# Patient Record
Sex: FEMALE | Race: WHITE
[De-identification: names, ages, dates, MRNs, and addresses within clinical notes are randomized per-mention and may not be internally consistent; named-entity substitution may affect disease eponyms.]

---

## 2020-10-23 ENCOUNTER — HOSPITAL ENCOUNTER (OUTPATIENT)
Dept: HOSPITAL 100 - WC | Age: 60
LOS: 8 days | End: 2020-10-31
Payer: COMMERCIAL

## 2020-10-23 ENCOUNTER — HOSPITAL ENCOUNTER (INPATIENT)
Dept: HOSPITAL 100 - ED | Age: 60
LOS: 6 days | Discharge: ANOTHER HOSPITAL | DRG: 477 | End: 2020-10-29
Payer: COMMERCIAL

## 2020-10-23 VITALS
SYSTOLIC BLOOD PRESSURE: 133 MMHG | RESPIRATION RATE: 21 BRPM | HEART RATE: 94 BPM | DIASTOLIC BLOOD PRESSURE: 113 MMHG | OXYGEN SATURATION: 97 %

## 2020-10-23 VITALS
DIASTOLIC BLOOD PRESSURE: 57 MMHG | HEART RATE: 89 BPM | RESPIRATION RATE: 22 BRPM | OXYGEN SATURATION: 98 % | SYSTOLIC BLOOD PRESSURE: 163 MMHG | TEMPERATURE: 97 F

## 2020-10-23 VITALS — BODY MASS INDEX: 42 KG/M2

## 2020-10-23 VITALS
HEART RATE: 87 BPM | RESPIRATION RATE: 23 BRPM | TEMPERATURE: 96.98 F | OXYGEN SATURATION: 97 % | DIASTOLIC BLOOD PRESSURE: 76 MMHG | SYSTOLIC BLOOD PRESSURE: 156 MMHG

## 2020-10-23 VITALS
SYSTOLIC BLOOD PRESSURE: 156 MMHG | TEMPERATURE: 97.52 F | RESPIRATION RATE: 16 BRPM | HEART RATE: 97 BPM | DIASTOLIC BLOOD PRESSURE: 69 MMHG | OXYGEN SATURATION: 97 %

## 2020-10-23 VITALS
DIASTOLIC BLOOD PRESSURE: 65 MMHG | TEMPERATURE: 98 F | SYSTOLIC BLOOD PRESSURE: 160 MMHG | RESPIRATION RATE: 16 BRPM | HEART RATE: 109 BPM

## 2020-10-23 VITALS — BODY MASS INDEX: 40.8 KG/M2 | BODY MASS INDEX: 35.7 KG/M2 | BODY MASS INDEX: 42 KG/M2 | BODY MASS INDEX: 35.6 KG/M2

## 2020-10-23 VITALS — SYSTOLIC BLOOD PRESSURE: 155 MMHG | DIASTOLIC BLOOD PRESSURE: 65 MMHG

## 2020-10-23 VITALS — TEMPERATURE: 98.7 F

## 2020-10-23 DIAGNOSIS — Z82.49: ICD-10-CM

## 2020-10-23 DIAGNOSIS — M48.061: ICD-10-CM

## 2020-10-23 DIAGNOSIS — E11.51: ICD-10-CM

## 2020-10-23 DIAGNOSIS — M48.00: ICD-10-CM

## 2020-10-23 DIAGNOSIS — K68.12: ICD-10-CM

## 2020-10-23 DIAGNOSIS — Z79.899: ICD-10-CM

## 2020-10-23 DIAGNOSIS — Z95.1: ICD-10-CM

## 2020-10-23 DIAGNOSIS — L97.424: ICD-10-CM

## 2020-10-23 DIAGNOSIS — M46.57: ICD-10-CM

## 2020-10-23 DIAGNOSIS — Z88.5: ICD-10-CM

## 2020-10-23 DIAGNOSIS — E66.01: ICD-10-CM

## 2020-10-23 DIAGNOSIS — Z88.8: ICD-10-CM

## 2020-10-23 DIAGNOSIS — E10.621: ICD-10-CM

## 2020-10-23 DIAGNOSIS — G47.33: ICD-10-CM

## 2020-10-23 DIAGNOSIS — G06.1: ICD-10-CM

## 2020-10-23 DIAGNOSIS — N31.9: ICD-10-CM

## 2020-10-23 DIAGNOSIS — D63.8: ICD-10-CM

## 2020-10-23 DIAGNOSIS — M86.672: ICD-10-CM

## 2020-10-23 DIAGNOSIS — Z79.02: ICD-10-CM

## 2020-10-23 DIAGNOSIS — E10.65: ICD-10-CM

## 2020-10-23 DIAGNOSIS — M19.011: ICD-10-CM

## 2020-10-23 DIAGNOSIS — D47.3: ICD-10-CM

## 2020-10-23 DIAGNOSIS — E11.621: Primary | ICD-10-CM

## 2020-10-23 DIAGNOSIS — Z90.710: ICD-10-CM

## 2020-10-23 DIAGNOSIS — E10.42: ICD-10-CM

## 2020-10-23 DIAGNOSIS — Z96.41: ICD-10-CM

## 2020-10-23 DIAGNOSIS — I10: ICD-10-CM

## 2020-10-23 DIAGNOSIS — Z90.49: ICD-10-CM

## 2020-10-23 DIAGNOSIS — L97.429: ICD-10-CM

## 2020-10-23 DIAGNOSIS — M86.172: Primary | ICD-10-CM

## 2020-10-23 DIAGNOSIS — B95.61: ICD-10-CM

## 2020-10-23 DIAGNOSIS — Z79.4: ICD-10-CM

## 2020-10-23 DIAGNOSIS — I49.9: ICD-10-CM

## 2020-10-23 DIAGNOSIS — L03.116: ICD-10-CM

## 2020-10-23 DIAGNOSIS — E10.51: ICD-10-CM

## 2020-10-23 DIAGNOSIS — B96.4: ICD-10-CM

## 2020-10-23 DIAGNOSIS — E78.5: ICD-10-CM

## 2020-10-23 DIAGNOSIS — I25.10: ICD-10-CM

## 2020-10-23 DIAGNOSIS — M19.90: ICD-10-CM

## 2020-10-23 DIAGNOSIS — Z95.818: ICD-10-CM

## 2020-10-23 DIAGNOSIS — Z79.82: ICD-10-CM

## 2020-10-23 DIAGNOSIS — E10.69: ICD-10-CM

## 2020-10-23 DIAGNOSIS — B96.89: ICD-10-CM

## 2020-10-23 DIAGNOSIS — M25.511: ICD-10-CM

## 2020-10-23 DIAGNOSIS — N30.00: ICD-10-CM

## 2020-10-23 DIAGNOSIS — E11.65: ICD-10-CM

## 2020-10-23 LAB
ALANINE AMINOTRANSFER ALT/SGPT: 35 U/L (ref 13–56)
ALANINE AMINOTRANSFER ALT/SGPT: 40 U/L (ref 13–56)
ALBUMIN SERPL-MCNC: 1.6 G/DL (ref 3.2–5)
ALBUMIN SERPL-MCNC: 1.6 G/DL (ref 3.2–5)
ALKALINE PHOSPHATASE: 161 U/L (ref 45–117)
ALKALINE PHOSPHATASE: 179 U/L (ref 45–117)
ANION GAP: 13 (ref 5–15)
ANION GAP: 13 (ref 5–15)
ANISOCYTOSIS BLD QL SMEAR: (no result)
ANISOCYTOSIS: (no result)
APTT PPP: 27.9 SECONDS (ref 24.1–36.2)
AST(SGOT): 21 U/L (ref 15–37)
AST(SGOT): 30 U/L (ref 15–37)
BUN SERPL-MCNC: 22 MG/DL (ref 7–18)
BUN SERPL-MCNC: 26 MG/DL (ref 7–18)
BUN/CREAT RATIO: 27.4 RATIO (ref 10–20)
BUN/CREAT RATIO: 31.7 RATIO (ref 10–20)
CALCIUM SERPL-MCNC: 8.5 MG/DL (ref 8.5–10.1)
CALCIUM SERPL-MCNC: 8.8 MG/DL (ref 8.5–10.1)
CARBON DIOXIDE: 18 MMOL/L (ref 21–32)
CARBON DIOXIDE: 20 MMOL/L (ref 21–32)
CHLORIDE: 105 MMOL/L (ref 98–107)
CHLORIDE: 107 MMOL/L (ref 98–107)
CRP SERPL-MCNC: 361 MG/L (ref 0–3)
DEPRECATED RDW RBC: 54.3 FL (ref 35.1–43.9)
DEPRECATED RDW RBC: 55.6 FL (ref 35.1–43.9)
DIFFERENTIAL COMMENT: (no result)
DIFFERENTIAL COMMENT: (no result)
DIFFERENTIAL INDICATED: (no result)
DIFFERENTIAL INDICATED: (no result)
ERYTHROCYTE [DISTWIDTH] IN BLOOD: 18.4 % (ref 11.6–14.6)
ERYTHROCYTE [DISTWIDTH] IN BLOOD: 18.4 % (ref 11.6–14.6)
EST GLOM FILT RATE - AFR AMER: 92 ML/MIN (ref 60–?)
EST GLOM FILT RATE - AFR AMER: 94 ML/MIN (ref 60–?)
ESTIMATED CREATININE CLEARANCE: 71.84 ML/MIN
ESTIMATED CREATININE CLEARANCE: 73.63 ML/MIN
GLOBULIN: 5.4 G/DL (ref 2.2–4.2)
GLOBULIN: 5.6 G/DL (ref 2.2–4.2)
GLUCOSE, DIPSTICK: 1000 MG/DL
GLUCOSE: 192 MG/DL (ref 74–106)
GLUCOSE: 282 MG/DL (ref 74–106)
HCT VFR BLD AUTO: 30.8 % (ref 37–47)
HCT VFR BLD AUTO: 34.1 % (ref 37–47)
HEMOGLOBIN: 9.2 G/DL (ref 12–15)
HEMOGLOBIN: 9.9 G/DL (ref 12–15)
HGB BLD-MCNC: 9.2 G/DL (ref 12–15)
HGB BLD-MCNC: 9.9 G/DL (ref 12–15)
HYPOCHROMASIA: (no result)
HYPOCHROMIA BLD QL: (no result)
IMMATURE GRANULOCYTES COUNT: 0.72 X10^3/UL (ref 0–0)
IMMATURE GRANULOCYTES COUNT: 1.24 X10^3/UL (ref 0–0)
KETONE-DIPSTICK: 50 MG/DL
LEUKOCYTE ESTERASE UR QL STRIP: 500 /UL
MANUAL DIF COMMENT BLD-IMP: (no result)
MANUAL DIF COMMENT BLD-IMP: (no result)
MCV RBC: 81.3 FL (ref 81–99)
MCV RBC: 82.6 FL (ref 81–99)
MEAN CORP HGB CONC: 29 G/DL (ref 32–36)
MEAN CORP HGB CONC: 29.9 G/DL (ref 32–36)
MEAN PLATELET VOL.: 10.1 FL (ref 6.2–12)
MEAN PLATELET VOL.: 9.2 FL (ref 6.2–12)
MICROCYTOSIS: (no result)
MUCOUS THREADS URNS QL MICRO: (no result) /HPF
NRBC FLAGGED BY ANALYZER: 0 % (ref 0–5)
NRBC FLAGGED BY ANALYZER: 0 % (ref 0–5)
PLATELET # BLD: 562 K/MM3 (ref 150–450)
PLATELET # BLD: 594 K/MM3 (ref 150–450)
PLATELET COUNT: 562 K/MM3 (ref 150–450)
PLATELET COUNT: 594 K/MM3 (ref 150–450)
POSITIVE COUNT: YES
POSITIVE COUNT: YES
POSITIVE DIFFERENTIAL: YES
POSITIVE DIFFERENTIAL: YES
POTASSIUM: 4.4 MMOL/L (ref 3.5–5.1)
POTASSIUM: 4.4 MMOL/L (ref 3.5–5.1)
PREALB SERPL-MCNC: 5.2 MG/DL (ref 20–40)
PROTHROMBIN TIME (PROTIME)PT.: 16 SECONDS (ref 11.7–14.9)
RBC # BLD AUTO: 3.79 M/MM3 (ref 4.2–5.4)
RBC # BLD AUTO: 4.13 M/MM3 (ref 4.2–5.4)
RBC DISTRIBUTION WIDTH CV: 18.4 % (ref 11.6–14.6)
RBC DISTRIBUTION WIDTH CV: 18.4 % (ref 11.6–14.6)
RBC DISTRIBUTION WIDTH SD: 54.3 FL (ref 35.1–43.9)
RBC DISTRIBUTION WIDTH SD: 55.6 FL (ref 35.1–43.9)
RBC MORPH BLD: (no result) NORMAL
RBC UR QL: (no result) /HPF (ref 0–5)
RED CELL MORPHOLOGY: (no result) NORMAL
SCAN SMEAR PER REVIEW CRITERIA: (no result)
SCAN SMEAR PER REVIEW CRITERIA: (no result)
SP GR UR: 1.01 (ref 1–1.03)
SQUAMOUS URNS QL MICRO: (no result) /HPF (ref 5–10)
URINE PRESERVATIVE: (no result)
WBC # BLD AUTO: 41 K/MM3 (ref 4.4–11)
WBC # BLD AUTO: 44.5 K/MM3 (ref 4.4–11)
WHITE BLOOD COUNT: 41 K/MM3 (ref 4.4–11)
WHITE BLOOD COUNT: 44.5 K/MM3 (ref 4.4–11)

## 2020-10-23 PROCEDURE — 81001 URINALYSIS AUTO W/SCOPE: CPT

## 2020-10-23 PROCEDURE — 73718 MRI LOWER EXTREMITY W/O DYE: CPT

## 2020-10-23 PROCEDURE — 77002 NEEDLE LOCALIZATION BY XRAY: CPT

## 2020-10-23 PROCEDURE — 82962 GLUCOSE BLOOD TEST: CPT

## 2020-10-23 PROCEDURE — 87075 CULTR BACTERIA EXCEPT BLOOD: CPT

## 2020-10-23 PROCEDURE — 87640 STAPH A DNA AMP PROBE: CPT

## 2020-10-23 PROCEDURE — 85730 THROMBOPLASTIN TIME PARTIAL: CPT

## 2020-10-23 PROCEDURE — 73610 X-RAY EXAM OF ANKLE: CPT

## 2020-10-23 PROCEDURE — A9575 INJ GADOTERATE MEGLUMI 0.1ML: HCPCS

## 2020-10-23 PROCEDURE — 83550 IRON BINDING TEST: CPT

## 2020-10-23 PROCEDURE — 84134 ASSAY OF PREALBUMIN: CPT

## 2020-10-23 PROCEDURE — 86140 C-REACTIVE PROTEIN: CPT

## 2020-10-23 PROCEDURE — 99203 OFFICE O/P NEW LOW 30 MIN: CPT

## 2020-10-23 PROCEDURE — 83036 HEMOGLOBIN GLYCOSYLATED A1C: CPT

## 2020-10-23 PROCEDURE — 73720 MRI LWR EXTREMITY W/O&W/DYE: CPT

## 2020-10-23 PROCEDURE — 82728 ASSAY OF FERRITIN: CPT

## 2020-10-23 PROCEDURE — 80202 ASSAY OF VANCOMYCIN: CPT

## 2020-10-23 PROCEDURE — 87077 CULTURE AEROBIC IDENTIFY: CPT

## 2020-10-23 PROCEDURE — 87088 URINE BACTERIA CULTURE: CPT

## 2020-10-23 PROCEDURE — A4216 STERILE WATER/SALINE, 10 ML: HCPCS

## 2020-10-23 PROCEDURE — 97802 MEDICAL NUTRITION INDIV IN: CPT

## 2020-10-23 PROCEDURE — 83605 ASSAY OF LACTIC ACID: CPT

## 2020-10-23 PROCEDURE — 87149 DNA/RNA DIRECT PROBE: CPT

## 2020-10-23 PROCEDURE — G0463 HOSPITAL OUTPT CLINIC VISIT: HCPCS

## 2020-10-23 PROCEDURE — 87040 BLOOD CULTURE FOR BACTERIA: CPT

## 2020-10-23 PROCEDURE — 93923 UPR/LXTR ART STDY 3+ LVLS: CPT

## 2020-10-23 PROCEDURE — 71045 X-RAY EXAM CHEST 1 VIEW: CPT

## 2020-10-23 PROCEDURE — 97535 SELF CARE MNGMENT TRAINING: CPT

## 2020-10-23 PROCEDURE — 87086 URINE CULTURE/COLONY COUNT: CPT

## 2020-10-23 PROCEDURE — 88305 TISSUE EXAM BY PATHOLOGIST: CPT

## 2020-10-23 PROCEDURE — 99285 EMERGENCY DEPT VISIT HI MDM: CPT

## 2020-10-23 PROCEDURE — 83540 ASSAY OF IRON: CPT

## 2020-10-23 PROCEDURE — 73060 X-RAY EXAM OF HUMERUS: CPT

## 2020-10-23 PROCEDURE — 87070 CULTURE OTHR SPECIMN AEROBIC: CPT

## 2020-10-23 PROCEDURE — 99251: CPT

## 2020-10-23 PROCEDURE — 87176 TISSUE HOMOGENIZATION CULTR: CPT

## 2020-10-23 PROCEDURE — 72158 MRI LUMBAR SPINE W/O & W/DYE: CPT

## 2020-10-23 PROCEDURE — 87186 SC STD MICRODIL/AGAR DIL: CPT

## 2020-10-23 PROCEDURE — 88304 TISSUE EXAM BY PATHOLOGIST: CPT

## 2020-10-23 PROCEDURE — 87205 SMEAR GRAM STAIN: CPT

## 2020-10-23 PROCEDURE — 97530 THERAPEUTIC ACTIVITIES: CPT

## 2020-10-23 PROCEDURE — 85652 RBC SED RATE AUTOMATED: CPT

## 2020-10-23 PROCEDURE — 85025 COMPLETE CBC W/AUTO DIFF WBC: CPT

## 2020-10-23 PROCEDURE — 93306 TTE W/DOPPLER COMPLETE: CPT

## 2020-10-23 PROCEDURE — 97162 PT EVAL MOD COMPLEX 30 MIN: CPT

## 2020-10-23 PROCEDURE — 20610 DRAIN/INJ JOINT/BURSA W/O US: CPT

## 2020-10-23 PROCEDURE — 85610 PROTHROMBIN TIME: CPT

## 2020-10-23 PROCEDURE — 93005 ELECTROCARDIOGRAM TRACING: CPT

## 2020-10-23 PROCEDURE — 80053 COMPREHEN METABOLIC PANEL: CPT

## 2020-10-23 PROCEDURE — 80048 BASIC METABOLIC PNL TOTAL CA: CPT

## 2020-10-23 PROCEDURE — 88311 DECALCIFY TISSUE: CPT

## 2020-10-23 PROCEDURE — 36415 COLL VENOUS BLD VENIPUNCTURE: CPT

## 2020-10-23 PROCEDURE — 97166 OT EVAL MOD COMPLEX 45 MIN: CPT

## 2020-10-23 PROCEDURE — 11044 DBRDMT BONE 1ST 20 SQ CM/<: CPT

## 2020-10-23 PROCEDURE — 84443 ASSAY THYROID STIM HORMONE: CPT

## 2020-10-23 RX ADMIN — SODIUM CHLORIDE 999 ML: 9 INJECTION, SOLUTION INTRAVENOUS at 21:10

## 2020-10-24 VITALS
TEMPERATURE: 99 F | OXYGEN SATURATION: 96 % | HEART RATE: 90 BPM | SYSTOLIC BLOOD PRESSURE: 154 MMHG | DIASTOLIC BLOOD PRESSURE: 60 MMHG | RESPIRATION RATE: 16 BRPM

## 2020-10-24 VITALS
OXYGEN SATURATION: 95 % | HEART RATE: 86 BPM | SYSTOLIC BLOOD PRESSURE: 148 MMHG | DIASTOLIC BLOOD PRESSURE: 69 MMHG | RESPIRATION RATE: 20 BRPM | TEMPERATURE: 98.6 F

## 2020-10-24 VITALS
HEART RATE: 85 BPM | TEMPERATURE: 99.1 F | RESPIRATION RATE: 18 BRPM | DIASTOLIC BLOOD PRESSURE: 68 MMHG | OXYGEN SATURATION: 100 % | SYSTOLIC BLOOD PRESSURE: 161 MMHG

## 2020-10-24 VITALS
DIASTOLIC BLOOD PRESSURE: 59 MMHG | SYSTOLIC BLOOD PRESSURE: 171 MMHG | TEMPERATURE: 98.4 F | HEART RATE: 85 BPM | OXYGEN SATURATION: 100 % | RESPIRATION RATE: 20 BRPM

## 2020-10-24 VITALS
DIASTOLIC BLOOD PRESSURE: 58 MMHG | SYSTOLIC BLOOD PRESSURE: 144 MMHG | RESPIRATION RATE: 18 BRPM | TEMPERATURE: 98.42 F | OXYGEN SATURATION: 100 % | HEART RATE: 84 BPM

## 2020-10-24 VITALS — HEART RATE: 90 BPM

## 2020-10-24 VITALS
TEMPERATURE: 97.9 F | DIASTOLIC BLOOD PRESSURE: 60 MMHG | OXYGEN SATURATION: 95 % | HEART RATE: 88 BPM | SYSTOLIC BLOOD PRESSURE: 126 MMHG | RESPIRATION RATE: 18 BRPM

## 2020-10-24 VITALS — OXYGEN SATURATION: 92 % | RESPIRATION RATE: 16 BRPM

## 2020-10-24 VITALS — HEART RATE: 92 BPM

## 2020-10-24 VITALS — OXYGEN SATURATION: 98 %

## 2020-10-24 VITALS — HEART RATE: 86 BPM

## 2020-10-24 VITALS — HEART RATE: 94 BPM

## 2020-10-24 LAB
ANION GAP: 15 (ref 5–15)
BUN SERPL-MCNC: 23 MG/DL (ref 7–18)
BUN/CREAT RATIO: 33.5 RATIO (ref 10–20)
CALCIUM SERPL-MCNC: 8.3 MG/DL (ref 8.5–10.1)
CARBON DIOXIDE: 15 MMOL/L (ref 21–32)
CHLORIDE: 106 MMOL/L (ref 98–107)
CRP SERPL-MCNC: 341 MG/L (ref 0–3)
DEPRECATED RDW RBC: 56.3 FL (ref 35.1–43.9)
DIFFERENTIAL COMMENT: (no result)
DIFFERENTIAL INDICATED: (no result)
ERYTHROCYTE [DISTWIDTH] IN BLOOD: 18.6 % (ref 11.6–14.6)
EST GLOM FILT RATE - AFR AMER: 113 ML/MIN (ref 60–?)
ESTIMATED CREATININE CLEARANCE: 85.37 ML/MIN
FERRITIN SERPL-MCNC: 319 NG/ML (ref 8–252)
GLUCOSE: 201 MG/DL (ref 74–106)
HCT VFR BLD AUTO: 34.7 % (ref 37–47)
HEMOGLOBIN: 10 G/DL (ref 12–15)
HGB BLD-MCNC: 10 G/DL (ref 12–15)
IMMATURE GRANULOCYTES COUNT: 1.51 X10^3/UL (ref 0–0)
IRON BINDING CAPACITY,TOTAL: 160 UG/DL (ref 250–450)
IRON SATN MFR SERPL: 11.9 % (ref 15–55)
IRON SPEC-MCNT: 19 UG/DL (ref 50–170)
MANUAL DIF COMMENT BLD-IMP: (no result)
MCV RBC: 83.8 FL (ref 81–99)
MEAN CORP HGB CONC: 28.8 G/DL (ref 32–36)
MEAN PLATELET VOL.: 9.6 FL (ref 6.2–12)
NRBC FLAGGED BY ANALYZER: 0 % (ref 0–5)
PLATELET # BLD: 588 K/MM3 (ref 150–450)
PLATELET COUNT: 588 K/MM3 (ref 150–450)
POSITIVE COUNT: YES
POSITIVE DIFFERENTIAL: YES
POTASSIUM: 4.1 MMOL/L (ref 3.5–5.1)
RBC # BLD AUTO: 4.14 M/MM3 (ref 4.2–5.4)
RBC DISTRIBUTION WIDTH CV: 18.6 % (ref 11.6–14.6)
RBC DISTRIBUTION WIDTH SD: 56.3 FL (ref 35.1–43.9)
SCAN SMEAR PER REVIEW CRITERIA: (no result)
STAPH AUREUS DNA BY PCR: POSITIVE
WBC # BLD AUTO: 45.6 K/MM3 (ref 4.4–11)
WHITE BLOOD COUNT: 45.6 K/MM3 (ref 4.4–11)

## 2020-10-24 RX ADMIN — SODIUM CHLORIDE, PRESERVATIVE FREE 0 ML: 5 INJECTION INTRAVENOUS at 09:23

## 2020-10-24 RX ADMIN — ASPIRIN 81 MG: 81 TABLET, COATED ORAL at 09:22

## 2020-10-24 RX ADMIN — Medication 1 PACKET: at 17:26

## 2020-10-24 RX ADMIN — NYSTATIN 500000 UNIT: 100000 SUSPENSION ORAL at 21:03

## 2020-10-24 RX ADMIN — NYSTATIN 500000 UNIT: 100000 SUSPENSION ORAL at 17:28

## 2020-10-24 RX ADMIN — SODIUM CHLORIDE 100 ML: 9 INJECTION, SOLUTION INTRAVENOUS at 17:25

## 2020-10-24 RX ADMIN — Medication 120 ML: at 21:05

## 2020-10-24 RX ADMIN — SODIUM CHLORIDE, PRESERVATIVE FREE 0 ML: 5 INJECTION INTRAVENOUS at 00:42

## 2020-10-24 RX ADMIN — NYSTATIN 500000 UNIT: 100000 SUSPENSION ORAL at 11:38

## 2020-10-24 RX ADMIN — Medication 120 ML: at 17:25

## 2020-10-24 RX ADMIN — METOPROLOL SUCCINATE 12.5 MG: 25 TABLET, EXTENDED RELEASE ORAL at 11:39

## 2020-10-24 RX ADMIN — METOPROLOL SUCCINATE 12.5 MG: 25 TABLET, EXTENDED RELEASE ORAL at 21:03

## 2020-10-24 RX ADMIN — NYSTATIN 500000 UNIT: 100000 SUSPENSION ORAL at 14:33

## 2020-10-24 RX ADMIN — SODIUM CHLORIDE 100 ML: 9 INJECTION, SOLUTION INTRAVENOUS at 00:36

## 2020-10-24 RX ADMIN — SODIUM CHLORIDE, PRESERVATIVE FREE 0 ML: 5 INJECTION INTRAVENOUS at 11:39

## 2020-10-25 VITALS — HEART RATE: 97 BPM

## 2020-10-25 VITALS
HEART RATE: 78 BPM | OXYGEN SATURATION: 97 % | DIASTOLIC BLOOD PRESSURE: 55 MMHG | SYSTOLIC BLOOD PRESSURE: 127 MMHG | TEMPERATURE: 97.88 F | RESPIRATION RATE: 18 BRPM

## 2020-10-25 VITALS
HEART RATE: 91 BPM | OXYGEN SATURATION: 94 % | SYSTOLIC BLOOD PRESSURE: 122 MMHG | RESPIRATION RATE: 18 BRPM | DIASTOLIC BLOOD PRESSURE: 54 MMHG | TEMPERATURE: 98.7 F

## 2020-10-25 VITALS
HEART RATE: 89 BPM | DIASTOLIC BLOOD PRESSURE: 50 MMHG | OXYGEN SATURATION: 95 % | SYSTOLIC BLOOD PRESSURE: 116 MMHG | RESPIRATION RATE: 18 BRPM | TEMPERATURE: 97.88 F

## 2020-10-25 VITALS
RESPIRATION RATE: 18 BRPM | SYSTOLIC BLOOD PRESSURE: 133 MMHG | HEART RATE: 84 BPM | OXYGEN SATURATION: 95 % | DIASTOLIC BLOOD PRESSURE: 61 MMHG | TEMPERATURE: 98.9 F

## 2020-10-25 VITALS — HEART RATE: 85 BPM

## 2020-10-25 VITALS — HEART RATE: 84 BPM

## 2020-10-25 VITALS — HEART RATE: 93 BPM

## 2020-10-25 VITALS — HEART RATE: 88 BPM

## 2020-10-25 VITALS — HEART RATE: 83 BPM

## 2020-10-25 VITALS — HEART RATE: 76 BPM

## 2020-10-25 LAB
ANION GAP: 12 (ref 5–15)
BUN SERPL-MCNC: 32 MG/DL (ref 7–18)
BUN/CREAT RATIO: 38.4 RATIO (ref 10–20)
CALCIUM SERPL-MCNC: 8.5 MG/DL (ref 8.5–10.1)
CARBON DIOXIDE: 17 MMOL/L (ref 21–32)
CHLORIDE: 112 MMOL/L (ref 98–107)
DEPRECATED RDW RBC: 56.3 FL (ref 35.1–43.9)
DIFFERENTIAL COMMENT: (no result)
DIFFERENTIAL INDICATED: (no result)
ERYTHROCYTE [DISTWIDTH] IN BLOOD: 18.8 % (ref 11.6–14.6)
EST GLOM FILT RATE - AFR AMER: 90 ML/MIN (ref 60–?)
ESTIMATED CREATININE CLEARANCE: 70.97 ML/MIN
GLUCOSE: 225 MG/DL (ref 74–106)
HCT VFR BLD AUTO: 29.4 % (ref 37–47)
HEMOGLOBIN: 8.6 G/DL (ref 12–15)
HGB BLD-MCNC: 8.6 G/DL (ref 12–15)
IMMATURE GRANULOCYTES COUNT: 0.65 X10^3/UL (ref 0–0)
MANUAL DIF COMMENT BLD-IMP: (no result)
MCV RBC: 81.2 FL (ref 81–99)
MEAN CORP HGB CONC: 29.3 G/DL (ref 32–36)
MEAN PLATELET VOL.: 9.5 FL (ref 6.2–12)
NRBC FLAGGED BY ANALYZER: 0 % (ref 0–5)
PLATELET # BLD: 537 K/MM3 (ref 150–450)
PLATELET COUNT: 537 K/MM3 (ref 150–450)
POSITIVE COUNT: YES
POSITIVE DIFFERENTIAL: YES
POTASSIUM: 4.5 MMOL/L (ref 3.5–5.1)
RBC # BLD AUTO: 3.62 M/MM3 (ref 4.2–5.4)
RBC DISTRIBUTION WIDTH CV: 18.8 % (ref 11.6–14.6)
RBC DISTRIBUTION WIDTH SD: 56.3 FL (ref 35.1–43.9)
SCAN SMEAR PER REVIEW CRITERIA: (no result)
VANCOMYCIN TROUGH SERPL-MCNC: 26.6 UG/ML (ref 5–15)
WBC # BLD AUTO: 33.5 K/MM3 (ref 4.4–11)
WHITE BLOOD COUNT: 33.5 K/MM3 (ref 4.4–11)

## 2020-10-25 RX ADMIN — METOPROLOL SUCCINATE 12.5 MG: 25 TABLET, EXTENDED RELEASE ORAL at 20:07

## 2020-10-25 RX ADMIN — NYSTATIN 500000 UNIT: 100000 SUSPENSION ORAL at 17:34

## 2020-10-25 RX ADMIN — Medication 120 ML: at 20:04

## 2020-10-25 RX ADMIN — NYSTATIN 500000 UNIT: 100000 SUSPENSION ORAL at 14:34

## 2020-10-25 RX ADMIN — SODIUM CHLORIDE 100 ML: 9 INJECTION, SOLUTION INTRAVENOUS at 05:17

## 2020-10-25 RX ADMIN — METOPROLOL SUCCINATE 12.5 MG: 25 TABLET, EXTENDED RELEASE ORAL at 11:10

## 2020-10-25 RX ADMIN — NYSTATIN 500000 UNIT: 100000 SUSPENSION ORAL at 11:09

## 2020-10-25 RX ADMIN — Medication 1 PACKET: at 11:08

## 2020-10-25 RX ADMIN — SODIUM CHLORIDE 100 ML: 9 INJECTION, SOLUTION INTRAVENOUS at 19:32

## 2020-10-25 RX ADMIN — NYSTATIN 500000 UNIT: 100000 SUSPENSION ORAL at 20:07

## 2020-10-25 RX ADMIN — ASPIRIN 81 MG: 81 TABLET, COATED ORAL at 11:07

## 2020-10-26 VITALS — HEART RATE: 93 BPM

## 2020-10-26 VITALS
SYSTOLIC BLOOD PRESSURE: 131 MMHG | HEART RATE: 84 BPM | OXYGEN SATURATION: 95 % | RESPIRATION RATE: 18 BRPM | DIASTOLIC BLOOD PRESSURE: 59 MMHG | TEMPERATURE: 98.42 F

## 2020-10-26 VITALS — HEART RATE: 80 BPM

## 2020-10-26 VITALS — OXYGEN SATURATION: 94 %

## 2020-10-26 VITALS
SYSTOLIC BLOOD PRESSURE: 136 MMHG | OXYGEN SATURATION: 97 % | HEART RATE: 82 BPM | DIASTOLIC BLOOD PRESSURE: 59 MMHG | TEMPERATURE: 98.42 F | RESPIRATION RATE: 18 BRPM

## 2020-10-26 VITALS
RESPIRATION RATE: 18 BRPM | OXYGEN SATURATION: 98 % | TEMPERATURE: 98.4 F | SYSTOLIC BLOOD PRESSURE: 137 MMHG | DIASTOLIC BLOOD PRESSURE: 55 MMHG | HEART RATE: 90 BPM

## 2020-10-26 VITALS
SYSTOLIC BLOOD PRESSURE: 139 MMHG | RESPIRATION RATE: 16 BRPM | DIASTOLIC BLOOD PRESSURE: 54 MMHG | OXYGEN SATURATION: 100 % | HEART RATE: 88 BPM | TEMPERATURE: 98.78 F

## 2020-10-26 VITALS — HEART RATE: 90 BPM

## 2020-10-26 VITALS — HEART RATE: 88 BPM

## 2020-10-26 VITALS — HEART RATE: 89 BPM

## 2020-10-26 LAB
ANION GAP: 12 (ref 5–15)
BUN SERPL-MCNC: 30 MG/DL (ref 7–18)
BUN/CREAT RATIO: 46.7 RATIO (ref 10–20)
CALCIUM SERPL-MCNC: 8.5 MG/DL (ref 8.5–10.1)
CARBON DIOXIDE: 18 MMOL/L (ref 21–32)
CHLORIDE: 116 MMOL/L (ref 98–107)
DEPRECATED RDW RBC: 57.3 FL (ref 35.1–43.9)
DIFFERENTIAL COMMENT: (no result)
DIFFERENTIAL INDICATED: (no result)
ERYTHROCYTE [DISTWIDTH] IN BLOOD: 18.9 % (ref 11.6–14.6)
EST GLOM FILT RATE - AFR AMER: 122 ML/MIN (ref 60–?)
ESTIMATED CREATININE CLEARANCE: 92.04 ML/MIN
GLUCOSE: 172 MG/DL (ref 74–106)
HCT VFR BLD AUTO: 27.1 % (ref 37–47)
HEMOGLOBIN: 7.7 G/DL (ref 12–15)
HGB BLD-MCNC: 7.7 G/DL (ref 12–15)
IMMATURE GRANULOCYTES COUNT: 0.34 X10^3/UL (ref 0–0)
MANUAL DIF COMMENT BLD-IMP: (no result)
MCV RBC: 83.6 FL (ref 81–99)
MEAN CORP HGB CONC: 28.4 G/DL (ref 32–36)
MEAN PLATELET VOL.: 9.6 FL (ref 6.2–12)
NRBC FLAGGED BY ANALYZER: 0 % (ref 0–5)
PLATELET # BLD: 458 K/MM3 (ref 150–450)
PLATELET COUNT: 458 K/MM3 (ref 150–450)
POSITIVE DIFFERENTIAL: YES
POTASSIUM: 4 MMOL/L (ref 3.5–5.1)
RBC # BLD AUTO: 3.24 M/MM3 (ref 4.2–5.4)
RBC DISTRIBUTION WIDTH CV: 18.9 % (ref 11.6–14.6)
RBC DISTRIBUTION WIDTH SD: 57.3 FL (ref 35.1–43.9)
SCAN SMEAR PER REVIEW CRITERIA: (no result)
VANCOMYCIN SERPL-MCNC: 14.1 UG/ML (ref 0–15)
WBC # BLD AUTO: 24.2 K/MM3 (ref 4.4–11)
WHITE BLOOD COUNT: 24.2 K/MM3 (ref 4.4–11)

## 2020-10-26 RX ADMIN — NYSTATIN 500000 UNIT: 100000 SUSPENSION ORAL at 10:32

## 2020-10-26 RX ADMIN — SODIUM CHLORIDE 100 ML: 9 INJECTION, SOLUTION INTRAVENOUS at 19:37

## 2020-10-26 RX ADMIN — SODIUM CHLORIDE, PRESERVATIVE FREE 0 ML: 5 INJECTION INTRAVENOUS at 10:28

## 2020-10-26 RX ADMIN — METOPROLOL SUCCINATE 12.5 MG: 25 TABLET, EXTENDED RELEASE ORAL at 21:23

## 2020-10-26 RX ADMIN — NYSTATIN 500000 UNIT: 100000 SUSPENSION ORAL at 17:26

## 2020-10-26 RX ADMIN — Medication 1 PACKET: at 10:17

## 2020-10-26 RX ADMIN — METOPROLOL SUCCINATE 12.5 MG: 25 TABLET, EXTENDED RELEASE ORAL at 10:33

## 2020-10-26 RX ADMIN — Medication 1 PACKET: at 17:26

## 2020-10-26 RX ADMIN — NYSTATIN 500000 UNIT: 100000 SUSPENSION ORAL at 14:46

## 2020-10-26 RX ADMIN — ASPIRIN 81 MG: 81 TABLET, COATED ORAL at 10:28

## 2020-10-26 RX ADMIN — Medication 120 ML: at 10:19

## 2020-10-26 RX ADMIN — NYSTATIN 500000 UNIT: 100000 SUSPENSION ORAL at 21:24

## 2020-10-26 RX ADMIN — SODIUM CHLORIDE 100 ML: 9 INJECTION, SOLUTION INTRAVENOUS at 04:28

## 2020-10-27 VITALS
TEMPERATURE: 98.24 F | HEART RATE: 80 BPM | OXYGEN SATURATION: 98 % | SYSTOLIC BLOOD PRESSURE: 131 MMHG | DIASTOLIC BLOOD PRESSURE: 58 MMHG | RESPIRATION RATE: 18 BRPM

## 2020-10-27 VITALS
DIASTOLIC BLOOD PRESSURE: 59 MMHG | HEART RATE: 79 BPM | OXYGEN SATURATION: 96 % | RESPIRATION RATE: 16 BRPM | TEMPERATURE: 97.16 F | SYSTOLIC BLOOD PRESSURE: 118 MMHG

## 2020-10-27 VITALS
SYSTOLIC BLOOD PRESSURE: 115 MMHG | TEMPERATURE: 98.4 F | DIASTOLIC BLOOD PRESSURE: 54 MMHG | RESPIRATION RATE: 18 BRPM | HEART RATE: 78 BPM | OXYGEN SATURATION: 96 %

## 2020-10-27 VITALS
DIASTOLIC BLOOD PRESSURE: 63 MMHG | TEMPERATURE: 99.1 F | RESPIRATION RATE: 18 BRPM | HEART RATE: 87 BPM | SYSTOLIC BLOOD PRESSURE: 141 MMHG | OXYGEN SATURATION: 96 %

## 2020-10-27 VITALS — HEART RATE: 80 BPM

## 2020-10-27 VITALS — HEART RATE: 87 BPM

## 2020-10-27 VITALS
DIASTOLIC BLOOD PRESSURE: 56 MMHG | SYSTOLIC BLOOD PRESSURE: 137 MMHG | RESPIRATION RATE: 18 BRPM | HEART RATE: 75 BPM | TEMPERATURE: 98.42 F | OXYGEN SATURATION: 97 %

## 2020-10-27 LAB
ANION GAP: 7 (ref 5–15)
BUN SERPL-MCNC: 21 MG/DL (ref 7–18)
BUN/CREAT RATIO: 50.6 RATIO (ref 10–20)
CALCIUM SERPL-MCNC: 7.8 MG/DL (ref 8.5–10.1)
CARBON DIOXIDE: 23 MMOL/L (ref 21–32)
CHLORIDE: 110 MMOL/L (ref 98–107)
DEPRECATED RDW RBC: 57.1 FL (ref 35.1–43.9)
ERYTHROCYTE [DISTWIDTH] IN BLOOD: 18.8 % (ref 11.6–14.6)
EST GLOM FILT RATE - AFR AMER: 201 ML/MIN (ref 60–?)
ESTIMATED CREATININE CLEARANCE: 140.25 ML/MIN
GLUCOSE: 156 MG/DL (ref 74–106)
HCT VFR BLD AUTO: 26.6 % (ref 37–47)
HEMOGLOBIN: 7.6 G/DL (ref 12–15)
HGB BLD-MCNC: 7.6 G/DL (ref 12–15)
IMMATURE GRANULOCYTES COUNT: 0.29 X10^3/UL (ref 0–0)
MCV RBC: 83.4 FL (ref 81–99)
MEAN CORP HGB CONC: 28.6 G/DL (ref 32–36)
MEAN PLATELET VOL.: 9.5 FL (ref 6.2–12)
NRBC FLAGGED BY ANALYZER: 0 % (ref 0–5)
PLATELET # BLD: 379 K/MM3 (ref 150–450)
PLATELET COUNT: 379 K/MM3 (ref 150–450)
POTASSIUM: 3.8 MMOL/L (ref 3.5–5.1)
RBC # BLD AUTO: 3.19 M/MM3 (ref 4.2–5.4)
RBC DISTRIBUTION WIDTH CV: 18.8 % (ref 11.6–14.6)
RBC DISTRIBUTION WIDTH SD: 57.1 FL (ref 35.1–43.9)
WBC # BLD AUTO: 20.2 K/MM3 (ref 4.4–11)
WHITE BLOOD COUNT: 20.2 K/MM3 (ref 4.4–11)

## 2020-10-27 RX ADMIN — SODIUM CHLORIDE 100 ML: 9 INJECTION, SOLUTION INTRAVENOUS at 07:35

## 2020-10-27 RX ADMIN — SODIUM CHLORIDE 100 ML: 9 INJECTION, SOLUTION INTRAVENOUS at 17:58

## 2020-10-27 RX ADMIN — ASPIRIN 81 MG: 81 TABLET, COATED ORAL at 07:56

## 2020-10-27 RX ADMIN — NYSTATIN 500000 UNIT: 100000 SUSPENSION ORAL at 07:59

## 2020-10-27 RX ADMIN — NYSTATIN 500000 UNIT: 100000 SUSPENSION ORAL at 21:32

## 2020-10-27 RX ADMIN — Medication 1 PACKET: at 07:56

## 2020-10-27 RX ADMIN — NYSTATIN 500000 UNIT: 100000 SUSPENSION ORAL at 13:17

## 2020-10-27 RX ADMIN — NYSTATIN 500000 UNIT: 100000 SUSPENSION ORAL at 17:19

## 2020-10-27 RX ADMIN — METOPROLOL SUCCINATE 12.5 MG: 25 TABLET, EXTENDED RELEASE ORAL at 21:33

## 2020-10-27 RX ADMIN — METOPROLOL SUCCINATE 12.5 MG: 25 TABLET, EXTENDED RELEASE ORAL at 07:57

## 2020-10-27 RX ADMIN — Medication 1 PACKET: at 17:19

## 2020-10-28 VITALS
SYSTOLIC BLOOD PRESSURE: 141 MMHG | TEMPERATURE: 98.24 F | RESPIRATION RATE: 18 BRPM | HEART RATE: 86 BPM | OXYGEN SATURATION: 98 % | DIASTOLIC BLOOD PRESSURE: 55 MMHG

## 2020-10-28 VITALS
HEART RATE: 79 BPM | OXYGEN SATURATION: 97 % | DIASTOLIC BLOOD PRESSURE: 51 MMHG | RESPIRATION RATE: 18 BRPM | SYSTOLIC BLOOD PRESSURE: 134 MMHG | TEMPERATURE: 98.3 F

## 2020-10-28 VITALS
TEMPERATURE: 98.24 F | SYSTOLIC BLOOD PRESSURE: 159 MMHG | HEART RATE: 81 BPM | RESPIRATION RATE: 18 BRPM | DIASTOLIC BLOOD PRESSURE: 59 MMHG | OXYGEN SATURATION: 96 %

## 2020-10-28 VITALS — HEART RATE: 86 BPM

## 2020-10-28 VITALS — HEART RATE: 79 BPM

## 2020-10-28 VITALS
RESPIRATION RATE: 18 BRPM | TEMPERATURE: 97.9 F | HEART RATE: 79 BPM | DIASTOLIC BLOOD PRESSURE: 68 MMHG | SYSTOLIC BLOOD PRESSURE: 153 MMHG | OXYGEN SATURATION: 97 %

## 2020-10-28 VITALS
RESPIRATION RATE: 18 BRPM | DIASTOLIC BLOOD PRESSURE: 55 MMHG | OXYGEN SATURATION: 97 % | SYSTOLIC BLOOD PRESSURE: 145 MMHG | TEMPERATURE: 98.06 F | HEART RATE: 85 BPM

## 2020-10-28 LAB
ANION GAP: 9 (ref 5–15)
BUN SERPL-MCNC: 23 MG/DL (ref 7–18)
BUN/CREAT RATIO: 42.5 RATIO (ref 10–20)
CALCIUM SERPL-MCNC: 8.1 MG/DL (ref 8.5–10.1)
CARBON DIOXIDE: 19 MMOL/L (ref 21–32)
CHLORIDE: 109 MMOL/L (ref 98–107)
DEPRECATED RDW RBC: 56.9 FL (ref 35.1–43.9)
ERYTHROCYTE [DISTWIDTH] IN BLOOD: 18.8 % (ref 11.6–14.6)
EST GLOM FILT RATE - AFR AMER: 148 ML/MIN (ref 60–?)
ESTIMATED CREATININE CLEARANCE: 109.08 ML/MIN
GLUCOSE: 168 MG/DL (ref 74–106)
HCT VFR BLD AUTO: 30.2 % (ref 37–47)
HEMOGLOBIN: 8.6 G/DL (ref 12–15)
HGB BLD-MCNC: 8.6 G/DL (ref 12–15)
IMMATURE GRANULOCYTES COUNT: 0.4 X10^3/UL (ref 0–0)
MCV RBC: 83.2 FL (ref 81–99)
MEAN CORP HGB CONC: 28.5 G/DL (ref 32–36)
MEAN PLATELET VOL.: 9.4 FL (ref 6.2–12)
NRBC FLAGGED BY ANALYZER: 0 % (ref 0–5)
PLATELET # BLD: 407 K/MM3 (ref 150–450)
PLATELET COUNT: 407 K/MM3 (ref 150–450)
POTASSIUM: 3.9 MMOL/L (ref 3.5–5.1)
RBC # BLD AUTO: 3.63 M/MM3 (ref 4.2–5.4)
RBC DISTRIBUTION WIDTH CV: 18.8 % (ref 11.6–14.6)
RBC DISTRIBUTION WIDTH SD: 56.9 FL (ref 35.1–43.9)
VANCOMYCIN TROUGH SERPL-MCNC: 17 UG/ML (ref 5–15)
WBC # BLD AUTO: 21.3 K/MM3 (ref 4.4–11)
WHITE BLOOD COUNT: 21.3 K/MM3 (ref 4.4–11)

## 2020-10-28 RX ADMIN — Medication 1 PACKET: at 16:57

## 2020-10-28 RX ADMIN — SODIUM CHLORIDE 100 ML: 9 INJECTION, SOLUTION INTRAVENOUS at 02:31

## 2020-10-28 RX ADMIN — NYSTATIN 500000 UNIT: 100000 SUSPENSION ORAL at 10:15

## 2020-10-28 RX ADMIN — SODIUM CHLORIDE 100 ML: 9 INJECTION, SOLUTION INTRAVENOUS at 14:38

## 2020-10-28 RX ADMIN — METOPROLOL SUCCINATE 12.5 MG: 25 TABLET, EXTENDED RELEASE ORAL at 21:20

## 2020-10-28 RX ADMIN — METOPROLOL SUCCINATE 12.5 MG: 25 TABLET, EXTENDED RELEASE ORAL at 10:14

## 2020-10-28 RX ADMIN — SODIUM CHLORIDE, PRESERVATIVE FREE 0 ML: 5 INJECTION INTRAVENOUS at 13:19

## 2020-10-28 RX ADMIN — NYSTATIN 500000 UNIT: 100000 SUSPENSION ORAL at 13:19

## 2020-10-28 RX ADMIN — NYSTATIN 500000 UNIT: 100000 SUSPENSION ORAL at 21:19

## 2020-10-28 RX ADMIN — ASPIRIN 81 MG: 81 TABLET, COATED ORAL at 07:41

## 2020-10-28 RX ADMIN — NYSTATIN 500000 UNIT: 100000 SUSPENSION ORAL at 16:57

## 2020-10-28 RX ADMIN — Medication 1 PACKET: at 07:41

## 2020-10-29 VITALS
DIASTOLIC BLOOD PRESSURE: 51 MMHG | OXYGEN SATURATION: 95 % | SYSTOLIC BLOOD PRESSURE: 139 MMHG | TEMPERATURE: 97.8 F | HEART RATE: 91 BPM | RESPIRATION RATE: 16 BRPM

## 2020-10-29 VITALS
SYSTOLIC BLOOD PRESSURE: 159 MMHG | TEMPERATURE: 98.6 F | OXYGEN SATURATION: 96 % | DIASTOLIC BLOOD PRESSURE: 75 MMHG | RESPIRATION RATE: 16 BRPM | HEART RATE: 94 BPM

## 2020-10-29 VITALS
RESPIRATION RATE: 16 BRPM | OXYGEN SATURATION: 95 % | SYSTOLIC BLOOD PRESSURE: 151 MMHG | HEART RATE: 82 BPM | TEMPERATURE: 97.5 F | DIASTOLIC BLOOD PRESSURE: 60 MMHG

## 2020-10-29 VITALS — HEART RATE: 97 BPM

## 2020-10-29 VITALS
OXYGEN SATURATION: 98 % | HEART RATE: 97 BPM | DIASTOLIC BLOOD PRESSURE: 90 MMHG | TEMPERATURE: 97.6 F | SYSTOLIC BLOOD PRESSURE: 160 MMHG | RESPIRATION RATE: 14 BRPM

## 2020-10-29 VITALS — HEART RATE: 94 BPM

## 2020-10-29 RX ADMIN — DOCUSATE SODIUM 50 MG AND SENNOSIDES 8.6 MG 2 TABLET: 8.6; 5 TABLET, FILM COATED ORAL at 15:27

## 2020-10-29 RX ADMIN — Medication 1 PACKET: at 09:05

## 2020-10-29 RX ADMIN — ASPIRIN 81 MG: 81 TABLET, COATED ORAL at 09:07

## 2020-10-29 RX ADMIN — NYSTATIN 500000 UNIT: 100000 SUSPENSION ORAL at 17:08

## 2020-10-29 RX ADMIN — SODIUM CHLORIDE, PRESERVATIVE FREE 0 ML: 5 INJECTION INTRAVENOUS at 14:24

## 2020-10-29 RX ADMIN — NYSTATIN 500000 UNIT: 100000 SUSPENSION ORAL at 09:05

## 2020-10-29 RX ADMIN — Medication 1 PACKET: at 16:48

## 2020-10-29 RX ADMIN — NYSTATIN 500000 UNIT: 100000 SUSPENSION ORAL at 14:24

## 2020-10-29 RX ADMIN — METOPROLOL SUCCINATE 12.5 MG: 25 TABLET, EXTENDED RELEASE ORAL at 09:04

## 2020-10-29 RX ADMIN — METOPROLOL SUCCINATE 12.5 MG: 25 TABLET, EXTENDED RELEASE ORAL at 20:24

## 2020-11-18 ENCOUNTER — HOSPITAL ENCOUNTER (INPATIENT)
Age: 60
LOS: 41 days | Discharge: ANOTHER HOSPITAL | DRG: 622 | End: 2020-12-29
Payer: COMMERCIAL

## 2020-11-18 VITALS
RESPIRATION RATE: 16 BRPM | HEART RATE: 80 BPM | SYSTOLIC BLOOD PRESSURE: 156 MMHG | OXYGEN SATURATION: 93 % | TEMPERATURE: 98.24 F | DIASTOLIC BLOOD PRESSURE: 63 MMHG

## 2020-11-18 VITALS — HEART RATE: 80 BPM | OXYGEN SATURATION: 93 %

## 2020-11-18 VITALS — BODY MASS INDEX: 35.6 KG/M2 | BODY MASS INDEX: 38.6 KG/M2
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
## 2020-11-18 DIAGNOSIS — I10: ICD-10-CM

## 2020-11-18 DIAGNOSIS — L97.429: ICD-10-CM

## 2020-11-18 DIAGNOSIS — E66.01: ICD-10-CM

## 2020-11-18 DIAGNOSIS — B95.61: ICD-10-CM

## 2020-11-18 DIAGNOSIS — G47.30: ICD-10-CM

## 2020-11-18 DIAGNOSIS — Z23: ICD-10-CM

## 2020-11-18 DIAGNOSIS — K21.9: ICD-10-CM

## 2020-11-18 DIAGNOSIS — B35.4: ICD-10-CM

## 2020-11-18 DIAGNOSIS — M75.01: ICD-10-CM

## 2020-11-18 DIAGNOSIS — E10.621: ICD-10-CM

## 2020-11-18 DIAGNOSIS — F32.9: ICD-10-CM

## 2020-11-18 DIAGNOSIS — E10.42: ICD-10-CM

## 2020-11-18 DIAGNOSIS — Z95.1: ICD-10-CM

## 2020-11-18 DIAGNOSIS — N31.9: ICD-10-CM

## 2020-11-18 DIAGNOSIS — M86.172: ICD-10-CM

## 2020-11-18 DIAGNOSIS — M19.90: ICD-10-CM

## 2020-11-18 DIAGNOSIS — T81.30XA: ICD-10-CM

## 2020-11-18 DIAGNOSIS — M48.061: ICD-10-CM

## 2020-11-18 DIAGNOSIS — E78.5: ICD-10-CM

## 2020-11-18 DIAGNOSIS — I25.10: ICD-10-CM

## 2020-11-18 DIAGNOSIS — L89.154: ICD-10-CM

## 2020-11-18 DIAGNOSIS — G06.2: ICD-10-CM

## 2020-11-18 DIAGNOSIS — E10.69: Primary | ICD-10-CM

## 2020-11-18 DIAGNOSIS — R78.81: ICD-10-CM

## 2020-11-18 DIAGNOSIS — G47.33: ICD-10-CM

## 2020-11-18 DIAGNOSIS — E10.51: ICD-10-CM

## 2020-11-18 PROCEDURE — 82962 GLUCOSE BLOOD TEST: CPT

## 2020-11-18 PROCEDURE — 80048 BASIC METABOLIC PNL TOTAL CA: CPT

## 2020-11-18 PROCEDURE — 97530 THERAPEUTIC ACTIVITIES: CPT

## 2020-11-18 PROCEDURE — 97162 PT EVAL MOD COMPLEX 30 MIN: CPT

## 2020-11-18 PROCEDURE — 97110 THERAPEUTIC EXERCISES: CPT

## 2020-11-18 PROCEDURE — 87640 STAPH A DNA AMP PROBE: CPT

## 2020-11-18 PROCEDURE — 87635 SARS-COV-2 COVID-19 AMP PRB: CPT

## 2020-11-18 PROCEDURE — 85014 HEMATOCRIT: CPT

## 2020-11-18 PROCEDURE — 86850 RBC ANTIBODY SCREEN: CPT

## 2020-11-18 PROCEDURE — 86922 COMPATIBILITY TEST ANTIGLOB: CPT

## 2020-11-18 PROCEDURE — 87070 CULTURE OTHR SPECIMN AEROBIC: CPT

## 2020-11-18 PROCEDURE — 87077 CULTURE AEROBIC IDENTIFY: CPT

## 2020-11-18 PROCEDURE — 97802 MEDICAL NUTRITION INDIV IN: CPT

## 2020-11-18 PROCEDURE — 86920 COMPATIBILITY TEST SPIN: CPT

## 2020-11-18 PROCEDURE — 87186 SC STD MICRODIL/AGAR DIL: CPT

## 2020-11-18 PROCEDURE — A4216 STERILE WATER/SALINE, 10 ML: HCPCS

## 2020-11-18 PROCEDURE — 87426 SARSCOV CORONAVIRUS AG IA: CPT

## 2020-11-18 PROCEDURE — 97535 SELF CARE MNGMENT TRAINING: CPT

## 2020-11-18 PROCEDURE — 86900 BLOOD TYPING SEROLOGIC ABO: CPT

## 2020-11-18 PROCEDURE — 97542 WHEELCHAIR MNGMENT TRAINING: CPT

## 2020-11-18 PROCEDURE — 87205 SMEAR GRAM STAIN: CPT

## 2020-11-18 PROCEDURE — 97166 OT EVAL MOD COMPLEX 45 MIN: CPT

## 2020-11-18 PROCEDURE — 80202 ASSAY OF VANCOMYCIN: CPT

## 2020-11-18 PROCEDURE — 86901 BLOOD TYPING SEROLOGIC RH(D): CPT

## 2020-11-18 PROCEDURE — U0002 COVID-19 LAB TEST NON-CDC: HCPCS

## 2020-11-18 PROCEDURE — 93926 LOWER EXTREMITY STUDY: CPT

## 2020-11-18 PROCEDURE — 97130 THER IVNTJ EA ADDL 15 MIN: CPT

## 2020-11-18 PROCEDURE — 85025 COMPLETE CBC W/AUTO DIFF WBC: CPT

## 2020-11-18 PROCEDURE — 97129 THER IVNTJ 1ST 15 MIN: CPT

## 2020-11-18 PROCEDURE — 36415 COLL VENOUS BLD VENIPUNCTURE: CPT

## 2020-11-18 PROCEDURE — 74018 RADEX ABDOMEN 1 VIEW: CPT

## 2020-11-18 PROCEDURE — 85018 HEMOGLOBIN: CPT

## 2020-11-19 VITALS — OXYGEN SATURATION: 99 % | HEART RATE: 66 BPM | RESPIRATION RATE: 18 BRPM

## 2020-11-19 VITALS — HEART RATE: 77 BPM | DIASTOLIC BLOOD PRESSURE: 60 MMHG | SYSTOLIC BLOOD PRESSURE: 128 MMHG

## 2020-11-19 VITALS
SYSTOLIC BLOOD PRESSURE: 128 MMHG | DIASTOLIC BLOOD PRESSURE: 60 MMHG | TEMPERATURE: 98.06 F | HEART RATE: 77 BPM | RESPIRATION RATE: 18 BRPM | OXYGEN SATURATION: 94 %

## 2020-11-19 VITALS — HEART RATE: 68 BPM | SYSTOLIC BLOOD PRESSURE: 120 MMHG | DIASTOLIC BLOOD PRESSURE: 53 MMHG

## 2020-11-19 VITALS
OXYGEN SATURATION: 95 % | DIASTOLIC BLOOD PRESSURE: 53 MMHG | RESPIRATION RATE: 16 BRPM | SYSTOLIC BLOOD PRESSURE: 120 MMHG | HEART RATE: 68 BPM | TEMPERATURE: 97.4 F

## 2020-11-19 VITALS — DIASTOLIC BLOOD PRESSURE: 53 MMHG | SYSTOLIC BLOOD PRESSURE: 120 MMHG

## 2020-11-19 LAB
ANION GAP: 4 (ref 5–15)
BUN SERPL-MCNC: 10 MG/DL (ref 7–18)
BUN/CREAT RATIO: 18.8 RATIO (ref 10–20)
CALCIUM SERPL-MCNC: 8.1 MG/DL (ref 8.5–10.1)
CARBON DIOXIDE: 30 MMOL/L (ref 21–32)
CHLORIDE: 107 MMOL/L (ref 98–107)
DEPRECATED RDW RBC: 77.4 FL (ref 35.1–43.9)
DIFFERENTIAL COMMENT: (no result)
DIFFERENTIAL INDICATED: (no result)
ERYTHROCYTE [DISTWIDTH] IN BLOOD: 24.2 % (ref 11.6–14.6)
EST GLOM FILT RATE - AFR AMER: 151 ML/MIN (ref 60–?)
ESTIMATED CREATININE CLEARANCE: 111.14 ML/MIN
GLUCOSE: 113 MG/DL (ref 74–106)
HCT VFR BLD AUTO: 24.3 % (ref 37–47)
HEMOGLOBIN: 7.4 G/DL (ref 12–15)
HGB BLD-MCNC: 7.4 G/DL (ref 12–15)
IMMATURE GRANULOCYTES COUNT: 0.11 X10^3/UL (ref 0–0)
MANUAL DIF COMMENT BLD-IMP: (no result)
MCV RBC: 89.3 FL (ref 81–99)
MEAN CORP HGB CONC: 30.5 G/DL (ref 32–36)
MEAN PLATELET VOL.: 8.6 FL (ref 6.2–12)
NRBC FLAGGED BY ANALYZER: 0 % (ref 0–5)
PLATELET # BLD: 399 K/MM3 (ref 150–450)
PLATELET COUNT: 399 K/MM3 (ref 150–450)
POSITIVE MORPHOLOGY: YES
POTASSIUM: 3.6 MMOL/L (ref 3.5–5.1)
RBC # BLD AUTO: 2.72 M/MM3 (ref 4.2–5.4)
RBC DISTRIBUTION WIDTH CV: 24.2 % (ref 11.6–14.6)
RBC DISTRIBUTION WIDTH SD: 77.4 FL (ref 35.1–43.9)
SCAN SMEAR PER REVIEW CRITERIA: (no result)
WBC # BLD AUTO: 10.6 K/MM3 (ref 4.4–11)
WHITE BLOOD COUNT: 10.6 K/MM3 (ref 4.4–11)

## 2020-11-20 ENCOUNTER — HOSPITAL ENCOUNTER (OUTPATIENT)
Age: 60
End: 2020-11-20
Payer: COMMERCIAL

## 2020-11-20 VITALS — BODY MASS INDEX: 38.6 KG/M2

## 2020-11-20 VITALS
RESPIRATION RATE: 20 BRPM | SYSTOLIC BLOOD PRESSURE: 164 MMHG | HEART RATE: 74 BPM | OXYGEN SATURATION: 95 % | TEMPERATURE: 97.34 F | DIASTOLIC BLOOD PRESSURE: 62 MMHG

## 2020-11-20 VITALS
TEMPERATURE: 97.7 F | OXYGEN SATURATION: 98 % | SYSTOLIC BLOOD PRESSURE: 131 MMHG | RESPIRATION RATE: 16 BRPM | HEART RATE: 68 BPM | DIASTOLIC BLOOD PRESSURE: 61 MMHG

## 2020-11-20 VITALS
HEART RATE: 69 BPM | DIASTOLIC BLOOD PRESSURE: 63 MMHG | RESPIRATION RATE: 14 BRPM | OXYGEN SATURATION: 98 % | TEMPERATURE: 96.9 F | SYSTOLIC BLOOD PRESSURE: 144 MMHG

## 2020-11-20 VITALS
SYSTOLIC BLOOD PRESSURE: 153 MMHG | DIASTOLIC BLOOD PRESSURE: 62 MMHG | OXYGEN SATURATION: 96 % | RESPIRATION RATE: 16 BRPM | HEART RATE: 74 BPM | TEMPERATURE: 97.88 F

## 2020-11-20 VITALS
OXYGEN SATURATION: 96 % | SYSTOLIC BLOOD PRESSURE: 153 MMHG | DIASTOLIC BLOOD PRESSURE: 62 MMHG | HEART RATE: 74 BPM | RESPIRATION RATE: 16 BRPM | TEMPERATURE: 98 F

## 2020-11-20 VITALS
OXYGEN SATURATION: 99 % | SYSTOLIC BLOOD PRESSURE: 136 MMHG | HEART RATE: 76 BPM | DIASTOLIC BLOOD PRESSURE: 55 MMHG | RESPIRATION RATE: 18 BRPM | TEMPERATURE: 97.1 F

## 2020-11-20 VITALS
TEMPERATURE: 96.7 F | OXYGEN SATURATION: 95 % | HEART RATE: 77 BPM | RESPIRATION RATE: 16 BRPM | DIASTOLIC BLOOD PRESSURE: 50 MMHG | SYSTOLIC BLOOD PRESSURE: 124 MMHG

## 2020-11-20 VITALS — HEART RATE: 62 BPM

## 2020-11-20 VITALS — HEART RATE: 74 BPM

## 2020-11-20 VITALS
RESPIRATION RATE: 16 BRPM | TEMPERATURE: 97.16 F | DIASTOLIC BLOOD PRESSURE: 55 MMHG | OXYGEN SATURATION: 99 % | HEART RATE: 76 BPM | SYSTOLIC BLOOD PRESSURE: 136 MMHG

## 2020-11-20 VITALS
HEART RATE: 62 BPM | RESPIRATION RATE: 16 BRPM | SYSTOLIC BLOOD PRESSURE: 143 MMHG | OXYGEN SATURATION: 100 % | TEMPERATURE: 96.26 F | DIASTOLIC BLOOD PRESSURE: 73 MMHG

## 2020-11-20 VITALS
DIASTOLIC BLOOD PRESSURE: 58 MMHG | SYSTOLIC BLOOD PRESSURE: 128 MMHG | TEMPERATURE: 97 F | RESPIRATION RATE: 16 BRPM | OXYGEN SATURATION: 96 % | HEART RATE: 65 BPM

## 2020-11-20 DIAGNOSIS — D64.9: Primary | ICD-10-CM

## 2020-11-20 PROCEDURE — 86922 COMPATIBILITY TEST ANTIGLOB: CPT

## 2020-11-20 PROCEDURE — 86850 RBC ANTIBODY SCREEN: CPT

## 2020-11-20 PROCEDURE — 86901 BLOOD TYPING SEROLOGIC RH(D): CPT

## 2020-11-20 PROCEDURE — 36415 COLL VENOUS BLD VENIPUNCTURE: CPT

## 2020-11-20 PROCEDURE — 86900 BLOOD TYPING SEROLOGIC ABO: CPT

## 2020-11-20 PROCEDURE — 86920 COMPATIBILITY TEST SPIN: CPT

## 2020-11-20 PROCEDURE — P9016 RBC LEUKOCYTES REDUCED: HCPCS

## 2020-11-20 PROCEDURE — A4216 STERILE WATER/SALINE, 10 ML: HCPCS

## 2020-11-20 PROCEDURE — 36430 TRANSFUSION BLD/BLD COMPNT: CPT

## 2020-11-21 VITALS
SYSTOLIC BLOOD PRESSURE: 154 MMHG | DIASTOLIC BLOOD PRESSURE: 60 MMHG | OXYGEN SATURATION: 93 % | TEMPERATURE: 97.34 F | RESPIRATION RATE: 16 BRPM | HEART RATE: 69 BPM

## 2020-11-21 VITALS
HEART RATE: 82 BPM | DIASTOLIC BLOOD PRESSURE: 62 MMHG | TEMPERATURE: 98.42 F | OXYGEN SATURATION: 93 % | RESPIRATION RATE: 16 BRPM | SYSTOLIC BLOOD PRESSURE: 140 MMHG

## 2020-11-21 VITALS — HEART RATE: 69 BPM

## 2020-11-21 VITALS — SYSTOLIC BLOOD PRESSURE: 140 MMHG | HEART RATE: 82 BPM | DIASTOLIC BLOOD PRESSURE: 62 MMHG

## 2020-11-21 LAB
HCT VFR BLD AUTO: 33.9 % (ref 37–47)
HEMOGLOBIN: 10 G/DL (ref 12–15)
HGB BLD-MCNC: 10 G/DL (ref 12–15)
VANCOMYCIN SERPL-MCNC: 16.3 UG/ML (ref 0–15)
VANCOMYCIN TROUGH SERPL-MCNC: 22.6 UG/ML (ref 5–15)
VANCOMYCIN TROUGH SERPL-MCNC: 33.3 UG/ML (ref 5–15)

## 2020-11-22 VITALS
TEMPERATURE: 98.24 F | DIASTOLIC BLOOD PRESSURE: 65 MMHG | SYSTOLIC BLOOD PRESSURE: 127 MMHG | RESPIRATION RATE: 16 BRPM | HEART RATE: 68 BPM | OXYGEN SATURATION: 94 %

## 2020-11-22 VITALS — SYSTOLIC BLOOD PRESSURE: 148 MMHG | DIASTOLIC BLOOD PRESSURE: 73 MMHG | HEART RATE: 70 BPM

## 2020-11-22 VITALS
TEMPERATURE: 97.52 F | HEART RATE: 70 BPM | DIASTOLIC BLOOD PRESSURE: 73 MMHG | OXYGEN SATURATION: 94 % | SYSTOLIC BLOOD PRESSURE: 148 MMHG | RESPIRATION RATE: 18 BRPM

## 2020-11-22 VITALS — DIASTOLIC BLOOD PRESSURE: 65 MMHG | SYSTOLIC BLOOD PRESSURE: 127 MMHG | HEART RATE: 68 BPM

## 2020-11-22 VITALS — HEART RATE: 68 BPM | DIASTOLIC BLOOD PRESSURE: 65 MMHG | SYSTOLIC BLOOD PRESSURE: 127 MMHG

## 2020-11-23 VITALS
TEMPERATURE: 96.6 F | HEART RATE: 71 BPM | SYSTOLIC BLOOD PRESSURE: 149 MMHG | DIASTOLIC BLOOD PRESSURE: 65 MMHG | OXYGEN SATURATION: 96 % | RESPIRATION RATE: 16 BRPM

## 2020-11-23 VITALS — HEART RATE: 71 BPM | DIASTOLIC BLOOD PRESSURE: 65 MMHG | SYSTOLIC BLOOD PRESSURE: 149 MMHG

## 2020-11-23 VITALS
SYSTOLIC BLOOD PRESSURE: 147 MMHG | RESPIRATION RATE: 16 BRPM | TEMPERATURE: 97.16 F | HEART RATE: 70 BPM | OXYGEN SATURATION: 95 % | DIASTOLIC BLOOD PRESSURE: 67 MMHG

## 2020-11-23 VITALS — DIASTOLIC BLOOD PRESSURE: 65 MMHG | HEART RATE: 71 BPM | SYSTOLIC BLOOD PRESSURE: 149 MMHG

## 2020-11-23 VITALS — RESPIRATION RATE: 18 BRPM | OXYGEN SATURATION: 96 % | HEART RATE: 66 BPM

## 2020-11-23 VITALS — HEART RATE: 70 BPM

## 2020-11-23 LAB — VANCOMYCIN TROUGH SERPL-MCNC: 14.2 UG/ML (ref 5–15)

## 2020-11-24 VITALS
TEMPERATURE: 97.4 F | HEART RATE: 64 BPM | RESPIRATION RATE: 16 BRPM | OXYGEN SATURATION: 98 % | SYSTOLIC BLOOD PRESSURE: 157 MMHG | DIASTOLIC BLOOD PRESSURE: 72 MMHG

## 2020-11-24 VITALS — HEART RATE: 64 BPM | DIASTOLIC BLOOD PRESSURE: 72 MMHG | SYSTOLIC BLOOD PRESSURE: 157 MMHG

## 2020-11-24 VITALS — DIASTOLIC BLOOD PRESSURE: 72 MMHG | HEART RATE: 64 BPM | SYSTOLIC BLOOD PRESSURE: 157 MMHG

## 2020-11-24 VITALS
DIASTOLIC BLOOD PRESSURE: 68 MMHG | OXYGEN SATURATION: 96 % | SYSTOLIC BLOOD PRESSURE: 155 MMHG | RESPIRATION RATE: 16 BRPM | TEMPERATURE: 97.7 F | HEART RATE: 68 BPM

## 2020-11-24 VITALS — HEART RATE: 84 BPM | DIASTOLIC BLOOD PRESSURE: 68 MMHG | SYSTOLIC BLOOD PRESSURE: 144 MMHG

## 2020-11-25 VITALS
HEART RATE: 65 BPM | RESPIRATION RATE: 18 BRPM | OXYGEN SATURATION: 96 % | TEMPERATURE: 97.34 F | DIASTOLIC BLOOD PRESSURE: 53 MMHG | SYSTOLIC BLOOD PRESSURE: 131 MMHG

## 2020-11-25 VITALS
HEART RATE: 70 BPM | DIASTOLIC BLOOD PRESSURE: 43 MMHG | SYSTOLIC BLOOD PRESSURE: 121 MMHG | RESPIRATION RATE: 17 BRPM | TEMPERATURE: 97.52 F | OXYGEN SATURATION: 94 %

## 2020-11-25 VITALS — DIASTOLIC BLOOD PRESSURE: 53 MMHG | SYSTOLIC BLOOD PRESSURE: 131 MMHG | HEART RATE: 61 BPM

## 2020-11-25 VITALS — HEART RATE: 70 BPM

## 2020-11-25 VITALS — HEART RATE: 61 BPM | DIASTOLIC BLOOD PRESSURE: 53 MMHG | SYSTOLIC BLOOD PRESSURE: 131 MMHG

## 2020-11-25 LAB — VANCOMYCIN TROUGH SERPL-MCNC: 14.1 UG/ML (ref 5–15)

## 2020-11-26 VITALS — DIASTOLIC BLOOD PRESSURE: 55 MMHG | SYSTOLIC BLOOD PRESSURE: 122 MMHG | HEART RATE: 60 BPM

## 2020-11-26 VITALS
TEMPERATURE: 97.34 F | RESPIRATION RATE: 16 BRPM | HEART RATE: 74 BPM | OXYGEN SATURATION: 98 % | SYSTOLIC BLOOD PRESSURE: 152 MMHG | DIASTOLIC BLOOD PRESSURE: 66 MMHG

## 2020-11-26 VITALS — HEART RATE: 60 BPM | OXYGEN SATURATION: 97 % | RESPIRATION RATE: 16 BRPM

## 2020-11-26 VITALS
TEMPERATURE: 98.24 F | RESPIRATION RATE: 16 BRPM | OXYGEN SATURATION: 97 % | HEART RATE: 60 BPM | SYSTOLIC BLOOD PRESSURE: 122 MMHG | DIASTOLIC BLOOD PRESSURE: 55 MMHG

## 2020-11-26 VITALS — DIASTOLIC BLOOD PRESSURE: 66 MMHG | SYSTOLIC BLOOD PRESSURE: 152 MMHG | HEART RATE: 74 BPM

## 2020-11-26 VITALS — HEART RATE: 74 BPM

## 2020-11-26 LAB
ANION GAP: 5 (ref 5–15)
ANISOCYTOSIS BLD QL SMEAR: (no result)
ANISOCYTOSIS: (no result)
BUN SERPL-MCNC: 18 MG/DL (ref 7–18)
BUN/CREAT RATIO: 26.7 RATIO (ref 10–20)
CALCIUM SERPL-MCNC: 8.7 MG/DL (ref 8.5–10.1)
CARBON DIOXIDE: 25 MMOL/L (ref 21–32)
CHLORIDE: 109 MMOL/L (ref 98–107)
DEPRECATED RDW RBC: 69.2 FL (ref 35.1–43.9)
DIFFERENTIAL INDICATED: (no result)
ERYTHROCYTE [DISTWIDTH] IN BLOOD: 20.4 % (ref 11.6–14.6)
EST GLOM FILT RATE - AFR AMER: 115 ML/MIN (ref 60–?)
ESTIMATED CREATININE CLEARANCE: 87.92 ML/MIN
GLUCOSE: 130 MG/DL (ref 74–106)
HCT VFR BLD AUTO: 33.2 % (ref 37–47)
HEMOGLOBIN: 10 G/DL (ref 12–15)
HGB BLD-MCNC: 10 G/DL (ref 12–15)
HYPOCHROMASIA: (no result)
HYPOCHROMIA BLD QL: (no result)
IMMATURE GRANULOCYTES COUNT: 0.13 X10^3/UL (ref 0–0)
MACROCYTES BLD QL: (no result)
MACROCYTOSIS: (no result)
MCV RBC: 93 FL (ref 81–99)
MEAN CORP HGB CONC: 30.1 G/DL (ref 32–36)
MEAN PLATELET VOL.: 8.9 FL (ref 6.2–12)
NRBC FLAGGED BY ANALYZER: 0 % (ref 0–5)
PLATELET # BLD: 447 K/MM3 (ref 150–450)
PLATELET COUNT: 447 K/MM3 (ref 150–450)
POSITIVE MORPHOLOGY: YES
POTASSIUM: 3.8 MMOL/L (ref 3.5–5.1)
RBC # BLD AUTO: 3.57 M/MM3 (ref 4.2–5.4)
RBC DISTRIBUTION WIDTH CV: 20.4 % (ref 11.6–14.6)
RBC DISTRIBUTION WIDTH SD: 69.2 FL (ref 35.1–43.9)
SCAN SMEAR PER REVIEW CRITERIA: (no result)
WBC # BLD AUTO: 10.4 K/MM3 (ref 4.4–11)
WHITE BLOOD COUNT: 10.4 K/MM3 (ref 4.4–11)

## 2020-11-27 VITALS
OXYGEN SATURATION: 95 % | TEMPERATURE: 97.6 F | SYSTOLIC BLOOD PRESSURE: 143 MMHG | HEART RATE: 65 BPM | DIASTOLIC BLOOD PRESSURE: 56 MMHG | RESPIRATION RATE: 16 BRPM

## 2020-11-27 VITALS
HEART RATE: 82 BPM | TEMPERATURE: 97.88 F | SYSTOLIC BLOOD PRESSURE: 150 MMHG | DIASTOLIC BLOOD PRESSURE: 62 MMHG | OXYGEN SATURATION: 94 % | RESPIRATION RATE: 15 BRPM

## 2020-11-27 VITALS — HEART RATE: 65 BPM | SYSTOLIC BLOOD PRESSURE: 143 MMHG | DIASTOLIC BLOOD PRESSURE: 65 MMHG

## 2020-11-27 VITALS — HEART RATE: 72 BPM | RESPIRATION RATE: 16 BRPM | OXYGEN SATURATION: 95 %

## 2020-11-27 VITALS — HEART RATE: 82 BPM

## 2020-11-27 VITALS — HEART RATE: 65 BPM | SYSTOLIC BLOOD PRESSURE: 143 MMHG | DIASTOLIC BLOOD PRESSURE: 56 MMHG

## 2020-11-28 VITALS
DIASTOLIC BLOOD PRESSURE: 48 MMHG | TEMPERATURE: 97.88 F | HEART RATE: 65 BPM | OXYGEN SATURATION: 92 % | SYSTOLIC BLOOD PRESSURE: 139 MMHG | RESPIRATION RATE: 18 BRPM

## 2020-11-28 VITALS — SYSTOLIC BLOOD PRESSURE: 133 MMHG | HEART RATE: 70 BPM | DIASTOLIC BLOOD PRESSURE: 50 MMHG

## 2020-11-28 VITALS
RESPIRATION RATE: 16 BRPM | DIASTOLIC BLOOD PRESSURE: 50 MMHG | OXYGEN SATURATION: 96 % | SYSTOLIC BLOOD PRESSURE: 133 MMHG | HEART RATE: 70 BPM | TEMPERATURE: 98.78 F

## 2020-11-28 VITALS — SYSTOLIC BLOOD PRESSURE: 139 MMHG | HEART RATE: 65 BPM | DIASTOLIC BLOOD PRESSURE: 48 MMHG

## 2020-11-28 VITALS — SYSTOLIC BLOOD PRESSURE: 133 MMHG | DIASTOLIC BLOOD PRESSURE: 50 MMHG | HEART RATE: 70 BPM

## 2020-11-28 LAB — VANCOMYCIN TROUGH SERPL-MCNC: 12.8 UG/ML (ref 5–15)

## 2020-11-29 VITALS
HEART RATE: 64 BPM | TEMPERATURE: 97.2 F | RESPIRATION RATE: 18 BRPM | SYSTOLIC BLOOD PRESSURE: 137 MMHG | DIASTOLIC BLOOD PRESSURE: 57 MMHG | OXYGEN SATURATION: 95 %

## 2020-11-29 VITALS
SYSTOLIC BLOOD PRESSURE: 152 MMHG | HEART RATE: 62 BPM | TEMPERATURE: 97.52 F | DIASTOLIC BLOOD PRESSURE: 70 MMHG | RESPIRATION RATE: 16 BRPM | OXYGEN SATURATION: 95 %

## 2020-11-29 VITALS — SYSTOLIC BLOOD PRESSURE: 137 MMHG | HEART RATE: 64 BPM | DIASTOLIC BLOOD PRESSURE: 57 MMHG

## 2020-11-29 VITALS — OXYGEN SATURATION: 95 % | RESPIRATION RATE: 18 BRPM | HEART RATE: 64 BPM

## 2020-11-29 VITALS — SYSTOLIC BLOOD PRESSURE: 152 MMHG | DIASTOLIC BLOOD PRESSURE: 70 MMHG | HEART RATE: 62 BPM

## 2020-11-29 VITALS — DIASTOLIC BLOOD PRESSURE: 70 MMHG | SYSTOLIC BLOOD PRESSURE: 152 MMHG

## 2020-11-30 VITALS
DIASTOLIC BLOOD PRESSURE: 56 MMHG | SYSTOLIC BLOOD PRESSURE: 125 MMHG | TEMPERATURE: 97.2 F | RESPIRATION RATE: 16 BRPM | HEART RATE: 56 BPM | OXYGEN SATURATION: 97 %

## 2020-11-30 VITALS
DIASTOLIC BLOOD PRESSURE: 60 MMHG | SYSTOLIC BLOOD PRESSURE: 146 MMHG | TEMPERATURE: 98.1 F | OXYGEN SATURATION: 93 % | RESPIRATION RATE: 16 BRPM | HEART RATE: 75 BPM

## 2020-11-30 VITALS — HEART RATE: 75 BPM | SYSTOLIC BLOOD PRESSURE: 146 MMHG | DIASTOLIC BLOOD PRESSURE: 60 MMHG

## 2020-11-30 VITALS — HEART RATE: 75 BPM

## 2020-11-30 VITALS — HEART RATE: 60 BPM

## 2020-12-01 VITALS
TEMPERATURE: 97.5 F | OXYGEN SATURATION: 99 % | RESPIRATION RATE: 16 BRPM | HEART RATE: 61 BPM | DIASTOLIC BLOOD PRESSURE: 73 MMHG | SYSTOLIC BLOOD PRESSURE: 148 MMHG

## 2020-12-01 VITALS — HEART RATE: 61 BPM | DIASTOLIC BLOOD PRESSURE: 73 MMHG | SYSTOLIC BLOOD PRESSURE: 148 MMHG

## 2020-12-01 VITALS — HEART RATE: 64 BPM | DIASTOLIC BLOOD PRESSURE: 72 MMHG | SYSTOLIC BLOOD PRESSURE: 146 MMHG

## 2020-12-01 VITALS
TEMPERATURE: 97.8 F | OXYGEN SATURATION: 98 % | HEART RATE: 64 BPM | DIASTOLIC BLOOD PRESSURE: 72 MMHG | RESPIRATION RATE: 16 BRPM | SYSTOLIC BLOOD PRESSURE: 146 MMHG

## 2020-12-01 VITALS — HEART RATE: 64 BPM | OXYGEN SATURATION: 98 % | RESPIRATION RATE: 18 BRPM

## 2020-12-01 VITALS — DIASTOLIC BLOOD PRESSURE: 73 MMHG | HEART RATE: 61 BPM | SYSTOLIC BLOOD PRESSURE: 148 MMHG

## 2020-12-01 LAB — VANCOMYCIN TROUGH SERPL-MCNC: 14.9 UG/ML (ref 5–15)

## 2020-12-02 VITALS
TEMPERATURE: 97.3 F | HEART RATE: 96 BPM | OXYGEN SATURATION: 97 % | RESPIRATION RATE: 16 BRPM | SYSTOLIC BLOOD PRESSURE: 126 MMHG | DIASTOLIC BLOOD PRESSURE: 61 MMHG

## 2020-12-02 VITALS — DIASTOLIC BLOOD PRESSURE: 63 MMHG | HEART RATE: 68 BPM | SYSTOLIC BLOOD PRESSURE: 154 MMHG

## 2020-12-02 VITALS
SYSTOLIC BLOOD PRESSURE: 154 MMHG | HEART RATE: 65 BPM | DIASTOLIC BLOOD PRESSURE: 63 MMHG | TEMPERATURE: 97.8 F | OXYGEN SATURATION: 97 % | RESPIRATION RATE: 18 BRPM

## 2020-12-02 VITALS — HEART RATE: 96 BPM

## 2020-12-02 VITALS — DIASTOLIC BLOOD PRESSURE: 93 MMHG | SYSTOLIC BLOOD PRESSURE: 154 MMHG | HEART RATE: 65 BPM

## 2020-12-02 LAB — STAPH AUREUS DNA BY PCR: NEGATIVE

## 2020-12-03 VITALS
TEMPERATURE: 97 F | SYSTOLIC BLOOD PRESSURE: 117 MMHG | RESPIRATION RATE: 16 BRPM | OXYGEN SATURATION: 100 % | DIASTOLIC BLOOD PRESSURE: 54 MMHG | HEART RATE: 74 BPM

## 2020-12-03 VITALS
DIASTOLIC BLOOD PRESSURE: 56 MMHG | TEMPERATURE: 98.9 F | HEART RATE: 74 BPM | RESPIRATION RATE: 16 BRPM | SYSTOLIC BLOOD PRESSURE: 111 MMHG | OXYGEN SATURATION: 95 %

## 2020-12-03 VITALS — HEART RATE: 74 BPM | DIASTOLIC BLOOD PRESSURE: 56 MMHG | SYSTOLIC BLOOD PRESSURE: 111 MMHG

## 2020-12-03 VITALS — HEART RATE: 76 BPM

## 2020-12-03 LAB
ANION GAP: 5 (ref 5–15)
BUN SERPL-MCNC: 22 MG/DL (ref 7–18)
BUN/CREAT RATIO: 31 RATIO (ref 10–20)
CALCIUM SERPL-MCNC: 8.9 MG/DL (ref 8.5–10.1)
CARBON DIOXIDE: 24 MMOL/L (ref 21–32)
CHLORIDE: 108 MMOL/L (ref 98–107)
DEPRECATED RDW RBC: 59.4 FL (ref 35.1–43.9)
ERYTHROCYTE [DISTWIDTH] IN BLOOD: 17.5 % (ref 11.6–14.6)
EST GLOM FILT RATE - AFR AMER: 108 ML/MIN (ref 60–?)
ESTIMATED CREATININE CLEARANCE: 82.96 ML/MIN
GLUCOSE: 103 MG/DL (ref 74–106)
HCT VFR BLD AUTO: 32.2 % (ref 37–47)
HEMOGLOBIN: 9.5 G/DL (ref 12–15)
HGB BLD-MCNC: 9.5 G/DL (ref 12–15)
IMMATURE GRANULOCYTES COUNT: 0.16 X10^3/UL (ref 0–0)
MCV RBC: 92.8 FL (ref 81–99)
MEAN CORP HGB CONC: 29.5 G/DL (ref 32–36)
MEAN PLATELET VOL.: 9.2 FL (ref 6.2–12)
NRBC FLAGGED BY ANALYZER: 0 % (ref 0–5)
PLATELET # BLD: 411 K/MM3 (ref 150–450)
PLATELET COUNT: 411 K/MM3 (ref 150–450)
POTASSIUM: 3.7 MMOL/L (ref 3.5–5.1)
RBC # BLD AUTO: 3.47 M/MM3 (ref 4.2–5.4)
RBC DISTRIBUTION WIDTH CV: 17.5 % (ref 11.6–14.6)
RBC DISTRIBUTION WIDTH SD: 59.4 FL (ref 35.1–43.9)
WBC # BLD AUTO: 13.3 K/MM3 (ref 4.4–11)
WHITE BLOOD COUNT: 13.3 K/MM3 (ref 4.4–11)

## 2020-12-04 VITALS
OXYGEN SATURATION: 95 % | DIASTOLIC BLOOD PRESSURE: 67 MMHG | HEART RATE: 69 BPM | RESPIRATION RATE: 18 BRPM | SYSTOLIC BLOOD PRESSURE: 138 MMHG | TEMPERATURE: 98.06 F

## 2020-12-04 VITALS
DIASTOLIC BLOOD PRESSURE: 53 MMHG | RESPIRATION RATE: 16 BRPM | SYSTOLIC BLOOD PRESSURE: 128 MMHG | TEMPERATURE: 96.8 F | OXYGEN SATURATION: 96 % | HEART RATE: 66 BPM

## 2020-12-04 VITALS — RESPIRATION RATE: 18 BRPM | OXYGEN SATURATION: 95 % | HEART RATE: 69 BPM

## 2020-12-04 VITALS — HEART RATE: 66 BPM | SYSTOLIC BLOOD PRESSURE: 128 MMHG | DIASTOLIC BLOOD PRESSURE: 53 MMHG

## 2020-12-04 VITALS — HEART RATE: 69 BPM | DIASTOLIC BLOOD PRESSURE: 67 MMHG | SYSTOLIC BLOOD PRESSURE: 138 MMHG

## 2020-12-04 VITALS — DIASTOLIC BLOOD PRESSURE: 53 MMHG | HEART RATE: 66 BPM | SYSTOLIC BLOOD PRESSURE: 128 MMHG

## 2020-12-04 LAB — VANCOMYCIN TROUGH SERPL-MCNC: 15.4 UG/ML (ref 5–15)

## 2020-12-05 VITALS — HEART RATE: 61 BPM | RESPIRATION RATE: 16 BRPM | OXYGEN SATURATION: 94 %

## 2020-12-05 VITALS
HEART RATE: 63 BPM | TEMPERATURE: 97.5 F | OXYGEN SATURATION: 96 % | RESPIRATION RATE: 14 BRPM | SYSTOLIC BLOOD PRESSURE: 115 MMHG | DIASTOLIC BLOOD PRESSURE: 50 MMHG

## 2020-12-05 VITALS — DIASTOLIC BLOOD PRESSURE: 67 MMHG | HEART RATE: 61 BPM | SYSTOLIC BLOOD PRESSURE: 136 MMHG

## 2020-12-05 VITALS
RESPIRATION RATE: 16 BRPM | DIASTOLIC BLOOD PRESSURE: 67 MMHG | HEART RATE: 61 BPM | TEMPERATURE: 97.7 F | SYSTOLIC BLOOD PRESSURE: 136 MMHG | OXYGEN SATURATION: 97 %

## 2020-12-05 VITALS — HEART RATE: 67 BPM

## 2020-12-06 VITALS — DIASTOLIC BLOOD PRESSURE: 59 MMHG | SYSTOLIC BLOOD PRESSURE: 133 MMHG | HEART RATE: 89 BPM

## 2020-12-06 VITALS — SYSTOLIC BLOOD PRESSURE: 149 MMHG | HEART RATE: 61 BPM | DIASTOLIC BLOOD PRESSURE: 62 MMHG

## 2020-12-06 VITALS
DIASTOLIC BLOOD PRESSURE: 59 MMHG | RESPIRATION RATE: 17 BRPM | TEMPERATURE: 97.88 F | HEART RATE: 89 BPM | OXYGEN SATURATION: 95 % | SYSTOLIC BLOOD PRESSURE: 133 MMHG

## 2020-12-06 VITALS
SYSTOLIC BLOOD PRESSURE: 149 MMHG | DIASTOLIC BLOOD PRESSURE: 62 MMHG | HEART RATE: 65 BPM | RESPIRATION RATE: 16 BRPM | TEMPERATURE: 96 F | OXYGEN SATURATION: 95 %

## 2020-12-06 VITALS — HEART RATE: 65 BPM | DIASTOLIC BLOOD PRESSURE: 62 MMHG | SYSTOLIC BLOOD PRESSURE: 149 MMHG

## 2020-12-07 VITALS
DIASTOLIC BLOOD PRESSURE: 72 MMHG | TEMPERATURE: 98.24 F | RESPIRATION RATE: 16 BRPM | SYSTOLIC BLOOD PRESSURE: 151 MMHG | HEART RATE: 70 BPM | OXYGEN SATURATION: 100 %

## 2020-12-07 VITALS
DIASTOLIC BLOOD PRESSURE: 62 MMHG | SYSTOLIC BLOOD PRESSURE: 125 MMHG | RESPIRATION RATE: 16 BRPM | TEMPERATURE: 96.6 F | HEART RATE: 69 BPM | OXYGEN SATURATION: 97 %

## 2020-12-07 VITALS — DIASTOLIC BLOOD PRESSURE: 62 MMHG | HEART RATE: 69 BPM | SYSTOLIC BLOOD PRESSURE: 125 MMHG

## 2020-12-07 VITALS — HEART RATE: 70 BPM

## 2020-12-08 VITALS — HEART RATE: 62 BPM | DIASTOLIC BLOOD PRESSURE: 64 MMHG | SYSTOLIC BLOOD PRESSURE: 145 MMHG

## 2020-12-08 VITALS
OXYGEN SATURATION: 98 % | RESPIRATION RATE: 18 BRPM | DIASTOLIC BLOOD PRESSURE: 64 MMHG | SYSTOLIC BLOOD PRESSURE: 145 MMHG | TEMPERATURE: 97.9 F | HEART RATE: 62 BPM

## 2020-12-08 VITALS — DIASTOLIC BLOOD PRESSURE: 53 MMHG | SYSTOLIC BLOOD PRESSURE: 142 MMHG | HEART RATE: 63 BPM

## 2020-12-08 VITALS — OXYGEN SATURATION: 98 % | HEART RATE: 62 BPM | RESPIRATION RATE: 18 BRPM

## 2020-12-08 VITALS
SYSTOLIC BLOOD PRESSURE: 142 MMHG | DIASTOLIC BLOOD PRESSURE: 53 MMHG | RESPIRATION RATE: 15 BRPM | TEMPERATURE: 97 F | OXYGEN SATURATION: 98 % | HEART RATE: 63 BPM

## 2020-12-08 VITALS — DIASTOLIC BLOOD PRESSURE: 53 MMHG | HEART RATE: 62 BPM | SYSTOLIC BLOOD PRESSURE: 142 MMHG

## 2020-12-09 VITALS
SYSTOLIC BLOOD PRESSURE: 115 MMHG | TEMPERATURE: 97.88 F | OXYGEN SATURATION: 95 % | DIASTOLIC BLOOD PRESSURE: 63 MMHG | RESPIRATION RATE: 18 BRPM | HEART RATE: 60 BPM

## 2020-12-09 VITALS — DIASTOLIC BLOOD PRESSURE: 75 MMHG | SYSTOLIC BLOOD PRESSURE: 118 MMHG | HEART RATE: 77 BPM

## 2020-12-09 VITALS
RESPIRATION RATE: 16 BRPM | SYSTOLIC BLOOD PRESSURE: 118 MMHG | TEMPERATURE: 97.16 F | DIASTOLIC BLOOD PRESSURE: 52 MMHG | OXYGEN SATURATION: 97 % | HEART RATE: 77 BPM

## 2020-12-09 VITALS — HEART RATE: 60 BPM | RESPIRATION RATE: 18 BRPM | OXYGEN SATURATION: 95 %

## 2020-12-09 VITALS — DIASTOLIC BLOOD PRESSURE: 52 MMHG | HEART RATE: 76 BPM | SYSTOLIC BLOOD PRESSURE: 119 MMHG

## 2020-12-09 VITALS — HEART RATE: 60 BPM | DIASTOLIC BLOOD PRESSURE: 63 MMHG | SYSTOLIC BLOOD PRESSURE: 115 MMHG

## 2020-12-10 VITALS — DIASTOLIC BLOOD PRESSURE: 54 MMHG | HEART RATE: 77 BPM | SYSTOLIC BLOOD PRESSURE: 122 MMHG

## 2020-12-10 VITALS — SYSTOLIC BLOOD PRESSURE: 110 MMHG | DIASTOLIC BLOOD PRESSURE: 41 MMHG | HEART RATE: 78 BPM

## 2020-12-10 VITALS
OXYGEN SATURATION: 96 % | TEMPERATURE: 98.24 F | RESPIRATION RATE: 16 BRPM | HEART RATE: 77 BPM | DIASTOLIC BLOOD PRESSURE: 54 MMHG | SYSTOLIC BLOOD PRESSURE: 122 MMHG

## 2020-12-10 VITALS — SYSTOLIC BLOOD PRESSURE: 122 MMHG | HEART RATE: 77 BPM | DIASTOLIC BLOOD PRESSURE: 54 MMHG

## 2020-12-10 VITALS
TEMPERATURE: 97.3 F | HEART RATE: 66 BPM | RESPIRATION RATE: 18 BRPM | OXYGEN SATURATION: 100 % | DIASTOLIC BLOOD PRESSURE: 43 MMHG | SYSTOLIC BLOOD PRESSURE: 84 MMHG

## 2020-12-10 LAB
ANION GAP: 5 (ref 5–15)
BUN SERPL-MCNC: 23 MG/DL (ref 7–18)
BUN/CREAT RATIO: 37.4 RATIO (ref 10–20)
CALCIUM SERPL-MCNC: 9 MG/DL (ref 8.5–10.1)
CARBON DIOXIDE: 26 MMOL/L (ref 21–32)
CHLORIDE: 107 MMOL/L (ref 98–107)
DEPRECATED RDW RBC: 51.9 FL (ref 35.1–43.9)
ERYTHROCYTE [DISTWIDTH] IN BLOOD: 15.8 % (ref 11.6–14.6)
EST GLOM FILT RATE - AFR AMER: 128 ML/MIN (ref 60–?)
ESTIMATED CREATININE CLEARANCE: 95.01 ML/MIN
GLUCOSE: 136 MG/DL (ref 74–106)
HCT VFR BLD AUTO: 31.9 % (ref 37–47)
HEMOGLOBIN: 10.1 G/DL (ref 12–15)
HGB BLD-MCNC: 10.1 G/DL (ref 12–15)
IMMATURE GRANULOCYTES COUNT: 0.18 X10^3/UL (ref 0–0)
MCV RBC: 89.6 FL (ref 81–99)
MEAN CORP HGB CONC: 31.7 G/DL (ref 32–36)
MEAN PLATELET VOL.: 9.2 FL (ref 6.2–12)
NRBC FLAGGED BY ANALYZER: 0 % (ref 0–5)
PLATELET # BLD: 407 K/MM3 (ref 150–450)
PLATELET COUNT: 407 K/MM3 (ref 150–450)
POTASSIUM: 3.9 MMOL/L (ref 3.5–5.1)
RBC # BLD AUTO: 3.56 M/MM3 (ref 4.2–5.4)
RBC DISTRIBUTION WIDTH CV: 15.8 % (ref 11.6–14.6)
RBC DISTRIBUTION WIDTH SD: 51.9 FL (ref 35.1–43.9)
WBC # BLD AUTO: 13.5 K/MM3 (ref 4.4–11)
WHITE BLOOD COUNT: 13.5 K/MM3 (ref 4.4–11)

## 2020-12-11 ENCOUNTER — HOSPITAL ENCOUNTER (OUTPATIENT)
Age: 60
End: 2020-12-11
Payer: COMMERCIAL

## 2020-12-11 VITALS — SYSTOLIC BLOOD PRESSURE: 119 MMHG | DIASTOLIC BLOOD PRESSURE: 65 MMHG | HEART RATE: 59 BPM

## 2020-12-11 VITALS
DIASTOLIC BLOOD PRESSURE: 65 MMHG | SYSTOLIC BLOOD PRESSURE: 119 MMHG | HEART RATE: 59 BPM | OXYGEN SATURATION: 97 % | RESPIRATION RATE: 16 BRPM | TEMPERATURE: 98.42 F

## 2020-12-11 VITALS — HEART RATE: 62 BPM | SYSTOLIC BLOOD PRESSURE: 130 MMHG | DIASTOLIC BLOOD PRESSURE: 51 MMHG

## 2020-12-11 VITALS — OXYGEN SATURATION: 98 % | RESPIRATION RATE: 18 BRPM | HEART RATE: 59 BPM

## 2020-12-11 VITALS
RESPIRATION RATE: 16 BRPM | OXYGEN SATURATION: 97 % | HEART RATE: 62 BPM | TEMPERATURE: 96.08 F | SYSTOLIC BLOOD PRESSURE: 130 MMHG | DIASTOLIC BLOOD PRESSURE: 51 MMHG

## 2020-12-11 VITALS — BODY MASS INDEX: 38.6 KG/M2

## 2020-12-11 DIAGNOSIS — T81.40XA: Primary | ICD-10-CM

## 2020-12-11 DIAGNOSIS — K68.12: ICD-10-CM

## 2020-12-11 LAB — VANCOMYCIN TROUGH SERPL-MCNC: 15.2 UG/ML (ref 5–15)

## 2020-12-11 PROCEDURE — A9575 INJ GADOTERATE MEGLUMI 0.1ML: HCPCS

## 2020-12-11 PROCEDURE — 72158 MRI LUMBAR SPINE W/O & W/DYE: CPT

## 2020-12-12 VITALS
OXYGEN SATURATION: 98 % | SYSTOLIC BLOOD PRESSURE: 136 MMHG | HEART RATE: 63 BPM | TEMPERATURE: 97.88 F | RESPIRATION RATE: 18 BRPM | DIASTOLIC BLOOD PRESSURE: 78 MMHG

## 2020-12-12 VITALS
TEMPERATURE: 97.16 F | SYSTOLIC BLOOD PRESSURE: 125 MMHG | RESPIRATION RATE: 17 BRPM | HEART RATE: 70 BPM | OXYGEN SATURATION: 97 % | DIASTOLIC BLOOD PRESSURE: 57 MMHG

## 2020-12-12 VITALS — HEART RATE: 63 BPM | DIASTOLIC BLOOD PRESSURE: 78 MMHG | SYSTOLIC BLOOD PRESSURE: 136 MMHG

## 2020-12-12 VITALS — DIASTOLIC BLOOD PRESSURE: 57 MMHG | HEART RATE: 70 BPM | SYSTOLIC BLOOD PRESSURE: 125 MMHG

## 2020-12-12 VITALS — SYSTOLIC BLOOD PRESSURE: 136 MMHG | HEART RATE: 63 BPM | DIASTOLIC BLOOD PRESSURE: 78 MMHG

## 2020-12-13 VITALS — HEART RATE: 71 BPM | SYSTOLIC BLOOD PRESSURE: 132 MMHG | DIASTOLIC BLOOD PRESSURE: 45 MMHG

## 2020-12-13 VITALS
TEMPERATURE: 97.4 F | RESPIRATION RATE: 16 BRPM | OXYGEN SATURATION: 97 % | DIASTOLIC BLOOD PRESSURE: 45 MMHG | SYSTOLIC BLOOD PRESSURE: 132 MMHG | HEART RATE: 71 BPM

## 2020-12-13 VITALS — DIASTOLIC BLOOD PRESSURE: 40 MMHG | HEART RATE: 50 BPM | SYSTOLIC BLOOD PRESSURE: 97 MMHG

## 2020-12-13 VITALS
OXYGEN SATURATION: 97 % | TEMPERATURE: 96.8 F | SYSTOLIC BLOOD PRESSURE: 94 MMHG | RESPIRATION RATE: 17 BRPM | HEART RATE: 56 BPM | DIASTOLIC BLOOD PRESSURE: 37 MMHG

## 2020-12-14 VITALS — SYSTOLIC BLOOD PRESSURE: 111 MMHG | DIASTOLIC BLOOD PRESSURE: 53 MMHG

## 2020-12-14 VITALS — HEART RATE: 64 BPM

## 2020-12-14 VITALS
OXYGEN SATURATION: 97 % | HEART RATE: 69 BPM | TEMPERATURE: 97.3 F | RESPIRATION RATE: 16 BRPM | DIASTOLIC BLOOD PRESSURE: 59 MMHG | SYSTOLIC BLOOD PRESSURE: 125 MMHG

## 2020-12-14 VITALS — DIASTOLIC BLOOD PRESSURE: 53 MMHG | SYSTOLIC BLOOD PRESSURE: 111 MMHG | HEART RATE: 69 BPM

## 2020-12-14 VITALS
OXYGEN SATURATION: 99 % | RESPIRATION RATE: 16 BRPM | SYSTOLIC BLOOD PRESSURE: 111 MMHG | TEMPERATURE: 97.16 F | DIASTOLIC BLOOD PRESSURE: 53 MMHG | HEART RATE: 69 BPM

## 2020-12-15 VITALS
DIASTOLIC BLOOD PRESSURE: 54 MMHG | OXYGEN SATURATION: 98 % | RESPIRATION RATE: 14 BRPM | TEMPERATURE: 96.3 F | SYSTOLIC BLOOD PRESSURE: 125 MMHG

## 2020-12-15 VITALS
DIASTOLIC BLOOD PRESSURE: 54 MMHG | OXYGEN SATURATION: 100 % | TEMPERATURE: 96.98 F | RESPIRATION RATE: 16 BRPM | SYSTOLIC BLOOD PRESSURE: 138 MMHG | HEART RATE: 58 BPM

## 2020-12-15 VITALS — SYSTOLIC BLOOD PRESSURE: 138 MMHG | HEART RATE: 58 BPM | DIASTOLIC BLOOD PRESSURE: 54 MMHG

## 2020-12-15 VITALS — SYSTOLIC BLOOD PRESSURE: 125 MMHG | DIASTOLIC BLOOD PRESSURE: 54 MMHG | HEART RATE: 57 BPM

## 2020-12-15 VITALS — RESPIRATION RATE: 18 BRPM

## 2020-12-15 VITALS — SYSTOLIC BLOOD PRESSURE: 125 MMHG | HEART RATE: 57 BPM | DIASTOLIC BLOOD PRESSURE: 54 MMHG

## 2020-12-16 VITALS
OXYGEN SATURATION: 98 % | DIASTOLIC BLOOD PRESSURE: 73 MMHG | TEMPERATURE: 99.86 F | RESPIRATION RATE: 17 BRPM | HEART RATE: 83 BPM | SYSTOLIC BLOOD PRESSURE: 140 MMHG

## 2020-12-16 VITALS — HEART RATE: 62 BPM

## 2020-12-16 VITALS
SYSTOLIC BLOOD PRESSURE: 114 MMHG | DIASTOLIC BLOOD PRESSURE: 62 MMHG | HEART RATE: 63 BPM | TEMPERATURE: 98 F | RESPIRATION RATE: 18 BRPM | OXYGEN SATURATION: 93 %

## 2020-12-16 VITALS — HEART RATE: 90 BPM | SYSTOLIC BLOOD PRESSURE: 140 MMHG | DIASTOLIC BLOOD PRESSURE: 74 MMHG

## 2020-12-16 VITALS — HEART RATE: 60 BPM

## 2020-12-17 VITALS
HEART RATE: 59 BPM | TEMPERATURE: 97.4 F | DIASTOLIC BLOOD PRESSURE: 63 MMHG | RESPIRATION RATE: 18 BRPM | OXYGEN SATURATION: 94 % | SYSTOLIC BLOOD PRESSURE: 127 MMHG

## 2020-12-17 VITALS
TEMPERATURE: 96.5 F | RESPIRATION RATE: 16 BRPM | OXYGEN SATURATION: 96 % | HEART RATE: 60 BPM | SYSTOLIC BLOOD PRESSURE: 139 MMHG | DIASTOLIC BLOOD PRESSURE: 60 MMHG

## 2020-12-17 VITALS — HEART RATE: 60 BPM | SYSTOLIC BLOOD PRESSURE: 139 MMHG | DIASTOLIC BLOOD PRESSURE: 60 MMHG

## 2020-12-17 VITALS — SYSTOLIC BLOOD PRESSURE: 127 MMHG | DIASTOLIC BLOOD PRESSURE: 63 MMHG | HEART RATE: 62 BPM

## 2020-12-17 VITALS — RESPIRATION RATE: 18 BRPM | HEART RATE: 62 BPM | OXYGEN SATURATION: 94 %

## 2020-12-17 LAB
ANION GAP: 6 (ref 5–15)
BUN SERPL-MCNC: 24 MG/DL (ref 7–18)
BUN/CREAT RATIO: 38.3 RATIO (ref 10–20)
CALCIUM SERPL-MCNC: 9 MG/DL (ref 8.5–10.1)
CARBON DIOXIDE: 24 MMOL/L (ref 21–32)
CHLORIDE: 110 MMOL/L (ref 98–107)
DEPRECATED RDW RBC: 49.1 FL (ref 35.1–43.9)
ERYTHROCYTE [DISTWIDTH] IN BLOOD: 15 % (ref 11.6–14.6)
EST GLOM FILT RATE - AFR AMER: 125 ML/MIN (ref 60–?)
ESTIMATED CREATININE CLEARANCE: 93.5 ML/MIN
GLUCOSE: 179 MG/DL (ref 74–106)
HCT VFR BLD AUTO: 32.1 % (ref 37–47)
HEMOGLOBIN: 9.8 G/DL (ref 12–15)
HGB BLD-MCNC: 9.8 G/DL (ref 12–15)
IMMATURE GRANULOCYTES COUNT: 0.12 X10^3/UL (ref 0–0)
MCV RBC: 89.7 FL (ref 81–99)
MEAN CORP HGB CONC: 30.5 G/DL (ref 32–36)
MEAN PLATELET VOL.: 9.5 FL (ref 6.2–12)
NRBC FLAGGED BY ANALYZER: 0 % (ref 0–5)
PLATELET # BLD: 343 K/MM3 (ref 150–450)
PLATELET COUNT: 343 K/MM3 (ref 150–450)
POTASSIUM: 3.9 MMOL/L (ref 3.5–5.1)
RBC # BLD AUTO: 3.58 M/MM3 (ref 4.2–5.4)
RBC DISTRIBUTION WIDTH CV: 15 % (ref 11.6–14.6)
RBC DISTRIBUTION WIDTH SD: 49.1 FL (ref 35.1–43.9)
WBC # BLD AUTO: 11.2 K/MM3 (ref 4.4–11)
WHITE BLOOD COUNT: 11.2 K/MM3 (ref 4.4–11)

## 2020-12-18 VITALS
HEART RATE: 78 BPM | SYSTOLIC BLOOD PRESSURE: 136 MMHG | DIASTOLIC BLOOD PRESSURE: 80 MMHG | RESPIRATION RATE: 16 BRPM | OXYGEN SATURATION: 97 % | TEMPERATURE: 97.6 F

## 2020-12-18 VITALS — DIASTOLIC BLOOD PRESSURE: 65 MMHG | SYSTOLIC BLOOD PRESSURE: 129 MMHG | HEART RATE: 60 BPM

## 2020-12-18 VITALS
OXYGEN SATURATION: 97 % | SYSTOLIC BLOOD PRESSURE: 129 MMHG | RESPIRATION RATE: 15 BRPM | HEART RATE: 60 BPM | TEMPERATURE: 98.24 F | DIASTOLIC BLOOD PRESSURE: 65 MMHG

## 2020-12-18 VITALS — HEART RATE: 60 BPM | DIASTOLIC BLOOD PRESSURE: 65 MMHG | SYSTOLIC BLOOD PRESSURE: 129 MMHG

## 2020-12-18 VITALS — RESPIRATION RATE: 16 BRPM | OXYGEN SATURATION: 97 % | HEART RATE: 78 BPM

## 2020-12-18 VITALS — DIASTOLIC BLOOD PRESSURE: 80 MMHG | HEART RATE: 78 BPM | SYSTOLIC BLOOD PRESSURE: 136 MMHG

## 2020-12-18 LAB — VANCOMYCIN TROUGH SERPL-MCNC: 17.3 UG/ML (ref 5–15)

## 2020-12-19 VITALS — DIASTOLIC BLOOD PRESSURE: 65 MMHG | HEART RATE: 65 BPM | SYSTOLIC BLOOD PRESSURE: 134 MMHG

## 2020-12-19 VITALS
SYSTOLIC BLOOD PRESSURE: 115 MMHG | OXYGEN SATURATION: 96 % | DIASTOLIC BLOOD PRESSURE: 57 MMHG | RESPIRATION RATE: 16 BRPM | HEART RATE: 56 BPM | TEMPERATURE: 96.98 F

## 2020-12-19 VITALS
SYSTOLIC BLOOD PRESSURE: 123 MMHG | DIASTOLIC BLOOD PRESSURE: 44 MMHG | RESPIRATION RATE: 16 BRPM | TEMPERATURE: 97.5 F | HEART RATE: 63 BPM | OXYGEN SATURATION: 98 %

## 2020-12-19 VITALS — SYSTOLIC BLOOD PRESSURE: 115 MMHG | HEART RATE: 56 BPM | DIASTOLIC BLOOD PRESSURE: 57 MMHG

## 2020-12-19 VITALS — DIASTOLIC BLOOD PRESSURE: 57 MMHG | SYSTOLIC BLOOD PRESSURE: 115 MMHG | HEART RATE: 56 BPM

## 2020-12-20 VITALS
TEMPERATURE: 97.7 F | DIASTOLIC BLOOD PRESSURE: 89 MMHG | OXYGEN SATURATION: 98 % | RESPIRATION RATE: 18 BRPM | HEART RATE: 65 BPM | SYSTOLIC BLOOD PRESSURE: 150 MMHG

## 2020-12-20 VITALS — SYSTOLIC BLOOD PRESSURE: 150 MMHG | HEART RATE: 65 BPM | DIASTOLIC BLOOD PRESSURE: 89 MMHG

## 2020-12-20 VITALS
HEART RATE: 58 BPM | DIASTOLIC BLOOD PRESSURE: 57 MMHG | RESPIRATION RATE: 16 BRPM | TEMPERATURE: 97.8 F | SYSTOLIC BLOOD PRESSURE: 119 MMHG | OXYGEN SATURATION: 100 %

## 2020-12-20 VITALS — SYSTOLIC BLOOD PRESSURE: 117 MMHG | HEART RATE: 58 BPM | DIASTOLIC BLOOD PRESSURE: 57 MMHG

## 2020-12-20 VITALS — SYSTOLIC BLOOD PRESSURE: 119 MMHG | DIASTOLIC BLOOD PRESSURE: 57 MMHG | HEART RATE: 57 BPM

## 2020-12-20 VITALS — OXYGEN SATURATION: 98 % | HEART RATE: 65 BPM | RESPIRATION RATE: 18 BRPM

## 2020-12-21 VITALS
RESPIRATION RATE: 14 BRPM | SYSTOLIC BLOOD PRESSURE: 129 MMHG | HEART RATE: 60 BPM | TEMPERATURE: 97.88 F | OXYGEN SATURATION: 97 % | DIASTOLIC BLOOD PRESSURE: 61 MMHG

## 2020-12-21 VITALS
HEART RATE: 64 BPM | RESPIRATION RATE: 20 BRPM | OXYGEN SATURATION: 96 % | SYSTOLIC BLOOD PRESSURE: 114 MMHG | TEMPERATURE: 98.4 F | DIASTOLIC BLOOD PRESSURE: 60 MMHG

## 2020-12-21 VITALS — SYSTOLIC BLOOD PRESSURE: 129 MMHG | DIASTOLIC BLOOD PRESSURE: 61 MMHG | HEART RATE: 60 BPM

## 2020-12-21 VITALS — HEART RATE: 64 BPM

## 2020-12-21 VITALS — DIASTOLIC BLOOD PRESSURE: 61 MMHG | HEART RATE: 60 BPM | SYSTOLIC BLOOD PRESSURE: 129 MMHG

## 2020-12-22 VITALS
OXYGEN SATURATION: 95 % | HEART RATE: 81 BPM | DIASTOLIC BLOOD PRESSURE: 55 MMHG | TEMPERATURE: 97.1 F | RESPIRATION RATE: 14 BRPM | SYSTOLIC BLOOD PRESSURE: 123 MMHG

## 2020-12-22 VITALS
RESPIRATION RATE: 14 BRPM | OXYGEN SATURATION: 97 % | TEMPERATURE: 97.88 F | HEART RATE: 92 BPM | DIASTOLIC BLOOD PRESSURE: 46 MMHG | SYSTOLIC BLOOD PRESSURE: 122 MMHG

## 2020-12-22 VITALS — SYSTOLIC BLOOD PRESSURE: 122 MMHG | HEART RATE: 62 BPM | DIASTOLIC BLOOD PRESSURE: 46 MMHG

## 2020-12-22 VITALS — SYSTOLIC BLOOD PRESSURE: 122 MMHG | DIASTOLIC BLOOD PRESSURE: 46 MMHG | HEART RATE: 62 BPM

## 2020-12-22 VITALS — HEART RATE: 81 BPM

## 2020-12-22 LAB — VANCOMYCIN TROUGH SERPL-MCNC: 16.1 UG/ML (ref 5–15)

## 2020-12-23 VITALS
DIASTOLIC BLOOD PRESSURE: 43 MMHG | SYSTOLIC BLOOD PRESSURE: 107 MMHG | TEMPERATURE: 97.8 F | OXYGEN SATURATION: 95 % | HEART RATE: 60 BPM | RESPIRATION RATE: 16 BRPM

## 2020-12-23 VITALS
SYSTOLIC BLOOD PRESSURE: 147 MMHG | DIASTOLIC BLOOD PRESSURE: 53 MMHG | OXYGEN SATURATION: 97 % | RESPIRATION RATE: 16 BRPM | HEART RATE: 67 BPM | TEMPERATURE: 97.2 F

## 2020-12-23 VITALS
DIASTOLIC BLOOD PRESSURE: 57 MMHG | TEMPERATURE: 97.88 F | SYSTOLIC BLOOD PRESSURE: 125 MMHG | OXYGEN SATURATION: 99 % | HEART RATE: 65 BPM | RESPIRATION RATE: 16 BRPM

## 2020-12-23 VITALS — SYSTOLIC BLOOD PRESSURE: 107 MMHG | HEART RATE: 60 BPM | DIASTOLIC BLOOD PRESSURE: 43 MMHG

## 2020-12-23 VITALS — HEART RATE: 65 BPM

## 2020-12-24 VITALS
DIASTOLIC BLOOD PRESSURE: 43 MMHG | RESPIRATION RATE: 96 BRPM | SYSTOLIC BLOOD PRESSURE: 111 MMHG | HEART RATE: 60 BPM | TEMPERATURE: 97.5 F | OXYGEN SATURATION: 96 %

## 2020-12-24 VITALS — SYSTOLIC BLOOD PRESSURE: 111 MMHG | DIASTOLIC BLOOD PRESSURE: 43 MMHG | HEART RATE: 60 BPM

## 2020-12-24 VITALS — OXYGEN SATURATION: 99 % | RESPIRATION RATE: 18 BRPM | HEART RATE: 63 BPM

## 2020-12-24 VITALS — HEART RATE: 79 BPM

## 2020-12-24 VITALS
SYSTOLIC BLOOD PRESSURE: 122 MMHG | OXYGEN SATURATION: 96 % | HEART RATE: 69 BPM | TEMPERATURE: 97.88 F | RESPIRATION RATE: 18 BRPM | DIASTOLIC BLOOD PRESSURE: 51 MMHG

## 2020-12-24 VITALS — HEART RATE: 60 BPM | SYSTOLIC BLOOD PRESSURE: 111 MMHG | DIASTOLIC BLOOD PRESSURE: 43 MMHG

## 2020-12-24 LAB
ANION GAP: 5 (ref 5–15)
BUN SERPL-MCNC: 28 MG/DL (ref 7–18)
BUN/CREAT RATIO: 44.2 RATIO (ref 10–20)
CALCIUM SERPL-MCNC: 8.9 MG/DL (ref 8.5–10.1)
CARBON DIOXIDE: 26 MMOL/L (ref 21–32)
CHLORIDE: 108 MMOL/L (ref 98–107)
DEPRECATED RDW RBC: 48.2 FL (ref 35.1–43.9)
ERYTHROCYTE [DISTWIDTH] IN BLOOD: 14.4 % (ref 11.6–14.6)
EST GLOM FILT RATE - AFR AMER: 123 ML/MIN (ref 60–?)
ESTIMATED CREATININE CLEARANCE: 93.5 ML/MIN
GLUCOSE: 182 MG/DL (ref 74–106)
HCT VFR BLD AUTO: 32.4 % (ref 37–47)
HEMOGLOBIN: 9.9 G/DL (ref 12–15)
HGB BLD-MCNC: 9.9 G/DL (ref 12–15)
IMMATURE GRANULOCYTES COUNT: 0.1 X10^3/UL (ref 0–0)
MCV RBC: 90.8 FL (ref 81–99)
MEAN CORP HGB CONC: 30.6 G/DL (ref 32–36)
MEAN PLATELET VOL.: 9.8 FL (ref 6.2–12)
NRBC FLAGGED BY ANALYZER: 0 % (ref 0–5)
PLATELET # BLD: 272 K/MM3 (ref 150–450)
PLATELET COUNT: 272 K/MM3 (ref 150–450)
POTASSIUM: 4 MMOL/L (ref 3.5–5.1)
RBC # BLD AUTO: 3.57 M/MM3 (ref 4.2–5.4)
RBC DISTRIBUTION WIDTH CV: 14.4 % (ref 11.6–14.6)
RBC DISTRIBUTION WIDTH SD: 48.2 FL (ref 35.1–43.9)
WBC # BLD AUTO: 13.4 K/MM3 (ref 4.4–11)
WHITE BLOOD COUNT: 13.4 K/MM3 (ref 4.4–11)

## 2020-12-25 VITALS
RESPIRATION RATE: 16 BRPM | TEMPERATURE: 97.8 F | DIASTOLIC BLOOD PRESSURE: 57 MMHG | OXYGEN SATURATION: 99 % | HEART RATE: 62 BPM | SYSTOLIC BLOOD PRESSURE: 122 MMHG

## 2020-12-25 VITALS
OXYGEN SATURATION: 96 % | SYSTOLIC BLOOD PRESSURE: 124 MMHG | RESPIRATION RATE: 16 BRPM | TEMPERATURE: 98.06 F | DIASTOLIC BLOOD PRESSURE: 54 MMHG | HEART RATE: 55 BPM

## 2020-12-25 VITALS — HEART RATE: 62 BPM | SYSTOLIC BLOOD PRESSURE: 122 MMHG | DIASTOLIC BLOOD PRESSURE: 57 MMHG

## 2020-12-25 VITALS — HEART RATE: 62 BPM

## 2020-12-26 VITALS — RESPIRATION RATE: 18 BRPM | OXYGEN SATURATION: 96 % | HEART RATE: 60 BPM

## 2020-12-26 VITALS
HEART RATE: 60 BPM | TEMPERATURE: 97.6 F | SYSTOLIC BLOOD PRESSURE: 124 MMHG | DIASTOLIC BLOOD PRESSURE: 52 MMHG | OXYGEN SATURATION: 96 % | RESPIRATION RATE: 18 BRPM

## 2020-12-26 VITALS — HEART RATE: 64 BPM | DIASTOLIC BLOOD PRESSURE: 56 MMHG | SYSTOLIC BLOOD PRESSURE: 125 MMHG

## 2020-12-26 VITALS — SYSTOLIC BLOOD PRESSURE: 124 MMHG | HEART RATE: 60 BPM | DIASTOLIC BLOOD PRESSURE: 52 MMHG

## 2020-12-26 VITALS
RESPIRATION RATE: 16 BRPM | HEART RATE: 64 BPM | DIASTOLIC BLOOD PRESSURE: 56 MMHG | TEMPERATURE: 96.98 F | OXYGEN SATURATION: 97 % | SYSTOLIC BLOOD PRESSURE: 125 MMHG

## 2020-12-26 LAB — VANCOMYCIN TROUGH SERPL-MCNC: 14.1 UG/ML (ref 5–15)

## 2020-12-27 VITALS — SYSTOLIC BLOOD PRESSURE: 134 MMHG | DIASTOLIC BLOOD PRESSURE: 61 MMHG | HEART RATE: 59 BPM

## 2020-12-27 VITALS — DIASTOLIC BLOOD PRESSURE: 64 MMHG | SYSTOLIC BLOOD PRESSURE: 141 MMHG | HEART RATE: 64 BPM

## 2020-12-27 VITALS
TEMPERATURE: 97.1 F | DIASTOLIC BLOOD PRESSURE: 64 MMHG | HEART RATE: 64 BPM | OXYGEN SATURATION: 99 % | SYSTOLIC BLOOD PRESSURE: 141 MMHG | RESPIRATION RATE: 16 BRPM

## 2020-12-27 VITALS — SYSTOLIC BLOOD PRESSURE: 115 MMHG | HEART RATE: 55 BPM | DIASTOLIC BLOOD PRESSURE: 53 MMHG

## 2020-12-27 VITALS
HEART RATE: 55 BPM | SYSTOLIC BLOOD PRESSURE: 115 MMHG | TEMPERATURE: 98.06 F | DIASTOLIC BLOOD PRESSURE: 53 MMHG | RESPIRATION RATE: 14 BRPM | OXYGEN SATURATION: 98 %

## 2020-12-28 VITALS
TEMPERATURE: 97.88 F | OXYGEN SATURATION: 96 % | SYSTOLIC BLOOD PRESSURE: 124 MMHG | DIASTOLIC BLOOD PRESSURE: 55 MMHG | RESPIRATION RATE: 16 BRPM | HEART RATE: 61 BPM

## 2020-12-28 VITALS — SYSTOLIC BLOOD PRESSURE: 121 MMHG | HEART RATE: 64 BPM | DIASTOLIC BLOOD PRESSURE: 51 MMHG

## 2020-12-28 VITALS
HEART RATE: 64 BPM | TEMPERATURE: 96.6 F | OXYGEN SATURATION: 97 % | RESPIRATION RATE: 18 BRPM | SYSTOLIC BLOOD PRESSURE: 121 MMHG | DIASTOLIC BLOOD PRESSURE: 51 MMHG

## 2020-12-28 VITALS — HEART RATE: 61 BPM

## 2020-12-29 ENCOUNTER — HOSPITAL ENCOUNTER (INPATIENT)
Dept: HOSPITAL 100 - MS3 | Age: 60
LOS: 2 days | Discharge: NURSING, REHAB OR LONG-TERM CARE | DRG: 981 | End: 2020-12-31
Payer: COMMERCIAL

## 2020-12-29 VITALS
RESPIRATION RATE: 16 BRPM | HEART RATE: 97 BPM | OXYGEN SATURATION: 100 % | SYSTOLIC BLOOD PRESSURE: 132 MMHG | DIASTOLIC BLOOD PRESSURE: 55 MMHG

## 2020-12-29 VITALS
RESPIRATION RATE: 16 BRPM | TEMPERATURE: 97.52 F | HEART RATE: 98 BPM | OXYGEN SATURATION: 100 % | DIASTOLIC BLOOD PRESSURE: 88 MMHG | SYSTOLIC BLOOD PRESSURE: 164 MMHG

## 2020-12-29 VITALS — BODY MASS INDEX: 38.6 KG/M2 | BODY MASS INDEX: 39 KG/M2

## 2020-12-29 VITALS
RESPIRATION RATE: 18 BRPM | HEART RATE: 59 BPM | TEMPERATURE: 98.42 F | DIASTOLIC BLOOD PRESSURE: 55 MMHG | OXYGEN SATURATION: 97 % | SYSTOLIC BLOOD PRESSURE: 130 MMHG

## 2020-12-29 VITALS — DIASTOLIC BLOOD PRESSURE: 57 MMHG | SYSTOLIC BLOOD PRESSURE: 117 MMHG | HEART RATE: 70 BPM

## 2020-12-29 VITALS
OXYGEN SATURATION: 98 % | HEART RATE: 72 BPM | RESPIRATION RATE: 16 BRPM | TEMPERATURE: 98.06 F | DIASTOLIC BLOOD PRESSURE: 55 MMHG | SYSTOLIC BLOOD PRESSURE: 121 MMHG

## 2020-12-29 VITALS
DIASTOLIC BLOOD PRESSURE: 93 MMHG | RESPIRATION RATE: 16 BRPM | TEMPERATURE: 98.06 F | HEART RATE: 68 BPM | SYSTOLIC BLOOD PRESSURE: 115 MMHG | OXYGEN SATURATION: 99 %

## 2020-12-29 VITALS — HEART RATE: 60 BPM | SYSTOLIC BLOOD PRESSURE: 115 MMHG | DIASTOLIC BLOOD PRESSURE: 40 MMHG

## 2020-12-29 VITALS
TEMPERATURE: 97 F | HEART RATE: 95 BPM | DIASTOLIC BLOOD PRESSURE: 65 MMHG | OXYGEN SATURATION: 97 % | SYSTOLIC BLOOD PRESSURE: 121 MMHG | RESPIRATION RATE: 16 BRPM

## 2020-12-29 VITALS
OXYGEN SATURATION: 97 % | TEMPERATURE: 97.88 F | RESPIRATION RATE: 16 BRPM | DIASTOLIC BLOOD PRESSURE: 40 MMHG | HEART RATE: 60 BPM | SYSTOLIC BLOOD PRESSURE: 115 MMHG

## 2020-12-29 VITALS
RESPIRATION RATE: 16 BRPM | TEMPERATURE: 98.24 F | HEART RATE: 70 BPM | DIASTOLIC BLOOD PRESSURE: 57 MMHG | OXYGEN SATURATION: 100 % | SYSTOLIC BLOOD PRESSURE: 117 MMHG

## 2020-12-29 DIAGNOSIS — E66.01: ICD-10-CM

## 2020-12-29 DIAGNOSIS — M46.28: ICD-10-CM

## 2020-12-29 DIAGNOSIS — I50.9: ICD-10-CM

## 2020-12-29 DIAGNOSIS — Z86.61: ICD-10-CM

## 2020-12-29 DIAGNOSIS — Z79.82: ICD-10-CM

## 2020-12-29 DIAGNOSIS — E78.5: ICD-10-CM

## 2020-12-29 DIAGNOSIS — I11.0: ICD-10-CM

## 2020-12-29 DIAGNOSIS — F41.9: ICD-10-CM

## 2020-12-29 DIAGNOSIS — I96: Primary | ICD-10-CM

## 2020-12-29 DIAGNOSIS — E10.52: ICD-10-CM

## 2020-12-29 DIAGNOSIS — F32.9: ICD-10-CM

## 2020-12-29 DIAGNOSIS — Z79.02: ICD-10-CM

## 2020-12-29 DIAGNOSIS — E10.42: ICD-10-CM

## 2020-12-29 DIAGNOSIS — B95.61: ICD-10-CM

## 2020-12-29 DIAGNOSIS — E10.649: ICD-10-CM

## 2020-12-29 DIAGNOSIS — L89.154: ICD-10-CM

## 2020-12-29 DIAGNOSIS — Z79.4: ICD-10-CM

## 2020-12-29 DIAGNOSIS — L97.422: ICD-10-CM

## 2020-12-29 DIAGNOSIS — M86.172: ICD-10-CM

## 2020-12-29 DIAGNOSIS — I25.10: ICD-10-CM

## 2020-12-29 DIAGNOSIS — D50.9: ICD-10-CM

## 2020-12-29 DIAGNOSIS — N31.9: ICD-10-CM

## 2020-12-29 DIAGNOSIS — T81.31XA: ICD-10-CM

## 2020-12-29 DIAGNOSIS — E87.6: ICD-10-CM

## 2020-12-29 DIAGNOSIS — Z79.899: ICD-10-CM

## 2020-12-29 DIAGNOSIS — Z95.1: ICD-10-CM

## 2020-12-29 DIAGNOSIS — Z98.1: ICD-10-CM

## 2020-12-29 DIAGNOSIS — E10.621: ICD-10-CM

## 2020-12-29 LAB — VANCOMYCIN TROUGH SERPL-MCNC: 15.4 UG/ML (ref 5–15)

## 2020-12-29 PROCEDURE — 88311 DECALCIFY TISSUE: CPT

## 2020-12-29 PROCEDURE — 88304 TISSUE EXAM BY PATHOLOGIST: CPT

## 2020-12-29 PROCEDURE — 87077 CULTURE AEROBIC IDENTIFY: CPT

## 2020-12-29 PROCEDURE — 87070 CULTURE OTHR SPECIMN AEROBIC: CPT

## 2020-12-29 PROCEDURE — 80053 COMPREHEN METABOLIC PANEL: CPT

## 2020-12-29 PROCEDURE — 0KBP0ZZ EXCISION OF LEFT HIP MUSCLE, OPEN APPROACH: ICD-10-PCS | Performed by: PLASTIC SURGERY

## 2020-12-29 PROCEDURE — 97802 MEDICAL NUTRITION INDIV IN: CPT

## 2020-12-29 PROCEDURE — 87102 FUNGUS ISOLATION CULTURE: CPT

## 2020-12-29 PROCEDURE — 85027 COMPLETE CBC AUTOMATED: CPT

## 2020-12-29 PROCEDURE — G0463 HOSPITAL OUTPT CLINIC VISIT: HCPCS

## 2020-12-29 PROCEDURE — 72192 CT PELVIS W/O DYE: CPT

## 2020-12-29 PROCEDURE — 87205 SMEAR GRAM STAIN: CPT

## 2020-12-29 PROCEDURE — 87176 TISSUE HOMOGENIZATION CULTR: CPT

## 2020-12-29 PROCEDURE — 97166 OT EVAL MOD COMPLEX 45 MIN: CPT

## 2020-12-29 PROCEDURE — 99251: CPT

## 2020-12-29 PROCEDURE — 80048 BASIC METABOLIC PNL TOTAL CA: CPT

## 2020-12-29 PROCEDURE — 87075 CULTR BACTERIA EXCEPT BLOOD: CPT

## 2020-12-29 PROCEDURE — 82962 GLUCOSE BLOOD TEST: CPT

## 2020-12-29 PROCEDURE — 88305 TISSUE EXAM BY PATHOLOGIST: CPT

## 2020-12-29 PROCEDURE — 97530 THERAPEUTIC ACTIVITIES: CPT

## 2020-12-29 PROCEDURE — 88312 SPECIAL STAINS GROUP 1: CPT

## 2020-12-29 PROCEDURE — 84134 ASSAY OF PREALBUMIN: CPT

## 2020-12-29 PROCEDURE — 97162 PT EVAL MOD COMPLEX 30 MIN: CPT

## 2020-12-29 PROCEDURE — 36415 COLL VENOUS BLD VENIPUNCTURE: CPT

## 2020-12-29 PROCEDURE — A4216 STERILE WATER/SALINE, 10 ML: HCPCS

## 2020-12-29 PROCEDURE — 85025 COMPLETE CBC W/AUTO DIFF WBC: CPT

## 2020-12-29 PROCEDURE — 87206 SMEAR FLUORESCENT/ACID STAI: CPT

## 2020-12-29 RX ADMIN — DOCUSATE SODIUM 50 MG AND SENNOSIDES 8.6 MG 1 TABLET: 8.6; 5 TABLET, FILM COATED ORAL at 21:53

## 2020-12-29 RX ADMIN — Medication 10 MG: at 21:50

## 2020-12-29 RX ADMIN — SODIUM CHLORIDE, PRESERVATIVE FREE 0 ML: 5 INJECTION INTRAVENOUS at 17:51

## 2020-12-30 VITALS
DIASTOLIC BLOOD PRESSURE: 54 MMHG | RESPIRATION RATE: 18 BRPM | OXYGEN SATURATION: 99 % | HEART RATE: 78 BPM | TEMPERATURE: 97.52 F | SYSTOLIC BLOOD PRESSURE: 137 MMHG

## 2020-12-30 VITALS
DIASTOLIC BLOOD PRESSURE: 37 MMHG | OXYGEN SATURATION: 97 % | HEART RATE: 69 BPM | SYSTOLIC BLOOD PRESSURE: 106 MMHG | RESPIRATION RATE: 16 BRPM | TEMPERATURE: 99.14 F

## 2020-12-30 VITALS
HEART RATE: 60 BPM | TEMPERATURE: 97 F | RESPIRATION RATE: 16 BRPM | OXYGEN SATURATION: 99 % | DIASTOLIC BLOOD PRESSURE: 39 MMHG | SYSTOLIC BLOOD PRESSURE: 102 MMHG

## 2020-12-30 VITALS — SYSTOLIC BLOOD PRESSURE: 138 MMHG | HEART RATE: 75 BPM | DIASTOLIC BLOOD PRESSURE: 58 MMHG

## 2020-12-30 VITALS
HEART RATE: 75 BPM | SYSTOLIC BLOOD PRESSURE: 138 MMHG | OXYGEN SATURATION: 100 % | TEMPERATURE: 97.5 F | DIASTOLIC BLOOD PRESSURE: 58 MMHG | RESPIRATION RATE: 16 BRPM

## 2020-12-30 VITALS — SYSTOLIC BLOOD PRESSURE: 106 MMHG | HEART RATE: 69 BPM | DIASTOLIC BLOOD PRESSURE: 37 MMHG

## 2020-12-30 VITALS — HEART RATE: 75 BPM

## 2020-12-30 LAB
ANION GAP: 5 (ref 5–15)
BUN SERPL-MCNC: 21 MG/DL (ref 7–18)
BUN/CREAT RATIO: 28.6 RATIO (ref 10–20)
CALCIUM SERPL-MCNC: 9.2 MG/DL (ref 8.5–10.1)
CARBON DIOXIDE: 26 MMOL/L (ref 21–32)
CHLORIDE: 107 MMOL/L (ref 98–107)
DEPRECATED RDW RBC: 49.7 FL (ref 35.1–43.9)
ERYTHROCYTE [DISTWIDTH] IN BLOOD: 14.6 % (ref 11.6–14.6)
EST GLOM FILT RATE - AFR AMER: 104 ML/MIN (ref 60–?)
ESTIMATED CREATININE CLEARANCE: 80.69 ML/MIN
GLUCOSE: 91 MG/DL (ref 74–106)
HCT VFR BLD AUTO: 35.5 % (ref 37–47)
HEMOGLOBIN: 10.5 G/DL (ref 12–15)
HGB BLD-MCNC: 10.5 G/DL (ref 12–15)
MCV RBC: 93.9 FL (ref 81–99)
MEAN CORP HGB CONC: 29.6 G/DL (ref 32–36)
MEAN PLATELET VOL.: 9.8 FL (ref 6.2–12)
PLATELET # BLD: 247 K/MM3 (ref 150–450)
PLATELET COUNT: 247 K/MM3 (ref 150–450)
POTASSIUM: 4.2 MMOL/L (ref 3.5–5.1)
PREALB SERPL-MCNC: 20.8 MG/DL (ref 20–40)
RBC # BLD AUTO: 3.78 M/MM3 (ref 4.2–5.4)
RBC DISTRIBUTION WIDTH CV: 14.6 % (ref 11.6–14.6)
RBC DISTRIBUTION WIDTH SD: 49.7 FL (ref 35.1–43.9)
WBC # BLD AUTO: 13.7 K/MM3 (ref 4.4–11)
WHITE BLOOD COUNT: 13.7 K/MM3 (ref 4.4–11)

## 2020-12-30 RX ADMIN — DOCUSATE SODIUM 50 MG AND SENNOSIDES 8.6 MG 1 TABLET: 8.6; 5 TABLET, FILM COATED ORAL at 08:22

## 2020-12-30 RX ADMIN — Medication 120 ML: at 17:05

## 2020-12-30 RX ADMIN — DOCUSATE SODIUM 50 MG AND SENNOSIDES 8.6 MG 1 TABLET: 8.6; 5 TABLET, FILM COATED ORAL at 23:13

## 2020-12-30 RX ADMIN — Medication 10 MG: at 23:13

## 2020-12-30 RX ADMIN — SODIUM HYPOCHLORITE 1 APPLIC: 2.5 SOLUTION TOPICAL at 11:51

## 2020-12-30 RX ADMIN — SODIUM CHLORIDE, PRESERVATIVE FREE 0 ML: 5 INJECTION INTRAVENOUS at 00:31

## 2020-12-30 RX ADMIN — Medication 120 ML: at 08:31

## 2020-12-30 RX ADMIN — SODIUM CHLORIDE, PRESERVATIVE FREE 0 ML: 5 INJECTION INTRAVENOUS at 20:38

## 2020-12-30 RX ADMIN — Medication 120 ML: at 22:35

## 2020-12-30 RX ADMIN — Medication 120 ML: at 14:46

## 2020-12-30 RX ADMIN — ASPIRIN 81 MG: 81 TABLET, COATED ORAL at 08:16

## 2020-12-31 VITALS — HEART RATE: 66 BPM | DIASTOLIC BLOOD PRESSURE: 43 MMHG | SYSTOLIC BLOOD PRESSURE: 126 MMHG

## 2020-12-31 VITALS
DIASTOLIC BLOOD PRESSURE: 37 MMHG | SYSTOLIC BLOOD PRESSURE: 108 MMHG | OXYGEN SATURATION: 100 % | TEMPERATURE: 97.8 F | RESPIRATION RATE: 18 BRPM | HEART RATE: 64 BPM

## 2020-12-31 VITALS
RESPIRATION RATE: 18 BRPM | TEMPERATURE: 97.5 F | SYSTOLIC BLOOD PRESSURE: 131 MMHG | HEART RATE: 65 BPM | OXYGEN SATURATION: 100 % | DIASTOLIC BLOOD PRESSURE: 48 MMHG

## 2020-12-31 VITALS
HEART RATE: 63 BPM | TEMPERATURE: 96.98 F | DIASTOLIC BLOOD PRESSURE: 65 MMHG | RESPIRATION RATE: 18 BRPM | OXYGEN SATURATION: 96 % | SYSTOLIC BLOOD PRESSURE: 121 MMHG

## 2020-12-31 VITALS
TEMPERATURE: 97.3 F | OXYGEN SATURATION: 100 % | HEART RATE: 68 BPM | SYSTOLIC BLOOD PRESSURE: 121 MMHG | RESPIRATION RATE: 16 BRPM | DIASTOLIC BLOOD PRESSURE: 31 MMHG

## 2020-12-31 VITALS — HEART RATE: 66 BPM

## 2020-12-31 LAB
ALANINE AMINOTRANSFER ALT/SGPT: 13 U/L (ref 13–56)
ALBUMIN SERPL-MCNC: 2.2 G/DL (ref 3.2–5)
ALKALINE PHOSPHATASE: 88 U/L (ref 45–117)
ANION GAP: 5 (ref 5–15)
AST(SGOT): 10 U/L (ref 15–37)
BUN SERPL-MCNC: 20 MG/DL (ref 7–18)
BUN/CREAT RATIO: 32.3 RATIO (ref 10–20)
CALCIUM SERPL-MCNC: 8.6 MG/DL (ref 8.5–10.1)
CARBON DIOXIDE: 25 MMOL/L (ref 21–32)
CHLORIDE: 108 MMOL/L (ref 98–107)
DEPRECATED RDW RBC: 48.6 FL (ref 35.1–43.9)
ERYTHROCYTE [DISTWIDTH] IN BLOOD: 14.2 % (ref 11.6–14.6)
EST GLOM FILT RATE - AFR AMER: 127 ML/MIN (ref 60–?)
ESTIMATED CREATININE CLEARANCE: 95.01 ML/MIN
GLOBULIN: 3.7 G/DL (ref 2.2–4.2)
GLUCOSE: 177 MG/DL (ref 74–106)
HCT VFR BLD AUTO: 30.9 % (ref 37–47)
HEMOGLOBIN: 9 G/DL (ref 12–15)
HGB BLD-MCNC: 9 G/DL (ref 12–15)
IMMATURE GRANULOCYTES COUNT: 0.06 X10^3/UL (ref 0–0)
MCV RBC: 93.9 FL (ref 81–99)
MEAN CORP HGB CONC: 29.1 G/DL (ref 32–36)
MEAN PLATELET VOL.: 9.8 FL (ref 6.2–12)
NRBC FLAGGED BY ANALYZER: 0 % (ref 0–5)
PLATELET # BLD: 214 K/MM3 (ref 150–450)
PLATELET COUNT: 214 K/MM3 (ref 150–450)
POTASSIUM: 4.1 MMOL/L (ref 3.5–5.1)
RBC # BLD AUTO: 3.29 M/MM3 (ref 4.2–5.4)
RBC DISTRIBUTION WIDTH CV: 14.2 % (ref 11.6–14.6)
RBC DISTRIBUTION WIDTH SD: 48.6 FL (ref 35.1–43.9)
WBC # BLD AUTO: 11.1 K/MM3 (ref 4.4–11)
WHITE BLOOD COUNT: 11.1 K/MM3 (ref 4.4–11)

## 2020-12-31 RX ADMIN — DOCUSATE SODIUM 50 MG AND SENNOSIDES 8.6 MG 1 TABLET: 8.6; 5 TABLET, FILM COATED ORAL at 10:42

## 2020-12-31 RX ADMIN — ASPIRIN 81 MG: 81 TABLET, COATED ORAL at 10:36

## 2020-12-31 RX ADMIN — Medication 120 ML: at 10:38

## 2020-12-31 RX ADMIN — SODIUM HYPOCHLORITE 1 APPLIC: 2.5 SOLUTION TOPICAL at 10:38

## 2020-12-31 RX ADMIN — SODIUM CHLORIDE, PRESERVATIVE FREE 0 ML: 5 INJECTION INTRAVENOUS at 00:42

## 2020-12-31 RX ADMIN — SODIUM CHLORIDE, PRESERVATIVE FREE 0 ML: 5 INJECTION INTRAVENOUS at 16:46

## 2022-10-20 ENCOUNTER — HOSPITAL ENCOUNTER (OUTPATIENT)
Dept: HOSPITAL 100 - WC | Age: 62
LOS: 11 days | Discharge: HOME | End: 2022-10-31
Payer: MEDICAID

## 2022-10-20 VITALS — DIASTOLIC BLOOD PRESSURE: 87 MMHG | HEART RATE: 97 BPM | TEMPERATURE: 97.52 F | SYSTOLIC BLOOD PRESSURE: 116 MMHG

## 2022-10-20 DIAGNOSIS — E11.621: Primary | ICD-10-CM

## 2022-10-20 DIAGNOSIS — Z79.4: ICD-10-CM

## 2022-10-20 DIAGNOSIS — Z79.82: ICD-10-CM

## 2022-10-20 DIAGNOSIS — Z79.02: ICD-10-CM

## 2022-10-20 DIAGNOSIS — E11.42: ICD-10-CM

## 2022-10-20 DIAGNOSIS — L89.622: ICD-10-CM

## 2022-10-20 DIAGNOSIS — E11.51: ICD-10-CM

## 2022-10-20 PROCEDURE — 11042 DBRDMT SUBQ TIS 1ST 20SQCM/<: CPT

## 2022-10-20 PROCEDURE — 99213 OFFICE O/P EST LOW 20 MIN: CPT

## 2022-10-20 PROCEDURE — G0463 HOSPITAL OUTPT CLINIC VISIT: HCPCS

## 2022-11-01 VITALS — HEART RATE: 97 BPM | TEMPERATURE: 97.5 F | DIASTOLIC BLOOD PRESSURE: 87 MMHG | SYSTOLIC BLOOD PRESSURE: 116 MMHG

## 2022-11-03 VITALS — HEART RATE: 78 BPM | TEMPERATURE: 97.88 F | DIASTOLIC BLOOD PRESSURE: 65 MMHG | SYSTOLIC BLOOD PRESSURE: 115 MMHG

## 2022-11-10 VITALS — DIASTOLIC BLOOD PRESSURE: 56 MMHG | SYSTOLIC BLOOD PRESSURE: 111 MMHG | TEMPERATURE: 96.98 F | HEART RATE: 73 BPM

## 2022-11-17 ENCOUNTER — HOSPITAL ENCOUNTER (OUTPATIENT)
Dept: HOSPITAL 100 - WC | Age: 62
LOS: 13 days | Discharge: HOME | End: 2022-11-30
Payer: MEDICAID

## 2022-11-17 VITALS — TEMPERATURE: 96.7 F | SYSTOLIC BLOOD PRESSURE: 128 MMHG | DIASTOLIC BLOOD PRESSURE: 79 MMHG | HEART RATE: 84 BPM

## 2022-11-17 DIAGNOSIS — R53.81: ICD-10-CM

## 2022-11-17 DIAGNOSIS — L89.622: ICD-10-CM

## 2022-11-17 DIAGNOSIS — E11.42: ICD-10-CM

## 2022-11-17 DIAGNOSIS — E11.621: Primary | ICD-10-CM

## 2022-11-17 PROCEDURE — 73630 X-RAY EXAM OF FOOT: CPT

## 2022-11-17 PROCEDURE — 15275 SKIN SUB GRAFT FACE/NK/HF/G: CPT

## 2022-12-01 VITALS
TEMPERATURE: 95.6 F | RESPIRATION RATE: 18 BRPM | SYSTOLIC BLOOD PRESSURE: 155 MMHG | HEART RATE: 86 BPM | DIASTOLIC BLOOD PRESSURE: 75 MMHG

## 2022-12-01 VITALS — TEMPERATURE: 96.62 F | DIASTOLIC BLOOD PRESSURE: 79 MMHG | SYSTOLIC BLOOD PRESSURE: 128 MMHG | HEART RATE: 84 BPM

## 2022-12-08 VITALS — SYSTOLIC BLOOD PRESSURE: 122 MMHG | TEMPERATURE: 96.9 F | DIASTOLIC BLOOD PRESSURE: 54 MMHG | HEART RATE: 63 BPM

## 2022-12-15 VITALS — DIASTOLIC BLOOD PRESSURE: 61 MMHG | SYSTOLIC BLOOD PRESSURE: 115 MMHG | TEMPERATURE: 97.16 F | HEART RATE: 71 BPM

## 2022-12-22 ENCOUNTER — HOSPITAL ENCOUNTER (OUTPATIENT)
Dept: HOSPITAL 100 - WC | Age: 62
LOS: 9 days | Discharge: HOME | End: 2022-12-31
Payer: MEDICAID

## 2022-12-22 ENCOUNTER — HOSPITAL ENCOUNTER (INPATIENT)
Dept: HOSPITAL 100 - ED | Age: 62
LOS: 8 days | Discharge: NURSING, REHAB OR LONG-TERM CARE | DRG: 314 | End: 2022-12-30
Payer: MEDICAID

## 2022-12-22 VITALS
HEART RATE: 85 BPM | TEMPERATURE: 97.8 F | DIASTOLIC BLOOD PRESSURE: 68 MMHG | SYSTOLIC BLOOD PRESSURE: 112 MMHG | RESPIRATION RATE: 22 BRPM

## 2022-12-22 VITALS
SYSTOLIC BLOOD PRESSURE: 152 MMHG | HEART RATE: 73 BPM | RESPIRATION RATE: 18 BRPM | OXYGEN SATURATION: 99 % | DIASTOLIC BLOOD PRESSURE: 67 MMHG | TEMPERATURE: 97.2 F

## 2022-12-22 VITALS
HEART RATE: 67 BPM | TEMPERATURE: 97.88 F | DIASTOLIC BLOOD PRESSURE: 64 MMHG | SYSTOLIC BLOOD PRESSURE: 130 MMHG | RESPIRATION RATE: 14 BRPM

## 2022-12-22 VITALS
OXYGEN SATURATION: 100 % | SYSTOLIC BLOOD PRESSURE: 96 MMHG | HEART RATE: 81 BPM | TEMPERATURE: 96.8 F | RESPIRATION RATE: 18 BRPM | DIASTOLIC BLOOD PRESSURE: 78 MMHG

## 2022-12-22 VITALS
OXYGEN SATURATION: 99 % | HEART RATE: 73 BPM | SYSTOLIC BLOOD PRESSURE: 152 MMHG | DIASTOLIC BLOOD PRESSURE: 67 MMHG | TEMPERATURE: 97.2 F | RESPIRATION RATE: 18 BRPM

## 2022-12-22 VITALS
DIASTOLIC BLOOD PRESSURE: 78 MMHG | HEART RATE: 80 BPM | OXYGEN SATURATION: 100 % | TEMPERATURE: 96.8 F | SYSTOLIC BLOOD PRESSURE: 96 MMHG | RESPIRATION RATE: 18 BRPM

## 2022-12-22 VITALS
SYSTOLIC BLOOD PRESSURE: 132 MMHG | DIASTOLIC BLOOD PRESSURE: 53 MMHG | TEMPERATURE: 98.3 F | HEART RATE: 80 BPM | RESPIRATION RATE: 17 BRPM | OXYGEN SATURATION: 97 %

## 2022-12-22 VITALS
HEART RATE: 67 BPM | SYSTOLIC BLOOD PRESSURE: 139 MMHG | OXYGEN SATURATION: 100 % | TEMPERATURE: 97.52 F | RESPIRATION RATE: 16 BRPM | DIASTOLIC BLOOD PRESSURE: 63 MMHG

## 2022-12-22 VITALS — SYSTOLIC BLOOD PRESSURE: 132 MMHG | HEART RATE: 80 BPM | DIASTOLIC BLOOD PRESSURE: 53 MMHG

## 2022-12-22 VITALS — BODY MASS INDEX: 40.7 KG/M2 | BODY MASS INDEX: 40.6 KG/M2

## 2022-12-22 VITALS — OXYGEN SATURATION: 98 %

## 2022-12-22 DIAGNOSIS — E10.621: Primary | ICD-10-CM

## 2022-12-22 DIAGNOSIS — I35.0: ICD-10-CM

## 2022-12-22 DIAGNOSIS — I25.2: ICD-10-CM

## 2022-12-22 DIAGNOSIS — B95.4: ICD-10-CM

## 2022-12-22 DIAGNOSIS — F41.9: ICD-10-CM

## 2022-12-22 DIAGNOSIS — B96.89: ICD-10-CM

## 2022-12-22 DIAGNOSIS — D50.9: ICD-10-CM

## 2022-12-22 DIAGNOSIS — Z79.82: ICD-10-CM

## 2022-12-22 DIAGNOSIS — L97.424: ICD-10-CM

## 2022-12-22 DIAGNOSIS — Z79.02: ICD-10-CM

## 2022-12-22 DIAGNOSIS — B95.2: ICD-10-CM

## 2022-12-22 DIAGNOSIS — E10.42: ICD-10-CM

## 2022-12-22 DIAGNOSIS — E11.42: ICD-10-CM

## 2022-12-22 DIAGNOSIS — Z95.1: ICD-10-CM

## 2022-12-22 DIAGNOSIS — Z79.899: ICD-10-CM

## 2022-12-22 DIAGNOSIS — I10: ICD-10-CM

## 2022-12-22 DIAGNOSIS — L89.622: ICD-10-CM

## 2022-12-22 DIAGNOSIS — E10.65: ICD-10-CM

## 2022-12-22 DIAGNOSIS — E10.69: ICD-10-CM

## 2022-12-22 DIAGNOSIS — E11.51: ICD-10-CM

## 2022-12-22 DIAGNOSIS — E78.5: ICD-10-CM

## 2022-12-22 DIAGNOSIS — F32.A: ICD-10-CM

## 2022-12-22 DIAGNOSIS — Z79.4: ICD-10-CM

## 2022-12-22 DIAGNOSIS — E66.01: ICD-10-CM

## 2022-12-22 DIAGNOSIS — I25.10: ICD-10-CM

## 2022-12-22 DIAGNOSIS — M86.672: ICD-10-CM

## 2022-12-22 DIAGNOSIS — E11.621: Primary | ICD-10-CM

## 2022-12-22 DIAGNOSIS — L03.116: ICD-10-CM

## 2022-12-22 DIAGNOSIS — E10.51: ICD-10-CM

## 2022-12-22 LAB
ALANINE AMINOTRANSFER ALT/SGPT: 21 U/L (ref 13–56)
ALBUMIN SERPL-MCNC: 2.9 G/DL (ref 3.2–5)
ALKALINE PHOSPHATASE: 139 U/L (ref 45–117)
ANION GAP: 5 (ref 5–15)
AST(SGOT): 10 U/L (ref 15–37)
BILIRUB DIRECT SERPL-MCNC: 0.14 MG/DL (ref 0–0.3)
BUN SERPL-MCNC: 19 MG/DL (ref 7–18)
BUN/CREAT RATIO: 17.8 RATIO (ref 10–20)
CALCIUM SERPL-MCNC: 9.4 MG/DL (ref 8.5–10.1)
CARBON DIOXIDE: 29 MMOL/L (ref 21–32)
CHLORIDE: 97 MMOL/L (ref 98–107)
CRP SERPL-MCNC: 35.2 MG/L (ref 0–3)
DEPRECATED RDW RBC: 40.3 FL (ref 35.1–43.9)
ERYTHROCYTE [DISTWIDTH] IN BLOOD: 13.1 % (ref 11.6–14.6)
EST GLOM FILT RATE - AFR AMER: 67 ML/MIN (ref 60–?)
ESTIMATED CREATININE CLEARANCE: 53.69 ML/MIN
GLOBULIN: 3.8 G/DL (ref 2.2–4.2)
GLUCOSE: 379 MG/DL (ref 74–106)
HCT VFR BLD AUTO: 36.1 % (ref 37–47)
HEMOGLOBIN: 12.4 G/DL (ref 12–15)
HGB BLD-MCNC: 12.4 G/DL (ref 12–15)
IMMATURE GRANULOCYTES COUNT: 0.1 X10^3/UL (ref 0–0)
MCV RBC: 86 FL (ref 81–99)
MEAN CORP HGB CONC: 34.3 G/DL (ref 32–36)
MEAN PLATELET VOL.: 10 FL (ref 6.2–12)
NRBC FLAGGED BY ANALYZER: 0 % (ref 0–5)
PLATELET # BLD: 295 K/MM3 (ref 150–450)
PLATELET COUNT: 295 K/MM3 (ref 150–450)
POTASSIUM: 4.9 MMOL/L (ref 3.5–5.1)
RBC # BLD AUTO: 4.2 M/MM3 (ref 4.2–5.4)
RBC DISTRIBUTION WIDTH CV: 13.1 % (ref 11.6–14.6)
RBC DISTRIBUTION WIDTH SD: 40.3 FL (ref 35.1–43.9)
WBC # BLD AUTO: 11.1 K/MM3 (ref 4.4–11)
WHITE BLOOD COUNT: 11.1 K/MM3 (ref 4.4–11)

## 2022-12-22 PROCEDURE — 87426 SARSCOV CORONAVIRUS AG IA: CPT

## 2022-12-22 PROCEDURE — 85652 RBC SED RATE AUTOMATED: CPT

## 2022-12-22 PROCEDURE — 99251: CPT

## 2022-12-22 PROCEDURE — 87070 CULTURE OTHR SPECIMN AEROBIC: CPT

## 2022-12-22 PROCEDURE — 88311 DECALCIFY TISSUE: CPT

## 2022-12-22 PROCEDURE — G0463 HOSPITAL OUTPT CLINIC VISIT: HCPCS

## 2022-12-22 PROCEDURE — 36415 COLL VENOUS BLD VENIPUNCTURE: CPT

## 2022-12-22 PROCEDURE — 99284 EMERGENCY DEPT VISIT MOD MDM: CPT

## 2022-12-22 PROCEDURE — 97803 MED NUTRITION INDIV SUBSEQ: CPT

## 2022-12-22 PROCEDURE — 97166 OT EVAL MOD COMPLEX 45 MIN: CPT

## 2022-12-22 PROCEDURE — 87040 BLOOD CULTURE FOR BACTERIA: CPT

## 2022-12-22 PROCEDURE — 87206 SMEAR FLUORESCENT/ACID STAI: CPT

## 2022-12-22 PROCEDURE — 88312 SPECIAL STAINS GROUP 1: CPT

## 2022-12-22 PROCEDURE — 80048 BASIC METABOLIC PNL TOTAL CA: CPT

## 2022-12-22 PROCEDURE — 73721 MRI JNT OF LWR EXTRE W/O DYE: CPT

## 2022-12-22 PROCEDURE — 87075 CULTR BACTERIA EXCEPT BLOOD: CPT

## 2022-12-22 PROCEDURE — 86140 C-REACTIVE PROTEIN: CPT

## 2022-12-22 PROCEDURE — 87116 MYCOBACTERIA CULTURE: CPT

## 2022-12-22 PROCEDURE — 87205 SMEAR GRAM STAIN: CPT

## 2022-12-22 PROCEDURE — 87640 STAPH A DNA AMP PROBE: CPT

## 2022-12-22 PROCEDURE — 83735 ASSAY OF MAGNESIUM: CPT

## 2022-12-22 PROCEDURE — 80202 ASSAY OF VANCOMYCIN: CPT

## 2022-12-22 PROCEDURE — 83605 ASSAY OF LACTIC ACID: CPT

## 2022-12-22 PROCEDURE — 97110 THERAPEUTIC EXERCISES: CPT

## 2022-12-22 PROCEDURE — 88307 TISSUE EXAM BY PATHOLOGIST: CPT

## 2022-12-22 PROCEDURE — 85025 COMPLETE CBC W/AUTO DIFF WBC: CPT

## 2022-12-22 PROCEDURE — 93005 ELECTROCARDIOGRAM TRACING: CPT

## 2022-12-22 PROCEDURE — 73610 X-RAY EXAM OF ANKLE: CPT

## 2022-12-22 PROCEDURE — 87102 FUNGUS ISOLATION CULTURE: CPT

## 2022-12-22 PROCEDURE — 87186 SC STD MICRODIL/AGAR DIL: CPT

## 2022-12-22 PROCEDURE — 80076 HEPATIC FUNCTION PANEL: CPT

## 2022-12-22 PROCEDURE — 97112 NEUROMUSCULAR REEDUCATION: CPT

## 2022-12-22 PROCEDURE — 82962 GLUCOSE BLOOD TEST: CPT

## 2022-12-22 PROCEDURE — 87015 SPECIMEN INFECT AGNT CONCNTJ: CPT

## 2022-12-22 PROCEDURE — 93306 TTE W/DOPPLER COMPLETE: CPT

## 2022-12-22 PROCEDURE — 83036 HEMOGLOBIN GLYCOSYLATED A1C: CPT

## 2022-12-22 PROCEDURE — 93923 UPR/LXTR ART STDY 3+ LVLS: CPT

## 2022-12-22 PROCEDURE — 36569 INSJ PICC 5 YR+ W/O IMAGING: CPT

## 2022-12-22 PROCEDURE — 97530 THERAPEUTIC ACTIVITIES: CPT

## 2022-12-22 PROCEDURE — 97162 PT EVAL MOD COMPLEX 30 MIN: CPT

## 2022-12-22 PROCEDURE — 99213 OFFICE O/P EST LOW 20 MIN: CPT

## 2022-12-22 PROCEDURE — A4216 STERILE WATER/SALINE, 10 ML: HCPCS

## 2022-12-22 PROCEDURE — 80053 COMPREHEN METABOLIC PANEL: CPT

## 2022-12-22 PROCEDURE — 87077 CULTURE AEROBIC IDENTIFY: CPT

## 2022-12-22 PROCEDURE — 85610 PROTHROMBIN TIME: CPT

## 2022-12-22 PROCEDURE — 84134 ASSAY OF PREALBUMIN: CPT

## 2022-12-22 PROCEDURE — 85730 THROMBOPLASTIN TIME PARTIAL: CPT

## 2022-12-22 PROCEDURE — 15275 SKIN SUB GRAFT FACE/NK/HF/G: CPT

## 2022-12-22 PROCEDURE — 97116 GAIT TRAINING THERAPY: CPT

## 2022-12-22 PROCEDURE — 97535 SELF CARE MNGMENT TRAINING: CPT

## 2022-12-22 PROCEDURE — 84100 ASSAY OF PHOSPHORUS: CPT

## 2022-12-22 RX ADMIN — SODIUM CHLORIDE 999 ML: 9 INJECTION, SOLUTION INTRAVENOUS at 11:09

## 2022-12-22 RX ADMIN — SODIUM CHLORIDE 100 ML: 9 INJECTION, SOLUTION INTRAVENOUS at 16:21

## 2022-12-22 RX ADMIN — DOCUSATE SODIUM 50 MG AND SENNOSIDES 8.6 MG 2 TABLET: 8.6; 5 TABLET, FILM COATED ORAL at 21:17

## 2022-12-22 RX ADMIN — DOXEPIN HYDROCHLORIDE 20 MG: 10 CAPSULE ORAL at 21:17

## 2022-12-22 RX ADMIN — INSULIN GLARGINE-YFGN 15 UNIT: 100 INJECTION, SOLUTION SUBCUTANEOUS at 16:31

## 2022-12-22 RX ADMIN — SODIUM CHLORIDE, PRESERVATIVE FREE 0 ML: 5 INJECTION INTRAVENOUS at 16:21

## 2022-12-22 RX ADMIN — INSULIN GLARGINE-YFGN 40 UNIT: 100 INJECTION, SOLUTION SUBCUTANEOUS at 21:15

## 2022-12-23 VITALS
OXYGEN SATURATION: 94 % | TEMPERATURE: 98.78 F | DIASTOLIC BLOOD PRESSURE: 67 MMHG | HEART RATE: 77 BPM | RESPIRATION RATE: 16 BRPM | SYSTOLIC BLOOD PRESSURE: 145 MMHG

## 2022-12-23 VITALS
TEMPERATURE: 97.5 F | OXYGEN SATURATION: 95 % | DIASTOLIC BLOOD PRESSURE: 56 MMHG | SYSTOLIC BLOOD PRESSURE: 134 MMHG | HEART RATE: 78 BPM | RESPIRATION RATE: 16 BRPM

## 2022-12-23 VITALS
SYSTOLIC BLOOD PRESSURE: 145 MMHG | DIASTOLIC BLOOD PRESSURE: 67 MMHG | OXYGEN SATURATION: 94 % | HEART RATE: 77 BPM | RESPIRATION RATE: 18 BRPM

## 2022-12-23 VITALS
SYSTOLIC BLOOD PRESSURE: 107 MMHG | OXYGEN SATURATION: 97 % | DIASTOLIC BLOOD PRESSURE: 50 MMHG | TEMPERATURE: 98 F | HEART RATE: 75 BPM | RESPIRATION RATE: 18 BRPM

## 2022-12-23 VITALS
DIASTOLIC BLOOD PRESSURE: 67 MMHG | SYSTOLIC BLOOD PRESSURE: 145 MMHG | OXYGEN SATURATION: 98 % | RESPIRATION RATE: 18 BRPM | TEMPERATURE: 97.9 F | HEART RATE: 79 BPM

## 2022-12-23 VITALS — HEART RATE: 76 BPM | SYSTOLIC BLOOD PRESSURE: 146 MMHG | DIASTOLIC BLOOD PRESSURE: 48 MMHG

## 2022-12-23 VITALS
HEART RATE: 75 BPM | DIASTOLIC BLOOD PRESSURE: 67 MMHG | OXYGEN SATURATION: 92 % | SYSTOLIC BLOOD PRESSURE: 105 MMHG | RESPIRATION RATE: 16 BRPM

## 2022-12-23 VITALS — OXYGEN SATURATION: 94 %

## 2022-12-23 VITALS
DIASTOLIC BLOOD PRESSURE: 56 MMHG | HEART RATE: 78 BPM | TEMPERATURE: 97.7 F | OXYGEN SATURATION: 98 % | SYSTOLIC BLOOD PRESSURE: 135 MMHG | RESPIRATION RATE: 18 BRPM

## 2022-12-23 VITALS
HEART RATE: 73 BPM | DIASTOLIC BLOOD PRESSURE: 63 MMHG | RESPIRATION RATE: 16 BRPM | OXYGEN SATURATION: 93 % | SYSTOLIC BLOOD PRESSURE: 117 MMHG

## 2022-12-23 VITALS
RESPIRATION RATE: 18 BRPM | HEART RATE: 66 BPM | SYSTOLIC BLOOD PRESSURE: 125 MMHG | DIASTOLIC BLOOD PRESSURE: 48 MMHG | OXYGEN SATURATION: 95 % | TEMPERATURE: 98.42 F

## 2022-12-23 VITALS
SYSTOLIC BLOOD PRESSURE: 106 MMHG | RESPIRATION RATE: 16 BRPM | TEMPERATURE: 98.4 F | DIASTOLIC BLOOD PRESSURE: 61 MMHG | HEART RATE: 74 BPM | OXYGEN SATURATION: 94 %

## 2022-12-23 VITALS
DIASTOLIC BLOOD PRESSURE: 48 MMHG | OXYGEN SATURATION: 98 % | TEMPERATURE: 97.9 F | HEART RATE: 76 BPM | RESPIRATION RATE: 17 BRPM | SYSTOLIC BLOOD PRESSURE: 146 MMHG

## 2022-12-23 VITALS
SYSTOLIC BLOOD PRESSURE: 108 MMHG | HEART RATE: 75 BPM | OXYGEN SATURATION: 92 % | DIASTOLIC BLOOD PRESSURE: 67 MMHG | RESPIRATION RATE: 18 BRPM

## 2022-12-23 VITALS — HEART RATE: 79 BPM

## 2022-12-23 LAB
ANION GAP: 5 (ref 5–15)
APTT PPP: 24.2 SECONDS (ref 24.1–36.2)
BUN SERPL-MCNC: 17 MG/DL (ref 7–18)
BUN/CREAT RATIO: 18.2 RATIO (ref 10–20)
CALCIUM SERPL-MCNC: 8.3 MG/DL (ref 8.5–10.1)
CARBON DIOXIDE: 27 MMOL/L (ref 21–32)
CHLORIDE: 104 MMOL/L (ref 98–107)
DEPRECATED RDW RBC: 42.4 FL (ref 35.1–43.9)
ERYTHROCYTE [DISTWIDTH] IN BLOOD: 13.2 % (ref 11.6–14.6)
EST GLOM FILT RATE - AFR AMER: 78 ML/MIN (ref 60–?)
ESTIMATED CREATININE CLEARANCE: 61.12 ML/MIN
GLUCOSE: 394 MG/DL (ref 74–106)
HCT VFR BLD AUTO: 33.1 % (ref 37–47)
HEMOGLOBIN: 11.1 G/DL (ref 12–15)
HGB BLD-MCNC: 11.1 G/DL (ref 12–15)
IMMATURE GRANULOCYTES COUNT: 0.06 X10^3/UL (ref 0–0)
MAGNESIUM: 2.3 MG/DL (ref 1.6–2.6)
MCV RBC: 89 FL (ref 81–99)
MEAN CORP HGB CONC: 33.5 G/DL (ref 32–36)
MEAN PLATELET VOL.: 9.9 FL (ref 6.2–12)
NRBC FLAGGED BY ANALYZER: 0 % (ref 0–5)
PLATELET # BLD: 261 K/MM3 (ref 150–450)
PLATELET COUNT: 261 K/MM3 (ref 150–450)
POTASSIUM: 3.9 MMOL/L (ref 3.5–5.1)
PREALB SERPL-MCNC: 11.3 MG/DL (ref 20–40)
PROTHROMBIN TIME (PROTIME)PT.: 13.7 SECONDS (ref 11.7–14.9)
RBC # BLD AUTO: 3.72 M/MM3 (ref 4.2–5.4)
RBC DISTRIBUTION WIDTH CV: 13.2 % (ref 11.6–14.6)
RBC DISTRIBUTION WIDTH SD: 42.4 FL (ref 35.1–43.9)
STAPH AUREUS DNA BY PCR: NEGATIVE
WBC # BLD AUTO: 7.4 K/MM3 (ref 4.4–11)
WHITE BLOOD COUNT: 7.4 K/MM3 (ref 4.4–11)

## 2022-12-23 PROCEDURE — 0QBM0ZZ EXCISION OF LEFT TARSAL, OPEN APPROACH: ICD-10-PCS | Performed by: PODIATRIST

## 2022-12-23 RX ADMIN — DOXEPIN HYDROCHLORIDE 20 MG: 10 CAPSULE ORAL at 21:06

## 2022-12-23 RX ADMIN — DOCUSATE SODIUM 50 MG AND SENNOSIDES 8.6 MG 2 TABLET: 8.6; 5 TABLET, FILM COATED ORAL at 13:58

## 2022-12-23 RX ADMIN — INFLUENZA A VIRUS A/VICTORIA/2570/2019 IVR-215 (H1N1) ANTIGEN (FORMALDEHYDE INACTIVATED), INFLUENZA A VIRUS A/DARWIN/9/2021 SAN-010 (H3N2) ANTIGEN (FORMALDEHYDE INACTIVATED), INFLUENZA B VIRUS B/PHUKET/3073/2013 ANTIGEN (FORMALDEHYDE INACTIVATED), AND INFLUENZA B VIRUS B/MICHIGAN/01/2021 ANTIGEN (FORMALDEHYDE INACTIVATED) 0.5 ML: 15; 15; 15; 15 INJECTION, SUSPENSION INTRAMUSCULAR at 16:03

## 2022-12-23 RX ADMIN — INSULIN GLARGINE-YFGN 45 UNIT: 100 INJECTION, SOLUTION SUBCUTANEOUS at 21:04

## 2022-12-23 RX ADMIN — INSULIN GLARGINE-YFGN 15 UNIT: 100 INJECTION, SOLUTION SUBCUTANEOUS at 17:29

## 2022-12-23 RX ADMIN — SODIUM CHLORIDE 100 ML: 9 INJECTION, SOLUTION INTRAVENOUS at 04:02

## 2022-12-24 VITALS
TEMPERATURE: 98.24 F | HEART RATE: 77 BPM | DIASTOLIC BLOOD PRESSURE: 51 MMHG | SYSTOLIC BLOOD PRESSURE: 112 MMHG | OXYGEN SATURATION: 98 % | RESPIRATION RATE: 18 BRPM

## 2022-12-24 VITALS
HEART RATE: 74 BPM | OXYGEN SATURATION: 98 % | DIASTOLIC BLOOD PRESSURE: 61 MMHG | TEMPERATURE: 97.88 F | SYSTOLIC BLOOD PRESSURE: 124 MMHG | RESPIRATION RATE: 15 BRPM

## 2022-12-24 VITALS
OXYGEN SATURATION: 98 % | RESPIRATION RATE: 18 BRPM | DIASTOLIC BLOOD PRESSURE: 69 MMHG | SYSTOLIC BLOOD PRESSURE: 93 MMHG | HEART RATE: 76 BPM | TEMPERATURE: 98.6 F

## 2022-12-24 VITALS — HEART RATE: 73 BPM | DIASTOLIC BLOOD PRESSURE: 46 MMHG | SYSTOLIC BLOOD PRESSURE: 111 MMHG

## 2022-12-24 VITALS — OXYGEN SATURATION: 93 %

## 2022-12-24 VITALS — HEART RATE: 77 BPM

## 2022-12-24 VITALS
DIASTOLIC BLOOD PRESSURE: 46 MMHG | RESPIRATION RATE: 18 BRPM | OXYGEN SATURATION: 96 % | HEART RATE: 73 BPM | TEMPERATURE: 98.2 F | SYSTOLIC BLOOD PRESSURE: 111 MMHG

## 2022-12-24 LAB
ANION GAP: 5 (ref 5–15)
BUN SERPL-MCNC: 19 MG/DL (ref 7–18)
BUN/CREAT RATIO: 18.8 RATIO (ref 10–20)
CALCIUM SERPL-MCNC: 8.8 MG/DL (ref 8.5–10.1)
CARBON DIOXIDE: 28 MMOL/L (ref 21–32)
CHLORIDE: 103 MMOL/L (ref 98–107)
DEPRECATED RDW RBC: 44.9 FL (ref 35.1–43.9)
ERYTHROCYTE [DISTWIDTH] IN BLOOD: 13.4 % (ref 11.6–14.6)
EST GLOM FILT RATE - AFR AMER: 72 ML/MIN (ref 60–?)
ESTIMATED CREATININE CLEARANCE: 56.88 ML/MIN
GLUCOSE: 269 MG/DL (ref 74–106)
HCT VFR BLD AUTO: 34.5 % (ref 37–47)
HEMOGLOBIN: 11.3 G/DL (ref 12–15)
HGB BLD-MCNC: 11.3 G/DL (ref 12–15)
IMMATURE GRANULOCYTES COUNT: 0.11 X10^3/UL (ref 0–0)
MCV RBC: 91.3 FL (ref 81–99)
MEAN CORP HGB CONC: 32.8 G/DL (ref 32–36)
MEAN PLATELET VOL.: 10 FL (ref 6.2–12)
NRBC FLAGGED BY ANALYZER: 0 % (ref 0–5)
PLATELET # BLD: 275 K/MM3 (ref 150–450)
PLATELET COUNT: 275 K/MM3 (ref 150–450)
POTASSIUM: 4.2 MMOL/L (ref 3.5–5.1)
RBC # BLD AUTO: 3.78 M/MM3 (ref 4.2–5.4)
RBC DISTRIBUTION WIDTH CV: 13.4 % (ref 11.6–14.6)
RBC DISTRIBUTION WIDTH SD: 44.9 FL (ref 35.1–43.9)
WBC # BLD AUTO: 9.1 K/MM3 (ref 4.4–11)
WHITE BLOOD COUNT: 9.1 K/MM3 (ref 4.4–11)

## 2022-12-24 RX ADMIN — DOCUSATE SODIUM 50 MG AND SENNOSIDES 8.6 MG 2 TABLET: 8.6; 5 TABLET, FILM COATED ORAL at 10:19

## 2022-12-24 RX ADMIN — SODIUM CHLORIDE, PRESERVATIVE FREE 0 ML: 5 INJECTION INTRAVENOUS at 22:25

## 2022-12-24 RX ADMIN — INSULIN GLARGINE-YFGN 30 UNIT: 100 INJECTION, SOLUTION SUBCUTANEOUS at 07:44

## 2022-12-24 RX ADMIN — INSULIN GLARGINE-YFGN 45 UNIT: 100 INJECTION, SOLUTION SUBCUTANEOUS at 20:57

## 2022-12-24 RX ADMIN — DOCUSATE SODIUM 50 MG AND SENNOSIDES 8.6 MG 2 TABLET: 8.6; 5 TABLET, FILM COATED ORAL at 20:59

## 2022-12-24 RX ADMIN — DOXEPIN HYDROCHLORIDE 20 MG: 10 CAPSULE ORAL at 20:59

## 2022-12-25 VITALS — HEART RATE: 76 BPM

## 2022-12-25 VITALS
RESPIRATION RATE: 18 BRPM | DIASTOLIC BLOOD PRESSURE: 62 MMHG | OXYGEN SATURATION: 96 % | SYSTOLIC BLOOD PRESSURE: 121 MMHG | TEMPERATURE: 98.06 F | HEART RATE: 76 BPM

## 2022-12-25 VITALS
SYSTOLIC BLOOD PRESSURE: 127 MMHG | DIASTOLIC BLOOD PRESSURE: 47 MMHG | HEART RATE: 66 BPM | TEMPERATURE: 98.24 F | RESPIRATION RATE: 17 BRPM | OXYGEN SATURATION: 99 %

## 2022-12-25 VITALS
TEMPERATURE: 97.88 F | RESPIRATION RATE: 16 BRPM | OXYGEN SATURATION: 97 % | HEART RATE: 76 BPM | SYSTOLIC BLOOD PRESSURE: 114 MMHG | DIASTOLIC BLOOD PRESSURE: 58 MMHG

## 2022-12-25 VITALS
HEART RATE: 73 BPM | TEMPERATURE: 97.6 F | SYSTOLIC BLOOD PRESSURE: 100 MMHG | DIASTOLIC BLOOD PRESSURE: 49 MMHG | RESPIRATION RATE: 18 BRPM | OXYGEN SATURATION: 94 %

## 2022-12-25 VITALS — SYSTOLIC BLOOD PRESSURE: 127 MMHG | HEART RATE: 66 BPM | DIASTOLIC BLOOD PRESSURE: 47 MMHG

## 2022-12-25 LAB
ALANINE AMINOTRANSFER ALT/SGPT: 18 U/L (ref 13–56)
ALBUMIN SERPL-MCNC: 2.5 G/DL (ref 3.2–5)
ALKALINE PHOSPHATASE: 106 U/L (ref 45–117)
ANION GAP: 4 (ref 5–15)
AST(SGOT): 11 U/L (ref 15–37)
BUN SERPL-MCNC: 24 MG/DL (ref 7–18)
BUN/CREAT RATIO: 21.6 RATIO (ref 10–20)
CALCIUM SERPL-MCNC: 8.5 MG/DL (ref 8.5–10.1)
CARBON DIOXIDE: 29 MMOL/L (ref 21–32)
CHLORIDE: 104 MMOL/L (ref 98–107)
DEPRECATED RDW RBC: 45.7 FL (ref 35.1–43.9)
ERYTHROCYTE [DISTWIDTH] IN BLOOD: 13.5 % (ref 11.6–14.6)
EST GLOM FILT RATE - AFR AMER: 64 ML/MIN (ref 60–?)
ESTIMATED CREATININE CLEARANCE: 51.76 ML/MIN
GLOBULIN: 3.4 G/DL (ref 2.2–4.2)
GLUCOSE: 381 MG/DL (ref 74–106)
HCT VFR BLD AUTO: 33.7 % (ref 37–47)
HEMOGLOBIN: 10.6 G/DL (ref 12–15)
HGB BLD-MCNC: 10.6 G/DL (ref 12–15)
IMMATURE GRANULOCYTES COUNT: 0.1 X10^3/UL (ref 0–0)
MCV RBC: 91.6 FL (ref 81–99)
MEAN CORP HGB CONC: 31.5 G/DL (ref 32–36)
MEAN PLATELET VOL.: 9.9 FL (ref 6.2–12)
NRBC FLAGGED BY ANALYZER: 0 % (ref 0–5)
PLATELET # BLD: 262 K/MM3 (ref 150–450)
PLATELET COUNT: 262 K/MM3 (ref 150–450)
POTASSIUM: 4.3 MMOL/L (ref 3.5–5.1)
RBC # BLD AUTO: 3.68 M/MM3 (ref 4.2–5.4)
RBC DISTRIBUTION WIDTH CV: 13.5 % (ref 11.6–14.6)
RBC DISTRIBUTION WIDTH SD: 45.7 FL (ref 35.1–43.9)
VANCOMYCIN TROUGH SERPL-MCNC: 20.8 UG/ML (ref 5–15)
WBC # BLD AUTO: 8.9 K/MM3 (ref 4.4–11)
WHITE BLOOD COUNT: 8.9 K/MM3 (ref 4.4–11)

## 2022-12-25 RX ADMIN — INSULIN GLARGINE-YFGN 45 UNIT: 100 INJECTION, SOLUTION SUBCUTANEOUS at 21:54

## 2022-12-25 RX ADMIN — DOCUSATE SODIUM 50 MG AND SENNOSIDES 8.6 MG 2 TABLET: 8.6; 5 TABLET, FILM COATED ORAL at 08:26

## 2022-12-25 RX ADMIN — DOXEPIN HYDROCHLORIDE 20 MG: 10 CAPSULE ORAL at 21:56

## 2022-12-25 RX ADMIN — SODIUM CHLORIDE, PRESERVATIVE FREE 0 ML: 5 INJECTION INTRAVENOUS at 14:27

## 2022-12-25 RX ADMIN — INSULIN GLARGINE-YFGN 30 UNIT: 100 INJECTION, SOLUTION SUBCUTANEOUS at 08:27

## 2022-12-25 RX ADMIN — DOCUSATE SODIUM 50 MG AND SENNOSIDES 8.6 MG 2 TABLET: 8.6; 5 TABLET, FILM COATED ORAL at 21:55

## 2022-12-26 VITALS
RESPIRATION RATE: 16 BRPM | DIASTOLIC BLOOD PRESSURE: 62 MMHG | SYSTOLIC BLOOD PRESSURE: 136 MMHG | TEMPERATURE: 97.7 F | OXYGEN SATURATION: 98 % | HEART RATE: 67 BPM

## 2022-12-26 VITALS
HEART RATE: 73 BPM | OXYGEN SATURATION: 100 % | RESPIRATION RATE: 15 BRPM | SYSTOLIC BLOOD PRESSURE: 142 MMHG | TEMPERATURE: 98.06 F | DIASTOLIC BLOOD PRESSURE: 66 MMHG

## 2022-12-26 VITALS — HEART RATE: 67 BPM | SYSTOLIC BLOOD PRESSURE: 136 MMHG | DIASTOLIC BLOOD PRESSURE: 62 MMHG

## 2022-12-26 VITALS
RESPIRATION RATE: 16 BRPM | SYSTOLIC BLOOD PRESSURE: 119 MMHG | HEART RATE: 66 BPM | TEMPERATURE: 97.34 F | DIASTOLIC BLOOD PRESSURE: 57 MMHG | OXYGEN SATURATION: 98 %

## 2022-12-26 VITALS
HEART RATE: 74 BPM | SYSTOLIC BLOOD PRESSURE: 110 MMHG | TEMPERATURE: 97.88 F | RESPIRATION RATE: 16 BRPM | DIASTOLIC BLOOD PRESSURE: 58 MMHG | OXYGEN SATURATION: 95 %

## 2022-12-26 VITALS — HEART RATE: 66 BPM

## 2022-12-26 LAB
ALANINE AMINOTRANSFER ALT/SGPT: 21 U/L (ref 13–56)
ALBUMIN SERPL-MCNC: 2.5 G/DL (ref 3.2–5)
ALKALINE PHOSPHATASE: 97 U/L (ref 45–117)
ANION GAP: 5 (ref 5–15)
AST(SGOT): 12 U/L (ref 15–37)
BUN SERPL-MCNC: 22 MG/DL (ref 7–18)
BUN/CREAT RATIO: 21.4 RATIO (ref 10–20)
CALCIUM SERPL-MCNC: 8.6 MG/DL (ref 8.5–10.1)
CARBON DIOXIDE: 28 MMOL/L (ref 21–32)
CHLORIDE: 103 MMOL/L (ref 98–107)
DEPRECATED RDW RBC: 45.3 FL (ref 35.1–43.9)
ERYTHROCYTE [DISTWIDTH] IN BLOOD: 13.5 % (ref 11.6–14.6)
EST GLOM FILT RATE - AFR AMER: 70 ML/MIN (ref 60–?)
ESTIMATED CREATININE CLEARANCE: 55.78 ML/MIN
GLOBULIN: 3.6 G/DL (ref 2.2–4.2)
GLUCOSE: 353 MG/DL (ref 74–106)
HCT VFR BLD AUTO: 32.9 % (ref 37–47)
HEMOGLOBIN: 10.9 G/DL (ref 12–15)
HGB BLD-MCNC: 10.9 G/DL (ref 12–15)
IMMATURE GRANULOCYTES COUNT: 0.08 X10^3/UL (ref 0–0)
MCV RBC: 91.4 FL (ref 81–99)
MEAN CORP HGB CONC: 33.1 G/DL (ref 32–36)
MEAN PLATELET VOL.: 9.9 FL (ref 6.2–12)
NRBC FLAGGED BY ANALYZER: 0 % (ref 0–5)
PLATELET # BLD: 250 K/MM3 (ref 150–450)
PLATELET COUNT: 250 K/MM3 (ref 150–450)
POTASSIUM: 4.2 MMOL/L (ref 3.5–5.1)
RBC # BLD AUTO: 3.6 M/MM3 (ref 4.2–5.4)
RBC DISTRIBUTION WIDTH CV: 13.5 % (ref 11.6–14.6)
RBC DISTRIBUTION WIDTH SD: 45.3 FL (ref 35.1–43.9)
VANCOMYCIN TROUGH SERPL-MCNC: 19 UG/ML (ref 5–15)
WBC # BLD AUTO: 6.7 K/MM3 (ref 4.4–11)
WHITE BLOOD COUNT: 6.7 K/MM3 (ref 4.4–11)

## 2022-12-26 RX ADMIN — DOXEPIN HYDROCHLORIDE 20 MG: 10 CAPSULE ORAL at 22:03

## 2022-12-26 RX ADMIN — INSULIN GLARGINE-YFGN 45 UNIT: 100 INJECTION, SOLUTION SUBCUTANEOUS at 22:01

## 2022-12-26 RX ADMIN — DOCUSATE SODIUM 50 MG AND SENNOSIDES 8.6 MG 2 TABLET: 8.6; 5 TABLET, FILM COATED ORAL at 22:02

## 2022-12-26 RX ADMIN — DOCUSATE SODIUM 50 MG AND SENNOSIDES 8.6 MG 2 TABLET: 8.6; 5 TABLET, FILM COATED ORAL at 08:33

## 2022-12-26 RX ADMIN — INSULIN GLARGINE-YFGN 35 UNIT: 100 INJECTION, SOLUTION SUBCUTANEOUS at 08:29

## 2022-12-27 VITALS
RESPIRATION RATE: 15 BRPM | DIASTOLIC BLOOD PRESSURE: 68 MMHG | HEART RATE: 66 BPM | OXYGEN SATURATION: 98 % | SYSTOLIC BLOOD PRESSURE: 110 MMHG | TEMPERATURE: 97.8 F

## 2022-12-27 VITALS
DIASTOLIC BLOOD PRESSURE: 48 MMHG | RESPIRATION RATE: 18 BRPM | TEMPERATURE: 98.24 F | OXYGEN SATURATION: 98 % | HEART RATE: 75 BPM | SYSTOLIC BLOOD PRESSURE: 132 MMHG

## 2022-12-27 VITALS — DIASTOLIC BLOOD PRESSURE: 51 MMHG | SYSTOLIC BLOOD PRESSURE: 107 MMHG

## 2022-12-27 VITALS
TEMPERATURE: 98.24 F | DIASTOLIC BLOOD PRESSURE: 64 MMHG | RESPIRATION RATE: 16 BRPM | SYSTOLIC BLOOD PRESSURE: 120 MMHG | OXYGEN SATURATION: 98 % | HEART RATE: 72 BPM

## 2022-12-27 VITALS
OXYGEN SATURATION: 99 % | DIASTOLIC BLOOD PRESSURE: 56 MMHG | TEMPERATURE: 97.7 F | RESPIRATION RATE: 18 BRPM | SYSTOLIC BLOOD PRESSURE: 105 MMHG | HEART RATE: 59 BPM

## 2022-12-27 VITALS — OXYGEN SATURATION: 95 %

## 2022-12-27 VITALS — HEART RATE: 70 BPM

## 2022-12-27 VITALS — HEART RATE: 72 BPM

## 2022-12-27 LAB
ANION GAP: 5 (ref 5–15)
BUN SERPL-MCNC: 21 MG/DL (ref 7–18)
BUN/CREAT RATIO: 21.1 RATIO (ref 10–20)
CALCIUM SERPL-MCNC: 8.7 MG/DL (ref 8.5–10.1)
CARBON DIOXIDE: 30 MMOL/L (ref 21–32)
CHLORIDE: 103 MMOL/L (ref 98–107)
DEPRECATED RDW RBC: 46.5 FL (ref 35.1–43.9)
ERYTHROCYTE [DISTWIDTH] IN BLOOD: 13.6 % (ref 11.6–14.6)
EST GLOM FILT RATE - AFR AMER: 73 ML/MIN (ref 60–?)
ESTIMATED CREATININE CLEARANCE: 58.03 ML/MIN
GLUCOSE: 314 MG/DL (ref 74–106)
HCT VFR BLD AUTO: 33.1 % (ref 37–47)
HEMOGLOBIN: 10.7 G/DL (ref 12–15)
HGB BLD-MCNC: 10.7 G/DL (ref 12–15)
IMMATURE GRANULOCYTES COUNT: 0.09 X10^3/UL (ref 0–0)
MCV RBC: 92.2 FL (ref 81–99)
MEAN CORP HGB CONC: 32.3 G/DL (ref 32–36)
MEAN PLATELET VOL.: 10.1 FL (ref 6.2–12)
NRBC FLAGGED BY ANALYZER: 0 % (ref 0–5)
PLATELET # BLD: 246 K/MM3 (ref 150–450)
PLATELET COUNT: 246 K/MM3 (ref 150–450)
POTASSIUM: 4 MMOL/L (ref 3.5–5.1)
RBC # BLD AUTO: 3.59 M/MM3 (ref 4.2–5.4)
RBC DISTRIBUTION WIDTH CV: 13.6 % (ref 11.6–14.6)
RBC DISTRIBUTION WIDTH SD: 46.5 FL (ref 35.1–43.9)
WBC # BLD AUTO: 7.2 K/MM3 (ref 4.4–11)
WHITE BLOOD COUNT: 7.2 K/MM3 (ref 4.4–11)

## 2022-12-27 RX ADMIN — INSULIN GLARGINE-YFGN 35 UNIT: 100 INJECTION, SOLUTION SUBCUTANEOUS at 08:26

## 2022-12-27 RX ADMIN — INSULIN GLARGINE-YFGN 50 UNIT: 100 INJECTION, SOLUTION SUBCUTANEOUS at 21:17

## 2022-12-27 RX ADMIN — DOCUSATE SODIUM 50 MG AND SENNOSIDES 8.6 MG 2 TABLET: 8.6; 5 TABLET, FILM COATED ORAL at 08:28

## 2022-12-27 RX ADMIN — DOCUSATE SODIUM 50 MG AND SENNOSIDES 8.6 MG 2 TABLET: 8.6; 5 TABLET, FILM COATED ORAL at 21:18

## 2022-12-27 RX ADMIN — DOXEPIN HYDROCHLORIDE 20 MG: 10 CAPSULE ORAL at 21:19

## 2022-12-28 VITALS — HEART RATE: 62 BPM

## 2022-12-28 VITALS
OXYGEN SATURATION: 98 % | SYSTOLIC BLOOD PRESSURE: 118 MMHG | DIASTOLIC BLOOD PRESSURE: 60 MMHG | HEART RATE: 70 BPM | RESPIRATION RATE: 16 BRPM | TEMPERATURE: 98.06 F

## 2022-12-28 VITALS
OXYGEN SATURATION: 100 % | RESPIRATION RATE: 18 BRPM | DIASTOLIC BLOOD PRESSURE: 55 MMHG | TEMPERATURE: 97.5 F | HEART RATE: 65 BPM | SYSTOLIC BLOOD PRESSURE: 132 MMHG

## 2022-12-28 VITALS
OXYGEN SATURATION: 97 % | RESPIRATION RATE: 16 BRPM | HEART RATE: 65 BPM | TEMPERATURE: 97.52 F | SYSTOLIC BLOOD PRESSURE: 116 MMHG | DIASTOLIC BLOOD PRESSURE: 58 MMHG

## 2022-12-28 VITALS
DIASTOLIC BLOOD PRESSURE: 56 MMHG | HEART RATE: 62 BPM | SYSTOLIC BLOOD PRESSURE: 120 MMHG | OXYGEN SATURATION: 98 % | RESPIRATION RATE: 18 BRPM | TEMPERATURE: 97.88 F

## 2022-12-28 VITALS — HEART RATE: 65 BPM

## 2022-12-28 LAB
ALANINE AMINOTRANSFER ALT/SGPT: 24 U/L (ref 13–56)
ALBUMIN SERPL-MCNC: 2.5 G/DL (ref 3.2–5)
ALKALINE PHOSPHATASE: 90 U/L (ref 45–117)
ANION GAP: 4 (ref 5–15)
AST(SGOT): 16 U/L (ref 15–37)
BUN SERPL-MCNC: 22 MG/DL (ref 7–18)
BUN/CREAT RATIO: 24.7 RATIO (ref 10–20)
CALCIUM SERPL-MCNC: 8.5 MG/DL (ref 8.5–10.1)
CARBON DIOXIDE: 30 MMOL/L (ref 21–32)
CHLORIDE: 106 MMOL/L (ref 98–107)
DEPRECATED RDW RBC: 44.8 FL (ref 35.1–43.9)
ERYTHROCYTE [DISTWIDTH] IN BLOOD: 13.6 % (ref 11.6–14.6)
EST GLOM FILT RATE - AFR AMER: 83 ML/MIN (ref 60–?)
ESTIMATED CREATININE CLEARANCE: 64.55 ML/MIN
GLOBULIN: 3.6 G/DL (ref 2.2–4.2)
GLUCOSE: 213 MG/DL (ref 74–106)
HCT VFR BLD AUTO: 32.5 % (ref 37–47)
HEMOGLOBIN: 10.5 G/DL (ref 12–15)
HGB BLD-MCNC: 10.5 G/DL (ref 12–15)
IMMATURE GRANULOCYTES COUNT: 0.08 X10^3/UL (ref 0–0)
MCV RBC: 91 FL (ref 81–99)
MEAN CORP HGB CONC: 32.3 G/DL (ref 32–36)
MEAN PLATELET VOL.: 9.7 FL (ref 6.2–12)
NRBC FLAGGED BY ANALYZER: 0 % (ref 0–5)
PLATELET # BLD: 233 K/MM3 (ref 150–450)
PLATELET COUNT: 233 K/MM3 (ref 150–450)
POTASSIUM: 4 MMOL/L (ref 3.5–5.1)
RBC # BLD AUTO: 3.57 M/MM3 (ref 4.2–5.4)
RBC DISTRIBUTION WIDTH CV: 13.6 % (ref 11.6–14.6)
RBC DISTRIBUTION WIDTH SD: 44.8 FL (ref 35.1–43.9)
VANCOMYCIN TROUGH SERPL-MCNC: 20.5 UG/ML (ref 5–15)
WBC # BLD AUTO: 7.7 K/MM3 (ref 4.4–11)
WHITE BLOOD COUNT: 7.7 K/MM3 (ref 4.4–11)

## 2022-12-28 RX ADMIN — DOCUSATE SODIUM 50 MG AND SENNOSIDES 8.6 MG 2 TABLET: 8.6; 5 TABLET, FILM COATED ORAL at 07:35

## 2022-12-28 RX ADMIN — INSULIN GLARGINE-YFGN 50 UNIT: 100 INJECTION, SOLUTION SUBCUTANEOUS at 20:25

## 2022-12-28 RX ADMIN — DIPHENHYDRAMINE HYDROCHLORIDE 25 MG: 50 INJECTION, SOLUTION INTRAMUSCULAR; INTRAVENOUS at 00:29

## 2022-12-28 RX ADMIN — DOXEPIN HYDROCHLORIDE 20 MG: 10 CAPSULE ORAL at 20:26

## 2022-12-28 RX ADMIN — DOCUSATE SODIUM 50 MG AND SENNOSIDES 8.6 MG 2 TABLET: 8.6; 5 TABLET, FILM COATED ORAL at 20:27

## 2022-12-28 RX ADMIN — INSULIN GLARGINE-YFGN 35 UNIT: 100 INJECTION, SOLUTION SUBCUTANEOUS at 07:34

## 2022-12-29 VITALS
TEMPERATURE: 97.8 F | RESPIRATION RATE: 16 BRPM | HEART RATE: 68 BPM | OXYGEN SATURATION: 97 % | SYSTOLIC BLOOD PRESSURE: 128 MMHG | DIASTOLIC BLOOD PRESSURE: 52 MMHG

## 2022-12-29 VITALS
HEART RATE: 64 BPM | RESPIRATION RATE: 16 BRPM | DIASTOLIC BLOOD PRESSURE: 54 MMHG | TEMPERATURE: 97.88 F | OXYGEN SATURATION: 97 % | SYSTOLIC BLOOD PRESSURE: 123 MMHG

## 2022-12-29 VITALS
OXYGEN SATURATION: 98 % | DIASTOLIC BLOOD PRESSURE: 61 MMHG | HEART RATE: 71 BPM | RESPIRATION RATE: 18 BRPM | SYSTOLIC BLOOD PRESSURE: 130 MMHG | TEMPERATURE: 99.2 F

## 2022-12-29 VITALS
OXYGEN SATURATION: 98 % | SYSTOLIC BLOOD PRESSURE: 130 MMHG | DIASTOLIC BLOOD PRESSURE: 61 MMHG | HEART RATE: 71 BPM | TEMPERATURE: 99.14 F | RESPIRATION RATE: 18 BRPM

## 2022-12-29 VITALS
SYSTOLIC BLOOD PRESSURE: 103 MMHG | RESPIRATION RATE: 16 BRPM | HEART RATE: 69 BPM | TEMPERATURE: 98.24 F | OXYGEN SATURATION: 96 % | DIASTOLIC BLOOD PRESSURE: 44 MMHG

## 2022-12-29 VITALS
RESPIRATION RATE: 16 BRPM | SYSTOLIC BLOOD PRESSURE: 107 MMHG | OXYGEN SATURATION: 95 % | TEMPERATURE: 98.1 F | HEART RATE: 65 BPM | DIASTOLIC BLOOD PRESSURE: 50 MMHG

## 2022-12-29 VITALS — HEART RATE: 66 BPM

## 2022-12-29 VITALS — OXYGEN SATURATION: 97 %

## 2022-12-29 LAB
ANION GAP: 3 (ref 5–15)
BUN SERPL-MCNC: 22 MG/DL (ref 7–18)
BUN/CREAT RATIO: 25 RATIO (ref 10–20)
CALCIUM SERPL-MCNC: 8.8 MG/DL (ref 8.5–10.1)
CARBON DIOXIDE: 30 MMOL/L (ref 21–32)
CHLORIDE: 108 MMOL/L (ref 98–107)
DEPRECATED RDW RBC: 46.1 FL (ref 35.1–43.9)
ERYTHROCYTE [DISTWIDTH] IN BLOOD: 13.6 % (ref 11.6–14.6)
EST GLOM FILT RATE - AFR AMER: 84 ML/MIN (ref 60–?)
ESTIMATED CREATININE CLEARANCE: 65.28 ML/MIN
GLUCOSE: 163 MG/DL (ref 74–106)
HCT VFR BLD AUTO: 33.4 % (ref 37–47)
HEMOGLOBIN: 10.6 G/DL (ref 12–15)
HGB BLD-MCNC: 10.6 G/DL (ref 12–15)
IMMATURE GRANULOCYTES COUNT: 0.08 X10^3/UL (ref 0–0)
MCV RBC: 92.8 FL (ref 81–99)
MEAN CORP HGB CONC: 31.7 G/DL (ref 32–36)
MEAN PLATELET VOL.: 10 FL (ref 6.2–12)
NRBC FLAGGED BY ANALYZER: 0 % (ref 0–5)
PLATELET # BLD: 234 K/MM3 (ref 150–450)
PLATELET COUNT: 234 K/MM3 (ref 150–450)
POTASSIUM: 3.9 MMOL/L (ref 3.5–5.1)
RBC # BLD AUTO: 3.6 M/MM3 (ref 4.2–5.4)
RBC DISTRIBUTION WIDTH CV: 13.6 % (ref 11.6–14.6)
RBC DISTRIBUTION WIDTH SD: 46.1 FL (ref 35.1–43.9)
WBC # BLD AUTO: 7.8 K/MM3 (ref 4.4–11)
WHITE BLOOD COUNT: 7.8 K/MM3 (ref 4.4–11)

## 2022-12-29 RX ADMIN — DOCUSATE SODIUM 50 MG AND SENNOSIDES 8.6 MG 2 TABLET: 8.6; 5 TABLET, FILM COATED ORAL at 08:27

## 2022-12-29 RX ADMIN — INSULIN GLARGINE-YFGN 35 UNIT: 100 INJECTION, SOLUTION SUBCUTANEOUS at 09:36

## 2022-12-29 RX ADMIN — SODIUM CHLORIDE, PRESERVATIVE FREE 0 ML: 5 INJECTION INTRAVENOUS at 23:38

## 2022-12-29 RX ADMIN — INSULIN GLARGINE-YFGN 50 UNIT: 100 INJECTION, SOLUTION SUBCUTANEOUS at 21:42

## 2022-12-29 RX ADMIN — DOCUSATE SODIUM 50 MG AND SENNOSIDES 8.6 MG 2 TABLET: 8.6; 5 TABLET, FILM COATED ORAL at 20:41

## 2022-12-29 RX ADMIN — DOXEPIN HYDROCHLORIDE 20 MG: 10 CAPSULE ORAL at 20:41

## 2022-12-30 VITALS
TEMPERATURE: 97.5 F | OXYGEN SATURATION: 94 % | DIASTOLIC BLOOD PRESSURE: 46 MMHG | SYSTOLIC BLOOD PRESSURE: 108 MMHG | RESPIRATION RATE: 18 BRPM | HEART RATE: 64 BPM

## 2022-12-30 LAB
ALANINE AMINOTRANSFER ALT/SGPT: 23 U/L (ref 13–56)
ALBUMIN SERPL-MCNC: 2.6 G/DL (ref 3.2–5)
ALKALINE PHOSPHATASE: 87 U/L (ref 45–117)
ANION GAP: 5 (ref 5–15)
AST(SGOT): 14 U/L (ref 15–37)
BUN SERPL-MCNC: 27 MG/DL (ref 7–18)
BUN/CREAT RATIO: 31.5 RATIO (ref 10–20)
CALCIUM SERPL-MCNC: 8.5 MG/DL (ref 8.5–10.1)
CARBON DIOXIDE: 25 MMOL/L (ref 21–32)
CHLORIDE: 109 MMOL/L (ref 98–107)
DEPRECATED RDW RBC: 45.9 FL (ref 35.1–43.9)
DIFFERENTIAL COMMENT: (no result)
DIFFERENTIAL INDICATED: (no result)
ERYTHROCYTE [DISTWIDTH] IN BLOOD: 13.8 % (ref 11.6–14.6)
EST GLOM FILT RATE - AFR AMER: 86 ML/MIN (ref 60–?)
ESTIMATED CREATININE CLEARANCE: 66.8 ML/MIN
GLOBULIN: 3.6 G/DL (ref 2.2–4.2)
GLUCOSE: 157 MG/DL (ref 74–106)
HCT VFR BLD AUTO: 32.6 % (ref 37–47)
HEMOGLOBIN: 10.4 G/DL (ref 12–15)
HGB BLD-MCNC: 10.4 G/DL (ref 12–15)
IMMATURE GRANULOCYTES COUNT: 0.08 X10^3/UL (ref 0–0)
MANUAL DIF COMMENT BLD-IMP: (no result)
MCV RBC: 92.4 FL (ref 81–99)
MEAN CORP HGB CONC: 31.9 G/DL (ref 32–36)
MEAN PLATELET VOL.: 10 FL (ref 6.2–12)
NRBC FLAGGED BY ANALYZER: 0 % (ref 0–5)
PLATELET # BLD: 184 K/MM3 (ref 150–450)
PLATELET COUNT: 184 K/MM3 (ref 150–450)
POSITIVE COUNT: YES
POTASSIUM: 3.9 MMOL/L (ref 3.5–5.1)
RBC # BLD AUTO: 3.53 M/MM3 (ref 4.2–5.4)
RBC DISTRIBUTION WIDTH CV: 13.8 % (ref 11.6–14.6)
RBC DISTRIBUTION WIDTH SD: 45.9 FL (ref 35.1–43.9)
SCAN SMEAR PER REVIEW CRITERIA: (no result)
VANCOMYCIN TROUGH SERPL-MCNC: 19.9 UG/ML (ref 5–15)
WBC # BLD AUTO: 7.9 K/MM3 (ref 4.4–11)
WHITE BLOOD COUNT: 7.9 K/MM3 (ref 4.4–11)

## 2022-12-30 RX ADMIN — SODIUM CHLORIDE, PRESERVATIVE FREE 0 ML: 5 INJECTION INTRAVENOUS at 02:01

## 2023-04-11 ENCOUNTER — HOSPITAL ENCOUNTER (OUTPATIENT)
Age: 63
Discharge: HOME | End: 2023-04-11
Payer: MEDICARE

## 2023-04-11 DIAGNOSIS — Z96.652: ICD-10-CM

## 2023-04-11 DIAGNOSIS — M86.9: Primary | ICD-10-CM

## 2023-04-11 PROCEDURE — 73590 X-RAY EXAM OF LOWER LEG: CPT

## 2023-04-11 PROCEDURE — 73560 X-RAY EXAM OF KNEE 1 OR 2: CPT

## 2023-04-14 ENCOUNTER — HOSPITAL ENCOUNTER (INPATIENT)
Dept: HOSPITAL 100 - ED | Age: 63
LOS: 7 days | Discharge: NURSING, REHAB OR LONG-TERM CARE | DRG: 617 | End: 2023-04-21
Payer: MEDICARE

## 2023-04-14 VITALS
HEART RATE: 83 BPM | DIASTOLIC BLOOD PRESSURE: 44 MMHG | TEMPERATURE: 98.42 F | SYSTOLIC BLOOD PRESSURE: 138 MMHG | OXYGEN SATURATION: 97 % | RESPIRATION RATE: 16 BRPM

## 2023-04-14 VITALS
HEART RATE: 83 BPM | OXYGEN SATURATION: 99 % | RESPIRATION RATE: 18 BRPM | DIASTOLIC BLOOD PRESSURE: 46 MMHG | SYSTOLIC BLOOD PRESSURE: 104 MMHG

## 2023-04-14 VITALS
RESPIRATION RATE: 18 BRPM | TEMPERATURE: 101.3 F | DIASTOLIC BLOOD PRESSURE: 92 MMHG | SYSTOLIC BLOOD PRESSURE: 132 MMHG | HEART RATE: 91 BPM | OXYGEN SATURATION: 96 %

## 2023-04-14 VITALS
OXYGEN SATURATION: 99 % | SYSTOLIC BLOOD PRESSURE: 117 MMHG | DIASTOLIC BLOOD PRESSURE: 46 MMHG | RESPIRATION RATE: 16 BRPM | HEART RATE: 82 BPM

## 2023-04-14 VITALS
DIASTOLIC BLOOD PRESSURE: 66 MMHG | TEMPERATURE: 98.4 F | SYSTOLIC BLOOD PRESSURE: 117 MMHG | RESPIRATION RATE: 18 BRPM | HEART RATE: 82 BPM | OXYGEN SATURATION: 99 %

## 2023-04-14 VITALS
OXYGEN SATURATION: 100 % | SYSTOLIC BLOOD PRESSURE: 123 MMHG | RESPIRATION RATE: 14 BRPM | TEMPERATURE: 98.6 F | DIASTOLIC BLOOD PRESSURE: 60 MMHG | HEART RATE: 83 BPM

## 2023-04-14 VITALS
TEMPERATURE: 98.6 F | DIASTOLIC BLOOD PRESSURE: 45 MMHG | OXYGEN SATURATION: 96 % | HEART RATE: 85 BPM | SYSTOLIC BLOOD PRESSURE: 133 MMHG | RESPIRATION RATE: 18 BRPM

## 2023-04-14 VITALS
HEART RATE: 83 BPM | TEMPERATURE: 98.6 F | DIASTOLIC BLOOD PRESSURE: 60 MMHG | OXYGEN SATURATION: 100 % | SYSTOLIC BLOOD PRESSURE: 123 MMHG | RESPIRATION RATE: 16 BRPM

## 2023-04-14 VITALS
RESPIRATION RATE: 16 BRPM | TEMPERATURE: 98.6 F | OXYGEN SATURATION: 99 % | DIASTOLIC BLOOD PRESSURE: 44 MMHG | HEART RATE: 80 BPM | SYSTOLIC BLOOD PRESSURE: 128 MMHG

## 2023-04-14 VITALS — BODY MASS INDEX: 43 KG/M2 | BODY MASS INDEX: 45.3 KG/M2 | BODY MASS INDEX: 40.4 KG/M2

## 2023-04-14 VITALS
OXYGEN SATURATION: 96 % | SYSTOLIC BLOOD PRESSURE: 132 MMHG | HEART RATE: 91 BPM | RESPIRATION RATE: 18 BRPM | DIASTOLIC BLOOD PRESSURE: 92 MMHG | TEMPERATURE: 101.3 F

## 2023-04-14 VITALS
RESPIRATION RATE: 16 BRPM | OXYGEN SATURATION: 96 % | DIASTOLIC BLOOD PRESSURE: 51 MMHG | HEART RATE: 84 BPM | SYSTOLIC BLOOD PRESSURE: 121 MMHG

## 2023-04-14 VITALS
DIASTOLIC BLOOD PRESSURE: 55 MMHG | RESPIRATION RATE: 16 BRPM | TEMPERATURE: 98.6 F | OXYGEN SATURATION: 97 % | HEART RATE: 89 BPM | SYSTOLIC BLOOD PRESSURE: 144 MMHG

## 2023-04-14 DIAGNOSIS — I25.10: ICD-10-CM

## 2023-04-14 DIAGNOSIS — E11.42: ICD-10-CM

## 2023-04-14 DIAGNOSIS — L97.524: ICD-10-CM

## 2023-04-14 DIAGNOSIS — D62: ICD-10-CM

## 2023-04-14 DIAGNOSIS — Z79.891: ICD-10-CM

## 2023-04-14 DIAGNOSIS — Z79.02: ICD-10-CM

## 2023-04-14 DIAGNOSIS — R30.0: ICD-10-CM

## 2023-04-14 DIAGNOSIS — E78.5: ICD-10-CM

## 2023-04-14 DIAGNOSIS — E66.01: ICD-10-CM

## 2023-04-14 DIAGNOSIS — L97.424: ICD-10-CM

## 2023-04-14 DIAGNOSIS — M25.362: ICD-10-CM

## 2023-04-14 DIAGNOSIS — Z79.899: ICD-10-CM

## 2023-04-14 DIAGNOSIS — E11.51: ICD-10-CM

## 2023-04-14 DIAGNOSIS — Z79.82: ICD-10-CM

## 2023-04-14 DIAGNOSIS — E11.621: Primary | ICD-10-CM

## 2023-04-14 DIAGNOSIS — I10: ICD-10-CM

## 2023-04-14 DIAGNOSIS — E11.65: ICD-10-CM

## 2023-04-14 DIAGNOSIS — G89.29: ICD-10-CM

## 2023-04-14 DIAGNOSIS — E11.69: ICD-10-CM

## 2023-04-14 DIAGNOSIS — R53.81: ICD-10-CM

## 2023-04-14 DIAGNOSIS — Z96.652: ICD-10-CM

## 2023-04-14 DIAGNOSIS — R33.9: ICD-10-CM

## 2023-04-14 DIAGNOSIS — M86.672: ICD-10-CM

## 2023-04-14 DIAGNOSIS — Z95.1: ICD-10-CM

## 2023-04-14 DIAGNOSIS — Z79.4: ICD-10-CM

## 2023-04-14 DIAGNOSIS — I25.2: ICD-10-CM

## 2023-04-14 LAB
ANION GAP: 7 (ref 5–15)
BUN SERPL-MCNC: 22 MG/DL (ref 7–18)
BUN/CREAT RATIO: 19.1 RATIO (ref 10–20)
CALCIUM SERPL-MCNC: 9 MG/DL (ref 8.5–10.1)
CARBON DIOXIDE: 28 MMOL/L (ref 21–32)
CHLORIDE: 101 MMOL/L (ref 98–107)
DEPRECATED RDW RBC: 47.8 FL (ref 35.1–43.9)
ERYTHROCYTE [DISTWIDTH] IN BLOOD: 13.9 % (ref 11.6–14.6)
EST GLOM FILT RATE - AFR AMER: 62 ML/MIN (ref 60–?)
ESTIMATED CREATININE CLEARANCE: 49.96 ML/MIN
GLUCOSE: 342 MG/DL (ref 74–106)
HCT VFR BLD AUTO: 37 % (ref 37–47)
HEMOGLOBIN: 11.8 G/DL (ref 12–15)
HGB BLD-MCNC: 11.8 G/DL (ref 12–15)
IMMATURE GRANULOCYTES COUNT: 0.09 X10^3/UL (ref 0–0)
MCV RBC: 94.6 FL (ref 81–99)
MEAN CORP HGB CONC: 31.9 G/DL (ref 32–36)
MEAN PLATELET VOL.: 10.2 FL (ref 6.2–12)
NRBC FLAGGED BY ANALYZER: 0 % (ref 0–5)
PLATELET # BLD: 231 K/MM3 (ref 150–450)
PLATELET COUNT: 231 K/MM3 (ref 150–450)
POTASSIUM: 4.5 MMOL/L (ref 3.5–5.1)
RBC # BLD AUTO: 3.91 M/MM3 (ref 4.2–5.4)
RBC DISTRIBUTION WIDTH CV: 13.9 % (ref 11.6–14.6)
RBC DISTRIBUTION WIDTH SD: 47.8 FL (ref 35.1–43.9)
WBC # BLD AUTO: 14.3 K/MM3 (ref 4.4–11)
WHITE BLOOD COUNT: 14.3 K/MM3 (ref 4.4–11)

## 2023-04-14 PROCEDURE — 88305 TISSUE EXAM BY PATHOLOGIST: CPT

## 2023-04-14 PROCEDURE — 36415 COLL VENOUS BLD VENIPUNCTURE: CPT

## 2023-04-14 PROCEDURE — 87426 SARSCOV CORONAVIRUS AG IA: CPT

## 2023-04-14 PROCEDURE — A4216 STERILE WATER/SALINE, 10 ML: HCPCS

## 2023-04-14 PROCEDURE — 93005 ELECTROCARDIOGRAM TRACING: CPT

## 2023-04-14 PROCEDURE — P9016 RBC LEUKOCYTES REDUCED: HCPCS

## 2023-04-14 PROCEDURE — 97162 PT EVAL MOD COMPLEX 30 MIN: CPT

## 2023-04-14 PROCEDURE — 82962 GLUCOSE BLOOD TEST: CPT

## 2023-04-14 PROCEDURE — 85025 COMPLETE CBC W/AUTO DIFF WBC: CPT

## 2023-04-14 PROCEDURE — 97166 OT EVAL MOD COMPLEX 45 MIN: CPT

## 2023-04-14 PROCEDURE — 83036 HEMOGLOBIN GLYCOSYLATED A1C: CPT

## 2023-04-14 PROCEDURE — 86900 BLOOD TYPING SEROLOGIC ABO: CPT

## 2023-04-14 PROCEDURE — 80048 BASIC METABOLIC PNL TOTAL CA: CPT

## 2023-04-14 PROCEDURE — 81001 URINALYSIS AUTO W/SCOPE: CPT

## 2023-04-14 PROCEDURE — 71045 X-RAY EXAM CHEST 1 VIEW: CPT

## 2023-04-14 PROCEDURE — 88311 DECALCIFY TISSUE: CPT

## 2023-04-14 PROCEDURE — 80202 ASSAY OF VANCOMYCIN: CPT

## 2023-04-14 PROCEDURE — 86920 COMPATIBILITY TEST SPIN: CPT

## 2023-04-14 PROCEDURE — 86850 RBC ANTIBODY SCREEN: CPT

## 2023-04-14 PROCEDURE — 85018 HEMOGLOBIN: CPT

## 2023-04-14 PROCEDURE — 86922 COMPATIBILITY TEST ANTIGLOB: CPT

## 2023-04-14 PROCEDURE — 97535 SELF CARE MNGMENT TRAINING: CPT

## 2023-04-14 PROCEDURE — A4648 IMPLANTABLE TISSUE MARKER: HCPCS

## 2023-04-14 PROCEDURE — 73630 X-RAY EXAM OF FOOT: CPT

## 2023-04-14 PROCEDURE — 85014 HEMATOCRIT: CPT

## 2023-04-14 PROCEDURE — 86901 BLOOD TYPING SEROLOGIC RH(D): CPT

## 2023-04-14 PROCEDURE — 97530 THERAPEUTIC ACTIVITIES: CPT

## 2023-04-14 PROCEDURE — 99283 EMERGENCY DEPT VISIT LOW MDM: CPT

## 2023-04-14 RX ADMIN — VANCOMYCIN HYDROCHLORIDE 200 MG: 1 INJECTION, SOLUTION INTRAVENOUS at 23:22

## 2023-04-14 RX ADMIN — INSULIN GLARGINE-YFGN 50 UNIT: 100 INJECTION, SOLUTION SUBCUTANEOUS at 21:55

## 2023-04-14 RX ADMIN — DOXEPIN HYDROCHLORIDE 20 MG: 10 CAPSULE ORAL at 21:52

## 2023-04-14 RX ADMIN — SODIUM CHLORIDE, PRESERVATIVE FREE 0 ML: 5 INJECTION INTRAVENOUS at 21:55

## 2023-04-15 VITALS
HEART RATE: 82 BPM | DIASTOLIC BLOOD PRESSURE: 54 MMHG | OXYGEN SATURATION: 97 % | SYSTOLIC BLOOD PRESSURE: 108 MMHG | RESPIRATION RATE: 16 BRPM | TEMPERATURE: 97.52 F

## 2023-04-15 VITALS
RESPIRATION RATE: 16 BRPM | TEMPERATURE: 98 F | OXYGEN SATURATION: 98 % | SYSTOLIC BLOOD PRESSURE: 115 MMHG | HEART RATE: 81 BPM | DIASTOLIC BLOOD PRESSURE: 51 MMHG

## 2023-04-15 VITALS
RESPIRATION RATE: 14 BRPM | DIASTOLIC BLOOD PRESSURE: 59 MMHG | HEART RATE: 93 BPM | OXYGEN SATURATION: 95 % | SYSTOLIC BLOOD PRESSURE: 121 MMHG | TEMPERATURE: 100.04 F

## 2023-04-15 VITALS
HEART RATE: 94 BPM | RESPIRATION RATE: 18 BRPM | SYSTOLIC BLOOD PRESSURE: 131 MMHG | DIASTOLIC BLOOD PRESSURE: 69 MMHG | OXYGEN SATURATION: 86 % | TEMPERATURE: 100.22 F

## 2023-04-15 VITALS — OXYGEN SATURATION: 86 %

## 2023-04-15 LAB
ANION GAP: 4 (ref 5–15)
BUN SERPL-MCNC: 18 MG/DL (ref 7–18)
BUN/CREAT RATIO: 15.3 RATIO (ref 10–20)
CALCIUM SERPL-MCNC: 8.5 MG/DL (ref 8.5–10.1)
CARBON DIOXIDE: 29 MMOL/L (ref 21–32)
CHLORIDE: 102 MMOL/L (ref 98–107)
DEPRECATED RDW RBC: 49.1 FL (ref 35.1–43.9)
ERYTHROCYTE [DISTWIDTH] IN BLOOD: 14.3 % (ref 11.6–14.6)
EST GLOM FILT RATE - AFR AMER: 60 ML/MIN (ref 60–?)
ESTIMATED CREATININE CLEARANCE: 48.69 ML/MIN
GLUCOSE: 304 MG/DL (ref 74–106)
HCT VFR BLD AUTO: 33 % (ref 37–47)
HEMOGLOBIN: 10.6 G/DL (ref 12–15)
HGB BLD-MCNC: 10.6 G/DL (ref 12–15)
IMMATURE GRANULOCYTES COUNT: 0.09 X10^3/UL (ref 0–0)
MCV RBC: 94 FL (ref 81–99)
MEAN CORP HGB CONC: 32.1 G/DL (ref 32–36)
MEAN PLATELET VOL.: 10.3 FL (ref 6.2–12)
NRBC FLAGGED BY ANALYZER: 0 % (ref 0–5)
PLATELET # BLD: 216 K/MM3 (ref 150–450)
PLATELET COUNT: 216 K/MM3 (ref 150–450)
POTASSIUM: 3.6 MMOL/L (ref 3.5–5.1)
RBC # BLD AUTO: 3.51 M/MM3 (ref 4.2–5.4)
RBC DISTRIBUTION WIDTH CV: 14.3 % (ref 11.6–14.6)
RBC DISTRIBUTION WIDTH SD: 49.1 FL (ref 35.1–43.9)
VANCOMYCIN TROUGH SERPL-MCNC: 14.6 UG/ML (ref 5–15)
WBC # BLD AUTO: 15.1 K/MM3 (ref 4.4–11)
WHITE BLOOD COUNT: 15.1 K/MM3 (ref 4.4–11)

## 2023-04-15 RX ADMIN — SODIUM CHLORIDE, PRESERVATIVE FREE 0 ML: 5 INJECTION INTRAVENOUS at 17:19

## 2023-04-15 RX ADMIN — VANCOMYCIN HYDROCHLORIDE 200 MG: 1 INJECTION, SOLUTION INTRAVENOUS at 23:26

## 2023-04-15 RX ADMIN — SODIUM CHLORIDE, PRESERVATIVE FREE 0 ML: 5 INJECTION INTRAVENOUS at 23:20

## 2023-04-15 RX ADMIN — SODIUM CHLORIDE, PRESERVATIVE FREE 0 ML: 5 INJECTION INTRAVENOUS at 10:51

## 2023-04-15 RX ADMIN — SODIUM CHLORIDE, PRESERVATIVE FREE 0 ML: 5 INJECTION INTRAVENOUS at 23:03

## 2023-04-15 RX ADMIN — INSULIN GLARGINE-YFGN 50 UNIT: 100 INJECTION, SOLUTION SUBCUTANEOUS at 23:40

## 2023-04-15 RX ADMIN — DOXEPIN HYDROCHLORIDE 20 MG: 10 CAPSULE ORAL at 23:33

## 2023-04-15 RX ADMIN — VANCOMYCIN HYDROCHLORIDE 200 MG: 1 INJECTION, SOLUTION INTRAVENOUS at 10:22

## 2023-04-16 VITALS
DIASTOLIC BLOOD PRESSURE: 59 MMHG | SYSTOLIC BLOOD PRESSURE: 116 MMHG | RESPIRATION RATE: 18 BRPM | HEART RATE: 74 BPM | OXYGEN SATURATION: 95 % | TEMPERATURE: 97.88 F

## 2023-04-16 VITALS
SYSTOLIC BLOOD PRESSURE: 115 MMHG | HEART RATE: 77 BPM | DIASTOLIC BLOOD PRESSURE: 58 MMHG | RESPIRATION RATE: 20 BRPM | TEMPERATURE: 98.3 F | OXYGEN SATURATION: 92 %

## 2023-04-16 VITALS
DIASTOLIC BLOOD PRESSURE: 50 MMHG | OXYGEN SATURATION: 95 % | SYSTOLIC BLOOD PRESSURE: 123 MMHG | RESPIRATION RATE: 18 BRPM | TEMPERATURE: 99.5 F | HEART RATE: 88 BPM

## 2023-04-16 VITALS — HEART RATE: 86 BPM | OXYGEN SATURATION: 96 %

## 2023-04-16 VITALS
SYSTOLIC BLOOD PRESSURE: 116 MMHG | RESPIRATION RATE: 18 BRPM | OXYGEN SATURATION: 98 % | TEMPERATURE: 98.3 F | HEART RATE: 76 BPM | DIASTOLIC BLOOD PRESSURE: 55 MMHG

## 2023-04-16 VITALS — DIASTOLIC BLOOD PRESSURE: 55 MMHG | SYSTOLIC BLOOD PRESSURE: 120 MMHG | HEART RATE: 75 BPM

## 2023-04-16 VITALS — OXYGEN SATURATION: 81 %

## 2023-04-16 LAB
ANION GAP: 3 (ref 5–15)
BUN SERPL-MCNC: 14 MG/DL (ref 7–18)
BUN/CREAT RATIO: 12.7 RATIO (ref 10–20)
CALCIUM SERPL-MCNC: 8.7 MG/DL (ref 8.5–10.1)
CARBON DIOXIDE: 29 MMOL/L (ref 21–32)
CHLORIDE: 100 MMOL/L (ref 98–107)
DEPRECATED RDW RBC: 48 FL (ref 35.1–43.9)
ERYTHROCYTE [DISTWIDTH] IN BLOOD: 13.9 % (ref 11.6–14.6)
EST GLOM FILT RATE - AFR AMER: 65 ML/MIN (ref 60–?)
ESTIMATED CREATININE CLEARANCE: 52.23 ML/MIN
GLUCOSE: 249 MG/DL (ref 74–106)
HCT VFR BLD AUTO: 34 % (ref 37–47)
HEMOGLOBIN: 10.9 G/DL (ref 12–15)
HGB BLD-MCNC: 10.9 G/DL (ref 12–15)
IMMATURE GRANULOCYTES COUNT: 0.15 X10^3/UL (ref 0–0)
MCV RBC: 94.7 FL (ref 81–99)
MEAN CORP HGB CONC: 32.1 G/DL (ref 32–36)
MEAN PLATELET VOL.: 10.3 FL (ref 6.2–12)
NRBC FLAGGED BY ANALYZER: 0 % (ref 0–5)
PLATELET # BLD: 231 K/MM3 (ref 150–450)
PLATELET COUNT: 231 K/MM3 (ref 150–450)
POTASSIUM: 3.6 MMOL/L (ref 3.5–5.1)
RBC # BLD AUTO: 3.59 M/MM3 (ref 4.2–5.4)
RBC DISTRIBUTION WIDTH CV: 13.9 % (ref 11.6–14.6)
RBC DISTRIBUTION WIDTH SD: 48 FL (ref 35.1–43.9)
WBC # BLD AUTO: 15.8 K/MM3 (ref 4.4–11)
WHITE BLOOD COUNT: 15.8 K/MM3 (ref 4.4–11)

## 2023-04-16 RX ADMIN — VANCOMYCIN HYDROCHLORIDE 200 MG: 1 INJECTION, SOLUTION INTRAVENOUS at 10:42

## 2023-04-16 RX ADMIN — VANCOMYCIN HYDROCHLORIDE 200 MG: 1 INJECTION, SOLUTION INTRAVENOUS at 23:14

## 2023-04-16 RX ADMIN — DOXEPIN HYDROCHLORIDE 20 MG: 10 CAPSULE ORAL at 21:45

## 2023-04-16 RX ADMIN — SODIUM CHLORIDE, PRESERVATIVE FREE 0 ML: 5 INJECTION INTRAVENOUS at 21:28

## 2023-04-16 RX ADMIN — SODIUM CHLORIDE, PRESERVATIVE FREE 0 ML: 5 INJECTION INTRAVENOUS at 23:14

## 2023-04-17 VITALS
RESPIRATION RATE: 16 BRPM | DIASTOLIC BLOOD PRESSURE: 49 MMHG | OXYGEN SATURATION: 96 % | SYSTOLIC BLOOD PRESSURE: 109 MMHG | TEMPERATURE: 99.3 F | HEART RATE: 83 BPM

## 2023-04-17 VITALS
OXYGEN SATURATION: 100 % | DIASTOLIC BLOOD PRESSURE: 50 MMHG | HEART RATE: 81 BPM | TEMPERATURE: 98.1 F | RESPIRATION RATE: 16 BRPM | SYSTOLIC BLOOD PRESSURE: 111 MMHG

## 2023-04-17 VITALS
RESPIRATION RATE: 16 BRPM | OXYGEN SATURATION: 95 % | HEART RATE: 74 BPM | DIASTOLIC BLOOD PRESSURE: 59 MMHG | TEMPERATURE: 99.14 F | SYSTOLIC BLOOD PRESSURE: 123 MMHG

## 2023-04-17 VITALS
OXYGEN SATURATION: 95 % | SYSTOLIC BLOOD PRESSURE: 115 MMHG | HEART RATE: 86 BPM | TEMPERATURE: 98.06 F | RESPIRATION RATE: 18 BRPM | DIASTOLIC BLOOD PRESSURE: 44 MMHG

## 2023-04-17 VITALS
DIASTOLIC BLOOD PRESSURE: 50 MMHG | SYSTOLIC BLOOD PRESSURE: 115 MMHG | TEMPERATURE: 98.78 F | HEART RATE: 79 BPM | RESPIRATION RATE: 18 BRPM | OXYGEN SATURATION: 95 %

## 2023-04-17 LAB
ANION GAP: 6 (ref 5–15)
BUN SERPL-MCNC: 17 MG/DL (ref 7–18)
BUN/CREAT RATIO: 15.5 RATIO (ref 10–20)
CALCIUM SERPL-MCNC: 8.8 MG/DL (ref 8.5–10.1)
CARBON DIOXIDE: 27 MMOL/L (ref 21–32)
CHLORIDE: 100 MMOL/L (ref 98–107)
DEPRECATED RDW RBC: 47 FL (ref 35.1–43.9)
ERYTHROCYTE [DISTWIDTH] IN BLOOD: 13.7 % (ref 11.6–14.6)
EST GLOM FILT RATE - AFR AMER: 65 ML/MIN (ref 60–?)
ESTIMATED CREATININE CLEARANCE: 52.23 ML/MIN
GLUCOSE, DIPSTICK: 250 MG/DL
GLUCOSE: 192 MG/DL (ref 74–106)
HCT VFR BLD AUTO: 31.7 % (ref 37–47)
HEMOGLOBIN: 10.2 G/DL (ref 12–15)
HGB BLD-MCNC: 10.2 G/DL (ref 12–15)
IMMATURE GRANULOCYTES COUNT: 0.15 X10^3/UL (ref 0–0)
MCV RBC: 93.5 FL (ref 81–99)
MEAN CORP HGB CONC: 32.2 G/DL (ref 32–36)
MEAN PLATELET VOL.: 10.3 FL (ref 6.2–12)
MUCOUS THREADS URNS QL MICRO: (no result) /HPF
NRBC FLAGGED BY ANALYZER: 0 % (ref 0–5)
PLATELET # BLD: 242 K/MM3 (ref 150–450)
PLATELET COUNT: 242 K/MM3 (ref 150–450)
POTASSIUM: 3.6 MMOL/L (ref 3.5–5.1)
PROT UR QL STRIP.AUTO: 30 MG/DL
RBC # BLD AUTO: 3.39 M/MM3 (ref 4.2–5.4)
RBC DISTRIBUTION WIDTH CV: 13.7 % (ref 11.6–14.6)
RBC DISTRIBUTION WIDTH SD: 47 FL (ref 35.1–43.9)
RBC UR QL: (no result) /HPF (ref 0–5)
SP GR UR: 1.01 (ref 1–1.03)
SQUAMOUS URNS QL MICRO: (no result) /HPF (ref 5–10)
URINE PRESERVATIVE: (no result)
VANCOMYCIN TROUGH SERPL-MCNC: 17.8 UG/ML (ref 5–15)
WBC # BLD AUTO: 14.3 K/MM3 (ref 4.4–11)
WHITE BLOOD COUNT: 14.3 K/MM3 (ref 4.4–11)

## 2023-04-17 RX ADMIN — VANCOMYCIN HYDROCHLORIDE 200 MG: 1 INJECTION, SOLUTION INTRAVENOUS at 23:12

## 2023-04-17 RX ADMIN — Medication 1 PACKET: at 15:58

## 2023-04-17 RX ADMIN — SODIUM CHLORIDE, PRESERVATIVE FREE 0 ML: 5 INJECTION INTRAVENOUS at 23:12

## 2023-04-17 RX ADMIN — VANCOMYCIN HYDROCHLORIDE 200 MG: 1 INJECTION, SOLUTION INTRAVENOUS at 11:00

## 2023-04-17 RX ADMIN — DOXEPIN HYDROCHLORIDE 20 MG: 10 CAPSULE ORAL at 23:00

## 2023-04-18 VITALS
OXYGEN SATURATION: 100 % | DIASTOLIC BLOOD PRESSURE: 57 MMHG | RESPIRATION RATE: 16 BRPM | HEART RATE: 89 BPM | SYSTOLIC BLOOD PRESSURE: 131 MMHG

## 2023-04-18 VITALS
DIASTOLIC BLOOD PRESSURE: 50 MMHG | OXYGEN SATURATION: 100 % | RESPIRATION RATE: 16 BRPM | HEART RATE: 88 BPM | SYSTOLIC BLOOD PRESSURE: 109 MMHG | TEMPERATURE: 97.34 F

## 2023-04-18 VITALS
OXYGEN SATURATION: 99 % | DIASTOLIC BLOOD PRESSURE: 52 MMHG | SYSTOLIC BLOOD PRESSURE: 134 MMHG | TEMPERATURE: 98.24 F | HEART RATE: 79 BPM | RESPIRATION RATE: 18 BRPM

## 2023-04-18 VITALS
HEART RATE: 79 BPM | DIASTOLIC BLOOD PRESSURE: 53 MMHG | TEMPERATURE: 98.06 F | SYSTOLIC BLOOD PRESSURE: 132 MMHG | OXYGEN SATURATION: 98 % | RESPIRATION RATE: 16 BRPM

## 2023-04-18 VITALS
OXYGEN SATURATION: 100 % | RESPIRATION RATE: 18 BRPM | SYSTOLIC BLOOD PRESSURE: 127 MMHG | HEART RATE: 73 BPM | TEMPERATURE: 98.1 F | DIASTOLIC BLOOD PRESSURE: 53 MMHG

## 2023-04-18 VITALS
HEART RATE: 73 BPM | SYSTOLIC BLOOD PRESSURE: 109 MMHG | OXYGEN SATURATION: 100 % | TEMPERATURE: 97.52 F | RESPIRATION RATE: 16 BRPM | DIASTOLIC BLOOD PRESSURE: 50 MMHG

## 2023-04-18 VITALS
OXYGEN SATURATION: 100 % | SYSTOLIC BLOOD PRESSURE: 115 MMHG | DIASTOLIC BLOOD PRESSURE: 50 MMHG | RESPIRATION RATE: 17 BRPM | HEART RATE: 89 BPM

## 2023-04-18 VITALS
TEMPERATURE: 97.5 F | OXYGEN SATURATION: 98 % | RESPIRATION RATE: 15 BRPM | SYSTOLIC BLOOD PRESSURE: 133 MMHG | HEART RATE: 86 BPM | DIASTOLIC BLOOD PRESSURE: 63 MMHG

## 2023-04-18 VITALS
SYSTOLIC BLOOD PRESSURE: 142 MMHG | RESPIRATION RATE: 18 BRPM | OXYGEN SATURATION: 97 % | DIASTOLIC BLOOD PRESSURE: 60 MMHG | HEART RATE: 71 BPM | TEMPERATURE: 98.24 F

## 2023-04-18 VITALS
DIASTOLIC BLOOD PRESSURE: 63 MMHG | OXYGEN SATURATION: 95 % | HEART RATE: 86 BPM | RESPIRATION RATE: 15 BRPM | SYSTOLIC BLOOD PRESSURE: 109 MMHG

## 2023-04-18 VITALS
OXYGEN SATURATION: 99 % | TEMPERATURE: 97.88 F | RESPIRATION RATE: 16 BRPM | DIASTOLIC BLOOD PRESSURE: 50 MMHG | HEART RATE: 80 BPM | SYSTOLIC BLOOD PRESSURE: 122 MMHG

## 2023-04-18 VITALS
TEMPERATURE: 97.34 F | RESPIRATION RATE: 16 BRPM | HEART RATE: 75 BPM | DIASTOLIC BLOOD PRESSURE: 59 MMHG | OXYGEN SATURATION: 93 % | SYSTOLIC BLOOD PRESSURE: 131 MMHG

## 2023-04-18 LAB
ANION GAP: 4 (ref 5–15)
BUN SERPL-MCNC: 20 MG/DL (ref 7–18)
BUN/CREAT RATIO: 18.9 RATIO (ref 10–20)
CALCIUM SERPL-MCNC: 8.9 MG/DL (ref 8.5–10.1)
CARBON DIOXIDE: 27 MMOL/L (ref 21–32)
CHLORIDE: 104 MMOL/L (ref 98–107)
DEPRECATED RDW RBC: 46.1 FL (ref 35.1–43.9)
ERYTHROCYTE [DISTWIDTH] IN BLOOD: 13.7 % (ref 11.6–14.6)
EST GLOM FILT RATE - AFR AMER: 68 ML/MIN (ref 60–?)
ESTIMATED CREATININE CLEARANCE: 54.2 ML/MIN
GLUCOSE: 226 MG/DL (ref 74–106)
HCT VFR BLD AUTO: 27.9 % (ref 37–47)
HCT VFR BLD AUTO: 29.9 % (ref 37–47)
HEMOGLOBIN: 8.8 G/DL (ref 12–15)
HEMOGLOBIN: 9.8 G/DL (ref 12–15)
HGB BLD-MCNC: 8.8 G/DL (ref 12–15)
HGB BLD-MCNC: 9.8 G/DL (ref 12–15)
IMMATURE GRANULOCYTES COUNT: 0.16 X10^3/UL (ref 0–0)
MCV RBC: 92.9 FL (ref 81–99)
MEAN CORP HGB CONC: 32.8 G/DL (ref 32–36)
MEAN PLATELET VOL.: 9.8 FL (ref 6.2–12)
NRBC FLAGGED BY ANALYZER: 0 % (ref 0–5)
PLATELET # BLD: 243 K/MM3 (ref 150–450)
PLATELET COUNT: 243 K/MM3 (ref 150–450)
POTASSIUM: 3.4 MMOL/L (ref 3.5–5.1)
RBC # BLD AUTO: 3.22 M/MM3 (ref 4.2–5.4)
RBC DISTRIBUTION WIDTH CV: 13.7 % (ref 11.6–14.6)
RBC DISTRIBUTION WIDTH SD: 46.1 FL (ref 35.1–43.9)
WBC # BLD AUTO: 12 K/MM3 (ref 4.4–11)
WHITE BLOOD COUNT: 12 K/MM3 (ref 4.4–11)

## 2023-04-18 PROCEDURE — 0Y6D0Z1 DETACHMENT AT LEFT UPPER LEG, HIGH, OPEN APPROACH: ICD-10-PCS | Performed by: SURGERY

## 2023-04-18 RX ADMIN — SODIUM CHLORIDE, PRESERVATIVE FREE 0 ML: 5 INJECTION INTRAVENOUS at 23:47

## 2023-04-18 RX ADMIN — LIDOCAINE HYDROCHLORIDE 30 ML: 10 INJECTION, SOLUTION EPIDURAL; INFILTRATION; INTRACAUDAL; PERINEURAL at 14:15

## 2023-04-18 RX ADMIN — SODIUM CHLORIDE, PRESERVATIVE FREE 0 ML: 5 INJECTION INTRAVENOUS at 07:02

## 2023-04-18 RX ADMIN — INSULIN GLARGINE-YFGN 50 UNIT: 100 INJECTION, SOLUTION SUBCUTANEOUS at 22:57

## 2023-04-18 RX ADMIN — DOXEPIN HYDROCHLORIDE 20 MG: 10 CAPSULE ORAL at 22:58

## 2023-04-18 RX ADMIN — VANCOMYCIN HYDROCHLORIDE 200 MG: 1 INJECTION, SOLUTION INTRAVENOUS at 11:05

## 2023-04-18 RX ADMIN — VANCOMYCIN HYDROCHLORIDE 200 MG: 1 INJECTION, SOLUTION INTRAVENOUS at 22:16

## 2023-04-19 VITALS — DIASTOLIC BLOOD PRESSURE: 53 MMHG | HEART RATE: 87 BPM | SYSTOLIC BLOOD PRESSURE: 112 MMHG

## 2023-04-19 VITALS
DIASTOLIC BLOOD PRESSURE: 51 MMHG | TEMPERATURE: 98.24 F | RESPIRATION RATE: 18 BRPM | OXYGEN SATURATION: 100 % | HEART RATE: 80 BPM | SYSTOLIC BLOOD PRESSURE: 120 MMHG

## 2023-04-19 VITALS
RESPIRATION RATE: 18 BRPM | OXYGEN SATURATION: 99 % | DIASTOLIC BLOOD PRESSURE: 44 MMHG | TEMPERATURE: 98.5 F | HEART RATE: 82 BPM | SYSTOLIC BLOOD PRESSURE: 108 MMHG

## 2023-04-19 VITALS
OXYGEN SATURATION: 100 % | RESPIRATION RATE: 18 BRPM | TEMPERATURE: 98.96 F | HEART RATE: 86 BPM | DIASTOLIC BLOOD PRESSURE: 110 MMHG | SYSTOLIC BLOOD PRESSURE: 127 MMHG

## 2023-04-19 VITALS
OXYGEN SATURATION: 98 % | SYSTOLIC BLOOD PRESSURE: 126 MMHG | DIASTOLIC BLOOD PRESSURE: 100 MMHG | HEART RATE: 87 BPM | TEMPERATURE: 97.88 F | RESPIRATION RATE: 16 BRPM

## 2023-04-19 VITALS
TEMPERATURE: 97.88 F | DIASTOLIC BLOOD PRESSURE: 55 MMHG | SYSTOLIC BLOOD PRESSURE: 106 MMHG | OXYGEN SATURATION: 100 % | HEART RATE: 86 BPM | RESPIRATION RATE: 18 BRPM

## 2023-04-19 VITALS
TEMPERATURE: 97.6 F | DIASTOLIC BLOOD PRESSURE: 47 MMHG | HEART RATE: 78 BPM | RESPIRATION RATE: 18 BRPM | SYSTOLIC BLOOD PRESSURE: 127 MMHG | OXYGEN SATURATION: 100 %

## 2023-04-19 VITALS
SYSTOLIC BLOOD PRESSURE: 109 MMHG | RESPIRATION RATE: 18 BRPM | TEMPERATURE: 97.88 F | HEART RATE: 80 BPM | DIASTOLIC BLOOD PRESSURE: 51 MMHG | OXYGEN SATURATION: 96 %

## 2023-04-19 VITALS
DIASTOLIC BLOOD PRESSURE: 50 MMHG | TEMPERATURE: 98.5 F | SYSTOLIC BLOOD PRESSURE: 114 MMHG | RESPIRATION RATE: 16 BRPM | HEART RATE: 80 BPM | OXYGEN SATURATION: 93 %

## 2023-04-19 VITALS — DIASTOLIC BLOOD PRESSURE: 56 MMHG | HEART RATE: 89 BPM | SYSTOLIC BLOOD PRESSURE: 129 MMHG

## 2023-04-19 VITALS
RESPIRATION RATE: 18 BRPM | HEART RATE: 84 BPM | SYSTOLIC BLOOD PRESSURE: 113 MMHG | TEMPERATURE: 98.2 F | DIASTOLIC BLOOD PRESSURE: 44 MMHG | OXYGEN SATURATION: 95 %

## 2023-04-19 VITALS — SYSTOLIC BLOOD PRESSURE: 110 MMHG | DIASTOLIC BLOOD PRESSURE: 53 MMHG

## 2023-04-19 LAB
ANION GAP: 2 (ref 5–15)
BUN SERPL-MCNC: 22 MG/DL (ref 7–18)
BUN/CREAT RATIO: 17.2 RATIO (ref 10–20)
CALCIUM SERPL-MCNC: 8.4 MG/DL (ref 8.5–10.1)
CARBON DIOXIDE: 27 MMOL/L (ref 21–32)
CHLORIDE: 105 MMOL/L (ref 98–107)
DEPRECATED RDW RBC: 47.9 FL (ref 35.1–43.9)
ERYTHROCYTE [DISTWIDTH] IN BLOOD: 13.8 % (ref 11.6–14.6)
EST GLOM FILT RATE - AFR AMER: 54 ML/MIN (ref 60–?)
ESTIMATED CREATININE CLEARANCE: 44.88 ML/MIN
GLUCOSE: 329 MG/DL (ref 74–106)
HCT VFR BLD AUTO: 22.2 % (ref 37–47)
HEMOGLOBIN: 7 G/DL (ref 12–15)
HGB BLD-MCNC: 7 G/DL (ref 12–15)
IMMATURE GRANULOCYTES COUNT: 0.2 X10^3/UL (ref 0–0)
MCV RBC: 95.3 FL (ref 81–99)
MEAN CORP HGB CONC: 31.5 G/DL (ref 32–36)
MEAN PLATELET VOL.: 10.3 FL (ref 6.2–12)
NRBC FLAGGED BY ANALYZER: 0 % (ref 0–5)
PLATELET # BLD: 301 K/MM3 (ref 150–450)
PLATELET COUNT: 301 K/MM3 (ref 150–450)
POTASSIUM: 4.5 MMOL/L (ref 3.5–5.1)
RBC # BLD AUTO: 2.33 M/MM3 (ref 4.2–5.4)
RBC DISTRIBUTION WIDTH CV: 13.8 % (ref 11.6–14.6)
RBC DISTRIBUTION WIDTH SD: 47.9 FL (ref 35.1–43.9)
WBC # BLD AUTO: 15.8 K/MM3 (ref 4.4–11)
WHITE BLOOD COUNT: 15.8 K/MM3 (ref 4.4–11)

## 2023-04-19 RX ADMIN — NITROGLYCERIN 0.4 MG: 0.4 TABLET, ORALLY DISINTEGRATING SUBLINGUAL at 21:28

## 2023-04-19 RX ADMIN — SODIUM CHLORIDE, PRESERVATIVE FREE 0 ML: 5 INJECTION INTRAVENOUS at 23:08

## 2023-04-19 RX ADMIN — HYDROMORPHONE HYDROCHLORIDE 0.5 MG: 1 INJECTION, SOLUTION INTRAMUSCULAR; INTRAVENOUS; SUBCUTANEOUS at 06:04

## 2023-04-19 RX ADMIN — HYDROMORPHONE HYDROCHLORIDE 0.5 MG: 1 INJECTION, SOLUTION INTRAMUSCULAR; INTRAVENOUS; SUBCUTANEOUS at 15:32

## 2023-04-19 RX ADMIN — HYDROMORPHONE HYDROCHLORIDE 0.5 MG: 1 INJECTION, SOLUTION INTRAMUSCULAR; INTRAVENOUS; SUBCUTANEOUS at 23:08

## 2023-04-19 RX ADMIN — SODIUM CHLORIDE, PRESERVATIVE FREE 0 ML: 5 INJECTION INTRAVENOUS at 00:19

## 2023-04-19 RX ADMIN — Medication 1 PACKET: at 18:24

## 2023-04-19 RX ADMIN — NITROGLYCERIN 0.4 MG: 0.4 TABLET, ORALLY DISINTEGRATING SUBLINGUAL at 11:48

## 2023-04-19 RX ADMIN — VANCOMYCIN HYDROCHLORIDE 200 MG: 1 INJECTION, SOLUTION INTRAVENOUS at 13:21

## 2023-04-19 RX ADMIN — SODIUM CHLORIDE, PRESERVATIVE FREE 0 ML: 5 INJECTION INTRAVENOUS at 06:04

## 2023-04-19 RX ADMIN — INSULIN GLARGINE-YFGN 50 UNIT: 100 INJECTION, SOLUTION SUBCUTANEOUS at 22:51

## 2023-04-19 RX ADMIN — DOXEPIN HYDROCHLORIDE 20 MG: 10 CAPSULE ORAL at 22:52

## 2023-04-20 VITALS
DIASTOLIC BLOOD PRESSURE: 55 MMHG | HEART RATE: 79 BPM | SYSTOLIC BLOOD PRESSURE: 110 MMHG | TEMPERATURE: 98.96 F | OXYGEN SATURATION: 95 % | RESPIRATION RATE: 18 BRPM

## 2023-04-20 VITALS
OXYGEN SATURATION: 96 % | HEART RATE: 80 BPM | TEMPERATURE: 98.4 F | RESPIRATION RATE: 16 BRPM | SYSTOLIC BLOOD PRESSURE: 124 MMHG | DIASTOLIC BLOOD PRESSURE: 54 MMHG

## 2023-04-20 VITALS
SYSTOLIC BLOOD PRESSURE: 118 MMHG | TEMPERATURE: 97.8 F | HEART RATE: 76 BPM | RESPIRATION RATE: 18 BRPM | OXYGEN SATURATION: 93 % | DIASTOLIC BLOOD PRESSURE: 48 MMHG

## 2023-04-20 VITALS
OXYGEN SATURATION: 99 % | RESPIRATION RATE: 18 BRPM | SYSTOLIC BLOOD PRESSURE: 105 MMHG | DIASTOLIC BLOOD PRESSURE: 50 MMHG | HEART RATE: 76 BPM | TEMPERATURE: 98.06 F

## 2023-04-20 LAB
ANION GAP: 3 (ref 5–15)
BUN SERPL-MCNC: 25 MG/DL (ref 7–18)
BUN/CREAT RATIO: 17.9 RATIO (ref 10–20)
CALCIUM SERPL-MCNC: 8.8 MG/DL (ref 8.5–10.1)
CARBON DIOXIDE: 28 MMOL/L (ref 21–32)
CHLORIDE: 107 MMOL/L (ref 98–107)
DEPRECATED RDW RBC: 50.3 FL (ref 35.1–43.9)
ERYTHROCYTE [DISTWIDTH] IN BLOOD: 14.6 % (ref 11.6–14.6)
EST GLOM FILT RATE - AFR AMER: 49 ML/MIN (ref 60–?)
ESTIMATED CREATININE CLEARANCE: 41.04 ML/MIN
GLUCOSE: 185 MG/DL (ref 74–106)
HCT VFR BLD AUTO: 25.3 % (ref 37–47)
HEMOGLOBIN: 7.9 G/DL (ref 12–15)
HGB BLD-MCNC: 7.9 G/DL (ref 12–15)
IMMATURE GRANULOCYTES COUNT: 0.41 X10^3/UL (ref 0–0)
MCV RBC: 95.5 FL (ref 81–99)
MEAN CORP HGB CONC: 31.2 G/DL (ref 32–36)
MEAN PLATELET VOL.: 9.8 FL (ref 6.2–12)
NRBC FLAGGED BY ANALYZER: 0.2 % (ref 0–5)
PLATELET # BLD: 337 K/MM3 (ref 150–450)
PLATELET COUNT: 337 K/MM3 (ref 150–450)
POTASSIUM: 4.1 MMOL/L (ref 3.5–5.1)
RBC # BLD AUTO: 2.65 M/MM3 (ref 4.2–5.4)
RBC DISTRIBUTION WIDTH CV: 14.6 % (ref 11.6–14.6)
RBC DISTRIBUTION WIDTH SD: 50.3 FL (ref 35.1–43.9)
WBC # BLD AUTO: 18.3 K/MM3 (ref 4.4–11)
WHITE BLOOD COUNT: 18.3 K/MM3 (ref 4.4–11)

## 2023-04-20 RX ADMIN — SODIUM CHLORIDE, PRESERVATIVE FREE 0 ML: 5 INJECTION INTRAVENOUS at 04:04

## 2023-04-20 RX ADMIN — DOXEPIN HYDROCHLORIDE 20 MG: 10 CAPSULE ORAL at 22:26

## 2023-04-20 RX ADMIN — HYDROMORPHONE HYDROCHLORIDE 0.5 MG: 1 INJECTION, SOLUTION INTRAMUSCULAR; INTRAVENOUS; SUBCUTANEOUS at 04:04

## 2023-04-20 RX ADMIN — HYDROMORPHONE HYDROCHLORIDE 0.5 MG: 1 INJECTION, SOLUTION INTRAMUSCULAR; INTRAVENOUS; SUBCUTANEOUS at 16:44

## 2023-04-20 RX ADMIN — SODIUM CHLORIDE, PRESERVATIVE FREE 0 ML: 5 INJECTION INTRAVENOUS at 08:56

## 2023-04-20 RX ADMIN — Medication 1 PACKET: at 16:42

## 2023-04-20 RX ADMIN — SODIUM CHLORIDE, PRESERVATIVE FREE 0 ML: 5 INJECTION INTRAVENOUS at 16:41

## 2023-04-20 RX ADMIN — HYDROMORPHONE HYDROCHLORIDE 0.5 MG: 1 INJECTION, SOLUTION INTRAMUSCULAR; INTRAVENOUS; SUBCUTANEOUS at 08:25

## 2023-04-20 RX ADMIN — INSULIN GLARGINE-YFGN 50 UNIT: 100 INJECTION, SOLUTION SUBCUTANEOUS at 22:30

## 2023-04-21 VITALS
OXYGEN SATURATION: 95 % | RESPIRATION RATE: 18 BRPM | SYSTOLIC BLOOD PRESSURE: 101 MMHG | TEMPERATURE: 98 F | DIASTOLIC BLOOD PRESSURE: 43 MMHG | HEART RATE: 63 BPM

## 2023-04-21 VITALS
HEART RATE: 72 BPM | SYSTOLIC BLOOD PRESSURE: 108 MMHG | OXYGEN SATURATION: 95 % | TEMPERATURE: 98.1 F | DIASTOLIC BLOOD PRESSURE: 51 MMHG | RESPIRATION RATE: 14 BRPM

## 2023-04-21 VITALS
HEART RATE: 72 BPM | SYSTOLIC BLOOD PRESSURE: 110 MMHG | TEMPERATURE: 98.7 F | DIASTOLIC BLOOD PRESSURE: 46 MMHG | OXYGEN SATURATION: 100 % | RESPIRATION RATE: 18 BRPM

## 2023-04-21 RX ADMIN — SODIUM CHLORIDE, PRESERVATIVE FREE 0 ML: 5 INJECTION INTRAVENOUS at 14:50

## 2023-04-21 RX ADMIN — Medication 1 PACKET: at 08:26

## 2024-02-19 ENCOUNTER — HOSPITAL ENCOUNTER (OUTPATIENT)
Age: 64
Discharge: HOME | End: 2024-02-19
Payer: MEDICARE

## 2024-02-19 DIAGNOSIS — R78.81: ICD-10-CM

## 2024-02-19 DIAGNOSIS — R30.0: Primary | ICD-10-CM

## 2024-02-19 LAB
MUCOUS THREADS URNS QL MICRO: (no result) /HPF
RBC UR QL: (no result) /HPF (ref 0–5)
SP GR UR: 1.01 (ref 1–1.03)
SQUAMOUS URNS QL MICRO: (no result) /HPF (ref 5–10)
URINE PRESERVATIVE: (no result)

## 2024-02-19 PROCEDURE — 87088 URINE BACTERIA CULTURE: CPT

## 2024-02-19 PROCEDURE — 87086 URINE CULTURE/COLONY COUNT: CPT

## 2024-02-19 PROCEDURE — 81001 URINALYSIS AUTO W/SCOPE: CPT

## 2024-09-02 ENCOUNTER — HOSPITAL ENCOUNTER (INPATIENT)
Dept: HOSPITAL 100 - ED | Age: 64
LOS: 1 days | Discharge: ANOTHER HOSPITAL | DRG: 287 | End: 2024-09-03
Payer: MEDICARE

## 2024-09-02 VITALS
HEART RATE: 96 BPM | SYSTOLIC BLOOD PRESSURE: 103 MMHG | OXYGEN SATURATION: 100 % | DIASTOLIC BLOOD PRESSURE: 55 MMHG | RESPIRATION RATE: 18 BRPM

## 2024-09-02 VITALS
SYSTOLIC BLOOD PRESSURE: 160 MMHG | TEMPERATURE: 98.7 F | RESPIRATION RATE: 18 BRPM | BODY MASS INDEX: 39.4 KG/M2 | DIASTOLIC BLOOD PRESSURE: 91 MMHG | OXYGEN SATURATION: 100 % | HEART RATE: 95 BPM | BODY MASS INDEX: 40.5 KG/M2

## 2024-09-02 VITALS
TEMPERATURE: 98 F | HEART RATE: 100 BPM | SYSTOLIC BLOOD PRESSURE: 100 MMHG | RESPIRATION RATE: 18 BRPM | DIASTOLIC BLOOD PRESSURE: 56 MMHG | OXYGEN SATURATION: 96 %

## 2024-09-02 VITALS
TEMPERATURE: 98.3 F | DIASTOLIC BLOOD PRESSURE: 69 MMHG | SYSTOLIC BLOOD PRESSURE: 87 MMHG | RESPIRATION RATE: 14 BRPM | HEART RATE: 99 BPM | OXYGEN SATURATION: 91 %

## 2024-09-02 VITALS
HEART RATE: 89 BPM | DIASTOLIC BLOOD PRESSURE: 64 MMHG | RESPIRATION RATE: 22 BRPM | OXYGEN SATURATION: 96 % | SYSTOLIC BLOOD PRESSURE: 78 MMHG

## 2024-09-02 VITALS
RESPIRATION RATE: 16 BRPM | OXYGEN SATURATION: 18 % | DIASTOLIC BLOOD PRESSURE: 91 MMHG | HEART RATE: 75 BPM | SYSTOLIC BLOOD PRESSURE: 160 MMHG

## 2024-09-02 VITALS
RESPIRATION RATE: 16 BRPM | DIASTOLIC BLOOD PRESSURE: 51 MMHG | SYSTOLIC BLOOD PRESSURE: 106 MMHG | HEART RATE: 88 BPM | OXYGEN SATURATION: 99 %

## 2024-09-02 VITALS
DIASTOLIC BLOOD PRESSURE: 91 MMHG | SYSTOLIC BLOOD PRESSURE: 110 MMHG | HEART RATE: 78 BPM | OXYGEN SATURATION: 100 % | RESPIRATION RATE: 17 BRPM

## 2024-09-02 VITALS
HEART RATE: 88 BPM | DIASTOLIC BLOOD PRESSURE: 60 MMHG | OXYGEN SATURATION: 97 % | SYSTOLIC BLOOD PRESSURE: 91 MMHG | RESPIRATION RATE: 16 BRPM

## 2024-09-02 VITALS — HEART RATE: 101 BPM | DIASTOLIC BLOOD PRESSURE: 79 MMHG | SYSTOLIC BLOOD PRESSURE: 159 MMHG

## 2024-09-02 VITALS
DIASTOLIC BLOOD PRESSURE: 56 MMHG | OXYGEN SATURATION: 99 % | RESPIRATION RATE: 16 BRPM | HEART RATE: 88 BPM | SYSTOLIC BLOOD PRESSURE: 99 MMHG

## 2024-09-02 VITALS
HEART RATE: 90 BPM | SYSTOLIC BLOOD PRESSURE: 94 MMHG | OXYGEN SATURATION: 96 % | RESPIRATION RATE: 16 BRPM | DIASTOLIC BLOOD PRESSURE: 64 MMHG

## 2024-09-02 VITALS
SYSTOLIC BLOOD PRESSURE: 108 MMHG | OXYGEN SATURATION: 100 % | HEART RATE: 93 BPM | DIASTOLIC BLOOD PRESSURE: 62 MMHG | RESPIRATION RATE: 18 BRPM

## 2024-09-02 VITALS
OXYGEN SATURATION: 98 % | HEART RATE: 90 BPM | DIASTOLIC BLOOD PRESSURE: 82 MMHG | SYSTOLIC BLOOD PRESSURE: 100 MMHG | RESPIRATION RATE: 17 BRPM

## 2024-09-02 VITALS
HEART RATE: 88 BPM | SYSTOLIC BLOOD PRESSURE: 107 MMHG | OXYGEN SATURATION: 99 % | RESPIRATION RATE: 17 BRPM | DIASTOLIC BLOOD PRESSURE: 66 MMHG

## 2024-09-02 VITALS — DIASTOLIC BLOOD PRESSURE: 55 MMHG | HEART RATE: 100 BPM | SYSTOLIC BLOOD PRESSURE: 112 MMHG

## 2024-09-02 DIAGNOSIS — Z95.1: ICD-10-CM

## 2024-09-02 DIAGNOSIS — I25.2: ICD-10-CM

## 2024-09-02 DIAGNOSIS — E11.9: ICD-10-CM

## 2024-09-02 DIAGNOSIS — Z79.84: ICD-10-CM

## 2024-09-02 DIAGNOSIS — I10: ICD-10-CM

## 2024-09-02 DIAGNOSIS — T82.855A: Primary | ICD-10-CM

## 2024-09-02 DIAGNOSIS — Z89.612: ICD-10-CM

## 2024-09-02 DIAGNOSIS — Z79.899: ICD-10-CM
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
## 2024-09-02 DIAGNOSIS — I25.10: ICD-10-CM

## 2024-09-02 DIAGNOSIS — Z79.4: ICD-10-CM

## 2024-09-02 DIAGNOSIS — E78.5: ICD-10-CM

## 2024-09-02 DIAGNOSIS — Z79.02: ICD-10-CM

## 2024-09-02 DIAGNOSIS — Z95.5: ICD-10-CM

## 2024-09-02 DIAGNOSIS — Z79.82: ICD-10-CM

## 2024-09-02 DIAGNOSIS — Z79.85: ICD-10-CM

## 2024-09-02 LAB
ANION GAP: 5 (ref 5–15)
APTT PPP: 22.8 SECONDS (ref 24.1–36.2)
BUN SERPL-MCNC: 19 MG/DL (ref 7–18)
BUN/CREAT RATIO: 17.1 RATIO (ref 10–20)
CALCIUM SERPL-MCNC: 9.2 MG/DL (ref 8.5–10.1)
CARBON DIOXIDE: 27 MMOL/L (ref 21–32)
CHLORIDE: 104 MMOL/L (ref 98–107)
DEPRECATED RDW RBC: 48.6 FL (ref 35.1–43.9)
ERYTHROCYTE [DISTWIDTH] IN BLOOD: 14.6 % (ref 11.6–14.6)
EST GLOM FILT RATE - AFR AMER: 64 ML/MIN (ref 60–?)
ESTIMATED CREATININE CLEARANCE: 68.69 ML/MIN
GLUCOSE: 372 MG/DL (ref 74–106)
HCT VFR BLD AUTO: 35.1 % (ref 37–47)
HEMOGLOBIN: 10.8 G/DL (ref 12–15)
HGB BLD-MCNC: 10.8 G/DL (ref 12–15)
IMMATURE GRANULOCYTES COUNT: 0.11 X10^3/UL (ref 0–0)
MCV RBC: 90.5 FL (ref 81–99)
MEAN CORP HGB CONC: 30.8 G/DL (ref 32–36)
MEAN PLATELET VOL.: 10 FL (ref 6.2–12)
NRBC FLAGGED BY ANALYZER: 0 % (ref 0–5)
PLATELET # BLD: 274 K/MM3 (ref 150–450)
PLATELET COUNT: 274 K/MM3 (ref 150–450)
POTASSIUM: 4.2 MMOL/L (ref 3.5–5.1)
PROTHROMBIN TIME (PROTIME)PT.: 13.7 SECONDS (ref 11.7–14.9)
RBC # BLD AUTO: 3.88 M/MM3 (ref 4.2–5.4)
RBC DISTRIBUTION WIDTH CV: 14.6 % (ref 11.6–14.6)
RBC DISTRIBUTION WIDTH SD: 48.6 FL (ref 35.1–43.9)
TROPONIN-I HS: 467 PG/ML (ref 3–54)
WBC # BLD AUTO: 10.9 K/MM3 (ref 4.4–11)
WHITE BLOOD COUNT: 10.9 K/MM3 (ref 4.4–11)

## 2024-09-02 PROCEDURE — 85610 PROTHROMBIN TIME: CPT

## 2024-09-02 PROCEDURE — 85730 THROMBOPLASTIN TIME PARTIAL: CPT

## 2024-09-02 PROCEDURE — 99285 EMERGENCY DEPT VISIT HI MDM: CPT

## 2024-09-02 PROCEDURE — C1769 GUIDE WIRE: HCPCS

## 2024-09-02 PROCEDURE — 85025 COMPLETE CBC W/AUTO DIFF WBC: CPT

## 2024-09-02 PROCEDURE — 80048 BASIC METABOLIC PNL TOTAL CA: CPT

## 2024-09-02 PROCEDURE — 99152 MOD SED SAME PHYS/QHP 5/>YRS: CPT

## 2024-09-02 PROCEDURE — 71045 X-RAY EXAM CHEST 1 VIEW: CPT

## 2024-09-02 PROCEDURE — C1894 INTRO/SHEATH, NON-LASER: HCPCS

## 2024-09-02 PROCEDURE — 93005 ELECTROCARDIOGRAM TRACING: CPT

## 2024-09-02 PROCEDURE — 93455 CORONARY ART/GRFT ANGIO S&I: CPT

## 2024-09-02 PROCEDURE — A4216 STERILE WATER/SALINE, 10 ML: HCPCS

## 2024-09-02 PROCEDURE — 84484 ASSAY OF TROPONIN QUANT: CPT

## 2024-09-02 PROCEDURE — 99153 MOD SED SAME PHYS/QHP EA: CPT

## 2024-09-02 RX ADMIN — NITROGLYCERIN 3 MG: 10 INJECTION INTRAVENOUS at 21:25

## 2024-09-02 RX ADMIN — TICAGRELOR 180 MG: 90 TABLET ORAL at 19:23

## 2024-09-02 RX ADMIN — NITROGLYCERIN 0.4 MG: 0.4 TABLET, ORALLY DISINTEGRATING SUBLINGUAL at 19:33

## 2024-09-02 RX ADMIN — HEPARIN SODIUM 4000 UNIT: 5000 INJECTION, SOLUTION INTRAVENOUS; SUBCUTANEOUS at 19:34

## 2024-09-02 RX ADMIN — NITROGLYCERIN 0.4 MG: 0.4 TABLET, ORALLY DISINTEGRATING SUBLINGUAL at 19:38

## 2024-09-02 NOTE — XMS RPT_ITS
Comprehensive CCD (C-CDA v2.1)  
  
                          Created on: 2024  
  
  
JAYDEN SOTO  
External Reference #: CDR,PersonID:[id number]  
: 1960  
Sex: Female  
  
Demographics  
  
  
                                        Address             [id number]  
[place]  [address]  
   
                                        Home Phone          [phone]  
   
                                        Home Phone          [phone]  
   
                                        Home Phone          [phone]  
   
                                        Home Phone          [phone]  
   
                                        Preferred Language  en  
   
                                        Marital Status        
   
                                        Mu-ism Affiliation Unknown  
   
                                        Race                White  
   
                                        Ethnic Group        Not  or Lati  
no  
  
  
Author  
  
  
                                        Organization        [facility]  
  
  
Care Team Providers  
  
  
                                Care Team Member Name Role            Phone  
   
                                MICHAEL ROBLES MD Attending       Unavailable  
   
                                MICHAEL ROBLES MD Primary Care    Unavailable  
   
                                MICHAEL ROBLES MD Admitting       Unavailable  
   
                                Unavailable     Primary Care Provider UnavailAnnalee Noel DO Primary Care Provider [phone]   
   
                                Becky Corrales MD Primary Care Provider [phone]  
[phone]  
   
                                Annalee Eckert DO Primary Care Provider [phone]   
   
                                Becky Corrales MD Primary Care Provider [phone]  
   
                                BECKY CORRALES Primary Care    Unavailable  
   
                                ARNULFO GALVIN Attending       UnavailBASIL Molina    Referring       Unavailable  
   
                                ESTERSOFI, ANNALEE M Primary Care    Unavailable  
   
                                BASIL MITCHELL    Referring       Unavailable  
   
                                ESTERSOFI, ANNALEE M Primary Care    Unavailable  
   
                                BASIL MITCHELL    Attending       Unavailable  
   
                                RADHA, ANNALEE M Primary Care    Unavailable  
   
                                BASIL MITCHELL    Attending       Unavailable  
   
                                ANNALEE ECKERT M Primary Care    Unavailable  
   
                                DAMON ECKERT Admitting       Unavailable  
   
                                DAMON ECKERT Attending       Unavailable  
   
                                BECKY CORRALES Primary Care    Unavailable  
   
                                BASIL MITCHELL    Admitting       Unavailable  
   
                                BASIL MITCHELL    Attending       Unavailable  
   
                                BASIL MITCHELL    Referring       Unavailable  
   
                                SAVANNA, BECKY Primary Care    Unavailable  
   
                                DUMCON SANDOVAL III Consulting      UnavailBASIL Molina    Attending       Unavailable  
   
                                RADHA, ANNALEE M Primary Care    Unavailable  
   
                                DAMON ECKERT Attending       Unavailable  
   
                                BECKY CORRALES Primary Care    Unavailable  
   
                                BASIL MITCHELL    Attending       Unavailable  
   
                                BASIL MITCHELL    Referring       Unavailable  
   
                                KARENI, BECKY Primary Care    Unavailable  
   
                                DAMON ECKERT Attending       Unavailable  
   
                                SAVANNA, BECKY Primary Care    Unavailable  
   
                                ALVIN SO Referring       Unavailable  
   
                                SAVANNA, BECKY Primary Care    Unavailable  
   
                                MAMADOU STARK Attending       Unavailab  
DAMON Cabrera Referring       Unavailable  
   
                                KALISEBECKY CARLISLE Primary Care    Unavailable  
   
                                STEWART WEBBER CHERISE PAL Admitting       Unav  
ailable  
   
                                CHERISE WEST Attending       Unav  
ailable  
   
                                BECKY CORRALES Primary Care    Unavailable  
   
                                FROY ALVARADO MD Admitting       Unavailable  
   
                                BECKY CORRALES MD Consulting      Unavailable  
   
                                FROY ALVARADO MD Attending       Unavailable  
   
                                FROY ALVARADO MD Primary Care    Unavailable  
   
                                PROVIDER, UNKNOWN Consulting      Unavailable  
   
                                PROVIDER, UNKNOWN Consulting      Unavailable  
   
                                BECKY CORRALES MD Admitting       Unavailable  
   
                                BECKY CORRALES MD Attending       Unavailable  
   
                                BECKY CORRALES MD Consulting      Unavailable  
   
                                BECKY CORRALES MD Primary Care    Unavailable  
   
                                PROVIDER, UNKNOWN Consulting      Unavailable  
   
                                PROVIDER, UNKNOWN Consulting      Unavailable  
   
                                CABA, MICHAELA T Admitting       Unavailable  
   
                                BECKY CORRALES MD Consulting      Unavailable  
   
                                CABA, MICHAELA T Attending       Unavailable  
   
                                CABA, MICHAELA T Primary Care    Unavailable  
   
                                PROVIDER, UNKNOWN Consulting      Unavailable  
   
                                PROVIDER, UNKNOWN Consulting      Unavailable  
   
                                BECKY CORRALES MD Admitting       Unavailable  
   
                                BECKY CORRALES MD Attending       Unavailable  
   
                                BECKY CORRALES MD Consulting      Unavailable  
   
                                BECKY CORRALES MD Primary Care    Unavailable  
   
                                PROVIDER, UNKNOWN Consulting      Unavailable  
   
                                PROVIDER, UNKNOWN Consulting      Unavailable  
   
                                BECKY CORRALES MD Admitting       Unavailable  
   
                                BECKY CORRALES MD Attending       Unavailable  
   
                                BECKY CORRALES MD Consulting      Unavailable  
   
                                BECKY CORRALES MD Primary Care    Unavailable  
   
                                PROVIDER, UNKNOWN Consulting      Unavailable  
   
                                PROVIDER, UNKNOWN Consulting      Unavailable  
  
  
  
Allergies  
  
  
                                                    Allergy   
Classification                          Reported   
Allergen(s)               Allergy Type              Date of   
Onset                     Reaction(s)               Facility  
   
                                                      
(2 sources)                             Acetaminophen /   
HYDROcodone     Drug Allergy                                    [facility]   
Repository  
   
                                                      
(2 sources)                             Angiotensin   
Converting Enzyme   
(Ace) Inhibitors                        Drug allergy   
(disorder)                                                  [facility]   
Repository  
   
                                                      
(4 sources)                             Morphine;   
Translations:   
[MORPHINE]                Drug Allergy                
1                                                   [facility]   
Repository  
   
                                                      
(20 sources)                            HMG-CoA reductase   
inhibitor;   
Translations:   
[STATINS-HMG-COA   
REDUCTASE   
INHIBITORS]                             Drug   
Intolerance                               
1                         Unknown                   [facility]  
   
                                                      
(20 sources)        Morphine            Drug Allergy          
1                         GI Upset                  [facility]  
   
                                                      
(1 source)                              HMG-CoA reductase   
inhibitor                               Drug   
Intolerance                               
1                         Unknown                   [facility]  
   
                                                      
(1 source)                              STATINS (HMG-COA   
REDUCTASE   
INHIBITORS)                             Drug allergy   
(disorder)                                                  [facility]   
Repository  
  
  
  
Medications  
Current Medications  
  
  
  
                      Medication Drug Class(es) Dates      Sig (Normalized) Sig   
(Original)  
   
                                                    acetaminophen 325 mg   
/ oxyCODONE   
hydrochloride 5 mg   
oral tablet  
(1 source)                Opioid Agonist            Start:   
2022  
End:   
2022                              take 1 tablet by   
mouth every six   
hours as needed   
for pain                                oxyCODONE-acetaminop  
hen (PERCOCET) 5-325   
mg tablet   
Indications: Trigger   
middle finger of   
right hand Take 1   
tablet by mouth   
every 6 hours as   
needed for pain for   
up to 3 days. 5   
tablet 0 2022 Active  
   
                                        Comment on above:   Take 1 tablet by selina  
th every 6 hours as needed for pain for up  
  
to 3 days.   
   
                                                    cyclobenzaprine   
hydrochloride 10 mg   
oral tablet  
(9 sources)               Muscle Relaxant           Start:   
2022  
End:   
2022                              take 1 tablet by   
mouth three times   
daily                                   cyclobenzaprine   
(FLEXERIL) 10 mg   
tablet Take 1 tablet   
by mouth three times   
daily. 90 tablet 0   
2022 Active  
   
                                        Comment on above:   Take 1 tablet by selina  
th three times daily.   
  
  
  
Completed/Discontinued Medications  
  
  
  
                      Medication Drug Class(es) Dates      Sig (Normalized) Sig   
(Original)  
   
                                                    1 ml alirocumab 75   
mg/ml auto-injector  
(12 sources)        PCSK9 Inhibitor                         inject 150 mg by   
subcutaneous   
injection every other   
week                                    alirocumab (PRALUENT)   
75 mg/mL pen Inject 150   
mg subcutaneously every   
other week. 0 Active  
  
  
  
                                                            inject 75 mg by subc  
utaneous injection   
every other week                        alirocumab (PRALUENT) 75 mg/mL pen Injec  
t   
75 mg subcutaneously every other week. 0   
Active  
  
  
  
                                        Comment on above:   Inject 75 mg subcuta  
neously every other week.   
   
                                                            Inject 150 mg subcut  
aneously every other week.  
   
   
                                                    apixaban 5 mg oral   
tablet  
(6 sources)               Factor Xa Inhibitor       Start:   
2022                              take 1 tablet by   
mouth twice   
daily                                   apixaban   
(ELIQUIS) 5 mg   
tab(s) Take 1   
tablet by mouth   
twice daily. 60   
tablet 0   
2022 Active  
  
  
  
                                        Start: 2022   take 1 tablet by selina  
th twice   
daily                                   apixaban (ELIQUIS) 5 mg tab(s) Take   
1 tablet by mouth twice daily. 60   
tablet 0 2022 Active  
   
                                        Start: 2022   take 1 tablet by selina  
th twice   
daily                                   apixaban (ELIQUIS) 5 mg tab(s) Take   
1 tablet by mouth twice daily. 60   
tablet 0 2022 Active  
   
                                        Start: 2022   take 2 tablets by mo  
uth twice   
daily, then take 1 tablet by   
mouth twice daily                       apixaban (ELIQUIS DVT-PE TREAT 30D   
START) 5 mg (74 tabs) Take 2   
tablets (10 mg) by mouth twice   
daily for 7 days. Then take 1   
tablet (5 mg) by mouth twice daily   
for 23 days 74 tablet 0 2022   
Active  
  
  
  
                                        Comment on above:   Take 1 tablet by selina  
 twice daily.   
   
                                                            Take 2 tablets (10 m  
g) by mouth twice daily for 7 days. Then   
take 1 tablet (5 mg) by mouth twice daily for 23 days   
   
                                                    aspirin 81 mg oral   
tablet  
(20 sources)                            Platelet Aggregation   
Inhibitor, Nonsteroidal   
Anti-inflammatory Drug                  Start:   
2022                                          aspirin 81 mg cap Take   
1 capsule by mouth   
once daily. Resume   
once BID dosing is   
completed 0 2022   
Active  
  
  
  
                                                    Start: 2022  
End: 2022                         take 1 tablet by mouth twice   
daily, then take 1 tablet by   
mouth twice daily                       aspirin, enteric coated (ASPIRIN,   
ENTERIC COATED) 81 mg EC tablet   
Take 1 tablet by mouth twice daily   
for 21 days. Once BID dosing is   
completed in 21 days, resume home   
daily dose 42 tablet 0 2022   
Active  
  
  
  
                                        Comment on above:   Take 1 tablet by [facility] twice daily for 21 days. Once BID   
dosing is   
completed in 21 days, resume home daily dose   
   
                                                            Take 1 capsule by mo  
uth once daily. Resume once BID dosing is   
completed   
   
                                                    bisacodyl 10 mg   
delayed release   
oral tablet  
(12 sources)        Stimulant Laxative                      take 10 mg by mouth   
once daily                              BISACODYL ORAL Take   
10 mg by mouth once   
daily. 0 Active  
   
                                        Comment on above:   Take 10 mg by mouth   
once daily.   
   
                                                    cefTRIAXone 1000   
mg injection  
(9 sources)                             Cephalosporin   
Antibacterial                           Start:   
2022  
End:   
2022                                    inject 2 g by   
intramuscular   
injection every   
twenty-four hours                       cefTRIAXone sodium   
(ROCEPHIN) 1 gram   
solr Inject 2 g   
intramuscularly every   
24 hours. 0   
2022   
Discontinued (Patient   
chooses alternative   
therapy)  
   
                                        Comment on above:   Inject 2 g intramusc  
ularly every 24 hours.   
   
                                                    cephalexin 500 mg   
oral capsule  
(3 sources)                             Cephalosporin   
Antibacterial                           Start:   
2022                                    take 1 capsule by   
mouth twice daily                       cephALEXin (KEFLEX)   
500 mg capsule Take 1   
capsule by mouth   
twice daily. 60   
capsule 5 2022   
Active  
  
  
  
                                                    Start: 2022  
End: 2022                         take 1 capsule by mouth four   
times daily                             cephALEXin (KEFLEX) 500 mg capsule   
Take 1 capsule by mouth four times   
daily. 240 capsule 1 2022 Active  
  
  
  
                                        Comment on above:   Take 1 capsule by mo  
uth four times daily.   
   
                                                            Take 1 capsule by mo  
uth twice daily.   
   
                                                    clopidogrel 75 mg oral   
tablet  
(15 sources)                            P2Y12 Platelet   
Inhibitor                                           take 1 tablet by   
mouth once daily                        clopidogrel (PLAVIX)   
75 mg tablet Take 75   
mg by mouth once   
daily. 0 Active  
   
                                        Comment on above:   Take 75 mg by mouth   
once daily.   
   
                                                    24 hr dilTIAZem   
hydrochloride 180 mg   
extended release oral   
capsule  
(15 sources)        Calcium Channel Blocker                     take 1 capsule b  
y   
mouth once daily,   
then take 1   
capsule by mouth   
every twenty-four   
hours                                   dilTIAZem CD   
(CARDIZEM CD) 180 mg   
24 hr capsule Take   
180 mg by mouth once   
daily. 0 Active  
   
                                        Comment on above:   Take 180 mg by mouth  
 once daily.   
   
                                                    doxepin hydrochloride   
10 mg oral capsule  
(12 sources)                            Tricyclic   
Antidepressant                                      take 2 capsules   
by mouth once   
daily at bedtime                        doxepin capsule 10   
mg Take 20 mg by   
mouth daily at   
bedtime. 0 Active  
   
                                        Comment on above:   Take 20 mg by mouth   
daily at bedtime.   
   
                                                    doxycycline monohydrate   
100 mg oral capsule  
(3 sources)         Tetracycline-class Drug                     take 1 capsule b  
y   
mouth twice daily                       doxycycline   
monohydrate   
(MONODOX) 100 mg   
capsule Take 100 mg   
by mouth twice   
daily. 0 Active  
   
                                        Comment on above:   Take 100 mg by mouth  
 twice daily.   
   
                                                    DULoxetine 60 mg   
delayed release oral   
capsule  
(15 sources)                            Serotonin and   
Norepinephrine Reuptake   
Inhibitor                                           take 1 capsule by   
mouth twice daily                       DULoxetine   
(CYMBALTA) 60 mg   
capsule Take 60 mg   
by mouth twice   
daily. 0 Active  
  
  
  
                                                take 1 capsule by mouth once robel  
ly DULoxetine (CYMBALTA) 60 mg capsule Take 60   
mg   
by mouth once daily. 0 Active  
  
  
  
                                        Comment on above:   Take 60 mg by mouth   
once daily.   
   
                                                            Take 60 mg by mouth   
twice daily.  
   
   
                                                    empagliflozin 25 mg   
oral tablet  
(15 sources)                            Sodium-Glucose   
Cotransporter 2   
Inhibitor                                           take 1 tablet by   
mouth once daily   
at breakfast                            empagliflozin   
(JARDIANCE) 25 mg   
tablet Take 25 mg by   
mouth daily with   
breakfast. 0 Active  
   
                                        Comment on above:   Take 25 mg by mouth   
daily with breakfast.   
   
                                                    gabapentin  
(15 sources)        Anti-epileptic Agent                     take 200 mg by   
mouth three times   
daily                                   GABAPENTIN ORAL Take   
200 mg by mouth three   
times daily. 0 Active  
  
  
  
                                                                GABAPENTIN ORAL   
Take by mouth. 0 Active  
  
  
  
                                        Comment on above:   Take by mouth.   
   
                                                            Take 200 mg by mouth  
 three times daily.  
   
   
                                                    insulin aspart,   
human 100 unt/ml   
injectable   
solution  
(12 sources)              Insulin Analog            Start:   
                                                 insulin aspart U-100   
(NOVOLOG) 100   
unit/mL 14 units   
with breakfast 8   
units with lunch 10   
units with dinner   
Admin Instructions:   
ADMINISTER   
CORRECTIONAL INSULIN   
REGARDLESS OF MEAL   
OR NUTRITION INTAKE   
Custom Scale If   
Blood Glucose   
(mg/dL) is Less than   
100 0 units 101-150   
0 units 151-175 2   
units 176-200 3   
units 201-225 4   
units 226-250 5   
units 251-275 6   
units 276-300 7   
units 301-325 8   
units 326-350 9   
units >350 10 units   
and Notify Provider   
Notify provider if 2   
consecutive blood   
glucose values in   
the previous 24   
hours are greater   
than 250 mg/mL and   
there have been no   
changes to the   
insulin regimen in   
the previous 24   
hours. 0 2022   
Active  
   
                                        Comment on above:   14 units with breakf  
ast  
8 units with lunch  
10 units with dinner  
  
Admin Instructions: ADMINISTER CORRECTIONAL INSULIN REGARDLESS OF   
MEAL OR NUTRITION INTAKE  
Custom Scale  
If Blood Glucose (mg/dL) is  
Less than 100 0 units  
101-150 0 units  
151-175 2 units  
176-200 3 units  
201-225 4 units  
226-250 5 units  
251-275 6 units  
276-300 7 units  
301-325 8 units  
326-350 9 units  
>350 10 units and Notify Provider  
Notify provider if 2 consecutive blood glucose values in the   
previous 24 hours are greater than 250 mg/mL and there have been no   
changes to the insulin regimen in the previous 24 hours.   
   
                                                    insulin glargine   
100 unt/ml   
injectable   
solution  
(12 sources)              Insulin Analog            Start:   
                                     inject 36 [IU] by   
subcutaneous   
injection once   
daily at bedtime                        insulin glargine   
(LANTUS U-100   
INSULIN) 100 unit/mL   
injection Inject 36   
Units subcutaneously   
daily at bedtime. 0   
2022 Active  
   
                                        Comment on above:   Inject 36 Units subc  
utaneously daily at bedtime.   
   
                                                    insulin   
glargine,hum.rec.a  
nlog (LANTUS   
SUBCUTANEOUS)  
(3 sources)                                                     insulin   
glargine,hum.rec.anl  
og (LANTUS   
SUBCUTANEOUS) Inject   
subcutaneously. 0   
Active  
   
                                        Comment on above:   Inject subcutaneousl  
y.   
   
                                                    24 hr isosorbide   
mononitrate 30 mg   
extended release   
oral tablet  
(15 sources)    Nitrate Vasodilator                                 isosorbide   
mononitrate ER   
(IMDUR) 30 mg 24 hr   
tablet Take 90 mg by   
mouth once daily. 0   
Active  
  
  
  
                                                            take 1 tablet by selina  
th once daily, then   
take 1 tablet by mouth every twenty-four   
hours                                   isosorbide mononitrate ER (IMDUR) 30 mg   
24 hr tablet Take 30 mg by mouth once   
daily. 0 Active  
  
  
  
                                        Comment on above:   Take 30 mg by mouth   
once daily.   
   
                                                            Take 90 mg by mouth   
once daily.  
   
   
                                                    Lactobacillus   
acidophilus  
(5 sources)                                                 take 1 capsule by   
mouth twice daily                       Lactobacillus   
acidophilus   
(ACIDOPHILUS) cap Take   
1 capsule by mouth   
twice daily. 0 Active  
   
                                        Comment on above:   Take 1 capsule by mo  
uth twice daily.   
   
                                                    melatonin 10 mg oral   
capsule  
(12 sources)                                                take 1 capsule by   
mouth once daily   
at bedtime                              melatonin 10 mg cap   
Take 1 capsule by mouth   
daily at bedtime. 0   
Active  
   
                                        Comment on above:   Take 1 capsule by mo  
uth daily at bedtime.   
   
                                                    metoprolol tartrate 25   
mg oral tablet  
(15 sources)                            beta-Adrenergic   
Blocker                                             take 25 mg by   
mouth twice daily                       METOPROLOL TARTRATE   
ORAL Take 25 mg by   
mouth twice daily. 0   
Active  
  
  
  
                                                                METOPROLOL TARTR  
ATE ORAL Take by mouth. 0 Active  
  
  
  
                                        Comment on above:   Take by mouth.   
   
                                                            Take 25 mg by mouth   
twice daily.  
   
   
                                                    12 hr ranolazine 1000   
mg extended release   
oral tablet  
(15 sources)        Anti-anginal                            take 1 tablet by   
mouth twice daily                       ranolazine SR (RANEXA)   
1,000 mg tab ER 12 hr   
Take 1 tablet by mouth   
twice daily. 0 Active  
  
  
  
                                                                ranolazine SR (R  
ANEXA) 1,000 mg tab ER 12 hr Take by mouth. 0 Active  
  
  
  
                                        Comment on above:   Take by mouth.   
   
                                                            Take 1 tablet by selina  
th twice daily.  
   
  
  
  
Problems  
Active Problems  
  
  
                                                    Problem   
Classification  Problem         Date            Documented Date Episodic/Chronic  
   
                                                    Bacterial infection;   
unspecified site  
(2 sources)                             Infection by   
methicillin sensitive   
Staphylococcus   
aureus; Translations:   
[Methicillin   
susceptible   
Staphylococcus aureus   
infection,   
unspecified site]                                           Episodic  
   
                                                    Coronary   
atherosclerosis and   
other heart disease  
(2 sources)                             Atherosclerotic heart   
disease of native   
coronary artery   
without angina   
pectoris;   
Translations:   
[Unstable angina]                       Onset:   
2022                                          Chronic  
   
                                                    Deficiency and other   
anemia  
(1 source)                              Anemia, unspecified;   
Translations:   
[Anemia, unspecified]                   Onset:   
2024                                          Episodic  
   
                                                    Diabetes mellitus   
without complication  
(20 sources)                            Type 2 diabetes   
mellitus without   
complication;   
Translations: [Type 2   
diabetes mellitus   
without   
complications]                          Onset:   
2022                Chronic  
   
                                                    Disorders of lipid   
metabolism  
(1 source)                              Hyperlipidemia,   
unspecified;   
Translations:   
[Hyperlipidemia,   
unspecified   
hyperlipidemia type]                    Onset:   
2022                                          Chronic  
   
                                                    Essential   
hypertension  
(20 sources)                            Essential   
hypertension;   
Translations:   
[Essential (primary)   
hypertension]                           Onset:   
2022                Chronic  
   
                                                    Other aftercare  
(1 source)                              Post-discharge   
follow-up;   
Translations:   
[Encounter for   
follow-up examination   
after completed   
treatment for   
conditions other than   
malignant neoplasm]                                         Episodic  
   
                                                    Other aftercare  
(1 source)                              Long-term current use   
of antibiotic;   
Translations: [Long   
term (current) use of   
antibiotics]                                                Episodic  
   
                                                    Other bone disease   
and musculoskeletal   
deformities  
(1 source)                              Knee joint finding;   
Translations:   
[Acquired absence of   
unspecified knee]                                           Chronic  
   
                                                    Other connective   
tissue disease  
(1 source)                              Presence of   
unspecified   
artificial knee   
joint; Translations:   
[Infection of total   
knee replacement,   
subsequent encounter]                   Onset:   
2022                                          Chronic  
   
                                                    Other connective   
tissue disease  
(1 source)                              Presence of left   
artificial knee   
joint; Translations:   
[Status post left   
partial knee   
replacement]                            Onset:   
2022                                          Chronic  
   
                                                    Other non-traumatic   
joint disorders  
(1 source)                              Pain in right   
shoulder;   
Translations: [Right   
shoulder pain,   
unspecified   
chronicity]                             Onset:   
10-                                          Episodic  
   
                                                    Other nutritional;   
endocrine; and   
metabolic disorders  
(20 sources)                            Obese class II;   
Translations:   
[Obesity,   
unspecified]                            Onset:   
2022                Chronic  
  
  
Past or Other Problems  
  
  
                      Problem Classification Problem    Date       Documented Da  
te Episodic/Chronic  
   
                                                    Complication of   
device; implant or   
graft  
(7 sources)                             Infection and   
inflammatory reaction   
due to internal left   
knee prosthesis,   
initial encounter;   
Translations: [Joint   
pain]                                   Onset:   
2021                                          Episodic  
   
                                                    Infective arthritis   
and osteomyelitis   
(except that caused by   
tuberculosis or   
sexually transmitted   
disease)  
(20 sources)                            Knee pyogenic   
arthritis;   
Translations:   
[Pyogenic arthritis,   
unspecified]                            Onset:   
2022                Episodic  
   
                                                    Other aftercare  
(1 source)                              Long term (current)   
use of insulin;   
Translations: [Type 2   
diabetes mellitus   
without complication,   
with long-term   
current use of   
insulin (HCC)]                          Onset:   
2022                                          Episodic  
   
                                                    Other aftercare  
(1 source)                              Long term (current)   
use of antibiotics;   
Translations: [Long   
term (current) use of   
antibiotics]                            Onset:   
2022                                          Episodic  
   
                                                    Other connective   
tissue disease  
(20 sources)                            Triggering of digit;   
Translations:   
[Trigger finger,   
right middle finger]                    Onset:   
2022                                          Episodic  
   
                                                    Other connective   
tissue disease  
(2 sources)                             Trigger finger, right   
middle finger;   
Translations:   
[Trigger middle   
finger of right hand]                   Onset:   
2022                                          Episodic  
   
                                                    Phlebitis;   
thrombophlebitis and   
thromboembolism  
(2 sources)                             Thromboembolism of   
vein; Translations:   
[Acute embolism and   
thrombosis of deep   
veins of right upper   
extremity]                              Onset:   
2022                                          Episodic  
   
                                                    Residual codes;   
unclassified  
(1 source)                              Other specified   
postprocedural   
states; Translations:   
[S/P trigger finger   
release]                                Onset:   
2022                                          Episodic  
   
                                                    Residual codes;   
unclassified  
(1 source)                              Localized edema;   
Translations:   
[Localized edema]                       Onset:   
2024                                          Episodic  
  
  
  
Results  
  
  
                          Test Name    Value        Interpretation Reference   
[facility]                                   Facility  
   
                                                    BRIEF OP NOTon 2024   
   
                                        BRIEF OP NOT        [facility] ID: [id number]  
Author: CHERISE WEST MD  
Service: Interventional   
Cardiology  
Author Type: Physician  
Type: Brief Op Note  
Filed: 2024 17:40  
Note Text:  
OPERATIVE NOTE  
Procedure DATE: 24  
Proceduralist(s) and   
Assistant(s):  
Cherise Pal MD  
Procedures:  
1. Left heart cath coronary   
angiography.  
2. Bypass grafts   
angiography.  
3. PTCA and IVUS of SVG to   
circumflex OM.  
Brief History :  
63 years old white female   
with long history of   
coronary artery disease she   
has  
history of coronary artery   
bypass surgery in [place]   
many years ago where she  
had SVG to circumflex OM and   
SVG to RCA and LIMA to LAD.   
In  she did  
receive bare-metal stenting   
of the degenerative disease   
of saphenous venous  
graft to circumflex and   
bare-metal stent of the   
degenerative disease of  
saphenous venous graft to   
PDA. She had occluded   
circumflex and occluded   
right  
coronary artery with   
occlusive disease of LAD.   
Patient was referred for  
coronary angiography and   
possible revascularization   
with unstable angina she is  
using many nitroglycerin   
despite maximal medical   
management. Per her son she  
use up to 20 of sublingual   
nitros. Patient is morbidly   
obese BMI 39.12. She  
has ischemic cardiomyopathy   
with ejection fraction 43%.   
She has been treated  
for type 2 diabetes   
mellitus, hypertension she   
quit smoking years ago and   
she  
has a family history of   
coronary artery disease. She   
lives with her son and  
hisfamily. She used to work   
as a schoolbus  for   
many years. Risk and  
benefit of procedure were   
discussed with the patient   
and she was consented.  
Procedure Details:  
Patient brought to cardiac   
Cath Lab, draped and   
positioned in usual fashion   
left  
radial artery approach,   
under complete aseptic and   
sterile condition 2%  
Xylocaine without   
epinephrine was used local   
anesthesia. Using modified  
Seldinger technique 5 French   
arterial sheath was placed   
left radial artery  
angiography was performed   
utilizing 5 French   
multipurpose and JL 3 5   
catheter  
after discussion with the   
patient decision was made to   
proceed with attempted to  
reach, lines the totally   
occluded saphenous venous   
graft to circumflex 5 French  
multipurpose guide was   
initially utilized 0.014.   
Wire was advanced at easily   
to  
cross to the native vessel   
followed by 2.75 20 balloon   
dilating catheter at low  
pressure inflation were able   
to create enough lumen to do   
intracoronary  
ultrasound which she   
revealed the intra graft   
thrombosis and the stent was   
wide  
patent with any stent   
restenosis. The 5 French   
sheath was upsized to 6   
French  
and a AR2 6 French guide was   
utilized and the 0.014 PT 2   
wire was readvanced to  
the circumflex OM and   
attempted to utilize   
aspiration atherectomy   
utilizing the  
penumbra catheter was noted   
successful due to the   
organized thrombosed lumen   
of  
the graft. We are unable to   
advance his penumbra   
catheter beyond this first   
to  
few millimeters of the   
graft. So decision was to   
abort attempted to restore   
the  
patency of the thrombosed   
graft. Arterial sheath was   
removed hemostasis secured  
by quick clot patient was   
transferred back to same-day   
unit in stable condition.  
The above was discussed with   
the patient and her son.  
Anesthesia: Conscious   
Sedation and Local  
Findings:  
1. Hemodynamic results:   
There was no gradient across   
the aortic valve LVEDP 16  
mmHg.  
2. Angiographic results:  
A. Native coronary   
angiography:  
1. Left main: Large-caliber   
vessel with distal 40 to 50%   
stenosis normally  
bifurcated into LAD and   
circumflex coronary artery.  
2. Left anterior descending   
coronary artery: Proximal   
with 80 to 90%  
stenosis mid vessel total   
occlusion after origin of   
the pheresed diagonal.  
3. Circumflex coronary   
artery: Proximally totally   
occluded.  
4. Right coronary artery:   
Proximally totally occluded.  
B. Bypass graft angiography:  
1. Left internal mammary   
graft to LAD: Wide patent   
with excellent  
antegrade flow.  
2. Reverse saphenous venous   
graft to PDA: Stent wide   
patent excellent  
LAMONTE-3 antegrade flow.  
3. Reverse saphenous venous   
graft to circumflex OM:   
Proximally totally  
occluded.  
3. Attempted to recanalize   
totally occluded SVG to   
circumflex OM was not  
successful.  
Estimated Blood Loss:   
minimal  
Complications: None  
Preop Diagnosis:  
Patient with multiple risk   
factors for coronary artery   
disease history of CABG  
stenting of grafts .   
With unstable angina   
utilizing mini sublingual   
nitro  
daily.  
Postop Diagnosis:  
1. Noninvasive echo evidence   
of decreased ejection   
fraction 43%.  
2. Three-vessel native   
occlusive coronary artery   
disease:  
A. LM: 50%.  
B. LAD: Proximal 90% mid   
vessel total occlusion.  
C. CX: Nondominant, proximal   
total occlusion.  
D. RCA: Dominant, total   
occlusion.  
3. LIMA to LAD: Wide patent.  
4. SVG to PDA: Wide patent.  
5. SVG to circumflex OM:   
Totally occluded   
unsuccessful attempted to   
recanalize  
the graft.  
Discussions and   
recommendations  
Patient with decreased left   
ventricular ejection   
fraction pat (more content   
not included)...    Normal                                  [facility]  
   
                                                    Basic metabolic 2000 panelon  
 2024   
   
                                                    Anion gap   
[Moles/Vol]     5 mmol/L        Normal          5-16            [facility]  
   
                                        Comment on above:   Order Comment: Speci  
men Type: BLOOD SPECIMEN  
Ordering Facility: [facility]  
Address: [address] [place] [address]   
   
                                                            Performed By: #### 2  
4321-2 ####  
[facility]  
CLIA [id number]  
[address] [place] [address] [place]   
   
                                                    Calcium   
[Mass/Vol]      9.7 mg/dL       Normal          8.5-10.5        [facility]  
   
                                        Comment on above:   Order Comment: Speci  
men Type: BLOOD SPECIMEN  
Ordering Facility: [facility]  
Address: [address] [place] [address]   
   
                                                            Performed By: #### 2  
4321-2 ####  
[facility]  
CLIA [id number]  
[address] [place] [address] [place]   
   
                                                    Chloride   
[Moles/Vol]     105 mmol/L      Normal                    [facility]  
   
                                        Comment on above:   Order Comment: Speci  
men Type: BLOOD SPECIMEN  
Ordering Facility: [facility]  
Address: [address] [place] [address]   
   
                                                            Performed By: #### 2  
4321-2 ####  
[facility]  
CLIA [id number]  
[address] [place], [place] [address] [place]   
   
                      CO2 [Moles/Vol] 29 mmol/L  Normal     21-32      [facility]  
   
                                        Comment on above:   Order Comment: Speci  
men Type: BLOOD SPECIMEN  
Ordering Facility: [facility]  
Address: [address] [place] [address]   
   
                                                            Performed By: #### 2  
4321-2 ####  
[facility]  
CLIA [id number]  
[address] [place] [address] [place]   
   
                                                    Creatinine   
[Mass/Vol]      0.96 mg/dL      High            0.51-0.95       [facility]  
   
                                        Comment on above:   Order Comment: Speci  
men Type: BLOOD SPECIMEN  
Ordering Facility: [facility]  
Address: [address] [place] [address]   
   
                                                            Result Comment: Kayla  
ents receiving either N-Acetylcysteine (NAC) or   
Metamizole prior to venipuncture, may have falsely depressed   
results.   
   
                                                            Performed By: #### 2  
4321-2 ####  
[facility]  
CLIA [id number]  
[address] [place] [address]   
   
                                                    Creatinine and   
Glomerular   
filtration   
rate.predicted   
panel (S/P/Bld) 67 mL/min/1.73m??? Normal          >=60            [facility]  
   
                                        Comment on above:   Order Comment: Speci  
men Type: BLOOD SPECIMEN  
Ordering Facility: [facility]  
Address: [address] [place] [address]   
   
                                                            Result Comment: Sydnee  
mated Glomerular Filtration Rate (eGFR) is   
calculated using the  CKD-EPI creatinine equation. This equation   
utilizes serum creatinine, sex, and age as parameters. The   
creatinine assay has traceable calibration to isotope dilution-mass   
spectrometry. Refer to KDIGO guidelines for clinical interpretation.   
In patients with unstable renal function, e.g. those with acute   
kidney injury, the eGFR may not accurately reflect actual GFR.   
   
                                                            Performed By: #### 2  
4321-2 ####  
[facility]  
CLIA [id number]  
[address] [place] [address] [place]   
   
                                                    Glucose   
[Mass/Vol]      270 mg/dL       High                      [facility]  
   
                                        Comment on above:   Order Comment: Speci  
men Type: BLOOD SPECIMEN  
Ordering Facility: [facility]  
Address: [address] [place], [place] [id number]   
   
                                                            Result Comment: The   
American Diabetes Association (ADA) provides   
guidance for cutoff values for fasting glucose and random glucose.   
The ADA defines fasting as no caloric intake for at least 8 hours.   
Fasting plasma glucose results between 100 to 125 mg/dL indicate   
increased risk for diabetes (prediabetes).  
Fasting plasma glucose results greater than or equal to 126 mg/dL   
meet the criteria for diagnosis of diabetes. In the absence of   
unequivocal hyperglycemia, results should be confirmed by repeat   
testing. In a patient with classic symptoms of hyperglycemia or   
hyperglycemic crisis, random plasma glucose results greater than or   
equal to 200 mg/dL meet the criteria for diagnosis of diabetes.  
Reference: Standards of Medical Care in Diabetes 2016, American   
Diabetes Association. Diabetes Care. 2016.39(Suppl 1).  
Results may be falsely elevated after the administration of   
Sulfapyridine.  
Results may be falsely depressed after the administration of   
Sulfasalazine.   
   
                                                            Performed By: #### 2  
4321-2 ####  
[facility]  
CLIA [id number]  
[address] [place] [address] [place]   
   
                                                    Potassium   
[Moles/Vol]     4.9 mmol/L      Normal          3.5-5.1         [facility]  
   
                                        Comment on above:   Order Comment: Speci  
men Type: BLOOD SPECIMEN  
Ordering Facility: [facility]  
Address: [address] [place] [address]   
   
                                                            Performed By: #### 2  
4321-2 ####  
[facility]  
CLIA [id number]  
[address] [place] [address] [place]   
   
                                                    Sodium   
[Moles/Vol]     139 mmol/L      Normal          136-145         [facility]  
   
                                        Comment on above:   Order Comment: Speci  
men Type: BLOOD SPECIMEN  
Ordering Facility: [facility]  
Address: [address] [place], [place] [address]   
   
                                                            Performed By: #### 2  
4321-2 ####  
[facility]  
CLIA [id number]  
[address] [place], [place] [address] [place]   
   
                                                    Urea nitrogen   
[Mass/Vol]      18 mg/dL        Normal          -            [facility]  
   
                                        Comment on above:   Order Comment: Speci  
men Type: BLOOD SPECIMEN  
Ordering Facility: [facility]  
Address: [address] [place] [address]   
   
                                                            Performed By: #### 2  
4321-2 ####  
[facility]  
CLIA [id number]  
[address] [place], [place] [address] [facility] [place]   
   
                                                    CARD CATH DIAGNOSTICon    
   
                                                    CARD CATH   
DIAGNOSTIC                              Site Id: [facility]  
Lab #: DEFAULT  
Study Date: 3/26/2024  
Start Time:  
End Time:  
  
Name Duty  
Cherise Pal MD PROC MD 1  
Dilcia Ruiz RT(R), [facility]   
PROC SCRUB 1  
Coby Henry RN PROC   
CIRC 1  
Bryson Medina RN PROC   
CIRC 2  
Roxie Sloan RN PROC   
RECORD 1  
+-------------------+  
 PATIENT INFORMATION   
+-------------------+  
Name: JAYDEN SOTO  
MRN: [id number]  
Accession #:   
[id number]  
: 1960  
Age: 63 years  
Gender: F  
+---------------------------  
-+  
 CLINICAL   
HISTORY/INDICATIONS   
+---------------------------  
-+  
Abnormal ECG.  
Procedural Status: Elective  
CAD Presentation: Symptoms   
Likely to be Ischemic  
Angina Classification   
(within 2 weeks): CCS III  
No Heart Failure  
Clinical History:  
63 years old white female   
with long history of   
coronary artery disease she   
has history of coronary   
artery bypass surgery in   
[place] many years ago where   
she had SVG to circumflex OM   
and SVG to RCA and LIMA to   
LAD. In  she did receive   
bare-metal stenting of the   
degenerative disease of   
saphenous venous graft to   
circumflex and bare-metal   
stent of the degenerative   
disease of saphenous venous   
graft to PDA. She had   
occluded circumflex and   
occluded right coronary   
artery with occlusive   
disease of LAD. Patient was   
referred for coronary   
angiography and possible   
revascularization with   
unstable angina she is using   
many nitroglycerin despite   
maximal medical management.   
Per her son she use up to 20   
of sublingual nitros.   
Patient is morbidly obese   
BMI 39.12. She has ischemic   
cardiomyopathy with ejection   
fraction 43%. She has been   
treated for type 2 diabetes   
mellitus, hypertension she   
quit smoking years ago and   
she has a family history of   
coronary artery disease. She   
lives with her son and his   
family. She used to work as   
a schoolbus  for many   
years. Risk and benefit of   
procedure were discussed   
with the patient and she was   
consented.  
+-------------------+  
 DIAGNOSTIC FINDINGS   
+-------------------+  
Coronary Anatomy: Right   
Dominant  
Injection Site(s): lt rad 5f  
  
LMT: _ The LMT has moderate   
diffuse disease.  
LAD:  
_ The LAD has severe diffuse   
disease.  
LCX: _ The Circumflex has   
severe diffuse disease.  
RAMUS: _ Ramus Status: Not   
Applicable.  
RCA:  
_ The RCA has severe diffuse   
disease.  
GRAFTS:  
_ Saphenous Vein Graft to   
the Right Posterior   
Descending Artery .   
Additional  
Comments - wide patent.  
_ Left Internal Mammary   
Artery Graft to the Left   
Anterior Descending .  
Additional Comments - wide   
patent.  
_ Saphenous Vein Graft to   
the OM-1 First Obtuse   
Marginal Branch (100 %  
stenosis). Additional   
Comments - occluded.  
+---------------+  
 IMPRESSION/PLAN   
+---------------+  
Impression:Findings:  
1. Hemodynamic results:   
There was no gradient across   
the aortic valve LVEDP 16   
mmHg.  
2. Angiographic results:  
A. Native coronary   
angiography:  
1. Left main: Large-caliber   
vessel with distal 40 to 50%   
stenosis normally bifurcated   
into LAD and circumflex   
coronary artery.  
2. Left anterior descending   
coronary artery: Proximal   
with 80 to 90% stenosis mid   
vessel total occlusion after   
origin of the pheresed   
diagonal.  
3. Circumflex coronary   
artery: Proximally totally   
occluded.  
4. Right coronary artery:   
Proximally totally occluded.  
B. Bypass graft angiography:  
1. Left internal mammary   
graft to LAD: Wide patent   
with excellent antegrade   
flow.  
2. Reverse saphenous venous   
graft to PDA: Stent wide   
patent excellent LAMONTE-3   
antegrade flow.  
3. Reverse saphenous venous   
graft to circumflex OM:   
Proximally totally occluded.  
3. Attempted to recanalize   
totally occluded SVG to   
circumflex OM was not   
successful.  
  
Postop Diagnosis:  
1. Noninvasive echo evidence   
of decreased ejection   
fraction 43%.  
2. Three-vessel native   
occlusive coronary artery   
disease:  
A. LM: 50%.  
B. LAD: Proximal 90% mid   
vessel total occlusion.  
C. CX: Nondominant, proximal   
total occlusion.  
D. RCA: Dominant, total   
occlusion.  
3. LIMA to LAD: Wide patent.  
4. SVG to PDA: Wide patent.  
5. SVG to circumflex OM:   
Totally occluded   
unsuccessful attempted to   
recanalize the graft.  
  
Recommended Treatment:   
Medical Therapy.  
Plan: Patient with decreased   
left ventricular ejection   
fraction patent LIMA to LAD   
and saphenous venous graft   
to PDA. Medical management   
for ischemia which probably   
in the lateral wall has been   
challenging. Unfortunately   
we are unable to establish   
antegrade flow to the   
lateral wall. Would optimize   
patient medical management.   
Consider nuclear stress test   
to confirm his reperfusion   
defect in the future to   
consider complex   
intervention with the   
proximal LAD to supply   
adequate antegrade flow to   
the first diagonal.  
+-------------------+  
 HEMODYNAMICS - XPER   
+-------------------+  
+----------------+------+---  
--+-------+-----+------+----  
--+  
 Measurement Name Sys  Kiera   
 End Kiera Mean  A Wave V   
Wave   
+----------------+------+---  
--+-------+-----+------+----  
--+  
 AO  95.00  63.00   80.00      
   
+----------------+------+---  
--+-------+-----+------+----  
--+  
 AO  111.00 58.00   81.00   
(more content not   
included)...        Normal                                  [facility]  
   
                                                    CBC panel Auto (Bld)on    
   
                                                    Erythrocyte   
distribution   
width (RBC)   
[Ratio]         17.2 %          High            11.5-15.0       [facility]  
   
                                        Comment on above:   Order Comment: Speci  
men Type: BLOOD SPECIMEN  
Ordering Facility: [facility]  
Address: [address] [place] [address]   
   
                                                            Performed By: #### 5  
8410-2 ####  
[facility]  
CLIA [id number]  
[address] [place] [address] [place]   
   
                                                    Hematocrit (Bld)   
[Volume fraction] 37.4 %          Normal          36.0-46.0       [facility]  
   
                                        Comment on above:   Order Comment: Speci  
men Type: BLOOD SPECIMEN  
Ordering Facility: [facility]  
Address: [address] [place] [address]   
   
                                                            Performed By: #### 5  
8410-2 ####  
[facility]  
CLIA [id number]  
[address] [place] [address] [place]   
   
                                                    Hemoglobin (Bld)   
[Mass/Vol]      11.5 g/dL       Normal          11.5-15.5       [facility]  
   
                                        Comment on above:   Order Comment: Speci  
men Type: BLOOD SPECIMEN  
Ordering Facility: [facility]  
Address: [address] [place] [address]   
   
                                                            Performed By: #### 5  
8410-2 ####  
[facility]  
CLIA [id number]  
[address] [place] [address] [place]   
   
                                                    MCH (RBC)   
[Entitic mass]  25.8 pg         Low             26.0-34.0       [facility]  
   
                                        Comment on above:   Order Comment: Speci  
men Type: BLOOD SPECIMEN  
Ordering Facility: [facility]  
Address: [address] [place] [address]   
   
                                                            Performed By: #### 5  
8410-2 ####  
[facility]  
CLIA [id number]  
[address] [place] [address] [place]   
   
                                                    MCHC (RBC)   
[Mass/Vol]      30.7 g/dL       Normal          30.5-36.0       [facility]  
   
                                        Comment on above:   Order Comment: Speci  
men Type: BLOOD SPECIMEN  
Ordering Facility: [facility]  
Address: [address] [place] [address]   
   
                                                            Performed By: #### 5  
8410-2 ####  
[facility]  
CLIA [id number]  
[address] [place] [address] [place]   
   
                                                    MCV (RBC)   
[Entitic vol]   84.0 fL         Normal          80.0-100.0      [facility]  
   
                                        Comment on above:   Order Comment: Speci  
men Type: BLOOD SPECIMEN  
Ordering Facility: [facility]  
Address: [address] [place] [address]   
   
                                                            Performed By: #### 5  
8410-2 ####  
[facility]  
CLIA [id number]  
[address] [place] [address] [place]   
   
                                                    Nucleated RBC   
(Bld) [#/Vol]   10*3/uL         Normal          <0.01           [facility]  
   
                                        Comment on above:   Order Comment: Speci  
men Type: BLOOD SPECIMEN  
Ordering Facility: [facility]  
Address: [address] [place] [address]   
   
                                                            Performed By: #### 5  
8410-2 ####  
[facility]  
CLIA [id number]  
[address] [place] [address] [place]   
   
                                                    Platelet mean   
volume (Bld)   
[Entitic vol]   9.4 fL          Normal          9.0-12.7        [facility]  
   
                                        Comment on above:   Order Comment: Speci  
men Type: BLOOD SPECIMEN  
Ordering Facility: [facility]  
Address: [address] [place] [address]   
   
                                                            Performed By: #### 5  
8410-2 ####  
[facility]  
CLIA [id number]  
[address] [place] [address] [place]   
   
                                                    Platelets (Bld)   
[#/Vol]         331 10*3/uL     Normal          150-400         [facility]  
   
                                        Comment on above:   Order Comment: Speci  
men Type: BLOOD SPECIMEN  
Ordering Facility: [facility]  
Address: [address] [place] [address]   
   
                                                            Performed By: #### 5  
8410-2 ####  
[facility]  
CLIA [id number]  
[address] [place] [address] [place]   
   
                      RBC (Bld) [#/Vol] 4.45 10*6/uL Normal     3.90-5.20  [facility]  
   
                                        Comment on above:   Order Comment: Speci  
men Type: BLOOD SPECIMEN  
Ordering Facility: [facility]  
Address: [address] [facility] [place] [address]   
   
                                                            Performed By: #### 5  
8410-2 ####  
[facility]  
CLIA [id number]  
[address] [place], [place] [address] [place]   
   
                      WBC (Bld) [#/Vol] 11.53 10*3/uL High       3.70-11.00 [facility]  
   
                                        Comment on above:   Order Comment: Speci  
men Type: BLOOD SPECIMEN  
Ordering Facility: [facility]  
Address: [address] [place] [address]   
   
                                                            Performed By: #### 5  
8410-2 ####  
[facility]  
CLIA [id number]  
[address] [place], [place] [address] [place]   
   
                                                    NURSING PROGon 2024   
   
                                        NURSING PROG        HNO ID: [id number]  
Author: KIMBERLEE MCCAULEY RN  
Service: Nursing  
Author Type: Registered   
Nurse  
Type: Nursing Progress Note  
Filed: 2024 11:59  
Note Text:  
Advised Dr. Pal that   
patients glucose was 270 on   
BMP. I rechecked blood sugar  
and it is down to 256.   
Patient took 75 units of   
Lantus last night 3/25/24 at  
2200. See orders    Normal                                  [facility]  
   
                                                    NURSING PROGon 2024   
   
                                        NURSING PROG        HNO ID: [id number]  
Author: LEO GOODEN RN  
Service: Nursing  
Author Type: Registered   
Nurse  
Type: Nursing Progress Note  
Filed: 2024 16:12  
Note Text:  
PRE-PROCEDURE INSTRUCTIONS  
TO PREPARE FOR YOUR   
PROCEDURE:  
Your arrival time for your   
procedure is 1100.  
Do NOT eat any solid foods   
after MIDNIGHT the night   
prior to your procedure -  
this includes gum or mints.  
You can drink clear liquids*   
up until 1030, which is 2   
hours before your arrival  
time.  
*Clear liquids = water,   
carbohydrate drink (sports   
drink that is clear or   
yellow  
in color), Ensure   
Pre-Surgery (given by PEAT   
or your DrKen), fruit juice   
without  
pulp (apple/cranberry),   
clear tea, black coffee (no   
cream).  
NO CARBONATED BEVERAGES AND   
NO ALCOHOL.  
Shower the morning of the   
procedure, put on clean   
clothes, and have clean   
sheets  
for your bed to help prevent   
infection after your   
procedure.  
Leave all valuables such as   
jewelry including rings,   
piercings, wallets, and  
purses at home.  
Wear comfortable,   
loose-fitting clothing.  
If you wear glasses or   
contacts, please bring a   
case.  
SPECIAL INSTRUCTIONS:  
If instructed, bring your   
first voided urine specimen   
with you.  
If you were provided skin   
preparation to use prior to   
your procedure, complete  
this as directed.  
If you were provided Ensure   
Pre-Surgery drink, you need   
to drink this at N/A.  
This should be consumed   
quickly (in less than 5   
minutes, rather than sipped   
over  
time)  
If a bowel preparation has   
been ordered by your   
physician, it is very   
important  
to follow the bowel prep   
instructions or your   
procedure may need to be  
rescheduled.  
If you use crutches or a   
walker, bring them with you.  
If you have a home   
CPAP/BIPAP machine, bring it   
with you.  
If you were instructed to   
complete a fleets enema or   
bowel prep, complete as  
directed.  
Bring copy of Living   
Will/Power of .  
Do not smoke or chew. If you   
use tobacco, quit or at   
least cut down before  
surgery. Do not smoke or   
chew after midnight the day   
before your surgery. This  
effects bleeding, infection,   
healing, and so much more.  
Do not take any Diet or   
Herbal Supplements 2 weeks   
prior to your surgery date.  
Please notify your physician   
if there is any change in   
your physical condition  
such as a cold, cough,   
fever, sore throat, or skin   
irritation near the surgical  
site.  
Visitors under the age of 14   
are restricted in the   
Surgery Center.  
UPON ARRIVAL:  
Access to [facility]   
[facility]) is located  
on [address].  
 parking is available   
for your convenience from   
5am-5pm- there is a $5.00  
charge for this service.  
Take the elevators directly   
inside the entrance to the   
1st Floor Surgery Lobby.  
Sign in at the podium   
located to the left when you   
get off the elevators.  
A payment may be expected at   
the time of service.  
One visitor may come back to   
the preoperative area with   
you. The preoperative  
staff will be reviewing your   
medical history, please let   
them know if you prefer  
not to have a visitor with   
you during this time. Once   
you are ready for your  
procedure, two visitors at a   
time are permitted in your   
preprocedure room.  
Follow all medication   
instructions as provided by   
your physician.     [facility]  
   
                                        NURSING PROG        HNO ID: [id number]  
Author: LEO GOODEN RN  
Service: Nursing  
Author Type: Registered   
Nurse  
Type: Nursing Progress Note  
Filed: 2024 14:38  
Note Text:  
PRE-PROCEDURE INSTRUCTIONS  
TO PREPARE FOR YOUR   
PROCEDURE:  
Your arrival time for your   
procedure is 0830.  
Do NOT eat any solid foods   
after MIDNIGHT the night   
prior to your procedure -  
this includes gum or mints.  
You can drink clear liquids*   
up until 0800, which is 2   
hours before your arrival  
time.  
*Clear liquids = water,   
carbohydrate drink (sports   
drink that is clear or   
yellow  
in color), Ensure   
Pre-Surgery (given by PEAT   
or your DrKen), fruit juice   
without  
pulp (apple/cranberry),   
clear tea, black coffee (no   
cream).  
NO CARBONATED BEVERAGES AND   
NO ALCOHOL.  
Shower the morning of the   
procedure, put on clean   
clothes, and have clean   
sheets  
for your bed to help prevent   
infection after your   
procedure.  
Leave all valuables such as   
jewelry including rings,   
piercings, wallets, and  
purses at home.  
Wear comfortable,   
loose-fitting clothing.  
If you wear glasses or   
contacts, please bring a   
case.  
SPECIAL INSTRUCTIONS:  
If instructed, bring your   
first voided urine specimen   
with you.  
If you were provided skin   
preparation to use prior to   
your procedure, complete  
this as directed.  
If you were provided Ensure   
Pre-Surgery drink, you need   
to drink this at N/A.  
This should be consumed   
quickly (in less than 5   
minutes, rather than sipped   
over  
time)  
If a bowel preparation has   
been ordered by your   
physician, it is very   
important  
to follow the bowel prep   
instructions or your   
procedure may need to be  
rescheduled.  
If you use crutches or a   
walker, bring them with you.  
If you have a home   
CPAP/BIPAP machine, bring it   
with you.  
If you were instructed to   
complete a fleets enema or   
bowel prep, complete as  
directed.  
Bring copy of Living   
Will/Power of .  
Do not smoke or chew. If you   
use tobacco, quit or at   
least cut down before  
surgery. Do not smoke or   
chew after midnight the day   
before your surgery. This  
effects bleeding, infection,   
healing, and so much more.  
Do not take any Diet or   
Herbal Supplements 2 weeks   
prior to your surgery date.  
Please notify your physician   
if there is any change in   
your physical condition  
such as a cold, cough,   
fever, sore throat, or skin   
irritation near the surgical  
site.  
Visitors under the age of 14   
are restricted in the   
Surgery Center.  
UPON ARRIVAL:  
Access to [facility]   
[facility]) is located  
on [address].  
 parking is available   
for your convenience from   
5am-5pm- there is a $5.00  
charge for this service.  
Take the elevators directly   
inside the entrance to the   
1st Floor Surgery Lobby.  
Sign in at the podium   
located to the left when you   
get off the elevators.  
A payment may be expected at   
the time of service.  
One visitor may come back to   
the preoperative area with   
you. The preoperative  
staff will be reviewing your   
medical history, please let   
them know if you prefer  
not to have a visitor with   
you during this time. Once   
you are ready for your  
procedure, two visitors at a   
time are permitted in your   
preprocedure room.  
Follow all medication   
instructions as provided by   
your physician.     [facility]  
   
                                        NURSING PROG        HNO ID: [id number]  
Author: LEO GOODEN,   
RN  
Service: Nursing  
Author Type: Registered   
Nurse  
Type: Nursing Progress Note  
Filed: 2024 09:36  
Note Text:  
I spoke to Dr. Alvarado's nurse   
and she notified Dr. Pal's   
office regarding the  
patient's Metformin. Dr. Pal is comfortable with   
proceeding with heart cath   
as  
long as the Metformin is   
held the 24 hours pre-   
procedure. I called the pt's  
daughter in law and she will   
hold today's doses. [facility]  
   
                                        NURSING PROG        HNO ID: [id number]  
Author: LEO GOODEN,   
RN  
Service: Nursing  
Author Type: Registered   
Nurse  
Type: Nursing Progress Note  
Filed: 2024 09:20  
Note Text:  
Hx completed with Pt's   
daughter in law. She states   
they did not receive any  
instruction regarding hold   
Metformin prior to   
procedure. I left a   
voicemail for  
Dr. Alvarado's nurse. Pt is   
going to hold AM dose for   
now until we hear back from  
the office.         [facility]  
   
                                                    CMP with eGFRon 2024   
   
                      AGE        63 years   [facility]  
   
                                        Comment on above:   Performed By: #### 2  
[id number] ####  
[facility],[address] [place] [address]   
[id number]   
   
                                                    Albumin   
[Mass/Vol]      2.9 g/dL        Low             3.4 - 5.0       [facility]  
   
                                        Comment on above:   Performed By: #### 2  
[id number] ####  
[facility],[address] [place] [address]   
[id number]   
   
                                                    Albumin/Globulin   
[Mass ratio]    0.6 {ratio}     Low             0.9 - 1.6       [facility]  
   
                                        Comment on above:   Performed By: #### 2  
[id number] ####  
[facility],[address] [place] [address]   
[id number]   
   
                      ALK PHOS   126 U/L    High       46 - 116   [facility]  
   
                                        Comment on above:   Performed By: #### 2  
[id number] ####  
[facility],[address] [place] [address]   
[id number]   
   
                                                    ALT [Catalytic   
activity/Vol]   44 U/L          Normal          14 - 59         [facility]  
   
                                        Comment on above:   Performed By: #### 2  
[id number] ####  
[facility],[address] [place] [address]   
[id number]   
   
                                                    Anion gap   
[Moles/Vol]     14 mmol/L       Normal          10 - 20         [facility]  
   
                                        Comment on above:   Performed By: #### 2  
[id number] ####  
[facility],[address] [place] [address]   
[id number]   
   
                                                    AST [Catalytic   
activity/Vol]   38 U/L          Normal          13 - 39         [facility]  
   
                                        Comment on above:   Performed By: #### 2  
[id number] ####  
[facility],[address] [place] [address]   
[id number]   
   
                      B/C RATIO  14 ratio   Normal     0 - 30     [facility]  
   
                                        Comment on above:   Performed By: #### 2  
[id number] ####  
[facility],[address] [place] [address]   
[id number]   
   
                                                    Bilirubin   
[Mass/Vol]      0.4 mg/dL       Normal          0.2 - 1.0       [facility]  
   
                                        Comment on above:   Performed By: #### 2  
[id number] ####  
[facility],[address] [place] [address]   
[id number]   
   
                                                    Calcium   
[Mass/Vol]      9.4 mg/dL       Normal          8.5 - 10.1      [facility]  
   
                                        Comment on above:   Performed By: #### 2  
[id number] ####  
[facility],[address] [place] [address]   
[address]   
   
                                                    Chloride   
[Moles/Vol]     103 mmol/L      Normal          98 - 107        [facility]  
   
                                        Comment on above:   Performed By: #### 2  
13885 ####  
[facility],[address] [place] [address]   
[address]   
   
                      CMP with eGFR            Normal                [facility]  
   
                                        Comment on above:   Result Comment: COMP  
REHENSIVE METABOLIC PANEL   
   
                                                            Performed By: #### 2  
22422 ####  
[facility],[address] [place] [address]   
[address]   
   
                      CO2 [Moles/Vol] 26.3 mmol/L Normal     21.0 - 32.0 [facility]  
   
                                        Comment on above:   Performed By: #### 2  
25669 ####  
[facility],[address] [place] [address]   
[address]   
   
                                                    Creatinine   
[Mass/Vol]      1.11 mg/dL      High            0.55 - 1.02     [facility]  
   
                                        Comment on above:   Performed By: #### 2  
24260 ####  
[facility],[address] [place] [address]   
[address]   
   
                      eGFR       50 ML/MINUTE Low        60 - 999   [facility]  
   
                                        Comment on above:   Performed By: #### 2  
37607 ####  
[facility],[address] [place] [address]   
[address]   
   
                      eGFR(AA)   60 ML/MINUTE Normal     60 - 999   [facility]  
   
                                        Comment on above:   Result Comment: ACCO  
RDING TO THE NATIONAL KIDNEY DISEASE   
EDUCATION   
PROGRAM(NKDE), A NORMAL eGFR  
IS A VALUE GREATER THAN OR EQUAL TO 60 ML/MIN/1.73 SQ METERS.  
CHRONIC KIDNEY DISEASE: <60mL/MIN/1.73 SQ METERS  
KIDNEY FAILURE: <15mL/MIN/1.73 SQ METERS  
THIS TEST SHOULD ONLY BE USED FOR PATIENTS 18 YEARS OF AGE AND   
OLDER.   
   
                                                            Performed By: #### 2  
16731 ####  
[facility],[address] [place] [address]   
[address]   
   
                                                    Globulin (S)   
[Mass/Vol]      4.7 g/dL        High            1.5 - 3.8       [facility]  
   
                                        Comment on above:   Performed By: #### 2  
17940 ####  
[facility],[address] [place] [address]   
[address]   
   
                                                    Glucose   
[Mass/Vol]      246 mg/dL       High            74 - 106        [facility]  
   
                                        Comment on above:   Performed By: #### 2  
36162 ####  
[facility],[address] [place] [address]   
[address]   
   
                                                    Potassium   
[Moles/Vol]     4.6 mmol/L      Normal          3.5 - 5.1       [facility]  
   
                                        Comment on above:   Performed By: #### 2  
88017 ####  
[facility],[address] [place] [address]   
[address]   
   
                                                    Protein   
[Mass/Vol]      7.6 g/dL        Normal          6.4 - 8.2       [facility]  
   
                                        Comment on above:   Performed By: #### 2  
21974 ####  
[address]   
[address]   
   
                                                    Sodium   
[Moles/Vol]     139 mmol/L      Normal          136 - 145       [facility]  
   
                                        Comment on above:   Performed By: #### 2  
33320 ####  
[facility],[address] [place] [address]   
[address]   
   
                                                    Urea nitrogen   
[Mass/Vol]      16 mg/dL        Normal          7 - 18          [facility]  
   
                                        Comment on above:   Performed By: #### 2  
38212 ####  
[facility],[address] [place] [address]   
[address]   
   
                                                    LIPID PROFILEon 2024   
   
                                                    Cholesterol   
[Mass/Vol]      241 mg/dL       High            0 - 240         [facility]  
   
                                        Comment on above:   Performed By: #### 2  
38703 ####  
[place] [address],[place]   
[address]   
   
                                                    Cholesterol in   
HDL [Mass/Vol]  33 mg/dL        Low             40 - 60         [facility]  
   
                                        Comment on above:   Performed By: #### 2  
58510 ####  
[facility],[address] [place] [address]   
[address]   
   
                                                    Cholesterol in   
LDL [Mass/Vol]  173 mg/dL       High            0 - 129         [facility]  
   
                                        Comment on above:   Performed By: #### 2  
15390 ####  
[facility],[address] [place] [address]   
[address]   
   
                                                    Cholesterol.total  
/Cholesterol in   
HDL [Mass ratio] 7.3 {ratio}     High            0.0 - 5.0       [facility]  
   
                                        Comment on above:   Performed By: #### 2  
41430 ####  
[facility],[address] [place] [address]   
[address]   
   
                      Lipid 1996 panel            Normal                [facility]  
   
                                        Comment on above:   Result Comment: LIPI  
D PROFILE   
   
                                                            Performed By: #### 2  
80970 ####  
[facility],[address] [place] [address]   
[address]   
   
                                                    Triglyceride   
[Mass/Vol]      177 mg/dL       High            0 - 150         [facility]  
   
                                        Comment on above:   Performed By: #### 2  
85715 ####  
[facility],[address] [place] [address]   
[address]   
   
                                                    MAGNESIUMon 2024   
   
                                                    Magnesium   
[Mass/Vol]      1.8 mg/dL       Normal          1.8 - 2.4       [facility]  
   
                                        Comment on above:   Performed By: #### 2  
09581 ####  
[facility],[address] [place] [address]   
[address]   
   
                                                    TSHon 2024   
   
                      TSH Qn     1.64 m[IU]/L Normal     0.35 - 3.74 [facility]  
   
                                        Comment on above:   Performed By: #### 2  
61508 ####  
[facility],[address] [place] [address]   
[address]   
   
                                                    LIPID PROFILEon 2023   
   
                                                    Cholesterol   
[Mass/Vol]      137 mg/dL       Normal          0 - 240         [facility]  
   
                                        Comment on above:   Performed By: #### 2  
22562 ####  
[facility],[address] [place] [address]   
[address]   
   
                                                    Cholesterol in   
HDL [Mass/Vol]  30 mg/dL        Low             40 - 60         [facility]  
   
                                        Comment on above:   Performed By: #### 2  
80968 ####  
[facility],[address] [place] [address]   
[address]   
   
                                                    Cholesterol in   
LDL [Mass/Vol]  76 mg/dL        Normal          0 - 129         [facility]  
   
                                        Comment on above:   Performed By: #### 2  
17801 ####  
[facility],[address] [place] [address]   
[address]   
   
                                                    Cholesterol.total  
/Cholesterol in   
HDL [Mass ratio] 4.6 {ratio}     Normal          0.0 - 5.0       [facility]  
   
                                        Comment on above:   Performed By: #### 2  
64288 ####  
[facility],[address] [place] [address]   
[address]   
   
                      Lipid 1996 panel            Normal                [facility]  
   
                                        Comment on above:   Result Comment: LIPI  
D PROFILE   
   
                                                            Performed By: #### 2  
44974 ####  
[address]   
[address]   
   
                                                    Triglyceride   
[Mass/Vol]      155 mg/dL       High            0 - 150         [facility]  
   
                                        Comment on above:   Performed By: #### 2  
58233 ####  
[address]   
[address]   
   
                                                    HGB A1C [CCL]on 2023   
   
                                                    HbA1c (Bld) [Mass   
fraction]       9.9 %           High            4.3-5.6         [facility]  
   
                                        Comment on above:   Result Comment: Amer  
ican Diabetes Association guidelines   
indicate   
that patients with  
HgbA1c in the range 5.7-6.4% are at increased risk for development   
of  
diabetes, and intervention by lifestyle modification may be   
beneficial.  
HgbA1c greater or equal to 6.5% is considered diagnostic of   
diabetes.   
   
                                                            Performed By: #### 2  
28596 ####  
[facility],[address] [place] [address]   
[address]   
   
                      Hemoglobin A0 237 mg/dL  Normal                [facility]  
   
                                        Comment on above:   Result Comment: eAG:  
 (Estimated average glucose) is a   
calculated   
value from HgbA1c and  
is representative of the average blood glucose level in the last 2-3  
month [place] [address]  
Johnny Chauhan III, M.D.  
[id number]  
[phone]   
   
                                                            Performed By: #### 2  
96170 ####  
[address]   
[address]   
   
                                                    HbA1c (Bld)on 2023   
   
                                                    Average glucose   
Estimated from   
glycated   
hemoglobin (Bld)   
[Mass/Vol]      237 mg/dL       Normal                          [facility]  
   
                                        Comment on above:   Order Comment: Speci  
men Type: BLOOD SPECIMEN  
Ordering Facility: [facility]  
Address: [address] [place] [address]   
   
                                                            Result Comment: eAG:  
 (Estimated average glucose) is a calculated   
value from HgbA1c and is representative of the average blood glucose   
level in the last 2-3 month period.   
   
                                                            Performed By: #### 5  
5454-3 ####  
[facility]  
CLIA [id number]  
[address] [place] [address] [place]   
   
                                                    HbA1c (Bld) [Mass   
fraction]       9.9 %           High            4.3-5.6         [facility]  
   
                                        Comment on above:   Order Comment: Speci  
men Type: BLOOD SPECIMEN  
Ordering Facility: [facility]  
Address: [address] [place] [address]   
   
                                                            Result Comment: Amer  
ican Diabetes Association guidelines indicate   
that patients with HgbA1c in the range 5.7-6.4% are at increased   
risk for development of diabetes, and intervention by lifestyle   
modification may be beneficial. HgbA1c greater or equal to 6.5% is   
considered diagnostic of diabetes.   
   
                                                            Performed By: #### 5  
5454-3 ####  
[facility]  
CLIA [id number]  
[address] [place], [place] [id number] [place]   
   
                                                    HbA1c (Bld)on 2023   
   
                                                    Average glucose   
Estimated from   
glycated   
hemoglobin (Bld)   
[Mass/Vol]      183 mg/dL       Normal                          [facility]  
   
                                        Comment on above:   Order Comment: Speci  
men Type: BLOOD SPECIMEN  
Ordering Facility: [facility]  
Address: [facility] [place] [address]   
   
                                                            Result Comment: eAG:  
 (Estimated average glucose) is a calculated   
value from HgbA1c and is representative of the average blood glucose   
level in the last 2-3 month period.   
   
                                                            Performed By: #### 5  
5454-3 ####  
[facility]  
CLIA [id number]  
[address] [place] [address] [place]   
   
                                                    HbA1c (Bld) [Mass   
fraction]       8.0 %           High            4.3-5.6         [facility]  
   
                                        Comment on above:   Order Comment: Speci  
men Type: BLOOD SPECIMEN  
Ordering Facility: [facility]  
Address: [facility] [place] [address]   
   
                                                            Result Comment: Amer  
ican Diabetes Association guidelines indicate   
that patients with HgbA1c in the range 5.7-6.4% are at increased   
risk for development of diabetes, and intervention by lifestyle   
modification may be beneficial. HgbA1c greater or equal to 6.5% is   
considered diagnostic of diabetes.   
   
                                                            Performed By: #### 5  
5454-3 ####  
[facility]  
CLIA [id number]  
[address] [place] [address] [place]   
   
                                                    Bacteria Wnd Culton 20  
23   
   
                                                    Bacteria   
identified Cx Nom   
(Wound)                                 CULTURE, WOUND:  
Unable to isolate   
predominant organisms due to   
heavy mixed growth. No   
further workup. Recommend   
repeat collection of tissue   
obtained after cleansing and   
debridement.  
GRAM STAIN:  
Many Gram positive cocci  
Few Polymorphonuclear   
leukocytes          Abnormal                                [facility]  
   
                                        Comment on above:   Performed By: #### 6  
462-6 ####  
[facility]  
CLIA [id number]  
[address] [place] [address] [place]   
   
                                                    CNOVon 10-   
   
                                        CNOV                Office Visit (AGHWW1  
)  
----------------------------  
----------------------------  
------------------------  
JAYDEN SOTO ([id number])   
1960 F CHT  
Date Time Provider   
Department  
10/4/22 9:30 AM MAMADOU STARK AGHWW1  
During your visit today, we   
recorded the following   
information about you:  
Temperature Weight Height  
98.3 degrees 115.7 kg 1.702   
m  
Rosalinda Cazares PA-C   
10/4/2022 12:43 PM Signed  
ORTHOPAEDIC SHOULDER AND   
ELBOW SERVICE  
HISTORY AND PHYSICAL EXAM  
REFERRING PROVIDER:  
Damon Eckert  
[address]  
[facility] [address]  
CHIEF COMPLAINT: right   
shoulder pain  
PAIN EVALUATION  
10/4/2022  
0954  
Pain Level: 8  
Description: Aching;Sore  
Frequency: Continuous  
Intervention/Comfort   
measure: Medication  
SUBJECTIVE:  
Jayden Soto is a 62 year   
old female who presents to   
clinic with right shoulder  
pain. History of right   
shoulder rotator cuff repair   
maybe 20 years ago. It  
did well following surgery   
until about 2 years ago. Two   
years ago she became  
septic due to diabetic ulcer   
on her left heel. She ended   
up having her left  
partial knee replacement   
washed out with an   
antibiotic spacer placed and   
a  
washout of her back. Due to   
the positioning during her   
back procedure she  
awoke with a  dead arm .   
While in the hospital she   
underwent a right shoulder  
corticosteroid injection and   
therapy without improvement.   
She was seen at an  
outside clinic following   
that with a subsequent   
corticosteroid injection  
without improvement.   
Shoulder is painful over the   
anterior aspect. Worse with  
range of motion. She has   
pain into her forearm and   
elbow. She does ambulate  
with a cane in her right   
hand. She is on chronic   
suppressive antibiotics and  
the plan is to keep the   
antibiotic spacer in place   
in her left knee. She is  
accompanied by her daughter   
today.  
PAST MEDICAL HISTORY:  
PAST MEDICAL HISTORY  
Diagnosis Date  
Chronic pain of left knee  
Coronary artery disease  
1 cardiac stent  
Diabetes (HCC)  
Hypercholesteremia  
Hypertension  
MRSA (methicillin resistant   
staph aureus) culture   
positive  
multiple joints/bones  
Pain of right hand  
PAST SURGICAL HISTORY:  
PAST SURGICAL HISTORY  
Procedure Laterality Date  
BACK SURGERY HX  
ELBOW SURGERY HX Bilateral  
for bone spurs  
IR VASCULAR ACCESS TEAM PICC   
INSERTION RADIO 2022  
KNEE SURGERY HX  
PAST SURGICAL HISTORY OF   
Left 2022  
left knee  
PAST SURGICAL HISTORY OF   
Left  
heel surgery  
PAST SURGICAL HISTORY OF   
Right 2022  
right middle finger  
SHOULDER SURGERY HX   
Bilateral  
SOCIAL HISTORY:  
Social History  
Tobacco Use  
Smoking status: Never  
Smokeless tobacco: Never  
Vaping Use  
Vaping Use: Never used  
Substance Use Topics  
Alcohol use: Never  
Drug use: Never  
ALLERGIES:  
ALLERGIES  
Allergen Reactions  
Morphine GI Upset  
Statins-Hmg-Coa Red* Unknown  
MEDICATIONS:  
Current Outpatient   
Medications on File Prior to   
Visit  
Medication Sig  
cephALEXin (KEFLEX) 500 mg   
capsule Take 1 capsule by   
mouth twice daily.  
Lactobacillus acidophilus   
(ACIDOPHILUS) cap Take 1   
capsule by mouth twice  
daily.  
apixaban (ELIQUIS DVT-PE   
TREAT 30D START) 5 mg (74   
tabs) Take 2 tablets (10  
mg) by mouth twice daily for   
7 days. Then take 1 tablet   
(5 mg) by mouth twice  
daily for 23 days  
apixaban (ELIQUIS) 5 mg   
tab(s) Take 1 tablet by   
mouth twice daily.  
aspirin 81 mg cap Take 1   
capsule by mouth once daily.   
Resume once BID dosing  
is completed  
aspirin, enteric coated   
(ASPIRIN, ENTERIC COATED) 81   
mg EC tablet Take 1  
tablet by mouth twice daily   
for 21 days. Once BID dosing   
is completed in 21  
days, resume home daily dose  
insulin glargine (LANTUS   
U-100 INSULIN) 100 unit/mL   
injection Inject 36 Units  
subcutaneously daily at   
bedtime. (Patient taking   
differently: Inject 40 Units  
subcutaneously daily at   
bedtime.)  
insulin aspart U-100   
(NOVOLOG) 100 unit/mL 14   
units with breakfast  
8 units with lunch  
10 units with dinner  
Admin Instructions:   
ADMINISTER CORRECTIONAL   
INSULIN REGARDLESS OF MEAL   
OR  
NUTRITION INTAKE  
Custom Scale  
If Blood Glucose (mg/dL) is  
Less than 100 0 units  
101-150 0 units  
151-175 2 units  
176-200 3 units  
201-225 4 units  
226-250 5 units  
251-275 6 units  
276-300 7 units  
301-325 8 units  
326-350 9 units  
>350 10 units and Notify   
Provider  
Notify provider if 2   
consecutive blood glucose   
values in the previous 24   
hours  
are greater than 250 mg/mL   
and there have been no   
changes to the insulin  
regimen in the previous 24   
hours.  
doxepin capsule 10 mg Take   
20 mg by mouth daily at   
bedtime.  
BISACODYL ORAL Take 10 mg by   
mouth once daily.  
melatonin 10 mg cap Take 1   
capsule by mouth daily at   
bedtime.  
alirocumab (PRALUENT) 75   
mg/mL pen Inject 150 mg   
subcutaneously every other  
week.  
DULoxetine (CYMBALTA) 60 mg   
capsule Take 60 mg by mouth   
twice daily.  
ranolazine SR (RANEXA) 1,000   
mg tab ER 12 hr Take 1   
tablet by mouth twice  
daily.  
dilTIAZem CD (CARDIZEM CD,   
CARTIA XT) 180 mg 24 hr   
capsule Take 180 mg by  
mouth once daily.  
GABAPENTIN ORAL Gaurav (more   
content not included)... Normal                                  [facility]  
   
                                                    CNPNon 2022   
   
                                        CNPN                Telephone (AGPOB1)  
----------------------------  
----------------------------  
------------------------  
JAYDEN SOTO ([id number])   
1960 F CHT  
Date Time Provider   
Department  
22  ORTH AGPOB1  
During your visit today, we   
recorded the following   
information about you:  
[facility]   
2022 4:15 PM Signed  
----- Message from Fauzia Moreira sent at 2022 4:09   
PM EDT -----  
Regarding: Orthopedics /   
Open Shoulder: Pain /   
Previous Surgery By A Non-  
Provider  
Contact: [phone]  
Patient has been identified   
by name and Date of birth   
(Y/N): y  
Patient: Jayden Soto  
YOB: 1960  
MRN: [id number]  
Previous Provider Seen: n/a  
Body Part(s) Identified:   
Right Shoulder  
Diagnosis/Reason For Visit:   
Right Shoulder  
Reason for the   
call/escalation: Right   
Shoulder Pain / Previous   
Surgery in  
[place] several yrs ago   
? Please reach out  
If reason for   
call/escalation is discharge   
from ED/ER or Hospital,   
which  
facility was the patient   
seen at: n/a  
Was an appointment scheduled   
(Y/N): n  
Person calling if other than   
patient: n  
Return call to if other than   
patient: n  
Best contact number:   
[phone]  
Thank you,  
Fauzia Moreira  
2022 4:09 PM  
[facility] Reagan  [facility]   
2022 4:16 PM Signed  
Please schedule with Karina Oliveirae D [facility]  
2022 4:16 PM  
Allergies As of Date:   
2022 Noted Allergy   
Reaction  
MORPHINE 2021 8 - GI   
Upset  
STATINS-HMG-COA REDUCTASE   
INHIBIT*2021 16 -   
Unknown  
Date Reviewed: 2022  
Reviewed by: Sherron Martins RN - Fully Assessed  
Reason for Visit:  
Appointment [[id number]]  
Prescriptions as of   
2022  
- cephALEXin (KEFLEX) 500 mg   
capsule  
Take 1 capsule by mouth   
twice daily.  
- Lactobacillus acidophilus   
(ACIDOPHILUS) cap  
Take 1 capsule by mouth   
twice daily.  
- apixaban (ELIQUIS DVT-PE   
TREAT 30D START) 5 mg (74   
tabs)  
Take 2 tablets (10 mg) by   
mouth twice daily for 7   
days. Then take 1 tablet (5  
mg) by mouth twice daily for   
23 days  
- apixaban (ELIQUIS) 5 mg   
tab(s)  
Take 1 tablet by mouth twice   
daily.  
- aspirin 81 mg cap  
Take 1 capsule by mouth once   
daily. Resume once BID   
dosing is completed  
- aspirin, enteric coated   
(ASPIRIN, ENTERIC COATED) 81   
mg EC tablet  
Take 1 tablet by mouth twice   
daily for 21 days. Once BID   
dosing is completed  
in 21 days, resume home   
daily dose  
- insulin glargine (LANTUS   
U-100 INSULIN) 100 unit/mL   
injection  
Inject 36 Units   
subcutaneously daily at   
bedtime.  
- insulin aspart U-100   
(NOVOLOG) 100 unit/mL  
14 units with breakfast 8   
units with lunch 10 units   
with dinner Admin  
Instructions: ADMINISTER   
CORRECTIONAL INSULIN   
REGARDLESS OF MEAL OR   
NUTRITION  
INTAKE Custom Scale If Blood   
Glucose (mg/dL) is Less than   
100 ? 0 units  
101-150 ? 0 units 151-175 ?   
2 units 176-200 ? 3 units   
201-225 ? 4 units  
226-250 ? 5 units 251-275 ?   
6 units 276-300 ? 7 units   
301-325 ? 8 units  
326-350 ? 9 units >350 ? 10   
units and Notify Provider   
Notify provider  
if 2 consecutive blood   
glucose values in the   
previous 24 hours are   
greater  
than 250 mg/mL and there   
have been no changes to the   
insulin regimen in the  
previous 24 hours.  
- doxepin capsule 10 mg  
Take 20 mg by mouth daily at   
bedtime.  
- BISACODYL ORAL  
Take 10 mg by mouth once   
daily.  
- melatonin 10 mg cap  
Take 1 capsule by mouth   
daily at bedtime.  
- alirocumab (PRALUENT) 75   
mg/mL pen  
Inject 150 mg subcutaneously   
every other week.  
- DULoxetine (CYMBALTA) 60   
mg capsule  
Take 60 mg by mouth twice   
daily.  
- ranolazine SR (RANEXA)   
1,000 mg tab ER 12 hr  
Take 1 tablet by mouth twice   
daily.  
- dilTIAZem CD (CARDIZEM CD)   
180 mg 24 hr capsule  
Take 180 mg by mouth once   
daily.  
- empagliflozin (JARDIANCE)   
25 mg tablet  
Take 25 mg by mouth daily   
with breakfast.  
- GABAPENTIN ORAL  
Take 200 mg by mouth three   
times daily.  
- METOPROLOL TARTRATE ORAL  
Take 25 mg by mouth twice   
daily.  
- isosorbide mononitrate ER   
(IMDUR) 30 mg 24 hr tablet  
Take 90 mg by mouth once   
daily.  
- clopidogrel (PLAVIX) 75 mg   
tablet  
Take 75 mg by mouth once   
daily.  
Problem List As Of Date   
2022 Noted Resolved  
Septic joint of left knee   
joint (HCC) [M00.9]   
2022  
Obesity, Class II, BMI   
35-39.9 [E66.9] 2022  
Controlled type 2 diabetes   
mellitus without   
com*2022  
Primary hypertension [I10]   
2022  
Trigger finger, right middle   
finger [M65.331] 2022  
Encounter Number: [id number]  
Encounter Status:Closed by   
TEZ  ROMA BROWNE on 22        Normal                                  [facility]  
   
                                                    C-REACTIVE PROTEINon   
022   
   
                                                    C-REACTIVE   
PROTEIN         0.40 mg/dL      Normal                          [facility]  
   
                                        Comment on above:   Order Comment: [facility]  
[facility]   
   
                                                            Result Comment: REF   
VALUE  
< 1.00   
   
                                                            Performed By: #### E  
SRWS ####  
[address] [address]   
   
                                                    CBC AND DIFFERENTIALon    
   
                                                    Basophils (Bld)   
[#/Vol]         0.10 10*3/uL    Normal          0.00 - 0.10     [facility]  
   
                                        Comment on above:   Order Comment: [facility]  
[facility]   
   
                                                            Performed By: #### C  
BCDF ####  
[address] [address]   
   
                                                    Basophils/100 WBC   
(Bld)           1.1 %           Normal          0.0 - 2.0       [facility]  
   
                                        Comment on above:   Order Comment: [facility]  
[facility]   
   
                                                            Performed By: #### C  
BCDF ####  
[address] [address]   
   
                                                    Eosinophils (Bld)   
[#/Vol]         0.30 10*3/uL    Normal          0.00 - 0.70     [facility]  
   
                                        Comment on above:   Order Comment: [facility]  
[facility]   
   
                                                            Performed By: #### C  
BCDF ####  
[address] [address]   
   
                                                    Eosinophils/100   
WBC (Bld)       3.2 %           Normal          0.0 - 6.0       [facility]  
   
                                        Comment on above:   Order Comment: [facility]  
[facility]   
   
                                                            Performed By: #### C  
BCDF ####  
[address] [address]   
   
                                                    Erythrocyte   
distribution   
width (RBC)   
[Ratio]         16.2 %          High            11.5 - 14.5     [facility]  
   
                                        Comment on above:   Order Comment: [facility]  
[facility]   
   
                                                            Performed By: #### C  
BCDF ####  
[address] [address]   
   
                                                    Hematocrit (Bld)   
[Volume fraction] 30.6 %          Low             36.0 - 46.0     [facility]  
   
                                        Comment on above:   Order Comment: [facility]  
[facility]   
   
                                                            Performed By: #### C  
BCDF ####  
[address] [address]   
   
                                                    Hemoglobin (Bld)   
[Mass/Vol]      10.3 g/dL       Low             12.0 - 16.0     [facility]  
   
                                        Comment on above:   Order Comment: [facility]  
[facility]   
   
                                                            Performed By: #### C  
BCDF ####  
[address] [address]   
   
                                                    Lymphocytes (Bld)   
[#/Vol]         2.40 10*3/uL    Normal          1.20 - 4.80     [facility]  
   
                                        Comment on above:   Order Comment: [facility]  
[facility]   
   
                                                            Performed By: #### C  
BCDF ####  
[address] [address]   
   
                                                    Lymphocytes/100   
WBC (Bld)       24.1 %          Normal          13.0 - 44.0     [facility]  
   
                                        Comment on above:   Order Comment: [facility]  
[facility]   
   
                                                            Performed By: #### C  
BCDF ####  
[address] [address]   
   
                                                    MCHC (RBC)   
[Mass/Vol]      33.8 g/dL       Normal          32.0 - 36.0     [facility]  
   
                                        Comment on above:   Order Comment: [facility]  
[facility]   
   
                                                            Performed By: #### C  
BCDF ####  
[address] [address]   
   
                                                    MCV (RBC)   
[Entitic vol]   85 fL           Normal          80 - 100        [facility]  
   
                                        Comment on above:   Order Comment: [facility]  
[facility]   
   
                                                            Performed By: #### C  
BCDF ####  
[address] [address]   
   
                                                    Monocytes (Bld)   
[#/Vol]         0.70 10*3/uL    Normal          0.10 - 1.00     [facility]  
   
                                        Comment on above:   Order Comment: [facility]  
[facility]   
   
                                                            Performed By: #### C  
BCDF ####  
[address] [address]   
   
                                                    Monocytes/100 WBC   
(Bld)           7.5 %           Normal          2.0 - 10.0      [facility]  
   
                                        Comment on above:   Order Comment: [facility]  
[facility]   
   
                                                            Performed By: #### C  
BCDF ####  
[address] [address]   
   
                                                    Neutrophils (Bld)   
[#/Vol]         6.40 10*3/uL    Normal          1.20 - 7.70     [facility]  
   
                                        Comment on above:   Order Comment: [facility]  
[facility]   
   
                                                            Result Comment: Perc  
ent differential counts (%) should be   
interpreted in the  
context of the absolute cell counts (cells/L).   
   
                                                            Performed By: #### C  
BCDF ####  
[address] [address]   
   
                                                    Neutrophils/100   
WBC (Bld)       64.1 %          Normal          40.0 - 80.0     [facility]  
   
                                        Comment on above:   Order Comment: [facility]  
[facility]   
   
                                                            Performed By: #### C  
BCDF ####  
[address] [address]   
   
                                                    Platelets (Bld)   
[#/Vol]         258 10*3/uL     Normal          150 - 450       [facility]  
   
                                        Comment on above:   Order Comment: [facility]  
[facility]   
   
                                                            Performed By: #### C  
BCDF ####  
[address] [address]   
   
                      RBC        3.59 x10E12/L Low        4.00 - 5.20 [facility]  
   
                                        Comment on above:   Order Comment: [facility]  
[facility]   
   
                                                            Performed By: #### C  
BCDF ####  
[address] [address]   
   
                      WBC (Bld) [#/Vol] 9.9 10*3/uL Normal     4.4 - 11.3 [facility]  
   
                                        Comment on above:   Order Comment: [facility]  
[facility]   
   
                                                            Performed By: #### C  
BCDF ####  
[address] [address]   
   
                                                    CREATININEon 2022   
   
                                                    Creatinine   
[Mass/Vol]      0.94 mg/dL      Normal          0.50 - 1.05     [facility]  
   
                                        Comment on above:   Order Comment: [facility]  
[facility]   
   
                                                            Performed By: #### E  
SRWS ####  
[address] [address]   
   
                                                    GFR/1.73 sq   
M.predicted among   
non-blacks MDRD   
(S/P/Bld) [Vol   
rate/Area]      69 mL/min/{1.73_m2} Normal          >90             [facility]  
   
                                        Comment on above:   Order Comment: [facility]  
[facility]   
   
                                                            Result Comment: CALC  
ULATIONS OF ESTIMATED GFR ARE PERFORMED  
USING THE  CKD-EPI STUDY REFIT EQUATION  
WITHOUT THE RACE VARIABLE FOR THE IDMS-TRACEABLE  
CREATININE METHODS.  
https://jasn.asnjournals.org/content/early//ASN.8202288196   
   
                                                            Performed By: #### E  
SRWS ####  
[address] [address]   
   
                                                    HEPATIC FUNCTION PANELon    
   
                                                    Albumin   
[Mass/Vol]      3.6 g/dL        Normal          3.4 - 5.0       [facility]  
   
                                        Comment on above:   Order Comment: [facility]  
[facility]   
   
                                                            Performed By: #### E  
SRWS ####  
[address] [address]   
   
                                                    ALP [Catalytic   
activity/Vol]   131 U/L         Normal          33 - 136        [facility]  
   
                                        Comment on above:   Order Comment: [facility]  
[facility]   
   
                                                            Performed By: #### E  
SRWS ####  
[address] [address]   
   
                                                    ALT [Catalytic   
activity/Vol]   14 U/L          Normal          7 - 45          [facility]  
   
                                        Comment on above:   Order Comment: [facility]  
[facility]   
   
                                                            Result Comment: Kayla  
ents treated with Sulfasalazine may generate  
falsely decreased results for ALT.   
   
                                                            Performed By: #### E  
SRWS ####  
[address] [address]   
   
                                                    AST [Catalytic   
activity/Vol]   12 U/L          Normal          9 - 39          [facility]  
   
                                        Comment on above:   Order Comment: [facility]  
[facility]   
   
                                                            Performed By: #### E  
SRWS ####  
[address] [address]   
   
                                                    Bilirubin   
[Mass/Vol]      0.3 mg/dL       Normal          0.0 - 1.2       [facility]  
   
                                        Comment on above:   Order Comment: [facility]  
[facility]   
   
                                                            Performed By: #### E  
SRWS ####  
[address] [address]   
   
                                                    Bilirubin.indirec  
t [Mass/Vol]    0.0 mg/dL       Normal          0.0 - 0.3       [facility]  
   
                                        Comment on above:   Order Comment: [facility]  
[facility]   
   
                                                            Performed By: #### E  
SRWS ####  
[address] [address]   
   
                                                    Protein   
[Mass/Vol]      6.4 g/dL        Normal          6.4 - 8.2       [facility]  
   
                                        Comment on above:   Order Comment: [facility]  
[facility]   
   
                                                            Performed By: #### E  
SRWS ####  
[address] [address]   
   
                                                    SEDIMENTATION RATE, ERYTHROC  
YTEon 2022   
   
                                                    SEDIMENTATION   
RATE, ERYTHROCYTE 9 mm/h          Normal          0 - 30          [facility]  
   
                                        Comment on above:   Order Comment: [facility]  
[facility]   
   
                                                            Performed By: #### C  
REAT ####  
[address] [address]   
   
                                                    C-REACTIVE PROTEINon   
022   
   
                                                    C-REACTIVE   
PROTEIN         0.46 mg/dL      Normal                          [facility]  
   
                                        Comment on above:   Order Comment: [facility]  
[facility]   
   
                                                            Result Comment: REF   
VALUE  
< 1.00   
   
                                                            Performed By: #### E  
SRWS ####  
[address] [address]   
   
                                                    CBC AND DIFFERENTIALon    
   
                                                    Basophils (Bld)   
[#/Vol]         0.10 10*3/uL    Normal          0.00 - 0.10     [facility]  
   
                                        Comment on above:   Order Comment: [facility]  
[facility]   
   
                                                            Performed By: #### C  
BCDF ####  
[address] [address]   
   
                                                    Basophils/100 WBC   
(Bld)           0.9 %           Normal          0.0 - 2.0       [facility]  
   
                                        Comment on above:   Order Comment: [facility]  
[facility]   
   
                                                            Performed By: #### C  
BCDF ####  
[address] [address]   
   
                                                    Eosinophils (Bld)   
[#/Vol]         0.30 10*3/uL    Normal          0.00 - 0.70     [facility]  
   
                                        Comment on above:   Order Comment: [facility]  
[facility]   
   
                                                            Performed By: #### C  
BCDF ####  
[address] [address]   
   
                                                    Eosinophils/100   
WBC (Bld)       3.4 %           Normal          0.0 - 6.0       [facility]  
   
                                        Comment on above:   Order Comment: [facility]  
[facility]   
   
                                                            Performed By: #### C  
BCDF ####  
[address] [address]   
   
                                                    Erythrocyte   
distribution   
width (RBC)   
[Ratio]         16.5 %          High            11.5 - 14.5     [facility]  
   
                                        Comment on above:   Order Comment: [facility]  
[facility]   
   
                                                            Performed By: #### C  
BCDF ####  
[address] [address]   
   
                                                    Hematocrit (Bld)   
[Volume fraction] 32.4 %          Low             36.0 - 46.0     [facility]  
   
                                        Comment on above:   Order Comment: [facility]  
[facility]   
   
                                                            Performed By: #### C  
BCDF ####  
[address] [address]   
   
                                                    Hemoglobin (Bld)   
[Mass/Vol]      10.9 g/dL       Low             12.0 - 16.0     [facility]  
   
                                        Comment on above:   Order Comment: [facility]   
   
                                                            Performed By: #### C  
BCDF ####  
[address] [address]   
   
                                                    Lymphocytes (Bld)   
[#/Vol]         2.50 10*3/uL    Normal          1.20 - 4.80     [facility]  
   
                                        Comment on above:   Order Comment: [facility]   
   
                                                            Performed By: #### C  
BCDF ####  
[address] [address]   
   
                                                    Lymphocytes/100   
WBC (Bld)       26.6 %          Normal          13.0 - 44.0     [facility]  
   
                                        Comment on above:   Order Comment: [facility]   
   
                                                            Performed By: #### C  
BCDF ####  
[address] [address]   
   
                                                    MCHC (RBC)   
[Mass/Vol]      33.8 g/dL       Normal          32.0 - 36.0     [facility]  
   
                                        Comment on above:   Order Comment: [facility]   
   
                                                            Performed By: #### C  
BCDF ####  
[address] [address]   
   
                                                    MCV (RBC)   
[Entitic vol]   86 fL           Normal          80 - 100        [facility]  
   
                                        Comment on above:   Order Comment: [facility]   
   
                                                            Performed By: #### C  
BCDF ####  
[address] [address]   
   
                                                    Monocytes (Bld)   
[#/Vol]         0.60 10*3/uL    Normal          0.10 - 1.00     [facility]  
   
                                        Comment on above:   Order Comment: [facility]   
   
                                                            Performed By: #### C  
BCDF ####  
[address] [address]   
   
                                                    Monocytes/100 WBC   
(Bld)           6.5 %           Normal          2.0 - 10.0      [facility]  
   
                                        Comment on above:   Order Comment: [facility]   
   
                                                            Performed By: #### C  
BCDF ####  
[address] [address]   
   
                                                    Neutrophils (Bld)   
[#/Vol]         5.80 10*3/uL    Normal          1.20 - 7.70     [facility]  
   
                                        Comment on above:   Order Comment: [facility]   
   
                                                            Result Comment: Perc  
ent differential counts (%) should be   
interpreted in the  
context of the absolute cell counts (cells/L).   
   
                                                            Performed By: #### C  
BCDF ####  
[address] [address]   
   
                                                    Neutrophils/100   
WBC (Bld)       62.6 %          Normal          40.0 - 80.0     [facility]  
   
                                        Comment on above:   Order Comment: [facility]   
   
                                                            Performed By: #### C  
BCDF ####  
[address] [address]   
   
                      NUCLEATED RBC 0.1 /100 WBC Normal                [facility]  
   
                                        Comment on above:   Order Comment: [facility]   
   
                                                            Performed By: #### C  
BCDF ####  
[address] [address]   
   
                                                    Platelets (Bld)   
[#/Vol]         322 10*3/uL     Normal          150 - 450       [facility]  
   
                                        Comment on above:   Order Comment: [facility]   
   
                                                            Performed By: #### C  
BCDF ####  
[address] [address]   
   
                      RBC        3.78 x10E12/L Low        4.00 - 5.20 [facility]  
   
                                        Comment on above:   Order Comment: [facility]   
   
                                                            Performed By: #### C  
BCDF ####  
[address] [address]   
   
                      WBC (Bld) [#/Vol] 9.2 10*3/uL Normal     4.4 - 11.3 [facility]  
   
                                        Comment on above:   Order Comment: [facility]   
   
                                                            Performed By: #### C  
BCDF ####  
[address] [address]   
   
                                                    CREATININEon 2022   
   
                                                    Creatinine   
[Mass/Vol]      0.88 mg/dL      Normal          0.50 - 1.05     [facility]  
   
                                        Comment on above:   Order Comment: [facility]   
   
                                                            Performed By: #### C  
REAT ####  
[address] [address]   
   
                                                    GFR/1.73 sq   
M.predicted among   
non-blacks MDRD   
(S/P/Bld) [Vol   
rate/Area]      75 mL/min/{1.73_m2} Normal          >90             [facility]  
   
                                        Comment on above:   Order Comment: [facility]   
   
                                                            Result Comment: CALC  
ULATIONS OF ESTIMATED GFR ARE PERFORMED  
USING THE  CKD-EPI STUDY REFIT EQUATION  
WITHOUT THE RACE VARIABLE FOR THE IDMS-TRACEABLE  
CREATININE METHODS.  
https://jasn.asnjournals.org/content/early//ASN.[email or web address]   
   
                                                            Performed By: #### C  
REAT ####  
[place] [id number]   
   
                                                    HEPATIC FUNCTION PANELon    
   
                                                    Albumin   
[Mass/Vol]      3.8 g/dL        Normal          3.4 - 5.0       [facility]  
   
                                        Comment on above:   Order Comment: [place]  
[facility]   
   
                                                            Performed By: #### H  
EPFP ####  
[place] [id number]   
   
                                                    ALP [Catalytic   
activity/Vol]   131 U/L         Normal          33 - 136        [facility]  
   
                                        Comment on above:   Order Comment: [place]  
[facility]   
   
                                                            Performed By: #### H  
EPFP ####  
[address] [address]   
   
                                                    ALT [Catalytic   
activity/Vol]   15 U/L          Normal          7 - 45          [facility]  
   
                                        Comment on above:   Order Comment: [place]  
[facility]   
   
                                                            Result Comment: Kayla  
ents treated with Sulfasalazine may generate  
falsely decreased results for ALT.   
   
                                                            Performed By: #### H  
EPFP ####  
[address] [address]   
   
                                                    AST [Catalytic   
activity/Vol]   14 U/L          Normal          9 - 39          [facility]  
   
                                        Comment on above:   Order Comment: [place]  
[facility]   
   
                                                            Performed By: #### H  
EPFP ####  
[address] [address]   
   
                                                    Bilirubin   
[Mass/Vol]      0.3 mg/dL       Normal          0.0 - 1.2       [facility]  
   
                                        Comment on above:   Order Comment: [place]  
[facility]   
   
                                                            Performed By: #### H  
EPFP ####  
[address] [address]   
   
                                                    Bilirubin.indirec  
t [Mass/Vol]    0.1 mg/dL       Normal          0.0 - 0.3       [facility]  
   
                                        Comment on above:   Order Comment: [place]  
[facility]   
   
                                                            Performed By: #### H  
EPFP ####  
[address] [address]   
   
                                                    Protein   
[Mass/Vol]      6.7 g/dL        Normal          6.4 - 8.2       [facility]  
   
                                        Comment on above:   Order Comment: [place]  
[facility]   
   
                                                            Performed By: #### H  
EPFP ####  
[address] [address]   
   
                                                    SEDIMENTATION RATE, ERYTHROC  
YTEon 2022   
   
                                                    SEDIMENTATION   
RATE, ERYTHROCYTE 21 mm/h         Normal          0 - 30          [facility]  
   
                                        Comment on above:   Order Comment: [place]  
[facility]   
   
                                                            Performed By: #### E  
SRWS ####  
[address] [address]   
   
                                                    Basic metabolic 2000 panelon  
 2022   
   
                                                    Anion gap   
[Moles/Vol]     9 mmol/L        Normal          9-18            [facility]  
   
                                        Comment on above:   Order Comment: Speci  
men Type: BLOOD SPECIMEN  
Ordering Facility: [facility]  
Address: [address] [place] [address]   
   
                                                            Performed By: #### 2  
4321-2 ####  
[facility]  
CLIA [id number]  
[address] [place]   
   
                                                    Calcium   
[Mass/Vol]      9.5 mg/dL       Normal          8.5-10.2        [facility]  
   
                                        Comment on above:   Order Comment: Speci  
men Type: BLOOD SPECIMEN  
Ordering Facility: [facility]  
Address: [address] [place] [address]   
   
                                                            Performed By: #### 2  
4321-2 ####  
[facility]  
CLIA [id number]  
[address] [place] [address] [place]   
   
                                                    Chloride   
[Moles/Vol]     102 mmol/L      Normal                    [facility]  
   
                                        Comment on above:   Order Comment: Speci  
men Type: BLOOD SPECIMEN  
Ordering Facility: [facility]  
Address: [address] [address]   
   
                                                            Performed By: #### 2  
4321-2 ####  
[facility]  
CLIA [id number]  
[address]   
   
                      CO2 [Moles/Vol] 28 mmol/L  Normal     22-30      [facility]  
   
                                        Comment on above:   Order Comment: Speci  
men Type: BLOOD SPECIMEN  
Ordering Facility: [facility]  
Address: [address] [place] [address]   
   
                                                            Performed By: #### 2  
4321-2 ####  
[facility]  
CLIA [id number]  
[address]   
   
                                                    Creatinine   
[Mass/Vol]      0.90 mg/dL      Normal          0.58-0.96       [facility]  
   
                                        Comment on above:   Order Comment: Speci  
men Type: BLOOD SPECIMEN  
Ordering Facility: [facility]  
Address: [address] [place] [address]   
   
                                                            Performed By: #### 2  
4321-2 ####  
[facility]  
CLIA [id number]  
[address]   
   
                                                    ESTIMATED   
GLOMERULAR   
FILTRATION RATE 73 mL/min/1.73m??? Normal          >=60            [facility]  
   
                                        Comment on above:   Order Comment: Speci  
men Type: BLOOD SPECIMEN  
Ordering Facility: [facility]  
Address: [address] [place] [address]   
   
                                                            Result Comment: Sydnee  
mated Glomerular Filtration Rate (eGFR) is   
calculated using the  CKD-EPI creatinine equation. This equation   
utilizes serum creatinine, sex, and age as parameters. The   
creatinine assay has traceable calibration to isotope dilution-mass   
spectrometry. Refer to KDIGO guidelines for clinical interpretation.   
In patients with unstable renal function, e.g. those with acute   
kidney injury, the eGFR may not accurately reflect actual GFR.   
   
                                                            Performed By: #### 2  
4321-2 ####  
[facility]  
CLIA [id number]  
[address] [place] [facility]   
   
                                                    Glucose   
[Mass/Vol]      259 mg/dL       High            74-99           [facility]  
   
                                        Comment on above:   Order Comment: Speci  
men Type: BLOOD SPECIMEN  
Ordering Facility: [facility]  
Address: [address] [place] [address]   
   
                                                            Result Comment: The   
American Diabetes Association (ADA) provides   
guidance for cutoff values for fasting glucose and random glucose.   
The ADA defines fasting as no caloric intake for at least 8 hours.   
Fasting plasma glucose results between 100 to 125 mg/dL indicate   
increased risk for diabetes (prediabetes).  
Fasting plasma glucose results greater than or equal to 126 mg/dL   
meet the criteria for diagnosis of diabetes. In the absence of   
unequivocal hyperglycemia, results should be confirmed by repeat   
testing. In a patient with classic symptoms of hyperglycemia or   
hyperglycemic crisis, random plasma glucose results greater than or   
equal to 200 mg/dL meet the criteria for diagnosis of diabetes.  
Reference: Standards of Medical Care in Diabetes 2016, American   
Diabetes Association. Diabetes Care. 2016.39(Suppl 1).   
   
                                                            Performed By: #### 2  
4321-2 ####  
[facility]  
CLIA [id number]  
[address] [place] [facility]   
   
                                                    Potassium   
[Moles/Vol]     4.6 mmol/L      Normal          3.7-5.1         [facility]  
   
                                        Comment on above:   Order Comment: Speci  
men Type: BLOOD SPECIMEN  
Ordering Facility: [facility]  
Address: [address] [place] [address]   
   
                                                            Performed By: #### 2  
4321-2 ####  
[facility]  
CLIA [id number]  
[address] [place] [facility]   
   
                                                    Sodium   
[Moles/Vol]     139 mmol/L      Normal          136-144         [facility]  
   
                                        Comment on above:   Order Comment: Speci  
men Type: BLOOD SPECIMEN  
Ordering Facility: [facility]  
Address: [address] [place] [address]   
   
                                                            Performed By: #### 2  
4321-2 ####  
[facility]  
CLIA [id number]  
[address] [place] [facility]   
   
                                                    Urea nitrogen   
[Mass/Vol]      19 mg/dL        Normal          7-21            [facility]  
   
                                        Comment on above:   Order Comment: Speci  
men Type: BLOOD SPECIMEN  
Ordering Facility: [facility]  
Address: [address] [place] [address]   
   
                                                            Performed By: #### 2  
4321-2 ####  
[facility]  
CLIA [id number]  
[address] [place]   
   
                                                    CBC W Auto Differential pane  
l (Bld)on 2022   
   
                                                    Basophils (Bld)   
[#/Vol]         0.07 10*3/uL    Normal          <0.11           [facility]  
   
                                        Comment on above:   Order Comment: Speci  
men Type: BLOOD SPECIMENOrdering Facility:  
   
[facility] Address: [address] [address]   
   
                                                            Performed By: #### 6  
4626, 425-3 ####  
[facility]  
CLIA [id number]  
[address] [place]   
   
                                                    Basophils/100 WBC   
(Bld)           0.8 %           Normal                          [facility]  
   
                                        Comment on above:   Order Comment: Speci  
men Type: BLOOD SPECIMENOrdering Facility:  
   
[facility] Address: [address] [place] [address]   
   
                                                            Performed By: #### 6  
464-6, 125-3 ####  
[facility]  
CLIA [id number]  
[address] [place] [facility]   
   
                                                    Differential cell   
count method Nom   
(Bld)           Auto            Normal                          [facility]  
   
                                        Comment on above:   Order Comment: Speci  
men Type: BLOOD SPECIMENOrdering Facility:  
   
[facility] Address: [address] [place] [address]   
   
                                                            Performed By: #### 6  
120-6, 008-3 ####  
[facility]  
CLIA [id number]  
[address] [place]   
   
                                                    Eosinophils (Bld)   
[#/Vol]         0.50 10*3/uL    High            <0.46           [facility]  
   
                                        Comment on above:   Order Comment: Speci  
men Type: BLOOD SPECIMENOrdering Facility:  
   
[facility] Address: [address] [address]   
   
                                                            Performed By: #### 6  
464-3, 954-3 ####  
[facility]  
CLIA [id number]  
[address] [place]   
   
                                                    Eosinophils/100   
WBC (Bld)       5.4 %           Normal                          [facility]  
   
                                        Comment on above:   Order Comment: Speci  
men Type: BLOOD SPECIMENOrdering Facility:  
   
[facility] Address: [address] [address]   
   
                                                            Performed By: #### 6  
4626 635-3 ####  
[facility]  
CLIA [id number]  
[address]   
   
                                                    Erythrocyte   
distribution   
width (RBC)   
[Ratio]         15.3 %          High            11.5-15.0       [facility]  
   
                                        Comment on above:   Order Comment: Speci  
men Type: BLOOD SPECIMENOrdering Facility:  
   
[facility] Address: [address] [place] [address]   
   
                                                            Performed By: #### 6  
4626, 675-3 ####  
[facility]  
CLIA [id number]  
[address] [place] [facility]   
   
                                                    Hematocrit (Bld)   
[Volume fraction] 36.3 %          Normal          36.0-46.0       [facility]  
   
                                        Comment on above:   Order Comment: Speci  
men Type: BLOOD SPECIMENOrdering Facility:  
  
[facility] Address: [address] [place] [address]   
   
                                                            Performed By: #### 6  
461-2, 705-3 ####  
[facility]  
CLIA [id number]  
[address] [place] [facility]   
   
                                                    Hemoglobin (Bld)   
[Mass/Vol]      11.0 g/dL       Low             11.5-15.5       [facility]  
   
                                        Comment on above:   Order Comment: Speci  
men Type: BLOOD SPECIMENOrdering Facility:  
   
[facility] Address: [address] [place] [address]   
   
                                                            Performed By: #### 6  
465-6, 655-3 ####  
[facility]  
CLIA [id number]  
[address]   
   
                      IMMATURE GRAN % 0.5 %      Normal                [facility]  
   
                                        Comment on above:   Order Comment: Speci  
men Type: BLOOD SPECIMENOrdering Facility:  
   
[facility] Address: [address] [place] [address]   
   
                                                            Performed By: #### 6  
4626, 915-3 ####  
[facility]  
CLIA [id number]  
[address]   
   
                      IMMATURE GRAN ABS 0.05 k/uL  Normal     <0.10      [facility]  
   
                                        Comment on above:   Order Comment: Speci  
men Type: BLOOD SPECIMENOrdering Facility:  
   
[facility] Address: [address] [place] [address]   
   
                                                            Performed By: #### 6  
4626, 275-3 ####  
[facility]  
CLIA [id number]  
[address] [place]   
   
                                                    Lymphocytes (Bld)   
[#/Vol]         2.36 10*3/uL    Normal          1.00-4.00       [facility]  
   
                                        Comment on above:   Order Comment: Speci  
men Type: BLOOD SPECIMENOrdering Facility:  
   
[facility] Address: [address] [place] [address]   
   
                                                            Performed By: #### 6  
4626, 325-3 ####  
[facility]  
CLIA [id number]  
[address] [place] [facility]   
   
                                                    Lymphocytes/100   
WBC (Bld)       25.6 %          Normal                          [facility]  
   
                                        Comment on above:   Order Comment: Speci  
men Type: BLOOD SPECIMENOrdering Facility:  
   
[facility] Address: [address] [place] [address]   
   
                                                            Performed By: #### 6  
463-6, 775-3 ####  
[facility]  
CLIA [id number]  
[address] [place] [facility]   
   
                                                    MCH (RBC)   
[Entitic mass]  27.7 pg         Normal          26.0-34.0       [facility]  
   
                                        Comment on above:   Order Comment: Speci  
men Type: BLOOD SPECIMENOrdering Facility:  
  
[facility] Address: [address] [address]   
   
                                                            Performed By: #### 6  
463-6, 189-3 ####  
[facility]  
CLIA [id number]  
[address] [place]   
   
                                                    MCHC (RBC)   
[Mass/Vol]      30.3 g/dL       Low             30.5-36.0       [facility]  
   
                                        Comment on above:   Order Comment: Speci  
men Type: BLOOD SPECIMENOrdering Facility:  
   
[facility] Address: [address] [place] [address]   
   
                                                            Performed By: #### 6  
462-6 635-3 ####  
[facility]  
CLIA [id number]  
[address]   
   
                                                    MCV (RBC)   
[Entitic vol]   91.4 fL         Normal          80.0-100.0      [facility]  
   
                                        Comment on above:   Order Comment: Speci  
men Type: BLOOD SPECIMENOrdering Facility:  
  
[facility] Address: [address] [place] [address]   
   
                                                            Performed By: #### 6  
462-6, 635-3 ####  
[facility]  
CLIA [id number]  
[address]   
   
                                                    Monocytes (Bld)   
[#/Vol]         0.60 10*3/uL    Normal          <0.87           [facility]  
   
                                        Comment on above:   Order Comment: Speci  
men Type: BLOOD SPECIMENOrdering Facility:  
   
[facility] Address: [address] [place] [address]   
   
                                                            Performed By: #### 6  
4626, 655-3 ####  
[facility]  
CLIA [id number]  
[address]   
   
                                                    Monocytes/100 WBC   
(Bld)           6.5 %           Normal                          [facility]  
   
                                        Comment on above:   Order Comment: Speci  
men Type: BLOOD SPECIMENOrdering Facility:  
   
[facility] Address: [address] [place] [address]   
   
                                                            Performed By: #### 6  
462-6, 995-3 ####  
[facility]  
CLIA [id number]  
[address] [place]   
   
                                                    Neutrophils (Bld)   
[#/Vol]         5.64 10*3/uL    Normal          1.45-7.50       [facility]  
   
                                        Comment on above:   Order Comment: Speci  
men Type: BLOOD SPECIMENOrdering Facility:  
   
[facility] Address: [address] [place] [address]   
   
                                                            Performed By: #### 6  
462-6, 915-3 ####  
[facility]  
CLIA [id number]  
[address]   
   
                                                    Neutrophils/100   
WBC (Bld)       61.2 %          Normal                          [facility]  
   
                                        Comment on above:   Order Comment: Speci  
men Type: BLOOD SPECIMENOrdering Facility:  
   
[facility] Address: [address]   
   
                                                            Performed By: #### 6  
462-6, 635-3 ####  
[facility]  
CLIA [id number]  
[address]   
   
                                                    Nucleated RBC   
(Bld) [#/Vol]   10*3/uL         Normal          <0.01           [facility]  
   
                                        Comment on above:   Order Comment: Speci  
men Type: BLOOD SPECIMENOrdering Facility:  
   
[facility] Address: [address]   
   
                                                            Performed By: #### 6  
462-6 635-3 ####  
[facility]  
CLIA [id number]  
[address] [place]   
   
                                                    Nucleated RBC/100   
WBC (Bld) [Ratio] 0.0 /100 WBC    Normal                          [facility]  
   
                                        Comment on above:   Order Comment: Speci  
men Type: BLOOD SPECIMENOrdering Facility:  
   
[facility] Address: [address]   
   
                                                            Performed By: #### 6  
462-6 635-3 ####  
[facility]  
CLIA [id number]  
[address] [place]   
   
                                                    Platelet mean   
volume (Bld)   
[Entitic vol]   9.7 fL          Normal          9.0-12.7        [facility] [facility]  
   
                                        Comment on above:   Order Comment: Speci  
men Type: BLOOD SPECIMENOrdering Facility:  
  
[facility] Address: [address]   
   
                                                            Performed By: #### 6  
[id number] ####  
[facility]  
CLIA [id number]  
[address] [place]   
   
                                                    Platelets (Bld)   
[#/Vol]         428 10*3/uL     High            150-400         [facility]  
   
                                        Comment on above:   Order Comment: Speci  
men Type: BLOOD SPECIMENOrdering Facility:  
   
[facility] Address: [address]   
   
                                                            Performed By: #### 6  
[id number], 635-3 ####  
[facility]  
CLIA [id number]  
[address]   
   
                      RBC (Bld) [#/Vol] 3.97 10*6/uL Normal     3.90-5.20  [facility]  
   
                                        Comment on above:   Order Comment: Speci  
men Type: BLOOD SPECIMENOrdering Facility:  
   
[facility] Address: [address] [place] [address]   
   
                                                            Performed By: #### 6  
462-6, 635-3 ####  
[facility]  
CLIA [id number]  
[address]   
   
                      WBC (Bld) [#/Vol] 9.22 10*3/uL Normal     3.70-11.00 [facility]  
   
                                        Comment on above:   Order Comment: Speci  
men Type: BLOOD SPECIMENOrdering Facility:  
   
[facility] Address: [address] [place] [address]   
   
                                                            Performed By: #### 6  
462-6, 635-3 ####  
[facility]  
CLIA [id number]  
[address]   
   
                                                    ED NOTEon 2022   
   
                                        ED NOTE             HNO ID: [id number]  
Author: Jami Tamayo RN  
Service: Emergency Medicine  
Author Type: Registered   
Nurse  
Type: ED Notes  
Filed: 2022 6:35 PM  
Note Text:  
Pt alert and oriented x 4,   
speaking in full sentences   
with unlabored  
breathing. Pt verbalizes   
understanding of discharge   
paperwork. Pt  
ambulated from department   
without difficulty. Normal                                  [facility]  
   
                                        ED NOTE             HNO ID: [id number]  
Author: Sherron Martins RN  
Service: Emergency Medicine  
Author Type: Registered   
Nurse  
Type: ED Notes  
Filed: 2022 5:56 PM  
Note Text:  
PICC line removed from right   
arm. Line length noted @ 42   
cm. Verified by  
multiple caregivers.   
Pressure to be held for 20   
min                 Normal                                  [facility]  
   
                                                    ED PROV NOTEon 2022   
   
                                        ED PROV NOTE        HNO ID: [id number]  
Author: Arnulfo Galvin MD  
Service: Emergency Medicine  
Author Type: Physician  
Type: ED Provider Notes  
Filed: 2022 7:52 PM  
Note Text:  
ED Provider Note  
Patient Name: Jayden Soto  
MRN: [id number]  
: 1960  
SERVICE DATE: 22  
History  
Patient presents with:  
Arm Pain: Pt states she was   
told she has a blood clot   
around her PICC line  
in her right upper arm. pt   
states she is having pain   
but no other  
symptoms. Pt is receiving   
antbx for a staph infection   
and has not missed  
any treatments  
61-year-old female presents   
emergency department after   
she was found to  
have a DVT in her right   
upper extremity earlier   
today on an outpatient  
ultrasound. She is currently   
receiving Rocephin and a   
PICC line in her  
right arm after she was   
found to have a chronically   
infected knee. She  
had the hardware removed   
from her knee and and   
antibiotics spacer placed  
on . Her culture   
grew out MSSA.. She has had   
swelling in her  
right arm since last week.   
Denies any chest pain   
shortness of breath or  
hemoptysis. No previous   
history of DVT. She is   
currently on aspirin and  
Brilinta for a cardiac   
stent. Stent was placed   
almost 1 year ago. Denies  
any fever sweats chills.   
Otherwise has been feeling   
well except for the  
pain in her right arm.   
Denies any history of brain   
lesions. No recent  
surgeries on brain or spine.   
She did have a pulley   
release of the right  
middle finger tendon on   
 but that would not   
prevent her from  
receiving anticoagulation at   
this point. She denies any   
GI bleeding.  
Denies hematuria.  
She reports that the   
infectious disease doctor   
sent her in and was  
communicating additional   
thoughts via electronic   
medical record.  
PAST MEDICAL HISTORY  
Diagnosis Date  
- Chronic pain of left knee  
- Coronary artery disease  
1 cardiac stent  
- Diabetes (HCC)  
- Hypercholesteremia  
- Hypertension  
- MRSA (methicillin   
resistant staph aureus)   
culture positive  
multiple joints/bones  
- Pain of right hand  
PAST SURGICAL HISTORY  
Procedure Laterality Date  
- BACK SURGERY HX  
- ELBOW SURGERY HX Bilateral  
for bone spurs  
- IR VASCULAR ACCESS TEAM   
PICC INSERTION RADIO   
2022  
- KNEE SURGERY HX  
- PAST SURGICAL HISTORY OF   
Left 2022  
left knee  
- PAST SURGICAL HISTORY OF   
Left  
heel surgery  
- PAST SURGICAL HISTORY OF   
Right 2022  
right middle finger  
- SHOULDER SURGERY HX   
Bilateral  
FAMILY HISTORY  
Problem Relation Age of   
Onset  
- Heart Mother  
- Stroke Father  
Social History  
Tobacco Use  
- Smoking status: Never   
Smoker  
- Smokeless tobacco: Never   
Used  
Vaping Use  
- Vaping Use: Never used  
Substance and Sexual   
Activity  
- Alcohol use: Never  
- Drug use: Never  
- Sexual activity: Not on   
file  
ALLERGIES  
Allergen Reactions  
- Morphine GI Upset  
- Statins-Hmg-Coa Red*   
Unknown  
Review of Systems  
Constitutional: Negative for   
fever.  
HENT: Negative for trouble   
swallowing.  
Eyes: Negative for discharge   
and redness.  
Respiratory: Negative for   
cough, chest tightness and   
shortness of breath.  
Cardiovascular: Negative for   
chest pain and leg swelling.  
Gastrointestinal: Negative   
for abdominal pain.  
Genitourinary: Negative for   
difficulty urinating.  
Skin: Negative for rash and   
wound.  
Neurological: Negative for   
speech difficulty.  
Psychiatric/Behavioral:   
Negative for agitation.  
All other systems reviewed   
and are negative.  
Physical Exam  
Vitals [22 1538]  
BP Pulse Temp Temp src Resp   
SpO2 Weight Height  
(!) 124/39 77 36.7 ?C (98.1   
?F) Oral 18 100 % 102.1 kg   
(225 lb) 1.702 m  
(5' 7 )  
Physical Exam  
Vitals and nursing note   
reviewed.  
Constitutional:  
General: She is not in acute   
distress.  
Appearance: Normal   
appearance. She is not   
ill-appearing or  
toxic-appearing.  
HENT:  
Head: Normocephalic and   
atraumatic.  
Right Ear: External ear   
normal.  
Left Ear: External ear   
normal.  
Nose: Nose normal.  
Eyes:  
General: No scleral icterus.  
Conjunctiva/sclera:   
Conjunctivae normal.  
Cardiovascular:  
Rate and Rhythm: Normal rate   
and regular rhythm.  
Pulmonary:  
Effort: Pulmonary effort is   
normal. No respiratory   
distress.  
Breath sounds: Normal breath   
sounds. No wheezing.  
Abdominal:  
General: There is no   
distension.  
Palpations: Abdomen is soft.  
Tenderness: There is no   
abdominal tenderness. There   
is no guarding.  
Musculoskeletal:  
Comments: Patient with a   
PICC line in her right upper   
extremity. No  
signs of cellulitis or   
infection at the site of the   
PICC line insertion.  
She has mild tenderness with   
palpation of the upper arm.   
The arm itself  
is not tense in any way. No   
signs of compartment   
syndrome. Right upper  
extremity is neurovascular   
intact.  
Skin:  
General: Skin is warm and   
dry.  
Neurological:  
General: No focal deficit   
present.  
Mental Status: She is alert   
and oriented to person,   
place, and time.  
Psychiatric:  
Mood and Affect: Mood   
normal.  
Behavior: Behavior normal.  
Diagnostic Testing  
ED Labs Ordered and Reviewed  
BASIC METABOLIC PNL -   
Abnormal; (more content not   
included)...        Normal                                  [facility]  
   
                                                    ED Triage Noteon 2022   
   
                                        ED Triage Note      HNO ID: [id number]  
Author: Britney Rivas PA-C  
Service: Emergency Medicine  
Author Type: Physician   
Assistant  
Type: ED Triage Notes  
Filed: 2022 3:46 PM  
Note Text:  
ED INTAKE NOTE  
Patient Name: Jayden Soto  
MRN: [id number]  
Service Date: 22  
BRIEF HPI:  
61-year-old female presents   
with positive DVT study in   
her right upper  
extremity where she has a   
PICC line. She presents here   
for removal and to  
transition to oral   
antibiotics and to start   
anticoagulation.  
BRIEF EXAM:  
Awake and Alert  
RRR  
CTAB  
RAWLS  
INTAKE WORKUP:  
CBC CMP PT PTT  
SIGNATURE: Britney Rivas PA-C                Normal                                  [facility]  
   
                                                    No Panel Informationon    
   
                                                                  [facility]  
   
                                                    US DVT UPPER RTon 2022  
   
   
                                        US DVT UPPER RT     * * *Final Report* *  
 *  
DATE OF EXAM: 2022   
2:46PM  
[facility] 1004 - US DVT UPPER RT /   
ACCESSION # [id number]  
PROCEDURE REASON: MSSA   
(methicillin susceptible   
Staphylococcus aureus)  
infection  
  
* * * * Physician   
Interpretation * * * *  
EXAMINATION: RIGHT UPPER   
EXTREMITY DEEP VENOUS   
ULTRASOUND WITH DOPPLER  
IMAGING  
CLINICAL HISTORY: MSSA   
infection. Right arm pain   
right PICC  
basilic  
TECHNIQUE: Grayscale with   
compression maneuvers where   
accessible, color  
and spectral Doppler of the   
right internal jugular,   
subclavian, and  
axillary veins was   
performed. Grayscale with   
compression maneuvers of  
the right brachial, basilic   
and cephalic veins was also   
performed. The  
contralateral internal   
jugular and distal   
subclavian veins were imaged  
for comparison. Images were   
obtained and stored in a   
permanent archive.  
MQ: [id number]  
COMPARISON: None  
RESULT:  
RIGHT UPPER EXTREMITY  
DEEP VEINS  
Internal Jugular vein:   
Normal compression, normal   
spontaneous flow.  
Subclavian vein: Thrombus.   
PICC seen.  
Axillary vein: Thrombus.  
Brachial vein: Normal   
compression  
SUPERFICIAL VEINS  
Basilic vein: Thrombus.  
Cephalic vein: Normal   
compression.  
LEFT UPPER EXTREMITY (FOR   
COMPARISON)  
DEEP VEINS  
Internal Jugular and Distal   
Subclavian veins: Normal   
compression, normal  
spontaneous flow.  
IMPRESSION:  
Positive study for DVT in   
the right subclavian,   
axillary, and basilic  
veins.  
Findings submitted to the   
file room to be conveyed as   
a wet reading to  
the ordering physician as   
per protocol.  
: PSCB  
Transcribe Date/Time: 2022 2:49P  
Dictated by : MIO MCNEIL MD  
This examination was   
interpreted and the report   
reviewed and  
electronically signed by:  
MIO MCNEIL MD on 2022 2:52PM EST  
[id number] Normal                                  [facility]  
   
                                                    C-REACTIVE PROTEINon   
022   
   
                                                    C-REACTIVE   
PROTEIN         1.78 mg/dL      Abnormal                        [facility]  
   
                                        Comment on above:   Order Comment: [place]  
[facility]   
   
                                                            Result Comment: REF   
VALUE  
< 1.00   
   
                                                            Performed By: #### C  
REAT ####  
[address] [address]   
   
                                                    CBC AND DIFFERENTIALon    
   
                                                    Basophils (Bld)   
[#/Vol]         0.10 10*3/uL    Normal          0.00 - 0.10     [facility]  
   
                                        Comment on above:   Order Comment: [facility]  
[facility]   
   
                                                            Performed By: #### C  
BCDF ####  
[address] [address]   
   
                                                    Basophils/100 WBC   
(Bld)           0.7 %           Normal          0.0 - 2.0       [facility]  
   
                                        Comment on above:   Order Comment: [facility]  
[facility]   
   
                                                            Performed By: #### C  
BCDF ####  
[address] [address]   
   
                                                    Eosinophils (Bld)   
[#/Vol]         0.50 10*3/uL    Normal          0.00 - 0.70     [facility]  
   
                                        Comment on above:   Order Comment: [facility]  
[facility]   
   
                                                            Performed By: #### C  
BCDF ####  
[address] [address]   
   
                                                    Eosinophils/100   
WBC (Bld)       5.7 %           Normal          0.0 - 6.0       [facility]  
   
                                        Comment on above:   Order Comment: [facility]  
[facility]   
   
                                                            Performed By: #### C  
BCDF ####  
[address] [address]   
   
                                                    Erythrocyte   
distribution   
width (RBC)   
[Ratio]         16.8 %          High            11.5 - 14.5     [facility]  
   
                                        Comment on above:   Order Comment: [facility]  
[facility]   
   
                                                            Performed By: #### C  
BCDF ####  
[address] [address]   
   
                                                    Hematocrit (Bld)   
[Volume fraction] 30.3 %          Low             36.0 - 46.0     [facility]  
   
                                        Comment on above:   Order Comment: [facility]  
[facility]   
   
                                                            Performed By: #### C  
BCDF ####  
[address] [address]   
   
                                                    Hemoglobin (Bld)   
[Mass/Vol]      9.8 g/dL        Low             12.0 - 16.0     [facility]  
   
                                        Comment on above:   Order Comment: [facility]  
[facility]   
   
                                                            Performed By: #### C  
BCDF ####  
[address] [address]   
   
                                                    Lymphocytes (Bld)   
[#/Vol]         2.00 10*3/uL    Normal          1.20 - 4.80     [facility]  
   
                                        Comment on above:   Order Comment: [facility]  
[facility]   
   
                                                            Performed By: #### C  
BCDF ####  
[address] [address]   
   
                                                    Lymphocytes/100   
WBC (Bld)       23.6 %          Normal          13.0 - 44.0     [facility]  
   
                                        Comment on above:   Order Comment: [facility]  
[facility]   
   
                                                            Performed By: #### C  
BCDF ####  
[address] [address]   
   
                                                    MCHC (RBC)   
[Mass/Vol]      32.2 g/dL       Normal          32.0 - 36.0     [facility]  
   
                                        Comment on above:   Order Comment: [facility]  
[facility]   
   
                                                            Performed By: #### C  
BCDF ####  
[address] [address]   
   
                                                    MCV (RBC)   
[Entitic vol]   86 fL           Normal          80 - 100        [facility]  
   
                                        Comment on above:   Order Comment: [facility]  
[facility]   
   
                                                            Performed By: #### C  
BCDF ####  
[address] [address]   
   
                                                    Monocytes (Bld)   
[#/Vol]         0.60 10*3/uL    Normal          0.10 - 1.00     [facility]  
   
                                        Comment on above:   Order Comment: [facility]  
[facility]   
   
                                                            Performed By: #### C  
BCDF ####  
[address] [address]   
   
                                                    Monocytes/100 WBC   
(Bld)           6.8 %           Normal          2.0 - 10.0      [facility]  
   
                                        Comment on above:   Order Comment: [facility]  
[facility]   
   
                                                            Performed By: #### C  
BCDF ####  
[address] [address]   
   
                                                    Neutrophils (Bld)   
[#/Vol]         5.30 10*3/uL    Normal          1.20 - 7.70     [facility]  
   
                                        Comment on above:   Order Comment: [facility]  
[facility]   
   
                                                            Result Comment: Perc  
ent differential counts (%) should be   
interpreted in the  
context of the absolute cell counts (cells/L).   
   
                                                            Performed By: #### C  
BCDF ####  
[address] [address]   
   
                                                    Neutrophils/100   
WBC (Bld)       63.2 %          Normal          40.0 - 80.0     [facility]  
   
                                        Comment on above:   Order Comment: [facility]  
[facility]   
   
                                                            Performed By: #### C  
BCDF ####  
[address] [address]   
   
                      NUCLEATED RBC 0.2 /100 WBC Normal                [facility]  
   
                                        Comment on above:   Order Comment: [facility]  
[facility]   
   
                                                            Performed By: #### C  
BCDF ####  
[address] [address]   
   
                                                    Platelets (Bld)   
[#/Vol]         349 10*3/uL     Normal          150 - 450       [facility]  
   
                                        Comment on above:   Order Comment: [facility]  
[facility]   
   
                                                            Performed By: #### C  
BCDF ####  
[address] [address]   
   
                      RBC        3.51 x10E12/L Low        4.00 - 5.20 [facility]  
   
                                        Comment on above:   Order Comment: [place]  
[facility]   
   
                                                            Performed By: #### C  
BCDF ####  
[address] [address]   
   
                      WBC (Bld) [#/Vol] 8.5 10*3/uL Normal     4.4 - 11.3 [facility]  
   
                                        Comment on above:   Order Comment: [place]  
[facility]   
   
                                                            Performed By: #### C  
BCDF ####  
[address] [address]   
   
                                                    CREATININEon 2022   
   
                                                    Creatinine   
[Mass/Vol]      0.83 mg/dL      Normal          0.50 - 1.05     [facility]  
   
                                        Comment on above:   Order Comment: [facility]  
[facility]   
   
                                                            Performed By: #### C  
REAT ####  
[address] [address]   
   
                                                    GFR/1.73 sq   
M.predicted among   
non-blacks MDRD   
(S/P/Bld) [Vol   
rate/Area]      80 mL/min/{1.73_m2} Normal          >90             [facility]  
   
                                        Comment on above:   Order Comment: [facility]  
[facility]   
   
                                                            Result Comment: CALC  
ULATIONS OF ESTIMATED GFR ARE PERFORMED  
USING THE  CKD-EPI STUDY REFIT EQUATION  
WITHOUT THE RACE VARIABLE FOR THE IDMS-TRACEABLE  
CREATININE METHODS.  
https://jasn.asnjournals.org/content/early/ASN.1915605838   
   
                                                            Performed By: #### C  
REAT ####  
[address] [address]   
   
                                                    HEPATIC FUNCTION PANELon    
   
                                                    Albumin   
[Mass/Vol]      3.4 g/dL        Normal          3.4 - 5.0       [facility]  
   
                                        Comment on above:   Order Comment: [facility]  
[facility]   
   
                                                            Performed By: #### H  
EPFP ####  
[address] [address]   
   
                                                    ALP [Catalytic   
activity/Vol]   121 U/L         Normal          33 - 136        [facility]  
   
                                        Comment on above:   Order Comment: [facility]  
[facility]   
   
                                                            Performed By: #### H  
EPFP ####  
[address] [address]   
   
                                                    ALT [Catalytic   
activity/Vol]   11 U/L          Normal          7 - 45          [facility]  
   
                                        Comment on above:   Order Comment: [facility]  
[facility]   
   
                                                            Result Comment: Kayla  
ents treated with Sulfasalazine may generate  
falsely decreased results for ALT.   
   
                                                            Performed By: #### H  
EPFP ####  
[address] [address]   
   
                                                    AST [Catalytic   
activity/Vol]   12 U/L          Normal          9 - 39          [facility]  
   
                                        Comment on above:   Order Comment: [facility]  
[facility]   
   
                                                            Performed By: #### H  
EPFP ####  
[address] [address]   
   
                                                    Bilirubin   
[Mass/Vol]      0.3 mg/dL       Normal          0.0 - 1.2       [facility]  
   
                                        Comment on above:   Order Comment: [facility]  
[facility]   
   
                                                            Performed By: #### H  
EPFP ####  
[address] [address]   
   
                                                    Bilirubin.indirec  
t [Mass/Vol]    0.1 mg/dL       Normal          0.0 - 0.3       [facility]  
   
                                        Comment on above:   Order Comment: [facility]  
[facility]   
   
                                                            Performed By: #### H  
EPFP ####  
[address] [address]   
   
                                                    Protein   
[Mass/Vol]      6.4 g/dL        Normal          6.4 - 8.2       [facility]  
   
                                        Comment on above:   Order Comment: [place]  
[facility]   
   
                                                            Performed By: #### H  
EPFP ####  
[address] [address]   
   
                                                    SEDIMENTATION RATE, ERYTHROC  
YTEon 2022   
   
                                                    SEDIMENTATION   
RATE, ERYTHROCYTE 23 mm/h         Normal          0 - 30          [facility]  
   
                                        Comment on above:   Order Comment: [place]  
[facility]   
   
                                                            Performed By: #### C  
REAT ####  
[address] [address]   
   
                                                    CNOVon 2022   
   
                                        CNOV                Office Visit (AGHWW1  
)  
----------------------------  
----------------------------  
------------------------  
JAYDEN SOTO ([id number])   
1960 F T  
Date Time Provider   
Department  
22 1:45 PM DAMON ECKERT AGHWW1  
During your visit today, we   
recorded the following   
information about you:  
Respiration Weight Height  
20/minute 102.1 kg 1.702 m  
Damon Eckert MD   
2022 2:29 PM Signed  
Patient presents with:  
Right Hand - Follow Up, Post   
Op, Pain  
SURGEON  
Damon Eckert MD  
PROCEDURE  
2022  
A1 pulley release right   
middle finger  
HISTORY OF PRESENT ILLNESS  
Jaydne Soto presents for   
post operative follow up 2   
weeks from surgery.  
The patient is doing well.   
She is very pleased with the   
results of the surgery  
and reports minimal pain in   
the hand. No wound concerns.  
Current Concerns: None  
Pain control:   
Well-controlled  
Currently taking pain   
medication:No  
Fever, chills or other signs   
of infection: Denies  
PHYSICAL EXAMINATION  
Right hand  
Inspection shows very   
minimal residual swelling  
A slight middle finger PIP   
flexion contracture remains,   
passively reducible and  
nonpainful  
Incision(s) clean, dry,   
intact. Nylon sutures in   
place  
No erythema, cellulitis or   
drainage  
Sutures removed without   
difficulty. Patient   
tolerated procedure well  
ROM: Full composite fist,   
middle fingertip to palm  
No residual mechanical   
locking or catching  
Sensation:Normal sensation  
AIN/PIN/ulnar motor intact  
Brisk cap refill  
IMAGING  
No new imaging obtained  
ASSESSMENT AND PLAN  
ASSESSMENT/PLAN:  
1. S/P trigger finger   
release - ICD9: V45.89,   
ICD10: Z98.890  
The patient is doing well   
and has healed the wound   
nicely. She is pleased with  
the results. We will   
follow-up together on an   
as-needed basis.  
Damon Eckert  
Pt education provided on   
palmar massage and   
desensitization.  
Patient was instructed to   
call the office with any   
questions and/or concerns  
Referring Provider: SELF   
[200]  
Allergies As of Date:   
2022 Noted Allergy   
Reaction  
MORPHINE 2021 8 - GI   
Upset  
STATINS-HMG-COA REDUCTASE   
INHIBIT*2021 16 -   
Unknown  
Date Reviewed: 2022  
Reviewed by: Damon Eckert MD - Fully Assessed  
Reason for Visit:  
Follow Up [171]  
Post Op [174]  
Pain [78]  
Primary Visit Diagnosis:S/P   
trigger finger release   
[Z98.890]  
Prescriptions as of   
2022  
- aspirin 81 mg cap  
Take 1 capsule by mouth once   
daily. Resume once BID   
dosing is completed  
- cyclobenzaprine (FLEXERIL)   
10 mg tablet  
Take 1 tablet by mouth three   
times daily.  
- aspirin, enteric coated   
(ASPIRIN, ENTERIC COATED) 81   
mg EC tablet  
Take 1 tablet by mouth twice   
daily for 21 days. Once BID   
dosing is completed  
in 21 days, resume home   
daily dose  
- insulin glargine (LANTUS   
U-100 INSULIN) 100 unit/mL   
injection  
Inject 36 Units   
subcutaneously daily at   
bedtime.  
- insulin aspart U-100   
(NOVOLOG) 100 unit/mL  
14 units with breakfast 8   
units with lunch 10 units   
with dinner Admin  
Instructions: ADMINISTER   
CORRECTIONAL INSULIN   
REGARDLESS OF MEAL OR   
NUTRITION  
INTAKE Custom Scale If Blood   
Glucose (mg/dL) is Less than   
100 ? 0 units  
101-150 ? 0 units 151-175 ?   
2 units 176-200 ? 3 units   
201-225 ? 4 units  
226-250 ? 5 units 251-275 ?   
6 units 276-300 ? 7 units   
301-325 ? 8 units  
326-350 ? 9 units >350 ? 10   
units and Notify Provider   
Notify provider  
if 2 consecutive blood   
glucose values in the   
previous 24 hours are   
greater  
than 250 mg/mL and there   
have been no changes to the   
insulin regimen in the  
previous 24 hours.  
- cefTRIAXone sodium   
(ROCEPHIN) 1 gram solr  
Inject 2 g intramuscularly   
every 24 hours.  
- doxepin capsule 10 mg  
Take 20 mg by mouth daily at   
bedtime.  
- BISACODYL ORAL  
Take 10 mg by mouth once   
daily.  
- melatonin 10 mg cap  
Take 1 capsule by mouth   
daily at bedtime.  
- alirocumab (PRALUENT) 75   
mg/mL pen  
Inject 75 mg subcutaneously   
every other week.  
- DULoxetine (CYMBALTA) 60   
mg capsule  
Take 60 mg by mouth twice   
daily.  
- ranolazine SR (RANEXA)   
1,000 mg tab ER 12 hr  
Take 1 tablet by mouth twice   
daily.  
- dilTIAZem CD (CARDIZEM CD)   
180 mg 24 hr capsule  
Take 180 mg by mouth once   
daily.  
- empagliflozin (JARDIANCE)   
25 mg tablet  
Take 25 mg by mouth daily   
with breakfast.  
- GABAPENTIN ORAL  
Take 200 mg by mouth three   
times daily.  
- METOPROLOL TARTRATE ORAL  
Take 25 mg by mouth twice   
daily.  
- isosorbide mononitrate ER   
(IMDUR) 30 mg 24 hr tablet  
Take 90 mg by mouth once   
daily.  
- clopidogrel (PLAVIX) 75 mg   
tablet  
Take 75 mg by mouth once   
daily.  
Problem List As Of Date   
2022 Noted Resolved  
Septic joint of left knee   
joint (HCC) [M00.9]   
2022  
Obesity, Class II, BMI   
35-39.9 [E66.9] 2022  
Controlled type 2 diabetes   
mellitus without   
com*2022  
Primary hypertension [I10]   
2022  
Trigger finger, right middle   
finger [M65.331] 2022  
Encounter Number: [id number]  
Encounter Status:Closed by   
DAMON ECKERT on 22 Normal                                  [facility]  
   
                                                    C-REACTIVE PROTEINon   
022   
   
                                                    C-REACTIVE   
PROTEIN         0.62 mg/dL      Normal                          [facility]  
   
                                        Comment on above:   Order Comment: [place]  
[facility]   
   
                                                            Result Comment: REF   
VALUE  
< 1.00   
   
                                                            Performed By: #### C  
RP ####  
[address] [address]   
   
                                                    CBC AND DIFFERENTIALon    
   
                                                    Basophils (Bld)   
[#/Vol]         0.10 10*3/uL    Normal          0.00 - 0.10     [facility]  
   
                                        Comment on above:   Order Comment: [place]  
[facility]   
   
                                                            Performed By: #### E  
SRWS ####  
[address] [address]   
   
                                                    Basophils/100 WBC   
(Bld)           0.9 %           Normal          0.0 - 2.0       [facility]  
   
                                        Comment on above:   Order Comment: [place]  
[facility]   
   
                                                            Performed By: #### E  
SRWS ####  
[address] [address]   
   
                                                    Eosinophils (Bld)   
[#/Vol]         0.60 10*3/uL    Normal          0.00 - 0.70     [facility]  
   
                                        Comment on above:   Order Comment: [place]  
[facility]   
   
                                                            Performed By: #### E  
SRWS ####  
[address] [address]   
   
                                                    Eosinophils/100   
WBC (Bld)       6.5 %           Normal          0.0 - 6.0       [facility]  
   
                                        Comment on above:   Order Comment: [place]  
[facility]   
   
                                                            Performed By: #### E  
SRWS ####  
[address] [address]   
   
                                                    Erythrocyte   
distribution   
width (RBC)   
[Ratio]         17.5 %          High            11.5 - 14.5     [facility]  
   
                                        Comment on above:   Order Comment: [place]  
[facility]   
   
                                                            Performed By: #### E  
SRWS ####  
[address] [place] [address]   
   
                                                    Hematocrit (Bld)   
[Volume fraction] 29.2 %          Low             36.0 - 46.0     [facility]  
   
                                        Comment on above:   Order Comment: [place]  
[facility]   
   
                                                            Performed By: #### E  
SRWS ####  
[address] [address]   
   
                                                    Hemoglobin (Bld)   
[Mass/Vol]      9.6 g/dL        Low             12.0 - 16.0     [facility]  
   
                                        Comment on above:   Order Comment: [place]  
[facility]   
   
                                                            Performed By: #### E  
SRWS ####  
[address] [address]   
   
                                                    Lymphocytes (Bld)   
[#/Vol]         2.00 10*3/uL    Normal          1.20 - 4.80     [facility]  
   
                                        Comment on above:   Order Comment: [place]  
[facility]   
   
                                                            Performed By: #### E  
SRWS ####  
[address] [address]   
   
                                                    Lymphocytes/100   
WBC (Bld)       21.7 %          Normal          13.0 - 44.0     [facility]  
   
                                        Comment on above:   Order Comment: [place]  
[facility]   
   
                                                            Performed By: #### E  
SRWS ####  
[address] [address]   
   
                                                    MCHC (RBC)   
[Mass/Vol]      32.8 g/dL       Normal          32.0 - 36.0     [facility]  
   
                                        Comment on above:   Order Comment: [place]  
[facility]   
   
                                                            Performed By: #### E  
SRWS ####  
[address] [address]   
   
                                                    MCV (RBC)   
[Entitic vol]   86 fL           Normal          80 - 100        [facility]  
   
                                        Comment on above:   Order Comment: [place]  
[facility]   
   
                                                            Performed By: #### E  
SRWS ####  
[address] [address]   
   
                                                    Monocytes (Bld)   
[#/Vol]         0.60 10*3/uL    Normal          0.10 - 1.00     [facility]  
   
                                        Comment on above:   Order Comment: [place]  
[facility]   
   
                                                            Performed By: #### E  
SRWS ####  
[address] [address]   
   
                                                    Monocytes/100 WBC   
(Bld)           6.3 %           Normal          2.0 - 10.0      [facility]  
   
                                        Comment on above:   Order Comment: [place]  
[facility]   
   
                                                            Performed By: #### E  
SRWS ####  
[address] [address]   
   
                                                    Neutrophils (Bld)   
[#/Vol]         5.90 10*3/uL    Normal          1.20 - 7.70     [facility]  
   
                                        Comment on above:   Order Comment: [place]  
[facility]   
   
                                                            Result Comment: Perc  
ent differential counts (%) should be   
interpreted in the  
context of the absolute cell counts (cells/L).   
   
                                                            Performed By: #### E  
SRWS ####  
[address] [address]   
   
                                                    Neutrophils/100   
WBC (Bld)       64.6 %          Normal          40.0 - 80.0     [facility]  
   
                                        Comment on above:   Order Comment: [place]  
[facility]   
   
                                                            Performed By: #### E  
SRWS ####  
[address] [address]   
   
                      NUCLEATED RBC 0.1 /100 WBC Normal                [facility]  
   
                                        Comment on above:   Order Comment: [place]  
[facility]   
   
                                                            Performed By: #### E  
SRWS ####  
[address] [address]   
   
                                                    Platelets (Bld)   
[#/Vol]         248 10*3/uL     Normal          150 - 450       [facility]  
   
                                        Comment on above:   Order Comment: [place]  
[facility]   
   
                                                            Performed By: #### E  
SRWS ####  
[address] [address]   
   
                      RBC        3.41 x10E12/L Low        4.00 - 5.20 [facility]  
   
                                        Comment on above:   Order Comment: [place]  
[facility]   
   
                                                            Performed By: #### E  
SRWS ####  
[address] [address]   
   
                      WBC (Bld) [#/Vol] 9.1 10*3/uL Normal     4.4 - 11.3 [facility]  
   
                                        Comment on above:   Order Comment: [place]  
[facility]   
   
                                                            Performed By: #### E  
SRWS ####  
[address] [address]   
   
                                                    CREATININEon 2022   
   
                                                    Creatinine   
[Mass/Vol]      0.88 mg/dL      Normal          0.50 - 1.05     [facility]  
   
                                        Comment on above:   Order Comment: [place]  
[facility]   
   
                                                            Performed By: #### C  
REAT ####  
[address] [address]   
   
                                                    GFR/1.73 sq   
M.predicted among   
non-blacks MDRD   
(S/P/Bld) [Vol   
rate/Area]      75 mL/min/{1.73_m2} Normal          >90             [facility]  
   
                                        Comment on above:   Order Comment: [place]  
[facility]   
   
                                                            Result Comment: CALC  
ULATIONS OF ESTIMATED GFR ARE PERFORMED  
USING THE  CKD-EPI STUDY REFIT EQUATION  
WITHOUT THE RACE VARIABLE FOR THE IDMS-TRACEABLE  
CREATININE METHODS.  
https://jasn.asnjournals.org/content/early//ASN.4643799452   
   
                                                            Performed By: #### C  
REAT ####  
[place] [id number]   
   
                                                    HEPATIC FUNCTION PANELon    
   
                                                    Albumin   
[Mass/Vol]      3.3 g/dL        Low             3.4 - 5.0       [facility]  
   
                                        Comment on above:   Order Comment: [place]  
[facility]   
   
                                                            Performed By: #### C  
REAT ####  
[address] [address]   
   
                                                    ALP [Catalytic   
activity/Vol]   124 U/L         Normal          33 - 136        [facility]  
   
                                        Comment on above:   Order Comment: [place]  
[facility]   
   
                                                            Performed By: #### C  
REAT ####  
[address] [address]   
   
                                                    ALT [Catalytic   
activity/Vol]   12 U/L          Normal          7 - 45          [facility]  
   
                                        Comment on above:   Order Comment: [place]  
[facility]   
   
                                                            Result Comment: Kayla  
ents treated with Sulfasalazine may generate  
falsely decreased results for ALT.   
   
                                                            Performed By: #### C  
REAT ####  
[address] [address]   
   
                                                    AST [Catalytic   
activity/Vol]   10 U/L          Normal          9 - 39          [facility]  
   
                                        Comment on above:   Order Comment: [place]  
[facility]   
   
                                                            Performed By: #### C  
REAT ####  
[address] [address]   
   
                                                    Bilirubin   
[Mass/Vol]      0.3 mg/dL       Normal          0.0 - 1.2       [facility]  
   
                                        Comment on above:   Order Comment: [place]  
[facility]   
   
                                                            Performed By: #### C  
REAT ####  
[address] [address]   
   
                                                    Bilirubin.indirec  
t [Mass/Vol]    0.0 mg/dL       Normal          0.0 - 0.3       [facility]  
   
                                        Comment on above:   Order Comment: [place]  
[facility]   
   
                                                            Performed By: #### C  
REAT ####  
[address] [address]   
   
                                                    Protein   
[Mass/Vol]      6.2 g/dL        Low             6.4 - 8.2       [facility]  
   
                                        Comment on above:   Order Comment: [place]  
[facility]   
   
                                                            Performed By: #### C  
REAT ####  
[address] [address]   
   
                                                    SEDIMENTATION RATE, ERYTHROC  
YTEon 2022   
   
                                                    SEDIMENTATION   
RATE, ERYTHROCYTE 11 mm/h         Normal          0 - 30          [facility]  
   
                                        Comment on above:   Order Comment: [place]  
[facility]   
   
                                                            Performed By: #### E  
SRWS ####  
[address] [address]   
   
                                                    CNOVon 04-   
   
                                        CNOV                Office Visit (AGHWG1  
)  
----------------------------  
----------------------------  
------------------------  
JAYDEN SOTO ([id number])   
1960 F T  
Date Time Provider   
Department  
4/15/22 9:30 AM BASIL MITCHELL   
[facility]  
During your visit today, we   
recorded the following   
information about you:  
Respiration Weight Height  
16/minute 102.1 kg 1.702 m  
Radha Novak MA   
4/15/2022 10:29 AM Signed  
REVIEW OF SYSTEMS:  
GENERAL: Well developed,   
well nourished. No acute   
distress  
PAIN: Pain left knee  
CARDIOVASCULAR: HTN  
MSK: Positive for joint   
swelling  
SKIN: Negative for lesions,   
rash, itching, metal   
sensitivity  
NEURO: Negative for seizure,   
trauma, numbness/tingling of   
extremities.  
ENDOCRINE: Positive for   
diabetic associated symptoms  
HEMATOLOGY: Negative for   
excessive bleeding, clots,   
bleeding disorders.  
Basil Mitchell MD 4/15/2022   
10:29 AM Signed  
Follow-up Patient Visit  
Jayden Soto is a 61 year   
old female who presents to   
follow up for Acquired  
absence of knee joint   
following explantation of   
joint prosthesis with   
presence  
of antibiotic-impregnated   
cement spacer (primary   
encounter diagnosis)  
Current or previous   
treatment regimens: NSAIDS  
Medications:  
Current Outpatient   
Medications  
Medication Sig  
- aspirin 81 mg cap Take 1   
capsule by mouth once daily.   
Resume once BID dosing  
is completed  
- cyclobenzaprine (FLEXERIL)   
10 mg tablet Take 1 tablet   
by mouth three times  
daily.  
- aspirin, enteric coated   
(ASPIRIN, ENTERIC COATED) 81   
mg EC tablet Take 1  
tablet by mouth twice daily   
for 21 days. Once BID dosing   
is completed in 21  
days, resume home daily dose  
- insulin glargine (LANTUS   
U-100 INSULIN) 100 unit/mL   
injection Inject 36 Units  
subcutaneously daily at   
bedtime.  
- insulin aspart U-100   
(NOVOLOG) 100 unit/mL 14   
units with breakfast  
8 units with lunch  
10 units with dinner  
Admin Instructions:   
ADMINISTER CORRECTIONAL   
INSULIN REGARDLESS OF MEAL   
OR  
NUTRITION INTAKE  
Custom Scale  
If Blood Glucose (mg/dL) is  
Less than 100 ? 0 units  
101-150 ? 0 units  
151-175 ? 2 units  
176-200 ? 3 units  
201-225 ? 4 units  
226-250 ? 5 units  
251-275 ? 6 units  
276-300 ? 7 units  
301-325 ? 8 units  
326-350 ? 9 units  
>350 ? 10 units and Notify   
Provider  
Notify provider if 2   
consecutive blood glucose   
values in the previous 24   
hours  
are greater than 250 mg/mL   
and there have been no   
changes to the insulin  
regimen in the previous 24   
hours.  
- cefTRIAXone sodium   
(ROCEPHIN) 1 gram solr   
Inject 2 g intramuscularly   
every 24  
hours.  
- doxepin capsule 10 mg Take   
20 mg by mouth daily at   
bedtime.  
- BISACODYL ORAL Take 10 mg   
by mouth once daily.  
- melatonin 10 mg cap Take 1   
capsule by mouth daily at   
bedtime.  
- alirocumab (PRALUENT) 75   
mg/mL pen Inject 75 mg   
subcutaneously every other  
week.  
- DULoxetine (CYMBALTA) 60   
mg capsule Take 60 mg by   
mouth twice daily.  
- ranolazine SR (RANEXA)   
1,000 mg tab ER 12 hr Take 1   
tablet by mouth twice  
daily.  
- dilTIAZem CD (CARDIZEM CD)   
180 mg 24 hr capsule Take   
180 mg by mouth once  
daily.  
- empagliflozin (JARDIANCE)   
25 mg tablet Take 25 mg by   
mouth daily with  
breakfast.  
- GABAPENTIN ORAL Take 200   
mg by mouth three times   
daily.  
- METOPROLOL TARTRATE ORAL   
Take 25 mg by mouth twice   
daily.  
- isosorbide mononitrate ER   
(IMDUR) 30 mg 24 hr tablet   
Take 90 mg by mouth once  
daily.  
- clopidogrel (PLAVIX) 75 mg   
tablet Take 75 mg by mouth   
once daily.  
No current   
facility-administered   
medications for this visit.  
Allergies:  
ALLERGIES  
Allergen Reactions  
- Morphine GI Upset  
- Statins-Hmg-Coa Red*   
Unknown  
Physical Examination:  
Resp 16   Ht 5' 7  (1.70m)     
Wt 225 lb (102.1kg)   BMI   
35.23 kg/(m2).  
Ortho Exam  
Ambulating with a walker  
Incision CDI  
ROM 5-100  
NVI  
Images: na  
Procedures  
Oxford Score: see report  
Assessment and Plan:  
1. Acquired absence of knee   
joint following explantation   
of joint prosthesis  
with presence of   
antibiotic-impregnated   
cement spacer - ICD9:   
V88.22, ICD10:  
Z89.529  
Continue antibiotics per ID  
Overall she is doing well,   
much better than prior to   
surgery  
As long as she is doing well   
clinically we can ride out   
the spacer  
FU in 6 weeks  
No follow-ups on file.  
Basil Mitchell MD  
Referring Provider: BASIL MITCHELL [[id number]]  
Allergies As of Date:   
04/15/2022 Noted Allergy   
Reaction  
MORPHINE 2021 8 - GI   
Upset  
STATINS-HMG-COA REDUCTASE   
INHIBIT*2021 16 -   
Unknown  
Date Reviewed: 04/15/2022  
Reviewed by: Basil Mitchell MD   
- Fully Assessed  
Reason for Visit:  
Post Op [174]  
Primary Visit   
Diagnosis:Acquired absence   
of knee joint following   
explantation  
of joint prosthesis with   
presence of  
antibiotic-impregnated   
cement spacer [Z89.529]  
Prescriptions as of   
04/15/2022  
- aspirin 81 mg cap  
Take 1 capsule by mouth once   
daily. Resume once BID   
dosing is completed  
- cyclobenzaprine (FLEXERIL)   
10 mg tablet  
Take 1 tablet by mouth three   
times daily.  
- aspirin, enteric coated   
(ASPIRIN, ENTERIC COATED) 81   
mg EC tablet  
Take 1 tablet by mouth twice   
meka (more content not   
included)...        Normal                                  [facility]  
   
                                                    C-REACTIVE PROTEINon   
022   
   
                                                    C-REACTIVE   
PROTEIN         0.23 mg/dL      Normal                          [facility]  
   
                                        Comment on above:   Order Comment: [facility]   
   
                                                            Result Comment: REF   
VALUE  
< 1.00   
   
                                                            Performed By: #### E  
SRWS ####  
[address] [address]   
   
                                                    CBC AND DIFFERENTIALon    
   
                                                    Basophils (Bld)   
[#/Vol]         0.00 10*3/uL    Normal          0.00 - 0.10     [facility]  
   
                                        Comment on above:   Order Comment: dropp  
ed off @ [place]   
   
                                                            Performed By: #### C  
BCDF ####  
[address] [address]   
   
                                                    Basophils/100 WBC   
(Bld)           0.6 %           Normal          0.0 - 2.0       [facility]  
   
                                        Comment on above:   Order Comment: dropp  
ed off @ [place]   
   
                                                            Performed By: #### C  
BCDF ####  
[address] [address]   
   
                                                    Eosinophils (Bld)   
[#/Vol]         0.40 10*3/uL    Normal          0.00 - 0.70     [facility]  
   
                                        Comment on above:   Order Comment: dropp  
ed off @ [place]   
   
                                                            Performed By: #### C  
BCDF ####  
[address] [address]   
   
                                                    Eosinophils/100   
WBC (Bld)       4.8 %           Normal          0.0 - 6.0       [facility]  
   
                                        Comment on above:   Order Comment: dropp  
ed off @ [place]   
   
                                                            Performed By: #### C  
BCDF ####  
[address] [address]   
   
                                                    Erythrocyte   
distribution   
width (RBC)   
[Ratio]         17.2 %          High            11.5 - 14.5     [facility]  
   
                                        Comment on above:   Order Comment: dropp  
ed off @ [place]   
   
                                                            Performed By: #### C  
BCDF ####  
[address] [address]   
   
                                                    Hematocrit (Bld)   
[Volume fraction] 32.8 %          Low             36.0 - 46.0     [facility]  
   
                                        Comment on above:   Order Comment: dropp  
ed off @ [place]   
   
                                                            Performed By: #### C  
BCDF ####  
[address] [address]   
   
                                                    Hemoglobin (Bld)   
[Mass/Vol]      10.6 g/dL       Low             12.0 - 16.0     [facility]  
   
                                        Comment on above:   Order Comment: dropp  
ed off @ [place]   
   
                                                            Performed By: #### C  
BCDF ####  
[address] [address]   
   
                                                    Lymphocytes (Bld)   
[#/Vol]         2.40 10*3/uL    Normal          1.20 - 4.80     [facility]  
   
                                        Comment on above:   Order Comment: dropp  
ed off @ [place]   
   
                                                            Performed By: #### C  
BCDF ####  
[address] [address]   
   
                                                    Lymphocytes/100   
WBC (Bld)       32.6 %          Normal          13.0 - 44.0     [facility]  
   
                                        Comment on above:   Order Comment: dropp  
ed off @ [place]   
   
                                                            Performed By: #### C  
BCDF ####  
[address] [address]   
   
                                                    MCHC (RBC)   
[Mass/Vol]      32.3 g/dL       Normal          32.0 - 36.0     [facility]  
   
                                        Comment on above:   Order Comment: dropp  
ed off @ [place]   
   
                                                            Performed By: #### C  
BCDF ####  
[address] [address]   
   
                                                    MCV (RBC)   
[Entitic vol]   85 fL           Normal          80 - 100        [facility]  
   
                                        Comment on above:   Order Comment: dropp  
ed off @ [place]   
   
                                                            Performed By: #### C  
BCDF ####  
[address] [address]   
   
                                                    Monocytes (Bld)   
[#/Vol]         0.60 10*3/uL    Normal          0.10 - 1.00     [facility]  
   
                                        Comment on above:   Order Comment: dropp  
ed off @ [place]   
   
                                                            Performed By: #### C  
BCDF ####  
[address] [address]   
   
                                                    Monocytes/100 WBC   
(Bld)           7.6 %           Normal          2.0 - 10.0      [facility]  
   
                                        Comment on above:   Order Comment: dropp  
ed off @ [place]   
   
                                                            Performed By: #### C  
BCDF ####  
[address] [address]   
   
                                                    Neutrophils (Bld)   
[#/Vol]         4.00 10*3/uL    Normal          1.20 - 7.70     [facility]  
   
                                        Comment on above:   Order Comment: dropp  
ed off @ [place]   
   
                                                            Result Comment: Perc  
ent differential counts (%) should be   
interpreted in the  
context of the absolute cell counts (cells/L).   
   
                                                            Performed By: #### C  
BCDF ####  
[address] [address]   
   
                                                    Neutrophils/100   
WBC (Bld)       54.4 %          Normal          40.0 - 80.0     [facility]  
   
                                        Comment on above:   Order Comment: dropp  
ed off @ [place]   
   
                                                            Performed By: #### C  
BCDF ####  
[address] [address]   
   
                      NUCLEATED RBC 0.2 /100 WBC Normal                [facility]  
   
                                        Comment on above:   Order Comment: dropp  
ed off @ [place]   
   
                                                            Performed By: #### C  
BCDF ####  
[address] [address]   
   
                                                    Platelets (Bld)   
[#/Vol]         262 10*3/uL     Normal          150 - 450       [facility]  
   
                                        Comment on above:   Order Comment: dropp  
ed off @ [place]   
   
                                                            Performed By: #### C  
BCDF ####  
[address] [address]   
   
                      RBC        3.86 x10E12/L Low        4.00 - 5.20 [facility]  
   
                                        Comment on above:   Order Comment: dropp  
ed off @ [place]   
   
                                                            Performed By: #### C  
BCDF ####  
[address] [address]   
   
                      WBC (Bld) [#/Vol] 7.4 10*3/uL Normal     4.4 - 11.3 [facility]  
   
                                        Comment on above:   Order Comment: dropp  
ed off @ [place]   
   
                                                            Performed By: #### C  
BCDF ####  
[address] [address]   
   
                                                    CREATININEon 2022   
   
                                                    Creatinine   
[Mass/Vol]      0.87 mg/dL      Normal          0.50 - 1.05     [facility]  
   
                                        Comment on above:   Order Comment: [place]  
[facility]   
   
                                                            Performed By: #### E  
SRWS ####  
[address] [address]   
   
                                                    GFR/1.73 sq   
M.predicted among   
non-blacks MDRD   
(S/P/Bld) [Vol   
rate/Area]      76 mL/min/{1.73_m2} Normal          >90             [facility]  
   
                                        Comment on above:   Order Comment: [place]  
[facility]   
   
                                                            Result Comment: CALC  
ULATIONS OF ESTIMATED GFR ARE PERFORMED  
USING THE  CKD-EPI STUDY REFIT EQUATION  
WITHOUT THE RACE VARIABLE FOR THE IDMS-TRACEABLE  
CREATININE METHODS.  
https://jasn.asnjournals.org/content/early/ASN.0068346017   
   
                                                            Performed By: #### E  
SRWS ####  
[address] [address]   
   
                                                    HEPATIC FUNCTION PANELon    
   
                                                    Albumin   
[Mass/Vol]      3.6 g/dL        Normal          3.4 - 5.0       [facility]  
   
                                        Comment on above:   Order Comment: [place]  
[facility]   
   
                                                            Performed By: #### E  
SRWS ####  
[address] [address]   
   
                                                    ALP [Catalytic   
activity/Vol]   126 U/L         Normal          33 - 136        [facility]  
   
                                        Comment on above:   Order Comment: [place]  
[facility]   
   
                                                            Performed By: #### E  
SRWS ####  
[address] [address]   
   
                                                    ALT [Catalytic   
activity/Vol]   15 U/L          Normal          7 - 45          [facility]  
   
                                        Comment on above:   Order Comment: [place]  
[facility]   
   
                                                            Result Comment: Kayla  
ents treated with Sulfasalazine may generate  
falsely decreased results for ALT.   
   
                                                            Performed By: #### E  
SRWS ####  
[address] [address]   
   
                                                    AST [Catalytic   
activity/Vol]   14 U/L          Normal          9 - 39          [facility]  
   
                                        Comment on above:   Order Comment: [place]  
[facility]   
   
                                                            Performed By: #### E  
SRWS ####  
[address] [address]   
   
                                                    Bilirubin   
[Mass/Vol]      0.2 mg/dL       Normal          0.0 - 1.2       [facility]  
   
                                        Comment on above:   Order Comment: [place]  
[facility]   
   
                                                            Performed By: #### E  
SRWS ####  
[address] [address]   
   
                                                    Bilirubin.indirec  
t [Mass/Vol]    0.0 mg/dL       Normal          0.0 - 0.3       [facility]  
   
                                        Comment on above:   Order Comment: [place]  
[facility]   
   
                                                            Performed By: #### E  
SRWS ####  
[address] [address]   
   
                                                    Protein   
[Mass/Vol]      6.7 g/dL        Normal          6.4 - 8.2       [facility]  
   
                                        Comment on above:   Order Comment: [place]  
[facility]   
   
                                                            Performed By: #### E  
SRWS ####  
[address] [address]   
   
                                                    SEDIMENTATION RATE, ERYTHROC  
YTEon 2022   
   
                                                    SEDIMENTATION   
RATE, ERYTHROCYTE 39 mm/h         High            0 - 30          [facility]  
   
                                        Comment on above:   Order Comment: [place]  
[facility]   
   
                                                            Performed By: #### E  
SRWS ####  
[address] [address]   
   
                                                    BRIEF OP NOTon 2022   
   
                                        BRIEF OP NOT        HNO ID: [id number]  
Author: Damon Eckert MD  
Service: Orthopaedic Surgery  
Author Type: Physician  
Type: Brief Op Note  
Filed: 2022 11:17 AM  
Note Text:  
BRIEF OPERATIVE / PROCEDURE   
NOTE  
LOG ID: [id number]  
Surgery/Procedure Date:   
2022  
Incision/Procedure Start   
Time: 10:45 AM  
Incision Close/Procedure End   
Time: 10:57 AM  
Surgeon(s)/Proceduralist(s)   
and Assistant(s):  
Surgeon(s) and Role:  
* Damon Eckert MD -   
Primary  
No Additional Staff  
Procedure(s): A1 pulley   
release right middle finger   
[id number]  
Anesthesia: Local  
Findings: Thickening and   
stenosis right middle   
finger.  
Estimated Blood Loss:   
minimal  
Specimens: None  
Complications: None  
Pre-Op/Pre-Procedure   
Diagnosis: Right middle   
trigger finger  
Post-Op/Post-Procedure   
Diagnosis: Right middle   
trigger finger  
SIGNATURE: Damon Eckert MD PATIENT NAME:   
Jayden Soto  
DATE: 2022 MRN:   
[id number]  
TIME: 11:13 AM PAGER/CONTACT   
#:                  Normal                                  [facility]  
   
                                                    HISTORY PHYSICALon    
   
                                        HISTORY PHYSICAL    HNO ID: [id number]  
Author: Isabella Thomas APRN.CNP  
Service: Anesthesiology  
Author Type: Nurse   
Practitioner  
Type: HANDP  
Filed: 2022 9:33 AM  
Note Text:  
HISTORY AND PHYSICAL   
EXAMINATION  
SERVICE DATE: 2022  
SERVICE TIME: 8:54 AM  
PRIMARY CARE PHYSICIAN:   
Becky Corrales MD, MD  
REASON FOR VISIT:  
Jayden Soto is a 61 year   
old female who is scheduled   
for Procedure(s):  
A1 PULLEY RELEASE RIGHT   
MIDDLE FINGER (Right) at the   
request of Dr. Eckert for routine HANDP.  
The patient has the   
following:  
ACTIVE PROBLEM LIST  
Septic Joint of Left Knee   
Joint (Hcc)  
Obesity, Class II, Bmi   
35-39.9  
Controlled Type 2 Diabetes   
Mellitus Without   
Complication, With Long-Term  
Current Use of Insulin (Hcc)  
Primary Hypertension  
Trigger Finger, Right Middle   
Finger  
Subjective  
CHIEF COMPLAINT: Right   
middle finger  
HPI:  
Patient is a 61 year old   
female who presents to the   
outpatient surgery  
center for the above   
procedure. Patient complains   
of right middle finger  
pain for a year. Today, she   
rates the right middle   
finger pain as 6 out  
of 10 on the numeric pain   
scale. She describes the   
pain as a throbbing  
and aching pain that   
radiates up right arm. She   
reports that she  
experiences  locking  of   
right middle finger daily.   
Patient has history  
of septic left knee. Patient   
agrees to proceed with   
procedure.  
PAST MEDICAL HISTORY  
Diagnosis Date  
- Chronic pain of left knee  
- Coronary artery disease  
1 cardiac stent  
- Diabetes (HCC)  
- Hypercholesteremia  
- Hypertension  
- MRSA (methicillin   
resistant staph aureus)   
culture positive  
multiple joints/bones  
- Pain of right hand  
PAST SURGICAL HISTORY  
Procedure Laterality Date  
- BACK SURGERY HX  
- ELBOW SURGERY HX Bilateral  
for bone spurs  
- IR VASCULAR ACCESS TEAM   
PICC INSERTION RADIO   
2022  
- KNEE SURGERY HX  
- PAST SURGICAL HISTORY OF   
Left 2022  
left knee  
- PAST SURGICAL HISTORY OF   
Left  
heel surgery  
- SHOULDER SURGERY HX   
Bilateral  
FAMILY HISTORY  
Problem Relation Age of   
Onset  
- Heart Mother  
- Stroke Father  
SOCIAL HISTORY:  
Social History  
Tobacco Use  
- Smoking status: Never   
Smoker  
- Smokeless tobacco: Never   
Used  
Vaping Use  
- Vaping Use: Never used  
Substance Use Topics  
- Alcohol use: Never  
- Drug use: Never  
Prior to Admission   
medications as of 22   
0924  
Medication Sig Last Dose   
Taking  
aspirin 81 mg cap Take 1   
capsule by mouth once daily.   
Resume once BID  
dosing is completed 2022   
at Unknown time Yes  
cyclobenzaprine (FLEXERIL)   
10 mg tablet Take 1 tablet   
by mouth three times  
daily. 2022 at Unknown   
time Yes  
insulin glargine (LANTUS   
U-100 INSULIN) 100 unit/mL   
injection Inject 36  
Units subcutaneously daily   
at bedtime. 2022 at 1800   
Yes  
insulin aspart U-100   
(NOVOLOG) 100 unit/mL 14   
units with breakfast  
8 units with lunch  
10 units with dinner  
Admin Instructions:   
ADMINISTER CORRECTIONAL   
INSULIN REGARDLESS OF MEAL   
OR  
NUTRITION INTAKE  
Custom Scale  
If Blood Glucose (mg/dL) is  
Less than 100 ? 0 units  
101-150 ? 0 units  
151-175 ? 2 units  
176-200 ? 3 units  
201-225 ? 4 units  
226-250 ? 5 units  
251-275 ? 6 units  
276-300 ? 7 units  
301-325 ? 8 units  
326-350 ? 9 units  
>350 ? 10 units and Notify   
Provider  
Notify provider if 2   
consecutive blood glucose   
values in the previous 24  
hours are greater than 250   
mg/mL and there have been no   
changes to the  
insulin regimen in the   
previous 24 hours. 2022   
at 1800 Yes  
cefTRIAXone sodium   
(ROCEPHIN) 1 gram solr   
Inject 2 g intramuscularly   
every  
24 hours. 2022 at   
Unknown time Yes  
doxepin capsule 10 mg Take   
20 mg by mouth daily at   
bedtime. 2022 at  
2100 Yes  
melatonin 10 mg cap Take 1   
capsule by mouth daily at   
bedtime. 2022 at  
2100 Yes  
alirocumab (PRALUENT) 75   
mg/mL pen Inject 75 mg   
subcutaneously every other  
week. Past Week at Unknown   
time Yes  
DULoxetine (CYMBALTA) 60 mg   
capsule Take 60 mg by mouth   
twice daily.  
2022 at 0630 Yes  
ranolazine SR (RANEXA) 1,000   
mg tab ER 12 hr Take 1   
tablet by mouth twice  
daily.  
2022 at 0630 Yes  
dilTIAZem CD (CARDIZEM CD)   
180 mg 24 hr capsule Take   
180 mg by mouth once  
daily. 2022 at 0630 Yes  
empagliflozin (JARDIANCE) 25   
mg tablet Take 25 mg by   
mouth daily with  
breakfast. 2022 at 0630   
Yes  
GABAPENTIN ORAL Take 200 mg   
by mouth three times daily.  
2022 at 0630 Yes  
METOPROLOL TARTRATE ORAL   
Take 25 mg by mouth twice   
daily.  
2022 at 0630 Yes  
isosorbide mononitrate ER   
(IMDUR) 30 mg 24 hr tablet   
Take 90 mg by mouth  
once daily.  
2022 at Unknown time Yes  
clopidogrel (PLAVIX) 75 mg   
tablet Take 75 mg by mouth   
once daily. 2022  
at 0630 Yes  
aspirin, enteric coated   
(ASPIRIN, ENTERIC COATED) 81   
mg EC tablet Take 1  
tablet by mouth twice daily   
for 21 days. Once BID dosing   
is completed in  
21 days, resume home daily   
dose  
BISACODYL ORAL Take 10 mg by   
mouth once daily.  
No medication comments   
found.  
ALLERGIES  
Allergen Reactions  
- Morphine GI Upset  
- Statins-Hmg-Coa Red*   
Unknown  
R (more content not   
included)...        [facility]  
   
                                                    NURSING PROGon 2022   
   
                                        NURSING PROG        HNO ID: [id number]  
Author: Tunde Ward RN  
Service: General Surgery  
Author Type: Registered   
Nurse  
Type: Nursing Progress Note  
Filed: 2022 11:30 AM  
Note Text:  
Nursing Progress Note  
Patient Name: Jayden Soto  
MRN: [id number]  
[facility]   
[facility]  
____________________________  
____________________________  
____________  
Daily Note:Dr. Eckert   
seeing pt  
This note was completed by:   
Tunde Ward    [facility]  
   
                                                    OPERATIVE NOon 2022   
   
                                        OPERATIVE NO        HNO ID: [id number]  
Author: aDmon Ekcert MD  
Service: Orthopaedic Surgery  
Author Type: Physician  
Type: Operative Report  
Filed: 2022 11:21 AM  
Note Text:  
ORTHO OPERATIVE REPORT  
LOG ID: [id number]  
Surgery/Procedure Date:   
2022  
Incision/Procedure Start   
Time: 10:45 AM  
Incision Close/Procedure End   
Time: 10:57 AM  
Surgeon(s)/Proceduralist(s)   
and Assistant(s):  
Surgeon(s) and Role:  
* Damon Eckert MD -   
Primary  
No Additional Staff  
Procedure(s): A1 pulley   
release, right middle finger  
Anesthesia: Local  
Operative Indications: Ms. Soto is a 61 year old   
female who presents  
with a progressively   
worsening right middle   
trigger finger. The patient  
failed nonoperative measures   
which had only given   
temporary relief. We  
discussed the option of   
surgery and the risks,   
benefits, alternatives, and  
limitations of this. She   
considered these carefully   
and elected to  
proceed. No guarantees were   
stated nor implied.  
Procedure Details: The   
patient was identified in   
the preoperative holding  
area having a valid and   
signed consent sheet. All   
final questions were  
answered to satisfaction,   
and I personally marked the   
operative site at  
the right hand overlying the   
A1 pulley of the middle   
finger. A  
preoperative huddle was   
performed. Using sterile   
technique, local  
anesthetic was infiltrated   
at the level of the   
incision. This set up  
nicely and provided dense   
analgesia. The patient was   
taken back to the  
operating room. She   
transferred to the operative   
table. Anesthesia  
controlled the head, neck,   
and airway from this point   
forward. All bony  
prominences were well   
padded. A hand table was   
brought into the right.  
Time-out was performed. All   
aspects of the surgical   
safety checklist  
were reviewed and agreed   
upon by all members of the   
surgical team. I  
began first with a   
preliminary cleanse of the   
entire right upper  
extremity with isopropyl   
alcohol. Next, a formal   
ChloraPrep pre-surgical  
preparation was performed   
and 3-minute dry time was   
observed. The right  
upper extremity was then   
draped out in usual sterile   
fashion. I planned  
the incision overlying the   
A1 pulley along the distal   
palmar crease. Total  
incision length was less   
than 1 cm. The skin was   
incised sharply with  
scalpel and careful   
dissection was carried   
through the subcutaneous  
tissues. Any small crossing   
venous structures were   
cauterized with  
bipolar electrocautery. The   
dissection was carried down   
to the level of  
the A1 pulley and retractors   
were then utilized to   
protect the  
neurovascular bundles,   
radial and ulnar. The pulley   
was then incised down  
its midline and was divided   
completely proximal to   
distal. I also  
dissected and divided the   
palmar aponeurotic fascia.   
At this point, there  
was no longer any catching   
with active flexion. There   
was noted to be  
some stenosis at the level   
of the pulley, but no   
high-grade fraying or  
other abnormalities. At this   
point, the wound was   
thoroughly irrigated  
with normal saline.   
Meticulous hemostasis was   
achievedand the wound was  
closed with 4-0 nylon   
sutures placed in simple   
fashion. Next, a Xeroform  
dressing was placed over the   
incision followed by fluffs   
and a soft  
dressing to the hand. The   
patient was then transferred   
to the recovery  
chair, and taken to the PACU   
in stable condition having   
tolerated the  
procedure well.  
Total Tourniquet Time: N/A  
Pre-Op/Pre-Procedure   
Diagnosis: Right middle   
trigger finger  
Post-Op/Post-Procedure   
Diagnosis: Right middle   
trigger finger  
Estimated Blood Loss:   
minimal  
Specimens: None  
Drains: Bonilla None  
Complications: None  
Participation in Procedure:   
The primary   
surgeon/proceduralist   
performed  
the entire procedure.  
SIGNATURE: Damon Eckert MD PATIENT NAME:   
Jayden Soto  
DATE: 2022 MRN:   
[id number]  
TIME: 11:17 AM PAGER/CONTACT   
#:  
? ?                 Normal                                  [facility]  
   
                                                    C REACTIVE PROTEINon   
022   
   
                      CRP [Mass/Vol] 15.6 mg/L  High       0-10.0     [facility]  
   
                                        Comment on above:   Performed By: #### C  
REAT, ESR, CREACT, LIVR, ACBC ####  
Testing performed at [address] [address]   
   
                                                    CBCon 2022   
   
                      ABSOLUTE BAS 0.1 10*3/uL Normal     0.0-0.2    [facility]  
   
                                        Comment on above:   Performed By: #### C  
REAT, ESR, CREACT, LIVR, ACBC ####  
Testing performed at [address] [address]   
   
                      ABSOLUTE EOS 0.40 10*3/uL Normal     0.0-0.7    [facility]  
   
                                        Comment on above:   Performed By: #### C  
REAT, ESR, CREACT, LIVR, ACBC ####  
Testing performed at [address] [address]   
   
                                                    ABSOLUTE   
NEUTROPHIL COUNT 5.5 10*3/uL     Normal          1.4-6.5         [facility]  
   
                                        Comment on above:   Performed By: #### C  
REAT, ESR, CREACT, LIVR, ACBC ####  
Testing performed at [address] [address]   
   
                                                    Basophils/100 WBC   
(Bld)           1.0 %           Normal          0.0-2.0         [facility]  
   
                                        Comment on above:   Performed By: #### C  
REAT, ESR, CREACT, LIVR, ACBC ####  
Testing performed at [address] [address]   
   
                      DTYPE      AUTO DIFF  Normal                [facility]  
   
                                        Comment on above:   Performed By: #### C  
REAT, ESR, CREACT, LIVR, ACBC ####  
Testing performed at [address] [address]   
   
                                                    Eosinophils/100   
WBC (Bld)       5.3 %           Normal          0.0-11.0        [facility]  
   
                                        Comment on above:   Performed By: #### C  
REAT, ESR, CREACT, LIVR, ACBC ####  
Testing performed at [address] [address]   
   
                                                    Erythrocyte   
distribution   
width (RBC)   
[Ratio]         17.1 %          High            11.5-14.5       [facility]  
   
                                        Comment on above:   Performed By: #### C  
REAT, ESR, CREACT, LIVR, ACBC ####  
Testing performed at [address] [address]   
   
                                                    Hematocrit (Bld)   
[Volume fraction] 27.9 %          Low             36.0-48.0       [facility]  
   
                                        Comment on above:   Performed By: #### C  
REAT, ESR, CREACT, LIVR, ACBC ####  
Testing performed at [address] [address]   
   
                                                    Hemoglobin (Bld)   
[Mass/Vol]      9.2 g/dL        Low             12.0-16.0       [facility]  
   
                                        Comment on above:   Performed By: #### C  
REAT, ESR, CREACT, LIVR, ACBC ####  
Testing performed at [address] [address]   
   
                                                    Lymphocytes (Bld)   
[#/Vol]         1.20 10*3/uL    Normal          1.2-3.4         [facility]  
   
                                        Comment on above:   Performed By: #### C  
REAT, ESR, CREACT, LIVR, ACBC ####  
Testing performed at [address] [address]   
   
                                                    Lymphocytes/100   
WBC (Bld)       15.4 %          Low             20.0-55.0       [facility]  
   
                                        Comment on above:   Performed By: #### C  
REAT, ESR, CREACT, LIVR, ACBC ####  
Testing performed at [address] [address]   
   
                                                    MCH (RBC)   
[Entitic mass]  28.1 pg         Normal          26.0-35.0       [facility]  
   
                                        Comment on above:   Performed By: #### C  
REAT, ESR, CREACT, LIVR, ACBC ####  
Testing performed at [address] [address]   
   
                                                    MCHC (RBC)   
[Mass/Vol]      32.9 g/dL       Normal          27.0-37.0       [facility]  
   
                                        Comment on above:   Performed By: #### C  
REAT, ESR, CREACT, LIVR, ACBC ####  
Testing performed at [address] [address]   
   
                                                    MCV (RBC)   
[Entitic vol]   85.2 fL         Normal          80.0-100.0      [facility]  
   
                                        Comment on above:   Performed By: #### C  
REAT, ESR, CREACT, LIVR, ACBC ####  
Testing performed at [address] [address]   
   
                                                    Monocytes (Bld)   
[#/Vol]         0.8 10*3/uL     High            0.0-0.7         [facility]  
   
                                        Comment on above:   Performed By: #### C  
REAT, ESR, CREACT, LIVR, ACBC ####  
Testing performed at [address] [address]   
   
                                                    Monocytes/100 WBC   
(Bld)           9.4 %           Normal          0.0-10.0        [facility]  
   
                                        Comment on above:   Performed By: #### C  
REAT, ESR, CREACT, LIVR, ACBC ####  
Testing performed at [address] [address]   
   
                                                    Neutrophils/100   
WBC (Bld)       68.9 %          Normal          37.0-75.0       [facility]  
   
                                        Comment on above:   Performed By: #### C  
REAT, ESR, CREACT, LIVR, ACBC ####  
Testing performed at [address] [address]   
   
                                                    Platelet mean   
volume (Bld)   
[Entitic vol]   7.7 fL          Normal          7.4-11.0        [facility]  
   
                                        Comment on above:   Performed By: #### C  
REAT, ESR, CREACT, LIVR, ACBC ####  
Testing performed at [address] [place] [address]   
   
                                                    Platelets (Bld)   
[#/Vol]         257 10*3/uL     Normal          130.0-400.0     [facility]  
   
                                        Comment on above:   Performed By: #### C  
REAT, ESR, CREACT, LIVR, ACBC ####  
Testing performed at [address] [place] [address]   
   
                      RBC (Bld) [#/Vol] 3.27 10*6/uL Low        4.0-5.4    [facility]  
   
                                        Comment on above:   Performed By: #### C  
REAT, ESR, CREACT, LIVR, ACBC ####  
Testing performed at [address] [place] [address]   
   
                      WBC (Bld) [#/Vol] 8.0 10*3/uL Normal     3.6-11.0   [facility]  
   
                                        Comment on above:   Performed By: #### C  
REAT, ESR, CREACT, LIVR, ACBC ####  
Testing performed at [address] [place] [address]   
   
                                                    CNOVon 2022   
   
                                        CNOV                Office Visit ([id number]  
)  
----------------------------  
----------------------------  
------------------------  
JAYDEN SOTO ([id number])   
1960 F  
Date Time Provider   
Department  
22 9:30 AM [facility]  
During your visit today, we   
recorded the following   
information about you:  
Respiration Weight Height  
20/minute 102.1 kg 1.702 m  
Damon Eckert MD   
2022 9:52 AM Signed  
Patient presents with:  
Right Hand - New, Stiffness,   
Pain  
HISTORY OF PRESENT ILLNESS  
Jayden Soto is a 61 year   
old female right hand   
dominant who presents for  
evaluation of a right middle   
trigger finger. The patient   
reports this  
worsening over the last year   
or more. It frequently   
becomes locked and is  
rather painful. Of note she   
is recovering from a left   
septic knee arthroplasty  
which underwent explantation   
and placement of a cement   
spacer. She is being  
treated by Dr. Mitchell.  
Location: Right hand, middle   
finger  
Severity: 5 on a scale of   
0-10  
Duration of symptoms: Over 1   
year  
Date of injury n/a  
Symptoms have Worsened  
Previous treatment:   
Observation  
Context worse with  
Activity/Motion and Gripping  
Occupation: Retired bus   
  
Smoking status:  
Tobacco Use: Never  
REVIEW OF SYSTEMS  
Cardiovascular ROS:No   
history of chest pain,   
palpitation, orthopnea,   
cyanosis,  
pedal edema  
Neurologic ROS: Numbness and   
Tingling:No  
PAST MEDICAL HISTORY  
Past medical, surgical,   
family, and social histories   
have been reviewed and  
updated with the patient   
today and are located   
elsewhere in the medical   
record.  
Diabetes:Yes  
ALLERGIES  
ALLERGIES  
Allergen Reactions  
- Morphine GI Upset  
- Statins-Hmg-Coa Red*   
Unknown  
PHYSICAL EXAMINATION  
Resp 20   Ht 170.2 cm (5'   
7 )   Wt 102.1 kg (225 lb)     
BMI 35.24 kg/m?  
Body mass index is 35.24   
kg/m?.  
General Appearance Well   
appearing, alert, in no   
acute distress,   
well-hydrated,  
well nourished.  
Alert and oriented times: 3  
Normal affect times: 3  
Appears stated age and well   
nourished  
Gait and station:normal  
Right Upper Extremity Exam:  
Inspection shows a resting   
flexed posture at the middle   
finger  
No wounds or lacerations. No   
surgical incisions noted  
Skin: WNL  
Tenderness to palpation:   
Tender at middle finger A1   
pulley  
ROM: Deficit at middle   
finger, 1 cm tip to palm  
Instability: none  
Sensation:Normal sensation  
Atrophy: Intact  
Brisk capillary refill  
Special tests: Pain,   
mechanical locking with   
terminal passive flexion of   
the  
middle finger  
REVIEW OF STUDIES  
No new imaging obtained  
ASSESSMENT AND PLAN  
ASSESSMENT/PLAN:  
1. Trigger finger, right   
middle finger - ICD9:   
727.03, ICD10: M65.331  
I discussed the diagnosis   
with the patient and   
reviewed some diagrams to  
explain the relevant   
anatomy. We reviewed   
treatment options including  
injections and surgery. She   
has a history of fragile   
diabetes and has  
experienced some significant   
blood sugar fluctuations   
following previous  
injections years ago. I   
think that the safest and   
most predictable measure in  
her case would be surgical   
release of the A1 pulley.   
This could be done under  
local anesthesia as a short   
outpatient procedure. Risks,   
benefits,  
alternatives were reviewed   
with her and she consider   
these carefully and elects  
to proceed. Consent was   
obtained. We will plan on   
outpatient surgery at her  
convenience. All of her   
questions were answered to   
her satisfaction.  
Damon Eckert  
Patient educated on expected   
postoperative course and   
recovery.  
Patient instructed to call   
the office with questions or   
concerns.  
Referring Provider: SELF   
[[id number]]  
Allergies As of Date:   
2022 Noted Allergy   
Reaction  
MORPHINE 2021 8 - GI   
Upset  
STATINS-HMG-COA REDUCTASE   
INHIBIT*2021 16 -   
Unknown  
Date Reviewed: 2022  
Reviewed by: Damon Eckert MD - Fully Assessed  
Reason for Visit:  
New [278501]  
Stiffness [1661]  
Pain [78]  
Primary Visit   
Diagnosis:Trigger finger,   
right middle finger   
[M65.331]  
Prescriptions as of   
2022  
- aspirin 81 mg cap  
Take 1 capsule by mouth once   
daily. Resume once BID   
dosing is completed  
- cyclobenzaprine (FLEXERIL)   
10 mg tablet  
Take 1 tablet by mouth three   
times daily.  
- aspirin, enteric coated   
(ASPIRIN, ENTERIC COATED) 81   
mg EC tablet  
Take 1 tablet by mouth twice   
daily for 21 days. Once BID   
dosing is completed  
in 21 days, resume home   
daily dose  
- insulin glargine (LANTUS   
U-100 INSULIN) 100 unit/mL   
injection  
Inject 36 Units   
subcutaneously daily at   
bedtime.  
- insulin aspart U-100   
(NOVOLOG) 100 unit/mL  
14 units with breakfast 8   
units with lunch 10 units   
with dinner Admin  
Instructions: ADMINISTER   
CORRECTIONAL INSULIN   
REGARDLESS OF MEAL OR   
NUTRITION  
INTAKE Custom Scale If Blood   
Glucose (mg/dL) is Less than   
100 ? 0 units  
101-150 ? 0 units 151-175 ?   
2 units 176-200 ? 3 units   
201-225 ? 4 units  
226-250 ? 5 units 251-275 ?   
6 units 276-300 ? 7 units   
301-325 ? 8 units  
326-350 ? 9 units >350 ? 10   
units and Notify Provider   
Notify provider  
if 2 consecutive blood   
glucose values i (more   
content not included)... Normal                                  [facility]  
   
                                                    CREATININE,SERUMon   
2   
   
                                                    Creatinine   
[Mass/Vol]      0.88 mg/dL      Normal          0.52-1.04       [facility]  
   
                                        Comment on above:   Performed By: #### C  
REAT, ESR, CREACT, LIVR, ACBC ####  
Testing performed at [place] [id number]   
   
                                                    EST. GFR,   
American        84 ml/min/1.73sq.m [facility]  
   
                                        Comment on above:   Performed By: #### C  
REAT, ESR, CREACT, LIVR, ACBC ####  
Testing performed at [place] [id number]   
   
                                                    EST. GFR,Non   
African American 70 ml/min/1.73sq.m [facility]  
   
                                        Comment on above:   Performed By: #### C  
REAT, ESR, CREACT, LIVR, ACBC ####  
[place] [address]   
   
                                        GFR Information     Average GFR for 60-6  
9 years   
old = 85.           Normal                                  [facility]  
   
                                        Comment on above:   Result Comment:   
jarek Kidney disease, GFR = <60.  
Kidney failure, GFR = <15.  
The GFR estimate is not adjusted for extreme body surface area or   
acute process, nor has it been validated for pregnant women or   
ethnic groups other than  and .   
   
                                                            Performed By: #### C  
REAT, ESR, CREACT, LIVR, ACBC ####  
[place] [address]   
   
                                                    ESRon 2022   
   
                                                    ESR (Bld)   
[Velocity]      25 mm/h         Normal          0-30            [facility]  
   
                                        Comment on above:   Performed By: #### C  
REAT, ESR, CREACT, LIVR, ACBC ####  
Testing performed at [place] [id number]   
   
                                                    LIVER PANELon 2022   
   
                                                    Albumin   
[Mass/Vol]      3.0 g/dL        Low             3.5-5.0         [facility]  
   
                                        Comment on above:   Performed By: #### C  
REAT, ESR, CREACT, LIVR, ACBC ####  
[place] [address]   
   
                                                    ALP [Catalytic   
activity/Vol]   107 U/L         Normal                    [facility]  
   
                                        Comment on above:   Performed By: #### C  
REAT, ESR, CREACT, LIVR, ACBC ####  
Testing performed at [address] [address]   
   
                                                    ALT [Catalytic   
activity/Vol]   15 U/L          Normal          14-54           [facility]  
   
                                        Comment on above:   Performed By: #### C  
REAT, ESR, CREACT, LIVR, ACBC ####  
Testing performed at [address] [address]   
   
                                                    AST [Catalytic   
activity/Vol]   16 U/L          Normal          15-41           [facility]  
   
                                        Comment on above:   Performed By: #### C  
REAT, ESR, CREACT, LIVR, ACBC ####  
Testing performed at [address] [address]   
   
                                                    Bilirubin   
[Mass/Vol]      0.4 mg/dL       Normal          0.2-1.2         [facility]  
   
                                        Comment on above:   Performed By: #### C  
REAT, ESR, CREACT, LIVR, ACBC ####  
Testing performed at [address] [address]   
   
                                                    Bilirubin.indirec  
t [Mass/Vol]    mg/dL           Normal          0.0-0.2         [facility]  
   
                                        Comment on above:   Performed By: #### C  
REAT, ESR, CREACT, LIVR, ACBC ####  
Testing performed at [address] [address]   
   
                                                    Protein   
[Mass/Vol]      6.4 g/dL        Normal          6.3-8.2         [facility]  
   
                                        Comment on above:   Performed By: #### C  
REAT, ESR, CREACT, LIVR, ACBC ####  
Testing performed at [address] [address]   
   
                                                    [place] 2022   
   
                                        CNPN                Telephone ([id number])  
----------------------------  
----------------------------  
------------------------  
JAYDEN SOTO ([id number])   
1960 F  
Date Time Provider   
Department  
3/30/22 BASIL MITCHELL  
During your visit today, we   
recorded the following   
information about you:  
Maco Cardona    
Ppg 3/30/2022 1:53 PM Signed  
----- Message from Amita Crooks sent at 3/30/2022   
1:31 PM EDT -----  
Regarding: Orthopedics /   
Paula Knee: Pain / Post Op   
Within 90 Day Period  
Subject Line Format:   
Orthopedics / [Provider Name   
or  Open AND Body Part ] /  
[Issue]  
Patient has been identified   
by name and Date of birth   
(Y/N): Y  
Patient: Jayden Soto  
YOB: 1960  
MRN: [id number]  
Previous Provider Seen: DR. HANNAH MITCHELL  
Body Part(s) Identified:   
KNEE  
Diagnosis/Reason For Visit:   
POST OP  
Reason for the   
call/escalation: POST OP  
If reason for   
call/escalation is discharge   
from ED/ER or Hospital,   
which  
facility was the patient   
seen at: NA  
Was an appointment scheduled   
(Y/N): N  
Person calling if other than   
patient: DAUGHTER IN [facility] -   
[place]  
Return call to if other than   
patient:     
Best contact number:   
[phone]  
Thank you,  
Amita Crooks  
2022 1:31 PM  
Maco Cardona [facility]   
Ppg 3/30/2022 1:54 PM Signed  
Appointment scheduled with   
 4/15/22 @ 9:30 am.  
Maco Cardona    
Ppg  
Allergies As of Date:   
2022 Noted Allergy   
Reaction  
MORPHINE 2021 8 - GI   
Upset  
STATINS-HMG-COA REDUCTASE   
INHIBIT*2021 16 -   
Unknown  
Date Reviewed: 2022  
Reviewed by: Marjorie Francis RN - Fully Assessed  
Reason for Visit:  
Appointment [186]  
Prescriptions as of   
2022  
- aspirin 81 mg cap  
Take 1 capsule by mouth once   
daily. Resume once BID   
dosing is completed  
- cyclobenzaprine (FLEXERIL)   
10 mg tablet  
Take 1 tablet by mouth three   
times daily.  
- aspirin, enteric coated   
(ASPIRIN, ENTERIC COATED) 81   
mg EC tablet  
Take 1 tablet by mouth twice   
daily for 21 days. Once BID   
dosing is completed  
in 21 days, resume home   
daily dose  
- insulin glargine (LANTUS   
U-100 INSULIN) 100 unit/mL   
injection  
Inject 36 Units   
subcutaneously daily at   
bedtime.  
- insulin aspart U-100   
(NOVOLOG) 100 unit/mL  
14 units with breakfast 8   
units with lunch 10 units   
with dinner Admin  
Instructions: ADMINISTER   
CORRECTIONAL INSULIN   
REGARDLESS OF MEAL OR   
NUTRITION  
INTAKE Custom Scale If Blood   
Glucose (mg/dL) is Less than   
100 ? 0 units  
101-150 ? 0 units 151-175 ?   
2 units 176-200 ? 3 units   
201-225 ? 4 units  
226-250 ? 5 units 251-275 ?   
6 units 276-300 ? 7 units   
301-325 ? 8 units  
326-350 ? 9 units >350 ? 10   
units and Notify Provider   
Notify provider  
if 2 consecutive blood   
glucose values in the   
previous 24 hours are   
greater  
than 250 mg/mL and there   
have been no changes to the   
insulin regimen in the  
previous 24 hours.  
- cefTRIAXone sodium   
(ROCEPHIN) 1 gram solr  
Inject 2 g intramuscularly   
every 24 hours.  
- doxepin capsule 10 mg  
Take 20 mg by mouth daily at   
bedtime.  
- BISACODYL ORAL  
Take 10 mg by mouth once   
daily.  
- melatonin 10 mg cap  
Take 1 capsule by mouth   
daily at bedtime.  
- alirocumab (PRALUENT) 75   
mg/mL pen  
Inject 75 mg subcutaneously   
every other week.  
- DULoxetine (CYMBALTA) 60   
mg capsule  
Take 60 mg by mouth twice   
daily.  
- ranolazine SR (RANEXA)   
1,000 mg tab ER 12 hr  
Take 1 tablet by mouth twice   
daily.  
- dilTIAZem CD (CARDIZEM CD)   
180 mg 24 hr capsule  
Take 180 mg by mouth once   
daily.  
- empagliflozin (JARDIANCE)   
25 mg tablet  
Take 25 mg by mouth daily   
with breakfast.  
- GABAPENTIN ORAL  
Take 200 mg by mouth three   
times daily.  
- METOPROLOL TARTRATE ORAL  
Take 25 mg by mouth twice   
daily.  
- isosorbide mononitrate ER   
(IMDUR) 30 mg 24 hr tablet  
Take 90 mg by mouth once   
daily.  
- clopidogrel (PLAVIX) 75 mg   
tablet  
Take 75 mg by mouth once   
daily.  
Problem List As Of Date   
2022 Noted Resolved  
Septic joint of left knee   
joint (HCC) [M00.9]   
2022  
Obesity, Class II, BMI   
35-39.9 [E66.9] 2022  
Controlled type 2 diabetes   
mellitus without   
com*2022  
Primary hypertension [I10]   
2022  
Encounter Number: [id number]  
Encounter Status:Closed by   
SUZAN  MACO BROWNE on 3/30/22        Normal                                  [facility]  
   
                                                    Basic metabolic 2000 panelon  
 2022   
   
                                                    Anion gap   
[Moles/Vol]     5 mmol/L        Low             9-18            [facility]  
   
                                        Comment on above:   Order Comment: Speci  
men Type: BLOOD SPECIMEN  
Ordering Facility: [facility]  
Address: [address] [address]   
   
                                                            Performed By: #### 2  
4321-2 ####  
[facility]  
CLIA [id number]  
[address] [place] [address] [place]   
   
                                                    Calcium   
[Mass/Vol]      9.0 mg/dL       Normal          8.5-10.2        [facility]  
   
                                        Comment on above:   Order Comment: Speci  
men Type: BLOOD SPECIMEN  
Ordering Facility: [facility]  
Address: [address] [place] [address]   
   
                                                            Performed By: #### 2  
4321-2 ####  
[facility] LABORATORY  
CLIA [id number]  
[address] [place] [address] [place]   
   
                                                    Chloride   
[Moles/Vol]     103 mmol/L      Normal                    [facility]  
   
                                        Comment on above:   Order Comment: Speci  
men Type: BLOOD SPECIMEN  
Ordering Facility: [facility]  
Address: [address] [place] [address]   
   
                                                            Performed By: #### 2  
4321-2 ####  
[facility]  
CLIA [id number]  
[address] [place] [address] [place]   
   
                      CO2 [Moles/Vol] 30 mmol/L  Normal     22-30      [facility]  
   
                                        Comment on above:   Order Comment: Speci  
men Type: BLOOD SPECIMEN  
Ordering Facility: [facility]  
Address: [address] [address]   
   
                                                            Performed By: #### 2  
4321-2 ####  
[facility]  
CLIA [id number]  
[address] [place]   
   
                                                    Creatinine   
[Mass/Vol]      1.12 mg/dL      High            0.58-0.96       [facility]  
   
                                        Comment on above:   Order Comment: Speci  
men Type: BLOOD SPECIMEN  
Ordering Facility: [facility]  
Address: [address] [address]   
   
                                                            Performed By: #### 2  
4321-2 ####  
[facility] LABORATORY  
CLIA [id number]  
1 [place] [address] [place]   
   
                                                    ESTIMATED   
GLOMERULAR   
FILTRATION RATE 56 mL/min/1.73m??? Low             >=60            [facility]  
   
                                        Comment on above:   Order Comment: Speci  
men Type: BLOOD SPECIMEN  
Ordering Facility: [facility]  
Address: [address] [place] [address]   
   
                                                            Result Comment: Sydnee  
mated Glomerular Filtration Rate (eGFR) is   
calculated using the  CKD-EPI creatinine equation. This equation   
utilizes serum creatinine, sex, and age as parameters. The   
creatinine assay has traceable calibration to isotope dilution-mass   
spectrometry. Refer to KDIGO guidelines for clinical interpretation.   
In patients with unstable renal function, e.g. those with acute   
kidney injury, the eGFR may not accurately reflect actual GFR.   
   
                                                            Performed By: #### 2  
4321-2 ####  
[facility]  
CLIA [id number]  
[address] [place] [address] [place]   
   
                                                    Glucose   
[Mass/Vol]      74 mg/dL        Normal          74-99           [facility]  
   
                                        Comment on above:   Order Comment: Speci  
men Type: BLOOD SPECIMEN  
Ordering Facility: [facility]  
Address: [address] [place] [address]   
   
                                                            Result Comment: The   
American Diabetes Association (ADA) provides   
guidance for cutoff values for fasting glucose and random glucose.   
The ADA defines fasting as no caloric intake for at least 8 hours.   
Fasting plasma glucose results between 100 to 125 mg/dL indicate   
increased risk for diabetes (prediabetes).  
Fasting plasma glucose results greater than or equal to 126 mg/dL   
meet the criteria for diagnosis of diabetes. In the absence of   
unequivocal hyperglycemia, results should be confirmed by repeat   
testing. In a patient with classic symptoms of hyperglycemia or   
hyperglycemic crisis, random plasma glucose results greater than or   
equal to 200 mg/dL meet the criteria for diagnosis of diabetes.  
Reference: Standards of Medical Care in Diabetes 2016, American   
Diabetes Association. Diabetes Care. 2016.39(Suppl 1).   
   
                                                            Performed By: #### 2  
4321-2 ####  
[facility] LABORATORY  
CLIA [id number]  
[address] [place] [address] [place]   
   
                                                    Potassium   
[Moles/Vol]     4.3 mmol/L      Normal          3.7-5.1         [facility]  
   
                                        Comment on above:   Order Comment: Speci  
men Type: BLOOD SPECIMEN  
Ordering Facility: [facility]  
Address: [address] [place] [address]   
   
                                                            Performed By: #### 2  
4321-2 ####  
[facility]  
CLIA [id number]  
[address]   
   
                                                    Sodium   
[Moles/Vol]     138 mmol/L      Normal          136-144         [facility]  
   
                                        Comment on above:   Order Comment: Speci  
men Type: BLOOD SPECIMEN  
Ordering Facility: [facility]  
Address: [address] [place] [address]   
   
                                                            Performed By: #### 2  
4321-2 ####  
[facility] LABORAT[facility]  
CLIA [id number]  
[address]   
   
                                                    Urea nitrogen   
[Mass/Vol]      17 mg/dL        Normal          7-21            [facility]  
   
                                        Comment on above:   Order Comment: Speci  
men Type: BLOOD SPECIMEN  
Ordering Facility: [facility]  
Address: [address] [place] [address]   
   
                                                            Performed By: #### 2  
4321-2 ####  
[facility] LABORATORY  
CLIA [id number]  
[address]   
   
                                                    CASE MANAGEMon 2022   
   
                                        CASE MANAGEM        HNO ID: [id number]  
Author: Bev Steele RN  
Service: Nursing  
Author Type: Registered   
Nurse  
Type: Care Mgt Progress Note  
Filed: 3/29/2022 4:02 PM  
Note Text:  
CARE MANAGEMENT DISCHARGE   
NOTE  
SERVICE DATE: 3/29/2022  
SERVICE TIME: 3:17 PM  
LOS: 8 days  
Admission Date: 3/21/2022  
DISCHARGE ARRANGEMENT (list   
agency and phone number)  
Discharge Arrangement: Home   
with Home Health  
Provider Name: [facility] Phone:   
[phone]  
CAREGIVER ASSESSMENT:  
Caregiver is ready, willing   
and able to meet the   
patient's needs as  
recommended by the   
inter-professional team:: No   
Caregiver needed  
Does the patient have an   
acute stroke diagnosis, or   
has the patient had a  
stroke during this   
admission?: No  
Patient's transition needs   
and plan for meeting these   
needs: home care and  
home pharmacy  
TRANSPORTATION ARRANGEMENTS:  
Transportation Arrangements:   
Car  
Caregiver is ready, willing   
and able to meet the   
patient's needs as  
recommended by the   
inter-professional team:: No   
Caregiver needed  
Does the patient have an   
acute stroke diagnosis, or   
has the patient had a  
stroke during this   
admission?: No  
Needs Prior to Discharge:   
Ready for Discharge  
Discharge orders in , pt   
going home with IV   
antibiotics with assistance  
from [facility]   
and [facility]   
Infusions ; SOC set up  
through [facility] set up with   
daugher-in-law at 9:00 am  
tomorrow morning; supplies   
through [facility] being   
delivered tonight;  
pt's son is able to provide   
transportation tonight; home   
care order and  
discharge summary sent to   
[facility] and   
[facility]; pt  
ready for dc from care   
management standpoint  
SIGNATURE: Bev Steele RN   
PATIENT NAME: Jayden Soto  
DATE: 2022 MRN:   
[id number]  
TIME: 3:16 PM PAGER/CONTACT   
#: [id number]       Normal                                  [facility]  
   
                                                    CBC panel Auto (Bld)on    
   
                                                    Erythrocyte   
distribution   
width (RBC)   
[Ratio]         15.3 %          High            11.5-15.0       [facility]  
   
                                        Comment on above:   Order Comment: Speci  
men Type: BLOOD SPECIMENOrdering Facility:  
   
[facility] Address: [address] [place] [address]   
   
                                                            Performed By: #### 5  
8410-2 ####[facility] LABORATORYCLIA   
[id number] [place] [address] [place]   
   
                                                    Hematocrit (Bld)   
[Volume fraction] 27.7 %          Low             36.0-46.0       [facility]  
   
                                        Comment on above:   Order Comment: Speci  
men Type: BLOOD SPECIMENOrdering Facility:  
  
[facility] Address: [address] [address]   
   
                                                            Performed By: #### 5  
8410-2 ####[facility] LABORATORYCLIA   
[id number] [place] [address] [place]   
   
                                                    Hemoglobin (Bld)   
[Mass/Vol]      8.3 g/dL        Low             11.5-15.5       [facility]  
   
                                        Comment on above:   Order Comment: Speci  
men Type: BLOOD SPECIMENOrdering Facility:  
   
[facility] Address: [address] [address]   
   
                                                            Performed By: #### 5  
8410-2 ####[facility] LABORATORYCLIA   
[id number] [address]   
   
                                                    MCH (RBC)   
[Entitic mass]  27.9 pg         Normal          26.0-34.0       [facility]  
   
                                        Comment on above:   Order Comment: Speci  
men Type: BLOOD SPECIMENOrdering Facility:  
  
[facility] Address: [address] [place] [address]   
   
                                                            Performed By: #### 5  
8410-2 ####[facility] LABORATORYCLIA   
[id number] [address]   
   
                                                    MCHC (RBC)   
[Mass/Vol]      30.0 g/dL       Low             30.5-36.0       [facility]  
   
                                        Comment on above:   Order Comment: Speci  
men Type: BLOOD SPECIMENOrdering Facility:  
   
[facility] Address: [address] [place] [address]   
   
                                                            Performed By: #### 5  
8410-2 ####[facility] LABORATORYCLIA   
[id number] [address]   
   
                                                    MCV (RBC)   
[Entitic vol]   93.0 fL         Normal          80.0-100.0      [facility]  
   
                                        Comment on above:   Order Comment: Speci  
men Type: BLOOD SPECIMENOrdering Facility:  
  
[facility] Address: [address] [place] [address]   
   
                                                            Performed By: #### 5  
8410-2 ####[facility] LABORATORYCLIA   
[id number] [address] [place] [facility]   
   
                                                    Nucleated RBC   
(Bld) [#/Vol]   0.02 10*3/uL    High            <0.01           [facility]  
   
                                        Comment on above:   Order Comment: Speci  
men Type: BLOOD SPECIMENOrdering Facility:  
   
[facility] Address: [address] [place] [address]   
   
                                                            Performed By: #### 5  
8410-2 ####[facility] LABORATORYCLIA   
[id number] [address]   
   
                                                    Platelet mean   
volume (Bld)   
[Entitic vol]   9.5 fL          Normal          9.0-12.7        [facility]  
   
                                        Comment on above:   Order Comment: Speci  
men Type: BLOOD SPECIMENOrdering Facility:  
  
[facility] Address: [address] [place] [address]   
   
                                                            Performed By: #### 5  
8410-2 ####[facility] LABORATORYCLIA   
[id number] [address]   
   
                                                    Platelets (Bld)   
[#/Vol]         381 10*3/uL     Normal          150-400         [facility]  
   
                                        Comment on above:   Order Comment: Speci  
men Type: BLOOD SPECIMENOrdering Facility:  
   
[facility] Address: [address] [place] [address]   
   
                                                            Performed By: #### 5  
8410-2 ####[facility] LABORATORYCLIA   
[id number] [address]   
   
                      RBC (Bld) [#/Vol] 2.98 10*6/uL Low        3.90-5.20  [facility]  
   
                                        Comment on above:   Order Comment: Speci  
men Type: BLOOD SPECIMENOrdering Facility:  
   
[facility] Address: [address] [place] [address]   
   
                                                            Performed By: #### 5  
8410-2 ####[facility] LABORATORYCLIA   
[id number] [address]   
   
                      WBC (Bld) [#/Vol] 9.93 10*3/uL Normal     3.70-11.00 [facility]  
   
                                        Comment on above:   Order Comment: Speci  
men Type: BLOOD SPECIMENOrdering Facility:  
   
[facility] Address: [address] [place] [address]   
   
                                                            Performed By: #### 5  
8410-2 ####[facility] LABORATORYCLIA   
[id number] [address]   
   
                                                    CNDSon 2022   
   
                                        CNDS                HNO ID: [id number]  
Author: JASON RolandCNP  
Service: Orthopaedic Surgery  
Author Type: Nurse   
Practitioner  
Type: Discharge Summary  
Filed: 3/29/2022 12:42 PM  
Note Text:  
----------------------------  
----------------------------  
------------------------  
Attestation signed by Basil Mitchell MD at 4/3/2022 6:55   
PM  
agree  
----------------------------  
----------------------------  
------------------------  
DISCHARGE SUMMARY  
PATIENT NAME: Jayden Soto   
Code Status: Not on file  
MRN: [id number]  
Highest Readmission Risk   
Score: 19  
The 30 day readmissions risk   
score is derived from an   
internally  
validated risk model which   
evaluates patient level   
characteristics,  
utilization history,   
medication orders and lab   
results up until the day of  
discharge. Patients with a   
score of 40 or above are   
considered highest  
risk for readmission.   
Specific patient level   
drivers will be listed at   
the  
bottom of the summary.  
Admission Information  
Admission Information  
ADMIT DATE: 3/21/2022  
DISCHARGE DATE: 3/29/2022  
MY DOCTORS AND MEDICAL TEAM:  
My Main Hospital Doctor:   
Basil Mitchell MD  
Primary Care Provider:   
Becky Corrales MD, MD  
My Medical Team Members:   
Treatment Team:  
Attending Provider: Basil Mitchell MD  
Consulting: Alvin So MD  
Consulting: Jerry Fink  
Consulting: Jerry Simms Infectious   
Disease  
MY CONDITION AT DISCHARGE:   
Stable  
REASON I WAS IN THE   
HOSPITAL: Left prosthetic   
knee infection  
SUMMARY OF WHAT HAPPENED   
WHILE I WAS IN THE HOSPITAL:   
Underwent irrigation  
and debridement of knee with   
spacer placement. On IV   
antibiotics.  
Discharged in stable   
condition.  
PICC line placed 3/25/2022  
OTHER PROBLEMS/DIAGNOSIS:  
Active Problems:  
Septic joint of left knee   
joint (HCC)  
Obesity, Class II, BMI   
35-39.9  
Controlled type 2 diabetes   
mellitus without   
complication, with long-term  
current use of insulin (HCC)  
Primary hypertension  
Resolved Problems:  
* No resolved hospital   
problems. *  
OPERATIONS PERFORMED WHILE   
IN THE HOSPITAL: None  
IMPORTANT TEST/PROCEDURES:  
No procedures performed  
TEST RESULTS NOT AVAILABLE   
AT THIS TIME:  
No pending results  
Discharge Disposition  
Discharge Disposition: Home   
With Home health care  
Activity When You Leave the   
Hospital  
No baths or showers for:  
Until first postoperative   
visit.  
Diet Instructions  
Resume your pre-hospital   
diet  
For Pain When You Leave the   
Hospital  
Use acetaminophen (Tylenol)   
as recommended on the bottle  
Use ibuprofen (Motrin,   
Advil) as recommended on the   
bottle  
Use the dispensed medication   
(see prescription)  
Wound/Surgical Site Care  
Do not change or remove your   
dressing  
Keep your dressing clean and   
dry  
Call Your Doctor If  
There is an unusual odor   
from the wound area  
There is severe pain at the   
operative site  
You have a severe headache  
You have lightheadedness,   
fainting, or confusion  
You have persistent   
nausea/vomiting over 24   
hours  
You have persistent or heavy   
bleeding  
You have redness, swelling,   
pus or drainage from the   
wound  
You have swollen glands or   
cold and clammy skin  
Your temperature is greater   
than 101F  
Follow Up Appointments  
Follow-Up Appointment  
When: In 1 week  
Basil Mitchell MD  
[phone]  
[address]  
[facility] [address]  
PCP Requested Referral  
Follow up with Dr. Con Lovett, infectious disease   
in 3 weeks. Call to  
schedule Appointment  
Follow up with your PCP or   
endocrinologist for improved   
glycemic control.  
FOLLOW-UP APPOINTMENTS   
ALREADY SCHEDULED WITH A   
[facility] PROVIDER:  
No future appointments.  
ALLERGIES  
Allergen Reactions  
- Morphine GI Upset  
- Statins-Hmg-Coa Red*   
Unknown  
DISCHARGE MEDICATION:   
Current Discharge Medication   
List  
Medication List  
START taking these   
medications  
insulin glargine 100 unit/mL   
injection  
Commonly known as: LANTUS   
U-100 INSULIN  
Inject 36 Units   
subcutaneously daily at   
bedtime.  
Replaces: LANTUS   
SUBCUTANEOUS  
oxyCODONE IR 5 mg immediate   
release tablet  
Commonly known as:   
ROXICODONE  
Take 1 tablet by mouth every   
6 hours as needed for pain   
for up to 5 days.  
CHANGE how you take these   
medications  
* aspirin, enteric coated 81   
mg EC tablet  
Commonly known as: ASPIRIN,   
ENTERIC COATED  
Take 1 tablet by mouth twice   
daily for 21 days.  
Once BID dosing is completed   
in 21 days, resume home   
daily dose  
What changed: You were   
already taking a medication   
with the same name, and  
this prescription was added.   
Make sure you understand how   
and when to take  
each.  
* aspirin 81 mg Cap  
Take 1 capsule by mouth once   
daily. Resume once BID   
dosing is completed  
What changed: additional   
instructions  
insulin aspart U-100 100   
unit/mL  
Commonly known as: NovoLOG  
14 units with breakfast  
8 units with lunch  
10 units with dinner  
Admin Instructions:   
ADMINISTER CORRECTIONAL   
INSULIN REGARDLESS OF MEAL   
OR  
NUTRITION INTAKE  
Custom Scale  
If Blood Glucose (mg/dL) is  
Less than 100 ? 0 units  
101-150 ? 0 units  
151-175 ? 2 units  
176-200 ? 3 units  
201-225 ? 4 units  
226-2 (more content not   
included)...        Normal                                  [facility]  
   
                                                    CONSULT PROGon 2022   
   
                                        CONSULT PROG        HNO ID: [id number]  
Author: Floridalma Mckeon PA-C  
Service: Infectious Disease  
Author Type: Physician   
Assistant  
Type: Consult Progress Note  
Filed: 3/29/2022 3:30 PM  
Note Text:  
----------------------------  
----------------------------  
------------------------  
Attestation signed by Con Lovett III, MD at   
3/29/2022 3:42 PM  
Discussed with Floridalma Yu PA-C. Ceftriaxone   
through 22 for MSSA left  
TKA infection s/p resection.  
----------------------------  
----------------------------  
------------------------  
----------------------------  
----------------------------  
------------------------  
Summary: ID signing off  
----------------------------  
----------------------------  
------------------------  
INFECTIOUS DISEASE CONSULT   
PROGRESS NOTE  
SERVICE DATE: 2022  
SERVICE TIME: 10:25 AM  
BRIEF SUMMARY: 61 year old   
female history of MSSA left   
TKA PJI s/p DAIR in  
 at outside hospital on   
suppressive therapy with   
doxycycline  
presented to [facility] 3/21 for   
breakthrough of PJI through   
suppression.  
ASSESSMENT:  
1. Left TKA infection due to   
MSSA s/p resection   
arthroplasty and  
articulating spacer   
placement with vancomycin   
and tobramycin  
3/24/2022--possible   
destination spacer  
2. Left total knee   
arthroplasty prosthetic   
joint infection with MSSA  
status post irrigation   
debridement with joint   
retention at outside  
hospital in   
3. Disseminated MSSA   
infection in  from foot   
wound complicated by  
lumbar spine infection   
status post surgical   
intervention but no  
instrumentation per patient  
4. Comorbidities including   
obesity and type 2 DM on   
insulin which  
complicate mgmt and recovery  
RECOMMENDATIONS:  
-Stop doxycycline.  
-Continue ceftriaxone 2 g IV   
every 24 hours for 6 weeks   
from resection  
arthroplasty through   
2022. CoPAT complete.  
-Follow-up in ID clinic has   
been arranged for 2022.  
ID team will sign off.   
Please Epic chat/call with   
questions.  
Subjective  
INTERVAL HISTORY:  
3/29- No complaints today.   
Some discomfort in her back.   
Left knee is  
painful. Dismissal planning   
is underway.  
PERTINENT ROS: No nausea,   
vomiting, diarrhea, skin   
rash.  
Active Antimicrobials (From   
admission, onward)  
Start Stop  
22 1900 doxycycline   
hyclate 100 mg cap(s)   
(VIBRAMYCIN) 100 mg,  
ORAL, EVERY 12 HRS AT   
7AM/7PM  
--  
22 1500 cefTRIAXone iv   
piggyback 2 g in dextrose   
(iso-osmotic) 50  
mL (ROCEPHIN) 2 g,   
INTRAVENOUS, EVERY 24 HOURS  
--  
22 1300 miconazole 2 %   
1 application topical powder   
(LOTRIMIN AF,  
DESENEX) 1 application,   
TOPICAL, 2 TIMES DAILY  
--  
Objective  
PHYSICAL EXAM:  
Physical Exam  
Constitutional:  
General: She is not in acute   
distress.  
Appearance: Normal   
appearance. She is not   
ill-appearing,  
toxic-appearing or   
diaphoretic.  
Musculoskeletal:  
Comments: She has some   
tenderness with palpation in   
the lumbar spine  
and paraspinal muscles.   
Prior incisions are healed.   
No evidence of  
dehiscence, erythema, or   
swelling.  
Left knee wrapped in Ace   
dressing.  
Skin:  
General: Skin is warm and   
dry.  
Findings: No rash.  
Neurological:  
Mental Status: She is alert   
and oriented to person,   
place, and time.  
Psychiatric:  
Mood and Affect: Mood   
normal.  
Behavior: Behavior normal.  
/61   Pulse 63   Temp   
(Src) 97.7 (Temporal)   Resp   
16   Ht 5' 7   
(1.70m)   Wt 248 lb 10.9 oz   
(112.8kg)   SpO2 96%   BMI   
38.94 kg/(m2).  
O2 Therapy: Room Air  
Lines, Drains, and Airways  
Line  
Central Line Single Lumen   
22 1319 Peripherally   
Inserted (PICC)  
Right Arm Through Introducer   
4.0 French 3 days  
DATA:  
Labs  
Hemoglobin 8.3, WBC 9.93,   
platelets 381  
Estimated Creatinine   
Clearance: 69.2 mL/min (A)   
(based on SCr of 1.12  
mg/dL (H)).  
Microbiology  
3/7/21 Left knee synovial   
fluid-  
3/23/2022 left knee   
arthroplasty?  
SIGNATURE: Floridalma Mckeon PA-C PATIENT NAME: Jayden Soto  
DATE: 2022 MRN:   
[id number]  
TIME: 10:25 AM CONTACT #:   
[phone]  
Discussed with Dr. Lovett. [facility]  
   
                                        CONSULT PROG        HNO ID: [id number]  
Author: April Dc MD  
Service: Endocrinology  
Author Type: Physician  
Type: Consult Progress Note  
Filed: 3/29/2022 9:10 AM  
Note Text:  
ENDOCRINOLOGY CONSULT   
PROGRESS NOTE  
SERVICE DATE: 3/29/2022  
SERVICE TIME: 7:07 AM  
Subjective  
INTERVAL HPI: Following for   
DM type 2.  
The patient is a 61-year-old   
female, who was admitted on   
, with  
left knee pain and swelling.   
She was found to have an   
infected left knee  
arthroplasty. She had   
surgery on , removal   
of knee arthroplasty,  
left knee with irrigation   
and debridement of left knee   
and insertion of  
antibiotic spacer.  
?Patient has history of type   
2 diabetes for 32 years,   
diagnosed when she  
was pregnant. She was on   
oral agents for several   
years. She has been on  
insulin for about 15 years.   
She is on Lantus 40 units at   
bedtime and  
NovoLog 13 units with each   
meal; current dose for at   
least 1 year. She is  
also on Jardiance 25 mg a   
day. She checks blood sugar   
3 times a day. They  
used to be less than 190,   
but since she had infection   
last year in her  
knee, her blood sugars have   
been high over 200s and   
300s; sometimes, it  
has gone as high as 500 and   
600. Her blood sugars have   
been fluctuating  
here. She was initially   
given Lantus 25 units at   
bedtime and then it was  
increased to 40 units. She   
was n.p.o. and NovoLog was   
being held off and  
on. She has lost about 80   
pounds weight over the last   
year. She has  
history of retinopathy and   
gets injections. She also   
had cataract  
surgery. She has history of   
neuropathy and numbness in   
both feet. She  
did not have any kidney   
problems.  
Notes and orders reviewed.   
D/C plan in progress.  
Diet: DIET CARBOHYDRATE   
CONTROLLED p.o intake good,   
following diet.  
Activity:Up with Assistance,   
has PT; ambulating  
Review of Systems: c/o left   
knee pain, no SOB, no   
nausea, no emesis; rest  
negative  
Current   
Facility-Administered   
Medications  
Medication Dose Route   
Frequency  
- ondansetron (PF) 4 mg   
injection (ZOFRAN) 4 mg   
INTRAVENOUS q 6 H PRN  
- NaCl 0.9% iv flush bag 20   
mL INTRAVENOUS PRN  
- sodium chloride 0.9 %   
(flush) 3-5 mL (BD   
POSIFLUSH) 3-5 mL   
INTRAVENOUS  
q 12 H  
- sodium chloride 0.9 %   
(flush) 2-10 mL (BD   
POSIFLUSH) 2-10 mL  
INTRAVENOUS q 12 H  
- DULoxetine 60 mg cap(s)   
(CYMBALTA) 60 mg ORAL BID  
- ranolazine ER 1,000 mg   
tab(s) (RANEXA) 1,000 mg   
ORAL BID  
- dilTIAZem  mg cap(s)   
(CARDIZEM CD, CARTIA XT) 180   
mg ORAL DAILY  
- gabapentin 200 mg cap(s)   
(NEURONTIN) 200 mg ORAL TID  
- isosorbide mononitrate ER   
90 mg tab(s) (IMDUR) 90 mg   
ORAL DAILY  
- clopidogrel 75 mg tab(s)   
(PLAVIX) 75 mg ORAL DAILY  
- metoprolol tartrate (short   
acting) 25 mg tab(s)   
(LOPRESSOR) 25 mg ORAL  
q 12 H  
- dextrose 40 % 15 g 15 g   
ORAL PRN  
Or  
- glucagon 1 mg injection 1   
mg INTRAMUSCULAR PRN  
Or  
- dextrose 50% in water 25   
mL syringe 12.5 g   
INTRAVENOUS PRN  
- empagliflozin 25 mg tab(s)   
(JARDIANCE) 25 mg ORAL DAILY  
- docusate sodium 100 mg   
cap(s) (COLACE) 100 mg ORAL   
BID  
- aspirin, enteric coated 81   
mg tab(s) 81 mg ORAL BID  
- sodium chloride 0.9 %   
(flush) 3-5 mL (BD   
POSIFLUSH) 3-5 mL   
INTRAVENOUS  
q 12 H  
- miconazole 2 % 1   
application topical powder   
(LOTRIMIN AF, DESENEX) 1  
application TOPICAL BID  
- acetaminophen 1,000 mg   
tab(s) (TYLENOL) 1,000 mg   
ORAL q 6 H  
- oxyCODONE IR 5-10 mg   
tab(s) (ROXICODONE) 5-10 mg   
ORAL q 4 H PRN  
- traMADol 50 mg tab(s)   
(ULTRAM) 50 mg ORAL q 6 H  
- morphine 2 mg injection 2   
mg INTRAVENOUS q 3 H PRN  
- melatonin 6 mg tab(s) 6 mg   
ORAL DAILY (8 PM)  
- insulin lispro pen (rapid   
acting) (HumaLOG KWIKPEN)   
SUBCUTANEOUS w  
MEALS  
- sodium chloride 0.9 %   
(flush) 10 mL (BD POSIFLUSH)   
10 mL INTRAVENOUS q  
12 H  
- sodium chloride 0.9 %   
(flush) 20 mL (BD POSIFLUSH)   
20 mL INTRAVENOUS  
PRN  
- senna-docusate 8.6-50 mg 1   
tablet (SENNA-S) 1 tablet   
ORAL BID  
- calcium carbonate 500 mg   
chewable tab(s) (TUMS) 500   
mg ORAL TID PRN  
- insulin glargine 36 Units   
pen (long acting) (LANTUS   
SOLOSTAR, BASAGLAR  
KWIKPEN) 36 Units   
SUBCUTANEOUS AT BEDTIME  
- insulin lispro 8 Units pen   
(rapid acting) (HumaLOG   
KWIKPEN) 8 Units  
SUBCUTANEOUS DAILY wLUNCH  
- insulin lispro 10 Units   
pen (rapid acting) (HumaLOG   
KWIKPEN) 10 Units  
SUBCUTANEOUS DAILY wDINNER  
- insulin lispro 14 Units   
pen (rapid acting) (HumaLOG   
KWIKPEN) 14 Units  
SUBCUTANEOUS DAILY WITH   
BREAKFAST  
- cefTRIAXone iv piggyback 2   
g in dextrose (iso-osmotic)   
50 mL (ROCEPHIN)  
2 g INTRAVENOUS q 24 H  
- doxycycline hyclate 100 mg   
cap(s) (VIBRAMYCIN) 100 mg   
ORAL q 12 h  
7am/7pm  
- diphenhydrAMINE 50 mg   
injection (BENADRYL) 50 mg   
INTRAVENOUS q 6 H PRN  
- cyclobenzaprine 5 mg   
tab(s) (FLEXERIL) 5 mg ORAL   
TID PRN  
Objective  
PHYSICAL EXAM:  
GENERAL: lying in bed, in no   
acute distress, mildly   
obese.  
SKIN: Warm, dry, has heel   
protector dressings over   
both heels. No rash.  
No ulcers or calluses   
noticed. Left knee bandaged.  
HEENT: Head is normocephalic   
and atraumatic. Eyes, pupils   
round, EOMI.  
Buccal mucosa is dry. No   
thrush.  
NECK: Supple. No goiter   
palpable on thyroid (more   
content not included)... Normal                                  [facility]  
   
                                                    NUTRITIONon 2022   
   
                                        NUTRITION           HNO ID: [id number]  
Author: Karina Viveros DTR  
Service: Nutrition Therapy  
Author Type: Dietetic   
Technician  
Type: Nutrition  
Filed: 3/29/2022 1:20 PM  
Note Text:  
NUTRITION THERAPY  
DIETETIC TECHNICIAN NOTE  
SERVICE DATE: 3/29/2022  
SERVICE TIME: 1028  
Visit Type: Length of Stay  
Patient reports   
unintentional weight loss of   
80# over the past year. Per  
Epic patient gained weight   
since 2021. Weight loss   
is not significant  
for malnutrition.  
Plan of Care:  
[facility]  
Nursing Admission Assessment   
Malnutrition Score: 0  
Nutrition Intake:  
Diet Orders (From admission,   
onward)  
Start Ordered  
22 0645 DIET   
CARBOHYDRATE CONTROLLED   
START NOW  
Question: Carbohydrate   
Control Answer: CONSISTENT   
CARBOHYDRATE  
22 0632  
Average intake over: Unable   
to determine  
Appetite: Good (Po intake   
%, eating well and   
enough)  
GI Symptoms:   
Constipation;Gaseous (acid   
reflux)  
Anthropometrics:  
Body mass index is 38.95   
kg/m?.  
Usual Weight: 123.4 kg (272   
lb) (a year ago per patient)  
Weight Change: Increased  
MNT Billing:  
$ Routine Care : 1-15   
minutes  
SIGNATURE: Karina Viveros DTR PATIENT NAME: Jayden Soto  
DATE: 2022 MRN:   
[id number]  
TIME: 1:18 PM       Normal                                  [facility]  
   
                                                    Basic metabolic 2000 panelon  
 2022   
   
                                                    Anion gap   
[Moles/Vol]     7 mmol/L        Low             9-18            [facility]  
   
                                        Comment on above:   Order Comment: Speci  
men Type: BLOOD SPECIMEN  
Ordering Facility: [facility]  
Address: [address] [place] [id number]   
   
                                                            Performed By: #### 2  
4321-2 ####  
[facility]  
CLIA [id number]  
[address] [place]   
   
                                                    Calcium   
[Mass/Vol]      8.7 mg/dL       Normal          8.5-10.2        [facility]  
   
                                        Comment on above:   Order Comment: Speci  
men Type: BLOOD SPECIMEN  
Ordering Facility: [facility]  
Address: [address] [place] [address]   
   
                                                            Performed By: #### 2  
4321-2 ####  
[facility]  
CLIA [id number]  
[address] [place] [address] [place]   
   
                                                    Chloride   
[Moles/Vol]     102 mmol/L      Normal                    [facility]  
   
                                        Comment on above:   Order Comment: Speci  
men Type: BLOOD SPECIMEN  
Ordering Facility: [facility]  
Address: [address] [place] [address]   
   
                                                            Performed By: #### 2  
4321-2 ####  
[facility]  
CLIA [id number]  
[address] [place]   
   
                      CO2 [Moles/Vol] 29 mmol/L  Normal     22-30      [facility]  
   
                                        Comment on above:   Order Comment: Speci  
men Type: BLOOD SPECIMEN  
Ordering Facility: [facility]  
Address: [address] [place] [address]   
   
                                                            Performed By: #### 2  
4321-2 ####  
[facility]  
CLIA [id number]  
[address] [place]   
   
                                                    Creatinine   
[Mass/Vol]      1.28 mg/dL      High            0.58-0.96       [facility]  
   
                                        Comment on above:   Order Comment: Speci  
men Type: BLOOD SPECIMEN  
Ordering Facility: [facility]  
Address: [address] [place] [address]   
   
                                                            Performed By: #### 2  
4321-2 ####  
[facility]  
CLIA [id number]  
[address]   
   
                                                    ESTIMATED   
GLOMERULAR   
FILTRATION RATE 48 mL/min/1.73m??? Low             >=60            [facility]  
   
                                        Comment on above:   Order Comment: Speci  
men Type: BLOOD SPECIMEN  
Ordering Facility: [facility]  
Address: [address] [place] [address]   
   
                                                            Result Comment: Sydnee  
mated Glomerular Filtration Rate (eGFR) is   
calculated using the  CKD-EPI creatinine equation. This equation   
utilizes serum creatinine, sex, and age as parameters. The   
creatinine assay has traceable calibration to isotope dilution-mass   
spectrometry. Refer to KDIGO guidelines for clinical interpretation.   
In patients with unstable renal function, e.g. those with acute   
kidney injury, the eGFR may not accurately reflect actual GFR.   
   
                                                            Performed By: #### 2  
4321-2 ####  
[facility]  
CLIA [id number]  
[address] [place] [address] [place]   
   
                                                    Glucose   
[Mass/Vol]      159 mg/dL       High            74-99           [facility]  
   
                                        Comment on above:   Order Comment: Speci  
men Type: BLOOD SPECIMEN  
Ordering Facility: [facility]  
Address: [address] [place] [address]   
   
                                                            Result Comment: The   
American Diabetes Association (ADA) provides   
guidance for cutoff values for fasting glucose and random glucose.   
The ADA defines fasting as no caloric intake for at least 8 hours.   
Fasting plasma glucose results between 100 to 125 mg/dL indicate   
increased risk for diabetes (prediabetes).  
Fasting plasma glucose results greater than or equal to 126 mg/dL   
meet the criteria for diagnosis of diabetes. In the absence of   
unequivocal hyperglycemia, results should be confirmed by repeat   
testing. In a patient with classic symptoms of hyperglycemia or   
hyperglycemic crisis, random plasma glucose results greater than or   
equal to 200 mg/dL meet the criteria for diagnosis of diabetes.  
Reference: Standards of Medical Care in Diabetes 2016, American   
Diabetes Association. Diabetes Care. 2016.39(Suppl 1).   
   
                                                            Performed By: #### 2  
4321-2 ####  
[facility]  
CLIA [id number]  
[address] [place] [address] [place]   
   
                                                    Potassium   
[Moles/Vol]     4.3 mmol/L      Normal          3.7-5.1         [facility]  
   
                                        Comment on above:   Order Comment: Speci  
men Type: BLOOD SPECIMEN  
Ordering Facility: [facility]  
Address: [address] [place] [address]   
   
                                                            Performed By: #### 2  
4321-2 ####  
[facility]  
CLIA [id number]  
[address] [place] [address] [place]   
   
                                                    Sodium   
[Moles/Vol]     138 mmol/L      Normal          136-144         [facility]  
   
                                        Comment on above:   Order Comment: Speci  
men Type: BLOOD SPECIMEN  
Ordering Facility: [facility]  
Address[place] [facility] [place] [address]   
   
                                                            Performed By: #### 2  
4321-2 ####  
[facility]  
CLIA [id number]  
[address]   
   
                                                    Urea nitrogen   
[Mass/Vol]      16 mg/dL        Normal          7-21            [facility]  
   
                                        Comment on above:   Order Comment: Speci  
men Type: BLOOD SPECIMEN  
Ordering Facility: [facility]  
Address: [address] [place] [address]   
   
                                                            Performed By: #### 2  
4321-2 ####  
[facility]  
CLIA [id number]  
[address]   
   
                                                    CASE MANAGEMon 2022   
   
                                        CASE MANAGEM        HNO ID: [id number]  
Author: Bev Steele RN  
Service: Nursing  
Author Type: Registered   
Nurse  
Type: Care Mgt Progress Note  
Filed: 3/28/2022 4:38 PM  
Note Text:  
CARE MANAGEMENT PROGRESS   
NOTE  
SERVICE DATE: 3/28/2022  
SERVICE TIME: 4:33 PM  
LOS: 7 days  
DC Plan: home with HHC   
through [facility]   
[facility]  
through [facility]; spoke   
with ID, copat in place,   
asked ID if they were  
ok with pt being discharged   
tomorrow and stated that pt   
should be ok to dc  
tomorrow, will follow up   
with ID tomorrow again;   
spoke with pt about  
possibility of dc tomorrow,   
pt states that her pain is   
managed enough to  
be dc tomorrow; [facility]   
and [facility]   
are able to do SOC  
visit Wednesday 3/30 if pt   
is medically ready for dc   
tomorrow after dose  
of IV antibiotics  
Anticipated dc date:   
3/29-3/30  
Transportation: pt family   
able to provide   
transportation at dc  
SIGNATURE: Bev Steele RN   
PATIENT NAME: Jayden Soto  
DATE: 2022 MRN:   
[id number]  
TIME: 4:33 PM PAGER/CONTACT   
#: [id number]       Normal                                  [facility]  
   
                                                    CBC panel Auto (Bld)on    
   
                                                    Erythrocyte   
distribution   
width (RBC)   
[Ratio]         14.7 %          Normal          11.5-15.0       [facility]  
   
                                        Comment on above:   Order Comment: Speci  
men Type: BLOOD SPECIMENOrdering Facility:  
   
[facility] Address: [address] [place] [address]   
   
                                                            Performed By: #### 5  
8410-2 ####[facility] LABORATORYCLIA   
[id number] [address]   
   
                                                    Hematocrit (Bld)   
[Volume fraction] 25.9 %          Low             36.0-46.0       [facility]  
   
                                        Comment on above:   Order Comment: Speci  
men Type: BLOOD SPECIMENOrdering Facility:  
  
[facility] Address: [address] [place] [address]   
   
                                                            Performed By: #### 5  
8410-2 ####[facility] LABORATORYCLIA   
[id number] [address] [place]   
[facility]   
   
                                                    Hemoglobin (Bld)   
[Mass/Vol]      7.8 g/dL        Low             11.5-15.5       [facility]  
   
                                        Comment on above:   Order Comment: Speci  
men Type: BLOOD SPECIMENOrdering Facility:  
   
[facility] Address: [address] [place] [address]   
   
                                                            Performed By: #### 5  
8410-2 ####[facility] LABORATORYCLIA   
[id number] [address] [place]   
[place]   
   
                                                    MCH (RBC)   
[Entitic mass]  28.1 pg         Normal          26.0-34.0       [facility]  
   
                                        Comment on above:   Order Comment: Speci  
men Type: BLOOD SPECIMENOrdering Facility:  
  
[facility] Address: [address] [place] [address]   
   
                                                            Performed By: #### 5  
8410-2 ####[facility] LABORATORYCLIA   
[id number] [address] [place]   
[place]   
   
                                                    MCHC (RBC)   
[Mass/Vol]      30.1 g/dL       Low             30.5-36.0       [facility]  
   
                                        Comment on above:   Order Comment: Speci  
men Type: BLOOD SPECIMENOrdering Facility:  
   
[facility] Address: [address] [place] [address]   
   
                                                            Performed By: #### 5  
8410-2 ####[facility] LABORATORYCLIA   
[id number] [address]   
   
                                                    MCV (RBC)   
[Entitic vol]   93.2 fL         Normal          80.0-100.0      [facility]  
   
                                        Comment on above:   Order Comment: Speci  
men Type: BLOOD SPECIMENOrdering Facility:  
  
[facility] Address: [address] [place] [address]   
   
                                                            Performed By: #### 5  
8410-2 ####[facility] LABORATORYCLIA   
[id number] [address]   
   
                                                    Nucleated RBC   
(Bld) [#/Vol]   10*3/uL         Normal          <0.01           [facility]  
   
                                        Comment on above:   Order Comment: Speci  
men Type: BLOOD SPECIMENOrdering Facility:  
   
[facility] Address: [address] [place] [address]   
   
                                                            Performed By: #### 5  
8410-2 ####[facility] LABORATORYCLIA   
[id number] [address]   
   
                                                    Platelet mean   
volume (Bld)   
[Entitic vol]   9.7 fL          Normal          9.0-12.7        [facility]  
   
                                        Comment on above:   Order Comment: Speci  
men Type: BLOOD SPECIMENOrdering Facility:  
  
[facility] Address: [address] [place] [address]   
   
                                                            Performed By: #### 5  
8410-2 ####[facility] LABORATORYCLIA   
[id number] [address]   
   
                                                    Platelets (Bld)   
[#/Vol]         372 10*3/uL     Normal          150-400         [facility]  
   
                                        Comment on above:   Order Comment: Speci  
men Type: BLOOD SPECIMENOrdering Facility:  
   
[facility] Address: [address] [place] [address]   
   
                                                            Performed By: #### 5  
8410-2 ####[facility] LABORATORYCLIA   
[id number] [address] [place]   
[place]   
   
                      RBC (Bld) [#/Vol] 2.78 10*6/uL Low        3.90-5.20  [facility]  
   
                                        Comment on above:   Order Comment: Speci  
men Type: BLOOD SPECIMENOrdering Facility:  
   
[facility] Address: [address] [place] [address]   
   
                                                            Performed By: #### 5  
8410-2 ####[facility] LABORATORYCLIA   
[id number] [address] OF   
[facility]   
   
                      WBC (Bld) [#/Vol] 9.68 10*3/uL Normal     3.70-11.00 [facility]  
   
                                        Comment on above:   Order Comment: Speci  
men Type: BLOOD SPECIMENOrdering Facility:  
   
[facility] Address: [address] [place] [address]   
   
                                                            Performed By: #### 5  
8410-2 ####[facility] LABORATORYCLIA   
[id number] [address]   
   
                                                    CONSULT PROGon 2022   
   
                                        CONSULT PROG        HNO ID: [id number]  
Author: Con Lovett III, MD  
Service: Infectious Disease  
Author Type: Physician  
Type: Consult Progress Note  
Filed: 3/28/2022 3:05 PM  
Note Text:  
PROGRESS NOTE INFECTIOUS   
DISEASE  
ASSESSMENT:  
61 year old female history   
of S. Aureus infection of   
the left knee in  
2021 who was on   
suppressive therapy with   
doxycycline and came in  
with left knee MSSA   
infection. Was reported in   
others notes that patient  
had MRSA last year but I   
wonder if this is recurrence   
of the prior  
infection? Need to get   
records of prior micro.  
1. Left TKA infection due to   
MSSA s/p extraction and   
antibiotic spacer  
placement- occurred despite   
doxycycline suppression  
2. Prior Left TKA infection   
with ?MRSA?-  
3. Prior spine infection-   
per care everywhere was in   
2020 due to  
MSSA so would really be   
surprised if #2 is MRSA  
4. Obesity, type 2 DM on   
insulin- complicate mgmt and   
recovery  
Active Antimicrobials (From   
admission, onward)  
Start Stop  
22 1500 cefTRIAXone iv   
piggyback 2 g in dextrose   
(iso-osmotic) 50  
mL (ROCEPHIN) 2 g,   
INTRAVENOUS, EVERY 24 HOURS  
--  
22 1300 miconazole 2 %   
1 application topical powder   
(LOTRIMIN AF,  
DESENEX) 1 application,   
TOPICAL, 2 TIMES DAILY  
--  
RECOMMENDATIONS:  
1. Change to ceftriaxone-   
stop date=22  
2. Resume doxycycline- need   
to get records from [facility] to   
answer the  
following->did she really   
have MRSA- if no then can   
hold the doxycycline;  
is there a reason to   
suppress staph other than   
the TKA infection?-if no  
then I don't think would   
need doxy going forward as   
the 2 stage exchange  
should eradicate  
DATA:  
Labs:  
Recent Labs  
22  
0209 22  
0401 22  
0135  
WBC 9.68 11.01* 10.82  
HB 7.8* 8.6* 8.4*  
HCT 25.9* 28.2* 26.9*  
 396 329  
 139 140  
K 4.3 4.2 4.2  
CHLOR 102 101 103  
CO2 29 29 30  
CREAT 1.28* 1.11* 0.93  
BUN 16 16 17  
GLUC 159* 146* 107*  
CA 8.7 9.1 8.8  
Microbiology:  
Positive Micro-30 Days  
Procedure Component Value   
Units Date/Time  
WOUND CULTURE AND GRAM STAIN   
[[id number]] (Abnormal)   
(Susceptibility)  
Collected: 22 1557  
Order Status: Completed   
Specimen: Tissue from KNEE   
ARTHROPLASTY LEFT  
Updated: 22 0953  
Culture, Wound Rare   
Staphylococcus aureus  
Gram Stain No organisms seen  
Many Polymorphonuclear   
leukocytes  
Many Red Blood Cells  
Susceptibility  
Staphylococcus aureus  
Not Specified  
Clindamycin Susceptible [1]  
Erythromycin Resistant  
Gentamicin Susceptible  
Oxacillin Susceptible [2]  
Rifampin Susceptible [3]  
Tetracycline Susceptible  
Trimeth sulfameth   
Susceptible  
Vancomycin Susceptible  
[1] This isolate does not   
demonstrate inducible   
Clindamycin resistance  
in vitro.  
[2] Oxacillin-susceptible   
staphylococci are   
susceptible to other  
penicilllinase-stable   
penicillins,   
beta-lactam/beta-lactamase   
inhibitor  
combinations,   
anti-staphylococcal cephems,   
and carbapenems.  
[3] Rifampin should not be   
used alone for antimicrobial   
therapy.  
Linear View  
Imaging/Other:  
No new imaging  
Subjective  
Feeling ok. Says she had   
trouble walking the stairs   
today with PT. She is  
worried about being   
discharged too early.  
Review of Systems  
No fevers or chills  
No nausea or diarrhea  
No pain at the right arm   
PICC  
Mild left knee pain  
Current medications:   
Reviewed  
Objective  
BP 99/55   Pulse 64   Temp   
(Src) 98.2 (Oral)   Resp 16   
  Ht 5' 7  (1.70m)  
  Wt 248 lb 10.9 oz   
(112.8kg)   SpO2 97%   BMI   
38.94 kg/(m2).  
O2 Therapy: Room Air  
Physical Exam  
Obese. No distress.  
oropharynx clear and moist  
Heart RRR without murmur  
Lungs CTAB  
Left knee bandaged, no   
surrounding redness.  
Right arm PICC without   
redness  
Lines, Drains, and Airways  
Line  
Central Line Single Lumen   
22 1319 Peripherally   
Inserted (PICC)  
Right Arm Through Introducer   
4.0 French 3 days  
SIGNATURE: Con Lovett III, MD PATIENT NAME: Jayden Soto  
DATE: 2022  
MRN: [id number]  
TIME: 2:47 PM  
PAGER/CONTACT #:   
[phone]        [facility]  
   
                                        CONSULT PROG        HNO ID: [id number]  
Author: Leonela Antony RPh  
Service: Pharmacy  
Author Type: Pharmacist  
Type: Consult Progress Note  
Filed: 3/28/2022 2:33 PM  
Note Text:  
PHARMACY VANCOMYCIN DOSING   
NOTE  
Patient Name: Jayden Soto   
MRN: [id number] Admission Date:   
3/21/2022  
Date of Consult: 3/28/2022   
Time of Consult: 2:31 PM  
Vancomycin therapy has been   
discontinued. Vancomycin   
level(s) have been  
discontinued: Yes. Pharmacy   
vancomycin dosing service   
will sign off.  
Thank you for allowing us to   
participate in this   
patient's care. Please  
contact pharmacy if there   
are questions.  
Leonela Antony RPh  [facility]  
   
                                        CONSULT PROG        HNO ID: [id number]  
Author: April Dc MD  
Service: Endocrinology  
Author Type: Physician  
Type: Consult Progress Note  
Filed: 3/28/2022 8:19 AM  
Note Text:  
ENDOCRINOLOGY CONSULT   
PROGRESS NOTE  
SERVICE DATE: 3/28/2022  
SERVICE TIME: 6:49 AM  
Subjective  
INTERVAL HPI: Following for   
DM type 2.  
The patient is a 61-year-old   
female, who was admitted on   
, with  
left knee pain and swelling.   
She was found to have an   
infected left knee  
arthroplasty. She had   
surgery on , removal   
of knee arthroplasty,  
left knee with irrigation   
and debridement of left knee   
and insertion of  
antibiotic spacer.  
?Patient has history of type   
2 diabetes for 32 years,   
diagnosed when she  
was pregnant. She was on   
oral agents for several   
years. She has been on  
insulin for about 15 years.   
She is on Lantus 40 units at   
bedtime and  
NovoLog 13 units with each   
meal; current dose for at   
least 1 year. She is  
also on Jardiance 25 mg a   
day. She checks blood sugar   
3 times a day. They  
used to be less than 190,   
but since she had infection   
last year in her  
knee, her blood sugars have   
been high over 200s and   
300s; sometimes, it  
has gone as high as 500 and   
600. Her blood sugars have   
been fluctuating  
here. She was initially   
given Lantus 25 units at   
bedtime and then it was  
increased to 40 units. She   
was n.p.o. and NovoLog was   
being held off and  
on. She has lost about 80   
pounds weight over the last   
year. She has  
history of retinopathy and   
gets injections. She also   
had cataract  
surgery. She has history of   
neuropathy and numbness in   
both feet. She  
did not have any kidney   
problems.  
Notes and orders reviewed.   
BS low last pm.  
Diet: DIET CARBOHYDRATE   
CONTROLLED p.o intake good,   
following diet.  
Activity:Up with Assistance,   
has PT; ambulating a little   
in room  
Review of Systems: c/o left   
knee pain, severe at times   
no SOB, no nausea,  
no emesis; rest negative  
Current   
Facility-Administered   
Medications  
Medication Dose Route   
Frequency  
- ondansetron (PF) 4 mg   
injection (ZOFRAN) 4 mg   
INTRAVENOUS q 6 H PRN  
- NaCl 0.9% iv flush bag 20   
mL INTRAVENOUS PRN  
- sodium chloride 0.9 %   
(flush) 3-5 mL (BD   
POSIFLUSH) 3-5 mL   
INTRAVENOUS  
q 12 H  
- sodium chloride 0.9 %   
(flush) 2-10 mL (BD   
POSIFLUSH) 2-10 mL  
INTRAVENOUS q 12 H  
- DULoxetine 60 mg cap(s)   
(CYMBALTA) 60 mg ORAL BID  
- ranolazine ER 1,000 mg   
tab(s) (RANEXA) 1,000 mg   
ORAL BID  
- dilTIAZem  mg cap(s)   
(CARDIZEM CD, CARTIA XT) 180   
mg ORAL DAILY  
- gabapentin 200 mg cap(s)   
(NEURONTIN) 200 mg ORAL TID  
- isosorbide mononitrate ER   
90 mg tab(s) (IMDUR) 90 mg   
ORAL DAILY  
- clopidogrel 75 mg tab(s)   
(PLAVIX) 75 mg ORAL DAILY  
- metoprolol tartrate (short   
acting) 25 mg tab(s)   
(LOPRESSOR) 25 mg ORAL  
q 12 H  
- dextrose 40 % 15 g 15 g   
ORAL PRN  
Or  
- glucagon 1 mg injection 1   
mg INTRAMUSCULAR PRN  
Or  
- dextrose 50% in water 25   
mL syringe 12.5 g   
INTRAVENOUS PRN  
- empagliflozin 25 mg tab(s)   
(JARDIANCE) 25 mg ORAL DAILY  
- docusate sodium 100 mg   
cap(s) (COLACE) 100 mg ORAL   
BID  
- aspirin, enteric coated 81   
mg tab(s) 81 mg ORAL BID  
- sodium chloride 0.9 %   
(flush) 3-5 mL (BD   
POSIFLUSH) 3-5 mL   
INTRAVENOUS  
q 12 H  
- vancomycin dosing and   
monitoring per pharmacy   
OTHER As Directed  
- miconazole 2 % 1   
application topical powder   
(LOTRIMIN AF, DESENEX) 1  
application TOPICAL BID  
- acetaminophen 1,000 mg   
tab(s) (TYLENOL) 1,000 mg   
ORAL q 6 H  
- oxyCODONE IR 5-10 mg   
tab(s) (ROXICODONE) 5-10 mg   
ORAL q 4 H PRN  
- traMADol 50 mg tab(s)   
(ULTRAM) 50 mg ORAL q 6 H  
- morphine 2 mg injection 2   
mg INTRAVENOUS q 3 H PRN  
- melatonin 6 mg tab(s) 6 mg   
ORAL DAILY (8 PM)  
- insulin lispro pen (rapid   
acting) (HumaLOG KWIKPEN)   
SUBCUTANEOUS w  
MEALS  
- sodium chloride 0.9 %   
(flush) 10 mL (BD POSIFLUSH)   
10 mL INTRAVENOUS q  
12 H  
- sodium chloride 0.9 %   
(flush) 20 mL (BD POSIFLUSH)   
20 mL INTRAVENOUS  
PRN  
- senna-docusate 8.6-50 mg 1   
tablet (SENNA-S) 1 tablet   
ORAL BID  
- calcium carbonate 500 mg   
chewable tab(s) (TUMS) 500   
mg ORAL TID PRN  
- insulin glargine 36 Units   
pen (long acting) (LANTUS   
SOLOSTAR, BASAGLAR  
KWIKPEN) 36 Units   
SUBCUTANEOUS AT BEDTIME  
- insulin lispro 8 Units pen   
(rapid acting) (HumaLOG   
KWIKPEN) 8 Units  
SUBCUTANEOUS DAILY wLUNCH  
- insulin lispro 10 Units   
pen (rapid acting) (HumaLOG   
KWIKPEN) 10 Units  
SUBCUTANEOUS DAILY wDINNER  
- insulin lispro 14 Units   
pen (rapid acting) (HumaLOG   
KWIKPEN) 14 Units  
SUBCUTANEOUS DAILY WITH   
BREAKFAST  
- NaCl 0.9% 1,000 mL iv   
bolus 1,000 mL INTRAVENOUS   
ONCE  
Objective  
PHYSICAL EXAM:  
GENERAL: lying in bed, in no   
acute distress, mildly   
obese.  
SKIN: Warm, dry, has heel   
protector dressings over   
both heels. No rash.  
No ulcers or calluses   
noticed. Left knee bandaged.  
HEENT: Head is normocephalic   
and atraumatic. Eyes, pupils   
round, EOMI.  
Buccal mucosa is dry. No   
thrush.  
NECK: Supple. No goiter   
palpable on thyroid exam.  
LUNGS: Fair air entry. Clear   
to auscultation.  
CVS: Regular rate and   
rhythm. No murmur or gallop.  
ABDOMEN: Soft and nontender.   
No masses.  
EXTREMITIES: (more content   
not included)...    Normal                                  [facility]  
   
                                                    THERAPY NTon 2022   
   
                                        THERAPY NT          HNO ID: [id number]  
Author: Eze Foley PTA  
Service: Physical Therapy  
Author Type: Physical   
Therapy Assistant  
Type: Therapy   
(PT/OT/Speech/Resp)  
Filed: 3/28/2022 2:37 PM  
Note Text:  
----------------------------  
----------------------------  
------------------------  
Attestation signed by   
Nabeel Monterroso PT at   
3/28/2022 4:14 PM  
I reviewed and agree with   
the assessment as documented   
above.  
SIGNATURE: Nabeel Monterroso PT  
DATE: 2022  
TIME: 4:14 PM  
----------------------------  
----------------------------  
------------------------  
Physical Therapy Treatment  
SERVICE DATE: 3/28/2022  
SERVICE TIME: 1402 to 1427  
ROOM: [place] [address]  
Recommended Discharge   
Disposition: Home PT  
Recommended Discharge   
Disposition Comments: home   
with family assist and  
home PT, use of wheeled   
walker  
Anticipated Discharge Needs:   
Physical Assist at   
Home;Supervision at Home  
Physical Assist at Home for:  
Cleaning;Laundry;Meals;Safet  
y;Shopping;Transportation  
Supervision at Home due to:   
(change in medical status)  
Recommended Discharge   
Equipment: No equipment   
needs anticipated  
PT 6 Clicks Score: 20  
Patient continues to require   
decreased levels of assist   
with all mobility  
tasks, able to negotiate 4   
steps this session with   
bilateral  
rail--progressed from   
moderate assist on 1st step   
to minimal assist.  
continue to recommend home   
PT to improve strength, ROM,   
balance,endurance,  
normalize gait pattern and   
improved safety and   
performance in the home  
back to prior level of   
function.  
Additional personnel present   
during visit: Ginny Morel  
Precautions/Activity   
Restrictions: Fall Risk  
Extremity With Weight   
Bearing Restricted: Left   
Lower Extremity  
Left Lower Extremity Weight   
Bearing Status: WBAT  
Current Hospital Course:   
Direct admit from Dr. Evans's office due to L TKA  
sepsis. s/p IANDD, component   
removal, antibiotic spacer   
insertion 3/23/33.  
WBAT post op. Waiting   
PICC/antibiotic   
recommendations.  
Reason for Hospital   
Admission: TKA infection  
Relevant Past Medical   
History: L TKA, joint   
infection with washout, HTN,  
DMII, obesity  
Response to Therapy   
Interventions: Good   
participation in activities,  
Improved tolerance for   
activity, Notable   
progression with functional  
activities/skills, On-track   
to achieve discharge goals  
Continue skilled needs due   
to: Functional   
mobility/skill impairments,  
Safety concerns  
Physical Therapy Problem   
List: Education   
Deficit;Safety   
Deficits;Decreased  
Activity Tolerance;Decreased   
Strength;Functional Mobility  
Impairment;Balance Impaired  
Treatment Interventions:   
Education;Strengthening;Func  
tional Mobility  
Training;Balance   
Training;Neuromuscular   
Re-education;Pain Management  
Plan for next visit: Chair   
transfer training, Gait   
training, Exercise  
instruction/handout, Sit to   
Stand Transfers, Stairs   
training, Standing  
Balance, Standing Tolerance,   
Bed mobility  
Home Environment  
Patient Lives With: Family   
(son, DIL, grandkids)  
Assistance Available: 24   
Hour  
Entry To Home: Ramp  
Number Of Stairs To   
Bed/Bath: 0  
Equipment Owned: Wheeled   
Walker;Cane;Shower   
Chair;Elevated Toilet Seat  
Prior Functional Level:   
Within Functional Limits  
Prior Functional Level   
Comments: patient reports   
typically independent  
without device, completes   
own ADLS  
Patient Report: agreeable to   
PT session  
CURRENT FUNCTIONAL STATUS:   
Most recent performance   
mobility performed  
during session in bold,   
other mobility completed   
during prior session and  
may no longer be correct or   
appropriate to complete.  
Current Functional Mobility   
Assist Level Additional   
Information  
Rolling  
Supine to Sit Modified   
Independent  
Sit to Supine Modified   
Independent  
Scooting  
Sit to Stand Contact Guard   
Assistance;Stand By   
Assistance cuing to slide  
LLE out, proper UE support,   
powering up with RLE  
Stand to Sit Contact Guard   
Assistance;Stand By   
Assistance cuing to slide  
LLE out, proper UE support,   
eccentric control  
Bed to Chair Contact Guard   
Assistance Bed To Chair   
Transfer Type: Stepping  
Bed To Chair Transfer   
Equipment: Gait Belt;Wheeled   
Walker  
Toilet/Commode Minimal   
Assistance;Additional   
Information  
Gait  
Contact Guard Assistance   
Gait Device: Wheeled Walker  
Gait Distance (feet): 100'  
cuing for proper posture,   
heel strike, reciprocal   
stepping  
Stairs Moderate   
Assistance;Minimal   
Assistance Stairs Device:   
Rail  
(bilateral rails)  
Number of Stairs: 4  
cuing to lead up with non   
surgical LE, down with   
surgical LE  
Curb Step  
Car Transfer  
Blank fields indicate   
activity not attempted  
General   
Deviations/Observations:   
Antalgic gait;Lynn  
decreased;Non-functional   
gait speed;Step length   
decreased  
Balance: Static   
Sitting;Dynamic   
Sitting;Static   
Standing;Dynamic Standing  
Static Sitting Balance:   
Normal Patient able to   
maintain steady balance  
without handhold support  
Dynamic Sitting Balance:   
Normal Patient accepts   
maximal challenge and  
can shift weight easily   
within full range in all   
directions  
Static Standing Balance:   
Fair Patient able to (more   
content not included)... Normal                                  [facility]  
   
                                                    Basic metabolic 2000 panelon  
 2022   
   
                                                    Anion gap   
[Moles/Vol]     9 mmol/L        Normal          9-18            [facility]  
   
                                        Comment on above:   Order Comment: Speci  
men Type: BLOOD SPECIMENOrdering Facility:  
   
[facility] Address: [address] [place] [address]   
   
                                                            Performed By: #### 2  
4321-2 ####[facility] LABORATORYCLIA   
[id number] [place] [address] [place]   
[place]   
   
                                                    Calcium   
[Mass/Vol]      9.1 mg/dL       Normal          8.5-10.2        [facility]  
   
                                        Comment on above:   Order Comment: Speci  
men Type: BLOOD SPECIMENOrdering Facility:  
   
[facility] Address: [address] [place] [address]   
   
                                                            Performed By: #### 2  
4321-2 ####[facility] LABORATORYCLIA   
[id number] [place] [address] [place]   
[place]   
   
                                                    Chloride   
[Moles/Vol]     101 mmol/L      Normal                    [facility]  
   
                                        Comment on above:   Order Comment: Speci  
men Type: BLOOD SPECIMENOrdering Facility:  
   
[facility] Address: [address] [place] [address]   
   
                                                            Performed By: #### 2  
4321-2 ####[facility] LABORATORYCLIA   
[id number] [address] [place]   
[place]   
   
                      CO2 [Moles/Vol] 29 mmol/L  Normal     22-30      [facility]  
   
                                        Comment on above:   Order Comment: Speci  
men Type: BLOOD SPECIMENOrdering Facility:  
   
[facility] Address: [address] [place] [address]   
   
                                                            Performed By: #### 2  
4321-2 ####[facility] LABORATORYCLIA   
[id number] [address] [place]   
[place]   
   
                                                    Creatinine   
[Mass/Vol]      1.11 mg/dL      High            0.58-0.96       [facility]  
   
                                        Comment on above:   Order Comment: Speci  
men Type: BLOOD SPECIMENOrdering Facility:  
   
[facility] Address: [address] [place] [address]   
   
                                                            Performed By: #### 2  
4321-2 ####[facility] LABORATORYCLIA   
[id number] [address]   
   
                                                    ESTIMATED   
GLOMERULAR   
FILTRATION RATE 57 mL/min/1.73m??? Low             >=60            [facility]  
   
                                        Comment on above:   Order Comment: Speci  
men Type: BLOOD SPECIMENOrdering Facility:  
   
[facility] Address: [address] [place] [address]   
   
                                                            Result Comment: Sydnee  
mated Glomerular Filtration Rate (eGFR) is   
calculated using the  CKD-EPI creatinine equation. This equation   
utilizes serum creatinine, sex, and age as parameters. The   
creatinine assay has traceable calibration to isotope dilution-mass   
spectrometry. Refer to KDIGO guidelines for clinical interpretation.   
In patients with unstable renal function, e.g. those with acute   
kidney injury, the eGFR may not accurately reflect actual GFR.   
   
                                                            Performed By: #### 2  
4321-2 ####[facility] LABORATORYCLIA   
[id number] [address] [place]   
[place]   
   
                                                    Glucose   
[Mass/Vol]      146 mg/dL       High            74-99           [facility]  
   
                                        Comment on above:   Order Comment: Speci  
men Type: BLOOD SPECIMENOrdering Facility:  
   
[facility] Address: [address] [place] [address]   
   
                                                            Result Comment: The   
American Diabetes Association (ADA) provides   
guidance for cutoff values for fasting glucose and random glucose.   
The ADA defines fasting as no caloric intake for at least 8 hours.   
Fasting plasma glucose results between 100 to 125 mg/dL indicate   
increased risk for diabetes (prediabetes).  
Fasting plasma glucose results greater than or equal to 126 mg/dL   
meet the criteria for diagnosis of diabetes. In the absence of   
unequivocal hyperglycemia, results should be confirmed by repeat   
testing. In a patient with classic symptoms of hyperglycemia or   
hyperglycemic crisis, random plasma glucose results greater than or   
equal to 200 mg/dL meet the criteria for diagnosis of diabetes.  
Reference: Standards of Medical Care in Diabetes 2016, American   
Diabetes Association. Diabetes Care. 2016.39(Suppl 1).   
   
                                                            Performed By: #### 2  
4321-2 ####[facility] LABORATORYCLIA   
[id number] [place] [address] [place]   
   
                                                    Potassium   
[Moles/Vol]     4.2 mmol/L      Normal          3.7-5.1         [facility]  
   
                                        Comment on above:   Order Comment: Jerry  
men Type: BLOOD SPECIMENOrdering Facility:  
   
[facility] Address: [address] [place] [address]   
   
                                                            Performed By: #### 2  
4321-2 ####[facility] LABORATORYCLIA   
[id number] [place] [address] [place]   
   
                                                    Sodium   
[Moles/Vol]     139 mmol/L      Normal          136-144         [facility]  
   
                                        Comment on above:   Order Comment: Speci  
men Type: BLOOD SPECIMENOrdering Facility:  
   
[facility] Address: [address] [place] [address]   
   
                                                            Performed By: #### 2  
4321-2 ####[facility] LABORATORYCLIA   
[id number] [place] [address] [place]   
   
                                                    Urea nitrogen   
[Mass/Vol]      16 mg/dL        Normal          7-21            [facility]  
   
                                        Comment on above:   Order Comment: Speci  
men Type: BLOOD SPECIMENOrdering Facility:  
   
[facility] Address: [address] [address]   
   
                                                            Performed By: #### 2  
4321-2 ####[facility] LABORATORYCLIA   
[id number] [address]   
   
                                                    CBC panel Auto (Bld)on    
   
                                                    Erythrocyte   
distribution   
width (RBC)   
[Ratio]         14.6 %          Normal          11.5-15.0       [facility]  
   
                                        Comment on above:   Order Comment: Speci  
men Type: BLOOD SPECIMENOrdering Facility:  
   
[facility] Address: [address] [place] [address]   
   
                                                            Performed By: #### 5  
8410-2 ####[facility] LABORATORYCLIA   
[id number] [address]   
   
                                                    Hematocrit (Bld)   
[Volume fraction] 28.2 %          Low             36.0-46.0       [facility]  
   
                                        Comment on above:   Order Comment: Speci  
men Type: BLOOD SPECIMENOrdering Facility:  
  
[facility] Address: [address] [place] [address]   
   
                                                            Performed By: #### 5  
8410-2 ####[facility] LABORATORYCLIA   
[id number] [address]   
   
                                                    Hemoglobin (Bld)   
[Mass/Vol]      8.6 g/dL        Low             11.5-15.5       [facility]  
   
                                        Comment on above:   Order Comment: Speci  
men Type: BLOOD SPECIMENOrdering Facility:  
   
[facility] Address: [address] [place] [address]   
   
                                                            Performed By: #### 5  
8410-2 ####[facility] LABORATORYCLIA   
[id number] [address]   
   
                                                    MCH (RBC)   
[Entitic mass]  28.1 pg         Normal          26.0-34.0       [facility]  
   
                                        Comment on above:   Order Comment: Speci  
men Type: BLOOD SPECIMENOrdering Facility:  
  
[facility] Address: [address] [place] [address]   
   
                                                            Performed By: #### 5  
8410-2 ####[facility] LABORATORYCLIA   
[id number] [address]   
   
                                                    MCHC (RBC)   
[Mass/Vol]      30.5 g/dL       Normal          30.5-36.0       [facility]  
   
                                        Comment on above:   Order Comment: Speci  
men Type: BLOOD SPECIMENOrdering Facility:  
   
[facility] Address: [address] [place] [address]   
   
                                                            Performed By: #### 5  
8410-2 ####[facility] LABORATORYCLIA   
[id number] [address] [place]   
[place]   
   
                                                    MCV (RBC)   
[Entitic vol]   92.2 fL         Normal          80.0-100.0      [facility]  
   
                                        Comment on above:   Order Comment: Speci  
men Type: BLOOD SPECIMENOrdering Facility:  
  
[facility] Address: [address] [place] [address]   
   
                                                            Performed By: #### 5  
8410-2 ####[facility] LABORATORYCLIA   
[id number] [address] [place]   
[facility]   
   
                                                    Nucleated RBC   
(Bld) [#/Vol]   10*3/uL         Normal          <0.01           [facility]  
   
                                        Comment on above:   Order Comment: Speci  
men Type: BLOOD SPECIMENOrdering Facility:  
   
[facility] Address: [address] [place] [address]   
   
                                                            Performed By: #### 5  
8410-2 ####[facility] LABORATORYCLIA   
[id number] [address] [place]   
[place]   
   
                                                    Platelet mean   
volume (Bld)   
[Entitic vol]   9.5 fL          Normal          9.0-12.7        [facility]  
   
                                        Comment on above:   Order Comment: Speci  
men Type: BLOOD SPECIMENOrdering Facility:  
  
[facility] Address: [address] [place] [address]   
   
                                                            Performed By: #### 5  
8410-2 ####[facility] LABORATORYCLIA   
[id number] [address] [place]   
[place]   
   
                                                    Platelets (Bld)   
[#/Vol]         396 10*3/uL     Normal          150-400         [facility]  
   
                                        Comment on above:   Order Comment: Speci  
men Type: BLOOD SPECIMENOrdering Facility:  
   
[facility] Address: [address] [place] [address]   
   
                                                            Performed By: #### 5  
8410-2 ####[facility] LABORATORYCLIA   
[id number] [address]   
   
                      RBC (Bld) [#/Vol] 3.06 10*6/uL Low        3.90-5.20  [facility]  
   
                                        Comment on above:   Order Comment: Speci  
men Type: BLOOD SPECIMENOrdering Facility:  
   
[facility] Address: [address] [place] [address]   
   
                                                            Performed By: #### 5  
8410-2 ####[facility] LABORATORYCLIA   
[id number] [address]   
   
                      WBC (Bld) [#/Vol] 11.01 10*3/uL High       3.70-11.00 [facility]  
   
                                        Comment on above:   Order Comment: Speci  
men Type: BLOOD SPECIMENOrdering Facility:  
   
[facility] Address: [facility] [place] [address] [address]   
   
                                                            Performed By: #### 5  
8410-2 ####[facility] LABORATORYCLIA   
[id number] [address]   
   
                                                    CONSULT PROGon 2022   
   
                                        CONSULT PROG        HNO ID: [id number]  
Author: Isamar Bhat RPh  
Service: Pharmacy  
Author Type: Pharmacist  
Type: Consult Progress Note  
Filed: 3/27/2022 12:23 PM  
Note Text:  
PHARMACY VANCOMYCIN DOSING   
NOTE  
Patient Name: Jayden Soto   
MRN: [id number] Admission Date:   
3/21/2022  
Date of Consult: 3/27/2022   
Time of Consult: 11:31 AM  
Indication: Bone AND joint   
infection  
Goal Range: 15-25 mcg/mL  
RECOMMENDATIONS/PLAN:  
Pharmacy consulted for   
vancomycin dosing for Jayden Soto, a 61 year old,  
female who is being treated   
with vancomycin for knee   
arthroplasty  
prosthetic joint infection.  
1. Patient is currently   
ordered Vancomycin 1.5 g IV   
q12h. Today is day 5  
of therapy.  
2. The most recent   
vancomycin level was 23.1   
mcg/mL drawn at 1108 on  
3/27/22. This is a ~11.5   
hour level on the 5th day of   
therapy. This  
level is reflective of 2   
doses of 1.5g q12h   
(pre-steady state for this  
dose).  
3. Serum creatinine and/or   
urine output have changed   
markedly in the  
previous days, therefore   
will discontinue vancomycin   
at this time and dose  
by level. Baseline Scr ~0.7,   
increased to 1.11 and   
appeared to be  
down-trending 3/26/22. Given   
Scr increasing again today   
(1.11), will dose  
by level at this time. Will   
give vancomycin weight based   
dose- 1.75g x 1  
today @ 1700 to allow time   
for vancomycin to clear.  
4. The next vancomycin level   
has been ordered for 3/28/22   
@ 1500  
(Completed)  
We will follow patient renal   
function, vancomycin levels   
and doses with  
you during the course of   
therapy. Additional   
recommendations will appear  
in follow up notes. If you   
have any questions, please   
contact Isamar Bhat  
at [phone] or main   
pharmacy at [phone]  
Age: 61 year old  
Allergies:  
ALLERGIES  
Allergen Reactions  
- Morphine GI Upset  
- Statins-Hmg-Coa Red*   
Unknown  
Last 3 Encounter Wt   
Readings:  
Date: Wt:  
2022 113.5 kg (250 lb   
3.6 oz)  
2022 95.3 kg (210 lb)  
2022 95.3 kg (210 lb)  
Last 1 Encounter Ht   
Readings:  
Date: Ht:  
2022 170.2 cm (5' 7 )  
Estimated Creatinine   
Clearance: 70.1 mL/min (A)   
(based on SCr of 1.11  
mg/dL (H)).  
Temp (24hrs), Av.7 ?C   
(98.1 ?F), Min:36.4 ?C (97.5   
?F), Max:37 ?C  
(98.6 ?F)  
- Current Temp: 36.5 ?C   
(97.7 ?F)  
Labs  
BUN (mg/dL)  
Date Value  
2022 16  
2022 17  
2022 17  
Creatinine (mg/dL)  
Date Value  
2022 1.11 (H)  
2022 0.93  
2022 1.11 (H)  
WBC (k/uL)  
Date Value  
2022 11.01 (H)  
2022 10.82  
2022 15.83 (H)  
Vancomycin Levels:  
Vancomycin (ug/mL)  
Date/Time Value  
2022 1108 23.1 (H)  
2022 0913 12.6  
Isamar Bhat [facility]      Normal                                  [facility]  
   
                                        CONSULT PROG        HNO ID: [id number]  
Author: April Dc MD  
Service: Endocrinology  
Author Type: Physician  
Type: Consult Progress Note  
Filed: 3/27/2022 9:31 AM  
Note Text:  
ENDOCRINOLOGY CONSULT   
PROGRESS NOTE  
SERVICE DATE: 3/27/2022  
SERVICE TIME: 7:22 AM  
Subjective  
INTERVAL HPI: Following for   
DM type 2.  
The patient is a 61-year-old   
female, who was admitted on   
, with  
left knee pain and swelling.   
She was found to have an   
infected left knee  
arthroplasty. She had   
surgery on , removal   
of knee arthroplasty,  
left knee with irrigation   
and debridement of left knee   
and insertion of  
antibiotic spacer.  
?Patient has history of type   
2 diabetes for 32 years,   
diagnosed when she  
was pregnant. She was on   
oral agents for several   
years. She has been on  
insulin for about 15 years.   
She is on Lantus 40 units at   
bedtime and  
NovoLog 13 units with each   
meal; current dose for at   
least 1 year. She is  
also on Jardiance 25 mg a   
day. She checks blood sugar   
3 times a day. They  
used to be less than 190,   
but since she had infection   
last year in her  
knee, her blood sugars have   
been high over 200s and   
300s; sometimes, it  
has gone as high as 500 and   
600. Her blood sugars have   
been fluctuating  
here. She was initially   
given Lantus 25 units at   
bedtime and then it was  
increased to 40 units. She   
was n.p.o. and NovoLog was   
being held off and  
on. She has lost about 80   
pounds weight over the last   
year. She has  
history of retinopathy and   
gets injections. She also   
had cataract  
surgery. She has history of   
neuropathy and numbness in   
both feet. She  
did not have any kidney   
problems.  
Notes and orders reviewed.  
Diet: DIET CARBOHYDRATE   
CONTROLLED p.o intake good,   
following diet.  
Activity:Up with Assistance,   
has PT; ambulating a little   
in room  
Review of Systems: c/o left   
knee pain, severe at times   
no SOB, c/o mild  
occ nausea, no emesis; rest   
negative  
Current   
Facility-Administered   
Medications  
Medication Dose Route   
Frequency  
- ondansetron (PF) 4 mg   
injection (ZOFRAN) 4 mg   
INTRAVENOUS q 6 H PRN  
- NaCl 0.9% iv flush bag 20   
mL INTRAVENOUS PRN  
- sodium chloride 0.9 %   
(flush) 3-5 mL (BD   
POSIFLUSH) 3-5 mL   
INTRAVENOUS  
q 12 H  
- sodium chloride 0.9 %   
(flush) 2-10 mL (BD   
POSIFLUSH) 2-10 mL  
INTRAVENOUS q 12 H  
- DULoxetine 60 mg cap(s)   
(CYMBALTA) 60 mg ORAL BID  
- ranolazine ER 1,000 mg   
tab(s) (RANEXA) 1,000 mg   
ORAL BID  
- dilTIAZem  mg cap(s)   
(CARDIZEM CD, CARTIA XT) 180   
mg ORAL DAILY  
- gabapentin 200 mg cap(s)   
(NEURONTIN) 200 mg ORAL TID  
- isosorbide mononitrate ER   
90 mg tab(s) (IMDUR) 90 mg   
ORAL DAILY  
- clopidogrel 75 mg tab(s)   
(PLAVIX) 75 mg ORAL DAILY  
- metoprolol tartrate (short   
acting) 25 mg tab(s)   
(LOPRESSOR) 25 mg ORAL  
q 12 H  
- dextrose 40 % 15 g 15 g   
ORAL PRN  
Or  
- glucagon 1 mg injection 1   
mg INTRAMUSCULAR PRN  
Or  
- dextrose 50% in water 25   
mL syringe 12.5 g   
INTRAVENOUS PRN  
- empagliflozin 25 mg tab(s)   
(JARDIANCE) 25 mg ORAL DAILY  
- docusate sodium 100 mg   
cap(s) (COLACE) 100 mg ORAL   
BID  
- aspirin, enteric coated 81   
mg tab(s) 81 mg ORAL BID  
- sodium chloride 0.9 %   
(flush) 3-5 mL (BD   
POSIFLUSH) 3-5 mL   
INTRAVENOUS  
q 12 H  
- vancomycin dosing and   
monitoring per pharmacy   
OTHER As Directed  
- insulin glargine 40 Units   
pen (long acting) (LANTUS   
SOLOSTAR, BASAGLAR  
KWIKPEN) 40 Units   
SUBCUTANEOUS AT BEDTIME  
- miconazole 2 % 1   
application topical powder   
(LOTRIMIN AF, DESENEX) 1  
application TOPICAL BID  
- acetaminophen 1,000 mg   
tab(s) (TYLENOL) 1,000 mg   
ORAL q 6 H  
- oxyCODONE IR 5-10 mg   
tab(s) (ROXICODONE) 5-10 mg   
ORAL q 4 H PRN  
- traMADol 50 mg tab(s)   
(ULTRAM) 50 mg ORAL q 6 H  
- morphine 2 mg injection 2   
mg INTRAVENOUS q 3 H PRN  
- melatonin 6 mg tab(s) 6 mg   
ORAL DAILY (8 PM)  
- insulin lispro 16 Units   
pen (rapid acting) (HumaLOG   
KWIKPEN) 16 Units  
SUBCUTANEOUS DAILY WITH   
BREAKFAST  
- insulin lispro 10 Units   
pen (rapid acting) (HumaLOG   
KWIKPEN) 10 Units  
SUBCUTANEOUS DAILY wLUNCH  
- insulin lispro 12 Units   
pen (rapid acting) (HumaLOG   
KWIKPEN) 12 Units  
SUBCUTANEOUS DAILY wDINNER  
- insulin lispro pen (rapid   
acting) (HumaLOG KWIKPEN)   
SUBCUTANEOUS w  
MEALS  
- sodium chloride 0.9 %   
(flush) 10 mL (BD POSIFLUSH)   
10 mL INTRAVENOUS q  
12 H  
- sodium chloride 0.9 %   
(flush) 20 mL (BD POSIFLUSH)   
20 mL INTRAVENOUS  
PRN  
- senna-docusate 8.6-50 mg 1   
tablet (SENNA-S) 1 tablet   
ORAL BID  
- vancomycin 1.5 g in D5W   
250 mL (VANCOCIN) 1.5 g   
INTRAVENOUS q 12 HR  
- NaCl 0.9% 500 mL iv bolus   
500 mL INTRAVENOUS ONCE  
Objective  
PHYSICAL EXAM:  
GENERAL: lying in bed, in no   
acute distress, mildly   
obese.  
SKIN: Warm, dry, has heel   
protector dressings over   
both heels. No rash.  
No ulcers or calluses   
noticed. Left knee bandaged.  
HEENT: Head is normocephalic   
and atraumatic. Eyes, pupils   
round, EOMI.  
Buccal mucosa is dry. No   
thrush.  
NECK: Supple. No goiter   
palpable on thyroid exam.  
LUNGS: Fair air entry. Clear   
to auscultation.  
CVS: Regular rate and   
rhythm. No murmur or gallop.  
ABDOMEN: Soft and nontender.   
No masses.  
EXTREMITIES: has com (more   
content not included)... Normal                                  [facility]  
   
                                                    THERAPY NTon 2022   
   
                                        THERAPY NT          HNO ID: [id number]  
Author: Eze Foley PTA  
Service: Physical Therapy  
Author Type: Physical   
Therapy Assistant  
Type: Therapy   
(PT/OT/Speech/Resp)  
Filed: 3/27/2022 12:06 PM  
Note Text:  
----------------------------  
----------------------------  
------------------------  
Attestation signed by Parvin Tomlin PT at 3/27/2022 2:41   
PM  
I reviewed and agree with   
the documentation   
corresponding to this   
therapy  
visit.  
SIGNATURE: Parvin Tomlin PT  
DATE: 2022  
TIME: 2:41 PM  
----------------------------  
----------------------------  
------------------------  
Physical Therapy Treatment  
SERVICE DATE: 3/27/2022  
SERVICE TIME: 1116 to 1143  
ROOM: TQ--01  
Recommended Discharge   
Disposition: Home PT  
Recommended Discharge   
Disposition Comments: home   
with family assist and  
home PT, use of wheeled   
walker  
Anticipated Discharge Needs:   
Physical Assist at   
Home;Supervision at Home  
Physical Assist at Home for:  
Cleaning;Laundry;Meals;Safet  
y;Shopping;Transportation  
Supervision at Home due to:   
(change in medical status)  
Recommended Discharge   
Equipment: No equipment   
needs anticipated  
PT 6 Clicks Score: 19  
Patient with improved gait   
distances this session,   
attempted  
stairs--unable to complete,   
secondary to pain with WB on   
LLE attempting to  
step up with RLE--patient   
does not have stairs at   
home. continue to  
recommend home PT to improve   
strength, ROM,   
balance,endurance, normalize  
gait pattern and improved   
safety and performance in   
the home back to prior  
level of function.  
Additional personal during   
visit : Luis Choudhary  
Precautions/Activity   
Restrictions: Fall Risk  
Extremity With Weight   
Bearing Restricted: Left   
Lower Extremity  
Left Lower Extremity Weight   
Bearing Status: WBAT  
Current Hospital Course:   
Direct admit from Dr. Evans's office due to L TKA  
sepsis. s/p IANDD, component   
removal, antibiotic spacer   
insertion 3/23/33.  
WBAT post op. Waiting   
PICC/antibiotic   
recommendations.  
Reason for Hospital   
Admission: TKA infection  
Relevant Past Medical   
History: L TKA, joint   
infection with washout, HTN,  
DMII, obesity  
Response to Therapy   
Interventions: Good   
participation in activities,  
Improved tolerance for   
activity, Notable   
progression with functional  
activities/skills, Pain  
Continue skilled needs due   
to: Functional   
mobility/skill impairments,  
Safety concerns  
Physical Therapy Problem   
List: Education   
Deficit;Safety   
Deficits;Decreased  
Activity Tolerance;Decreased   
Strength;Functional Mobility  
Impairment;Balance Impaired  
Treatment Interventions:   
Education;Strengthening;Func  
tional Mobility  
Training;Balance   
Training;Neuromuscular   
Re-education;Pain Management  
Plan for next visit: Chair   
transfer training, Gait   
training, Exercise  
instruction/handout, Sit to   
Stand Transfers, Stairs   
training, Standing  
Balance, Standing Tolerance,   
Bed mobility  
Home Environment  
Patient Lives With: Family   
(son, DIL, grandkids)  
Assistance Available: 24   
Hour  
Entry To Home: Ramp  
Number Of Stairs To   
Bed/Bath: 0  
Equipment Owned: Wheeled   
Walker;Cane;Shower   
Chair;Elevated Toilet Seat  
Prior Functional Level:   
Within Functional Limits  
Prior Functional Level   
Comments: patient reports   
typically independent  
without device, completes   
own ADLS  
Patient Report: agreeable to   
PT session  
CURRENT FUNCTIONAL STATUS:   
Most recent performance   
mobility performed  
during session in bold,   
other mobility completed   
during prior session and  
may no longer be correct or   
appropriate to complete.  
Current Functional Mobility   
Assist Level Additional   
Information  
Rolling  
Supine to Sit Stand By   
Assistance  
Sit to Supine Stand By   
Assistance  
Scooting  
Sit to Stand Contact Guard   
Assistance cuing to slide   
LLE out, proper UE  
support, powering up with   
RLE  
Stand to Sit Contact Guard   
Assistance cuing to slide   
LLE out, proper UE  
support, eccentric control  
Bed to Chair Contact Guard   
Assistance Bed To Chair   
Transfer Type: Stepping  
Bed To Chair Transfer   
Equipment: Gait Belt;Wheeled   
Walker  
Toilet/Commode Minimal   
Assistance;Additional   
Information  
Gait  
Contact Guard Assistance   
Gait Device: Wheeled Walker  
Gait Distance (feet): 100'  
cuing for proper posture,   
heel strike, reciprocal   
stepping  
Stairs Additional   
Information  
attempted, too painful to   
place all weight on LLE to   
step up with right  
using both rails  
Curb Step  
Car Transfer  
Blank fields indicate   
activity not attempted  
General   
Deviations/Observations:   
Antalgic gait;Lynn  
decreased;Non-functional   
gait speed;Step length   
decreased  
Balance: Static   
Sitting;Dynamic   
Sitting;Static   
Standing;Dynamic Standing  
Static Sitting Balance:   
Normal Patient able to   
maintain steady balance  
without handhold support  
Dynamic Sitting Balance:   
Normal Patient accepts   
maximal challenge and  
can shift weight easily   
within full range in all   
directions  
Static Standing Balance:   
Fair Patient able to   
maintain balance with  
handhold support, may   
require occasional minimal   
assistance  
Dynamic Standing Balance:   
Fair Patient (more content   
not included)...    Normal                                  [facility]  
   
                                                    Vancomycin random [Mass/Vol]  
on 2022   
   
                                                    Vancomycin   
[Mass/Vol]      23.1 ug/mL      High            10.0-20.0       [facility]  
   
                                        Comment on above:   Order Comment: Speci  
men Type: BLOOD SPECIMEN  
Ordering Facility: [facility]  
Address: [address] [place] [address]   
   
                                                            Result Comment: Refe  
rence ranges and high/low indicator flags are   
provided as general guidelines only. The treating physician must   
determine appropriate target levels/dosing based on the specific   
clinical situation.   
   
                                                            Performed By: #### 2  
4321-2 ####  
[facility] LABORATORY  
CLIA [id number]  
[address] [place]   
   
                                                    Basic metabolic 2000 panelon  
 2022   
   
                                                    Anion gap   
[Moles/Vol]     7 mmol/L        Low             9-18            [facility]  
   
                                        Comment on above:   Order Comment: Speci  
men Type: BLOOD SPECIMENOrdering Facility:  
   
[facility] Address: [address] [place] [address]   
   
                                                            Performed By: #### 2  
4321-2 ####[facility] LABORATORYCLIA   
[id number] [place] [address] [place]   
   
                                                    Calcium   
[Mass/Vol]      8.8 mg/dL       Normal          8.5-10.2        [facility]  
   
                                        Comment on above:   Order Comment: Speci  
men Type: BLOOD SPECIMENOrdering Facility:  
   
[facility] Address: [address] [place] [address]   
   
                                                            Performed By: #### 2  
4321-2 ####[facility] LABORATORYCLIA   
[id number] [place] [address] [place]   
   
                                                    Chloride   
[Moles/Vol]     103 mmol/L      Normal                    [facility]  
   
                                        Comment on above:   Order Comment: Speci  
men Type: BLOOD SPECIMENOrdering Facility:  
   
[facility] Address: [address] [place] [address]   
   
                                                            Performed By: #### 2  
4321-2 ####[facility] LABORATORYCLIA   
[id number] [place] [address] [place]   
[place]   
   
                      CO2 [Moles/Vol] 30 mmol/L  Normal     22-30      [facility]  
   
                                        Comment on above:   Order Comment: Speci  
men Type: BLOOD SPECIMENOrdering Facility:  
   
[facility] Address: [address] [address]   
   
                                                            Performed By: #### 2  
4321-2 ####[facility]   
[id number] [address]   
   
                                                    Creatinine   
[Mass/Vol]      0.93 mg/dL      Normal          0.58-0.96       [facility]  
   
                                        Comment on above:   Order Comment: Speci  
men Type: BLOOD SPECIMENOrdering Facility:  
   
[facility] Address: [address] [address]   
   
                                                            Performed By: #### 2  
4321-2 ####[facility]   
[id number] [address]   
   
                                                    ESTIMATED   
GLOMERULAR   
FILTRATION RATE 71 mL/min/1.73m??? Normal          >=60            [facility]  
   
                                        Comment on above:   Order Comment: Speci  
men Type: BLOOD SPECIMENOrdering Facility:  
   
[facility] Address: [address] [place] [address]   
   
                                                            Result Comment: Sydnee  
mated Glomerular Filtration Rate (eGFR) is   
calculated using the  CKD-EPI creatinine equation. This equation   
utilizes serum creatinine, sex, and age as parameters. The   
creatinine assay has traceable calibration to isotope dilution-mass   
spectrometry. Refer to KDIGO guidelines for clinical interpretation.   
In patients with unstable renal function, e.g. those with acute   
kidney injury, the eGFR may not accurately reflect actual GFR.   
   
                                                            Performed By: #### 2  
4321-2 ####[facility] LABORATORYCLIA   
[id number] [address] OF   
[facility]   
   
                                                    Glucose   
[Mass/Vol]      107 mg/dL       High            74-99           [facility]  
   
                                        Comment on above:   Order Comment: Speci  
men Type: BLOOD SPECIMENOrdering Facility:  
   
[facility] Address: [address] [address]   
   
                                                            Result Comment: The   
American Diabetes Association (ADA) provides   
guidance for cutoff values for fasting glucose and random glucose.   
The ADA defines fasting as no caloric intake for at least 8 hours.   
Fasting plasma glucose results between 100 to 125 mg/dL indicate   
increased risk for diabetes (prediabetes).  
Fasting plasma glucose results greater than or equal to 126 mg/dL   
meet the criteria for diagnosis of diabetes. In the absence of   
unequivocal hyperglycemia, results should be confirmed by repeat   
testing. In a patient with classic symptoms of hyperglycemia or   
hyperglycemic crisis, random plasma glucose results greater than or   
equal to 200 mg/dL meet the criteria for diagnosis of diabetes.  
Reference: Standards of Medical Care in Diabetes 2016, American   
Diabetes Association. Diabetes Care. 2016.39(Suppl 1).   
   
                                                            Performed By: #### 2  
4321-2 ####[facility] LABORATORYCLIA   
[id number] [address] STATES [place]   
[facility]   
   
                                                    Potassium   
[Moles/Vol]     4.2 mmol/L      Normal          3.7-5.1         [facility]  
   
                                        Comment on above:   Order Comment: Speci  
men Type: BLOOD SPECIMENOrdering Facility:  
   
[facility] Address: [address] [place] [address]   
   
                                                            Performed By: #### 2  
4321-2 ####[facility] LABORATORYCLIA   
[id number] [address] STATES [facility]   
   
                                                    Sodium   
[Moles/Vol]     140 mmol/L      Normal          136-144         [facility]  
   
                                        Comment on above:   Order Comment: Speci  
men Type: BLOOD SPECIMENOrdering Facility:  
   
[facility] Address: [address] [place] [address]   
   
                                                            Performed By: #### 2  
4321-2 ####[facility] LABORATORYCLIA   
[id number] [address] STATES [place]   
[place]   
   
                                                    Urea nitrogen   
[Mass/Vol]      17 mg/dL        Normal          7-21            [facility]  
   
                                        Comment on above:   Order Comment: Speci  
men Type: BLOOD SPECIMENOrdering Facility:  
   
[facility] Address: [address] [place] [address]   
   
                                                            Performed By: #### 2  
4321-2 ####[facility] LABORATORYCLIA   
[id number] [address] STATES [place]   
[place]   
   
                                                    CBC panel Auto (Bld)on    
   
                                                    Erythrocyte   
distribution   
width (RBC)   
[Ratio]         14.5 %          Normal          11.5-15.0       [facility]  
   
                                        Comment on above:   Order Comment: Speci  
men Type: BLOOD SPECIMENOrdering Facility:  
   
[facility] Address: [address] [place] [address]   
   
                                                            Performed By: #### 6  
4626, 525-3 ####  
[facility]  
CLIA [id number]  
[address]   
   
                                                    Hematocrit (Bld)   
[Volume fraction] 26.9 %          Low             36.0-46.0       [facility]  
   
                                        Comment on above:   Order Comment: Speci  
men Type: BLOOD SPECIMENOrdering Facility:  
  
[facility] Address: [address] [place] [address]   
   
                                                            Performed By: #### 6  
462-2, 505-3 ####  
[facility]  
CLIA [id number]  
[address] OF [facility]   
   
                                                    Hemoglobin (Bld)   
[Mass/Vol]      8.4 g/dL        Low             11.5-15.5       [facility]  
   
                                        Comment on above:   Order Comment: Speci  
men Type: BLOOD SPECIMENOrdering Facility:  
   
[facility] Address: [address] [place] [address]   
   
                                                            Performed By: #### 6  
4626, 925-3 ####  
[facility]  
CLIA [id number]  
[address]   
   
                                                    MCH (RBC)   
[Entitic mass]  28.2 pg         Normal          26.0-34.0       [facility]  
   
                                        Comment on above:   Order Comment: Speci  
men Type: BLOOD SPECIMENOrdering Facility:  
  
[facility] Address: [address] [place] [address]   
   
                                                            Performed By: #### 6  
4626, 895-3 ####  
[facility]  
CLIA [id number]  
[address]   
   
                                                    MCHC (RBC)   
[Mass/Vol]      31.2 g/dL       Normal          30.5-36.0       [facility]  
   
                                        Comment on above:   Order Comment: Speci  
men Type: BLOOD SPECIMENOrdering Facility:  
   
[facility] Address: [address] [place] [address]   
   
                                                            Performed By: #### 6  
462-6, 195-3 ####  
[facility]  
CLIA [id number]  
[address]   
   
                                                    MCV (RBC)   
[Entitic vol]   90.3 fL         Normal          80.0-100.0      [facility]  
   
                                        Comment on above:   Order Comment: Speci  
men Type: BLOOD SPECIMENOrdering Facility:  
  
[facility] Address: [address] [place] [address]   
   
                                                            Performed By: #### 6  
462-6, 635-3 ####  
[facility] LABORATORY  
CLIA [id number]  
[address]   
   
                                                    Nucleated RBC   
(Bld) [#/Vol]   10*3/uL         Normal          <0.01           [facility]  
   
                                        Comment on above:   Order Comment: Speci  
men Type: BLOOD SPECIMENOrdering Facility:  
   
[facility] Address: [address] [place] [address]   
   
                                                            Performed By: #### 6  
462-6, 635-3 ####  
[facility] LABORATORY  
CLIA [id number]  
[address]   
   
                                                    Platelet mean   
volume (Bld)   
[Entitic vol]   9.6 fL          Normal          9.0-12.7        [facility]  
   
                                        Comment on above:   Order Comment: Speci  
men Type: BLOOD SPECIMENOrdering Facility:  
  
[facility] Address: [address] [place] [address]   
   
                                                            Performed By: #### 6  
462-6, 635-3 ####  
[facility] LABORATORY  
CLIA [id number]  
[address]   
   
                                                    Platelets (Bld)   
[#/Vol]         329 10*3/uL     Normal          150-400         [facility]  
   
                                        Comment on above:   Order Comment: Speci  
men Type: BLOOD SPECIMENOrdering Facility:  
   
[facility] Address: [address] [place] [address]   
   
                                                            Performed By: #### 6  
462-6, 635-3 ####  
[facility] LABORATORY  
CLIA [id number]  
[address]   
   
                      RBC (Bld) [#/Vol] 2.98 10*6/uL Low        3.90-5.20  [facility]  
   
                                        Comment on above:   Order Comment: Speci  
men Type: BLOOD SPECIMENOrdering Facility:  
   
[facility] Address: [address] [place], [place] [id number]   
   
                                                            Performed By: #### 6  
462-6, 635-3 ####  
[facility] LABORATORY  
CLIA [id number]  
[address] OF [facility]   
   
                      WBC (Bld) [#/Vol] 10.82 10*3/uL Normal     3.70-11.00 [facility]  
   
                                        Comment on above:   Order Comment: Speci  
men Type: BLOOD SPECIMENOrdering Facility:  
   
[facility] Address: [address] [place] [address]   
   
                                                            Performed By: #### 6  
462-6, 635-3 ####  
[facility] LABORATORY  
CLIA [id number]  
[address]   
   
                                                    CONSULT PROGon 2022   
   
                                        CONSULT PROG        HNO ID: [id number]  
Author: Isamar Bhat venancio  
Service: Pharmacy  
Author Type: Pharmacist  
Type: Consult Progress Note  
Filed: 3/26/2022 11:41 AM  
Note Text:  
PHARMACY VANCOMYCIN DOSING   
NOTE  
Patient Name: Jayden Soto   
MRN: [id number] Admission Date:   
3/21/2022  
Date of Consult: 3/26/2022   
Time of Consult: 11:15 AM  
Indication: Bone AND joint   
infection  
Goal Range: 15-25 mcg/mL  
RECOMMENDATIONS/PLAN:  
Pharmacy consulted for   
vancomycin dosing for Jayden Soto, a 61 year old,  
female who is being treated   
with vancomycin for bone AND   
joint infection  
1. Patient is currently   
ordered Vancomycin dosed by   
level. Today is day 4  
of therapy.  
2. The most recent   
vancomycin level was 12.6   
mcg/mL drawn at 0913 on  
3/26/22. This is a ~21 hour   
level on the 4th day of   
therapy. This is a  
subtherapeutic level based   
on goal range, expect that   
~24 hour trough  
would be further   
subtherapeutic.  
3. Will schedule vancomycin   
to 1.5 g with a dosing   
interval of q12h. Scr  
previously increased to 1.11   
on 3/25, appears to be   
down-trending with  
most recent 0.93 today,   
estimated CrCl 80mL/min.   
Will change back to Q12H  
as previously ordered and   
monitor renal function   
closely.  
4. The next vancomycin level   
has been ordered for 3/27/22   
@ 1130  
(Completed). Will check   
level prior to 3rd dose of   
1.5g Q12H based on  
recent changing renal   
function.  
We will follow patient renal   
function, vancomycin levels   
and doses with  
you during the course of   
therapy. Additional   
recommendations will appear  
in follow up notes. If you   
have any questions, please   
contact Isamar Bhat  
at [phone] or [place]   
[place] [phone].  
Age: 61 year old  
Allergies:  
ALLERGIES  
Allergen Reactions  
- Morphine GI Upset  
- Statins-Hmg-Coa Red*   
Unknown  
Last 3 Encounter Wt   
Readings:  
Date: Wt:  
2022 104.7 kg (230 lb   
13.2 oz)  
2022 95.3 kg (210 lb)  
2022 95.3 kg (210 lb)  
Last 1 Encounter Ht   
Readings:  
Date: Ht:  
2022 170.2 cm (5' 7 )  
Estimated Creatinine   
Clearance: 80 mL/min (based   
on SCr of 0.93 mg/dL).  
Temp (24hrs), Av.8 ?C   
(98.2 ?F), Min:36.2 ?C (97.2   
?F), Max:36.9 ?C  
(98.4 ?F)  
- Current Temp: 36.2 ?C   
(97.2 ?F)  
Labs  
BUN (mg/dL)  
Date Value  
2022 17  
2022 17  
2022 15  
Creatinine (mg/dL)  
Date Value  
2022 0.93  
2022 1.11 (H)  
2022 0.73  
WBC (k/uL)  
Date Value  
2022 10.82  
2022 15.83 (H)  
2022 14.24 (H)  
Vancomycin Levels:  
Vancomycin (ug/mL)  
Date/Time Value  
2022 0913 12.6  
2022 0859 12.6  
Isamar Bhat [facility]  
   
                                        CONSULT PROG        HNO ID: [id number]  
Author: April Dc MD  
Service: Endocrinology  
Author Type: Physician  
Type: Consult Progress Note  
Filed: 3/26/2022 9:29 AM  
Note Text:  
ENDOCRINOLOGY CONSULT   
PROGRESS NOTE  
SERVICE DATE: 3/26/2022  
SERVICE TIME: 7:23 AM  
Subjective  
INTERVAL HPI: Following for   
DM type 2.  
The patient is a 61-year-old   
female, who was admitted on   
, with  
left knee pain and swelling.   
She was found to have an   
infected left knee  
arthroplasty. She had   
surgery on , removal   
of knee arthroplasty,  
left knee with irrigation   
and debridement of left knee   
and insertion of  
antibiotic spacer.  
?Patient has history of type   
2 diabetes for 32 years,   
diagnosed when she  
was pregnant. She was on   
oral agents for several   
years. She has been on  
insulin for about 15 years.   
She is on Lantus 40 units at   
bedtime and  
NovoLog 13 units with each   
meal; current dose for at   
least 1 year. She is  
also on Jardiance 25 mg a   
day. She checks blood sugar   
3 times a day. They  
used to be less than 190,   
but since she had infection   
last year in her  
knee, her blood sugars have   
been high over 200s and   
300s; sometimes, it  
has gone as high as 500 and   
600. Her blood sugars have   
been fluctuating  
here. She was initially   
given Lantus 25 units at   
bedtime and then it was  
increased to 40 units. She   
was n.p.o. and NovoLog was   
being held off and  
on. She has lost about 80   
pounds weight over the last   
year. She has  
history of retinopathy and   
gets injections. She also   
had cataract  
surgery. She has history of   
neuropathy and numbness in   
both feet. She  
did not have any kidney   
problems.  
Notes and orders reviewed.  
Diet: DIET CARBOHYDRATE   
CONTROLLED p.o intake good,   
following diet.  
Activity:Up with Assistance,   
has PT; ambulating a little  
Review of Systems: c/o left   
knee pain, no SOB, no   
nausea, rest negative  
Current   
Facility-Administered   
Medications  
Medication Dose Route   
Frequency  
- ondansetron (PF) 4 mg   
injection (ZOFRAN) 4 mg   
INTRAVENOUS q 6 H PRN  
- NaCl 0.9% iv flush bag 20   
mL INTRAVENOUS PRN  
- sodium chloride 0.9 %   
(flush) 3-5 mL (BD   
POSIFLUSH) 3-5 mL   
INTRAVENOUS  
q 12 H  
- sodium chloride 0.9 %   
(flush) 2-10 mL (BD   
POSIFLUSH) 2-10 mL  
INTRAVENOUS q 12 H  
- DULoxetine 60 mg cap(s)   
(CYMBALTA) 60 mg ORAL BID  
- ranolazine ER 1,000 mg   
tab(s) (RANEXA) 1,000 mg   
ORAL BID  
- dilTIAZem  mg cap(s)   
(CARDIZEM CD, CARTIA XT) 180   
mg ORAL DAILY  
- gabapentin 200 mg cap(s)   
(NEURONTIN) 200 mg ORAL TID  
- isosorbide mononitrate ER   
90 mg tab(s) (IMDUR) 90 mg   
ORAL DAILY  
- clopidogrel 75 mg tab(s)   
(PLAVIX) 75 mg ORAL DAILY  
- metoprolol tartrate (short   
acting) 25 mg tab(s)   
(LOPRESSOR) 25 mg ORAL  
q 12 H  
- dextrose 40 % 15 g 15 g   
ORAL PRN  
Or  
- glucagon 1 mg injection 1   
mg INTRAMUSCULAR PRN  
Or  
- dextrose 50% in water 25   
mL syringe 12.5 g   
INTRAVENOUS PRN  
- empagliflozin 25 mg tab(s)   
(JARDIANCE) 25 mg ORAL DAILY  
- docusate sodium 100 mg   
cap(s) (COLACE) 100 mg ORAL   
BID  
- aspirin, enteric coated 81   
mg tab(s) 81 mg ORAL BID  
- sodium chloride 0.9 %   
(flush) 3-5 mL (BD   
POSIFLUSH) 3-5 mL   
INTRAVENOUS  
q 12 H  
- vancomycin dosing and   
monitoring per pharmacy   
OTHER As Directed  
- cefTRIAXone iv piggyback 2   
g in dextrose (iso-osmotic)   
50 mL (ROCEPHIN)  
2 g INTRAVENOUS q 24 H  
- insulin glargine 40 Units   
pen (long acting) (LANTUS   
SOLOSTAR, BASAGLAR  
KWIKPEN) 40 Units   
SUBCUTANEOUS AT BEDTIME  
- miconazole 2 % 1   
application topical powder   
(LOTRIMIN AF, DESENEX) 1  
application TOPICAL BID  
- acetaminophen 1,000 mg   
tab(s) (TYLENOL) 1,000 mg   
ORAL q 6 H  
- oxyCODONE IR 5-10 mg   
tab(s) (ROXICODONE) 5-10 mg   
ORAL q 4 H PRN  
- traMADol 50 mg tab(s)   
(ULTRAM) 50 mg ORAL q 6 H  
- morphine 2 mg injection 2   
mg INTRAVENOUS q 3 H PRN  
- melatonin 6 mg tab(s) 6 mg   
ORAL DAILY (8 PM)  
- insulin lispro 16 Units   
pen (rapid acting) (HumaLOG   
KWIKPEN) 16 Units  
SUBCUTANEOUS DAILY WITH   
BREAKFAST  
- insulin lispro 10 Units   
pen (rapid acting) (HumaLOG   
KWIKPEN) 10 Units  
SUBCUTANEOUS DAILY wLUNCH  
- insulin lispro 12 Units   
pen (rapid acting) (HumaLOG   
KWIKPEN) 12 Units  
SUBCUTANEOUS DAILY wDINNER  
- insulin lispro pen (rapid   
acting) (HumaLOG KWIKPEN)   
SUBCUTANEOUS w  
MEALS  
- sodium chloride 0.9 %   
(flush) 10 mL (BD POSIFLUSH)   
10 mL INTRAVENOUS q  
12 H  
- sodium chloride 0.9 %   
(flush) 20 mL (BD POSIFLUSH)   
20 mL INTRAVENOUS  
PRN  
- lactulose 20 g CUP   
(DUPHALAC, CONSTULOSE) 20 g   
ORAL BID  
- senna-docusate 8.6-50 mg 1   
tablet (SENNA-S) 1 tablet   
ORAL BID  
Objective  
PHYSICAL EXAM:  
GENERAL: lying in bed, in no   
acute distress, mildly   
obese.  
SKIN: Warm, dry, has heel   
protector dressings over   
both heels. No rash.  
No ulcers or calluses   
noticed. Left knee bandaged.  
HEENT: Head is normocephalic   
and atraumatic. Eyes, pupils   
round, EOMI.  
Buccal mucosa is dry. No   
thrush.  
NECK: Supple. No goiter   
palpable on thyroid exam.  
LUNGS: Fair air entry. Clear   
to auscultation.  
CVS: Regular rate and   
rhythm. No murmur or gallop.  
ABDOMEN: Soft and nontender.   
No masses.  
EXTREMITIES: has compression   
ban (more content not   
included)...        Normal                                  [facility]  
   
                                                    THERAPY NTon 2022   
   
                                        THERAPY NT          HNO ID: [id number]  
Author: Yasmeen Dominguez PTA  
Service: Physical Therapy  
Author Type: Physical   
Therapy Assistant  
Type: Therapy   
(PT/OT/Speech/Resp)  
Filed: 3/26/2022 12:11 PM  
Note Text:  
----------------------------  
----------------------------  
------------------------  
Attestation signed by Maribell Hopkins PT at 3/26/2022   
4:52 PM  
I reviewed and agree with   
the documentation   
corresponding to this   
therapy  
visit.  
SIGNATURE: Maribell Hopkins PT  
DATE: 2022  
TIME: 4:52 PM  
----------------------------  
----------------------------  
------------------------  
Physical Therapy Treatment  
SERVICE DATE: 3/26/2022  
SERVICE TIME: 1130 to 1153  
ROOM: BZ--  
Recommended Discharge   
Disposition: Home PT  
Recommended Discharge   
Disposition Comments: home   
with family assist and  
home PT, use of wheeled   
walker  
Anticipated Discharge Needs:   
Physical Assist at   
Home;Supervision at Home  
Physical Assist at Home for:  
Cleaning;Laundry;Meals;Safet  
y;Shopping;Transportation  
Supervision at Home due to:   
(change in medical status)  
Recommended Discharge   
Equipment: No equipment   
needs anticipated  
PT 6 Clicks Score: 17  
Additional personal during   
visit : Luis Choudhary  
Patient reports not feeling   
well due to low blood sugar,   
nursing notified.  
Patient did well tolerating   
her exercises and transfers   
but unable to  
attempt further ambulation   
due to feeling shaky and   
lightheaded, patient  
provided with some crackers   
and peanut butter and   
repositioned in bed with  
cold pack. Goals ongoing.  
Precautions/Activity   
Restrictions: Fall Risk  
Extremity With Weight   
Bearing Restricted: Left   
Lower Extremity  
Left Lower Extremity Weight   
Bearing Status: WBAT  
Current Hospital Course:   
Direct admit from Dr. Evans's office due to L TKA  
sepsis. s/p IANDD, component   
removal, antibiotic spacer   
insertion 3/23/33.  
WBAT post op. Waiting   
PICC/antibiotic   
recommendations.  
Reason for Hospital   
Admission: TKA infection  
Relevant Past Medical   
History: L TKA, joint   
infection with washout, HTN,  
DMII, obesity  
Response to Therapy   
Interventions: Good   
participation in activities,   
Low  
activity tolerance, Requires   
additional time to complete   
activities  
Continue skilled needs due   
to: Functional   
mobility/skill impairments,  
Safety concerns  
Physical Therapy Problem   
List: Education   
Deficit;Safety   
Deficits;Decreased  
Activity Tolerance;Decreased   
Strength;Functional Mobility  
Impairment;Balance Impaired  
Treatment Interventions:   
Education;Strengthening;Func  
tional Mobility  
Training;Balance   
Training;Neuromuscular   
Re-education;Pain Management  
Home Environment  
Patient Lives With: Family   
(son, DIL, grandkids)  
Assistance Available: 24   
Hour  
Entry To Home: Ramp  
Number Of Stairs To   
Bed/Bath: 0  
Equipment Owned: Wheeled   
Walker;Cane;Shower   
Chair;Elevated Toilet Seat  
Prior Functional Level:   
Within Functional Limits  
Prior Functional Level   
Comments: patient reports   
typically independent  
without device, completes   
own ADLS  
Patient Report: feeling   
shaky but agreed to try   
therapy  
CURRENT FUNCTIONAL STATUS:   
Most recent performance   
mobility performed  
during session in bold,   
other mobility completed   
during prior session and  
may no longer be correct or   
appropriate to complete.  
Current Functional Mobility   
Assist Level Additional   
Information  
Rolling  
Supine to Sit Stand By   
Assistance;Additional   
Information reliant on use   
of  
rail and uses RLE to help   
move LLE  
Sit to Supine Stand By   
Assistance;Additional   
Information use of RLE to  
help return LLE into bed  
Scooting  
Sit to Stand Contact Guard   
Assistance;Additional   
Information cues for hand  
placement, get her nose over   
her toes and power up   
through her legs  
Stand to Sit Contact Guard   
Assistance;Additional   
Information cues to slide  
LLE forward  
Bed to Chair Contact Guard   
Assistance;Additional   
Information Bed To Chair  
Transfer Type: Stepping  
Bed To Chair Transfer   
Equipment: Wheeled Walker  
3-4 small side steps  
Toilet/Commode Minimal   
Assistance;Additional   
Information assist required  
for hygiene while standing   
with wheeled walker for   
support  
Gait  
Contact Guard   
Assistance;Additional   
Information Gait Device:   
Wheeled  
Walker  
Gait Distance (feet): 20ftx2  
Stairs  
Curb Step  
Car Transfer  
Blank fields indicate   
activity not attempted  
General   
Deviations/Observations:   
Antalgic gait;Lynn  
decreased;Difficulty   
changing   
direction/turning;Flexed   
trunk  
posture;Non-functional gait   
speed;Step length decreased  
JH-HLM: 5: Standing (1 or   
more minutes)  
Learning/Educational Needs:   
Discharge   
Plan;Equipment;Functional  
Activities/Mobility;Plan of   
Care;Pain   
Management;Rehabilitation   
Techniques  
and Procedures;Safety  
Goals for Plan of Care:  
Patient /Caregiver Goals: Go   
Home  
Able to perform HEP with:   
Verbal Cues Only (L TKA   
protocol)  
Transfer supine to/from sit   
with: Independent  
Transfer sit to/from stand   
with: Independent  
Ambulate with: Independent  
Distance: 50ft intervals  
Device: Wheeled Walker  
Ambulate up and (more   
content not included)... Normal                                  [facility]  
   
                                                    Vancomycin random [Mass/Vol]  
on 2022   
   
                                                    Vancomycin   
[Mass/Vol]      12.6 ug/mL      Normal          10.0-20.0       [facility]  
   
                                        Comment on above:   Order Comment: Speci  
men Type: BLOOD SPECIMENOrdering Facility:  
   
[facility] Address: [address], [place] [address]   
   
                                                            Result Comment: Refe  
rence ranges and high/low indicator flags are   
provided as general guidelines only. The treating physician must   
determine appropriate target levels/dosing based on the specific   
clinical situation.   
   
                                                            Performed By: #### 4  
091-5 ####[facility] GENERAL LABORATORYCLIA   
[id number] [address] [place]   
[place]   
   
                                                    Basic metabolic 2000 panelon  
 2022   
   
                                                    Anion gap   
[Moles/Vol]     12 mmol/L       Normal          9-18            [facility]  
   
                                        Comment on above:   Order Comment: Speci  
men Type: BLOOD SPECIMEN  
Ordering Facility: [facility]  
Address: [address] [place] [address]   
   
                                                            Performed By: #### 2  
4321-2 ####  
[facility]  
CLIA [id number]  
[address] STATES [facility]   
   
                                                    Calcium   
[Mass/Vol]      9.1 mg/dL       Normal          8.5-10.2        [facility]  
   
                                        Comment on above:   Order Comment: Speci  
men Type: BLOOD SPECIMEN  
Ordering Facility: [facility]  
Address: [address] [address]   
   
                                                            Performed By: #### 2  
4321-2 ####  
[facility]  
CLIA [id number]  
[address] STATES [facility]   
   
                                                    Chloride   
[Moles/Vol]     101 mmol/L      Normal                    [facility]  
   
                                        Comment on above:   Order Comment: Speci  
men Type: BLOOD SPECIMEN  
Ordering Facility: [facility]  
Address: [address] [address]   
   
                                                            Performed By: #### 2  
4321-2 ####  
[facility]  
CLIA [id number]  
[address] [place]   
   
                      CO2 [Moles/Vol] 28 mmol/L  Normal     22-30      [facility]  
   
                                        Comment on above:   Order Comment: Speci  
men Type: BLOOD SPECIMEN  
Ordering Facility: [facility]  
Address: [address] [address]   
   
                                                            Performed By: #### 2  
4321-2 ####  
[facility]  
CLIA [id number]  
[address] [place]   
   
                                                    Creatinine   
[Mass/Vol]      1.11 mg/dL      High            0.58-0.96       [facility]  
   
                                        Comment on above:   Order Comment: Speci  
men Type: BLOOD SPECIMEN  
Ordering Facility: [facility]  
Address: [address] [address]   
   
                                                            Performed By: #### 2  
4321-2 ####  
[facility]  
CLIA [id number]  
[address] OF [facility]   
   
                                                    ESTIMATED   
GLOMERULAR   
FILTRATION RATE 57 mL/min/1.73m??? Low             >=60            [facility]  
   
                                        Comment on above:   Order Comment: Speci  
men Type: BLOOD SPECIMEN  
Ordering Facility: [facility]  
Address: [address] [place] [address]   
   
                                                            Result Comment: Sydnee  
mated Glomerular Filtration Rate (eGFR) is   
calculated using the  CKD-EPI creatinine equation. This equation   
utilizes serum creatinine, sex, and age as parameters. The   
creatinine assay has traceable calibration to isotope dilution-mass   
spectrometry. Refer to KDIGO guidelines for clinical interpretation.   
In patients with unstable renal function, e.g. those with acute   
kidney injury, the eGFR may not accurately reflect actual GFR.   
   
                                                            Performed By: #### 2  
4321-2 ####  
[facility] LAB[facility]AT[facility]  
CLIA [id number]  
[address] [place]   
   
                                                    Glucose   
[Mass/Vol]      132 mg/dL       High            74-99           [facility]  
   
                                        Comment on above:   Order Comment: Speci  
men Type: BLOOD SPECIMEN  
Ordering Facility: [facility]  
Address: [address] [place] [address]   
   
                                                            Result Comment: The   
American Diabetes Association (ADA) provides   
guidance for cutoff values for fasting glucose and random glucose.   
The ADA defines fasting as no caloric intake for at least 8 hours.   
Fasting plasma glucose results between 100 to 125 mg/dL indicate   
increased risk for diabetes (prediabetes).  
Fasting plasma glucose results greater than or equal to 126 mg/dL   
meet the criteria for diagnosis of diabetes. In the absence of   
unequivocal hyperglycemia, results should be confirmed by repeat   
testing. In a patient with classic symptoms of hyperglycemia or   
hyperglycemic crisis, random plasma glucose results greater than or   
equal to 200 mg/dL meet the criteria for diagnosis of diabetes.  
Reference: Standards of Medical Care in Diabetes 2016, American   
Diabetes Association. Diabetes Care. 2016.39(Suppl 1).   
   
                                                            Performed By: #### 2  
4321-2 ####  
[facility]  
CLIA [id number]  
[address] STATES [place] [facility]   
   
                                                    Potassium   
[Moles/Vol]     4.3 mmol/L      Normal          3.7-5.1         [facility]  
   
                                        Comment on above:   Order Comment: Speci  
men Type: BLOOD SPECIMEN  
Ordering Facility: [facility]  
Address: [address] [place] [address]   
   
                                                            Performed By: #### 2  
4321-2 ####  
[facility]  
CLIA [id number]  
[address]   
   
                                                    Sodium   
[Moles/Vol]     141 mmol/L      Normal          136-144         [facility]  
   
                                        Comment on above:   Order Comment: Speci  
men Type: BLOOD SPECIMEN  
Ordering Facility: [facility]  
Address: [address] [place] [address]   
   
                                                            Performed By: #### 2  
4321-2 ####  
[facility]  
CLIA [id number]  
[address] [place] [facility]   
   
                                                    Urea nitrogen   
[Mass/Vol]      17 mg/dL        Normal          7-21            [facility]  
   
                                        Comment on above:   Order Comment: Speci  
men Type: BLOOD SPECIMEN  
Ordering Facility: [facility]  
Address: [address] [place] [address]   
   
                                                            Performed By: #### 2  
4321-2 ####  
[facility]  
CLIA [id number]  
[address] [place]   
   
                                                    CASE MANAGEMon 2022   
   
                                        CASE MANAGEM        HNO ID: [id number]  
Author: Bev Steele RN  
Service: Nursing  
Author Type: Registered   
Nurse  
Type: Care Mgt Progress Note  
Filed: 3/25/2022 2:26 PM  
Note Text:  
CARE MANAGEMENT PROGRESS   
NOTE  
SERVICE DATE: 3/25/2022  
SERVICE TIME: 2:24 PM  
LOS: 4 days  
DC Plan: home with HHC and   
home pharmacy; pt agreeable   
to [facility] for  
home pharmacy and only   
accepting home health care   
[facility];  
pt's daughter-in-law Nneka   
able to be teachable   
caregiver for IV  
antibiotics; PICC line in   
place  
Barriers to dc: need final   
recs for IV antibiotics;   
waiting on culture  
sensitivies and will need   
copat  
Anticipated dc date: TBD  
Transportation: pt's family   
able to provide   
transportation at dc  
SIGNATURE: Bev Steele RN   
PATIENT NAME: Jayden Soto  
DATE: 2022 MRN:   
[id number]  
TIME: 2:23 PM PAGER/CONTACT   
#: [id number]       Normal                                  [facility]  
   
                                                    CBC panel Auto (Bld)on    
   
                                                    Erythrocyte   
distribution   
width (RBC)   
[Ratio]         14.3 %          Normal          11.5-15.0       [facility]  
   
                                        Comment on above:   Order Comment: Speci  
men Type: BLOOD SPECIMENOrdering Facility:  
   
[facility] Address: [address] [place] [address]   
   
                                                            Performed By: #### 5  
8410-2 ####[facility] LABORATORYCLIA   
[id number] [address] [place]   
[place]   
   
                                                    Hematocrit (Bld)   
[Volume fraction] 30.3 %          Low             36.0-46.0       [facility]  
   
                                        Comment on above:   Order Comment: Speci  
men Type: BLOOD SPECIMENOrdering Facility:  
  
[facility] Address: [address] [place] [address]   
   
                                                            Performed By: #### 5  
8410-2 ####[facility] LABORATORYCLIA   
[id number] [place] [address] [place]   
[place]   
   
                                                    Hemoglobin (Bld)   
[Mass/Vol]      9.2 g/dL        Low             11.5-15.5       [facility]  
   
                                        Comment on above:   Order Comment: Speci  
men Type: BLOOD SPECIMENOrdering Facility:  
   
[facility] Address: [address] [place] [address]   
   
                                                            Performed By: #### 5  
8410-2 ####[facility] LABORATORYCLIA   
[id number] [place] [address] [place]   
[place]   
   
                                                    MCH (RBC)   
[Entitic mass]  26.7 pg         Normal          26.0-34.0       [facility]  
   
                                        Comment on above:   Order Comment: Speci  
men Type: BLOOD SPECIMENOrdering Facility:  
  
[facility] Address: [address] [place] [address]   
   
                                                            Performed By: #### 5  
8410-2 ####[facility] LABORATORYCLIA   
[id number] [address]   
   
                                                    MCHC (RBC)   
[Mass/Vol]      30.4 g/dL       Low             30.5-36.0       [facility]  
   
                                        Comment on above:   Order Comment: Speci  
men Type: BLOOD SPECIMENOrdering Facility:  
   
[facility] Address: [address] [place] [address]   
   
                                                            Performed By: #### 5  
8410-2 ####[facility] LABORATORYCLIA   
[id number] [address] [place] [facility]   
   
                                                    MCV (RBC)   
[Entitic vol]   88.1 fL         Normal          80.0-100.0      [facility]  
   
                                        Comment on above:   Order Comment: Speci  
men Type: BLOOD SPECIMENOrdering Facility:  
  
[facility] Address: [address] [place] [address]   
   
                                                            Performed By: #### 5  
8410-2 ####[facility] LABORATORYCLIA   
[id number] [address]   
   
                                                    Nucleated RBC   
(Bld) [#/Vol]   10*3/uL         Normal          <0.01           [facility]  
   
                                        Comment on above:   Order Comment: Speci  
men Type: BLOOD SPECIMENOrdering Facility:  
   
[facility] Address: [address] [place] [address]   
   
                                                            Performed By: #### 5  
8410-2 ####[facility] LABORATORYCLIA   
[id number] [address] [place] [facility]   
   
                                                    Platelet mean   
volume (Bld)   
[Entitic vol]   9.5 fL          Normal          9.0-12.7        [facility]  
   
                                        Comment on above:   Order Comment: Speci  
men Type: BLOOD SPECIMENOrdering Facility:  
  
[facility] Address: [address] [place] [address]   
   
                                                            Performed By: #### 5  
8410-2 ####[facility] LABORATORYCLIA   
[id number] [address] [place]   
[place]   
   
                                                    Platelets (Bld)   
[#/Vol]         437 10*3/uL     High            150-400         [facility]  
   
                                        Comment on above:   Order Comment: Speci  
men Type: BLOOD SPECIMENOrdering Facility:  
   
[facility] Address: [address] [place] [address]   
   
                                                            Performed By: #### 5  
8410-2 ####[facility] LABORATORYCLIA   
[id number] [place] [address] [place]   
   
                      RBC (Bld) [#/Vol] 3.44 10*6/uL Low        3.90-5.20  [facility]  
   
                                        Comment on above:   Order Comment: Speci  
men Type: BLOOD SPECIMENOrdering Facility:  
   
[facility] Address: [address] [place] [address]   
   
                                                            Performed By: #### 5  
8410-2 ####[facility] LABORATORYCLIA   
[id number] [address] [place]   
[place]   
   
                      WBC (Bld) [#/Vol] 15.83 10*3/uL High       3.70-11.00 [facility]  
   
                                        Comment on above:   Order Comment: Speci  
men Type: BLOOD SPECIMENOrdering Facility:  
   
[facility] Address: [address] [place] [address]   
   
                                                            Performed By: #### 5  
8410-2 ####[facility] LABORATORYCLIA   
[id number] [address] OF   
[facility]   
   
                                                    CONSULTon 2022   
   
                                        CONSULT             HNO ID: [id number]  
Author: April Dc MD  
Service: Endocrinology  
Author Type: Physician  
Type: Consults  
Filed: 3/27/2022 10:04 AM  
Note Text:  
[facility] -   
Consultation  
PATIENT NAME: JAYDEN SOTO  
MRN: [id number] CSN: [id number]  
YOB: 1960   
SEX/AGE: F/61  
PATIENT TYPE: I HOSP SVC:   
OROR LOCATION: [id number]  
DATE OF SERVICE: 2022  
TIME OF SERVICE: 07:46 AM  
REFERRING PHYSICIAN: BASIL MITCHELL  
REASON FOR CONSULTATION:   
Uncontrolled diabetes.  
HISTORY: The patient is a   
61-year-old female, who was   
admitted on , with left knee pain and   
swelling. The patient was   
found to have an  
infected left knee   
arthroplasty. She had   
surgery on , removal   
of  
knee arthroplasty, left knee   
with irrigation and   
debridement of left knee  
and insertion of antibiotic   
spacer.  
Regarding diabetes, the   
patient has history of type   
2 diabetes for 32  
years, started when she was   
pregnant. She was on oral   
agents for several  
years. She has been on   
insulin for about 15 years.   
She is on Lantus 40  
units at bedtime and NovoLog   
13 units with each meal.   
This current dose,  
she has been on for at least   
1 year. She is also on   
Jardiance 25 mg a  
day. The patient states she   
checks her blood sugar 3   
times a day. They  
used to be less than 190,   
but since she had infection   
last year in her  
knee, her blood sugars have   
been high over 200s and   
300s. Sometimes, it  
has gone as high as 500 and   
600 also. Her blood sugars   
have been  
fluctuating in hospital. She   
was initially given Lantus   
25 units at  
bedtime and then it was   
increased to 40 units last   
night. Her fasting  
blood sugar is better today,   
but she had several high   
blood sugars during  
admission. She was n.p.o.   
and NovoLog was being held   
off and on. The  
patient states that she has   
lost about 80 pounds weight   
over the last  
year. She has a history of   
retinopathy and gets   
injections. She also  
had cataract surgery. She   
has history of neuropathy   
and numbness in both  
feet. She did not have any   
kidney problems prior to   
admission.  
PAST MEDICAL HISTORY:   
History of type 2   
insulin-requiring diabetes,  
history of coronary artery   
disease and CABG, history of   
hypertension,  
history of COVID  
infection in 2021,   
history of left knee   
replacement in .  
Then, patient had infection   
in 2020 when she   
was septic with  
foot infection which spread   
to her spine and knee. She   
also had a  
washout of the left knee in   
2021.  
FAMILY HISTORY: Positive for   
diabetes in father and   
grandmother.  
SOCIAL HISTORY: No history   
of smoking or alcohol use.  
REVIEW OF SYSTEMS:  
All 10 systems reviewed with   
the patient in detail. They   
are as per  
History of Present Illness,   
otherwise, negative.  
PHYSICAL EXAMINATION:  
GENERAL: The patient is   
lying in bed, in no acute   
distress. She is a  
mildly obese. Height is 5   
feet 7 inches, weight is 230   
pounds, BMI is  
36.15.  
VITAL SIGNS: BP 93/44, heart   
rate 61 and regular,   
temperature 36.6,  
respirations 16 and regular.  
SKIN: Warm and dry. The   
patient has heel protector   
dressings over both  
heels. No rash. No ulcers or   
calluses noticed. Left knee   
is bandaged.  
HEENT: Head is normocephalic   
and atraumatic. Eyes, pupils   
appear round.  
Extraocular muscles are   
intact. Buccal mucosa is   
dry. No thrush.  
NECK: Supple. No goiter   
palpable on thyroid exam.  
LUNGS: Fair air entry. Clear   
to auscultation.  
CVS: Regular rate and   
rhythm. No murmur or gallop.  
ABDOMEN: Soft and nontender.   
No masses.  
EXTREMITIES: The patient has   
compression bandage over   
left knee. There  
is no edema. She has the   
left foot deformity. Right   
foot does not have  
any deformity. Pedal pulses   
are 2+.  
NEUROLOGICAL: Grossly intact   
strength and sensations and   
grossly intact  
cranial nerve exam.  
PSYCH: The patient does not   
appear anxious or depressed.  
LABORATORY DATA: Sodium 141,   
potassium 4.3, chloride 101,   
CO2 of 28, BUN  
17, creatinine 1.11. Blood   
sugars yesterday 303, 190,   
85, 179 and this  
morning 134.  
CLINICAL IMPRESSION:  
1. Diabetes mellitus type 2,   
32 years duration,   
insulin-requiring for 15  
years; at  
home on Lantus 40 units at   
bedtime and NovoLog 13 units   
with each meal  
and  
Jardiance 25 mg a day. Her   
HbA1c was 9.4 on admission.   
Her blood  
sugars have  
been high frequently at home   
since she has left knee   
infection and  
pain.  
2. Status post irrigation   
and debridement of left   
knee, removal of  
unicompartmental left knee   
arthroplasty and insertion   
of antibiotic  
spacer and  
the left septic knee on   
2022.  
3. Acute kidney injury.   
Creatinine is improved with   
hydration.  
4. History of coronary   
artery disease and coronary   
artery bypass graft.  
5. Diabetic neuropathy and   
retinopathy.  
RECOMMENDATIONS:  
1. Discussed with the   
patient the goal of glycemic   
control and the  
treatment plan  
in detail.  
2. We will continue   
carb-controlled diet and   
avoid any juices. Asked the  
patient  
to have consistent   
carbohydrates with her meals   
to avoid fluctuations  
of blood  
breaux (more content not   
included)...        Normal                                  [facility]  
   
                                        CONSULT             HNO ID: [id number]  
Author: April Dc MD  
Service: Endocrinology  
Author Type: Physician  
Type: Consults  
Filed: 3/25/2022 7:45 AM  
Note Text:  
I have reviewed the   
patient's medical record in   
detail. Consult note  
dictated. See new insulin   
orders.  
April Dc MD    [facility]  
   
                                                    CONSULT PROGon 2022   
   
                                        CONSULT PROG        HNO ID: [id number]  
Author: Swetha Hart RPh  
Service: Pharmacy  
Author Type: Pharmacist  
Type: Consult Progress Note  
Filed: 3/25/2022 10:13 AM  
Note Text:  
PHARMACY VANCOMYCIN DOSING   
NOTE  
Patient Name: Jayden Soto   
MRN: [id number] Admission Date:   
3/21/2022  
Date of Consult: 3/25/2022   
Time of Consult: 9:41 AM  
Indication: Bone AND joint   
infection  
Goal Range: 15-25 mcg/mL  
RECOMMENDATIONS/PLAN:  
Pharmacy consulted for   
vancomycin dosing for Jayden Soto, a 61 year old,  
female who is being treated   
with vancomycin for bone AND   
joint infection.  
1. Patient is currently   
ordered Vancomycin 1.5 g IV   
q12h. Today is day 3  
of therapy.  
2. The most recent   
vancomycin level was 12.6   
mcg/mL drawn at 0900 on  
3/25/22. This is a 11 hour   
level on the 3rd day of   
therapy.  
3. Serum creatinine and/or   
urine output have changed in   
the previous days,  
therefore will empirically   
change dose to 1.5 g x ONCE.   
Patient's  
creatinine has increased to   
1.1 from ~0.7 mg/dL, patient   
now has ROMEO.  
Therefore, will change to   
DBL. If the level is low   
tomorrow and scr stable  
may consider scheduling   
doses with close follow up.  
4. The next vancomycin level   
has been ordered for 3/26/22   
@ 0930  
(Completed)  
We will follow patient renal   
function, vancomycin levels   
and doses with  
you during the course of   
therapy. Additional   
recommendations will appear  
in follow up notes. If you   
have any questions, please   
[place] [phone].  
Age: 61 year old  
Allergies:  
ALLERGIES  
Allergen Reactions  
- Morphine GI Upset  
- Statins-Hmg-Coa Red*   
Unknown  
Last 3 Encounter Wt   
Readings:  
Date: Wt:  
2022 104.7 kg (230 lb   
13.2 oz)  
2022 95.3 kg (210 lb)  
2022 95.3 kg (210 lb)  
Last 1 Encounter Ht   
Readings:  
Date: Ht:  
2022 170.2 cm (5' 7 )  
Estimated Creatinine   
Clearance: 67 mL/min (A)   
(based on SCr of 1.11 mg/dL  
(H)).  
Temp (24hrs), Av.8 ?C   
(98.2 ?F), Min:36.4 ?C (97.5   
?F), Max:37.3 ?C  
(99.1 ?F)  
- Current Temp: 36.6 ?C   
(97.9 ?F)  
Labs  
BUN (mg/dL)  
Date Value  
2022 17  
2022 15  
2022 15  
Creatinine (mg/dL)  
Date Value  
2022 1.11 (H)  
2022 0.73  
2022 0.79  
WBC (k/uL)  
Date Value  
2022 15.83 (H)  
2022 14.24 (H)  
2022 10.63  
Vancomycin Levels:  
Vancomycin (ug/mL)  
Date/Time Value  
2022 0859 12.6  
Swetha Hart [facility]  
   
                                                    PT EDon 2022   
   
                                        PT ED               HNO ID: [id number]  
Author: Angeles Preston RN  
Service: PICC Team  
Author Type: Registered   
Nurse  
Type: Patient Education  
Filed: 3/25/2022 12:58 PM  
Note Text:  
AMBULATORY PATIENT EDUCATION   
VASCULAR ACCESS TEAM  
TOPIC: Peripherally Inserted   
Central Catheter  
READINESS TO LEARN  
COGNITIVE ABILITY: Alert and   
oriented  
MOTIVATION TO LEARN: Eager  
FAMILY SUPPORT: Unable to   
assess - Family not present  
INSTRUCTION PROVIDED TO:   
Patient  
PATIENT LEARNS BEST BY:   
Individual Instruction  
Written Instruction -   
Hand-outs  
Verbal Instruction  
FACTORS AFFECTING   
LEARNING:None  
PHYSICAL LIMITATIONS   
AFFECTING LEARNING: None  
LEARNING RESPONSE  
METHOD OF INSTRUCTION:   
Individual instruction  
Written instruction -   
handouts  
Verbal instruction  
PATIENT / FAMILY RESPONSE:   
Verbalizes understanding of   
pre-procedure  
instructions  
Verbalizes understanding of   
post-procedure instructions  
FOLLOW-UP PLAN: Follow-up   
with Primary Care  
SUPPLEMENTAL MATERIAL: PICC   
Line brochure       [facility]  
   
                                        PT ED               HNO ID: [id number]  
Author: Cherry Mckee RD  
Service: Nutrition Therapy  
Author Type: Registered   
Dietitian  
Type: Patient Education  
Filed: 3/25/2022 12:55 PM  
Note Text:  
NUTRITION THERAPY PATIENT   
EDUCATION  
SERVICE DATE: 3/25/2022  
SERVICE TIME: 1310  
TOPIC: diabetic education  
LEARNING ASSESSMENT  
Individuals Assessed:   
Patient  
Preferred Learning Method: :   
Individual Instruction,   
Verbal Instruction  
and Written Instruction  
Barriers to Learning: : None   
Evident  
LEARNING RESPONSE  
Instruction Provided to:   
Patient  
Patient / Family Response:   
Recommend Cont. Instruction  
Method of Instruction: Teach   
Back able to state cardiac   
diet restrictions  
and ways to lower BS  
Individual instruction  
Written instruction -   
handouts  
Verbal instruction  
Material(s) Provided to   
Patient:CC portion   
placement.  
Follow-Up Plan: Reinforce -   
Repeat previous content  
Referral (Recommendation):   
Diabetes Self Management   
Education Program  
Pt blames JOSSY for elevated   
BS due to inability to cook   
own meals,  
emphasized that once knee   
healed, she will be   
responsible for cooking. Pt  
accepting of information.  
MNT Billing:  
$ Initial Assessment: 16-30   
minutes  
SIGNATURE: Cherry Mckee RD PATIENT NAME: Jayden Soto  
DATE: 2022 MRN:   
[id number]  
TIME: 12:48 PM PAGER: Normal                                  [facility]  
   
                                                    THERAPY NTon 2022   
   
                                        THERAPY NT          HNO ID: [id number]  
Author: Madison Byrne, PT  
Service: Physical Therapy  
Author Type: Physical   
Therapist  
Type: Therapy   
(PT/OT/Speech/Resp)  
Filed: 3/25/2022 12:26 PM  
Note Text:  
Physical Therapy Treatment  
SERVICE DATE: 3/25/2022  
SERVICE TIME: 1031 to 1056  
ROOM: [place] [address]  
Recommended Discharge   
Disposition: Home PT  
Recommended Discharge   
Disposition Comments: home   
with family assist and  
home PT, use of wheeled   
walker  
Anticipated Discharge Needs:   
Physical Assist at   
Home;Supervision at Home  
Physical Assist at Home for:  
Cleaning;Laundry;Meals;Safet  
y;Shopping;Transportation  
Supervision at Home due to:   
(change in medical status)  
Recommended Discharge   
Equipment: No equipment   
needs anticipated  
PT 6 Clicks Score: 17  
Patient progressing towards   
goals. Able to increase   
ambulation distance  
and complete total knee   
arthroplasty protocol   
therapeutic exercise despite  
increased pain this session.   
Patient would benefit from   
additional  
physical therapy to address   
deficits, improve   
strength/balance/mobility.  
Recommend home PT at   
discharge.  
Precautions/Activity   
Restrictions: Fall Risk  
Extremity With Weight   
Bearing Restricted: Left   
Lower Extremity  
Left Lower Extremity Weight   
Bearing Status: WBAT  
Current Hospital Course:   
Direct admit from Dr. Evans's office due to L TKA  
sepsis. s/p IANDD, component   
removal, antibiotic spacer   
insertion 3/23/33.  
WBAT post op. Waiting   
PICC/antibiotic   
recommendations.  
Reason for Hospital   
Admission: TKA infection  
Relevant Past Medical   
History: L TKA, joint   
infection with washout, HTN,  
DMII, obesity  
Response to Therapy   
Interventions: Good   
participation in activities,   
Pain  
Continue skilled needs due   
to: Functional   
mobility/skill impairments,  
Safety concerns  
Physical Therapy Problem   
List: Education   
Deficit;Safety   
Deficits;Decreased  
Activity Tolerance;Decreased   
Strength;Functional Mobility  
Impairment;Balance Impaired  
Treatment Interventions:   
Education;Strengthening;Func  
tional Mobility  
Training;Balance   
Training;Neuromuscular   
Re-education;Pain Management  
Home Environment  
Patient Lives With: Family   
(son, DIL, grandkids)  
Assistance Available: 24   
Hour  
Entry To Home: Ramp  
Number Of Stairs To   
Bed/Bath: 0  
Equipment Owned: Wheeled   
Walker;Cane;Shower   
Chair;Elevated Toilet Seat  
Prior Functional Level:   
Within Functional Limits  
Prior Functional Level   
Comments: patient reports   
typically independent  
without device, completes   
own ADLS  
Patient Report: Agreeable to   
PT, reports increased pain   
and fatigue this  
date.  
CURRENT FUNCTIONAL STATUS:   
mobility performed during   
session in bold,  
other mobility completed   
during prior session and may   
no longer be correct  
or appropriate to complete.  
Current Functional Mobility   
Assist Level Additional   
Information  
Rolling  
Supine to Sit Contact Guard   
Assistance  
Sit to Supine Stand By   
Assistance  
Scooting  
Sit to Stand Contact Guard   
Assistance;Additional   
Information cues for hand  
placement at walker, LLE   
extension to prevent   
excessive flexion, power  
through RLE  
Stand to Sit Contact Guard   
Assistance;Additional   
Information cuing to  
slide LLE out, reaching back   
with UEs, eccentric control  
Bed to Chair  
Toilet/Commode  
Gait  
Contact Guard   
Assistance;Additional   
Information Gait Device:   
Wheeled  
Walker  
Gait Distance (feet): 20ftx2  
increased pain and antalgic   
gait this session, cues for   
upright posture in  
walker, cues to increase WB   
BUE to improve clearance RLE  
Stairs  
Curb Step  
Car Transfer  
Blank fields indicate   
activity not attempted  
General   
Deviations/Observations:   
Antalgic gait;Lynn  
decreased;Difficulty   
changing   
direction/turning;Flexed   
trunk  
posture;Non-functional gait   
speed;Step length decreased  
Balance: Static   
Sitting;Dynamic   
Sitting;Static   
Standing;Dynamic Standing  
Static Sitting Balance: Good   
Patient able to maintain   
balance without  
handhold support, limited   
postural sway  
Dynamic Sitting Balance:   
Good Patient accepts   
moderate challenge, able  
to maintain balance while   
picking up object off floor  
Static Standing Balance:   
Fair Patient able to   
maintain balance with  
handhold support, may   
require occasional minimal   
assistance  
Dynamic Standing Balance:   
Fair Patient accepts minimal   
challenge, able  
to maintain balance while   
turning head/trunk  
-M: 7: Walk 25 feet or   
more  
Learning/Educational Needs:   
Discharge   
Plan;Equipment;Functional  
Activities/Mobility;Plan of   
Care;Pain   
Management;Rehabilitation   
Techniques  
and Procedures;Safety  
Goals for Plan of Care:  
Patient /Caregiver Goals: Go   
Home  
Able to perform HEP with:   
Verbal Cues Only (L TKA   
protocol)  
Transfer supine to/from sit   
with: Independent  
Transfer sit to/from stand   
with: Independent  
Ambulate with: Independent  
Distance: 50ft intervals  
Device: Wheeled Walker  
Ambulate up and down steps   
with: Stand By Assistance  
Number of steps: 4  
Device: Rail;Cane  
Progress Toward Goals:   
Progressing as expected  
Rehab Potential: Good  
Patient will be discontinued   
(more content not   
included)...        Normal                                  [facility]  
   
                                                    Vancomycin random [Mass/Vol]  
on 2022   
   
                                                    Vancomycin   
[Mass/Vol]      12.6 ug/mL      Normal          10.0-20.0       [facility]  
   
                                        Comment on above:   Order Comment: Speci  
men Type: BLOOD SPECIMENOrdering Facility:  
   
[facility] Address: [address] [place], [place] [id number]   
   
                                                            Result Comment: Refe  
rence ranges and high/low indicator flags are   
provided as general guidelines only. The treating physician must   
determine appropriate target levels/dosing based on the specific   
clinical situation.   
   
                                                            Performed By: #### 4  
091-5 ####[facility] LABORATORYCLIA   
[id number] [place] [address] [place]   
[place]   
   
                                                    ANES POSTPROC EVALon   
022   
   
                                                    ANES POSTPROC   
EVAL                                    HNO ID: [id number]  
Author: Damon Kwan MD  
Service: Anesthesiology  
Author Type: Physician  
Type: Anesthesia   
Postprocedure Evaluation  
Filed: 3/28/2022 11:05 AM  
Note Text:  
POST ANESTHESIA EVALUATION   
NOTE  
: 1960  
Procedure Summary  
Date: 22 Room /   
Location: [facility] OR 08 / [facility] OR  
Anesthesia Start:    
Anesthesia Stop:   
Procedure: REMOVAL   
PROSTHESIS JOINT KNEE (Left   
Knee) Diagnosis: Infection  
of total knee replacement,   
subsequent encounter  
Surgeons: Baisl Mitchell MD   
Responsible Provider: Damon Kwan MD  
Anesthesia Type: general ASA   
Status: 3  
Anesthesia Type: general  
Airway Type: ETT  
Last Vitals  
Vitals Value Taken Time  
/61 22 1915  
Temp 36.4 ?C (97.5 ?F)   
22 1900  
HR SpO2 63 03/23/22 1940  
Resp 12 22  
SpO2 100 % 22  
Post Anesthesia Patient   
Status  
Anticipated Disposition:   
inpatient floor planned   
admission.  
Neurological Status: aware   
and responsive.  
Pulmonary Status: breathing   
comfortably on room air  
Airway Control: returned to   
baseline unsupported.  
Cardiovascular Status:   
stable.  
Pain Management: clinically   
adequate  
Postoperative Hydration:   
acceptable.  
Intraoperative Events:  
no significant anesthesia   
events  
Post Operative   
Nausea/Vomiting Status: no   
significant post operative  
nausea or vomiting  
Anesthetic Observations:  
Recommendation: further care   
per PACU/ICU/floor team.  
Anesthesia Observations  
No Documentation  
SIGNATURE: Damon Kwan MD PATIENT NAME: Jayden Soto  
DATE: 2022 MRN:   
[id number]  
TIME: 11:04 AM CSN:   
[id number]           Normal                                  [facility]  
   
                                                    Basic metabolic 2000 panelon  
 2022   
   
                                                    Anion gap   
[Moles/Vol]     10 mmol/L       Normal          9-18            [facility]  
   
                                        Comment on above:   Order Comment: Speci  
men Type: BLOOD SPECIMENOrdering Facility:  
   
[facility] Address: [address] [place] [address]   
   
                                                            Performed By: #### 2  
4321-2 ####[facility] LABORATORYCLIA   
[id number] [place] [address] [place]   
[place]   
   
                                                    Calcium   
[Mass/Vol]      8.6 mg/dL       Normal          8.5-10.2        [facility]  
   
                                        Comment on above:   Order Comment: Speci  
men Type: BLOOD SPECIMENOrdering Facility:  
   
[facility] Address: [address] [place] [address]   
   
                                                            Performed By: #### 2  
4321-2 ####[facility] LABORATORYCLIA   
[id number] [place] [address] [place]   
[place]   
   
                                                    Chloride   
[Moles/Vol]     99 mmol/L       Normal                    [facility]  
   
                                        Comment on above:   Order Comment: Speci  
men Type: BLOOD SPECIMENOrdering Facility:  
   
[facility] Address: [address] [place] [address]   
   
                                                            Performed By: #### 2  
4321-2 ####[facility] LABORATORYCLIA   
[id number] [place] [address] [place]   
[place]   
   
                      CO2 [Moles/Vol] 26 mmol/L  Normal     22-30      [facility]  
   
                                        Comment on above:   Order Comment: Speci  
men Type: BLOOD SPECIMENOrdering Facility:  
   
[facility] Address: [address] [address]   
   
                                                            Performed By: #### 2  
4321-2 ####[facility] LABORATORYCLIA   
[id number] [address] [place]   
[facility]   
   
                                                    Creatinine   
[Mass/Vol]      0.73 mg/dL      Normal          0.58-0.96       [facility]  
   
                                        Comment on above:   Order Comment: Speci  
men Type: BLOOD SPECIMENOrdering Facility:  
   
[facility] Address: [address] [place] [address]   
   
                                                            Performed By: #### 2  
4321-2 ####[facility] Center[facility]   
[id number] [address] OF   
[facility]   
   
                                                    ESTIMATED   
GLOMERULAR   
FILTRATION RATE 94 mL/min/1.73m??? Normal          >=60            [facility]  
   
                                        Comment on above:   Order Comment: Speci  
men Type: BLOOD SPECIMENOrdering Facility:  
   
[facility] Address: [address] [place] [address]   
   
                                                            Result Comment: Sydnee  
mated Glomerular Filtration Rate (eGFR) is   
calculated using the  CKD-EPI creatinine equation. This equation   
utilizes serum creatinine, sex, and age as parameters. The   
creatinine assay has traceable calibration to isotope dilution-mass   
spectrometry. Refer to KDIGO guidelines for clinical interpretation.   
In patients with unstable renal function, e.g. those with acute   
kidney injury, the eGFR may not accurately reflect actual GFR.   
   
                                                            Performed By: #### 2  
4321-2 ####[facility] LABORATORYCLIA   
[id number] [address] [place]   
[place]   
   
                                                    Glucose   
[Mass/Vol]      330 mg/dL       High            74-99           [facility]  
   
                                        Comment on above:   Order Comment: Speci  
men Type: BLOOD SPECIMENOrdering Facility:  
   
[facility] Address: [address] [address]   
   
                                                            Result Comment: The   
American Diabetes Association (ADA) provides   
guidance for cutoff values for fasting glucose and random glucose.   
The ADA defines fasting as no caloric intake for at least 8 hours.   
Fasting plasma glucose results between 100 to 125 mg/dL indicate   
increased risk for diabetes (prediabetes).  
Fasting plasma glucose results greater than or equal to 126 mg/dL   
meet the criteria for diagnosis of diabetes. In the absence of   
unequivocal hyperglycemia, results should be confirmed by repeat   
testing. In a patient with classic symptoms of hyperglycemia or   
hyperglycemic crisis, random plasma glucose results greater than or   
equal to 200 mg/dL meet the criteria for diagnosis of diabetes.  
Reference: Standards of Medical Care in Diabetes 2016, American   
Diabetes Association. Diabetes Care. 2016.39(Suppl 1).   
   
                                                            Performed By: #### 2  
4321-2 ####[facility] LABORATORYCLIA   
[id number] [place] [address] [place]   
   
                                                    Potassium   
[Moles/Vol]     5.3 mmol/L      High            3.7-5.1         [facility]  
   
                                        Comment on above:   Order Comment: Speci  
men Type: BLOOD SPECIMENOrdering Facility:  
   
[facility] Address: [address] [place] [address]   
   
                                                            Performed By: #### 2  
1-2 ####[facility] LABORATORYCLIA   
[id number] [address] [place]   
[place]   
   
                                                    Sodium   
[Moles/Vol]     135 mmol/L      Low             136-144         [facility]  
   
                                        Comment on above:   Order Comment: Speci  
men Type: BLOOD SPECIMENOrdering Facility:  
   
[facility] Address: [address] [place] [address]   
   
                                                            Performed By: #### 2  
1-2 ####[facility] LABORATORYCLIA   
[id number] [address] [place]   
[place]   
   
                                                    Urea nitrogen   
[Mass/Vol]      15 mg/dL        Normal          7-21            [facility]  
   
                                        Comment on above:   Order Comment: Speci  
men Type: BLOOD SPECIMENOrdering Facility:  
   
[facility] Address: [address] [place] [address]   
   
                                                            Performed By: #### 2  
1-2 ####[facility] LABORATORYCLIA   
[id number] [address] OF   
[place]   
   
                                                    CASE MANAGEMon 2022   
   
                                        CASE MANAGEM        HNO ID: [id number]  
Author: Bev Steele RN  
Service: Nursing  
Author Type: Registered   
Nurse  
Type: Care Mgt Progress Note  
Filed: 3/24/2022 3:17 PM  
Note Text:  
CARE MANAGEMENT PROGRESS   
NOTE  
SERVICE DATE: 3/24/2022  
SERVICE TIME: 3:15 PM  
LOS: 3 days  
Attempted to call patient   
about previous home care   
company used, pt's  
daughter -in -law answered   
instead and was able to give   
home care  
information ; stated that pt   
has used [facility] in the past   
but had  
different insurance when   
used company; referral   
created for [facility]  
care  
SIGNATURE: Bev Steele RN   
PATIENT NAME: Jayden Soto  
DATE: 2022 MRN:   
[id number]  
TIME: 3:15 PM PAGER/CONTACT   
#: [id number]       Normal                                  [facility]  
   
                                                    CBC panel Auto (Bld)on    
   
                                                    Erythrocyte   
distribution   
width (RBC)   
[Ratio]         13.9 %          Normal          11.5-15.0       [facility]  
   
                                        Comment on above:   Order Comment: Speci  
men Type: BLOOD SPECIMEN  
Ordering Facility: [facility]  
Address: [address] [place] [address]   
   
                                                            Performed By: #### 2  
4321-2 ####  
[facility] LABORATORY  
CLIA [id number]  
[address] [place] [address] [place]   
   
                                                    Hematocrit (Bld)   
[Volume fraction] 29.8 %          Low             36.0-46.0       [facility]  
   
                                        Comment on above:   Order Comment: Speci  
men Type: BLOOD SPECIMEN  
Ordering Facility: [facility]  
Address: [address] [place] [address]   
   
                                                            Performed By: #### 2  
4321-2 ####  
[facility] LABORATORY  
CLIA [id number]  
[address] [place] [address] [place]   
   
                                                    Hemoglobin (Bld)   
[Mass/Vol]      9.2 g/dL        Low             11.5-15.5       [facility]  
   
                                        Comment on above:   Order Comment: Speci  
men Type: BLOOD SPECIMEN  
Ordering Facility: [facility]  
Address: [address] [place] [address]   
   
                                                            Performed By: #### 2  
4321-2 ####  
[facility] LABORATORY  
CLIA [id number]  
[address] [place] [address] [place]   
   
                                                    MCH (RBC)   
[Entitic mass]  26.7 pg         Normal          26.0-34.0       [facility]  
   
                                        Comment on above:   Order Comment: Speci  
men Type: BLOOD SPECIMEN  
Ordering Facility: [facility]  
Address: [address] [place] [address]   
   
                                                            Performed By: #### 2  
4321-2 ####  
[facility]  
CLIA [id number]  
[address]   
   
                                                    MCHC (RBC)   
[Mass/Vol]      30.9 g/dL       Normal          30.5-36.0       [facility]  
   
                                        Comment on above:   Order Comment: Speci  
men Type: BLOOD SPECIMEN  
Ordering Facility: [facility]  
Address: [address] [place] [address]   
   
                                                            Performed By: #### 2  
4321-2 ####  
[facility]  
CLIA [id number]  
[address] [place] [facility]   
   
                                                    MCV (RBC)   
[Entitic vol]   86.4 fL         Normal          80.0-100.0      [facility]  
   
                                        Comment on above:   Order Comment: Speci  
men Type: BLOOD SPECIMEN  
Ordering Facility: [facility]  
Address: [address] [place] [address]   
   
                                                            Performed By: #### 2  
1-2 ####  
[facility] LABORATORY  
CLIA [id number]  
[address]   
   
                                                    Nucleated RBC   
(Bld) [#/Vol]   10*3/uL         Normal          <0.01           [facility]  
   
                                        Comment on above:   Order Comment: Speci  
men Type: BLOOD SPECIMEN  
Ordering Facility: [facility]  
Address: [address] [place] [address]   
   
                                                            Performed By: #### 2  
4321-2 ####  
[facility]  
CLIA [id number]  
[address]   
   
                                                    Platelet mean   
volume (Bld)   
[Entitic vol]   9.5 fL          Normal          9.0-12.7        [facility]  
   
                                        Comment on above:   Order Comment: Speci  
men Type: BLOOD SPECIMEN  
Ordering Facility: [facility]  
Address: [address] [place] [address]   
   
                                                            Performed By: #### 2  
1-2 ####  
[facility] LABORATORY  
CLIA [id number]  
[address]   
   
                                                    Platelets (Bld)   
[#/Vol]         381 10*3/uL     Normal          150-400         [facility]  
   
                                        Comment on above:   Order Comment: Speci  
men Type: BLOOD SPECIMEN  
Ordering Facility: [facility]  
Address: [address] [place] [address]   
   
                                                            Performed By: #### 2  
4321-2 ####  
[facility] LABORATORY  
CLIA [id number]  
[address] [place] [facility]   
   
                      RBC (Bld) [#/Vol] 3.45 10*6/uL Low        3.90-5.20  [facility]  
   
                                        Comment on above:   Order Comment: Speci  
men Type: BLOOD SPECIMEN  
Ordering Facility: [facility]  
Address: [address] [place] [address]   
   
                                                            Performed By: #### 2  
4321-2 ####  
[facility] LABORATORY  
CLIA [id number]  
[address]   
   
                      WBC (Bld) [#/Vol] 14.24 10*3/uL High       3.70-11.00 [facility]  
   
                                        Comment on above:   Order Comment: Speci  
men Type: BLOOD SPECIMEN  
Ordering Facility: [facility]  
Address: [address] [place] [address]   
   
                                                            Performed By: #### 2  
4321-2 ####  
[facility] LABORATORY  
CLIA [id number]  
[address] [place] [address]   
   
                                                    CONSULT PROGon 2022   
   
                                        CONSULT PROG        HNO ID: [id number]  
Author: Swetha Hart RP  
Service: Pharmacy  
Author Type: Pharmacist  
Type: Consult Progress Note  
Filed: 3/24/2022 8:54 AM  
Note Text:  
PHARMACY VANCOMYCIN DOSING   
NOTE  
Patient Name: Jayden oSto   
MRN: [id number] Admission Date:   
3/21/2022  
Date of Consult: 3/24/2022   
Time of Consult: 8:24 AM  
Indication: Bone AND joint   
infection  
Goal Range: 15-25 mcg/mL  
RECOMMENDATIONS/PLAN:  
Pharmacy consulted for   
vancomycin dosing for Jayden Soto, a 61 year old,  
female who is being treated   
with vancomycin for bone AND   
joint infection.  
1. Patient is currently   
ordered Vancomycin 1.5 g IV   
q24h. Today is day 2  
of therapy.  
2. No vancomycin level has   
been drawn for this dosing   
regimen.  
3. Will adjust vancomycin to   
1.5 g with a dosing interval   
of q12h.  
Although patient has spacer   
with vancomycin, this does   
not have systemic  
absorption; it is for local   
microbial control. Patient's   
renal function  
qualifies her for q12h   
dosing to likely reach   
trough goal of 15-25 mcg/mL.  
Therefore, will increase   
dose to 1.5 g q12h.  
4. The next vancomycin level   
has been ordered for 3/25/22   
@   
(Completed) will schedule   
trough prior to the 5th dose   
to monitor for  
accumulation.  
We will follow patient renal   
function, vancomycin levels   
and doses with  
you during the course of   
therapy. Additional   
recommendations will appear  
in follow up notes. If you   
have any questions, please   
[place] [phone].  
Age: 61 year old  
Allergies:  
ALLERGIES  
Allergen Reactions  
- Morphine GI Upset  
- Statins-Hmg-Coa Red*   
Unknown  
Last 3 Encounter Wt   
Readings:  
Date: Wt:  
2022 104.7 kg (230 lb   
13.2 oz)  
2022 95.3 kg (210 lb)  
2022 95.3 kg (210 lb)  
Last 1 Encounter Ht   
Readings:  
Date: Ht:  
2022 170.2 cm (5' 7 )  
Estimated Creatinine   
Clearance: 101.9 mL/min   
(based on SCr of 0.73   
mg/dL).  
Temp (24hrs), Av.4 ?C   
(97.6 ?F), Min:36 ?C (96.8   
?F), Max:36.8 ?C  
(98.2 ?F)  
- Current Temp: 36.4 ?C   
(97.5 ?F)  
Labs  
BUN (mg/dL)  
Date Value  
2022 15  
2022 15  
2022 14  
Creatinine (mg/dL)  
Date Value  
2022 0.73  
2022 0.79  
2022 0.67  
WBC (k/uL)  
Date Value  
2022 14.24 (H)  
2022 10.63  
2022 12.01 (H)  
Vancomycin Levels: No   
results found for: FABIÁN Posada Hailey, [facility]  Normal                                  [facility]  
   
                                                    THERAPY NTon 2022   
   
                                        THERAPY NT          HNO ID: [id number]  
Author: Eze Foley PTA  
Service: Physical Therapy  
Author Type: Physical   
Therapy Assistant  
Type: Therapy   
(PT/OT/Speech/Resp)  
Filed: 3/24/2022 3:07 PM  
Note Text:  
----------------------------  
----------------------------  
------------------------  
Attestation signed by   
Mio Jeter PT at   
3/24/2022 3:29 PM  
I reviewed and agree with   
the documentation   
corresponding to this   
therapy  
visit.  
SIGNATURE: Mio Jeter PT  
DATE: 2022  
TIME: 3:29 PM  
----------------------------  
----------------------------  
------------------------  
Physical Therapy Treatment  
SERVICE DATE: 3/24/2022  
SERVICE TIME: 1430 to 1454  
ROOM: [place] [address]  
Recommended Discharge   
Disposition: Home PT  
Recommended Discharge   
Disposition Comments: home   
with family assist and  
home PT, use of wheeled   
walker  
Anticipated Discharge Needs:   
Physical Assist at   
Home;Supervision at Home  
Physical Assist at Home for:  
Cleaning;Laundry;Meals;Safet  
y;Shopping;Transportation  
Supervision at Home due to:   
(change in medical status)  
Recommended Discharge   
Equipment: No equipment   
needs anticipated  
PT 6 Clicks Score: 17  
Patient with improved gait   
distances, decreased levels   
of assist with all  
mobility tasks. On pace for   
home going at d/c will   
assist from family.  
continue to recommend home   
PT to improve strength, ROM,   
balance,endurance,  
normalize gait pattern and   
improved safety and   
performance in the home  
back to prior level of   
function.  
Additional personnel present   
during visit: Ginny Morel  
Precautions/Activity   
Restrictions: Fall Risk  
Extremity With Weight   
Bearing Restricted: Left   
Lower Extremity  
Left Lower Extremity Weight   
Bearing Status: WBAT  
Current Hospital Course:   
Direct admit from Dr. Evans's office due to L TKA  
sepsis. s/p IANDD, component   
removal, antibiotic spacer   
insertion 3/23/33.  
WBAT post op  
Reason for Hospital   
Admission: TKA infection  
Relevant Past Medical   
History: L TKA, joint   
infection with washout, HTN,  
DMII, obesity  
Response to Therapy   
Interventions: Good   
participation in activities  
Continue skilled needs due   
to: Functional   
mobility/skill impairments,  
Safety concerns  
Physical Therapy Problem   
List: Education   
Deficit;Safety   
Deficits;Decreased  
Activity Tolerance;Decreased   
Strength;Functional Mobility  
Impairment;Balance Impaired  
Treatment Interventions:   
Education;Strengthening;Func  
tional Mobility  
Training;Balance   
Training;Neuromuscular   
Re-education;Pain Management  
Plan for next visit: Bed   
mobility, Chair transfer   
training, Gait training,  
Exercise   
instruction/handout, Sit to   
Stand Transfers, Stairs   
training,  
Standing Balance, Standing   
Tolerance  
Home Environment  
Patient Lives With: Family   
(son, DIL, grandkids)  
Assistance Available: 24   
Hour  
Entry To Home: Ramp  
Number Of Stairs To   
Bed/Bath: 0  
Equipment Owned: Wheeled   
Walker;Cane;Shower   
Chair;Elevated Toilet Seat  
Prior Functional Level:   
Within Functional Limits  
Prior Functional Level   
Comments: patient reports   
typically independent  
without device, completes   
own ADLS  
Patient Report: Pleasant and   
agreeable to PT.  
CURRENT FUNCTIONAL STATUS:   
Most recent performance   
mobility performed  
during session in bold,   
other mobility completed   
during prior session and  
may no longer be correct or   
appropriate to complete.  
Current Functional Mobility   
Assist Level Additional   
Information  
Rolling  
Supine to Sit Contact Guard   
Assistance  
Sit to Supine Contact Guard   
Assistance  
Scooting  
Sit to Stand Contact Guard   
Assistance;Additional   
Information cuing to  
slide LLE out to prevent   
excessive knee flexion,   
proper UE support,  
powering up with RLE  
Stand to Sit Contact Guard   
Assistance;Additional   
Information cuing to  
slide LLE out, reaching back   
with UEs, eccentric control  
Bed to Chair  
Toilet/Commode  
Gait  
Contact Guard Assistance   
Gait Device: Wheeled Walker  
Gait Distance (feet): 15'x2  
cuing for proper heel   
strike, step length, walker   
management, picking up  
feet with turning  
Stairs  
Curb Step  
Car Transfer  
Blank fields indicate   
activity not attempted  
General   
Deviations/Observations:   
Antalgic gait;Lynn  
decreased;Difficulty   
changing   
direction/turning;Flexed   
trunk  
posture;Non-functional gait   
speed;Step length decreased  
Balance: Static   
Sitting;Dynamic   
Sitting;Static   
Standing;Dynamic Standing  
Static Sitting Balance: Good   
Patient able to maintain   
balance without  
handhold support, limited   
postural sway  
Dynamic Sitting Balance:   
Good Patient accepts   
moderate challenge, able  
to maintain balance while   
picking up object off floor  
Static Standing Balance:   
Fair Patient able to   
maintain balance with  
handhold support, may   
require occasional minimal   
assistance  
Dynamic Standing Balance:   
Fair Patient accepts minimal   
challenge, able  
to maintain balance while   
turning head/trunk  
-HLM: 7: Walk 25 feet or   
more  
Learning/Educational Needs:   
Discharge   
Plan;Equipment;Functional  
Activities/Mobility;Plan of   
Care;Pain   
Management;Rehabilitation   
Techniqu (more content not   
included)...        Normal                                  [facility]  
   
                                        THERAPY NT          HNO ID: [id number]  
Author: DAYNE Macario/L  
Service: Occupational   
Therapy  
Author Type: Occupational   
Therapist  
Type: Therapy   
(PT/OT/Speech/Resp)  
Filed: 3/24/2022 11:49 AM  
Note Text:  
Occupational Therapy   
Evaluation  
SERVICE DATE: 3/24/2022  
SERVICE TIME: 1005 to 1020  
ROOM: [place] [address]  
Recommended Discharge   
Disposition: Home  
Recommended Discharge   
Disposition Comments:   
Anticipate return home with  
family assist PRn  
Anticipated Discharge Needs:   
Physical Assist at   
Home;Supervision at Home  
Physical Assist at Home for:  
Cleaning;Laundry;Meals;Safet  
y;Shopping;Transportation  
Supervision at Home due to:   
(change in medical status)  
OT 6 Clicks Score: 18  
Precautions/Activity   
Restrictions: Fall Risk  
Extremity With Weight   
Bearing Restricted: Left   
Lower Extremity  
Left Lower Extremity Weight   
Bearing Status: WBAT  
Current Hospital Course:   
Direct admit from Dr. Evans's office due to L TKA  
sepsis. s/p IANDD, component   
removal, antibiotic spacer   
insertion 3/23/33.  
WBAT post op  
Reason for Hospital   
Admission: TKA infection  
Relevant Past Medical   
History: L TKA, joint   
infection with washout, HTN,  
DMII, obesity  
Response to Therapy   
Interventions: Good   
participation in activities,   
Pain  
Continue skilled needs due   
to: Functional impairment,   
Safety concerns  
Occupational Therapy Problem   
List: Impaired Self   
Care;Decreased Activity  
Tolerance;Functional   
Mobility Impairment  
Cognition/Communication   
Deficits  
Responsiveness: Alert  
Follows Commands: 3-step   
Commands  
Attention Deficits:   
Distractible, Divided  
Memory Deficits: Short Term,   
Long Term  
Executive Function Deficits:   
Safety Awareness, Insight to   
Deficits,  
Problem Solving  
Insight to Deficits: Minimal   
impairment  
Problem Solving Deficit:   
Minimal impairment  
Safety Awareness Deficit:   
Minimal impairment  
Treatment Interventions:   
Education;Self Care / Home   
Management;Functional  
Mobility Training  
Home Environment  
Patient Lives With: Family   
(son, DIL, grandkids)  
Assistance Available: 24   
Hour  
Entry To Home: Ramp  
Number Of Stairs To   
Bed/Bath: 0  
Equipment Owned: Wheeled   
Walker;Cane;Shower   
Chair;Elevated Toilet Seat  
Prior Functional Level:   
Within Functional Limits  
Prior Functional Level   
Comments: patient reports   
typically independent  
without device, completes   
own ADLS  
Patient Report: Awake,   
pleasant, agreeable to OT   
session  
CURRENT FUNCTIONAL STATUS:   
Most recent performance  
Current Activities of Daily   
Living Assist Level   
Additional Information  
Feeding Independent  
Grooming Independent  
Bathing Upper Body Set Up  
Bathing Lower Body Moderate   
Assistance  
Dressing Upper Body Set Up  
Dressing Lower Body Moderate   
Assistance  
Toileting Contact Guard   
Assistance  
Instrumental Activities of   
Daily Living Assist Level   
Additional  
Information  
Meal/Beverage Prep  
Cleaning  
Laundry  
Medication Management with   
Strategies  
Functional Mobility Assist   
Level Additional Information  
Rolling  
Supine to Sit  
Sit to Supine Contact Guard   
Assistance  
Scooting Stand By Assistance  
Sit to Stand Contact Guard   
Assistance  
Stand to Sit Contact Guard   
Assistance  
Bed to Chair  
Toilet/Commode Contact Guard   
Assistance  
Shower  
Functional Mobility Contact   
Guard Assistance Wheeled   
Walker  
Blank real indicate   
activity not attempted  
Range of Motion: WFL  
Strength: WFL  
Balance: Dynamic Standing  
Dynamic Standing Balance:   
Fair Patient accepts minimal   
challenge, able  
to maintain balance while   
turning head/trunk  
Learning/Educational Needs:   
Safety;Self Care;Functional  
Activities/Mobility  
Goals for Plan of Care:  
Patient /Caregiver Goals: Go   
Home  
Lower Body Bathing with:   
Modified Independent  
Lower Body Dressing with:   
Modified Independent  
Toilet Hygiene with:   
Modified Independent  
Toilet Transfer with:   
Modified Independent  
Tolerate (minutes of   
functional activity): 20  
Functional Activity with:   
Modified Independent  
Demonstrate Competence With   
Education with: Verbal Cues   
Only (safety wiht  
ADLs, functional transfers)  
Rehab Potential: Good  
Patient will be discontinued   
from Occupational Therapy   
when no further  
skilled needs are identified   
in this setting.  
PLAN:  
OT Frequency: 1 time per   
week  
Plan of Care developed with:   
Patient  
TREATMENT INTERVENTIONS:  
Therapy Diagnosis: Reduced   
mobility-other;Decreased   
activities of daily  
living (ADL)  
Interventions Provided:   
Evaluation  
$ Evaluation-Moderate   
(24604) Billed Units: 1 unit  
Training AND education   
provided in: Role of   
Occupational Therapy, Bed  
mobility, Transfer -   
Toilet/commode, Toileting?,   
Lower extremity dressing,  
Transfer - Sit to stand  
The following therapeutic   
skills were used: Activity   
dosing, Assessment of  
tolerance including vitals   
response to activity,   
Physical assist,  
Therapeutic use of self,   
Cues for sequencing/proper   
technique for activity  
Skilled Treatment Time   
(minutes): 15  
Please see discipline   
specific clinical   
documentation flowsheet for  
complete details for this   
therapy evaluation/gillian   
(more content not   
included)...        Normal                                  [facility]  
   
                                        THERAPY NT          HNO ID: [id number]  
Author: Madison Byrne, PT  
Service: Physical Therapy  
Author Type: Physical   
Therapist  
Type: Therapy   
(PT/OT/Speech/Resp)  
Filed: 3/24/2022 11:47 AM  
Note Text:  
Physical Therapy Evaluation  
SERVICE DATE: 3/24/2022  
SERVICE TIME: 838 to 920  
ROOM: [place] [address]  
Recommended Discharge   
Disposition: Home PT  
Recommended Discharge   
Disposition Comments: home   
with family assist and  
home PT, use of wheeled   
walker  
Anticipated Discharge Needs:   
Physical Assist at   
Home;Supervision at Home  
Physical Assist at Home for:  
Cleaning;Laundry;Meals;Safet  
y;Shopping;Transportation  
Supervision at Home due to:   
(change in medical status)  
Recommended Discharge   
Equipment: No equipment   
needs anticipated  
PT 6 Clicks Score: 17  
Precautions/Activity   
Restrictions: Fall Risk  
Extremity With Weight   
Bearing Restricted: Left   
Lower Extremity  
Left Lower Extremity Weight   
Bearing Status: WBAT  
Current Hospital Course:   
Direct admit from Dr. Evans's office due to L TKA  
sepsis. s/p IANDD, component   
removal, antibiotic spacer   
insertion 3/23/33.  
WBAT post op  
Reason for Hospital   
Admission: TKA infection  
Relevant Past Medical   
History: L TKA, joint   
infection with washout, HTN,  
DMII, obesity  
Response to Therapy   
Interventions: Good   
participation in activities,  
On-track to achieve   
discharge goals  
Continue skilled needs due   
to: Functional   
mobility/skill impairments,  
Safety concerns  
Physical Therapy Problem   
List: Education   
Deficit;Safety   
Deficits;Decreased  
Activity Tolerance;Decreased   
Strength;Functional Mobility  
Impairment;Balance Impaired  
Treatment Interventions:   
Education;Strengthening;Func  
tional Mobility  
Training;Balance   
Training;Neuromuscular   
Re-education;Pain Management  
Home Environment  
Patient Lives With: Family   
(son, DIL, grandkids)  
Assistance Available: 24   
Hour  
Entry To Home: Ramp  
Number Of Stairs To   
Bed/Bath: 0  
Equipment Owned: Wheeled   
Walker;Cane;Shower   
Chair;Elevated Toilet Seat  
Prior Functional Level:   
Within Functional Limits  
Prior Functional Level   
Comments: patient reports   
typically independent  
without device, completes   
own ADLS  
Patient Report: Pleasant and   
agreeable to PT.  
CURRENT FUNCTIONAL STATUS:   
Most recent performance  
Current Functional Mobility   
Assist Level Additional   
Information  
Rolling  
Supine to Sit Minimal   
Assistance;Additional   
Information assist to guide  
LLE to EOB  
Sit to Supine  
Scooting  
Sit to Stand Contact Guard   
Assistance;Additional   
Information instruction  
in WB status, cues for hand   
placement at walker, x   
multiple trials from  
various surfaces  
Stand to Sit Contact Guard   
Assistance;Additional   
Information cues for  
positioning at surface,   
reach hands back and sit   
slowly, step forward with  
LLE to prevent excessive   
knee flexion  
Bed to Chair  
Toilet/Commode  
Gait  
Contact Guard   
Assistance;Additional   
Information Gait Device:   
Wheeled  
Walker  
Gait Distance (feet): 3ft,   
15ft  
instruction in step to   
pattern for pain management,   
cues for posture and  
positioning in walker, cues   
walker management and   
sequencing, cues to use  
BUE to offset weight in LLE   
single leg stance as needed  
Stairs  
Curb Step  
Car Transfer  
Blank fields indicate   
activity not attempted  
General   
Deviations/Observations:   
Antalgic gait;Lynn   
decreased;Step  
length   
decreased;Non-functional   
gait speed;UE weight bearing   
on assistive  
device excessive  
Range of Motion: Lower   
Extremity Comments  
Right Lower Extremity ROM   
Comments: WFL  
Left Lower Extremity ROM   
Comments: knee approx 10-50   
degrees  
Strength: Lower Extremity   
Comments  
Right Lower Extremity   
Strength Comments: WFL  
Left Lower Extremity   
Strength Comments: (+) quad   
set, min assist LAQ  
Balance: Static   
Sitting;Dynamic   
Sitting;Static   
Standing;Dynamic Standing  
Static Sitting Balance: Good   
Patient able to maintain   
balance without  
handhold support, limited   
postural sway  
Dynamic Sitting Balance:   
Good Patient accepts   
moderate challenge, able  
to maintain balance while   
picking up object off floor  
Static Standing Balance:   
Fair Patient able to   
maintain balance with  
handhold support, may   
require occasional minimal   
assistance  
Dynamic Standing Balance:   
Fair Patient accepts minimal   
challenge, able  
to maintain balance while   
turning head/trunk  
JH-HLM: 6: Walk 10 steps or   
more  
Learning/Educational Needs:   
Discharge   
Plan;Equipment;Functional  
Activities/Mobility;Plan of   
Care;Pain   
Management;Rehabilitation   
Techniques  
and Procedures;Safety  
Goals for Plan of Care:  
Patient /Caregiver Goals: Go   
Home  
Able to perform HEP with:   
Verbal Cues Only (L TKA   
protocol)  
Transfer supine to/from sit   
with: Independent  
Transfer sit to/from stand   
with: Independent  
Ambulate with: Independent  
Distance: 50ft intervals  
Device: Wheeled Walker  
Ambulate up and down steps   
with: Stand By Assistance  
Number of steps: 4  
Device: Rail;Cane  
Rehab Potential: Good  
Patient will be discontinued   
from Physical Therapy when   
no further skilled  
needs are identified in this   
setting.  
(more content not   
included)...        Normal                                  [facility]  
   
                                                    ALLIED HEALTHon 2022   
   
                                        ALLIED HEALTH       HNO ID: [id number]  
Author: RT Kacey(R)  
Service: ?  
Author Type: Technician  
Type: Allied Health  
Filed: 3/23/2022 6:51 PM  
Note Text:  
Radiology Service Progress   
Note  
PATIENT NAME: Jayden Soto  
MRN: [id number]  
DATE OF SERVICE: 2022  
TIME: 6:50 PM  
PATIENT IDENTITY   
VERIFICATION COMPLETED USING   
TWO (2) IDENTIFIERS: Name  
and Date of Birth confirmed   
by identification band.  
FALL SCREENING: Has the   
patient had 2 falls in the   
last year or 1 fall  
with injury or currently   
using an Ambulatory   
Assistive Device (Walker,  
Cane, Wheelchair, Crutches,   
etc.)? Inpatient: Screened   
on floor  
PATIENT GENDER DATA: Female.   
Pregnancy status: Pregnant:   
No  
Breastfeeding status: NO.  
PATIENT RELEVANT IMPLANT   
DATA REVIEWED: Not   
Applicable  
RADIOLOGY DEPARTMENT:   
General X-ray: Exam(s)   
Completed: Lower Extremity  
X-Ray(s): Knee, AP / LAT   
Left  
PERIPHERAL IV DATA: Not   
applicable  
SIGNED BY: RT Kacey(R)  
2022 6:50 PM Normal                                  [facility]  
   
                                                    ANES PRE-OPon 2022   
   
                                        ANES PRE-OP         HNO ID: [id number]  
Author: León De La Cruz MD  
Service: Anesthesiology  
Author Type: Physician  
Type: Anesthesia   
Preprocedure Evaluation  
Filed: 3/23/2022 2:19 PM  
Note Text:  
ANESTHESIOLOGY DAY OF   
SURGERY NOTE  
: 1960  
Procedure Information  
Date/Time: 22 1517  
Procedure: REMOVAL   
PROSTHESIS JOINT KNEE (Left   
Knee)  
[facility]: [facility]  [facility]  
Surgeons: Basil Mitchell MD  
Estimated body mass index is   
36.15 kg/m? as calculated   
from the following:  
Height as of this encounter:   
170.2 cm (5' 7 ).  
Weight as of this encounter:   
104.7 kg (230 lb 13.2 oz).  
Most recent hematocrit and   
potassium results:  
Hematocrit 30.6 3/23/2022  
Potassium 4.2 3/23/2022  
Relevant Problems  
CARDIO  
(+) Primary hypertension  
ENDO  
(+) Controlled type 2   
diabetes mellitus without   
complication, with  
long-term current use of   
insulin (HCC)  
Other  
(+) Septic joint of left   
knee joint (HCC)  
I - PHYSICAL EVALUATION  
AIRWAY  
Patient intubated: No.  
Tracheostomy tube not   
present  
Mallampati: II.  
TM distance: >3 FB.  
Neck ROM: full ROM without   
neurological symptoms.  
Mouth opening: adequate.  
Short neck: no.  
Thick neck: no  
DENTAL  
Dental findings: teeth   
intact and poor dentition.  
II - ANESTHESIA PLAN  
ASA Score: 3  
Anesthetic Plan: general  
NPO Status: adequate  
Monitoring plan: standard   
ASA.  
Postoperative analgesic   
plan: parenteral or oral   
opioids.  
Patient / Surrogate agrees   
to blood products: Yes  
Potential Anesthesia issues   
that may suggest increased   
risk of  
complications or   
contraindication to planned   
procedure: none.  
No vitals data found for the   
desired time range.  
Facility-Administered   
Medications as of 3/23/2022  
Medication Dose Route   
Frequency  
- [MAR Hold due to Transfer]   
insulin lispro 13 Units pen   
(rapid acting)  
(HumaLOG KWIKPEN) 13 Units   
SUBCUTANEOUS w MEALS  
- [MAR Hold due to Transfer]   
insulin lispro pen (rapid   
acting) (HumaLOG  
KWIKPEN) SUBCUTANEOUS q 6 H  
- [MAR Hold due to Transfer]   
dextrose 5% in LR infusion   
(D5-LR) 75 mL/hr  
INTRAVENOUS CONTINUOUS  
- [MAR Hold due to Transfer]   
insulin glargine 25 Units   
pen (long acting)  
(LANTUS SOLOSTAR, BASAGLAR   
KWIKPEN) 25 Units   
SUBCUTANEOUS AT BEDTIME  
- [MAR Hold due to Transfer]   
dextrose 40 % 15 g 15 g ORAL   
PRN  
Or  
- [MAR Hold due to Transfer]   
glucagon 1 mg injection 1 mg   
INTRAMUSCULAR  
PRN  
Or  
- [MAR Hold due to Transfer]   
dextrose 50% in water 25 mL   
syringe 12.5 g  
INTRAVENOUS PRN  
- [MAR Hold due to Transfer]   
empagliflozin 25 mg tab(s)   
(JARDIANCE) 25 mg  
ORAL DAILY  
- [MAR Hold due to Transfer]   
ondansetron (PF) 4 mg   
injection (ZOFRAN) 4  
mg INTRAVENOUS q 6 H PRN  
- [MAR Hold due to Transfer]   
NaCl 0.9% iv flush bag 20 mL   
INTRAVENOUS PRN  
- [MAR Hold due to Transfer]   
sodium chloride 0.9 %   
(flush) 3-5 mL (BD  
POSIFLUSH) 3-5 mL   
INTRAVENOUS q 12 H  
- [MAR Hold due to Transfer]   
sodium chloride 0.9 %   
(flush) 2-10 mL (BD  
POSIFLUSH) 2-10 mL   
INTRAVENOUS q 12 H  
- [MAR Hold due to Transfer]   
DULoxetine 60 mg cap(s)   
(CYMBALTA) 60 mg  
ORAL BID  
- [MAR Hold due to Transfer]   
ranolazine ER 1,000 mg   
tab(s) (RANEXA) 1,000  
mg ORAL BID  
- [MAR Hold due to Transfer]   
dilTIAZem  mg cap(s)   
(CARDIZEM CD,  
CARTIA XT) 180 mg ORAL DAILY  
- [MAR Hold due to Transfer]   
gabapentin 200 mg cap(s)   
(NEURONTIN) 200 mg  
ORAL TID  
- [MAR Hold due to Transfer]   
isosorbide mononitrate ER 90   
mg tab(s)  
(IMDUR) 90 mg ORAL DAILY  
- [MAR Hold due to Transfer]   
clopidogrel 75 mg tab(s)   
(PLAVIX) 75 mg ORAL  
DAILY  
- [MAR Hold due to Transfer]   
metoprolol tartrate (short   
acting) 25 mg  
tab(s) (LOPRESSOR) 25 mg   
ORAL q 12 H  
- [MAR Hold due to Transfer]   
traMADol 50 mg tab(s)   
(ULTRAM) 50 mg ORAL q  
6 H PRN  
- [MAR Hold due to Transfer]   
acetaminophen 1,000 mg   
tab(s) (TYLENOL)  
1,000 mg ORAL q 6 H PRN  
- [MAR Hold due to Transfer]   
oxyCODONE IR 5-10 mg tab(s)   
(ROXICODONE)  
5-10 mg ORAL q 6 H PRN  
Outpatient Medications as of   
3/23/2022  
Medication Sig  
- DULoxetine (CYMBALTA) 60   
mg capsule Take 60 mg by   
mouth twice daily.  
- ranolazine SR (RANEXA)   
1,000 mg tab ER 12 hr Take 1   
tablet by mouth  
twice daily.  
- dilTIAZem CD (CARDIZEM CD)   
180 mg 24 hr capsule Take   
180 mg by mouth  
once daily.  
- doxycycline monohydrate   
(MONODOX) 100 mg capsule   
Take 100 mg by mouth  
twice daily.  
- GABAPENTIN ORAL Take 200   
mg by mouth three times   
daily.  
- METOPROLOL TARTRATE ORAL   
Take 25 mg by mouth twice   
daily.  
- isosorbide mononitrate ER   
(IMDUR) 30 mg 24 hr tablet   
Take 90 mg by mouth  
once daily.  
- insulin   
glargine,hum.rec.anlog   
(LANTUS SUBCUTANEOUS) Inject   
40 Units  
subcutaneously daily at   
bedtime.  
- clopidogrel (PLAVIX) 75 mg   
tablet Take 75 mg by mouth   
once daily.  
- empagliflozin (JARDIANCE)   
25 mg tablet Take 25 mg by   
mouth daily with  
breakfast.  
I have interviewed and   
examined the patient. I have   
reviewed the medical  
record and/or the   
pre-anesthesia evaluation,   
pertinent labs, and test  
results.  
This contains updated   
information obtained within   
48 hours of  
Surgery/Procedure.  
SIGNATURE: León De La Cruz MD P   
(more content not   
included)...        Normal                                  [facility]  
   
                                                    BRIEF OP NOTon 2022   
   
                                        BRIEF OP NOT        HNO ID: [id number]  
Author: Amadou Camp MD  
Service: Orthopaedic Surgery  
Author Type: Resident  
Type: Brief Op Note  
Filed: 3/23/2022 5:51 PM  
Note Text:  
BRIEF OPERATIVE / PROCEDURE   
NOTE  
REMOVAL UKA IMPLANT LEFT   
KNEE, INSERTION ANTIBIOTIC   
SPACER LEFT KNEE  
LOG ID: [id number]  
Surgery/Procedure Date:   
3/23/2022  
Incision/Procedure Start   
Time: 3:43 PM  
Incision Close/Procedure End   
Time: 5:20 PM  
Surgeon(s)/Proceduralist(s)   
and Assistant(s):  
Surgeon(s) and Role:  
* Basil Mitchell MD - Primary  
* Amadou Camp MD - Resident   
- Assisting  
Surgical Assistant: Vladimir Lafleur SA  
Procedure(s):  
Irrigation and Debridement   
Left Knee, Removal   
Unicompartmental Knee  
Arthroplasty Left Knee,   
Insertion Articulating   
Antibiotic Spacer Left Knee  
Anesthesia: General  
Approach: Medial   
parapatellar  
Findings: Left Knee   
Periprosthetic Joint   
Infection  
Estimated Blood Loss: 200cc  
Specimens: Left Knee Fluid   
Cultures  
Complications: None  
Implant:  
Implant Name Type Inv. Item   
Serial No.  Lot   
No. LRB No. Used  
Action  
INSERT 4-11 E-F 12MM   
ULTRACONGRUENT KNEE   
-076-12 Implant   
[employer]  
INC [id number] Left 1   
Implanted  
PERSONA IMP KNEE FEM STEM LT   
0 [id number] Implant   
[employer]  
[id number] Left 1 Implanted  
CEMENT SIMPLEX GENTAMICIN   
BONE HIGH VISCOSITY 20ML   
STERILE 40GM -  
[id number] Cement / Putty   
CEMENT SIMPLEX GENTAMICIN   
BONE HIGH VISCOSITY  
20ML STERILE 40GM [facility]   
ORTHOPEDICS [id number] Left   
1 Implanted  
CEMENT SIMPLEX GENTAMICIN   
BONE HIGH VISCOSITY 20ML   
STERILE 40GM -  
[id number] Cement / Putty   
CEMENT SIMPLEX GENTAMICIN   
BONE HIGH VISCOSITY  
[facility]ILE [facility]   
[facility]EDICS [id number] Left   
1 Wasted  
CEMENT SIMPLEX GENTAMICIN   
BONE HIGH VISCOSITY 20ML   
STERILE 40GM -  
[id number] Cement / Putty   
CEMENT SIMPLEX GENTAMICIN   
BONE HIGH VISCOSITY  
20ML STERILE 40GM [facility]   
ORTHOPEDICS [id number] Left   
1 Implanted  
CEMENT SIMPLEX GENTAMICIN   
BONE HIGH VISCOSITY 20ML   
STERILE 40GM -  
[id number] Cement / Putty   
CEMENT SIMPLEX GENTAMICIN   
BONE HIGH VISCOSITY  
20ML STERILE [facility]   
ORTHOPEDICS [id number] Left   
1 Implanted  
Pre-Op/Pre-Procedure   
Diagnosis:Left Knee   
Periprosthetic Joint   
Infection  
Post-Op/Post-Procedure   
Diagnosis:Left Knee   
Periprosthetic Joint   
Infection  
A/P: 62 YO F POD0 s/p IANDD,   
Removal Left Knee UKA,   
Insertion Articulating  
Antibiotic Spacer Left Knee  
Pain control  
WBAT LLE  
PT/OT  
Medical management per IM  
Antibiotics per ID  
Currently on IV Vancomycin  
F/U intra-op culture results  
Dressing: Aquacel + Drain  
Monitor drain output: pull   
when less than 30cc per   
shift  
DVT ppx: Continue home   
plavix POD1, start Aspirin   
81mg BID x 21 days POD1  
F/U XR L Knee  
Discharge planning: pending   
PT recs/ when final abx recs   
obtained  
SIGNATURE: Amadou Camp MD   
PATIENT NAME: Jayden Soto  
DATE: 2022 MRN:   
[id number]  
TIME: 5:45 PM       Normal                                  [facility]  
   
                                                    Bacteria Spec Anaerobe Culto  
n 2022   
   
                                                    Bacteria   
identified Anaer   
cx Nom (Unsp   
spec)           Negative        Normal                          [facility]  
   
                                        Comment on above:   Performed By: #### 6  
462-6, 635-3 ####  
[facility] LABORATORY  
CLIA [id number]  
[address] [place] [address] [place]   
   
                                                    Bacteria   
identified Anaer   
cx Nom (Unsp   
spec)                                   CULTURE, ANAEROBE:  
Negative for anaerobes.  
ORGANISM ID: 1  
Diphtheroids  
Cultured in broth only Normal                                  [facility]  
   
                                        Comment on above:   Performed By: #### 6  
462-6, 635-3 ####[facility]   
LABORATORYCLIA   
[id number] [place] [address] [place]   
   
                                                    Bacteria Wnd Culton 20   
   
                                                    Bacteria   
identified Cx Nom   
(Wound)                                 ORGANISM ID: 1  
Rare Staphylococcus aureus  
GRAM STAIN:  
No organisms seen  
Many Polymorphonuclear   
leukocytes  
Many Red Blood Cells  
ORGANISM ID: 1   
(STAPHYLOCOCCUS AUREUS)  
----------------------------  
------  
ANTIBIOTIC INTERPRETATION   
JUSTINE STATUS REFERENCE RANGE  
----------------------------  
------  
Clindamycin S <=0.5 F  
Susceptible <=0.5 ,   
Intermediate >.5 , Resistant   
>2  
This isolate does not   
demonstrate inducible   
Clindamycin resistance in   
vitro.  
Erythromycin R >4 F  
Susceptible <=0.5 ,   
Intermediate >.5 , Resistant   
>4  
Gentamicin S <=2 F  
Susceptible <=4 ,   
Intermediate >4 , Resistant   
>8  
Oxacillin S <=0.25 F  
Susceptible <=2 ,   
Intermediate >2 , Resistant   
>2  
Oxacillin-susceptible   
staphylococci are   
susceptible to other   
penicilllinase-stable   
penicillins,   
beta-lactam/beta-lactamase   
inhibitor combinations,   
anti-staphylococcal cephems,   
and carbapenems.  
Rifampin S <=0.5 F  
Susceptible <=1 ,   
Intermediate >1 , Resistant   
>2  
Rifampin should not be used   
alone for antimicrobial   
therapy.  
Trimeth sulfameth S <=1 F  
  
Tetracycline S <=0.5 F  
Susceptible <=4 ,   
Intermediate >4 , Resistant   
>8  
Vancomycin S 1 F  
Susceptible <=2 ,   
Intermediate >2 , Resistant   
>=16                Abnormal                                [facility]  
   
                                        Comment on above:   Performed By: #### 6  
462-6 635-3 ####  
[facility] LABORAT[facility]  
CLIA [id number]  
[address] [place] [id number] [place]   
   
                                                    Bacteria   
identified Cx Nom   
(Wound)                                 CULTURE, WOUND:  
No growth 5 days  
  
GRAM STAIN:  
No organisms seen  
Few Polymorphonuclear   
leukocytes  
Many Red Blood Cells Normal                                  [facility]  
   
                                        Comment on above:   Performed By: #### 6  
462-6, 355-3 ####[facility]   
LABORATORYCLIA   
[id number] [place] [address] [place]   
   
                                                    Basic metabolic 2000 panelon  
 2022   
   
                                                    Anion gap   
[Moles/Vol]     8 mmol/L        Low             9-18            [facility]  
   
                                        Comment on above:   Order Comment: Speci  
men Type: BLOOD SPECIMEN  
Ordering Facility: [facility]  
Address: [address] [place] [address]   
   
                                                            Performed By: #### 2  
4321-2 ####  
[facility]  
CLIA [id number]  
[address] [place] [address] [place]   
   
                                                    Calcium   
[Mass/Vol]      8.8 mg/dL       Normal          8.5-10.2        [facility]  
   
                                        Comment on above:   Order Comment: Speci  
men Type: BLOOD SPECIMEN  
Ordering Facility: [facility]  
Address: [address] [place] [address]   
   
                                                            Performed By: #### 2  
4321-2 ####  
[facility]  
CLIA [id number]  
[address] [place]   
   
                                                    Chloride   
[Moles/Vol]     100 mmol/L      Normal                    [facility]  
   
                                        Comment on above:   Order Comment: Speci  
men Type: BLOOD SPECIMEN  
Ordering Facility: [facility]  
Address: [address] [place] [address]   
   
                                                            Performed By: #### 2  
4321-2 ####  
[facility]  
CLIA [id number]  
[address] [place]   
   
                      CO2 [Moles/Vol] 27 mmol/L  Normal     22-30      [facility]  
   
                                        Comment on above:   Order Comment: Speci  
men Type: BLOOD SPECIMEN  
Ordering Facility: [facility]  
Address: [address] [place] [address]   
   
                                                            Performed By: #### 2  
4321-2 ####  
[facility]  
CLIA [id number]  
[address] [place]   
   
                                                    Creatinine   
[Mass/Vol]      0.79 mg/dL      Normal          0.58-0.96       [facility]  
   
                                        Comment on above:   Order Comment: Speci  
men Type: BLOOD SPECIMEN  
Ordering Facility: [facility]  
Address: [address] [address]   
   
                                                            Performed By: #### 2  
4321-2 ####  
[facility]  
CLIA [id number]  
[address] [place]   
   
                                                    ESTIMATED   
GLOMERULAR   
FILTRATION RATE 86 mL/min/1.73m??? Normal          >=60            [facility]  
   
                                        Comment on above:   Order Comment: Speci  
men Type: BLOOD SPECIMEN  
Ordering Facility: [facility]  
Address: [address] [place] [address]   
   
                                                            Result Comment: Sydnee  
mated Glomerular Filtration Rate (eGFR) is   
calculated using the  CKD-EPI creatinine equation. This equation   
utilizes serum creatinine, sex, and age as parameters. The   
creatinine assay has traceable calibration to isotope dilution-mass   
spectrometry. Refer to KDIGO guidelines for clinical interpretation.   
In patients with unstable renal function, e.g. those with acute   
kidney injury, the eGFR may not accurately reflect actual GFR.   
   
                                                            Performed By: #### 2  
4321-2 ####  
[facility]  
CLIA [id number]  
[address] [place] [address] [place]   
   
                                                    Glucose   
[Mass/Vol]      285 mg/dL       High            74-99           [facility]  
   
                                        Comment on above:   Order Comment: Speci  
men Type: BLOOD SPECIMEN  
Ordering Facility: [facility]  
Address: [address] [place] [address]   
   
                                                            Result Comment: The   
American Diabetes Association (ADA) provides   
guidance for cutoff values for fasting glucose and random glucose.   
The ADA defines fasting as no caloric intake for at least 8 hours.   
Fasting plasma glucose results between 100 to 125 mg/dL indicate   
increased risk for diabetes (prediabetes).  
Fasting plasma glucose results greater than or equal to 126 mg/dL   
meet the criteria for diagnosis of diabetes. In the absence of   
unequivocal hyperglycemia, results should be confirmed by repeat   
testing. In a patient with classic symptoms of hyperglycemia or   
hyperglycemic crisis, random plasma glucose results greater than or   
equal to 200 mg/dL meet the criteria for diagnosis of diabetes.  
Reference: Standards of Medical Care in Diabetes 2016, American   
Diabetes Association. Diabetes Care. 2016.39(Suppl 1).   
   
                                                            Performed By: #### 2  
4321-2 ####  
[facility] Decisions  
CLIA [id number]  
[address] [place]   
   
                                                    Potassium   
[Moles/Vol]     4.2 mmol/L      Normal          3.7-5.1         [facility]  
   
                                        Comment on above:   Order Comment: Speci  
men Type: BLOOD SPECIMEN  
Ordering Facility: [facility]  
Address: [address] [place] [address]   
   
                                                            Performed By: #### 2  
4321-2 ####  
[facility]  
CLIA [id number]  
[address] [place]   
   
                                                    Sodium   
[Moles/Vol]     135 mmol/L      Low             136-144         [facility]  
   
                                        Comment on above:   Order Comment: Speci  
men Type: BLOOD SPECIMEN  
Ordering Facility: [facility]  
Address: [address] [place] [address]   
   
                                                            Performed By: #### 2  
4321-2 ####  
[facility]  
CLIA [id number]  
[address] [place]   
   
                                                    Urea nitrogen   
[Mass/Vol]      15 mg/dL        Normal          7-21            [facility]  
   
                                        Comment on above:   Order Comment: Speci  
men Type: BLOOD SPECIMEN  
Ordering Facility: [facility]  
Address: [address] [place] [address]   
   
                                                            Performed By: #### 2  
4321-2 ####  
[facility]  
CLIA [id number]  
[address] [place]   
   
                                                    CASE MGT INIT ASSESon 2022   
   
                                                    CASE MGT INIT   
ASSES                                   HNO ID: [id number]  
Author: Bev Steele RN  
Service: Nursing  
Author Type: Registered   
Nurse  
Type: Care Mgt Initial   
Assessment  
Filed: 3/23/2022 11:46 AM  
Note Text:  
CARE MANAGEMENT: ASSESSMENT   
AND DISCHARGE PLAN  
SERVICE DATE: 2022  
SERVICE TIME: 11:40 AM  
PRIMARY CARE PHYSICIAN:  
Becky Corrales MD, MD  
Phone: [phone]  
ADMISSION STATUS: Inpatient  
Needs Prior to Discharge: To   
Be Determined  
MEDICAL:  
[facility] MEDICAID  
Patient/Representative   
Stated Goals: To have   
reduction in symptoms;To  
improve my functional   
status;To return home to   
life as it was  
Health Insurance: [facility] Issues Impacting   
Discharge Plan: Newly   
diagnosed  
Newly Diagnosed: left septic   
knee  
Last Discharge Date:  
N/A  
Is this Within the Past 30   
days?  
Last discharge within 30   
days: No  
Advance Directive:  
Current Advance Directive:   
Health Care Power of   
  
In Chart: No  
Health LiteracyHow often do   
you need to have someone   
help you when you  
read instructions,   
pamphlets, or other written   
material from your doctor  
or pharmacy? : 1 - Never  
How confident are you   
filling out medical forms by   
yourself?: 1 -  
Extremely  
If Patient scores > 3 on   
either question, the   
following interventions were  
put into place:: Patient did   
not score > 3 on either   
question.  
Baseline Mental Status  
Prior to this Illness what   
was the patient's Baseline   
Mental Status?:  
Alert AND Oriented  
Prior to this illness, has   
anyone described the patient   
having any of the  
following behaviors?: Not   
Applicable  
Relationship of the   
informant to the patient::   
Self  
Functional Status:   
Independent  
Does Patient Currently   
Receive Any Community   
Services or Home Care?: None  
Equipment Prior to   
Admission:   
Walker;Cane;Wheelchair;Tub   
bench/chair  
Has the Patient Been in a   
Skilled Nursing Facility in   
the Past 30 days?:  
No  
SOCIAL:  
Living Arrangements: Home  
Lives With: Son  
Financial Resources: Retired  
Primary Contact: Extended   
Emergency Contact   
Information  
Primary Emergency Contact:   
NNEKA SOTO  
Mobile Phone: [phone]  
Relation: Relative  
Secondary Emergency Contact:   
jacinto soto  
Mobile Phone: [phone]  
Relation: Son  
Supportive Patient Contact::   
Yes  
Contact Resources: Family  
Family Name/Phone: Nneka   
Michel ([facility]) [phone] (U)  
Caregiver   
AssessmentCaregiver is   
ready, willing and able to   
meet the  
patient's needs as   
recommended by the   
inter-professional team:: No  
Caregiver needed  
Does the patient have an   
acute stroke diagnosis, or   
has the patient had a  
stroke during this   
admission?: No  
Patient's transition needs   
and plan for meeting these   
needs: [facility]  
Patient's perception of need   
for this admission: septic   
knee  
Medication Adherance  
I am convinced of the   
importance of my   
prescription medication: 0 -   
Agree  
Completely  
I worry that my prescription   
medication will do more harm   
than good to me  
: 0 - Disagree Completely  
I feel financially burdened   
by my out-of-pocket expenses   
for my  
prescription medication:: 0   
- Disagree Completely  
Risk Score: 0  
Patient is categorized as:   
Low risk < 2  
Are you interested in   
bedside delivery of your   
medications? Yes  
Is Patient Psychosocially   
Complex?: No  
ASSESSMENT AND PLAN:  
Medical Needs:  
Medical Needs: None  
Psychosocial Needs:  
Psychosocial Needs: None  
FREEDOM OF CHOICE EXPLAINED:  
Freedom of Choice Given: Yes  
Level of Care Discussed:   
Home Care  
Financial Disclosure   
Provided: No  
Provider List: Home Care  
Provider list within the   
patient's requested   
geographic area shared with  
the patient/family: Yes  
within: 20 miles  
of zip code: [address]  
Quality and resource use   
metrics shared with the   
patient that are relevant  
to the patient's goals of   
care and treatment   
preferences:: No  
POTENTIAL TRANSITION PLANS  
Home OT/PT  
Chart reviewed, pt from home   
with son and grandkids,   
independent pta,  
Retired, does not drive,   
pt's son is able to provide   
transportation and  
assistance at home; pt not   
active with home care   
company, she states that  
she used a home care company   
last year for surgery but   
unaware of  
company's name; in the   
meantime, pt agreeable to   
sending multiple home  
care referrals in her area   
+PCP Becky Corrales +DME   
walker, cane,  
wheelchair, shower bench +Rx   
[facility]  
SIGNATURE: Bev Steele RN   
PATIENT NAME: Jayden Soto  
DATE: 2022 MRN:   
[id number]  
TIME: 11:40 AM PAGER/CONTACT   
#: [id number]       Normal                                  [facility]  
   
                                                    CBC W Auto Differential pane  
l (Bld)on 2022   
   
                                                    Basophils (Bld)   
[#/Vol]         0.04 10*3/uL    Normal          <0.11           [facility]  
   
                                        Comment on above:   Order Comment: Speci  
men Type: BLOOD SPECIMENOrdering Facility:  
   
[facility] Address: [address] [place] [address]   
   
                                                            Performed By: #### 5  
7021-8 ####[facility] LABORATORYCLIA   
[id number] [place] [address] [place]   
[place]   
   
                                                    Basophils/100 WBC   
(Bld)           0.4 %           Normal                          [facility]  
   
                                        Comment on above:   Order Comment: Speci  
men Type: BLOOD SPECIMENOrdering Facility:  
   
[facility] Address: [address] [place] [address]   
   
                                                            Performed By: #### 5  
7021-8 ####[facility] LABORATORYCLIA   
[id number] [place] [address] [place]   
[place]   
   
                                                    Differential cell   
count method Nom   
(Bld)           Auto            Normal                          [facility]  
   
                                        Comment on above:   Order Comment: Speci  
men Type: BLOOD SPECIMENOrdering Facility:  
   
[facility] Address: [address] [place] [address]   
   
                                                            Performed By: #### 5  
7021-8 ####[facility] LABORATORYCLIA   
[id number] [address] [place]   
[place]   
   
                                                    Eosinophils (Bld)   
[#/Vol]         0.25 10*3/uL    Normal          <0.46           [facility]  
   
                                        Comment on above:   Order Comment: Speci  
men Type: BLOOD SPECIMENOrdering Facility:  
   
[facility] Address: [address] [place] [address]   
   
                                                            Performed By: #### 5  
7021-8 ####[facility] LABORATORYCLIA   
[id number] [address] [place]   
[place]   
   
                                                    Eosinophils/100   
WBC (Bld)       2.4 %           Normal                          [facility]  
   
                                        Comment on above:   Order Comment: Speci  
men Type: BLOOD SPECIMENOrdering Facility:  
   
[facility] Address: [address] [place] [address]   
   
                                                            Performed By: #### 5  
7021-8 ####[facility] LABORATORYCLIA   
[id number] [address] [place]   
[place]   
   
                                                    Erythrocyte   
distribution   
width (RBC)   
[Ratio]         14.1 %          Normal          11.5-15.0       [facility]  
   
                                        Comment on above:   Order Comment: Speci  
men Type: BLOOD SPECIMENOrdering Facility:  
   
[facility] Address: [address] [place] [address]   
   
                                                            Performed By: #### 5  
7021-8 ####[facility] LABORATORYCLIA   
[id number] [address] [place]   
[place]   
   
                                                    Hematocrit (Bld)   
[Volume fraction] 30.6 %          Low             36.0-46.0       [facility]  
   
                                        Comment on above:   Order Comment: Speci  
men Type: BLOOD SPECIMENOrdering Facility:  
  
[facility] Address: [address] [place] [address]   
   
                                                            Performed By: #### 5  
7021-8 ####[facility] LABORATORYCLIA   
[id number] [address] [place]   
[place]   
   
                                                    Hemoglobin (Bld)   
[Mass/Vol]      9.7 g/dL        Low             11.5-15.5       [facility]  
   
                                        Comment on above:   Order Comment: Speci  
men Type: BLOOD SPECIMENOrdering Facility:  
   
[facility] Address: [address] [place] [address]   
   
                                                            Performed By: #### 5  
7021-8 ####[facility] LABORATORYCLIA   
[id number] [address] [place]   
[place]   
   
                      IMMATURE GRAN % 1.1 %      Normal                [facility]  
   
                                        Comment on above:   Order Comment: Speci  
men Type: BLOOD SPECIMENOrdering Facility:  
   
[facility] Address: [address] [place] [address]   
   
                                                            Performed By: #### 5  
7021-8 ####[facility] LABORATORYCLIA   
[id number] [address]   
   
                      IMMATURE GRAN ABS 0.12 k/uL  High       <0.10      [facility]  
   
                                        Comment on above:   Order Comment: Speci  
men Type: BLOOD SPECIMENOrdering Facility:  
   
[facility] Address: [address] [place] [address]   
   
                                                            Performed By: #### 5  
7021-8 ####[facility] LABORATORYCLIA   
[id number] [address] [place]   
[place]   
   
                                                    Lymphocytes (Bld)   
[#/Vol]         2.82 10*3/uL    Normal          1.00-4.00       [facility]  
   
                                        Comment on above:   Order Comment: Speci  
men Type: BLOOD SPECIMENOrdering Facility:  
   
[facility] Address: [address] [place] [address]   
   
                                                            Performed By: #### 5  
7021-8 ####[facility] LABORATORYCLIA   
[id number] [address]   
   
                                                    Lymphocytes/100   
WBC (Bld)       26.5 %          Normal                          [facility]  
   
                                        Comment on above:   Order Comment: Speci  
men Type: BLOOD SPECIMENOrdering Facility:  
   
[facility] Address: [address] [place] [address]   
   
                                                            Performed By: #### 5  
7021-8 ####[facility] LABORATORYCLIA   
[id number] [address]   
   
                                                    MCH (RBC)   
[Entitic mass]  27.9 pg         Normal          26.0-34.0       [facility]  
   
                                        Comment on above:   Order Comment: Speci  
men Type: BLOOD SPECIMENOrdering Facility:  
  
[facility] Address: [address] [place] [address]   
   
                                                            Performed By: #### 5  
7021-8 ####[facility] LABORATORYCLIA   
[id number] [address]   
   
                                                    MCHC (RBC)   
[Mass/Vol]      31.7 g/dL       Normal          30.5-36.0       [facility]  
   
                                        Comment on above:   Order Comment: Speci  
men Type: BLOOD SPECIMENOrdering Facility:  
   
[facility] Address: [address] [place] [address]   
   
                                                            Performed By: #### 5  
7021-8 ####[facility] LABORATORYCLIA   
[id number] [address]   
   
                                                    MCV (RBC)   
[Entitic vol]   87.9 fL         Normal          80.0-100.0      [facility]  
   
                                        Comment on above:   Order Comment: Speci  
men Type: BLOOD SPECIMENOrdering Facility:  
  
[facility] Address: [address] [place] [address]   
   
                                                            Performed By: #### 5  
7021-8 ####[facility] LABORATORYCLIA   
[id number] [address]   
   
                                                    Monocytes (Bld)   
[#/Vol]         0.85 10*3/uL    Normal          <0.87           [facility]  
   
                                        Comment on above:   Order Comment: Speci  
men Type: BLOOD SPECIMENOrdering Facility:  
   
[facility] Address: [address] [place] [address]   
   
                                                            Performed By: #### 5  
7021-8 ####[facility] LABORATORYCLIA   
[id number] [address]   
   
                                                    Monocytes/100 WBC   
(Bld)           8.0 %           Normal                          [facility]  
   
                                        Comment on above:   Order Comment: Speci  
men Type: BLOOD SPECIMENOrdering Facility:  
   
[facility] Address: [address] [address]   
   
                                                            Performed By: #### 5  
7021-8 ####[facility] LABORATORYCLIA   
[id number] [address] [place]   
[place]   
   
                                                    Neutrophils (Bld)   
[#/Vol]         6.55 10*3/uL    Normal          1.45-7.50       [facility]  
   
                                        Comment on above:   Order Comment: Speci  
men Type: BLOOD SPECIMENOrdering Facility:  
   
[facility] Address: [address] [place] [address]   
   
                                                            Performed By: #### 5  
7021-8 ####[facility] LABORATORYCLIA   
[id number] [address]   
   
                                                    Neutrophils/100   
WBC (Bld)       61.6 %          Normal                          [facility]  
   
                                        Comment on above:   Order Comment: Speci  
men Type: BLOOD SPECIMENOrdering Facility:  
   
[facility] Address: [address] [place] [address]   
   
                                                            Performed By: #### 5  
7021-8 ####[facility] LABORATORYCLIA   
[id number] [address] [place]    
[place]   
   
                                                    Nucleated RBC   
(Bld) [#/Vol]   10*3/uL         Normal          <0.01           [facility]  
   
                                        Comment on above:   Order Comment: Speci  
men Type: BLOOD SPECIMENOrdering Facility:  
   
[facility] Address: [address] [place] [address]   
   
                                                            Performed By: #### 5  
7021-8 ####[facility] LABORATORYCLIA   
[id number] [address] [place]   
[place]   
   
                                                    Nucleated RBC/100   
WBC (Bld) [Ratio] 0.0 /100 WBC    Normal                          [facility]  
   
                                        Comment on above:   Order Comment: Speci  
men Type: BLOOD SPECIMENOrdering Facility:  
   
[facility] Address: [address] [place] [address]   
   
                                                            Performed By: #### 5  
7021-8 ####[facility] LABORATORYCLIA   
[id number] [place] [address] [place]   
[place]   
   
                                                    Platelet mean   
volume (Bld)   
[Entitic vol]   9.3 fL          Normal          9.0-12.7        [facility]  
   
                                        Comment on above:   Order Comment: Speci  
men Type: BLOOD SPECIMENOrdering Facility:  
  
[facility] Address: [address] [place] [address]   
   
                                                            Performed By: #### 5  
7021-8 ####[facility] LABORATORYCLIA   
[id number] [address] [place]   
[place]   
   
                                                    Platelets (Bld)   
[#/Vol]         364 10*3/uL     Normal          150-400         [facility]  
   
                                        Comment on above:   Order Comment: Speci  
men Type: BLOOD SPECIMENOrdering Facility:  
   
[facility] Address: [address] [place] [address]   
   
                                                            Performed By: #### 5  
7021-8 ####[facility] LABORATORYCLIA   
[id number] [address]   
   
                      RBC (Bld) [#/Vol] 3.48 10*6/uL Low        3.90-5.20  [facility]  
   
                                        Comment on above:   Order Comment: Speci  
men Type: BLOOD SPECIMENOrdering Facility:  
   
[facility] Address: [address] [place] [address]   
   
                                                            Performed By: #### 5  
7021-8 ####[facility] LABORATORYCLIA   
[id number] [address] OF   
[facility]   
   
                      WBC (Bld) [#/Vol] 10.63 10*3/uL Normal     3.70-11.00 [facility]  
   
                                        Comment on above:   Order Comment: Speci  
men Type: BLOOD SPECIMENOrdering Facility:  
   
[facility] Address: [address] [place] [address]   
   
                                                            Performed By: #### 5  
7021-8 ####[facility] LABORATORYCLIA   
[id number] [address]   
   
                                                    CONSULT PROGon 2022   
   
                                        CONSULT PROG        HNO ID: [id number]  
Author: Leo Hearn RPh  
Service: Pharmacy  
Author Type: Pharmacist  
Type: Consult Progress Note  
Filed: 3/23/2022 8:53 PM  
Note Text:  
PHARMACY VANCOMYCIN DOSING   
NOTE  
Patient Name: Jayden Soto   
MRN: [id number] Admission Date:   
3/21/2022  
Date of Consult: 3/23/2022   
Time of Consult: 8:40 PM  
Indication: Bone AND joint   
infection  
Goal Range: 15-25 mcg/mL  
RECOMMENDATIONS/PLAN:  
Pharmacy consulted for   
vancomycin dosing for Jayden Soto, a 61 year old,  
female who is being treated   
with vancomycin for Bone and   
Joint infection  
1. Patient is currently   
ordered Vancomycin for   
pharmacy to dose. Today is  
day 1 of therapy.  
- Of note- pt did received a   
Vanco 1 g x 1 dose at OR   
(3/28 at 1548)- also  
confirmed by RN  
- Pt also has a spacer for   
his wound in with Vanco -   
given 3/28  
- Pt also had an ordered   
Vanco 1.5 q 12 hr regimen   
post-op orders until  
anesthesia end time---I   
d/c'ed this order as we are   
currently being  
consulted for Vancomycin   
consult  
2. No vancomycin level has   
been drawn for this dosing   
regimen.  
- Renal fn is currently   
un-concerning (caveat: Obese   
with BMI of 36.15)  
- WBC wnl (previously   
elevated); afebrile ; MAP >   
65  
- Will use Actual BW of   
104.7 kg for Vanco dose of   
~1500 mg and will keep  
q 24 hr frequency -   
accumulation risk w/ obesity   
and current infectious  
disease s/s  
- Wound Cx in process -   
monitor  
- There is already a BMP in   
AM- Monitor  
- Will initiate a regimen of   
Vanco 1.5 q 24 hr starting   
tomorrow 3/24 @  
1600  
3. Will schedule vancomycin   
to 1.5 g with a dosing   
interval of q24h.  
4. The next vancomycin level   
has been ordered for 3/27 @   
1500 (Completed)  
- prior to 4th dose of the   
regimen ordered  
We will follow patient renal   
function, vancomycin levels   
and doses with  
you during the course of   
therapy. Additional   
recommendations will appear  
in follow up notes. If you   
have any questions, please   
contact Leo Hearn (Pharmacist)  
at Ext # [phone].  
Age: 61 year old  
Allergies:  
ALLERGIES  
Allergen Reactions  
- Morphine GI Upset  
- Statins-Hmg-Coa Red*   
Unknown  
Last 3 Encounter Wt   
Readings:  
Date: Wt:  
2022 104.7 kg (230 lb   
13.2 oz)  
2022 95.3 kg (210 lb)  
2022 95.3 kg (210 lb)  
Last 1 Encounter Ht   
Readings:  
Date: Ht:  
2022 170.2 cm (5' 7 )  
Estimated Creatinine   
Clearance: 94.2 mL/min   
(based on SCr of 0.79   
mg/dL).  
Temp (24hrs), Av.4 ?C   
(97.6 ?F), Min:36 ?C (96.8   
?F), Max:36.8 ?C  
(98.2 ?F)  
- Current Temp: 36.8 ?C   
(98.2 ?F)  
Labs  
BUN (mg/dL)  
Date Value  
2022 15  
2022 14  
Creatinine (mg/dL)  
Date Value  
2022 0.79  
2022 0.67  
WBC (k/uL)  
Date Value  
2022 10.63  
2022 12.01 (H)  
Vancomycin Levels: No   
results found for: FABIÁN Hearn, [facility] Normal                                  [facility]  
   
                                                    HGB A1Con 2022   
   
                                                    Average glucose   
Estimated from   
glycated   
hemoglobin (Bld)   
[Mass/Vol]      223 mg/dL       Normal                          [facility]  
   
                                        Comment on above:   Order Comment: Speci  
men Type: BLOOD SPECIMENOrdering Facility:  
   
[facility] Address: [address] [address]   
   
                                                            Result Comment: eAG:  
 (Estimated average glucose) is a calculated   
value from HgbA1c and is representative of the average blood glucose   
level in the last 2-3 month period.   
   
                                                            Performed By: #### H  
BA1C ####[facility] LABORATORYCLIA   
[id number] [place] [address] [place]   
   
                                                    HbA1c (Bld) [Mass   
fraction]       9.4 %           High            4.3-5.6         [facility]  
   
                                        Comment on above:   Order Comment: Speci  
men Type: BLOOD SPECIMENOrdering Facility:  
  
[facility] Address: [address] [address]   
   
                                                            Result Comment: Amer  
[place] Diabetes Association guidelines indicate   
that patients with HgbA1c in the range 5.7-6.4% are at increased   
risk for development of diabetes, and intervention by lifestyle   
modification may be beneficial. HgbA1c greater or equal to 6.5% is   
considered diagnostic of diabetes.   
   
                                                            Performed By: #### H  
BA1C ####[facility] LABORATORYCLIA   
[id number] [address] [id number] [place]   
[place]   
   
                                                    NURSING PROGon 2022   
   
                                        NURSING PROG        HNO ID: [id number]  
Author: Marjorie Francis RN  
Service: Nursing  
Author Type: Registered   
Nurse  
Type: Nursing Progress Note  
Filed: 3/23/2022 6:24 PM  
Note Text:  
Nursing Progress Note  
Patient Name: Jayden Soto  
MRN: [id number]  
Patient Location:   
[facility]  
____________________________  
____________________________  
____________  
Daily Note:xrays done  
This note was completed by:   
Marjorie Francis       [facility]  
   
                                                    OPERATIVE NOon 2022   
   
                                        OPERATIVE NO        HNO ID: [id number]  
Author: Basil Mitchell MD  
Service: Orthopaedic Surgery  
Author Type: Physician  
Type: Operative Report  
Filed: 3/24/2022 8:07 AM  
Note Text:  
[facility] - Operative   
Report  
JAYDEN SOTO  
: 1960  
AGE: 61.  
SEX: F  
MRN: [id number]  
PATIENT TYPE: I  
HOSP SVC: OROR  
LOCATION: [facility]  
ATTENDING PHYSICIAN: Basil Mitchell MD  
CSN NUMBER: [id number]  
DATE OF SURGERY/PROCEDURE:   
2022  
INCISION/PROCEDURE START   
TIME: 3:43 PM  
INCISION CLOSE/PROCEDURE END   
TIME: 5:20 PM  
PREOPERATIVE DIAGNOSIS:   
Infected left knee   
arthroplasty.  
POSTOPERATIVE DIAGNOSIS:   
Infected left knee   
arthroplasty.  
SURGEON: Basil Mitchell MD  
ASSISTANT: Amadou Camp MD.  
SURGERY/PROCEDURE: Explant   
of left knee arthroplasty   
both femoral and  
tibial components and   
insertion of articulating   
antibiotic spacer.  
ANESTHESIA: General  
SPECIAL MEDICATIONS: Patient   
received 2 g of Ancef, 1 g   
of vancomycin  
intravenously after cultures   
were obtained   
intraoperatively. The   
patient  
received 1 g of TXA   
intravenously at incision, 1   
g of TXA intravenously  
at closure. The patient   
received 50 mL of ERNST as a   
local anesthetic.  
HISTORY OF PRESENT ILLNESS:   
Patient is a 61-year-old   
female with a  
history of a left medial   
compartment partial knee   
arthroplasty done in  
 in [place].  
Approximately 1 year ago,   
she developed prosthetic   
joint infection, was  
treated at an outside   
institution. According to   
the patient, she had a  
prosthetic joint infection,   
which may have came from her   
foot, which also  
spread to an epidural   
infection. This was all   
treated at outside  
institution. According to   
the patient, she spent   
several months in both  
hospital as well as rehab   
facility. She was seen in my   
office initially  
in December complaining of   
left knee pain. At that   
time, her serologies  
were essentially normal. She   
did have lateral compartment   
arthritis,  
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   
pyogenic arthritis. At the   
time, I did not  
recommend surgical   
intervention. She was again   
seen several months later  
my office with worsening   
pain, swelling as well as   
fevers. I did  
aspirate her left knee at   
this time, which the cell   
count was suspicious  
for a chronic infection of   
the left knee. I recommended   
explant of this  
left knee arthroplasty and   
insertion of the antibiotic   
articulating  
spacer. The patient elected   
to proceed.  
DESCRIPTION OF PROCEDURE:   
Patient was identified in   
the preoperative  
holding area. All final   
questions were answered.   
Left lower extremity  
was marked. A preoperative   
huddle was performed. The   
patient's family  
present. She was taken back   
to the operating room, moved   
over onto the  
OR table. She was sedated   
today and intubated by   
Anesthesia, and they  
were in charge of her head,   
neck, and airway throughout   
the remainder of  
the case. A tourniquet was   
placed on left upper thigh.   
The left lower  
extremity was pre-prepped   
with alcohol as well as   
Hibiclens. Left lower  
extremity than prepped and   
draped in sterile orthopedic   
fashion. Prior  
to proceeding, time- out was   
performed according to [place]   
[facility]  
[facility] safety standards.   
Following time-out, the   
tourniquet was  
inflated to 300 mmHg .   
Incision was made down   
through previous midline  
incision. Previous medial   
parapatellar arthrotomy was   
used for exposure.  
There was noted to be   
purulent drainage upon entry   
into the knee joint.  
Multiple cultures of this   
were taken and sent. We then   
performed a  
complete synovectomy for   
exposure purposes. The knee   
was then flexed to  
90 degrees. Femoral canal   
was entered through the   
distal femur. Distal  
femoral cutting guide was   
performed. Once this was   
performed, the  
femoral component was then   
removed with minimal bone   
loss. The knee was  
then sized to a size 7 and   
the 4-in-1 cutting guide was   
placed at 3  
degrees of external rotation   
according to the posterior   
condylar axis.  
The 4 cuts were then made in   
sequential fashion.   
Attention was then  
turned to the proximal   
tibia. Extramedullary tibial   
cutting guide was  
placed. We  
referenced a neutral cut   
just below the level of the   
implant for clean  
removal of the implant. Cut   
was performed. The osteotomy   
was removed as  
was the medial compartment   
tibial implant with minimal   
bone loss. The  
tibia was sized to size E.   
We did place trial   
components. This was a  
size 12 polyethylene without   
the tibial tray and a size 7   
femur. I was  
pleased with the overall   
range of motion as well as   
the balance of the  
knee to track well. The   
intramedullary canal of the   
tibia was then  
opened. Our final components   
were open. The stem was   
fashioned onto the  
tibia using antibiotic   
cement. 2 batches of simplex   
cement were used  
with 4 grams of vancomycin   
and 4.8 grams of tobramycin.   
The antibiotic  
articulating spacer was then   
cemented into the knee.   
Tourniquet was let  
down. All bleeders were   
cauterized. We did place a   
medium Hemovac  
drain. 50 mL of ERNST was   
used as local anesthetic.   
The capsule was  
closed with #2 Ethibond,   
followed (more content not   
included)...        Normal                                  [facility]  
   
                                                    XR KNEE 2V AP/LAT LTon    
   
                                                    XR KNEE 2V AP/LAT   
LT                                      * * *Final Report* * *  
DATE OF EXAM: Mar 23 2022   
6:49PM  
AKX 5206 - XR KNEE 2V AP/LAT   
LT / ACCESSION # [id number]  
PROCEDURE REASON:   
Post-operative /   
post-procedure assessment,   
asymptomatic  
  
* * * * Physician   
Interpretation * * * *  
EXAM: LEFT KNEE: 2 VIEWS  
HISTORY: 61-year-old female   
status total post knee   
replacement  
TECHNIQUE: AP, lateral  
COMPARISON: 2022  
RESULT: Status post left   
total knee arthroplasty   
embedded in  
methacrylate with patella   
resurfacing in near anatomic   
alignment. No  
evidence of loosening,   
fracture or dislocation.   
Surgical drain is  
present. . Patellofemoral   
replacement is new compared   
to the previous  
exam with replacing the   
medial unicompartmental   
arthroplasty. .  
IMPRESSION: NEW TOTAL KNEE   
REPLACEMENT WITHOUT   
COMPLICATION  
: MONSE  
Transcribe Date/Time: Mar 23   
2022 8:04P  
Dictated by : MAGNOLIA ALVAREZ MD  
This examination was   
interpreted and the report   
reviewed and  
electronically signed by:  
MAGNOLIA ALVAREZ MD on Mar   
23 2022 8:07PM EST  
[id number] Normal                                  [facility]  
   
                                                    Basic metabolic 2000 panelon  
 2022   
   
                                                    Anion gap   
[Moles/Vol]     12 mmol/L       Normal          9-18            [facility]  
   
                                        Comment on above:   Order Comment: Speci  
men Type: BLOOD SPECIMEN  
Ordering Facility: [facility]  
Address: [address] [place] [address]   
   
                                                            Performed By: #### 2  
4321-2 ####  
[facility]  
CLIA [id number]  
[address] [place] [address] [place]   
   
                                                    Calcium   
[Mass/Vol]      8.7 mg/dL       Normal          8.5-10.2        [facility]  
   
                                        Comment on above:   Order Comment: Speci  
men Type: BLOOD SPECIMEN  
Ordering Facility: [facility]  
Address: [address] [place] [address]   
   
                                                            Performed By: #### 2  
4321-2 ####  
[facility]  
CLIA [id number]  
[address] [place]   
   
                                                    Chloride   
[Moles/Vol]     101 mmol/L      Normal                    [facility]  
   
                                        Comment on above:   Order Comment: Speci  
men Type: BLOOD SPECIMEN  
Ordering Facility: [facility]  
Address: [address] [place] [address]   
   
                                                            Performed By: #### 2  
4321-2 ####  
[facility]  
CLIA [id number]  
[address] [place]   
   
                      CO2 [Moles/Vol] 25 mmol/L  Normal     22-30      [facility]  
   
                                        Comment on above:   Order Comment: Speci  
men Type: BLOOD SPECIMEN  
Ordering Facility: [facility]  
Address: [address] [place] [address]   
   
                                                            Performed By: #### 2  
4321-2 ####  
[facility]  
CLIA [id number]  
[address] [place] [address] [place]   
   
                                                    Creatinine   
[Mass/Vol]      0.67 mg/dL      Normal          0.58-0.96       [facility]  
   
                                        Comment on above:   Order Comment: Speci  
men Type: BLOOD SPECIMEN  
Ordering Facility: [facility]  
Address: [address] [address]   
   
                                                            Performed By: #### 2  
4321-2 ####  
[facility]  
CLIA [id number]  
[address] [place]   
   
                                                    ESTIMATED   
GLOMERULAR   
FILTRATION RATE 100 mL/min/1.73m??? Normal          >=60            [facility]  
   
                                        Comment on above:   Order Comment: Speci  
men Type: BLOOD SPECIMEN  
Ordering Facility: [facility]  
Address: [address] [place] [address]   
   
                                                            Result Comment: Sydnee  
mated Glomerular Filtration Rate (eGFR) is   
calculated using the  CKD-EPI creatinine equation. This equation   
utilizes serum creatinine, sex, and age as parameters. The   
creatinine assay has traceable calibration to isotope dilution-mass   
spectrometry. Refer to KDIGO guidelines for clinical interpretation.   
In patients with unstable renal function, e.g. those with acute   
kidney injury, the eGFR may not accurately reflect actual GFR.   
   
                                                            Performed By: #### 2  
4321-2 ####  
[facility]  
CLIA [id number]  
[address] [place] [address] [place]   
   
                                                    Glucose   
[Mass/Vol]      201 mg/dL       High            74-99           [facility]  
   
                                        Comment on above:   Order Comment: Speci  
men Type: BLOOD SPECIMEN  
Ordering Facility: [facility]  
Address: [address] [place] [address]   
   
                                                            Result Comment: The   
American Diabetes Association (ADA) provides   
guidance for cutoff values for fasting glucose and random glucose.   
The ADA defines fasting as no caloric intake for at least 8 hours.   
Fasting plasma glucose results between 100 to 125 mg/dL indicate   
increased risk for diabetes (prediabetes).  
Fasting plasma glucose results greater than or equal to 126 mg/dL   
meet the criteria for diagnosis of diabetes. In the absence of   
unequivocal hyperglycemia, results should be confirmed by repeat   
testing. In a patient with classic symptoms of hyperglycemia or   
hyperglycemic crisis, random plasma glucose results greater than or   
equal to 200 mg/dL meet the criteria for diagnosis of diabetes.  
Reference: Standards of Medical Care in Diabetes 2016, American   
Diabetes Association. Diabetes Care. 2016.39(Suppl 1).   
   
                                                            Performed By: #### 2  
4321-2 ####  
[facility]  
CLIA [id number]  
[address] [place] [address] [place]   
   
                                                    Potassium   
[Moles/Vol]     4.0 mmol/L      Normal          3.7-5.1         [facility]  
   
                                        Comment on above:   Order Comment: Speci  
men Type: BLOOD SPECIMEN  
Ordering Facility: [facility]  
Address: [address] [place] [address]   
   
                                                            Performed By: #### 2  
4321-2 ####  
[facility]  
CLIA [id number]  
[address] [place] [facility]   
   
                                                    Sodium   
[Moles/Vol]     138 mmol/L      Normal          136-144         [facility]  
   
                                        Comment on above:   Order Comment: Speci  
men Type: BLOOD SPECIMEN  
Ordering Facility: [facility]  
Address: [address] [place] [address]   
   
                                                            Performed By: #### 2  
4321-2 ####  
[facility]  
CLIA [id number]  
[address] [place] [facility]   
   
                                                    Urea nitrogen   
[Mass/Vol]      14 mg/dL        Normal          7-21            [facility]  
   
                                        Comment on above:   Order Comment: Speci  
men Type: BLOOD SPECIMEN  
Ordering Facility: [facility]  
Address: [address] [place] [address]   
   
                                                            Performed By: #### 2  
4321-2 ####  
[facility]  
CLIA [id number]  
[address] [place] [address] OF [facility]   
   
                                                    CASE MANAGEMon 2022   
   
                                        CASE MANAGEM        HNO ID: [id number]  
Author: Bev Steele RN  
Service: Nursing  
Author Type: Registered   
Nurse  
Type: Care Mgt Progress Note  
Filed: 3/22/2022 2:48 PM  
Note Text:  
CARE MANAGEMENT PROGRESS   
NOTE  
SERVICE DATE: 3/22/2022  
SERVICE TIME: 2:47 PM  
LOS: 1 day  
Made multiple attempts to   
complete initial assessment,   
patient on the  
phone each time; gave   
patient my contact   
information to reach out to   
me  
when available to talk  
SIGNATURE: Bev Steele RN   
PATIENT NAME: Jayden Soto  
DATE: 2022 MRN:   
[id number]  
TIME: 2:47 PM PAGER/CONTACT   
#: [id number]       Normal                                  [facility]  
   
                                                    CBC W Auto Differential pane  
l (Bld)on 2022   
   
                                                    Basophils (Bld)   
[#/Vol]         0.07 10*3/uL    Normal          <0.11           [facility]  
   
                                        Comment on above:   Order Comment: Speci  
men Type: BLOOD SPECIMENOrdering Facility:  
   
[facility] Address: [address] [place] [address]   
   
                                                            Performed By: #### 5  
7021-8 ####[facility]   
[id number] [address] [place]   
   
                                                    Basophils/100 WBC   
(Bld)           0.6 %           Normal                          [facility]  
   
                                        Comment on above:   Order Comment: Speci  
men Type: BLOOD SPECIMENOrdering Facility:  
   
[facility] Address: [address] [place] [address]   
   
                                                            Performed By: #### 5  
7021-8 ####[facility]   
[id number] [address] [place]   
   
                                                    Differential cell   
count method Nom   
(Bld)           Auto            Normal                          [facility]  
   
                                        Comment on above:   Order Comment: Speci  
men Type: BLOOD SPECIMENOrdering Facility:  
   
[facility] Address: [address] [place] [address]   
   
                                                            Performed By: #### 5  
7021-8 ####[facility]   
[id number] [place] [address] [place]   
   
                                                    Eosinophils (Bld)   
[#/Vol]         0.31 10*3/uL    Normal          <0.46           [facility]  
   
                                        Comment on above:   Order Comment: Speci  
men Type: BLOOD SPECIMENOrdering Facility:  
   
[facility] Address: [address] [place] [address]   
   
                                                            Performed By: #### 5  
7021-8 ####[facility]   
[id number] [address] [place]   
[place]   
   
                                                    Eosinophils/100   
WBC (Bld)       2.6 %           Normal                          [facility]  
   
                                        Comment on above:   Order Comment: Speci  
men Type: BLOOD SPECIMENOrdering Facility:  
   
[facility] Address: [address] [place] [address]   
   
                                                            Performed By: #### 5  
7021-8 ####[facility]   
[id number] [address]   
   
                                                    Erythrocyte   
distribution   
width (RBC)   
[Ratio]         14.1 %          Normal          11.5-15.0       [facility]  
   
                                        Comment on above:   Order Comment: Speci  
men Type: BLOOD SPECIMENOrdering Facility:  
   
[facility] Address: [address] [place] [address]   
   
                                                            Performed By: #### 5  
7021-8 ####[facility] LAB[facility]ORYCLIA   
[id number] [address] OF   
[facility]   
   
                                                    Hematocrit (Bld)   
[Volume fraction] 36.3 %          Normal          36.0-46.0       [facility]  
   
                                        Comment on above:   Order Comment: Speci  
men Type: BLOOD SPECIMENOrdering Facility:  
  
[facility] Address: [address] [place] [address]   
   
                                                            Performed By: #### 5  
7021-8 ####[facility] LABORATORYCLIA   
[id number] [address] OF   
[facility]   
   
                                                    Hemoglobin (Bld)   
[Mass/Vol]      11.3 g/dL       Low             11.5-15.5       [facility]  
   
                                        Comment on above:   Order Comment: Speci  
men Type: BLOOD SPECIMENOrdering Facility:  
   
[facility] Address: [address] [place] [address]   
   
                                                            Performed By: #### 5  
7021-8 ####[facility] LAB[facility]ATORYCLIA   
[id number] [address]   
   
                      IMMATURE GRAN % 1.3 %      Normal                [facility]  
   
                                        Comment on above:   Order Comment: Speci  
men Type: BLOOD SPECIMENOrdering Facility:  
   
[facility] Address: [address] [place] [address]   
   
                                                            Performed By: #### 5  
7021-8 ####[facility] LABORATORYCLIA   
[id number] [address]   
   
                      IMMATURE GRAN ABS 0.16 k/uL  High       <0.10      [facility]  
   
                                        Comment on above:   Order Comment: Speci  
men Type: BLOOD SPECIMENOrdering Facility:  
   
[facility] Address: [address] [place] [address]   
   
                                                            Performed By: #### 5  
7021-8 ####[facility] LABORATORYCLIA   
[id number] [address]   
   
                                                    Lymphocytes (Bld)   
[#/Vol]         3.27 10*3/uL    Normal          1.00-4.00       [facility]  
   
                                        Comment on above:   Order Comment: Speci  
men Type: BLOOD SPECIMENOrdering Facility:  
   
[facility] Address: [address] [place] [address]   
   
                                                            Performed By: #### 5  
7021-8 ####[facility] LABORATORYCLIA   
[id number] [address]   
   
                                                    Lymphocytes/100   
WBC (Bld)       27.2 %          Normal                          [facility]  
   
                                        Comment on above:   Order Comment: Speci  
men Type: BLOOD SPECIMENOrdering Facility:  
   
[facility] Address: [address] [place] [address]   
   
                                                            Performed By: #### 5  
7021-8 ####[facility] LABORATORYCLIA   
[id number] [address]   
   
                                                    MCH (RBC)   
[Entitic mass]  27.6 pg         Normal          26.0-34.0       [facility]  
   
                                        Comment on above:   Order Comment: Speci  
men Type: BLOOD SPECIMENOrdering Facility:  
  
[facility] Address: [address] [place] [address]   
   
                                                            Performed By: #### 5  
7021-8 ####[facility] LABORATORYCLIA   
[id number] [address]   
   
                                                    MCHC (RBC)   
[Mass/Vol]      31.1 g/dL       Normal          30.5-36.0       [facility]  
   
                                        Comment on above:   Order Comment: Speci  
men Type: BLOOD SPECIMENOrdering Facility:  
   
[facility] Address: [address] [place] [address]   
   
                                                            Performed By: #### 5  
7021-8 ####[facility] LABORATORYCLIA   
[id number] [address]   
   
                                                    MCV (RBC)   
[Entitic vol]   88.8 fL         Normal          80.0-100.0      [facility]  
   
                                        Comment on above:   Order Comment: Speci  
men Type: BLOOD SPECIMENOrdering Facility:  
  
[facility] Address: [address] [place] [address]   
   
                                                            Performed By: #### 5  
7021-8 ####[facility]ATORYCL[facility]   
[id number] [address] [place]   
[place]   
   
                                                    Monocytes (Bld)   
[#/Vol]         0.82 10*3/uL    Normal          <0.87           [facility]  
   
                                        Comment on above:   Order Comment: Speci  
men Type: BLOOD SPECIMENOrdering Facility:  
   
[facility] Address: [address] [place] [address]   
   
                                                            Performed By: #### 5  
7021-8 ####[facility] LAB[facility]ORYCL[facility]   
[id number] [address]   
[place]   
   
                                                    Monocytes/100 WBC   
(Bld)           6.8 %           Normal                          [facility]  
   
                                        Comment on above:   Order Comment: Speci  
men Type: BLOOD SPECIMENOrdering Facility:  
   
[facility] Address: [address] [place] [address]   
   
                                                            Performed By: #### 5  
7021-8 ####[facility] LABORATORYCLIA   
[id number] [place] [address] [place]   
[place]   
   
                                                    Neutrophils (Bld)   
[#/Vol]         7.38 10*3/uL    Normal          1.45-7.50       [facility]  
   
                                        Comment on above:   Order Comment: Speci  
men Type: BLOOD SPECIMENOrdering Facility:  
   
[facility] Address: [address] [place] [address]   
   
                                                            Performed By: #### 5  
7021-8 ####[facility]CL[facility]   
[id number] [address] [place]   
[place]   
   
                                                    Neutrophils/100   
WBC (Bld)       61.5 %          Normal                          [facility]  
   
                                        Comment on above:   Order Comment: Speci  
men Type: BLOOD SPECIMENOrdering Facility:  
   
[facility] Address: [address] [place] [address]   
   
                                                            Performed By: #### 5  
7021-8 ####[facility]ORYCL[facility]   
[id number] [place] [address] [place]   
[place]   
   
                                                    Nucleated RBC   
(Bld) [#/Vol]   10*3/uL         Normal          <0.01           [facility]  
   
                                        Comment on above:   Order Comment: Speci  
men Type: BLOOD SPECIMENOrdering Facility:  
   
[facility] Address: [address] [address]   
   
                                                            Performed By: #### 5  
7021-8 ####[facility] LABORATORYCLIA   
[id number] [place] [address] [place]   
[place]   
   
                                                    Nucleated RBC/100   
WBC (Bld) [Ratio] 0.0 /100 WBC    Normal                          [facility]  
   
                                        Comment on above:   Order Comment: Speci  
men Type: BLOOD SPECIMENOrdering Facility:  
   
[facility] Address: [address] [place] [address]   
   
                                                            Performed By: #### 5  
7021-8 ####[facility] LABORATORYCLIA   
[id number] [place] [address] [place]   
[place]   
   
                                                    Platelet mean   
volume (Bld)   
[Entitic vol]   9.5 fL          Normal          9.0-12.7        [facility]  
   
                                        Comment on above:   Order Comment: Speci  
men Type: BLOOD SPECIMENOrdering Facility:  
  
[facility] Address: [address] [place], [place] [id number]   
   
                                                            Performed By: #### 5  
7021-8 ####[facility] LABORATORYCLIA   
[id number] [place] [address] [place]   
[place]   
   
                                                    Platelets (Bld)   
[#/Vol]         478 10*3/uL     High            150-400         [facility]  
   
                                        Comment on above:   Order Comment: Speci  
men Type: BLOOD SPECIMENOrdering Facility:  
   
[facility] Address: [address]   
   
                                                            Performed By: #### 5  
7021-8 ####[facility] LABORATORYCLIA   
[id number] [place] [address] [place]   
[place]   
   
                      RBC (Bld) [#/Vol] 4.09 10*6/uL Normal     3.90-5.20  [facility]  
   
                                        Comment on above:   Order Comment: Speci  
men Type: BLOOD SPECIMENOrdering Facility:  
   
[facility] Address: [address] [place], [place] [id number]   
   
                                                            Performed By: #### 5  
7021-8 ####[facility] LABORATORYCLIA   
[id number] [address]   
   
                      WBC (Bld) [#/Vol] 12.01 10*3/uL High       3.70-11.00 [facility]  
   
                                        Comment on above:   Order Comment: Speci  
men Type: BLOOD SPECIMENOrdering Facility:  
   
[facility] Address: [address] [place] [address]   
   
                                                            Performed By: #### 5  
7021-8 ####[facility] LABORATORYCLIA   
[id number] [address]   
   
                                                    CONFIRM BLOOD TYPEon   
022   
   
                      ABO        A          Normal                [facility]  
   
                                        Comment on above:   Order Comment: Speci  
men Type: BLOOD SPECIMEN  
Ordering Facility: [facility]  
Address: [address] [place] [address]   
   
                                                            Performed By: #### C  
ONABO ####  
[facility]  
CLIA [id number]  
[address]   
   
                      Rh Nom (Bld) Positive   Normal                [facility]  
   
                                        Comment on above:   Order Comment: Speci  
men Type: BLOOD SPECIMEN  
Ordering Facility: [facility]  
Address: [address] [place] [address]   
   
                                                            Performed By: #### C  
ONABO ####  
[facility]  
CLIA [id number]  
[address]   
   
                                                    CONSULTon 2022   
   
                                        CONSULT             HNO ID: [id number]  
Author: Americo Parisi MD  
Service: Infectious Disease  
Author Type: Resident  
Type: Consults  
Filed: 3/22/2022 12:34 PM  
Note Text:  
----------------------------  
----------------------------  
------------------------  
Attestation signed by   
Alvin So MD at   
3/22/2022 6:51 PM  
I personally saw and   
evaluated the patient. I   
reviewed the resident's   
note. I  
agree with the resident's   
assessment and plan unless   
otherwise noted.  
61 year old female with   
diabetes mellitus, a history   
of left knee replacement,  
disseminated rt foot wound   
infection leading to lumbar   
spinal infection and  
left knee seeding in Dec   
2020, left prosthetic knee   
washout in 2021, s/p  
iv abx followed by oral doxy   
and rifampin for 6 months,   
currently on Doxy for  
chr suppression, presented   
to [facility] 3/21/2022 for   
further treatment of  
prosthetic joint infection.   
Symptomatic with worsening   
left knee pain along  
with swelling, started in   
2021 after twisting   
the knee while walking.  
Also symptomatic with chills   
and hyperglycemia. Was seen   
in the Ortho clinic  
on 3/17, a diagnostic   
arthrocentesis of left knee   
revealed 15 cc of cloudy and  
bloody synovial fluid. Fluid   
,000, nucleated cells   
17,472, 97%  
neutrophils. CRP 1.9. WBC   
count 12.0.  
Exam: Left knee slightly   
swollen, incision wound   
well-healed, no erythema,  
painful on flexion. No   
murmur audible.  
Cultures: No culture   
available from left knee   
arthrocentesis (misplaced).  
Assessment and plan:  
1. Left prosthetic knee pain   
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   
PJI  
2. Chills, no recorded   
fevers, no significant   
leukocytosis  
3. Hyperglycemia  
4. 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   
staphylococcal infection in   
2020, history of  
lumbar spinal infection,   
history of left knee   
prosthetic joint   
infection-s/p  
joint washout in 2021  
Given significantly elevated   
nucleated cell count from   
left knee  
arthrocentesis, the clinical   
picture is concerning for   
recurrence of prosthetic  
joint infection.  
Recommendations:  
-To hold off on empiric   
antimicrobial therapy until   
cultures are collected in  
the OR  
-To obtain medical records   
from [facility]  
-To check hemoglobin A1c  
Alvin So MD  
Infectious Disease  
Respiratory [place]  
Pager: [phone]  
----------------------------  
----------------------------  
------------------------  
INITIAL CONSULT INFECTIOUS   
DISEASE  
SERVICE DATE: 3/22/2022  
SERVICE TIME: 10:11 AM  
We were asked to evaluate   
Ms. Jayden Soto, a 61 year   
old yo female by  
Dr. Mitchell for prosthetic   
joint infection. Our   
findings and  
recommendations will be   
communicated through the   
shared medical record.  
ASSESSMENT:  
Patient is a 60 yo female   
with with medical history of   
left total knee  
replacement with a   
prosthetic knee joint who is   
coming in for concern for  
septic knee joint.  
Estimated Creatinine   
Clearance: 111.1 mL/min   
(based on SCr of 0.67   
mg/dL).  
RECOMMENDATIONS:  
Left prosthetic knee joint   
infection  
-Patient will have removal   
of prosthetic tomorrow and   
will have another  
aspiration of left knee.   
Will follow-up with results   
of left knee  
aspiration.  
-Will start patient on   
antibiotics post procedure   
to cover staph and MRSA.  
-Patient has been on   
Doxycycline since septic   
washout in 2021. She  
was on rifampin for MRSA   
coverage until 2022. Will request  
outside chart from [facility] for more   
information.  
-Previous aspiration   
completed on  showed   
WBC greater than 24899  
which likely looks   
consistent with a septic   
knee joint.  
Type 2 Diabetes Mellitus  
-Hemoglobin A1c unknown.  
-Patient is on Lantus and   
Jardiance for diabetes.  
-Affecting wound healing and   
increasing risk for wound   
infection.  
-History of diabetic foot   
ulcers.  
-Encourage blood sugar   
control.  
Peripheral Vascular Disease  
-Currently on Aspirin and   
Plavix.  
-PVD affecting wound   
healing.  
Subjective  
SUBJECTIVE:  
HPI:  
61 year old female with   
medical history of left   
total knee replacement  
with a left periprosthetic   
joint presented to [facility]   
3/21/2022 for left knee  
joint infection. Patient saw   
Dr. Mitchell on  and was   
complaining of  
left knee pain and left knee   
swelling. She was also   
complaining of feeling  
feverish. Tmax was 100.0 F.   
Patient had been taking   
tylenol for pain.  
Patient had a septic washout   
in 2021. In the office   
knee aspiration  
was completed and results   
showed 90712 cells   
consistent with a septic   
left  
knee infection. Patient says   
she has been on Doxycycline   
100 mg BID and  
she was off Rifampin but it   
was discontinued in   
September.  
Active Antimicrobials (From   
admission, onward)  
None  
Immunosuppressants:  
None  
Current other medications   
reviewed.  
Current   
Facility-Administered   
Medications  
Medication Dose Route   
Frequency  
- lactated ringers iv   
infusion 125 mL/hr   
INTRAVENOUS CONTINUOUS  
- ondansetron (PF) 4 mg   
injection (ZOFRAN) 4 mg   
INTRAVENOUS q 6 H PRN  
- NaCl 0.9% iv flush bag 20   
mL INTRAVENOUS PRN  
- sodium chloride 0.9 %   
(flush) 3-5 m (more content   
not included)...    Normal                                  [facility]  
   
                                        CONSULT             HNO ID: [id number]  
Author: Muna Hughes MD  
Service: Hospital Medicine  
Author Type: Physician  
Type: Consults  
Filed: 3/22/2022 1:29 PM  
Note Text:  
Hospital Medicine  
Consult  
Patient Name: Jayden Soto  
MRN: [id number]  
Admission Date: 3/21/2022  
Reason For Admission:   
Medical Management, DM2  
IMPRESSION AND PLAN:  
Active Problems:  
Septic joint of left knee   
joint (HCC)  
Obesity, Class II, BMI   
35-39.9  
Controlled type 2 diabetes   
mellitus without   
complication, with long-term  
current use of insulin (HCC)  
Primary hypertension  
Resolved Problems:  
* No resolved hospital   
problems. *  
Active Hospital Problems  
Diagnosis  
- Septic joint of left knee   
joint (HCC)  
1. Insulin-dependent   
diabetes mellitus type 2   
with mild hyperglycemia  
since patient has not been   
resumed on her home   
medication, will resume  
Lantus today at 25 units   
since patient is going to be   
n.p.o. after  
midnight, her home dose is   
40 unit and that will be   
resumed after her  
surgery, regarding her   
Jardiance will hold the dose   
today and tomorrow and  
resume it on Thursday,   
meanwhile we will continue   
with insulin coverage as  
needed for hyperglycemia.  
2. Hypertension resumed on   
her home medication.  
3. Coronary artery disease   
status post stent placement   
status post  
cardiac arrest a year ago in   
2021 was on Plavix and   
aspirin, also  
has open heart surgery,   
recommend cardiology   
evaluation before her  
surgical intervention due to   
her extensive heart disease.  
4. Septic joint of left knee   
plan by primary service.  
============================  
============================  
===========  
HPI:  
This is a 61-year-old female   
with known insulin-dependent   
diabetes  
mellitus type 2,   
hypertension, coronary   
artery disease status post   
CABG  
and stent placement,   
COVID-19 infection in   
2021, who is being  
admitted to the hospital   
secondary to having left   
knee pain patient had  
left knee total replacement   
and prosthetic knee joint   
but had a fall in  
2021 after the knee   
gave out on her and since   
then she has been  
having increased pain in it   
and was found recently to   
have septic knee  
joint was admitted to the   
hospital for surgical   
intervention, asked to  
evaluate the patient for   
medical management, patient   
is known to have  
diabetes mellitus type 2 on   
insulin Lantus 40 units at   
bedtime with  
Jardiance at home and said   
her glucose has been okay,   
known to have  
coronary artery disease   
status post CABG x3 vessels   
years ago and stent  
placement in 2021   
after having a cardiac   
arrest, patient has been  
doing okay at home denies   
any fever, chills, chest   
pain no nausea no  
vomiting, her ability to   
ambulate has decreased over   
time due to the left  
knee issues. Patient   
previous work-up was done at   
[facility] in  
[place]. Patient denied any   
active alcohol tobacco use,   
she lives at home,  
positive for heart disease   
in the family.  
============================  
============================  
===========  
PERTINENT ROS: No fever,   
chills , night sweats, no   
chest pain, off oxygen,  
no ankle edema, no abd pain,   
no dysuria  
All other systems were   
reviewed and negative except   
for HPI  
============================  
============================  
===========  
Temp Av.7 ?C (98.1 ?F)   
Min: 36.6 ?C (97.9 ?F) Max:   
36.8 ?C (98.2  
?F)  
Pulse Av.7 Min: 66 Max:   
72  
No data recorded  
Cuff BP Min: 135/65 Max:   
147/65  
Pain Level: 5  
============================  
============================  
===========  
No past medical history on   
file.  
No past surgical history on   
file.  
No family history on file.  
Social History  
Tobacco Use  
- Smoking status: Not on   
file  
- Smokeless tobacco: Not on   
file  
Substance Use Topics  
- Alcohol use: Not on file  
- Drug use: Not on file  
ALLERGIES  
Allergen Reactions  
- Morphine GI Upset  
- Statins-Hmg-Coa Red*   
Unknown  
============================  
============================  
===========  
MEDICATIONS:  
doxepin capsule 10 mg Take   
20 mg by mouth daily at   
bedtime.  
BISACODYL ORAL Take 10 mg by   
mouth once daily.  
melatonin 10 mg cap Take 1   
capsule by mouth daily at   
bedtime.  
aspirin 81 mg cap Take 1   
capsule by mouth once daily.  
insulin aspart U-100   
(NOVOLOG) 100 unit/mL Inject   
13 Units subcutaneously  
three times daily before   
meals.  
alirocumab (PRALUENT) 75   
mg/mL pen Inject 75 mg   
subcutaneously every other  
week.  
DULoxetine (CYMBALTA) 60 mg   
capsule Take 60 mg by mouth   
twice daily.  
ranolazine SR (RANEXA) 1,000   
mg tab ER 12 hr Take 1   
tablet by mouth twice  
daily.  
dilTIAZem CD (CARDIZEM CD)   
180 mg 24 hr capsule Take   
180 mg by mouth once  
daily.  
doxycycline monohydrate   
(MONODOX) 100 mg capsule   
Take 100 mg by mouth  
twice daily.  
GABAPENTIN ORAL Take 200 mg   
by mouth three times daily.  
METOPROLOL TARTRATE ORAL   
Take 25 mg by mouth twice   
daily.  
isosorbide mononitrate ER   
(IMDUR) 30 mg 24 hr tablet   
Take 90 mg by mouth  
once daily.  
insulin   
glargine,hum.rec.anlog   
(LANTUS SUBCUTANEOUS) Inject   
40 Units  
subcutaneously daily at   
bedtime.  
clopidogrel (more content   
not included)...    Normal                                  [facility]  
   
                                                    CRP SerPl-mCncon 2022   
   
                      CRP [Mass/Vol] 1.9 mg/dL  High       <0.9       [facility]  
   
                                        Comment on above:   Order Comment: Speci  
men Type: BLOOD SPECIMENOrdering Facility:  
   
[facility] Address: [address] [place]   
[address]   
   
                                                            Performed By: #### 1  
988-5 ####[facility]   
[id number] [place] [address] [place]   
   
                                                    HISTORY PHYSICALon    
   
                                        HISTORY PHYSICAL    HNO ID: [id number]  
Author: Washington Skinner MD  
Service: Orthopaedic Surgery  
Author Type: Resident  
Type: HANDP  
Filed: 3/21/2022 10:53 PM  
Note Text:  
----------------------------  
----------------------------  
------------------------  
Attestation signed by Basil Mitchell MD at 3/22/2022 9:47   
AM  
Plan for explant infected   
partial knee and insertion   
antibiotic spacer on 3/22.  
----------------------------  
----------------------------  
------------------------  
Orthopaedic Surgery History   
and Physical  
Chief Complaint: Left septic   
Knee  
Admitting Physician: Dr. Mitchell  
Date: 2022  
Time: 10:43 PM  
History of Present Illness  
Jayden Soto is a 61 year   
old female who presents as a   
direct admit from  
Dr. Evans's office. Patient   
has history of left total   
knee replacement  
along with a left   
periprosthetic joint   
infection with previous   
washout.  
She presented to Dr. Evans's   
office on 3/17 complaining   
of pain and  
swelling in the left knee.   
Patient also complaining of   
low-grade fevers.  
At that time a knee   
aspiration occurred results   
demonstrating a likely  
septic left total knee with   
17,000 cells. Patient was   
instructed to come  
into the hospital for a   
further work-up and plan for   
surgery. She denies  
any new complaints answer   
office visit on .   
Denies any new falls.  
Review of Systems  
A 10-point review of systems   
was completed and is   
otherwise  
non-contributory to the   
patient's presenting   
condition.  
History  
No past medical history on   
file.  
No past surgical history on   
file.  
COVID-19 VACCINE(1) Never   
done  
DEPRESSION SCREENING Never   
done  
HEPATITIS C SCREENING Never   
done  
HIV SCREENING Never done  
DTAP,TDAP,TD(1 - Tdap) Never   
done  
PAP TESTING Never done  
HPV TESTING Never done  
MAMMOGRAM Never done  
LIPID SCREEN Never done  
DIABETES SCREEN Never done  
COLORECTAL CANCER SCREENING   
Never done  
SHINGRIX VACCINE(1 of 2)   
Never done  
INFLUENZA(1) Never done  
MENINGOCOCCAL CONJUGATE Aged   
Out  
A review of the patient's   
history was completed and is   
otherwise  
non-contributory to the   
patient's presenting   
condition.  
Medications  
doxepin capsule 10 mg Take   
20 mg by mouth daily at   
bedtime.  
BISACODYL ORAL Take 10 mg by   
mouth once daily.  
melatonin 10 mg cap Take 1   
capsule by mouth daily at   
bedtime.  
aspirin 81 mg cap Take 1   
capsule by mouth once daily.  
insulin aspart U-100   
(NOVOLOG) 100 unit/mL Inject   
13 Units subcutaneously  
three times daily before   
meals.  
alirocumab (PRALUENT) 75   
mg/mL pen Inject 75 mg   
subcutaneously every other  
week.  
DULoxetine (CYMBALTA) 60 mg   
capsule Take 60 mg by mouth   
twice daily.  
ranolazine SR (RANEXA) 1,000   
mg tab ER 12 hr Take 1   
tablet by mouth twice  
daily.  
dilTIAZem CD (CARDIZEM CD)   
180 mg 24 hr capsule Take   
180 mg by mouth once  
daily.  
doxycycline monohydrate   
(MONODOX) 100 mg capsule   
Take 100 mg by mouth  
twice daily.  
GABAPENTIN ORAL Take 200 mg   
by mouth three times daily.  
METOPROLOL TARTRATE ORAL   
Take 25 mg by mouth twice   
daily.  
isosorbide mononitrate ER   
(IMDUR) 30 mg 24 hr tablet   
Take 90 mg by mouth  
once daily.  
insulin   
glargine,hum.rec.anlog   
(LANTUS SUBCUTANEOUS) Inject   
40 Units  
subcutaneously daily at   
bedtime.  
clopidogrel (PLAVIX) 75 mg   
tablet Take 75 mg by mouth   
once daily.  
empagliflozin (JARDIANCE) 25   
mg tablet Take 25 mg by   
mouth daily with  
breakfast.  
Allergies  
Morphine and Statins-Hmg-Coa   
Reductase Inhibitors  
Family History  
No family history on file.  
Social History  
Employer And Job Title: None   
on file  
Years Of Education   
Completed: Not specified  
Marital Status: Single  
Social History  
Tobacco Use  
- Smoking status: Not on   
file  
- Smokeless tobacco: Not on   
file  
Substance Use Topics  
- Alcohol use: Not on file  
- Drug use: Not on file  
Physical Examination  
Vitals  
/65   Pulse 66   Temp   
36.8 ?C (98.2 ?F) (Oral)     
Resp 18   Ht  
170.2 cm (5' 7 )   Wt 104.7   
kg (230 lb 13.2 oz)   SpO2   
100%   BMI 36.15  
kg/m?  
General  
Alert and oriented. NAD.  
Skin  
No rashes or lesions.   
Appropriate skin turgor.  
Cardiovascular  
RRR. Peripheral pulses   
symmetric.  
Pulmonary  
Non-labored breathing on RA.   
Symmetric chest expansion.  
GI/Abdomen  
Abdomen soft, non-tender,   
and non-distended.  
Neuro  
CN II-XII grossly intact.  
Psych  
Appropriate mood and affect.  
Left Lower Extremity  
Obvious swelling of the left   
knee. With associated   
erythema. No open  
wounds.  
Patient is tender to   
palpation over the anterior   
knee joint.  
Pain with knee motion  
Previous knee incision   
appears well-healed no   
evidence of wound  
dehiscence.  
Sensation diminished at   
baseline due to peripheral   
neuropathy. No changes  
from baseline  
Motor intact: EHL/DF/PF  
BCR all digits  
DP pulse palpable  
Components of the patient's   
physical examination not   
noted above were not  
contributory to the present   
assessment.  
Labs  
No results for input(s): NA,   
K, CHLOR, CO2, BUN, CREAT,   
GLUC, ANION, CA,  
MG, P, ALB, AST, ALT,   
ALKPHOS, TBILI, DBILI,   
PHOSINTL, WBC, HB, HCT, PLT,  
LACT, INR, PH, PCO2, PO   
(more content not   
included)...        Normal                                  [facility]  
   
                                                    NURSING PROGon 2022   
   
                                        NURSING PROG        HNO ID: [id number]  
Author: Doris Rodriguez RN  
Service: ?  
Author Type: Registered   
Nurse  
Type: Nursing Progress Note  
Filed: 3/22/2022 12:10 PM  
Note Text:  
Patient family at bed side ,   
wanting to speak with doctor   
about plan after  
surgery.Contacted [phone] .   
Informed patient and family   
someone will be in .  
Patient having pain in left   
knee, repositioned knee. Bed   
in low position  
call light with in reach Normal                                  [facility]  
   
                                                    PT panel Coag (PPP)on 2022   
   
                                                    INR Coag (PPP)   
[Relative time] 1.0 {INR}       Normal          0.9-1.3         [facility]  
   
                                        Comment on above:   Order Comment: Speci  
men Type: BLOOD SPECIMEN  
Ordering Facility: [facility]  
Address: [address] [place] [address]   
   
                                                            Result Comment: Kyleigh  
min K Antagonist (VKA) Therapeutic Range: INR 2   
to 3 (Target INR of 2.5)  
Note: For patients treated with VKA drugs, such as warfarin, the   
American College of Chest Physicians 2012 Guideline recommends a   
therapeutic INR range of 2 to 3 (target INR of 2.5). This   
recommendation includes high-risk patients with antiphospholipid   
syndrome with previous arterial or venous thromboembolism,   
current-generation mechanical or bioprosthetic aortic heart valve   
replacement.  
Note: Patients with mechanical aortic valve replacement and   
additional risk factors for thromboembolic events (atrial   
fibrillation, previous thromboembolism, LV dysfunction,   
hypercoagulable conditions) or an older generation mechanical AVR   
(i.e., ball in-Cage) or any mechanical MVR should have a INR   
therapeutic range of 2.5 to 3.5 (target INR of 3).  
Werner CALZADA, et al. Chest 2012, 141:7S-47S  
Balwinder RA, et al. [facility] 2017, 70: 252-289   
   
                                                            Performed By: #### 2  
4321-2 ####  
[facility] LABORATORY  
CLIA [id number]  
[address] [place] [address] [place]   
   
                                                    PT Coag (PPP)   
[Time]          10.6 s          Normal          9.7-13.0        [facility]  
   
                                        Comment on above:   Order Comment: Speci  
men Type: BLOOD SPECIMEN  
Ordering Facility: [facility]  
Address: [address] [place] [address]   
   
                                                            Performed By: #### 2  
4321-2 ####  
[facility] LABORATORY  
CLIA [id number]  
[address] [place] [address] [place]   
   
                                                    SARS-CoV-2 RNA Resp Ql RIDDHI+p  
robeon 2022   
   
                                                    SARS-CoV-2   
(COVID-19) RNA   
RIDDHI+probe Ql   
(Resp)                                  COVID 19 RESULT:  
SARS-CoV-2 (Agent of   
COVID-19) Not Detected by   
RT-PCR or equivalent method.  
This test has been   
authorized by FDA under an   
Emergency Use Authorization   
(EUA).              Normal                                  [facility]  
   
                                        Comment on above:   Performed By: #### 9  
4500-6 ####[facility] LABORATORYCLIA   
[id number] [address]   
   
                                                    TYPE AND SCREENon 2022  
   
   
                      ABO        A          Normal                [facility]  
   
                                        Comment on above:   Order Comment: Speci  
men Type: BLOOD SPECIMEN  
Ordering Facility: [facility]  
Address: [address] [place] [address]   
   
                                                            Performed By: #### T  
SCR ####  
[facility] BLOOD BANK  
CLIA [id number]  
[address]   
   
                                                    HISTORICAL AB SCR   
STATUS          Negative        Normal                          [facility]  
   
                                        Comment on above:   Order Comment: Speci  
men Type: BLOOD SPECIMEN  
Ordering Facility: [facility]  
Address: [address] [place] [address]   
   
                                                            Performed By: #### T  
SCR ####  
[facility] BLOOD BANK  
CLIA [id number]  
[address]   
   
                      Rh Nom (Bld) Positive   Normal                [facility]  
   
                                        Comment on above:   Order Comment: Speci  
men Type: BLOOD SPECIMEN  
Ordering Facility: [facility]  
Address: [address] [place] [address]   
   
                                                            Performed By: #### T  
SCR ####  
[facility] BLOOD BANK  
CLIA [id number]  
[address]   
   
                                                    TYPE AND SCREEN   
EXPIRATION      2022 23:59 Normal                          [facility]  
   
                                        Comment on above:   Order Comment: Speci  
men Type: BLOOD SPECIMEN  
Ordering Facility: [facility]  
Address: [address] [place] [address]   
   
                                                            Performed By: #### T  
SCR ####  
[facility] BLOOD BANK  
CLIA [id number]  
[address] [place]   
   
                                                    CNOVon 2022   
   
                                        CNOV                Office Visit (AGHWW1  
)  
----------------------------  
----------------------------  
------------------------  
JAYDEN SOTO ([id number])   
1960 F  
Date Time Provider   
Department  
3/17/22 9:45 AM BASIL MITCHELL   
AGHWW1  
During your visit today, we   
recorded the following   
information about you:  
Respiration Weight Height  
16/minute 95.3 kg 1.702 m  
Basil Mitchell MD 3/17/2022   
10:12 AM Signed  
Follow-up Patient Visit  
Jayden Soto is a 61 year   
old female who presents to   
follow up for Infection  
of total knee replacement,   
subsequent encounter   
(primary encounter   
diagnosis)  
Status post left partial   
knee replacement  
Pain in prosthetic joint,   
initial encounter ([facility])  
Worsening pain and swelling   
in the left knee joint. She   
missed a dose of her  
Doxy last week. Reports low   
grade fever not taken but   
feels warm, blood sugars  
have been high.  
Current or previous   
treatment regimens: NSAIDS  
Medications:  
Current Outpatient   
Medications  
Medication Sig  
- DULoxetine (CYMBALTA) 60   
mg capsule Take 60 mg by   
mouth once daily.  
- ranolazine SR (RANEXA)   
1,000 mg tab ER 12 hr Take   
by mouth.  
- dilTIAZem CD (CARDIZEM CD)   
180 mg 24 hr capsule Take   
180 mg by mouth once  
daily.  
- empagliflozin (JARDIANCE)   
25 mg tablet Take 25 mg by   
mouth daily with  
breakfast.  
- doxycycline monohydrate   
(MONODOX) 100 mg capsule   
Take 100 mg by mouth twice  
daily.  
- GABAPENTIN ORAL Take by   
mouth.  
- METOPROLOL TARTRATE ORAL   
Take by mouth.  
- isosorbide mononitrate ER   
(IMDUR) 30 mg 24 hr tablet   
Take 30 mg by mouth once  
daily.  
- insulin   
glargine,hum.rec.anlog   
(LANTUS SUBCUTANEOUS) Inject   
subcutaneously.  
- clopidogrel (PLAVIX) 75 mg   
tablet Take 75 mg by mouth   
once daily.  
No current   
facility-administered   
medications for this visit.  
Allergies:  
ALLERGIES  
Allergen Reactions  
- Morphine GI Upset  
- Statins-Hmg-Coa Red*   
Unknown  
Physical Examination:  
Resp 16   Ht 5' 7  (1.70m)     
Wt 210 lb (95.3kg)   BMI   
32.88 kg/(m2).  
Ortho Exam  
Seen today in wheelchair  
Moderate swelling and   
effusion  
Painful ROM  
Images: 3 views left knee   
taken today and reviewed by   
myself shows No obvious  
loosening of the prosthesis,   
interval worsening of joint   
space narrowing,  
possible Air in the   
suprapatellar space and   
effusion as well.  
Large Joint Arthro/Inj: L   
knee joint  
Informed Consent  
Consent Obtained: Verbal  
Universal Protocol  
A moment to CARE was   
completed.  
SIGN IN  
Personnel directly involved   
with the procedure wore the   
appropriate PPE.  
Patient/Surrogate   
Stated/Verified: Patient   
name, Date of birth,   
Relevant  
allergies and Intended   
procedure  
TIME OUT  
3/17/2022 10:11 AM  
The procedure site was   
prepped in the usual sterile   
fashion.  
Site: L knee joint  
Aspirate: 15 mL  
cloudy and bloody  
Outcome: Tolerated well, no   
immediate complications  
Post-injection instructions   
were reviewed with the   
patient and the patient  
voiced understanding of   
these instructions.  
SIGN OUT  
All specimen containers   
correctly labeled.  
Oxford Score: see report  
Assessment and Plan:  
1. Infection of total knee   
replacement, subsequent   
encounter - ICD9: V58.89,  
ICD10: T84.59XD, Z96.659   
(primary diagnosis)  
2. Status post left partial   
knee replacement - ICD9:   
V43.65, ICD10: Z96.652  
3. Pain in prosthetic joint,   
initial encounter ([facility]) -   
ICD9: 996.77, 338.18,  
ICD10: T84.84XA  
Suspect possible recurrent   
infection in the joint. Her   
serology labs were  
normal 2 months ago. I   
aspirated the knee today and   
sent for analysis. Will  
call the patient and   
daughter with results.   
Briefly discussed possible   
explant  
and spacer in the future.   
Any signs symptoms of sepsis   
they should go to the  
ED.  
No follow-ups on file.  
Basil Mitchell MD  
Referring Provider: SELF   
[200]  
Allergies As of Date:   
2022 Noted Allergy   
Reaction  
MORPHINE 2021 8 - GI   
Upset  
STATINS-HMG-COA REDUCTASE   
INHIBIT*2021 16 -   
Unknown  
Date Reviewed: 2022  
Reviewed by: Radha Novak MA - Fully   
Assessed  
Reason for Visit:  
Follow Up [171]  
Primary Visit   
Diagnosis:Infection of total   
knee replacement, subsequent  
encounter [T84.59XD,   
Z96.659]  
Other Visit Diagnoses:Status   
post left partial knee   
replacement [Z96.652]  
Pain in prosthetic joint,   
initial encounter ([facility])  
[T84.84XA]  
Order(s):XR KNEE POST OP 3V   
AP/LAT/MERCHANT LEFT   
[7249084] Order #:   
[id number]  
SYNOVIAL FLUID, ROUTINE   
[SQRTSYN] Order #:   
[id number].  
#:[id number]  
Large Joint Arthro/Inj: L   
knee joint [GBT231] Order #:   
[id number]  
Prescriptions as of   
2022  
- DULoxetine (CYMBALTA) 60   
mg capsule  
Take 60 mg by mouth once   
daily.  
- ranolazine SR (RANEXA)   
1,000 mg tab ER 12 hr  
Take by mouth.  
- dilTIAZem CD (CARDIZEM CD)   
180 mg 24 hr capsule  
Take 180 mg by mouth once   
daily.  
- empagliflozin (JARDIANCE)   
25 mg tablet  
Take 25 mg by mouth daily   
with breakfast.  
- doxycycline monohydrate   
(MONODOX) 100 mg capsule  
Take 100 mg by mouth twice   
daily.  
- GABAPENTIN ORAL  
Take by mouth.  
- METOPROLOL TARTRATE ORAL  
Take by mouth.  
- isosor (more content not   
included)...        Normal                                  [facility]  
   
                                                    SYNOVIAL FLUID MANUAL DIFFon  
 2022   
   
                                                    DIF TTL, SYNOVIAL   
FLUID           100 cells counted Normal                          [facility]  
   
                                        Comment on above:   Order Comment: Speci  
men Type: BLOOD SPECIMEN  
Ordering Facility: [facility]  
Address: [address] [place] [address]   
   
                                                            Performed By: #### 2  
4321-2 ####  
[facility] LABORATORY  
CLIA [id number]  
[address]   
   
                      LYMPH%, SF 3          Normal                [facility]  
   
                                        Comment on above:   Order Comment: Speci  
men Type: BLOOD SPECIMEN  
Ordering Facility: [facility]  
Address: [address] [place] [address]   
   
                                                            Performed By: #### 2  
4321-2 ####  
[facility] LABORATORY  
CLIA [id number]  
[address] [place]   
   
                      NEUT%      97         High       0-<25      [facility]  
   
                                        Comment on above:   Order Comment: Speci  
men Type: BLOOD SPECIMEN  
Ordering Facility: [facility]  
Address: [address] [place] [address]   
   
                                                            Performed By: #### 2  
4321-2 ####  
[facility] LABORATORY  
CLIA [id number]  
[address]   
   
                                                    SYNOVIAL FLUID, ROUTINEon    
   
                                                    Clarity (Unsp   
spec)           Clear           Normal          Clear           [facility]  
   
                                        Comment on above:   Order Comment: Speci  
men Type: BLOOD SPECIMEN  
Ordering Facility: [facility]  
Address: [address] [place] [address]   
   
                                                            Performed By: #### 2  
4321-2 ####  
[facility]  
CLIA [id number]  
[address]   
   
                      Color (Syn fld) Yellow     Normal     Yellow     [facility]  
   
                                        Comment on above:   Order Comment: Speci  
men Type: BLOOD SPECIMEN  
Ordering Facility: [facility]  
Address: [address] [place] [address]   
   
                                                            Performed By: #### 2  
4321-2 ####  
[facility] LABORATORY  
CLIA [id number]  
[address]   
   
                                                    RBC Manual cnt   
(Syn fld) [#/Vol] [id number] /uL      High            <2,000          [facility]  
   
                                        Comment on above:   Order Comment: Speci  
men Type: BLOOD SPECIMEN  
Ordering Facility: [facility]  
Address: [address] [place] [address]   
   
                                                            Performed By: #### 2  
4321-2 ####  
[facility] LABORATORY  
CLIA [id number]  
[address]   
   
                                                    Specimen source   
Nom (Unsp spec) KNEE LEFT, SYNOVIAL FLUID Normal                          [facility]  
   
                                        Comment on above:   Order Comment: Speci  
men Type: BLOOD SPECIMEN  
Ordering Facility: [facility]  
Address: [address] [place] [address]   
   
                                                            Performed By: #### 2  
4321-2 ####  
[facility]  
CLIA [id number]  
[address]   
   
                                                    WBC Manual cnt   
(Syn fld) [#/Vol] [id number] /uL       High            0-200           [facility]  
   
                                        Comment on above:   Order Comment: Speci  
men Type: BLOOD SPECIMEN  
Ordering Facility: [facility]  
Address: [address] [place] [address]   
   
                                                            Performed By: #### 2  
4321-2 ####  
[facility]  
CLIA [id number]  
[address]   
   
                                                    CNPNon 03-   
   
                                        CNPN                Telephone (POB1)  
----------------------------  
----------------------------  
------------------------  
JAYDEN SOTO ([id number])   
1960 F  
Date Time Provider   
Department  
3/15/22 BASIL MITCHELL POB1  
During your visit today, we   
recorded the following   
information about you:  
Brendan Altman  Ppg   
3/15/2022 1:46 PM Signed  
Nneka called for patient.   
Nneka sates that Jayden's   
left knee swelling has  
gotten significantly more   
swollen over the last 36   
hours. She said it is warm  
to the touch but not red but   
it's the size of a   
cantaloupe. Daughter in law   
is  
concerned and asking if she   
should be doing anything for   
her. Patient is  
currently elevating, resting   
and using Tylenol. Nneka   
also said she is not  
complaining of fever or   
chills but a great deal of   
left knee pain. I told  
Nneka that Dr. Mitchell is   
currently in surgery and he   
may not be able to get  
back to me before I leave   
the office at 4:30 pm today.  
Brendan Altman  [facility]  
March 15, 2022 1:45 PM  
Brendan Altman [facility]   
3/15/2022 4:25 PM Signed  
Patient scheduled to see Dr. Mitchell on 3/17/22.  
Brendan Altman [facility]  
March 15, 2022 4:25 PM  
Allergies As of Date:   
03/15/2022 Noted Allergy   
Reaction  
MORPHINE 2021 8 - GI   
Upset  
STATINS-HMG-COA REDUCTASE   
INHIBIT*2021 16 -   
Unknown  
Date Reviewed: 2022  
Reviewed by: Radha Novak MA - Fully   
Assessed  
Reason for Visit:  
Patient Question [8297] Cmt:   
Daughter in law Nneka   
called for patient  
Prescriptions as of   
03/15/2022  
- DULoxetine (CYMBALTA) 60   
mg capsule  
Take 60 mg by mouth once   
daily.  
- ranolazine SR (RANEXA)   
1,000 mg tab ER 12 hr  
Take by mouth.  
- dilTIAZem CD (CARDIZEM CD)   
180 mg 24 hr capsule  
Take 180 mg by mouth once   
daily.  
- empagliflozin (JARDIANCE)   
25 mg tablet  
Take 25 mg by mouth daily   
with breakfast.  
- doxycycline monohydrate   
(MONODOX) 100 mg capsule  
Take 100 mg by mouth twice   
daily.  
- GABAPENTIN ORAL  
Take by mouth.  
- METOPROLOL TARTRATE ORAL  
Take by mouth.  
- isosorbide mononitrate ER   
(IMDUR) 30 mg 24 hr tablet  
Take 30 mg by mouth once   
daily.  
- insulin   
glargine,hum.rec.anlog   
(LANTUS SUBCUTANEOUS)  
Inject subcutaneously.  
- clopidogrel (PLAVIX) 75 mg   
tablet  
Take 75 mg by mouth once   
daily.  
Problem List As Of Date:   
03/15/2022  
(None)  
Encounter Number: [id number]  
Encounter Status:Closed by   
BRENDAN BAXTER on 3/15/22        [facility] 2022   
   
                                        CNPN                Telephone (AGPOB1)  
----------------------------  
----------------------------  
------------------------  
JAYDEN SOTO ([id number])   
1960 F  
Date Time Provider   
Department  
22 [facility]  
During your visit today, we   
recorded the following   
information about you:  
Maco Cardona [facility]   
Ppg 2022 10:59 AM   
Signed  
Patient's daughter in law   
called, she would like to   
know if you have decided on  
the plan for patient's knee.  
She said someone was suppose   
to call them and they have   
not heard anything.  
Maco Cardona    
Ppg  
Maco Cardona [facility]   
Ppg 3/1/2022 9:48 AM   
Addendum  
Patient's daughter in law   
called again, she said that   
she has not heard from  
you and the patient and the   
family have questions for   
you.  
Patient's pain is getting   
worse also they just wanted   
you to be aware.  
Maco Cardona [facility]   
Ppg  
Allergies As of Date:   
2022 Noted Allergy   
Reaction  
MORPHINE 2021 8 - GI   
Upset  
STATINS-HMG-COA REDUCTASE   
INHIBIT*2021 16 -   
Unknown  
Date Reviewed: 2022  
Reviewed by: Radha Novak MA - Fully   
Assessed  
Reason for Visit:  
Question [1327]  
Prescriptions as of   
2022  
- DULoxetine (CYMBALTA) 60   
mg capsule  
Take 60 mg by mouth once   
daily.  
- ranolazine SR (RANEXA)   
1,000 mg tab ER 12 hr  
Take by mouth.  
- dilTIAZem CD (CARDIZEM CD)   
180 mg 24 hr capsule  
Take 180 mg by mouth once   
daily.  
- empagliflozin (JARDIANCE)   
25 mg tablet  
Take 25 mg by mouth daily   
with breakfast.  
- doxycycline monohydrate   
(MONODOX) 100 mg capsule  
Take 100 mg by mouth twice   
daily.  
- GABAPENTIN ORAL  
Take by mouth.  
- METOPROLOL TARTRATE ORAL  
Take by mouth.  
- isosorbide mononitrate ER   
(IMDUR) 30 mg 24 hr tablet  
Take 30 mg by mouth once   
daily.  
- insulin   
glargine,hum.rec.anlog   
(LANTUS SUBCUTANEOUS)  
Inject subcutaneously.  
- clopidogrel (PLAVIX) 75 mg   
tablet  
Take 75 mg by mouth once   
daily.  
Problem List As Of Date:   
2022  
(None)  
Encounter Number: [id number]  
Encounter Status:Closed by   
SUZAN  [facility]         [facility]   
   
                                        [facility]                Office Visit (AGHWG1  
)  
----------------------------  
----------------------------  
------------------------  
JAYDEN SOTO ([id number])   
1960 F  
Date Time Provider   
Department  
22 3:00 PM BASIL MITCHELL   
[facility]  
During your visit today, we   
recorded the following   
information about you:  
Respiration Weight Height  
16/minute 95.3 kg 1.702 m  
Radha Novak MA   
2022 3:07 PM Signed  
REVIEW OF SYSTEMS:  
GENERAL: Well developed,   
well nourished. No acute   
distress  
PAIN: Pain left knee  
CARDIOVASCULAR: HTN  
MSK: Positive for joint   
swelling  
SKIN: Negative for lesions,   
rash, itching, metal   
sensitivity  
NEURO: Negative for seizure,   
trauma, numbness/tingling of   
extremities.  
ENDOCRINE: Positive for   
diabetic associated symptoms  
HEMATOLOGY: Negative for   
excessive bleeding, clots,   
bleeding disorders.  
Basil Mitchell MD 2022   
3:07 PM Signed  
Follow-up Patient Visit  
Jayden Soto is a 61 year   
old female who presents to   
follow up for Pain in  
prosthetic joint, initial   
encounter ([facility]) (primary   
encounter diagnosis)  
Status post left partial   
knee replacement  
Ongoing pain in the left   
knee with swelling that   
worsens throughout the day.  
She is on chronic   
doxycycline for previous   
infection in the knee. CRP   
and ESR  
normal.  
Current or previous   
treatment regimens: NSAIDS  
Medications:  
Current Outpatient   
Medications  
Medication Sig  
- DULoxetine (CYMBALTA) 60   
mg capsule Take 60 mg by   
mouth once daily.  
- ranolazine SR (RANEXA)   
1,000 mg tab ER 12 hr Take   
by mouth.  
- dilTIAZem CD (CARDIZEM CD)   
180 mg 24 hr capsule Take   
180 mg by mouth once  
daily.  
- empagliflozin (JARDIANCE)   
25 mg tablet Take 25 mg by   
mouth daily with  
breakfast.  
- doxycycline monohydrate   
(MONODOX) 100 mg capsule   
Take 100 mg by mouth twice  
daily.  
- GABAPENTIN ORAL Take by   
mouth.  
- METOPROLOL TARTRATE ORAL   
Take by mouth.  
- isosorbide mononitrate ER   
(IMDUR) 30 mg 24 hr tablet   
Take 30 mg by mouth once  
daily.  
- insulin   
glargine,hum.rec.anlog   
(LANTUS SUBCUTANEOUS) Inject   
subcutaneously.  
- clopidogrel (PLAVIX) 75 mg   
tablet Take 75 mg by mouth   
once daily.  
No current   
facility-administered   
medications for this visit.  
Allergies:  
ALLERGIES  
Allergen Reactions  
- Morphine GI Upset  
- Statins-Hmg-Coa Red*   
Unknown  
Physical Examination:  
Resp 16   Ht 5' 7  (1.70m)     
Wt 210 lb (95.3kg)   BMI   
32.88 kg/(m2).  
Ortho Exam  
Ambulates with a walker  
Brace in place left lower   
extremity  
Incision left knee well   
healed  
Swelling noted to the left   
knee joint  
ROM 5-110  
Images: no new images today  
Procedures  
Oxford Score: see report  
Assessment and Plan:  
1. Pain in prosthetic joint,   
initial encounter (HCC) -   
ICD9: 996.77, 338.18,  
ICD10: T84.84XA (primary   
diagnosis)  
2. Status post left partial   
knee replacement - ICD9:   
V43.65, ICD10: Z96.652  
Discussed with the patient   
and her son today the   
possible plans moving   
forward  
if going to be surgical.   
Would include conversion to   
TKA versus placement of  
an articulating spacer. I   
will come up with a surgical   
plan and contact them.  
Ideally she would be off her   
Doxy so that we could obtain   
cultures  
intraoperatively.  
No follow-ups on file.  
Basil Mitchell MD  
Referring Provider: SELF   
[200]  
Allergies As of Date:   
2022 Noted Allergy   
Reaction  
MORPHINE 2021 8 - GI   
Upset  
STATINS-HMG-COA REDUCTASE   
INHIBIT*2021 16 -   
Unknown  
Date Reviewed: 2022  
Reviewed by: Radha Novak MA - Fully   
Assessed  
Reason for Visit:  
Follow Up [171]  
Primary Visit Diagnosis:Pain   
in prosthetic joint, initial   
encounter ([facility])  
[T84.84XA]  
Other Visit Diagnosis:Status   
post left partial knee   
replacement [Z96.652]  
Prescriptions as of   
2022  
- DULoxetine (CYMBALTA) 60   
mg capsule  
Take 60 mg by mouth once   
daily.  
- ranolazine SR (RANEXA)   
1,000 mg tab ER 12 hr  
Take by mouth.  
- dilTIAZem CD (CARDIZEM CD)   
180 mg 24 hr capsule  
Take 180 mg by mouth once   
daily.  
- empagliflozin (JARDIANCE)   
25 mg tablet  
Take 25 mg by mouth daily   
with breakfast.  
- doxycycline monohydrate   
(MONODOX) 100 mg capsule  
Take 100 mg by mouth twice   
daily.  
- GABAPENTIN ORAL  
Take by mouth.  
- METOPROLOL TARTRATE ORAL  
Take by mouth.  
- isosorbide mononitrate ER   
(IMDUR) 30 mg 24 hr tablet  
Take 30 mg by mouth once   
daily.  
- insulin   
glargine,hum.rec.anlog   
(LANTUS SUBCUTANEOUS)  
Inject subcutaneously.  
- clopidogrel (PLAVIX) 75 mg   
tablet  
Take 75 mg by mouth once   
daily.  
Problem List As Of Date:   
2022  
(None)  
Encounter Number: [id number]  
Encounter Status:Closed by   
BASIL MITCHELL on 22 [facility]  
   
                                                    NM BONE 3 PHASEon 2021  
   
   
                                        NM BONE 3 PHASE     * * *Final Report* *  
 *  
DATE OF EXAM: Dec 29 2021   
1:09PM  
AKN 0009 - NM BONE 3 PHASE /   
ACCESSION # [id number]  
PROCEDURE REASON: Pain in   
prosthetic joint, initial   
encounter (HCC)  
  
* * * * Physician   
Interpretation * * * *  
THREE-PHASE BONE SCAN OF   
knees (2021):  
HISTORY: Status post medial   
hemiarthroplasty 3/21. Pain   
left knee 4  
weeks..  
TECHNIQUE: 20.6 mCi 99m-Tc   
MDP IV.  
CORRELATION: No prior bone   
scan. Plain film radiographs   
of the left knee  
2021 .  
RESULT:  
There is abnormal perfusion   
seen. Left knee is   
hyperemic.  
There is abnormal blood   
pooling of the tracer seen.   
Abnormal blood pool  
tracer noted at the level of   
the left knee seen on   
anterior and posterior  
views  
Delayed images demonstrate   
increased activity noted in   
the femoral  
metaphysis distally and the   
tibial metaphysis   
proximally. Focally  
intense increased MDP uptake   
is noted involving the   
lateral aspect of the  
left knee joint posteriorly   
primarily involving the   
region of the lateral  
tibial plateau articular   
surface and the adjacent   
posterior aspect of the  
lateral femoral condyle most   
consistent with arthritic   
change.  
There is no focally   
increased activity within   
the medial femoral condyle  
or medial tibial plateau   
adjacent to the prosthesis.  
==========  
IMPRESSION:  
Abnormal three-phase bone   
scan with findings   
consistent with inflammatory  
or infectious arthropathy on   
the left primarily involving   
the lateral  
compartment femorotibial   
joint. .  
: MONSE  
Transcribe Date/Time: Dec 29   
2021 2:00P  
Dictated by : GROVER MADISON MD  
This examination was   
interpreted and the report   
reviewed and  
electronically signed by:  
GROVER MADISON MD on Dec 29   
2021 2:08PM EST  
[id number] Normal                                  [facility]  
   
                                                    No Panel Informationon    
   
                                                                  [facility]  
   
                                                    CNOVon 2021   
   
                                        CNOV                Office Visit (AGHWG1  
)  
----------------------------  
----------------------------  
------------------------  
JAYDEN SOTO ([id number])   
1960 F  
Date Time Provider   
Department  
21 2:45 PM BASIL MITCHELL AGHWG1  
During your visit today, we   
recorded the following   
information about you:  
Respiration Weight Height  
16/minute 95.3 kg 1.702 m  
Radha Riveraguzman MA   
2021 4:06 PM Signed  
REVIEW OF SYSTEMS:  
GENERAL: Well developed,   
well nourished. No acute   
distress  
PAIN: Pain left knee  
CARDIOVASCULAR: HTN, CAD  
MSK: Positive for joint   
swelling  
SKIN: Negative for lesions,   
rash, itching, metal   
sensitivity  
NEURO: Negative for seizure,   
trauma, numbness/tingling of   
extremities.  
ENDOCRINE: Positive for   
diabetic associated symptoms  
HEMATOLOGY: Negative for   
excessive bleeding, clots,   
bleeding disorders.  
Basil Mitchell MD 2021   
4:06 PM Signed  
Patient ID: Jayden Soto is   
a 61 year old female.  
CC: Consultation requested   
by Annalee Eckert DO for   
evaluation of: Patient  
presents with:  
Left Knee - New  
HPI: Jayden Soto is a 61   
year old female who presents   
with a long history of  
activity-related Left knee   
pain. The patient states she   
twisted the knee and  
has had pain medially. The   
pain is described as dull   
aching and sharp shooting  
pain and is present in   
themedial regions of the   
knee. The patient does  
require ambulatory aids. The   
patient does have difficulty   
ascending and  
descending stairs as well as   
getting out of a chair. The   
pain is to the point  
where it is adversely   
affecting activities of   
daily living.  
Previous treatment has   
consisted of left partial   
knee replacement in    
with  
a washout in March with PICC   
line. This started from a   
foot ulcer that ended  
up in her spine. This was   
washed out in   
The patient is referred for   
orthopedic evaluation and   
management.  
Pain Assessment  
The following portions of   
the patient's history were   
reviewed and updated as  
appropriate: allergies,   
current medications, past   
family history, past medical  
history, past social   
history, past surgical   
history and problem list.  
No past medical history on   
file.  
No past surgical history on   
file.  
ROS  
No family history on file.  
Social History  
Tobacco Use  
- Smoking status: Not on   
file  
- Smokeless tobacco: Not on   
file  
Substance Use Topics  
- Alcohol use: Not on file  
- Drug use: Not on file  
Current Outpatient   
Medications  
Medication Sig Dispense   
Refill  
- DULoxetine (CYMBALTA) 60   
mg capsule Take 60 mg by   
mouth once daily.  
- ranolazine SR (RANEXA)   
1,000 mg tab ER 12 hr Take   
by mouth.  
- dilTIAZem CD (CARDIZEM CD)   
180 mg 24 hr capsule Take   
180 mg by mouth once  
daily.  
- empagliflozin (JARDIANCE)   
25 mg tablet Take 25 mg by   
mouth daily with  
breakfast.  
- doxycycline monohydrate   
(MONODOX) 100 mg capsule   
Take 100 mg by mouth twice  
daily.  
- GABAPENTIN ORAL Take by   
mouth.  
- METOPROLOL TARTRATE ORAL   
Take by mouth.  
- isosorbide mononitrate ER   
(IMDUR) 30 mg 24 hr tablet   
Take 30 mg by mouth once  
daily.  
- insulin   
glargine,hum.rec.anlog   
(LANTUS SUBCUTANEOUS) Inject   
subcutaneously.  
- clopidogrel (PLAVIX) 75 mg   
tablet Take 75 mg by mouth   
once daily.  
No current   
facility-administered   
medications for this visit.  
ALLERGIES  
Allergen Reactions  
- Morphine GI Upset  
- Statins-Hmg-Coa Red*   
Unknown  
There is no problem list on   
file for this patient.  
Objective:  
On physical examination, the   
patient is a well appearing   
female in no  
respiratory distress. The   
patient is awake, alert and   
oriented to person,  
place and time. Body mass   
index is 32.89 kg/m?.  
Inspection of the bilateral   
lower extremities does not   
demonstrate color,  
temperature or trophic   
changes. There is   
satisfactory alignment and   
no  
asymmetry.  
Examination of theLeft knee   
finds mild intra-articular   
effusion. There is no  
varus deformity. The skin is   
noted to be intact. There is   
no lymphadenopathy.  
There is tenderness to   
palpation in the medial   
portion of the knee Range of  
motion is 5-100 degrees.   
There is satisfactory   
varus/valgus stability.   
There  
is no midflexion   
instability. Reynaldo test   
is negative. There is no   
calf  
tenderness. There is no   
evidence of distal   
neurovascular compromise.  
Examination of the Right   
knee finds no   
intra-articular effusion.   
There is no  
varus deformity. The skin is   
noted to be intact. There is   
no lymphadenopathy.  
There is no tenderness to   
palpation in the medial and   
lateral portion of the  
knee Range of motion is   
0-120 degrees. There is   
satisfactory varus/valgus  
stability. There is no   
midflexion instability.   
Reynaldo test is negative.  
There is no calf tenderness.   
There is no evidence of   
distal neurovascular  
compromise.  
Examination of the bilateral   
hips exhibits physiologic   
range of motion without  
difficulty, equal leg   
lengths, no pain with   
resisted hip flexion and no  
tenderness to palpation over   
greater trochanters.  
Further examination of the   
bilateral lower extremities   
finds satisfactory ankle  
dorsiflexion and plantar fle   
(more content not   
included)...        Normal                                  [facility]  
   
                                                    CNPNon 12-   
   
                                        CNPN                Telephone (AGPOB1)  
----------------------------  
----------------------------  
------------------------  
JAYDEN SOTO ([id number])   
1960 F  
Date Time Provider   
Department  
12/15/21 AG ORTH AGPOB1  
During your visit today, we   
recorded the following   
information about you:  
Roma Reagan  Ppg   
12/15/2021 10:18 AM Signed  
----- Message from Amita Crooks sent at   
12/15/2021 9:42 AM EST -----  
Regarding: Orthopedics /   
Open Knee: Pain / Previous   
Surgery By A Non-AG Provider  
Subject Line Format:   
Orthopedics / [Provider Name   
or  Open AND Body Part ] /  
[Issue]  
Patient has been identified   
by name and Date of birth   
(Y/N): Y  
Patient: Jayden Soto  
YOB: 1960  
MRN: [id number]  
Previous Provider Seen: NA  
Body Part(s) Identified: L   
KNEE  
Diagnosis/Reason For Visit:   
PAIN - INFECTED IMPLANT IN   
KNEE  
Reason for the   
call/escalation: PREVIOUS   
SURGERIES ON KNEE  
If reason for   
call/escalation is discharge   
from ED/ER or Hospital,   
which  
facility was the patient   
seen at: NA  
Was an appointment scheduled   
(Y/N): DR. PEPPER FROM [facility]   
REFERRED PATIENT  
Person calling if other than   
patient: DAUGHTER IN LAW Mamta MAGUIRE  
Return call to if other than   
patient: DAUGHTER IN LAW Mamta CRENSHAW  
Best contact number:   
[phone]  
Thank you,  
Amita Crooks  
December 15, 2021 9:42 AM  
[facility]   
12/15/2021 10:21 AM Signed  
Spoke to the patient's   
daughter-in-law, Nneka, to   
obtain a little more  
information. She states Dr. Pepper would like her to see   
a joint replacement  
specialist. In  she had   
a partial knee replacement   
and this year she had  
had to have two IANDD's to   
clean it out. Will Paula   
see for this?  
Roma Reagan  Ppg  
December 15, 2021 10:20 AM  
Allergies As of Date:   
12/15/2021  
(Not on File)  
Date Reviewed: Never   
Reviewed  
Reason for Visit:  
Future Appointment [256]  
Problem List As Of Date:   
12/15/2021  
(None)  
Encounter Number: [id number]  
Encounter Status:Closed by   
TEZ  HOLLIE, ROMA TONY on 12/15/21       Normal                                  [facility]  
   
                                                    BLOOD CULTUREon 2021   
   
                                                    Bacteria   
identified Cx Nom   
(Bld)           NO GROWTH AFTER 5 DAYS Normal                          [facility]  
   
                                        Comment on above:   Order Comment: Ruddy  
s: M   
   
                                                            Performed By: #### M  
[id number] ####[facility]   
[id number] [place], [place] [address]Bn# [phone]   
   
                                                    Ray 2021   
   
                                                    EMERGENCY   
PHYSICIAN REPORT                        This is a preliminary report   
only, as the practitioner   
review and authentication   
has not occurred.   Normal                                  [facility]  
   
                      ER                    Normal                [facility]  
   
                                                    [id number] 2021   
   
                                                    SARS-CoV-2   
(COVID-19) RNA   
RIDDHI+probe Ql   
(Unsp spec)               Negative                  Invalid   
Interpretation   
Code                      Negative                  [facility]  
   
                                        Comment on above:   Order Comment: Ruddy  
s: M   
   
                                                            Result Comment: RESU  
LTS CALLED TO SOLIS DE LA GARZA RN/EDAT [id number] 21   
BY DERRICK GILLISNegative results do not preclude SARS-CoV-2   
infection andshould not be used as the sole basis for treatment or   
otherpatient management decisions. Negative results must becombined   
with clinical observation, patient history, andepidemiological   
information.This test was performed by PCR.   
   
                                                            Performed By: #### L  
[id number] ####[facility]   
[id number] [place], [place] [address]Zq# [phone]   
   
                                                    BMPon 2021   
   
                                                    Anion gap   
[Moles/Vol]     8 mmol/L        Normal          5-16            [facility]  
   
                                        Comment on above:   Order Comment: Ruddy  
s: M   
   
                                                            Performed By: #### L  
550.97465, L500.88064, L500.02741, L550.24190   
####[facility] [id number] [place], [place]   
[address]Xb# [phone]   
   
                                                    Calcium   
[Mass/Vol]      9.3 mg/dL       Normal          8.5-10.5        [facility]  
   
                                        Comment on above:   Order Comment: Campu  
s: M   
   
                                                            Result Comment: NOTE  
 NEW NORMAL RANGE DUE TO REAGENT CHANGE   
   
                                                            Performed By: #### L  
550.60650, L500.17096, L500.70205, L550.37347   
####[facility] [id number] [place], [place]   
[address]Ve# [phone]   
   
                                                    Chloride   
[Moles/Vol]     104 mmol/L      Normal                    [facility]  
   
                                        Comment on above:   Order Comment: Campu  
s: M   
   
                                                            Performed By: #### L  
550.15947, L500.75859, L500.18646, L550.54363   
####[facility] [id number] [place], [place]   
[address]Gg# [phone]   
   
                      CO2 [Moles/Vol] 27.0 mmol/L Normal     21-32      [facility]  
   
                                        Comment on above:   Order Comment: Campu  
s: M   
   
                                                            Performed By: #### L  
550.00543, L500.70507, L500.51326, L550.25400   
####[facility] [id number] [place], [place]   
[address]Qx# [phone]   
   
                                                    Creatinine   
[Mass/Vol]      0.71 mg/dL      Normal          0.510-0.950     [facility]  
   
                                        Comment on above:   Order Comment: Campu  
s: M   
   
                                                            Result Comment: Kayla  
ents receiving either N-Acetylcysteine (NAC)   
orMetamizole prior to venipuncture, may have falsely   
depressedresults.   
   
                                                            Performed By: #### L  
550.97814, L500.52261, L500.02002, L550.82891   
####[facility] [id number] [place], [place]   
[address]Ko# [phone]   
   
                                                    Glucose   
[Mass/Vol]      247 mg/dL       High                      [facility]  
   
                                        Comment on above:   Order Comment: Campu  
s: M   
   
                                                            Result Comment: 70-1  
00- Normal Fasting; 100-125 Impaired Fasting;   
greaterthan 126 on more than one result- Diabetes. ADA   
guidelines.Results may be falsely elevated after the administration   
ofSulfapyridine.Results may be falsely depressed after the   
administration ofSulfasalazine.   
   
                                                            Performed By: #### L  
550.26250, L500.88701, L500.00278, L550.34159   
####[facility] [id number] [place], [place]   
[address]Hl# [phone]   
   
                                                    Potassium   
[Moles/Vol]     5.0 mmol/L      Normal          3.5-5.1         [facility]  
   
                                        Comment on above:   Order Comment: Campu  
s: M   
   
                                                            Result Comment: Slig  
ht Hemolysis, Result may be affected.   
   
                                                            Performed By: #### L  
550.03706, L500.83003, L500.21481, L550.14493   
####[facility] [id number] [place], [place]   
[address]Ws# [phone]   
   
                                                    Sodium   
[Moles/Vol]     139 mmol/L      Normal          136-145         [facility]  
   
                                        Comment on above:   Order Comment: Campu  
s: M   
   
                                                            Performed By: #### L  
550.85886, L500.45502, L500.18683, L550.90507   
####[facility] [id number] [place], [place]   
[address]Ry# [phone]   
   
                                                    Urea nitrogen   
[Mass/Vol]      16 mg/dL        Normal          7-26            [facility]  
   
                                        Comment on above:   Order Comment: Campu  
s: M   
   
                                                            Performed By: #### L  
550.73046, L500.03808, L500.51575, L550.20056   
####[facility] [address] [place], [place]   
[address]Ro# [phone]   
   
                                                    Urea   
nitrogen/Creatini  
ne [Mass ratio] 22 mg/mg        Normal          15-24           [facility]  
   
                                        Comment on above:   Order Comment: Campu  
s: M   
   
                                                            Performed By: #### L  
550.86501, L500.40535, L500.58336, L550.36222   
####[facility] [id number] [place] [address]   
[address]Xe# [phone]   
   
                                                    CBC W/DIFFon 2021   
   
                      BASO ABS   0.00 K/CU MM Normal     0-0.2      [facility]   
[place]  
   
                                        Comment on above:   Order Comment: Campu  
s: M   
   
                                                            Performed By: #### L  
200.22096, L200.33198 ####[address] [address]Kt# [phone]   
   
                                                    Basophils/100 WBC   
(Bld)           0.4 %           Normal          0-2             [facility]   
[place]  
   
                                        Comment on above:   Order Comment: Campu  
s: M   
   
                                                            Performed By: #### L  
200.38574, L200.99851 ####[address] [address]Lc# [phone]   
   
                      EOS ABS    0.20 K/CU MM Normal     0-0.5      [facility]   
[place]  
   
                                        Comment on above:   Order Comment: Campu  
s: M   
   
                                                            Performed By: #### L  
200.12560, L200.88291 ####[address] [address]Nv# [phone]   
   
                                                    Eosinophils/100   
WBC (Bld)       2.3 %           Normal          0-5             [facility]   
[place]  
   
                                        Comment on above:   Order Comment: Campu  
s: M   
   
                                                            Performed By: #### L  
200.11144, L200.81800 ####[address] [address]Ws# [phone]   
   
                                                    Erythrocyte   
distribution   
width (RBC)   
[Ratio]         13.4 %          Normal          11-14.5         [facility]   
[place]  
   
                                        Comment on above:   Order Comment: Campu  
s: M   
   
                                                            Performed By: #### L  
200.63347, L200.00288 ####[facility]   
[id number] [place] [address]Ae# [phone]   
   
                                                    Hematocrit (Bld)   
[Volume fraction] 34.8 %          Low             35.0-47.0       [facility]   
[place]  
   
                                        Comment on above:   Order Comment: Campu  
s: M   
   
                                                            Performed By: #### L  
200.71361, L200.22775 ####[facility]   
[id number] [place], [place] [address]Cf# [phone]   
   
                                                    Hemoglobin (Bld)   
[Mass/Vol]      11.0 g/dL       Low             11.5-15.5       [facility]   
[place]  
   
                                        Comment on above:   Order Comment: Campu  
s: M   
   
                                                            Performed By: #### L  
200.70491, L200.63104 ####[place] [address]Ph# [phone]   
   
                      IMMATR GRAN ABS 0.30 K/CU MM Normal     Less than 2 [facility]   
[place]  
   
                                        Comment on above:   Order Comment: Campu  
s: M   
   
                                                            Performed By: #### L  
200.62069, L200.07725 ####[address] [address]Uz# [phone]   
   
                      IMMATURE GRAN % 3.0 %      Normal     Less than 2 [facility]   
[place]  
   
                                        Comment on above:   Order Comment: Campu  
s: M   
   
                                                            Performed By: #### L  
200.38988, L200.93425 ####[address] [address]Vf# [phone]   
   
                      LYMPH ABS  2.30 K/CU MM Normal     0.9-4.4    [facility]   
[place]  
   
                                        Comment on above:   Order Comment: Campu  
s: M   
   
                                                            Performed By: #### L  
200.29864, L200.79967 ####[place] [address]Ph# [phone]   
   
                                                    Lymphocytes/100   
WBC (Bld)       22.1 %          Normal          20-40           [facility]   
[place]  
   
                                        Comment on above:   Order Comment: Campu  
s: M   
   
                                                            Performed By: #### L  
200.66462, L200.71348 ####[facility]   
[id number] [place] [address]Ki# [phone]   
   
                                                    MCHC (RBC)   
[Mass/Vol]      31.6 g/dL       Low             32.0-36.0       [facility]   
[place]  
   
                                        Comment on above:   Order Comment: Campu  
s: M   
   
                                                            Performed By: #### L  
200.52598, L200.47050 ####[facility]   
[id number] [place], [place] [address]Ml# [phone]   
   
                                                    MCV (RBC)   
[Entitic vol]   88.3 fL         Normal          80.0-99.0       [facility]  
   
                                        Comment on above:   Order Comment: Campu  
s: M   
   
                                                            Performed By: #### L  
200.68278, L2.75302 ####[address] [address]Ml# [phone]   
   
                      MONO ABS   0.80 K/CU MM Normal     0.1-1.1    [facility]   
[place]  
   
                                        Comment on above:   Order Comment: Campu  
s: M   
   
                                                            Performed By: #### L  
200.66895,  ####[place] [address]Ph# [phone]   
   
                                                    Monocytes/100 WBC   
(Bld)           7.2 %           Normal          2-10            [facility]  
   
                                        Comment on above:   Order Comment: Campu  
s: M   
   
                                                            Performed By: #### L  
200.18232,  ####[facility]   
[id number] [place] [address]Hx# [phone]   
   
                      NEUTROPHIL ABS 6.70 K/CU MM Normal     2.0-8.3    [facility]  
   
                                        Comment on above:   Order Comment: Campu  
s: M   
   
                                                            Performed By: #### L  
200.28986,  ####[address] [address]Hz# [phone]   
   
                                                    Neutrophils/100   
WBC (Bld)       65.0 %          Normal          45-75           [facility]  
   
                                        Comment on above:   Order Comment: Campu  
s: M   
   
                                                            Performed By: #### L  
200.33545, L210123 ####[facility]   
[id number] [place] [address]Eq# [phone]   
   
                                                    Nucleated RBC/100   
WBC (Bld) [Ratio] 0.0 %           Normal          Less than 1     [facility]  
   
                                        Comment on above:   Order Comment: Campu  
s: M   
   
                                                            Performed By: #### L  
200.70035, L268389 ####[facility] [place], [place] [address]Xi# [phone]   
   
                                                    Platelet mean   
volume (Bld)   
[Entitic vol]   9.4 fL          Normal          9.4-12.4        [facility]  
   
                                        Comment on above:   Order Comment: Campu  
s: M   
   
                                                            Performed By: #### L  
200.46418, L200.92022 ####[facility]   
[id number] [place], [place] [address]Qm# [phone]   
   
                      PLT        546 K/CU MM High       150-450    [facility]  
   
                                        Comment on above:   Order Comment: Campu  
s: M   
   
                                                            Performed By: #### L  
200.39659, L200.06996 ####[facility]   
[id number] [place], [place] [address]Lv# [phone]   
   
                      RBC        3.94 M/CU MM Normal     3.90-5.30  [facility]  
   
                                        Comment on above:   Order Comment: Campu  
s: M   
   
                                                            Performed By: #### L  
200.18669, L200.79386 ####[facility]   
[id number] [place], [place] [address]Dn# [phone]   
   
                      WBC        10.4 K/CUMM Normal     4.5-11.0   [facility]  
   
                                        Comment on above:   Order Comment: Campu  
s: M   
   
                                                            Performed By: #### L  
200.24682, L200.71451 ####[facility]   
[id number] [place], [place] [address]Po# [phone]   
   
                                                    CRPon 2021   
   
                      CRP        5.76 MG/DL Normal     LESS THAN 1 [facility]  
   
                                        Comment on above:   Order Comment: Campu  
s: M   
   
                                                            Performed By: #### L  
550.93202, L500.31820, L500.62127, L550.88251   
####[facility] [id number] [place], [place]   
[address]Mf# [phone]   
   
                                                    GFR ESTon 2021   
   
                      IF  AMER Greater than 60 Normal                [facility]  
   
                                        Comment on above:   Order Comment: Campu  
s: M   
   
                                                            Performed By: #### L  
550.19901, L500.56291, L500.31095, L550.71463   
####[facility] [id number] [place], [place]   
[address]Nq# [phone]   
   
                      IF non-AFR AMER Greater than 60 Normal                [facility]   
[place]  
   
                                        Comment on above:   Order Comment: Ruddy  
s: M   
   
                                                            Performed By: #### L  
550.55416, L500.83259, L500.05304, L550.15776   
####[facility] [id number] [place], [place]   
[address]Iy# [phone]   
   
                                                    KNEE COMP 4 OR MORE VWS LTon  
 2021   
   
                                                    KNEE COMP 4 OR   
MORE VWS LT                     Normal                          [facility]   
[place]  
   
                                                    LACTATE BLOODon 2021   
   
                      LACTATE BLOOD 1.62 MMOL/L Normal     0.40-2.00  [facility]  
   
                                        Comment on above:   Order Comment: Ruddy  
s: M   
   
                                                            Performed By: #### L  
550.38511 ####[facility]   
[id number] [place], [place] [address]Ob# [phone]   
   
                                                    PROCAL Aon 2021   
   
                      PROCAL A   0.04 NG/ML Normal     0-0.50     [facility]  
   
                                        Comment on above:   Order Comment: Ruddy  
s: TU   
   
                                                            Result Comment: PCT   
Concentration InterpretationPCT <=0.1   
NG/ML:Normal range for healthy adultsPCT >0.1 NG/ML and <0.5   
NG/ML:Systemic infection (sepsis) is possible and may   
requireantibiotic treatment, but other conditions are known   
toelevate PCT as well.PCT >0.5 NG/ML:Should ne considered at risk   
for developing severe sepsisor septic shock.PCT >2.0 NG/ML:Important   
systemic inflammatory response. Almost exclusivelyindicates episode   
of severe bacterial sepsis or septicshock.This assy uses differnt   
methodologies and produces resultssignificantly lower than the   
Vidas. It is recommended toevaluate patients for sepsis based on   
results obtained fromthe [employer] platform vs the [employer] Vidas   
platform(previous).NOTE NEW NORMAL RANGE DUE TO REAGENT CHANGE   
   
                                                            Performed By: #### L  
550.12493, L500.61374, L500.19685, L550.87775   
####[facility] [id number] [place] [address]   
[address]Ug# [phone]   
   
                                                    WSR/MODon 2021   
   
                      WSR/MOD    1 MM/HR    Normal     0-30       [facility]   
[place]  
   
                                        Comment on above:   Order Comment: Campu  
s: M   
   
                                                            Performed By: #### L  
200.10181, L200.69363 ####[place] [address]Ph# [phone]   
   
                                                    CBC W/DIFFon 2021   
   
                      BASO ABS   0.00 K/CU MM Normal     0-0.2      [facility]   
[place]  
   
                                        Comment on above:   Order Comment: Campu  
s: M   
   
                                                            Performed By: #### L  
200.45347, L200.68792 ####[address] [address]Dt# [phone]   
   
                                                    Basophils/100 WBC   
(Bld)           0.2 %           Normal          0-2             [facility]   
[place]  
   
                                        Comment on above:   Order Comment: Campu  
s: M   
   
                                                            Performed By: #### L  
200.94194, L200.06458 ####[address] [address]Ge# [phone]   
   
                      EOS ABS    0.10 K/CU MM Normal     0-0.5      [facility]   
[place]  
   
                                        Comment on above:   Order Comment: Campu  
s: M   
   
                                                            Performed By: #### L  
200.37648, L200.72769 ####[place] [address]Ph# [phone]   
   
                                                    Eosinophils/100   
WBC (Bld)       0.5 %           Normal          0-5             [facility]   
[place]  
   
                                        Comment on above:   Order Comment: Campu  
s: M   
   
                                                            Performed By: #### L  
200.88277, L200.26316 ####[facility]   
[id number] [place] [address]Hi# [phone]   
   
                                                    Erythrocyte   
distribution   
width (RBC)   
[Ratio]         14.1 %          Normal          11-14.5         [facility]   
[place]  
   
                                        Comment on above:   Order Comment: Campu  
s: M   
   
                                                            Performed By: #### L  
200.27436, L200.01077 ####[facility]   
[id number] [place], [place] [address]To# [phone]   
   
                                                    Hematocrit (Bld)   
[Volume fraction] 40.4 %          Normal          35.0-47.0       [facility]   
[place]  
   
                                        Comment on above:   Order Comment: Campu  
s: M   
   
                                                            Performed By: #### L  
200.89506,  ####[address] [address]Vq# [phone]   
   
                                                    Hemoglobin (Bld)   
[Mass/Vol]      13.0 g/dL       Normal          11.5-15.5       [facility]   
[place]  
   
                                        Comment on above:   Order Comment: Campu  
s: M   
   
                                                            Performed By: #### L  
200.93782,  ####[place] [address] [place] [id number]# [phone]   
   
                      IMMATR GRAN ABS 0.10 K/CU MM Normal     Less than 2 [facility]   
[place]  
   
                                        Comment on above:   Order Comment: Campu  
s: M   
   
                                                            Performed By: #### L  
200.74413,  ####[place] [address]Ph# [phone]   
   
                      IMMATURE GRAN % 0.6 %      Normal     Less than 2 [facility]   
[place]  
   
                                        Comment on above:   Order Comment: Campu  
s: M   
   
                                                            Performed By: #### L  
200.77302,  ####[facility]   
[id number] [place], [place] [address]Ph# [phone]   
   
                      LYMPH ABS  2.30 K/CU MM Normal     0.9-4.4    [facility]   
[place]  
   
                                        Comment on above:   Order Comment: Campu  
s: M   
   
                                                            Performed By: #### L  
200.55388, L292723 ####[place] [address]Ph# [phone]   
   
                                                    Lymphocytes/100   
WBC (Bld)       17.7 %          Low             20-40           [facility]  
   
                                        Comment on above:   Order Comment: Campu  
s: M   
   
                                                            Performed By: #### L  
200.51272, L200.63904 ####[facility]   
[id number] [place], [place] [address]Im# [phone]   
   
                                                    MCHC (RBC)   
[Mass/Vol]      32.2 g/dL       Normal          32.0-36.0       [facility]  
   
                                        Comment on above:   Order Comment: Campu  
s: M   
   
                                                            Performed By: #### L  
200.19176, L248642 ####[address] [address]Ko# [phone]   
   
                                                    MCV (RBC)   
[Entitic vol]   87.6 fL         Normal          80.0-99.0       [facility]  
   
                                        Comment on above:   Order Comment: Campu  
s: M   
   
                                                            Performed By: #### L  
200.19935,  ####[address] [address]Wu# [phone]   
   
                      MONO ABS   1.20 K/CU MM High       0.1-1.1    [facility]  
   
                                        Comment on above:   Order Comment: Campu  
s: M   
   
                                                            Performed By: #### L  
200.65423, L239208 ####[address] [address]Ib# [phone]   
   
                                                    Monocytes/100 WBC   
(Bld)           9.0 %           Normal          2-10            [facility]  
   
                                        Comment on above:   Order Comment: Campu  
s: M   
   
                                                            Performed By: #### L  
200.46224, L269361 ####[facility]   
[id number] [place] [address]Jr# [phone]   
   
                      NEUTROPHIL ABS 9.40 K/CU MM High       2.0-8.3    [facility]  
   
                                        Comment on above:   Order Comment: Campu  
s: M   
   
                                                            Performed By: #### L  
200.68737, L216806 ####[address] [address]Tb# [phone]   
   
                                                    Neutrophils/100   
WBC (Bld)       72.0 %          Normal          45-75           [facility]  
   
                                        Comment on above:   Order Comment: Campu  
s: M   
   
                                                            Performed By: #### L  
200.01033, L201868 ####[facility]   
[id number] [place], [place] [address]Hi# [phone]   
   
                                                    Nucleated RBC/100   
WBC (Bld) [Ratio] 0.0 %           Normal          Less than 1     [facility]   
[place]  
   
                                        Comment on above:   Order Comment: Campu  
s: M   
   
                                                            Performed By: #### L  
200.97264, L200.96596 ####[facility]   
[id number] [place], [place] [address]Fz# [phone]   
   
                                                    Platelet mean   
volume (Bld)   
[Entitic vol]   10.7 fL         Normal          9.4-12.4        [facility]   
[place]  
   
                                        Comment on above:   Order Comment: Campu  
s: M   
   
                                                            Performed By: #### L  
200.55334, L2.22112 ####[facility]   
[id number] [place], [place] [address]Ei# [phone]   
   
                      PLT        188 K/CU MM Normal     150-450    [facility]  
   
                                        Comment on above:   Order Comment: Campu  
s: M   
   
                                                            Performed By: #### L  
200.84037, L2.14986 ####[facility]   
[id number] [place], [place] [address]Yq# [phone]   
   
                      RBC        4.61 M/CU MM Normal     3.90-5.30  [facility]  
   
                                        Comment on above:   Order Comment: Campu  
s: M   
   
                                                            Performed By: #### L  
200.76978, L200.38096 ####[facility]   
[id number] [place], [place] [address]Yf# [phone]   
   
                      WBC        13.0 K/CUMM High       4.5-11.0   [facility]   
[place]  
   
                                        Comment on above:   Order Comment: Campu  
s: M   
   
                                                            Performed By: #### L  
200.37888, L200.03431 ####[facility]   
[id number] [place], [place] [address]Dl# [phone]   
   
                                                    CRPon 2021   
   
                      CRP        28.44 MG/DL Normal     LESS THAN 1 [facility]   
[place]  
   
                                        Comment on above:   Order Comment: Campu  
s: M   
   
                                                            Performed By: #### L  
550.32889, L550.38032 ####[facility]   
[id number] [place], [place] [address]Ym# [phone]   
   
                                                    Ray 2021   
   
                                                    EMERGENCY   
PHYSICIAN REPORT                        This is a preliminary report   
only, as the practitioner   
review and authentication   
has not occurred.   Normal                                  [facility]   
[place]  
   
                      ER                    Normal                [facility]  
   
                                                    KNEE COMP 4 OR MORE VWS LTon  
 2021   
   
                                                    KNEE COMP 4 OR   
MORE VWS LT                     Normal                          [facility]  
   
                                                    PROCAL Aon 2021   
   
                      PROCAL A   0.11 NG/ML Normal     0-0.50     [facility]  
   
                                        Comment on above:   Order Comment: Campu  
s: M   
   
                                                            Result Comment: PCT   
Concentration InterpretationPCT <=0.1   
NG/ML:Normal range for healthy adultsPCT >0.1 NG/ML and <0.5   
NG/ML:Systemic infection (sepsis) is possible and may   
requireantibiotic treatment, but other conditions are known   
toelevate PCT as well.PCT >0.5 NG/ML:Should ne considered at risk   
for developing severe sepsisor septic shock.PCT >2.0 NG/ML:Important   
systemic inflammatory response. Almost exclusivelyindicates episode   
of severe bacterial sepsis or septicshock.This assy uses differnt   
methodologies and produces resultssignificantly lower than the   
Vidas. It is recommended toevaluate patients for sepsis based on   
results obtained fromthe [employer] platform vs the [employer] Vidas   
platform(previous).NOTE NEW NORMAL RANGE DUE TO REAGENT CHANGE   
   
                                                            Performed By: #### L  
550.47900, L550.38807 ####[facility]   
[id number] [place], [place] [address]Fq# [phone]   
   
                                                    WSR/MODon 2021   
   
                      WSR/MOD    1 MM/HR    Normal     0-30       [facility]  
   
                                        Comment on above:   Order Comment: Campu  
s: M   
   
                                                            Performed By: #### L  
200.30071, L200.60745 ####[facility]   
[id number] [place], [place] [address]Im# [phone]   
   
                                                    BMPon 2021   
   
                                                    Anion gap   
[Moles/Vol]     8 mmol/L        Normal          5-16            [facility]  
   
                                        Comment on above:   Order Comment: Campu  
s: M   
   
                                                            Performed By: #### L  
500.27600, L500.31781 ####[facility]   
[id number] [place], [place] [address]Nz# [phone]   
   
                                                    Calcium   
[Mass/Vol]      9.3 mg/dL       Normal          8.5-10.5        [facility]   
[place]  
   
                                        Comment on above:   Order Comment: Campu  
s: M   
   
                                                            Result Comment: NOTE  
 NEW NORMAL RANGE DUE TO REAGENT CHANGE   
   
                                                            Performed By: #### L  
500.51149, L500.37654 ####[facility]   
[id number] [place], [place] [address]Kp# [phone]   
   
                                                    Chloride   
[Moles/Vol]     107 mmol/L      Normal                    [facility]  
   
                                        Comment on above:   Order Comment: Campu  
s: M   
   
                                                            Performed By: #### L  
500.08149, L500.45054 ####[facility]   
[id number] [place], [place] [address]Sz# [phone]   
   
                      CO2 [Moles/Vol] 27.0 mmol/L Normal     21-32      [facility]  
   
                                        Comment on above:   Order Comment: Campu  
s: M   
   
                                                            Performed By: #### L  
500.29932, L500.40096 ####[facility]   
[id number] [place], [place] [address]Vt# [phone]   
   
                                                    Creatinine   
[Mass/Vol]      0.72 mg/dL      Normal          0.510-0.950     [facility]  
   
                                        Comment on above:   Order Comment: Campu  
s: M   
   
                                                            Result Comment: Kayla  
ents receiving either N-Acetylcysteine (NAC)   
orMetamizole prior to venipuncture, may have falsely   
depressedresults.   
   
                                                            Performed By: #### L  
500.18442, L500.49716 ####[facility]   
[id number] [place], [place] [address]Ec# [phone]   
   
                                                    Glucose   
[Mass/Vol]      189 mg/dL       High                      [facility]   
[place]  
   
                                        Comment on above:   Order Comment: Campu  
s: M   
   
                                                            Result Comment: 70-1  
00- Normal Fasting; 100-125 Impaired Fasting;   
greaterthan 126 on more than one result- Diabetes. ADA   
guidelines.Results may be falsely elevated after the administration   
ofSulfapyridine.Results may be falsely depressed after the   
administration ofSulfasalazine.   
   
                                                            Performed By: #### L  
500.29792, L500.72622 ####[facility]   
[id number] [place], [place] [address]Kz# [phone]   
   
                                                    Potassium   
[Moles/Vol]     4.7 mmol/L      Normal          3.5-5.1         [facility]  
   
                                        Comment on above:   Order Comment: Campu  
s: M   
   
                                                            Performed By: #### L  
500.75890, L500.38545 ####[facility]   
[id number] [place] [address]Pn# [phone]   
   
                                                    Sodium   
[Moles/Vol]     142 mmol/L      Normal          136-145         [facility]  
   
                                        Comment on above:   Order Comment: Campu  
s: M   
   
                                                            Performed By: #### L  
500.68112, L500.64656 ####[facility]   
[id number] [place] [address]Bx# [phone]   
   
                                                    Urea nitrogen   
[Mass/Vol]      24 mg/dL        Normal          7-26            [facility]  
   
                                        Comment on above:   Order Comment: Campu  
s: M   
   
                                                            Performed By: #### L  
500.76731, L500.47209 ####[facility]   
[id number] [place], [place] [address]Vq# [phone]   
   
                                                    Urea   
nitrogen/Creatini  
ne [Mass ratio] 33 mg/mg        High            15-24           [facility]  
   
                                        Comment on above:   Order Comment: Campu  
s: M   
   
                                                            Performed By: #### L  
500.86422, L500.94592 ####[facility]   
[id number] [place] [address]Kw# [phone]   
   
                                                    CBCon 2021   
   
                                                    Erythrocyte   
distribution   
width (RBC)   
[Ratio]         14.6 %          High            11-14.5         [facility]  
   
                                        Comment on above:   Order Comment: Campu  
s: M   
   
                                                            Performed By: #### L  
200.74775 ####[facility]   
[id number] [place], [place] [address]Mi# [phone]   
   
                                                    Hematocrit (Bld)   
[Volume fraction] 41.4 %          Normal          35.0-47.0       [facility]  
   
                                        Comment on above:   Order Comment: Campu  
s: M   
   
                                                            Performed By: #### L  
200.07842 ####[facility]   
[id number] [place] [address]Xg# [phone]   
   
                                                    Hemoglobin (Bld)   
[Mass/Vol]      13.2 g/dL       Normal          11.5-15.5       [facility]  
   
                                        Comment on above:   Order Comment: Campu  
s: M   
   
                                                            Performed By: #### L  
200.87682 ####[facility]   
[id number] [place] [address]Ed# [phone]   
   
                                                    MCHC (RBC)   
[Mass/Vol]      31.9 g/dL       Low             32.0-36.0       [facility]  
   
                                        Comment on above:   Order Comment: Campu  
s: M   
   
                                                            Performed By: #### L  
200.58644 ####[facility]   
[id number] [place] [address]Mo# [phone]   
   
                                                    MCV (RBC)   
[Entitic vol]   88.5 fL         Normal          80.0-99.0       [facility]  
   
                                        Comment on above:   Order Comment: Campu  
s: M   
   
                                                            Performed By: #### L  
200.93885 ####[facility]   
[id number] [place] [address]Ft# [phone]   
   
                                                    Nucleated RBC/100   
WBC (Bld) [Ratio] 0.0 %           Normal          Less than 1     [facility]  
   
                                        Comment on above:   Order Comment: Campu  
s: M   
   
                                                            Performed By: #### L  
200.04718 ####[facility]   
[id number] [place] [address]Rj# [phone]   
   
                                                    Platelet mean   
volume (Bld)   
[Entitic vol]   10.2 fL         Normal          9.4-12.4        [facility]  
   
                                        Comment on above:   Order Comment: Campu  
s: M   
   
                                                            Performed By: #### L  
200.20122 ####[facility]   
[id number] [place] [address]Ku# [phone]   
   
                      PLT        276 K/CU MM Normal     150-450    [facility]  
   
                                        Comment on above:   Order Comment: Campu  
s: M   
   
                                                            Performed By: #### L  
200.17189 ####[facility]   
[id number] [place], [place] [address]Sr# [phone]   
   
                      RBC        4.68 M/CU MM Normal     3.90-5.30  [facility]  
   
                                        Comment on above:   Order Comment: Campu  
s: M   
   
                                                            Performed By: #### L  
200.47524 ####[facility]   
[id number] [place], [place] [address]Zv# [phone]   
   
                      WBC        10.7 K/CUMM Normal     4.5-11.0   [facility]  
   
                                        Comment on above:   Order Comment: Campu  
s: M   
   
                                                            Performed By: #### L  
200.07796 ####[facility]   
[id number] [place], [place] [address]Lz# [phone]   
   
                                                    CCon 2021   
   
                      CARDIAC CATH            Normal                [facility]  
   
                      CC                    Normal                [facility]   
[place]  
   
                                                    GFR ESTon 2021   
   
                      IF  AMER Greater than 60 Normal                [facility]  
   
                                        Comment on above:   Order Comment: Campu  
s: M   
   
                                                            Performed By: #### L  
500.15802, L500.04363 ####[facility]   
[id number] [place], [place] [address]Gv# [phone]   
   
                      IF non-AFR AMER Greater than 60 Normal                [facility]  
   
                                        Comment on above:   Order Comment: Campu  
s: M   
   
                                                            Performed By: #### L  
500.84791, L500.90632 ####[facility]   
[id number] [place], [place] [address]Um# [phone]   
   
                                                    PTon 2021   
   
                                                    INR Coag (PPP)   
[Relative time] 1.02 {INR}      Normal          0.9-1.1         [facility]  
   
                                        Comment on above:   Order Comment: Campu  
s: M   
   
                                                            Result Comment: Khari  
mmended PT INR therapeutic range for long term   
andprophylactic therapy is 2.0 - 3.0. For heart valve andshunt   
patients the range is 2.5 - 3.5.   
   
                                                            Performed By: #### L  
300.61923 ####[facility]   
[id number] [place], [place] [address]Su# [phone]   
   
                      PTS        10.9 SECONDS Normal     9.5-12.0   [facility]   
[place]  
   
                                        Comment on above:   Order Comment: Ruddy  
s: M   
   
                                                            Performed By: #### L  
300.15467 ####[facility]   
[id number] [place], [place] [address]In# [phone]   
   
                                                    BMP with eGFRon 2021   
   
                      Age - Reported 60 years   Normal                [facility]  
   
                                        Comment on above:   Performed By: #### 2  
83097 ####  
[facility],[address] [place] [address]   
[address]   
   
                                                    Anion gap   
[Moles/Vol]     12 mmol/L       Normal          10 - 20         [facility]  
   
                                        Comment on above:   Performed By: #### 2  
99125 ####  
[facility],[address] [place] [address]   
[address]   
   
                                                    Calcium   
[Mass/Vol]      9.1 mg/dL       Normal          8.5 - 10.1      [facility]  
   
                                        Comment on above:   Performed By: #### 2  
27376 ####  
[facility],[address] [place] [address]   
[address]   
   
                                                    Chloride   
[Moles/Vol]     105 mmol/L      Normal          98 - 107        [facility]  
   
                                        Comment on above:   Performed By: #### 2  
25560 ####  
[facility],[address] [place] [address]   
[address]   
   
                      CO2 [Moles/Vol] 28.5 mmol/L Normal     21.0 - 32.0 [facility]  
   
                                        Comment on above:   Performed By: #### 2  
96897 ####  
[facility],[address] [place] [address]   
[address]   
   
                                                    Creatinine   
[Mass/Vol]      0.8 mg/dL       Normal          0.5 - 1.0       [facility]  
   
                                        Comment on above:   Performed By: #### 2  
19562 ####  
[facility],[address] [place] [address]   
[address]   
   
                                                    GFR/1.73 sq M   
predicted among   
non-blacks MDRD   
(S/P/Bld) [Vol   
rate/Area]      mL/min/{1.73_m2} Normal          60 - 999        [facility]  
   
                                        Comment on above:   Performed By: #### 2  
13445 ####  
[facility],[address] [place] [address]   
[address]   
   
                                                            Result Comment: ACCO  
RDING TO THE NATIONAL KIDNEY DISEASE EDUCATION   
PROGRAM(NKDE), A NORMAL eGFR  
IS A VALUE GREATER THAN OR EQUAL TO 60 ML/MIN/1.73 SQ METERS.  
CHRONIC KIDNEY DISEASE: <60mL/MIN/1.73 SQ METERS  
KIDNEY FAILURE: <15mL/MIN/1.73 SQ METERS  
THIS TEST SHOULD ONLY BE USED FOR PATIENTS 18 YEARS OF AGE AND   
OLDER.   
   
                                                    GFR/1.73 sq M   
predicted among   
non-blacks MDRD   
(S/P/Bld) [Vol   
rate/Area]                      Normal                          [facility]  
   
                                        Comment on above:   Result Comment: BASI  
C METABOLIC PANEL   
   
                                                            Performed By: #### 2  
12814 ####  
[address]   
[address]   
   
                                                    Glucose   
[Mass/Vol]      251 mg/dL       High            74 - 106        [facility]  
   
                                        Comment on above:   Performed By: #### 2  
73116 ####  
[address]   
[address]   
   
                                                    Potassium   
[Moles/Vol]     4.4 mmol/L      Normal          3.5 - 5.1       [facility]  
   
                                        Comment on above:   Performed By: #### 2  
33110 ####  
[facility],[address] [place] [address]   
[address]   
   
                                                    Sodium   
[Moles/Vol]     141 mmol/L      Normal          136 - 145       [facility]  
   
                                        Comment on above:   Performed By: #### 2  
45255 ####  
[facility],[address] [place] [address]   
[address]   
   
                                                    Urea nitrogen   
[Mass/Vol]      27 mg/dL        High            7 - 18          [facility]  
   
                                        Comment on above:   Performed By: #### 2  
35159 ####  
[address]   
[address]   
   
                                                    CBC + DIFFon 2021   
   
                                                    Basophils (Bld)   
[#/Vol]         0.10 x10EE3/UL  Normal          0.00 - 0.10     [facility]  
   
                                        Comment on above:   Performed By: #### 2  
63641 ####  
[facility],[address] [place] [address]   
[address]   
   
                                                    Basophils/100 WBC   
(Bld)           1.0 %           Normal          0.0 - 2.0       [facility]  
   
                                        Comment on above:   Performed By: #### 2  
74145 ####  
[facility],[address] [place] [address]   
   
                      CBC + DIFF            Normal                [facility]  
   
                                        Comment on above:   Result Comment: CBC-  
COMPLETE BLOOD COUNT   
   
                                                            Performed By: #### 2  
70804 ####  
[facility],[address] [place] [address]   
   
                                                    Eosinophils (Bld)   
[#/Vol]         0.30 x10EE3/UL  Normal          0.00 - 0.50     [facility]  
   
                                        Comment on above:   Performed By: #### 2  
27269 ####  
[facility],[address] [place], [place] [address]   
   
                                                    Eosinophils/100   
WBC (Bld)       3.3 %           Normal          0.0 - 7.0       [facility]  
   
                                        Comment on above:   Performed By: #### 2  
54582 ####  
[facility],[address] [place] [address]   
   
                                                    Erythrocyte   
distribution   
width (RBC)   
[Ratio]         19.8 %          High            12.0 - 15.6     [facility]  
   
                                        Comment on above:   Performed By: #### 2  
68310 ####  
[facility],[address] [place], [place] [address]   
   
                                                    Hematocrit (Bld)   
[Volume fraction] 32.0 %          Low             34.0 - 46.0     [facility]  
   
                                        Comment on above:   Performed By: #### 2  
95981 ####  
[facility],[address] [place] [address]   
[address]   
   
                                                    Hemoglobin (Bld)   
[Mass/Vol]      10.4 g/dL       Low             12.0 - 16.0     [facility]  
   
                                        Comment on above:   Performed By: #### 2  
55136 ####  
[facility],[address] [place] [address]   
[address]   
   
                                                    Lymphocytes (Bld)   
[#/Vol]         1.90 x10EE3/UL  Normal          0.80 - 2.80     [facility]  
   
                                        Comment on above:   Performed By: #### 2  
24201 ####  
[facility],[address] [place] [address]   
[address]   
   
                                                    Lymphocytes/100   
WBC (Bld)       19.2 %          Low             20.0 - 45.0     [facility]  
   
                                        Comment on above:   Performed By: #### 2  
20970 ####  
[facility],[address] [place] [address]   
[address]   
   
                      MANUAL DIFF N/A        Normal                [facility]  
   
                                        Comment on above:   Performed By: #### 2  
67556 ####  
[facility],[address] [place] [address]   
[address]   
   
                                                    MCH (RBC)   
[Entitic mass]  27 pg           Normal          27 - 33         [facility]  
   
                                        Comment on above:   Performed By: #### 2  
34287 ####  
[facility],[address] [place] [address]   
[address]   
   
                                                    MCHC (RBC)   
[Mass/Vol]      32 X10 3        Normal          32 - 36         [facility]  
   
                                        Comment on above:   Performed By: #### 2  
39942 ####  
[facility],[address] [place] [address]   
[address]   
   
                                                    MCV (RBC)   
[Entitic vol]   84 fL           Normal          80 - 99         [facility]  
   
                                        Comment on above:   Performed By: #### 2  
77248 ####  
[facility],[address] [place] [address]   
[address]   
   
                                                    Monocytes (Bld)   
[#/Vol]         0.50 x10EE3/UL  Normal          0.20 - 1.00     [facility]  
   
                                        Comment on above:   Performed By: #### 2  
64809 ####  
[facility],[address] [place] [address]   
[address]   
   
                      MONOS %    4.9 %      Normal     0.0 - 10.0 [facility]  
   
                                        Comment on above:   Performed By: #### 2  
75341 ####  
[facility],[address] [place] [address]   
[address]   
   
                                                    Morphology Mustapha   
(Bld) [Interp]  N/A             Normal                          [facility]  
   
                                        Comment on above:   Result Comment: {CD]  
   
   
                                                            Performed By: #### 2  
83614 ####  
[facility],[address] [place] [address]   
[address]   
   
                                                    Neutrophils (Bld)   
[#/Vol]         7.10 x10EE3/UL  Normal          1.50 - 7.10     [facility]  
   
                                        Comment on above:   Performed By: #### 2  
55084 ####  
[facility],[address] [place] [address]   
[address]   
   
                                                    Neutrophils/100   
WBC (Bld)       71.6 %          Normal          46.0 - 76.0     [facility]  
   
                                        Comment on above:   Performed By: #### 2  
47378 ####  
[facility],[address] [place] [address]   
[address]   
   
                                                    Platelet mean   
volume (Bld)   
[Entitic vol]   8.7 fL          Normal          6.6 - 10.5      [facility]  
   
                                        Comment on above:   Result Comment: AUTO  
MATED DIFFERENTIAL   
   
                                                            Performed By: #### 2  
52207 ####  
[facility],[address] [place] [address]   
[address]   
   
                                                    Platelets (Bld)   
[#/Vol]         328 x10EE3/UL   Normal          150 - 450       [facility]  
   
                                        Comment on above:   Performed By: #### 2  
22592 ####  
[facility],[address] [place] [address]   
[address]   
   
                      RBC (Bld) [#/Vol] 3.82 x 10EE6/UL Low        4.10 - 5.30 [facility]  
   
                                        Comment on above:   Performed By: #### 2  
36837 ####  
[facility],[address] [place] [address]   
41927   
   
                      WBC (Bld) [#/Vol] 9.9 x 10EE3/UL Normal     4.5 - 10.8 [facility]  
   
                                        Comment on above:   Performed By: #### 2  
94842 ####  
[facility],[address] [place] [address]   
[address]   
   
                                                    HEPATIC FUNCTION PANELon    
   
                                                    Albumin   
[Mass/Vol]      2.7 g/dL        Low             3.4 - 5.0       [facility]  
   
                                        Comment on above:   Performed By: #### 2  
73905 ####  
[facility],[address] [place] [address]   
50963   
   
                      ALK PHOS   93 U/L     Normal     46 - 116   [facility]  
   
                                        Comment on above:   Performed By: #### 2  
10966 ####  
[facility],[address] [place] [address]   
[address]   
   
                      ALT/SGPT   15 U/L     Normal     14 - 59    [facility]  
   
                                        Comment on above:   Performed By: #### 2  
28888 ####  
[facility],[address] [place] [address]   
[address]261   
   
                      AST/SGOT   18 U/L     Normal     13 - 39    [facility]  
   
                                        Comment on above:   Performed By: #### 2  
78334 ####  
[facility],[address] [place] [address]   
[address]   
   
                                                    Bilirubin   
[Mass/Vol]      0.2 mg/dL       Normal          0.2 - 1.0       [facility]  
   
                                        Comment on above:   Performed By: #### 2  
78482 ####  
[facility],[address] [place] [address]   
06731   
   
                                                    Bilirubin.direct   
[Mass/Vol]      0.1 mg/dL       Normal          0.0 - 0.2       [facility]  
   
                                        Comment on above:   Performed By: #### 2  
93981 ####  
[facility],[address] [place] [address]   
[address]   
   
                                                    HEPATIC FUNCTION   
PANEL                           Normal                          [facility]  
   
                                        Comment on above:   Result Comment: HEPA  
TIC FUNCTION PROFILE   
   
                                                            Performed By: #### 2  
96915 ####  
[facility],[address] [place] [address]   
[address]   
   
                                                    Protein   
[Mass/Vol]      6.7 g/dL        Normal          6.4 - 8.2       [facility]  
   
                                        Comment on above:   Performed By: #### 2  
21811 ####  
[facility],[address] [place] [address]   
[address]60   
   
                                                    SEDRATEon 2021   
   
                      SEDRATE    30 mm/hr   Normal     0 - 30     [facility]  
   
                                        Comment on above:   Performed By: #### 2  
22251 ####  
[facility],[address] [place] [address]   
[address]   
   
                                                    BMPon 2021   
   
                                                    Anion gap   
[Moles/Vol]     3 mmol/L        Low             5-16            [facility]  
   
                                        Comment on above:   Order Comment: Campu  
s: M   
   
                                                            Performed By: #### L  
500.30746, L500.05614 ####[facility]   
[id number] [place], [place] [address]Pj# [phone]   
   
                                                    Calcium   
[Mass/Vol]      9.6 mg/dL       Normal          8.5-10.5        [facility]  
   
                                        Comment on above:   Order Comment: Campu  
s: M   
   
                                                            Result Comment: NOTE  
 NEW NORMAL RANGE DUE TO REAGENT CHANGE   
   
                                                            Performed By: #### L  
500.97486, L500.92530 ####[facility]   
[id number] [place], [place] [address]Po# [phone]   
   
                                                    Chloride   
[Moles/Vol]     110 mmol/L      High                      [facility]  
   
                                        Comment on above:   Order Comment: Campu  
s: M   
   
                                                            Performed By: #### L  
500.98176, L500.75958 ####[facility]   
[id number] [place], [place] [address]Si# [phone]   
   
                      CO2 [Moles/Vol] 30.0 mmol/L Normal     21-32      [facility]  
   
                                        Comment on above:   Order Comment: Campu  
s: M   
   
                                                            Performed By: #### L  
500.51902, L500.27949 ####[facility]   
[id number] [place], [place] [address]Do# [phone]   
   
                                                    Creatinine   
[Mass/Vol]      0.47 mg/dL      Low             0.510-0.950     [facility]  
   
                                        Comment on above:   Order Comment: Campu  
s: M   
   
                                                            Result Comment: Kayla  
ents receiving either N-Acetylcysteine (NAC)   
orMetamizole prior to venipuncture, may have falsely   
depressedresults.   
   
                                                            Performed By: #### L  
500.05623, L500.87208 ####[facility]   
[id number] [place], [place] [address]Va# [phone]   
   
                                                    Glucose   
[Mass/Vol]      130 mg/dL       High                      [facility]   
[place]  
   
                                        Comment on above:   Order Comment: Campu  
s: M   
   
                                                            Result Comment: 70-1  
00- Normal Fasting; 100-125 Impaired Fasting;   
greaterthan 126 on more than one result- Diabetes. ADA   
guidelines.Results may be falsely elevated after the administration   
ofSulfapyridine.Results may be falsely depressed after the   
administration ofSulfasalazine.   
   
                                                            Performed By: #### L  
500.44104, L5.37318 ####[facility]   
[id number] [place], [place] [address]Ei# [phone]   
   
                                                    Potassium   
[Moles/Vol]     3.9 mmol/L      Normal          3.5-5.1         [facility]  
   
                                        Comment on above:   Order Comment: Campu  
s: M   
   
                                                            Result Comment: Slig  
ht Hemolysis, Result may be affected.   
   
                                                            Performed By: #### L  
500.50203, L5.04488 ####[facility]   
[id number] [place], [place] [address]Jv# [phone]   
   
                                                    Sodium   
[Moles/Vol]     143 mmol/L      Normal          136-145         [facility]  
   
                                        Comment on above:   Order Comment: Campu  
s: M   
   
                                                            Performed By: #### L  
500.00191, L5.36607 ####[facility]   
[id number] [place], [place] [address]Nl# [phone]   
   
                                                    Urea nitrogen   
[Mass/Vol]      20 mg/dL        Normal          7-26            [facility]  
   
                                        Comment on above:   Order Comment: Campu  
s: M   
   
                                                            Performed By: #### L  
500.61545, L5.02033 ####[facility]   
[id number] [place], [place] [address]Up# [phone]   
   
                                                    Urea   
nitrogen/Creatini  
ne [Mass ratio] 42 mg/mg        High            15-24           [facility]  
   
                                        Comment on above:   Order Comment: Campu  
s: M   
   
                                                            Performed By: #### L  
500.44899, L500.26475 ####[facility]   
[id number] [place], [place] [address]Uz# [phone]   
   
                                                    CBCon 2021   
   
                                                    Erythrocyte   
distribution   
width (RBC)   
[Ratio]         17.3 %          High            11-14.5         [facility]  
   
                                        Comment on above:   Order Comment: Campu  
s: M   
   
                                                            Performed By: #### L  
200.93289 ####[facility]   
[id number] [place] [address]Aq# [phone]   
   
                                                    Hematocrit (Bld)   
[Volume fraction] 35.6 %          Normal          35.0-47.0       [facility]  
   
                                        Comment on above:   Order Comment: Campu  
s: M   
   
                                                            Performed By: #### L  
200.38796 ####[address] [address]Ow# [phone]   
   
                                                    Hemoglobin (Bld)   
[Mass/Vol]      10.8 g/dL       Low             11.5-15.5       [facility]  
   
                                        Comment on above:   Order Comment: Campu  
s: M   
   
                                                            Performed By: #### L  
200.49010 ####[facility]   
[id number] [place] [address]Wf# [phone]   
   
                                                    MCHC (RBC)   
[Mass/Vol]      30.3 g/dL       Low             32.0-36.0       [facility]  
   
                                        Comment on above:   Order Comment: Campu  
s: M   
   
                                                            Performed By: #### L  
200.01310 ####[facility]   
[id number] [place] [address]Nb# [phone]   
   
                                                    MCV (RBC)   
[Entitic vol]   87.0 fL         Normal          80.0-99.0       [facility]  
   
                                        Comment on above:   Order Comment: Campu  
s: M   
   
                                                            Performed By: #### L  
200.98772 ####[facility]   
[id number] [place] [address]Pq# [phone]   
   
                                                    Nucleated RBC/100   
WBC (Bld) [Ratio] 0.0 %           Normal          Less than 1     [facility]   
[place]  
   
                                        Comment on above:   Order Comment: Campu  
s: M   
   
                                                            Performed By: #### L  
200.52740 ####[facility]   
[id number] [place], [place] [address]Ol# [phone]   
   
                                                    Platelet mean   
volume (Bld)   
[Entitic vol]   9.7 fL          Normal          9.4-12.4        [facility]  
   
                                        Comment on above:   Order Comment: Campu  
s: M   
   
                                                            Performed By: #### L  
200.89116 ####[facility]   
[id number] [place] [address]Gk# [phone]   
   
                      PLT        285 K/CU MM Normal     150-450    [facility]  
   
                                        Comment on above:   Order Comment: Campu  
s: M   
   
                                                            Performed By: #### L  
200.85160 ####[address] [address]Du# [phone]   
   
                      RBC        4.09 M/CU MM Normal     3.90-5.30  [facility]  
   
                                        Comment on above:   Order Comment: Campu  
s: M   
   
                                                            Performed By: #### L  
200.53040 ####[facility]   
[id number] [place] [address]Id# [phone]   
   
                      WBC        10.0 K/CUMM Normal     4.5-11.0   [facility]  
   
                                        Comment on above:   Order Comment: Campu  
s: M   
   
                                                            Performed By: #### L  
200.46051 ####[facility]   
[id number] [place] [address]Dm# [phone]   
   
                                                    GFR ESTon 2021   
   
                      IF  AMER Greater than 60 Normal                [facility]  
   
                                        Comment on above:   Order Comment: Campu  
s: M   
   
                                                            Performed By: #### L  
500.83332, L500.65874 ####[facility]   
[id number] [place] [address]Hh# [phone]   
   
                      IF non-AFR AMER Greater than 60 Normal                [facility]  
   
                                        Comment on above:   Order Comment: Campu  
s: M   
   
                                                            Performed By: #### L  
500.54595, L500.02038 ####[facility]   
[id number] [place] [address]Xi# [phone]   
   
                                                    PROG.Mikayla 2021   
   
                      PROG.CARD             Normal                [facility]  
   
                                                    Progress   
Note-Cardiology                 Normal                          [facility]  
   
                                                    PROG.Mikayla 2021   
   
                      PROG.CARD             Normal                [facility]  
   
                                                    Progress   
Note-Cardiology                 Normal                          [facility]  
   
                                                    CBC W/DIFFon 2021   
   
                      BASO ABS   0.10 K/CU MM Normal     0-0.2      [facility]  
   
                                        Comment on above:   Order Comment: Campu  
s: M   
   
                                                            Performed By: #### L  
200.20492 ####[address] [address]Es# [phone]   
   
                                                    Basophils/100 WBC   
(Bld)           0.6 %           Normal          0-2             [facility]  
   
                                        Comment on above:   Order Comment: Campu  
s: M   
   
                                                            Performed By: #### L  
200.83484 ####[address] [address]Gw# [phone]   
   
                      EOS ABS    0.60 K/CU MM High       0-0.5      [facility]  
   
                                        Comment on above:   Order Comment: Campu  
s: M   
   
                                                            Performed By: #### L  
200.31240 ####[facility]   
[id number] [place] [address]Qk# [phone]   
   
                                                    Eosinophils/100   
WBC (Bld)       7.3 %           High            0-5             [facility]  
   
                                        Comment on above:   Order Comment: Campu  
s: M   
   
                                                            Performed By: #### L  
200.99412 ####[facility]   
[id number] [place] [address]Yv# [phone]   
   
                                                    Erythrocyte   
distribution   
width (RBC)   
[Ratio]         17.4 %          High            11-14.5         [facility]  
   
                                        Comment on above:   Order Comment: Campu  
s: M   
   
                                                            Performed By: #### L  
200.76768 ####[facility]   
[id number] [place] [address]Fg# [phone]   
   
                                                    Hematocrit (Bld)   
[Volume fraction] 38.1 %          Normal          35.0-47.0       [facility]  
   
                                        Comment on above:   Order Comment: Campu  
s: M   
   
                                                            Performed By: #### L  
200.77699 ####[facility]   
[id number] [place], [place] [address]Ph# [phone]   
   
                                                    Hemoglobin (Bld)   
[Mass/Vol]      11.5 g/dL       Normal          11.5-15.5       [facility]  
   
                                        Comment on above:   Order Comment: Campu  
s: M   
   
                                                            Performed By: #### L  
200.35795 ####[facility]   
[id number] [place], [place] [address]Ph# [phone]   
   
                      IMMATR GRAN ABS 0.00 K/CU MM Normal     Less than 2 [facility]   
[place]  
   
                                        Comment on above:   Order Comment: Campu  
s: M   
   
                                                            Performed By: #### L  
200.95004 ####[facility]   
[id number] [place], [place] [address]Ph# [phone]   
   
                      IMMATURE GRAN % 0.3 %      Normal     Less than 2 [facility]   
[place]  
   
                                        Comment on above:   Order Comment: Campu  
s: M   
   
                                                            Performed By: #### L  
200.38404 ####[facility]   
[id number] [place], [place] [address]Ph# [phone]   
   
                      LYMPH ABS  3.10 K/CU MM Normal     0.9-4.4    [facility]  
   
                                        Comment on above:   Order Comment: Campu  
s: M   
   
                                                            Performed By: #### L  
200.45582 ####[facility]   
[id number] [place], [place] [address]Ph# [phone]   
   
                                                    Lymphocytes/100   
WBC (Bld)       36.0 %          Normal          20-40           [facility]  
   
                                        Comment on above:   Order Comment: Campu  
s: M   
   
                                                            Performed By: #### L  
200.87762 ####[facility]   
[id number] [place], [place] [address]Ph# [phone]   
   
                                                    MCHC (RBC)   
[Mass/Vol]      30.2 g/dL       Low             32.0-36.0       [facility]  
   
                                        Comment on above:   Order Comment: Campu  
s: M   
   
                                                            Performed By: #### L  
200.66380 ####[facility]   
[id number] [place], [place] [address]Ph# [phone]   
   
                                                    MCV (RBC)   
[Entitic vol]   87.0 fL         Normal          80.0-99.0       [facility]  
   
                                        Comment on above:   Order Comment: Campu  
s: M   
   
                                                            Performed By: #### L  
200.36680 ####[facility]   
[id number] [place] [address]Tk# [phone]   
   
                      MONO ABS   0.70 K/CU MM Normal     0.1-1.1    [facility]  
   
                                        Comment on above:   Order Comment: Campu  
s: M   
   
                                                            Performed By: #### L  
200.93021 ####[place] [address]Ph# [phone]   
   
                                                    Monocytes/100 WBC   
(Bld)           7.9 %           Normal          2-10            [facility]  
   
                                        Comment on above:   Order Comment: Campu  
s: M   
   
                                                            Performed By: #### L  
200.83713 ####[place] [address]Ph# [phone]   
   
                      NEUTROPHIL ABS 4.10 K/CU MM Normal     2.0-8.3    [facility]  
   
                                        Comment on above:   Order Comment: Campu  
s: M   
   
                                                            Performed By: #### L  
200.33090 ####[facility]   
[id number] [place], [place] [address]Ph# [phone]   
   
                                                    Neutrophils/100   
WBC (Bld)       47.9 %          Normal          45-75           [facility]  
   
                                        Comment on above:   Order Comment: Campu  
s: M   
   
                                                            Performed By: #### L  
200.83823 ####[facility]   
[id number] [place], [place] [address]Ph# [phone]   
   
                                                    Nucleated RBC/100   
WBC (Bld) [Ratio] 0.0 %           Normal          Less than 1     [facility]  
   
                                        Comment on above:   Order Comment: Campu  
s: M   
   
                                                            Performed By: #### L  
200.57419 ####[facility]   
[id number] [place] [address]Po# [phone]   
   
                                                    Platelet mean   
volume (Bld)   
[Entitic vol]   9.7 fL          Normal          9.4-12.4        [facility]  
   
                                        Comment on above:   Order Comment: Campu  
s: M   
   
                                                            Performed By: #### L  
200.80472 ####[facility]   
[id number] [place], [place] [address]Qu# [phone]   
   
                      PLT        303 K/CU MM Normal     150-450    [facility]  
   
                                        Comment on above:   Order Comment: Campu  
s: M   
   
                                                            Performed By: #### L  
200.41276 ####[facility]   
[id number] [place], [place] [address]Tl# [phone]   
   
                      RBC        4.38 M/CU MM Normal     3.90-5.30  [facility]  
   
                                        Comment on above:   Order Comment: Campu  
s: M   
   
                                                            Performed By: #### L  
200.72697 ####[facility]   
[id number] [place], [place] [address]Rc# [phone]   
   
                      WBC        8.6 K/CUMM Normal     4.5-11.0   [facility]  
   
                                        Comment on above:   Order Comment: Campu  
s: M   
   
                                                            Performed By: #### L  
200.01827 ####[facility]   
[id number] [place], [place] [address]Tt# [phone]   
   
                                                    CMPon 2021   
   
                                                    Albumin   
[Mass/Vol]      2.8 g/dL        Low             3.2-5.0         [facility]  
   
                                        Comment on above:   Order Comment: Campu  
s: M   
   
                                                            Performed By: #### L  
500.03456, L500.86930, L500.64270, L500.30924   
####[facility] [id number] [place], [place]   
[address]Dw# [phone]   
   
                                                    Albumin/Globulin   
[Mass ratio]    0.8 {ratio}     Normal          0.8-2.0         [facility]  
   
                                        Comment on above:   Order Comment: Campu  
s: M   
   
                                                            Performed By: #### L  
500.39952, L500.98806, L500.08725, L500.99145   
####[facility] [id number] [place], [place]   
[address]Ry# [phone]   
   
                      ALK PHOS   122 U/L    High            [facility]  
   
                                        Comment on above:   Order Comment: Campu  
s: M   
   
                                                            Performed By: #### L  
500.92298, L500.67987, L500.06160, L500.52761   
####[facility] [id number] [place], [place]   
[address]Ro# [phone]   
   
                                                    ALT [Catalytic   
activity/Vol]   7 U/L           Low             13-61           [facility]  
   
                                        Comment on above:   Order Comment: Campu  
s: M   
   
                                                            Result Comment: RESU  
LTS MAY BE FALSELY DEPRESSED AFTER THE   
ADMINISTRATION OFSULFASALAZINE AND/OR SULFAPYRIDINE.   
   
                                                            Performed By: #### L  
500.78773, L500.72847, L500.01835, L500.54085   
####[facility] RKPRHAXKZP2289 [place], [place]   
[address]Xp# [phone]   
   
                                                    Anion gap   
[Moles/Vol]     7 mmol/L        Normal          5-16            [facility]  
   
                                        Comment on above:   Order Comment: Campu  
s: M   
   
                                                            Performed By: #### L  
500.23187, L500.37988, L500.96806, L500.79907   
####[facility] CTQBEZGPJV6617 [place], [place]   
[address]Ew# [phone]   
   
                                                    AST [Catalytic   
activity/Vol]   13 U/L          Normal          8-34            [facility]  
   
                                        Comment on above:   Order Comment: Campu  
s: M   
   
                                                            Result Comment: RESU  
LTS MAY BE FALSELY DEPRESSED AFTER THE   
ADMINISTRATION OFSULFASALAZINE AND/OR SULFAPYRIDINE.   
   
                                                            Performed By: #### L  
500.63885, L500.83124, L500.46242, L500.77016   
####[facility] [id number] [place], [place]   
[address]Fp# [phone]   
   
                      BILI TOTAL 0.20 MG/DL Normal     0.2-1.0    [facility]  
   
                                        Comment on above:   Order Comment: Campu  
s: M   
   
                                                            Performed By: #### L  
500.05259, L500.52338, L500.94491, L500.27442   
####[facility] TWGEBMQDTW3289 [place], [place]   
[address]Wn# [phone]   
   
                                                    Calcium   
[Mass/Vol]      9.7 mg/dL       Normal          8.5-10.5        [facility]  
   
                                        Comment on above:   Order Comment: Campu  
s: M   
   
                                                            Result Comment: NOTE  
 NEW NORMAL RANGE DUE TO REAGENT CHANGE   
   
                                                            Performed By: #### L  
500.31219, L500.10491, L500.81002, L500.24371   
####[facility] [id number] [place], [place]   
[address]Ut# [phone]   
   
                                                    Chloride   
[Moles/Vol]     107 mmol/L      Normal                    [facility]  
   
                                        Comment on above:   Order Comment: Campu  
s: M   
   
                                                            Performed By: #### L  
500.12011, L500.34663, L500.75641, L500.04500   
####[facility] BRXNCPHRCJ7936 [place], [place]   
[address]So# [phone]   
   
                      CO2 [Moles/Vol] 27.0 mmol/L Normal     21-32      [facility]  
   
                                        Comment on above:   Order Comment: Campu  
s: M   
   
                                                            Performed By: #### L  
500.19370, L500.54966, L500.99288, L500.01473   
####[facility] IPGYSCDEAJ[address] [place] [address]   
[address]Jk# [phone]   
   
                                                    Creatinine   
[Mass/Vol]      0.48 mg/dL      Low             0.510-0.950     [facility]  
   
                                        Comment on above:   Order Comment: Campu  
s: M   
   
                                                            Result Comment: Kayla  
ents receiving either N-Acetylcysteine (NAC)   
orMetamizole prior to venipuncture, may have falsely   
depressedresults.   
   
                                                            Performed By: #### L  
500.76681, L500.79050, L500.20055, L500.63351   
####[facility] BOLFARVYTT4845 [place], [place]   
[address]Zq# [phone]   
   
                                                    Globulin (S)   
[Mass/Vol]      3.6 g/dL        Normal          2.2-4.2         [facility]  
   
                                        Comment on above:   Order Comment: Campu  
s: M   
   
                                                            Performed By: #### L  
500.97804, L500.45121, L500.54817, L500.36697   
####[facility] IGDBZKTOTP1077 [place], [place]   
[address]Ss# [phone]   
   
                                                    Glucose   
[Mass/Vol]      181 mg/dL       High                      [facility]   
[place]  
   
                                        Comment on above:   Order Comment: Campu  
s: M   
   
                                                            Result Comment: 70-1  
00- Normal Fasting; 100-125 Impaired Fasting;   
greaterthan 126 on more than one result- Diabetes. ADA   
guidelines.Results may be falsely elevated after the administration   
ofSulfapyridine.Results may be falsely depressed after the   
administration ofSulfasalazine.   
   
                                                            Performed By: #### L  
500.34351, L500.03125, L500.15851, L500.14166   
####[facility] TKFAZVINLF0283 [place], [place]   
[address]Uo# [phone]   
   
                                                    Potassium   
[Moles/Vol]     4.1 mmol/L      Normal          3.5-5.1         [facility]  
   
                                        Comment on above:   Order Comment: Campu  
s: M   
   
                                                            Result Comment: Slig  
ht Hemolysis, Result may be affected.   
   
                                                            Performed By: #### L  
500.75178, L500.53341, L500.42535, L500.72472   
####[facility] DJAYYNGMQB7176 [place], [place]   
[address]Qb# [phone]   
   
                                                    Protein   
[Mass/Vol]      6.4 g/dL        Normal          6.0-8.5         [facility]  
   
                                        Comment on above:   Order Comment: Campu  
s: M   
   
                                                            Performed By: #### L  
500.92880, L500.11526, L500.22106, L500.10505   
####[facility] YNYWYAMMTP7728 [place], [place]   
[address]Gr# [phone]   
   
                                                    Sodium   
[Moles/Vol]     141 mmol/L      Normal          136-145         [facility]  
   
                                        Comment on above:   Order Comment: Campu  
s: M   
   
                                                            Performed By: #### L  
500.97123, L500.48495, L500.77170, L500.49764   
####[facility] WEPQQSQUHU3024 [place], [place]   
[address]Sx# [phone]   
   
                                                    Urea nitrogen   
[Mass/Vol]      19 mg/dL        Normal          7-26            [facility]   
[place]  
   
                                        Comment on above:   Order Comment: Campu  
s: M   
   
                                                            Performed By: #### L  
500.12397, L500.23918, L500.27062, L500.95954   
####[facility] YCUGHHKFAD4758 [place], [place]   
[address]Fz# [phone]   
   
                                                    Urea   
nitrogen/Creatini  
ne [Mass ratio] 40 mg/mg        High            15-24           [facility]   
[place]  
   
                                        Comment on above:   Order Comment: Campu  
s: M   
   
                                                            Performed By: #### L  
500.54554, L500.14779, L500.46775, L500.10102   
####[facility] [id number] [place] [address]   
[address]Ae# [phone]   
   
                                                    GFR ESTon 2021   
   
                      IF  AMER Greater than 60 Normal                [facility]  
   
                                        Comment on above:   Order Comment: Campu  
s: M   
   
                                                            Performed By: #### L  
500.86820, L500.64552, L500.17372, L500.88798   
####[facility] IFBXGUYAWX[address] [place] [address]   
[address]Kw# [phone]   
   
                      IF non-AFR AMER Greater than 60 Normal                [facility]  
   
                                        Comment on above:   Order Comment: Campu  
s: M   
   
                                                            Performed By: #### L  
500.33294, L500.75421, L500.48186, L500.61123   
####[facility] YXOSSLGSSS5592 [place], [place]   
[address]Of# [phone]   
   
                                                    MAGNESIUMon 2021   
   
                      MAGNESIUM  1.9 MG/CL  Normal     1.6-2.6    [facility]  
   
                                        Comment on above:   Order Comment: Campu  
s: M   
   
                                                            Performed By: #### L  
500.75582, L500.46541, L500.19362, L500.68816   
####[facility] [address] [place] [address]   
[address]Lt# [phone]   
   
                                                    PHOSon 2021   
   
                                                    Phosphate   
[Mass/Vol]      5.00 mg/dL      High            2.5-4.9         [facility]  
   
                                        Comment on above:   Order Comment: Campu  
s: M   
   
                                                            Result Comment: Elev  
ated m-protein (paraprotein) levels in the serum   
may beexhibited in patients with monoclonal gammopathies,   
causingfalsely elevated inorganic phosphorus results.   
   
                                                            Performed By: #### L  
500.25303, L500.76873, L500.38067, L500.56090   
####[facility] NSPOFPEWAI7143 [place], [place]   
[address]Yb# [phone]   
   
                                                    Pablo 2021   
   
                      POC ACT    294 SECONDS High       100-150    [facility]   
[place]  
   
                                                    BMPon 2021   
   
                                                    Anion gap   
[Moles/Vol]     6 mmol/L        Normal          5-16            [facility]  
   
                                        Comment on above:   Order Comment: Campu  
s: M   
   
                                                            Performed By: #### L  
500.64801, L500.98403 ####[facility]   
ZRDKMQQLJZ8945 [place], [place] [address]Ye# [phone]   
   
                                                    Calcium   
[Mass/Vol]      9.6 mg/dL       Normal          8.5-10.5        [facility]  
   
                                        Comment on above:   Order Comment: Campu  
s: M   
   
                                                            Result Comment: NOTE  
 NEW NORMAL RANGE DUE TO REAGENT CHANGE   
   
                                                            Performed By: #### L  
500.77452, L500.36384 ####[facility]   
SPGBTVNMSR5710 [place], [place] [address]Oa# [phone]   
   
                                                    Chloride   
[Moles/Vol]     109 mmol/L      High                      [facility]  
   
                                        Comment on above:   Order Comment: Campu  
s: M   
   
                                                            Performed By: #### L  
500.36773, L500.12491 ####[facility]   
ZDEFWCOAKZ0254 [place], [place] [address]Dh# [phone]   
   
                      CO2 [Moles/Vol] 27.0 mmol/L Normal     21-32      [facility]  
   
                                        Comment on above:   Order Comment: Campu  
s: M   
   
                                                            Performed By: #### L  
500.45898, L500.16611 ####[facility]   
RDFZJTPNQK7249 [place], [place] [address]Wt# [phone]   
   
                                                    Creatinine   
[Mass/Vol]      0.46 mg/dL      Low             0.510-0.950     [facility]  
   
                                        Comment on above:   Order Comment: Campu  
s: M   
   
                                                            Result Comment: Kayla  
ents receiving either N-Acetylcysteine (NAC)   
orMetamizole prior to venipuncture, may have falsely   
depressedresults.   
   
                                                            Performed By: #### L  
500.15916, L500.52184 ####[facility]   
BELAAOAPMB7441 [place], [place] [address]Kf# [phone]   
   
                                                    Glucose   
[Mass/Vol]      120 mg/dL       High                      [facility]  
   
                                        Comment on above:   Order Comment: Campu  
s: M   
   
                                                            Result Comment: 70-1  
00- Normal Fasting; 100-125 Impaired Fasting;   
greaterthan 126 on more than one result- Diabetes. ADA   
guidelines.Results may be falsely elevated after the administration   
ofSulfapyridine.Results may be falsely depressed after the   
administration ofSulfasalazine.   
   
                                                            Performed By: #### L  
500.80851, L500.06193 ####[place] [address] [place], [place] [address]Fi# [phone]   
   
                                                    Potassium   
[Moles/Vol]     4.1 mmol/L      Normal          3.5-5.1         [facility]  
   
                                        Comment on above:   Order Comment: Campu  
s: M   
   
                                                            Performed By: #### L  
500.80748, L500.72081 ####[facility]   
[id number] [place] [address]Jd# [phone]   
   
                                                    Sodium   
[Moles/Vol]     142 mmol/L      Normal          136-145         [facility]  
   
                                        Comment on above:   Order Comment: Campu  
s: M   
   
                                                            Performed By: #### L  
500.88623, L500.09284 ####[facility]   
[id number] [place] [address]Ej# [phone]   
   
                                                    Urea nitrogen   
[Mass/Vol]      18 mg/dL        Normal          7-26            [facility]  
   
                                        Comment on above:   Order Comment: Campu  
s: M   
   
                                                            Performed By: #### L  
500.54166, L500.16608 ####[facility]   
[address]NJUKDIB[address] [place] [address]Pi# [phone]   
   
                                                    Urea   
nitrogen/Creatini  
ne [Mass ratio] 39 mg/mg        High            15-24           [facility]  
   
                                        Comment on above:   Order Comment: Campu  
s: M   
   
                                                            Performed By: #### L  
500.41337, L500.31250 ####[facility]   
HRWUNEHHDT9810 [place], [place] [address]Fr# [phone]   
   
                                                    CBCon 2021   
   
                                                    Erythrocyte   
distribution   
width (RBC)   
[Ratio]         17.6 %          High            11-14.5         [facility]  
   
                                        Comment on above:   Order Comment: Campu  
s: M   
   
                                                            Performed By: #### L  
200.59142 ####[facility]   
[id number] [place] [address]Au# [phone]   
   
                                                    Hematocrit (Bld)   
[Volume fraction] 36.5 %          Normal          35.0-47.0       [facility]  
   
                                        Comment on above:   Order Comment: Campu  
s: M   
   
                                                            Performed By: #### L  
200.34053 ####[facility]   
FAFAYCYYPH[address] [place] [address]Bp# [phone]   
   
                                                    Hemoglobin (Bld)   
[Mass/Vol]      11.0 g/dL       Low             11.5-15.5       [facility]  
   
                                        Comment on above:   Order Comment: Campu  
s: M   
   
                                                            Performed By: #### L  
200.42206 ####[facility]   
[address]HRTRTJ[address] [place] [address]Ux# [phone]   
   
                                                    MCHC (RBC)   
[Mass/Vol]      30.1 g/dL       Low             32.0-36.0       [facility]  
   
                                        Comment on above:   Order Comment: Campu  
s: M   
   
                                                            Performed By: #### L  
200.56386 ####[facility]   
[address]KCBCVPUW[address] [place] [address]Dz# [phone]   
   
                                                    MCV (RBC)   
[Entitic vol]   88.0 fL         Normal          80.0-99.0       [facility]  
   
                                        Comment on above:   Order Comment: Campu  
s: M   
   
                                                            Performed By: #### L  
200.66964 ####[facility]   
[address]UJ[address]KE[address] [place] [address]Dk# [phone]   
   
                                                    Nucleated RBC/100   
WBC (Bld) [Ratio] 0.0 %           Normal          Less than 1     [facility]  
   
                                        Comment on above:   Order Comment: Campu  
s: M   
   
                                                            Performed By: #### L  
200.50714 ####[facility]   
HQOCQFUBWV7776 [place], [place] [address]Eh# [phone]   
   
                                                    Platelet mean   
volume (Bld)   
[Entitic vol]   9.2 fL          Low             9.4-12.4        [facility]  
   
                                        Comment on above:   Order Comment: Campu  
s: M   
   
                                                            Performed By: #### L  
200.47082 ####[facility]   
[address]YHOWXVCH[address] [place] [address]Uw# [phone]   
   
                      PLT        298 K/CU MM Normal     150-450    [facility]  
   
                                        Comment on above:   Order Comment: Campu  
s: M   
   
                                                            Performed By: #### L  
200.27011 ####[address] [address]Gw# [phone]   
   
                      RBC        4.15 M/CU MM Normal     3.90-5.30  [facility]  
   
                                        Comment on above:   Order Comment: Campu  
s: M   
   
                                                            Performed By: #### L  
200.04619 ####[facility]   
[address]DXVCOYYB[address] [place] [address]Ni# [phone]   
   
                      WBC        9.0 K/CUMM Normal     4.5-11.0   [facility]  
   
                                        Comment on above:   Order Comment: Campu  
s: M   
   
                                                            Performed By: #### L  
200.21468 ####[facility]   
[id number] [place] [address]Cj# [phone]   
   
                                                    GFR ESTon 2021   
   
                      IF  AMER Greater than 60 Normal                [facility]   
[place]  
   
                                        Comment on above:   Order Comment: Campu  
s: M   
   
                                                            Performed By: #### L  
500.07436, L500.92278 ####[facility]   
[id number] [place] [address]Hf# [phone]   
   
                      IF non-AFR AMER Greater than 60 Normal                [facility]  
   
                                        Comment on above:   Order Comment: Campu  
s: M   
   
                                                            Performed By: #### L  
500.79831, L500.03706 ####[facility]   
[id number] [place] [address]Xm# [phone]   
   
                                                    PROG.NOTEon 2021   
   
                      PROG.NOTE             Normal                [facility]  
   
                                                    Progress   
Note-Physician                  Normal                          [facility]  
   
                                                    CBC W/DIFFon 2021   
   
                      BASO ABS   0.10 K/CU MM Normal     0-0.2      [facility]  
   
                                        Comment on above:   Order Comment: Campu  
s: M   
   
                                                            Performed By: #### L  
200.77750 ####[facility]   
[id number] [place], [place] [address]Zk# [phone]   
   
                                                    Basophils/100 WBC   
(Bld)           0.9 %           Normal          0-2             [facility]  
   
                                        Comment on above:   Order Comment: Campu  
s: M   
   
                                                            Performed By: #### L  
200.35769 ####[facility]   
[id number] [place] [address]Ra# [phone]   
   
                      EOS ABS    0.50 K/CU MM Normal     0-0.5      [facility]  
   
                                        Comment on above:   Order Comment: Campu  
s: M   
   
                                                            Performed By: #### L  
200.19791 ####[facility]   
[id number] [place] [address]Vl# [phone]   
   
                                                    Eosinophils/100   
WBC (Bld)       5.9 %           High            0-5             [facility]  
   
                                        Comment on above:   Order Comment: Campu  
s: M   
   
                                                            Performed By: #### L  
200.26535 ####[facility]   
[id number] [place] [address]Wn# [phone]   
   
                                                    Erythrocyte   
distribution   
width (RBC)   
[Ratio]         17.7 %          High            11-14.5         [facility]  
   
                                        Comment on above:   Order Comment: Campu  
s: M   
   
                                                            Performed By: #### L  
200.05783 ####[facility]   
[id number] [place] [address]Xi# [phone]   
   
                                                    Hematocrit (Bld)   
[Volume fraction] 39.3 %          Normal          35.0-47.0       [facility]  
   
                                        Comment on above:   Order Comment: Campu  
s: M   
   
                                                            Performed By: #### L  
200.31202 ####[facility]   
UOOQHOMEIL[address] [place] [address]Oo# [phone]   
   
                                                    Hemoglobin (Bld)   
[Mass/Vol]      11.8 g/dL       Normal          11.5-15.5       [facility]   
[place]  
   
                                        Comment on above:   Order Comment: Campu  
s: M   
   
                                                            Performed By: #### L  
200.04773 ####[facility]   
[id number] [place] [id number]# [phone]   
   
                      IMMATR GRAN ABS 0.10 K/CU MM Normal     Less than 2 [facility]   
[place]  
   
                                        Comment on above:   Order Comment: Campu  
s: M   
   
                                                            Performed By: #### L  
200.49589 ####[facility]   
[id number] [place], [place] [address]Ph# [phone]   
   
                      IMMATURE GRAN % 0.6 %      Normal     Less than 2 [facility]   
[place]  
   
                                        Comment on above:   Order Comment: Campu  
s: M   
   
                                                            Performed By: #### L  
200.58278 ####[place] [address]695[address]Ph# [phone]   
   
                      LYMPH ABS  3.30 K/CU MM Normal     0.9-4.4    [facility]   
[place]  
   
                                        Comment on above:   Order Comment: Campu  
s: M   
   
                                                            Performed By: #### L  
200.43317 ####[facility]   
[id number] [place], [place] [address]Ph# [phone]   
   
                                                    Lymphocytes/100   
WBC (Bld)       38.3 %          Normal          20-40           [facility]  
   
                                        Comment on above:   Order Comment: Campu  
s: M   
   
                                                            Performed By: #### L  
200.15168 ####[facility]   
[id number] [place], [place] [address]Ph# [phone]   
   
                                                    MCHC (RBC)   
[Mass/Vol]      30.0 g/dL       Low             32.0-36.0       [facility]   
[place]  
   
                                        Comment on above:   Order Comment: Campu  
s: M   
   
                                                            Performed By: #### L  
200.40134 ####[facility]   
[id number] [place], [place] [address]Ph# [phone]   
   
                                                    MCV (RBC)   
[Entitic vol]   88.7 fL         Normal          80.0-99.0       [facility]  
   
                                        Comment on above:   Order Comment: Campu  
s: M   
   
                                                            Performed By: #### L  
200.17698 ####[facility]   
ERMMLCLUEM9373 [place], [place] [address]Gz# [phone]   
   
                      MONO ABS   0.60 K/CU MM Normal     0.1-1.1    [facility]  
   
                                        Comment on above:   Order Comment: Campu  
s: M   
   
                                                            Performed By: #### L  
200.62033 ####[facility]   
[id number] [place], [place] [address]Ph# [phone]   
   
                                                    Monocytes/100 WBC   
(Bld)           7.4 %           Normal          2-10            [facility]  
   
                                        Comment on above:   Order Comment: Campu  
s: M   
   
                                                            Performed By: #### L  
200.12231 ####[place] [address]695[address]Ph# [phone]   
   
                      NEUTROPHIL ABS 4.00 K/CU MM Normal     2.0-8.3    [facility]  
   
                                        Comment on above:   Order Comment: Campu  
s: M   
   
                                                            Performed By: #### L  
200.20225 ####[facility]   
[id number] [place], [place] [address]Ph# [phone]   
   
                                                    Neutrophils/100   
WBC (Bld)       46.9 %          Normal          45-75           [facility]  
   
                                        Comment on above:   Order Comment: Campu  
s: M   
   
                                                            Performed By: #### L  
200.66672 ####[address] [address]Dp# [phone]   
   
                                                    Nucleated RBC/100   
WBC (Bld) [Ratio] 0.0 %           Normal          Less than 1     [facility]  
   
                                        Comment on above:   Order Comment: Campu  
s: M   
   
                                                            Performed By: #### L  
200.84884 ####[facility]   
[id number] [place], [place] [address]Ph# [phone]   
   
                                                    Platelet mean   
volume (Bld)   
[Entitic vol]   9.3 fL          Low             9.4-12.4        [facility]  
   
                                        Comment on above:   Order Comment: Campu  
s: M   
   
                                                            Performed By: #### L  
200.13852 ####[facility]   
[id number] [place], [place] [address]Ph# [phone]   
   
                      PLT        356 K/CU MM Normal     150-450    [facility]   
[place]  
   
                                        Comment on above:   Order Comment: Campu  
s: M   
   
                                                            Performed By: #### L  
200.14897 ####[facility]   
[id number] [place], [place] [address]Wg# [phone]   
   
                      RBC        4.43 M/CU MM Normal     3.90-5.30  [facility]  
   
                                        Comment on above:   Order Comment: Campu  
s: M   
   
                                                            Performed By: #### L  
200.25553 ####[facility]   
[id number] [place] [address]Fa# [phone]   
   
                      WBC        8.5 K/CUMM Normal     4.5-11.0   [facility]  
   
                                        Comment on above:   Order Comment: Campu  
s: M   
   
                                                            Performed By: #### L  
200.47661 ####[address] [address]Pf# [phone]   
   
                                                    CMPon 2021   
   
                                                    Albumin   
[Mass/Vol]      3.0 g/dL        Low             3.2-5.0         [facility]  
   
                                        Comment on above:   Order Comment: Campu  
s: M   
   
                                                            Performed By: #### L  
500.55828, L500.72256, L500.22600, L500.73137   
####[facility] [id number] [place], [place]   
[address]Cx# [phone]   
   
                                                    Albumin/Globulin   
[Mass ratio]    0.7 {ratio}     Low             0.8-2.0         [facility]  
   
                                        Comment on above:   Order Comment: Campu  
s: M   
   
                                                            Performed By: #### L  
500.36209, L500.33743, L500.09775, L500.92784   
####[facility] [id number] [place], [place]   
[address]Vk# [phone]   
   
                      ALK PHOS   139 U/L    High            [facility]  
   
                                        Comment on above:   Order Comment: Campu  
s: M   
   
                                                            Performed By: #### L  
500.62110, L500.17080, L500.21372, L500.72570   
####[facility] ZSWICHYJZJ3662 [place], [place]   
[address]Uu# [phone]   
   
                                                    ALT [Catalytic   
activity/Vol]   7 U/L           Low             13-61           [facility]  
   
                                        Comment on above:   Order Comment: Campu  
s: M   
   
                                                            Result Comment: RESU  
LTS MAY BE FALSELY DEPRESSED AFTER THE   
ADMINISTRATION OFSULFASALAZINE AND/OR SULFAPYRIDINE.   
   
                                                            Performed By: #### L  
500.41609, L500.34488, L500.92386, L500.27585   
####[facility] DROXVCQDWA[address] [place], [place]   
[address]Rp# [phone]   
   
                                                    Anion gap   
[Moles/Vol]     8 mmol/L        Normal          5-16            [facility]  
   
                                        Comment on above:   Order Comment: Campu  
s: M   
   
                                                            Performed By: #### L  
500.86804, L500.64950, L500.96870, L500.89753   
####[facility] YABZIZXJIK[address]7 [place], [place]   
[address]Kx# [phone]   
   
                                                    AST [Catalytic   
activity/Vol]   15 U/L          Normal          8-34            [facility]  
   
                                        Comment on above:   Order Comment: Campu  
s: M   
   
                                                            Result Comment: RESU  
LTS MAY BE FALSELY DEPRESSED AFTER THE   
ADMINISTRATION OFSULFASALAZINE AND/OR SULFAPYRIDINE.   
   
                                                            Performed By: #### L  
500.16945, L500.05306, L500.42911, L500.80783   
####[facility] GVCMGQAQBN3720 [place], [place]   
[address]Zs# [phone]   
   
                      BILI TOTAL 0.30 MG/DL Normal     0.2-1.0    [facility]  
   
                                        Comment on above:   Order Comment: Campu  
s: M   
   
                                                            Performed By: #### L  
500.18222, L500.30315, L500.32236, L500.30368   
####[facility] JJQSYMVLGP5361 [place], [place]   
[address]Cv# [phone]   
   
                                                    Calcium   
[Mass/Vol]      10.1 mg/dL      Normal          8.5-10.5        [facility]  
   
                                        Comment on above:   Order Comment: Campu  
s: M   
   
                                                            Result Comment: NOTE  
 NEW NORMAL RANGE DUE TO REAGENT CHANGE   
   
                                                            Performed By: #### L  
500.39166, L500.27164, L500.96835, L500.69883   
####[facility] GWCKJZGWFA[address] [place], [place]   
[address]Ej# [phone]   
   
                                                    Chloride   
[Moles/Vol]     107 mmol/L      Normal                    [facility]   
[place]  
   
                                        Comment on above:   Order Comment: Campu  
s: M   
   
                                                            Performed By: #### L  
500.53529, L500.19172, L500.88349, L500.64368   
####[facility] OTHGBRAPIL[address] [place], [place]   
[address]Yp# [phone]   
   
                      CO2 [Moles/Vol] 29.0 mmol/L Normal     21-32      [facility]   
[place]  
   
                                        Comment on above:   Order Comment: Campu  
s: M   
   
                                                            Performed By: #### L  
500.32301, L500.15696, L500.31048, L500.51472   
####[facility] IALWDWRVMI7265 [place], [place]   
[address]Yr# [phone]   
   
                                                    Creatinine   
[Mass/Vol]      0.50 mg/dL      Low             0.510-0.950     [facility]   
[place]  
   
                                        Comment on above:   Order Comment: Campu  
s: M   
   
                                                            Result Comment: Kayla  
ents receiving either N-Acetylcysteine (NAC)   
orMetamizole prior to venipuncture, may have falsely   
depressedresults.   
   
                                                            Performed By: #### L  
500.24385, L500.09913, L500.89688, L500.08713   
####[facility] XOPFODLQRW4539 [place], [place]   
[address]Nr# [phone]   
   
                                                    Globulin (S)   
[Mass/Vol]      4.3 g/dL        High            2.2-4.2         [facility]  
   
                                        Comment on above:   Order Comment: Campu  
s: M   
   
                                                            Performed By: #### L  
500.23594, L500.07201, L500.85052, L500.28588   
####[facility] [id number] [place], [place]   
[address]Dh# [phone]   
   
                                                    Glucose   
[Mass/Vol]      109 mg/dL       High                      [facility]  
   
                                        Comment on above:   Order Comment: Campu  
s: M   
   
                                                            Result Comment: 70-1  
00- Normal Fasting; 100-125 Impaired Fasting;   
greaterthan 126 on more than one result- Diabetes. ADA   
guidelines.Results may be falsely elevated after the administration   
ofSulfapyridine.Results may be falsely depressed after the   
administration ofSulfasalazine.   
   
                                                            Performed By: #### L  
500.84614, L500.13294, L500.89841, L500.69127   
####[facility] WUPEFRETYA4[address] [place], [place]   
[address]Uw# [phone]   
   
                                                    Potassium   
[Moles/Vol]     4.1 mmol/L      Normal          3.5-5.1         [facility]   
[place]  
   
                                        Comment on above:   Order Comment: Campu  
s: M   
   
                                                            Result Comment: Slig  
ht Hemolysis, Result may be affected.   
   
                                                            Performed By: #### L  
500.03374, L500.51865, L500.42363, L500.66232   
####[facility] LCYERTKFMB2903 [place], [place]   
[address]Jh# [phone]   
   
                                                    Protein   
[Mass/Vol]      7.3 g/dL        Normal          6.0-8.5         [facility]  
   
                                        Comment on above:   Order Comment: Campu  
s: M   
   
                                                            Performed By: #### L  
500.21685, L500.44589, L500.58572, L500.95554   
####[facility] BITODCTMVP5[address] [place], [place]   
[address]Aa# [phone]   
   
                                                    Sodium   
[Moles/Vol]     144 mmol/L      Normal          136-145         [facility]  
   
                                        Comment on above:   Order Comment: Campu  
s: M   
   
                                                            Performed By: #### L  
500.14265, L500.19947, L500.13249, L500.58338   
####[facility] WTIOMNKAFH3263 [place], [place]   
[address]Yv# [phone]   
   
                                                    Urea nitrogen   
[Mass/Vol]      17 mg/dL        Normal          7-26            [facility]  
   
                                        Comment on above:   Order Comment: Campu  
s: M   
   
                                                            Performed By: #### L  
500.87874, L500.72545, L500.53166, L500.75974   
####[facility] FVDYKZLNOC0362 [place], [place]   
[address]Ok# [phone]   
   
                                                    Urea   
nitrogen/Creatini  
ne [Mass ratio] 34 mg/mg        High            15-24           [facility]   
[place]  
   
                                        Comment on above:   Order Comment: Campu  
s: M   
   
                                                            Performed By: #### L  
500.23649, L500.69306, L500.70543, L500.68536   
####[facility] SKAPUDGJFM[address] [place], [place]   
[address]Vl# [phone]   
   
                                                    GFR ESTon 2021   
   
                      IF  AMER Greater than 60 Normal                [facility]   
[place]  
   
                                        Comment on above:   Order Comment: Campu  
s: M   
   
                                                            Performed By: #### L  
500.30510, L500.98147, L500.74790, L500.05144   
####[facility] CQJRWTJQXQ8119 [place], [place]   
[address]Xe# [phone]   
   
                      IF non-AFR AMER Greater than 60 Normal                [facility]  
   
                                        Comment on above:   Order Comment: Campu  
s: M   
   
                                                            Performed By: #### L  
500.90174, L500.64599, L500.60319, L500.75673   
####[facility] NYFERGGLJW8385 [place], [place]   
[address]Kk# [phone]   
   
                                                    MAGNESIUMon 2021   
   
                      MAGNESIUM  1.8 MG/CL  Normal     1.6-2.6    [facility]  
   
                                        Comment on above:   Order Comment: Campu  
s: M   
   
                                                            Performed By: #### L  
500.25637, L500.80798, L500.87242, L500.01236   
####[facility] GLAUHOJAHN4562 [place], [place]   
[address]Xv# [phone]   
   
                                                    PHOSon 2021   
   
                                                    Phosphate   
[Mass/Vol]      4.30 mg/dL      Normal          2.5-4.9         [facility]   
[place]  
   
                                        Comment on above:   Order Comment: Campu  
s: M   
   
                                                            Result Comment: Elev  
ated m-protein (paraprotein) levels in the serum   
may beexhibited in patients with monoclonal gammopathies,   
causingfalsely elevated inorganic phosphorus results.   
   
                                                            Performed By: #### L  
500.55951, L500.82276, L500.95490, L500.07575   
####[facility] XCKYXJASLL4288 [place], [place]   
[address]Bj# [phone]   
   
                                                    PROG.Mikayla 2021   
   
                      PROG.CARD             Normal                [facility]  
   
                                                    Progress   
Note-Cardiology                 Normal                          [facility]  
   
                                                    PROG.Mikayla 2021   
   
                      PROG.CARD             Normal                [facility]  
   
                                                    Progress   
Note-Cardiology                 Normal                          [facility] [facility]  
   
                                                    PROG.ORTHOon 2021   
   
                      PROG.ORTHO            Normal                [facility]  
   
                                                    Progress   
Note-Ortho                      Normal                          [facility]  
   
                                                    BMPon 2021   
   
                                                    Anion gap   
[Moles/Vol]     4 mmol/L        Low             5-16            [facility]  
   
                                        Comment on above:   Order Comment: Campu  
s: M   
   
                                                            Performed By: #### L  
500.28315, L500.83499 ####[facility]   
[id number] [place], [place] [address]Du# [phone]   
   
                                                    Calcium   
[Mass/Vol]      9.7 mg/dL       Normal          8.5-10.5        [facility]  
   
                                        Comment on above:   Order Comment: Campu  
s: M   
   
                                                            Result Comment: NOTE  
 NEW NORMAL RANGE DUE TO REAGENT CHANGE   
   
                                                            Performed By: #### L  
500.53392, L500.67664 ####[facility]   
[id number] [place], [place] [address]Uk# [phone]   
   
                                                    Chloride   
[Moles/Vol]     108 mmol/L      High                      [facility]  
   
                                        Comment on above:   Order Comment: Campu  
s: M   
   
                                                            Performed By: #### L  
500.02874, L500.21689 ####[facility]   
[id number] [place], [place] [address]Jx# [phone]   
   
                      CO2 [Moles/Vol] 29.0 mmol/L Normal     21-32      [facility]  
   
                                        Comment on above:   Order Comment: Campu  
s: M   
   
                                                            Performed By: #### L  
500.37501, L500.04928 ####[facility]   
[id number] [place], [place] [address]Va# [phone]   
   
                                                    Creatinine   
[Mass/Vol]      0.57 mg/dL      Normal          0.510-0.950     [facility]   
[place]  
   
                                        Comment on above:   Order Comment: Campu  
s: M   
   
                                                            Result Comment: Kayla  
ents receiving either N-Acetylcysteine (NAC)   
orMetamizole prior to venipuncture, may have falsely   
depressedresults.   
   
                                                            Performed By: #### L  
500.12731, L500.86992 ####[facility]   
[id number] [place], [place] [address]Ae# [phone]   
   
                                                    Glucose   
[Mass/Vol]      116 mg/dL       High                      [facility]  
   
                                        Comment on above:   Order Comment: Campu  
s: M   
   
                                                            Result Comment: 70-1  
00- Normal Fasting; 100-125 Impaired Fasting;   
greaterthan 126 on more than one result- Diabetes. ADA   
guidelines.Results may be falsely elevated after the administration   
ofSulfapyridine.Results may be falsely depressed after the   
administration ofSulfasalazine.   
   
                                                            Performed By: #### L  
500.89810, L500.28304 ####[facility]   
[id number] [place] [address]Hm# [phone]   
   
                                                    Potassium   
[Moles/Vol]     4.3 mmol/L      Normal          3.5-5.1         [facility]  
   
                                        Comment on above:   Order Comment: Campu  
s: M   
   
                                                            Performed By: #### L  
500.55452, L5.30033 ####[facility]   
[id number] [place] [address]Rw# [phone]   
   
                                                    Sodium   
[Moles/Vol]     141 mmol/L      Normal          136-145         [facility]  
   
                                        Comment on above:   Order Comment: Campu  
s: M   
   
                                                            Performed By: #### L  
500.73948, L500.47798 ####[facility]   
[id number] [place] [address]Wg# [phone]   
   
                                                    Urea nitrogen   
[Mass/Vol]      20 mg/dL        Normal          7-26            [facility]  
   
                                        Comment on above:   Order Comment: Campu  
s: M   
   
                                                            Performed By: #### L  
500.36192, L500.83138 ####[facility]   
[id number] [place], [place] [address]Fb# [phone]   
   
                                                    Urea   
nitrogen/Creatini  
ne [Mass ratio] 35 mg/mg        High            15-24           [facility]  
   
                                        Comment on above:   Order Comment: Campu  
s: M   
   
                                                            Performed By: #### L  
500.44151, L500.75137 ####[facility]   
[id number] [place], [place] [address]Ud# [phone]   
   
                                                    CBCon 2021   
   
                                                    Erythrocyte   
distribution   
width (RBC)   
[Ratio]         17.5 %          High            11-14.5         [facility]  
   
                                        Comment on above:   Order Comment: Campu  
s: M   
   
                                                            Performed By: #### L  
200.42501 ####[facility]   
[id number] [place] [address]Qo# [phone]   
   
                                                    Hematocrit (Bld)   
[Volume fraction] 36.7 %          Normal          35.0-47.0       [facility]  
   
                                        Comment on above:   Order Comment: Campu  
s: M   
   
                                                            Performed By: #### L  
200.48474 ####[facility]   
[id number] [place], [place] [address]Us# [phone]   
   
                                                    Hemoglobin (Bld)   
[Mass/Vol]      10.9 g/dL       Low             11.5-15.5       [facility]  
   
                                        Comment on above:   Order Comment: Campu  
s: M   
   
                                                            Performed By: #### L  
200.51192 ####[facility]   
[id number] [place] [address]Mi# [phone]   
   
                                                    MCHC (RBC)   
[Mass/Vol]      29.7 g/dL       Low             32.0-36.0       [facility]  
   
                                        Comment on above:   Order Comment: Campu  
s: M   
   
                                                            Performed By: #### L  
200.90229 ####[facility]   
[id number] [place], [place] [address]Zc# [phone]   
   
                                                    MCV (RBC)   
[Entitic vol]   87.6 fL         Normal          80.0-99.0       [facility]  
   
                                        Comment on above:   Order Comment: Campu  
s: M   
   
                                                            Performed By: #### L  
200.42827 ####[facility]   
[id number] [place], [place] [address]Cj# [phone]   
   
                                                    Nucleated RBC/100   
WBC (Bld) [Ratio] 0.0 %           Normal          Less than 1     [facility]   
[place]  
   
                                        Comment on above:   Order Comment: Campu  
s: M   
   
                                                            Performed By: #### L  
200.77059 ####[facility]   
[id number] [place], [place] [address]Lv# [phone]   
   
                                                    Platelet mean   
volume (Bld)   
[Entitic vol]   9.2 fL          Low             9.4-12.4        [facility]  
   
                                        Comment on above:   Order Comment: Campu  
s: M   
   
                                                            Performed By: #### L  
200.45686 ####[facility]   
[id number] [place] [address]Ry# [phone]   
   
                      PLT        427 K/CU MM Normal     150-450    [facility]  
   
                                        Comment on above:   Order Comment: Campu  
s: M   
   
                                                            Performed By: #### L  
200.80478 ####[facility]   
[id number] [place] [address]Vh# [phone]   
   
                      RBC        4.19 M/CU MM Normal     3.90-5.30  [facility]  
   
                                        Comment on above:   Order Comment: Campu  
s: M   
   
                                                            Performed By: #### L  
200.77447 ####[facility]   
[id number] [place] [address]Mb# [phone]   
   
                      WBC        10.0 K/CUMM Normal     4.5-11.0   [facility]  
   
                                        Comment on above:   Order Comment: Campu  
s: M   
   
                                                            Performed By: #### L  
200.90660 ####[facility]   
[id number] [place] [address]Vt# [phone]   
   
                                                    GFR ESTon 2021   
   
                      IF  AMER Greater than 60 Normal                [facility]  
   
                                        Comment on above:   Order Comment: Campu  
s: M   
   
                                                            Performed By: #### L  
500.01386, L500.88368 ####[facility]   
[id number] [place] [address]Zd# [phone]   
   
                      IF non-AFR AMER Greater than 60 Normal                [facility]  
   
                                        Comment on above:   Order Comment: Campu  
s: M   
   
                                                            Performed By: #### L  
500.21680, L500.93030 ####[facility]   
[id number] [place] [address]Je# [phone]   
   
                                                    PROG.Mikayla 2021   
   
                      PROG.CARD             Normal                [facility]  
   
                                                    Progress   
Note-Cardiology                 Normal                          [facility]  
   
                                                    5-HIAA, PLASMAon 2021   
   
                      5-HIAA,PLASMA 9 ng/mL    Normal     ()         [facility]  
   
                                        Comment on above:   Order Comment: Campu  
s: M   
   
                                                            Result Comment: This  
 5-HIAA result was determined using   
liquidchromatography-tandem mass spectrometry (LC-MS/MS).Values   
obtained cannot be evaluated interchangeably withdifferent assay   
methods or kits.This 5-HIAA result alone cannot be interpreted as   
absoluteevidence of the presence or absence of disease.The   
performance characteristics of this assay have beendetermined by   
[facility]. The result should not be used as adiagnostic procedure   
without confirmation of the diagnosisby another medically   
established diagnostic product orprocedure.Reference   
Range:<22Performed At: [email or web address] [address] [place], [place] [id number] Brian F [id number]   
   
                                                            Performed By: #### L  
550.82356 ####[facility] HospitalEAFZMIL[address] [place] [address]Eq# [phone]   
   
                                                    PROG.Mikayla 2021   
   
                      PROG.CARD             Normal                [facility]  
   
                                                    Progress   
Note-Cardiology                 Normal                          [facility]  
   
                                                    PROG.Mikayla 2021   
   
                      PROG.CARD             Normal                [facility]  
   
                                                    Progress   
Note-Cardiology                 Normal                          [facility]  
   
                                                    CBC W/DIFFon 2021   
   
                      BASO ABS   0.00 K/CU MM Normal     0-0.2      [facility]  
   
                                        Comment on above:   Order Comment: Campu  
s: M   
   
                                                            Performed By: #### L  
200.58163 ####[facility]   
[id number] [place], [place] [address]Yb# [phone]   
   
                                                    Basophils/100 WBC   
(Bld)           0.5 %           Normal          0-2             [facility]  
   
                                        Comment on above:   Order Comment: Campu  
s: M   
   
                                                            Performed By: #### L  
200.24110 ####[facility]   
[id number] [place], [place] [address]Pb# [phone]   
   
                      EOS ABS    0.40 K/CU MM Normal     0-0.5      [facility]   
[place]  
   
                                        Comment on above:   Order Comment: Campu  
s: M   
   
                                                            Performed By: #### L  
200.75416 ####[facility]   
[id number] [place], [place] [address]Ph# [phone]   
   
                                                    Eosinophils/100   
WBC (Bld)       4.7 %           Normal          0-5             [facility]   
[place]  
   
                                        Comment on above:   Order Comment: Campu  
s: M   
   
                                                            Performed By: #### L  
200.08401 ####[place] [address]Ph# [phone]   
   
                                                    Erythrocyte   
distribution   
width (RBC)   
[Ratio]         17.4 %          High            11-14.5         [facility]   
[place]  
   
                                        Comment on above:   Order Comment: Campu  
s: M   
   
                                                            Performed By: #### L  
200.97496 ####[place] [address]Ph# [phone]   
   
                                                    Hematocrit (Bld)   
[Volume fraction] 34.0 %          Low             35.0-47.0       [facility]   
[place]  
   
                                        Comment on above:   Order Comment: Campu  
s: M   
   
                                                            Performed By: #### L  
200.50204 ####[place] [address]Ph# [phone]   
   
                                                    Hemoglobin (Bld)   
[Mass/Vol]      10.0 g/dL       Low             11.5-15.5       [facility]   
[place]  
   
                                        Comment on above:   Order Comment: Campu  
s: M   
   
                                                            Performed By: #### L  
200.14331 ####[facility]   
[id number] [place], [place] [address]Ph# [phone]   
   
                      IMMATR GRAN ABS 0.00 K/CU MM Normal     Less than 2 [facility]   
[place]  
   
                                        Comment on above:   Order Comment: Campu  
s: M   
   
                                                            Performed By: #### L  
200.12238 ####[address] [address]Gj# [phone]   
   
                      IMMATURE GRAN % 0.5 %      Normal     Less than 2 [facility]   
[place]  
   
                                        Comment on above:   Order Comment: Campu  
s: M   
   
                                                            Performed By: #### L  
200.80845 ####[facility]   
[id number] [place], [place] [address]Lx# [phone]   
   
                      LYMPH ABS  2.90 K/CU MM Normal     0.9-4.4    [facility]   
[place]  
   
                                        Comment on above:   Order Comment: Campu  
s: M   
   
                                                            Performed By: #### L  
200.70648 ####[place] [address]Ph# [phone]   
   
                                                    Lymphocytes/100   
WBC (Bld)       33.7 %          Normal          20-40           [facility]  
   
                                        Comment on above:   Order Comment: Campu  
s: M   
   
                                                            Performed By: #### L  
200.86613 ####[place] [address]Ph# [phone]   
   
                                                    MCHC (RBC)   
[Mass/Vol]      29.4 g/dL       Low             32.0-36.0       [facility]  
   
                                        Comment on above:   Order Comment: Campu  
s: M   
   
                                                            Performed By: #### L  
200.24217 ####[address] [address]Lk# [phone]   
   
                                                    MCV (RBC)   
[Entitic vol]   88.5 fL         Normal          80.0-99.0       [facility]  
   
                                        Comment on above:   Order Comment: Campu  
s: M   
   
                                                            Performed By: #### L  
200.25322 ####[address] [address]Bp# [phone]   
   
                      MONO ABS   0.80 K/CU MM Normal     0.1-1.1    [facility]   
[place]  
   
                                        Comment on above:   Order Comment: Campu  
s: M   
   
                                                            Performed By: #### L  
200.25400 ####[facility]   
[id number] [place] [address]Il# [phone]   
   
                                                    Monocytes/100 WBC   
(Bld)           9.0 %           Normal          2-10            [facility]  
   
                                        Comment on above:   Order Comment: Campu  
s: M   
   
                                                            Performed By: #### L  
200.46382 ####[facility]   
[id number] [place], [place] [address]Ph# [phone]   
   
                      NEUTROPHIL ABS 4.40 K/CU MM Normal     2.0-8.3    [facility]   
[place]  
   
                                        Comment on above:   Order Comment: Campu  
s: M   
   
                                                            Performed By: #### L  
200.18905 ####[facility]   
[id number] [place], [place] [address]Dl# [phone]   
   
                                                    Neutrophils/100   
WBC (Bld)       51.6 %          Normal          45-75           [facility]  
   
                                        Comment on above:   Order Comment: Campu  
s: M   
   
                                                            Performed By: #### L  
200.41960 ####[address] [address]Ro# [phone]   
   
                                                    Nucleated RBC/100   
WBC (Bld) [Ratio] 0.0 %           Normal          Less than 1     [facility]  
   
                                        Comment on above:   Order Comment: Campu  
s: M   
   
                                                            Performed By: #### L  
200.58506 ####[address] [address]Bb# [phone]   
   
                                                    Platelet mean   
volume (Bld)   
[Entitic vol]   9.0 fL          Low             9.4-12.4        [facility]  
   
                                        Comment on above:   Order Comment: Campu  
s: M   
   
                                                            Performed By: #### L  
200.99889 ####[facility]   
[id number] [place], [place] [address]Ui# [phone]   
   
                      PLT        449 K/CU MM Normal     150-450    [facility]  
   
                                        Comment on above:   Order Comment: Campu  
s: M   
   
                                                            Performed By: #### L  
200.60240 ####[facility]   
[id number] [place] [address]Jz# [phone]   
   
                      RBC        3.84 M/CU MM Low        3.90-5.30  [facility]  
   
                                        Comment on above:   Order Comment: Campu  
s: M   
   
                                                            Performed By: #### L  
200.94308 ####[facility]   
[id number] [place], [place] [address]Bi# [phone]   
   
                      WBC        8.6 K/CUMM Normal     4.5-11.0   [facility]  
   
                                        Comment on above:   Order Comment: Campu  
s: M   
   
                                                            Performed By: #### L  
200.65457 ####[facility]   
[id number] [place], [place] [address]Ta# [phone]   
   
                                                    CMPon 2021   
   
                                                    Albumin   
[Mass/Vol]      2.4 g/dL        Low             3.2-5.0         [facility]  
   
                                        Comment on above:   Order Comment: Campu  
s: M   
   
                                                            Performed By: #### L  
500.78108, L500.66767, L500.20527, L500.73435   
####[facility] [id number] [place], [place]   
[address]Nb# [phone]   
   
                                                    Albumin/Globulin   
[Mass ratio]    0.6 {ratio}     Low             0.8-2.0         [facility]  
   
                                        Comment on above:   Order Comment: Campu  
s: M   
   
                                                            Performed By: #### L  
500.85457, L500.14348, L500.84441, L500.88873   
####[facility] [id number] [place], [place]   
[address]Vd# [phone]   
   
                      ALK PHOS   118 U/L    High            [facility]  
   
                                        Comment on above:   Order Comment: Campu  
s: M   
   
                                                            Performed By: #### L  
500.14767, L500.84778, L500.24213, L500.37483   
####[facility] [id number] [place], [place]   
[address]Su# [phone]   
   
                                                    ALT [Catalytic   
activity/Vol]   7 U/L           Low             13-61           [facility]  
   
                                        Comment on above:   Order Comment: Campu  
s: M   
   
                                                            Result Comment: RESU  
LTS MAY BE FALSELY DEPRESSED AFTER THE   
ADMINISTRATION OFSULFASALAZINE AND/OR SULFAPYRIDINE.   
   
                                                            Performed By: #### L  
500.37242, L500.32632, L500.92968, L500.76347   
####[facility] [id number] [place], [place]   
[address]Pt# [phone]   
   
                                                    Anion gap   
[Moles/Vol]     5 mmol/L        Normal          5-16            [facility]  
   
                                        Comment on above:   Order Comment: Campu  
s: M   
   
                                                            Performed By: #### L  
500.16422, L500.27337, L500.89540, L500.06385   
####[facility] GCXMSLXCZQ1468 [place], [place]   
[address]Wh# [phone]   
   
                                                    AST [Catalytic   
activity/Vol]   14 U/L          Normal          8-34            [facility]  
   
                                        Comment on above:   Order Comment: Campu  
s: M   
   
                                                            Result Comment: RESU  
LTS MAY BE FALSELY DEPRESSED AFTER THE   
ADMINISTRATION OFSULFASALAZINE AND/OR SULFAPYRIDINE.   
   
                                                            Performed By: #### L  
500.70836, L500.15255, L500.43702, L500.65000   
####[facility] TYLNYGBCHF3673 [place], [place]   
[address]Jw# [phone]   
   
                      BILI TOTAL 0.30 MG/DL Normal     0.2-1.0    [facility]  
   
                                        Comment on above:   Order Comment: Campu  
s: M   
   
                                                            Performed By: #### L  
500.93506, L500.10998, L500.93553, L500.03241   
####[facility] YTMDXZWFDZ0715 [place], [place]   
[address]Id# [phone]   
   
                                                    Calcium   
[Mass/Vol]      9.3 mg/dL       Normal          8.5-10.5        [facility]  
   
                                        Comment on above:   Order Comment: Campu  
s: M   
   
                                                            Result Comment: NOTE  
 NEW NORMAL RANGE DUE TO REAGENT CHANGE   
   
                                                            Performed By: #### L  
500.63925, L500.04908, L500.01401, L500.90253   
####[facility] VNKSZWBEBV1661 [place], [place]   
[address]Iu# [phone]   
   
                                                    Chloride   
[Moles/Vol]     109 mmol/L      High                      [facility]  
   
                                        Comment on above:   Order Comment: Campu  
s: M   
   
                                                            Performed By: #### L  
500.01731, L500.78996, L500.91854, L500.28104   
####[facility] DITCAQNSDL2421 [place], [place]   
[address]Ry# [phone]   
   
                      CO2 [Moles/Vol] 30.0 mmol/L Normal     21-32      [facility]  
   
                                        Comment on above:   Order Comment: Campu  
s: M   
   
                                                            Performed By: #### L  
500.80829, L500.89448, L500.28297, L500.65814   
####[facility] [id number] [place], [place]   
[address]Mc# [phone]   
   
                                                    Creatinine   
[Mass/Vol]      0.60 mg/dL      Normal          0.510-0.950     [facility]  
   
                                        Comment on above:   Order Comment: Campu  
s: M   
   
                                                            Result Comment: Kayla  
ents receiving either N-Acetylcysteine (NAC)   
orMetamizole prior to venipuncture, may have falsely   
depressedresults.   
   
                                                            Performed By: #### L  
500.71644, L500.94438, L500.11134, L500.27022   
####[facility] [id number] [place], [place]   
[address]Fb# [phone]   
   
                                                    Globulin (S)   
[Mass/Vol]      3.8 g/dL        Normal          2.2-4.2         [facility]  
   
                                        Comment on above:   Order Comment: Campu  
s: M   
   
                                                            Performed By: #### L  
500.04860, L500.49212, L500.10152, L500.89175   
####[facility] [id number] [place], [place]   
[address]Lv# [phone]   
   
                                                    Glucose   
[Mass/Vol]      87 mg/dL        Normal                    [facility]  
   
                                        Comment on above:   Order Comment: Darinu  
s: M   
   
                                                            Result Comment: 70-1  
00- Normal Fasting; 100-125 Impaired Fasting;   
greaterthan 126 on more than one result- Diabetes. ADA   
guidelines.Results may be falsely elevated after the administration   
ofSulfapyridine.Results may be falsely depressed after the   
administration ofSulfasalazine.   
   
                                                            Performed By: #### L  
500.70108, L500.34805, L500.22166, L500.89545   
####[facility] KVCJKDJZVP5306 [place], [place]   
[address]Jy# [phone]   
   
                                                    Potassium   
[Moles/Vol]     4.1 mmol/L      Normal          3.5-5.1         [facility]  
   
                                        Comment on above:   Order Comment: Campu  
s: M   
   
                                                            Performed By: #### L  
500.47354, L500.74568, L500.28062, L500.56635   
####[facility] HQVUPFHOKY2389 [place], [place]   
[address]Zw# [phone]   
   
                                                    Protein   
[Mass/Vol]      6.2 g/dL        Normal          6.0-8.5         [facility]  
   
                                        Comment on above:   Order Comment: Campu  
s: M   
   
                                                            Performed By: #### L  
500.16850, L500.46589, L500.15032, L500.97477   
####[facility] [id number] [place], [place]   
[address]Ik# [phone]   
   
                                                    Sodium   
[Moles/Vol]     144 mmol/L      Normal          136-145         [facility]  
   
                                        Comment on above:   Order Comment: Campu  
s: M   
   
                                                            Performed By: #### L  
500.17132, L500.36930, L500.57127, L500.26263   
####[facility] QWBNYDRDQT8825 [place], [place]   
[address]Sb# [phone]   
   
                                                    Urea nitrogen   
[Mass/Vol]      18 mg/dL        Normal          7-26            [facility]  
   
                                        Comment on above:   Order Comment: Campu  
s: M   
   
                                                            Performed By: #### L  
500.87572, L500.10881, L500.28926, L500.79439   
####[facility] [id number] [place], [place]   
[address]Um# [phone]   
   
                                                    Urea   
nitrogen/Creatini  
ne [Mass ratio] 30 mg/mg        High            15-24           [facility]  
   
                                        Comment on above:   Order Comment: Campu  
s: M   
   
                                                            Performed By: #### L  
500.40430, L500.79757, L500.29302, L500.74530   
####[facility] YOAPPJCWMV5542 [place], [place]   
[address]Hm# [phone]   
   
                                                    CONS.Mikayla 2021   
   
                      CONS.CARD             Normal                [facility]  
   
                                                    Consultation-Card  
iology                          Normal                          [facility]   
[place]  
   
                                                    GFR ESTon 2021   
   
                      IF  AMER Greater than 60 Normal                [facility]  
   
                                        Comment on above:   Order Comment: Campu  
s: M   
   
                                                            Performed By: #### L  
500.32228, L500.20894, L500.13358, L500.45989   
####[facility] EPSNPSDZMR0539 [place], [place]   
[address]Rw# [phone]   
   
                      IF non-AFR AMER Greater than 60 Normal                [facility]  
   
                                        Comment on above:   Order Comment: Campu  
s: M   
   
                                                            Performed By: #### L  
500.77791, L500.78821, L500.81708, L500.73524   
####[facility] WCVBEEQSSN7407 [place], [place]   
[address]Uk# [phone]   
   
                                                    MAGNESIUMon 2021   
   
                      MAGNESIUM  2.1 MG/CL  Normal     1.6-2.6    [facility]  
   
                                        Comment on above:   Order Comment: Campu  
s: M   
   
                                                            Performed By: #### L  
500.39646, L500.91354, L500.82990, L500.72111   
####[facility] TGRXGDHSZM9237 [place], [place]   
[address]Hr# [phone]   
   
                                                    PHOSon 2021   
   
                                                    Phosphate   
[Mass/Vol]      4.50 mg/dL      Normal          2.5-4.9         [facility]  
   
                                        Comment on above:   Order Comment: Campu  
s: M   
   
                                                            Result Comment: Elev  
ated m-protein (paraprotein) levels in the serum   
may beexhibited in patients with monoclonal gammopathies,   
causingfalsely elevated inorganic phosphorus results.   
   
                                                            Performed By: #### L  
500.68271, L500.89012, L500.63292, L500.53900   
####[facility] HSTFMZJTHU0198 [place], [place]   
[address]Wb# [phone]   
   
                                                    CBC W/DIFFon 2021   
   
                      BASO ABS   0.10 K/CU MM Normal     0-0.2      [facility]  
   
                                        Comment on above:   Order Comment: Campu  
s: M   
   
                                                            Performed By: #### L  
200.11444 ####[facility]   
[id number] [place], [place] [address]Ww# [phone]   
   
                                                    Basophils/100 WBC   
(Bld)           0.6 %           Normal          0-2             [facility]  
   
                                        Comment on above:   Order Comment: Campu  
s: M   
   
                                                            Performed By: #### L  
200.36179 ####[facility]   
EYVHVRAFCH5663 [place], [place] [address]Cn# [phone]   
   
                      EOS ABS    0.40 K/CU MM Normal     0-0.5      [facility]   
[place]  
   
                                        Comment on above:   Order Comment: Campu  
s: M   
   
                                                            Performed By: #### L  
200.86440 ####[facility]   
[id number] [place] [address]Ga# [phone]   
   
                                                    Eosinophils/100   
WBC (Bld)       4.4 %           Normal          0-5             [facility]   
[place]  
   
                                        Comment on above:   Order Comment: Campu  
s: M   
   
                                                            Performed By: #### L  
200.39889 ####[place] [id number]# [phone]   
   
                                                    Erythrocyte   
distribution   
width (RBC)   
[Ratio]         18.1 %          High            11-14.5         [facility]   
[place]  
   
                                        Comment on above:   Order Comment: Campu  
s: M   
   
                                                            Performed By: #### L  
200.47166 ####[facility]   
[id number] [place], [place] [address]Ph# [phone]   
   
                                                    Hematocrit (Bld)   
[Volume fraction] 31.9 %          Low             35.0-47.0       [facility]   
[place]  
   
                                        Comment on above:   Order Comment: Campu  
s: M   
   
                                                            Performed By: #### L  
200.40983 ####[facility]   
RROHHVXFJV[address] [place] [address]Wm# [phone]   
   
                                                    Hemoglobin (Bld)   
[Mass/Vol]      9.7 g/dL        Low             11.5-15.5       [facility]  
   
                                        Comment on above:   Order Comment: Campu  
s: M   
   
                                                            Performed By: #### L  
200.58851 ####[facility]   
[id number] [place] [address]Jd# [phone]   
   
                      IMMATR GRAN ABS 0.10 K/CU MM Normal     Less than 2 [facility]   
[place]  
   
                                        Comment on above:   Order Comment: Campu  
s: M   
   
                                                            Performed By: #### L  
200.26865 ####[facility]   
HYLZMYWGCZ[address] [place], [place] [address]Ph# [phone]   
   
                      IMMATURE GRAN % 0.6 %      Normal     Less than 2 [facility]   
[place]  
   
                                        Comment on above:   Order Comment: Campu  
s: M   
   
                                                            Performed By: #### L  
200.23548 ####[facility]   
[id number] [place] [address]Uc# [phone]   
   
                      LYMPH ABS  2.90 K/CU MM Normal     0.9-4.4    [facility]   
[place]  
   
                                        Comment on above:   Order Comment: Campu  
s: M   
   
                                                            Performed By: #### L  
200.52975 ####[facility]   
[address]YAE[address]LE[address] [place] [address]Yb# [phone]   
   
                                                    Lymphocytes/100   
WBC (Bld)       29.6 %          Normal          20-40           [facility]  
   
                                        Comment on above:   Order Comment: Campu  
s: M   
   
                                                            Performed By: #### L  
200.67985 ####[address] [address]Em# [phone]   
   
                                                    MCHC (RBC)   
[Mass/Vol]      30.4 g/dL       Low             32.0-36.0       [facility]   
[place]  
   
                                        Comment on above:   Order Comment: Campu  
s: M   
   
                                                            Performed By: #### L  
200.87713 ####[address] [address]Qb# [phone]   
   
                                                    MCV (RBC)   
[Entitic vol]   86.0 fL         Normal          80.0-99.0       [facility]  
   
                                        Comment on above:   Order Comment: Campu  
s: M   
   
                                                            Performed By: #### L  
200.61878 ####[facility]   
[id number] [place] [address]Fi# [phone]   
   
                      MONO ABS   1.00 K/CU MM Normal     0.1-1.1    [facility]   
[place]  
   
                                        Comment on above:   Order Comment: Campu  
s: M   
   
                                                            Performed By: #### L  
200.44360 ####[facility]   
[id number] [place] [address]Cz# [phone]   
   
                                                    Monocytes/100 WBC   
(Bld)           10.3 %          High            2-10            [facility]   
[place]  
   
                                        Comment on above:   Order Comment: Campu  
s: M   
   
                                                            Performed By: #### L  
200.76362 ####[facility]   
[id number] [place], [place] [address]Dc# [phone]   
   
                      NEUTROPHIL ABS 5.40 K/CU MM Normal     2.0-8.3    [facility]  
   
                                        Comment on above:   Order Comment: Campu  
s: M   
   
                                                            Performed By: #### L  
200.91066 ####[facility]   
JAXKVKWHEO6980 [place], [place] [address]Pl# [phone]   
   
                                                    Neutrophils/100   
WBC (Bld)       54.5 %          Normal          45-75           [facility]  
   
                                        Comment on above:   Order Comment: Campu  
s: M   
   
                                                            Performed By: #### L  
200.95838 ####[place] [address]695[address]Ph# [phone]   
   
                                                    Nucleated RBC/100   
WBC (Bld) [Ratio] 0.0 %           Normal          Less than 1     [facility]  
   
                                        Comment on above:   Order Comment: Campu  
s: M   
   
                                                            Performed By: #### L  
200.96027 ####[facility]   
YFI[address]KTAJ[address] [place], [place] [address]Ph# [phone]   
   
                                                    Platelet mean   
volume (Bld)   
[Entitic vol]   9.1 fL          Low             9.4-12.4        [facility]  
   
                                        Comment on above:   Order Comment: Campu  
s: M   
   
                                                            Performed By: #### L  
200.55623 ####[facility]   
[id number] [place] [address]Of# [phone]   
   
                      PLT        390 K/CU MM Normal     150-450    [facility]  
   
                                        Comment on above:   Order Comment: Campu  
s: M   
   
                                                            Performed By: #### L  
200.67009 ####[facility]   
[id number] [place], [place] [address]Hv# [phone]   
   
                      RBC        3.71 M/CU MM Low        3.90-5.30  [facility]  
   
                                        Comment on above:   Order Comment: Campu  
s: M   
   
                                                            Performed By: #### L  
200.74839 ####[facility]   
[address] [place] [address]Mb# [phone]   
   
                      WBC        9.9 K/CUMM Normal     4.5-11.0   [facility]  
   
                                        Comment on above:   Order Comment: Campu  
s: M   
   
                                                            Performed By: #### L  
200.41155 ####[facility]   
[id number] [place], [place] [address]Mf# [phone]   
   
                                                    CMPon 2021   
   
                                                    Albumin   
[Mass/Vol]      2.0 g/dL        Low             3.2-5.0         [facility]  
   
                                        Comment on above:   Order Comment: Campu  
s: M   
   
                                                            Performed By: #### L  
500.46142, L500.14805, L500.84697, L500.53611   
####[facility] [id number] [place], [place]   
[address]Zr# [phone]   
   
                                                    Albumin/Globulin   
[Mass ratio]    0.5 {ratio}     Low             0.8-2.0         [facility]  
   
                                        Comment on above:   Order Comment: Campu  
s: M   
   
                                                            Performed By: #### L  
500.36430, L500.52065, L500.42516, L500.05248   
####[facility] [id number] [place], [place]   
[address]Cz# [phone]   
   
                      ALK PHOS   112 U/L    Normal          [facility]  
   
                                        Comment on above:   Order Comment: Campu  
s: M   
   
                                                            Performed By: #### L  
500.23934, L500.80116, L500.61744, L500.64528   
####[facility] YVIFDXCNBT3699 [place], [place]   
[address]Ax# [phone]   
   
                                                    ALT [Catalytic   
activity/Vol]   7 U/L           Low             13-61           [facility]  
   
                                        Comment on above:   Order Comment: Campu  
s: M   
   
                                                            Result Comment: RESU  
LTS MAY BE FALSELY DEPRESSED AFTER THE   
ADMINISTRATION OFSULFASALAZINE AND/OR SULFAPYRIDINE.   
   
                                                            Performed By: #### L  
500.81972, L500.54636, L500.21425, L500.09243   
####[facility] [id number] [place], [place]   
[address]Pg# [phone]   
   
                                                    Anion gap   
[Moles/Vol]     6 mmol/L        Normal          5-16            [facility]  
   
                                        Comment on above:   Order Comment: Campu  
s: M   
   
                                                            Performed By: #### L  
500.02845, L500.95144, L500.55524, L500.35405   
####[facility] [id number] [place], [place]   
[address]Mw# [phone]   
   
                                                    AST [Catalytic   
activity/Vol]   20 U/L          Normal          8-34            [facility]  
   
                                        Comment on above:   Order Comment: Campu  
s: M   
   
                                                            Result Comment: RESU  
LTS MAY BE FALSELY DEPRESSED AFTER THE   
ADMINISTRATION OFSULFASALAZINE AND/OR SULFAPYRIDINE.   
   
                                                            Performed By: #### L  
500.39570, L500.44316, L500.67256, L500.67630   
####[facility] WYSHHEXPLV7415 [place], [place]   
[address]Ka# [phone]   
   
                      BILI TOTAL 0.40 MG/DL Normal     0.2-1.0    [facility]  
   
                                        Comment on above:   Order Comment: Campu  
s: M   
   
                                                            Performed By: #### L  
500.68428, L500.47656, L500.54451, L500.33169   
####[facility] [id number] [place], [place]   
[address]Dn# [phone]   
   
                                                    Calcium   
[Mass/Vol]      8.8 mg/dL       Normal          8.5-10.5        [facility]  
   
                                        Comment on above:   Order Comment: Campu  
s: M   
   
                                                            Result Comment: NOTE  
 NEW NORMAL RANGE DUE TO REAGENT CHANGE   
   
                                                            Performed By: #### L  
500.07514, L500.01931, L500.71255, L500.24517   
####[facility] [id number] [place], [place]   
[address]Cc# [phone]   
   
                                                    Chloride   
[Moles/Vol]     110 mmol/L      High                      [facility]  
   
                                        Comment on above:   Order Comment: Campu  
s: M   
   
                                                            Performed By: #### L  
500.34756, L500.79746, L500.77119, L500.83992   
####[facility] IOSYPYAHEB2917 [place], [place]   
[address]Td# [phone]   
   
                      CO2 [Moles/Vol] 27.0 mmol/L Normal     21-32      [facility]  
   
                                        Comment on above:   Order Comment: Campu  
s: M   
   
                                                            Performed By: #### L  
500.63731, L500.12308, L500.48217, L500.77010   
####[facility] [id number] [place], [place]   
[address]Xs# [phone]   
   
                                                    Creatinine   
[Mass/Vol]      0.45 mg/dL      Low             0.510-0.950     [facility]  
   
                                        Comment on above:   Order Comment: Campu  
s: M   
   
                                                            Result Comment: Kayla  
ents receiving either N-Acetylcysteine (NAC)   
orMetamizole prior to venipuncture, may have falsely   
depressedresults.   
   
                                                            Performed By: #### L  
500.56319, L500.59985, L500.71942, L500.04830   
####[facility] [id number] [place], [place]   
[address]Xv# [phone]   
   
                                                    Globulin (S)   
[Mass/Vol]      3.7 g/dL        Normal          2.2-4.2         [facility]  
   
                                        Comment on above:   Order Comment: Campu  
s: M   
   
                                                            Performed By: #### L  
500.51522, L500.90255, L500.13049, L500.19424   
####[facility] [id number] [place], [place]   
[address]Nj# [phone]   
   
                                                    Glucose   
[Mass/Vol]      194 mg/dL       High                      [facility]  
   
                                        Comment on above:   Order Comment: Campu  
s: M   
   
                                                            Result Comment: 70-1  
00- Normal Fasting; 100-125 Impaired Fasting;   
greaterthan 126 on more than one result- Diabetes. ADA   
guidelines.Results may be falsely elevated after the administration   
ofSulfapyridine.Results may be falsely depressed after the   
administration ofSulfasalazine.   
   
                                                            Performed By: #### L  
500.05681, L500.65811, L500.02343, L500.11591   
####[facility] TJYEJHFLIW3905 [place], [place]   
[address]Hz# [phone]   
   
                                                    Potassium   
[Moles/Vol]     4.0 mmol/L      Normal          3.5-5.1         [facility]  
   
                                        Comment on above:   Order Comment: Campu  
s: M   
   
                                                            Result Comment: Slig  
ht Hemolysis, Result may be affected.   
   
                                                            Performed By: #### L  
500.22129, L500.20289, L500.56336, L500.80177   
####[facility] [id number] [place], [place]   
[address]Ec# [phone]   
   
                                                    Protein   
[Mass/Vol]      5.7 g/dL        Low             6.0-8.5         [facility]   
[place]  
   
                                        Comment on above:   Order Comment: Campu  
s: M   
   
                                                            Performed By: #### L  
500.21849, L500.94586, L500.83142, L500.69626   
####[facility] [id number] [place], [place]   
[address]Sr# [phone]   
   
                                                    Sodium   
[Moles/Vol]     143 mmol/L      Normal          136-145         [facility]  
   
                                        Comment on above:   Order Comment: Campu  
s: M   
   
                                                            Performed By: #### L  
500.25972, L500.90635, L500.97484, L500.03399   
####[facility] [id number] [place] [address]   
[address]Jy# [phone]   
   
                                                    Urea nitrogen   
[Mass/Vol]      7 mg/dL         Normal          7-26            [facility]  
   
                                        Comment on above:   Order Comment: Campu  
s: M   
   
                                                            Performed By: #### L  
500.19669, L500.27456, L500.85790, L500.50533   
####[facility] NXCJHILGAP7232 [place], [place]   
[address]Us# [phone]   
   
                                                    Urea   
nitrogen/Creatini  
ne [Mass ratio] 15 mg/mg        Normal          15-24           [facility]  
   
                                        Comment on above:   Order Comment: Campu  
s: M   
   
                                                            Performed By: #### L  
500.56439, L500.13689, L500.33389, L500.50422   
####[facility] COHBQFNGJS6148 [place], [place]   
[address]Pc# [phone]   
   
                                                    GFR ESTon 2021   
   
                      IF  AMER Greater than 60 Normal                [facility]  
   
                                        Comment on above:   Order Comment: Campu  
s: M   
   
                                                            Performed By: #### L  
500.99576, L500.71117, L500.68887, L500.39374   
####[facility] ZVLBSCZODY1908 [place], [place]   
[address]Do# [phone]   
   
                      IF non-AFR AMER Greater than 60 Normal                [facility]  
   
                                        Comment on above:   Order Comment: Campu  
s: M   
   
                                                            Performed By: #### L  
500.59494, L500.21122, L500.67216, L500.87462   
####[facility] MTYALROBTW8326 [place], [place]   
[address]Ua# [phone]   
   
                                                    MAGNESIUMon 2021   
   
                      MAGNESIUM  1.9 MG/CL  Normal     1.6-2.6    [facility]  
   
                                        Comment on above:   Order Comment: Campu  
s: M   
   
                                                            Performed By: #### L  
500.77714, L500.97098, L500.81011, L500.13440   
####[facility] SHWOGYEHHB9129 [place], [place]   
[address]Nk# [phone]   
   
                                                    PHOSon 2021   
   
                                                    Phosphate   
[Mass/Vol]      4.10 mg/dL      Normal          2.5-4.9         [facility]  
   
                                        Comment on above:   Order Comment: Campu  
s: M   
   
                                                            Result Comment: Elev  
ated m-protein (paraprotein) levels in the serum   
may beexhibited in patients with monoclonal gammopathies,   
causingfalsely elevated inorganic phosphorus results.   
   
                                                            Performed By: #### L  
500.69892, L500.02360, L500.68438, L500.68809   
####[facility] [id number] [place], [place]   
[address]Te# [phone]   
   
                                                    PROG.NOTEon 2021   
   
                      PROG.NOTE             Normal                [facility]  
   
                                                    Progress   
Note-Physician                  Normal                          [facility]  
   
                                                    BMPon 2021   
   
                                                    Anion gap   
[Moles/Vol]     4 mmol/L        Low             5-16            [facility]  
   
                                        Comment on above:   Order Comment: Campu  
s: M   
   
                                                            Performed By: #### L  
500.58024, L500.20018 ####[facility]   
WZKXBSTQAA8705 [place], [place] [address]Tz# [phone]   
   
                                                    Calcium   
[Mass/Vol]      9.1 mg/dL       Normal          8.5-10.5        [facility]  
   
                                        Comment on above:   Order Comment: Campu  
s: M   
   
                                                            Result Comment: NOTE  
 NEW NORMAL RANGE DUE TO REAGENT CHANGE   
   
                                                            Performed By: #### L  
500.03692, L500.94541 ####[facility]   
AJFVESZHUJ0085 [place], [place] [address]On# [phone]   
   
                                                    Chloride   
[Moles/Vol]     110 mmol/L      High                      [facility]  
   
                                        Comment on above:   Order Comment: Campu  
s: M   
   
                                                            Performed By: #### L  
500.75792, L500.83615 ####[facility]   
GUZEDLISRW1057 [place], [place] [address]Ov# [phone]   
   
                      CO2 [Moles/Vol] 29.0 mmol/L Normal     21-32      [facility]  
   
                                        Comment on above:   Order Comment: Campu  
s: M   
   
                                                            Performed By: #### L  
500.54423, L500.61480 ####[facility]   
AAVUYKPUPV8135 [place], [place] [address]Jy# [phone]   
   
                                                    Creatinine   
[Mass/Vol]      0.45 mg/dL      Low             0.510-0.950     [facility]  
   
                                        Comment on above:   Order Comment: Campu  
s: M   
   
                                                            Result Comment: Kayla  
ents receiving either N-Acetylcysteine (NAC)   
orMetamizole prior to venipuncture, may have falsely   
depressedresults.   
   
                                                            Performed By: #### L  
500.31848, L500.21237 ####[facility]   
KVXWZZSAHR9702 [place], [place] [address]Nq# [phone]   
   
                                                    Glucose   
[Mass/Vol]      151 mg/dL       High                      [facility]  
   
                                        Comment on above:   Order Comment: Campu  
s: M   
   
                                                            Result Comment: 70-1  
00- Normal Fasting; 100-125 Impaired Fasting;   
greaterthan 126 on more than one result- Diabetes. ADA   
guidelines.Results may be falsely elevated after the administration   
ofSulfapyridine.Results may be falsely depressed after the   
administration ofSulfasalazine.   
   
                                                            Performed By: #### L  
500.19017, L500.61599 ####[facility]   
[id number] [place], [place] [address]Bh# [phone]   
   
                                                    Potassium   
[Moles/Vol]     3.8 mmol/L      Normal          3.5-5.1         [facility]   
[place]  
   
                                        Comment on above:   Order Comment: Campu  
s: M   
   
                                                            Performed By: #### L  
500.24949, L500.71400 ####[facility]   
SJUHNJKUJW4774 [place], [place] [address]Yd# [phone]   
   
                                                    Sodium   
[Moles/Vol]     143 mmol/L      Normal          136-145         [facility]   
[place]  
   
                                        Comment on above:   Order Comment: Campu  
s: M   
   
                                                            Performed By: #### L  
500.33713, L500.84028 ####[facility]   
[address] [place] [address]Yf# [phone]   
   
                                                    Urea nitrogen   
[Mass/Vol]      7 mg/dL         Normal          7-26            [facility]   
[place]  
   
                                        Comment on above:   Order Comment: Campu  
s: M   
   
                                                            Performed By: #### L  
500.91632, L500.54137 ####[facility]   
[address]XYZ[address] [place] [address]Cl# [phone]   
   
                                                    Urea   
nitrogen/Creatini  
ne [Mass ratio] 15 mg/mg        Normal          15-24           [facility]   
[place]  
   
                                        Comment on above:   Order Comment: Campu  
s: M   
   
                                                            Performed By: #### L  
500.04267, L500.15448 ####[facility]   
IZXKAZNFZT7727 [place], [place] [address]Mo# [phone]   
   
                                                    CBCon 2021   
   
                                                    Erythrocyte   
distribution   
width (RBC)   
[Ratio]         17.9 %          High            11-14.5         [facility]  
   
                                        Comment on above:   Order Comment: Campu  
s: M   
   
                                                            Performed By: #### L  
200.36748 ####[facility]   
ESPOFQMRZI0871 [place], [place] [address]Sf# [phone]   
   
                                                    Hematocrit (Bld)   
[Volume fraction] 31.1 %          Low             35.0-47.0       [facility]  
   
                                        Comment on above:   Order Comment: Campu  
s: M   
   
                                                            Performed By: #### L  
200.36468 ####[facility]   
[id number] [place], [place] [address]Hb# [phone]   
   
                                                    Hemoglobin (Bld)   
[Mass/Vol]      9.3 g/dL        Low             11.5-15.5       [facility]  
   
                                        Comment on above:   Order Comment: Campu  
s: M   
   
                                                            Performed By: #### L  
200.86512 ####[facility]   
[id number] [place], [place] [address]Ws# [phone]   
   
                                                    MCHC (RBC)   
[Mass/Vol]      29.9 g/dL       Low             32.0-36.0       [facility]  
   
                                        Comment on above:   Order Comment: Campu  
s: M   
   
                                                            Performed By: #### L  
200.69151 ####[facility]   
[address]HP[address]CFNW[address] [place] [address]Es# [phone]   
   
                                                    MCV (RBC)   
[Entitic vol]   86.4 fL         Normal          80.0-99.0       [facility]  
   
                                        Comment on above:   Order Comment: Campu  
s: M   
   
                                                            Performed By: #### L  
200.76500 ####[facility]   
[id number] [place], [place] [address]Ph# [phone]   
   
                                                    Nucleated RBC/100   
WBC (Bld) [Ratio] 0.0 %           Normal          Less than 1     [facility]  
   
                                        Comment on above:   Order Comment: Campu  
s: M   
   
                                                            Performed By: #### L  
200.99790 ####[facility]   
[id number] [place] [address]Dx# [phone]   
   
                                                    Platelet mean   
volume (Bld)   
[Entitic vol]   9.0 fL          Low             9.4-12.4        [facility]  
   
                                        Comment on above:   Order Comment: Campu  
s: M   
   
                                                            Performed By: #### L  
200.74459 ####[facility]   
[id number] [place] [address]De# [phone]   
   
                      PLT        392 K/CU MM Normal     150-450    [facility]  
   
                                        Comment on above:   Order Comment: Campu  
s: M   
   
                                                            Performed By: #### L  
200.04353 ####[facility]   
[id number] [place] [address]Vt# [phone]   
   
                      RBC        3.60 M/CU MM Low        3.90-5.30  [facility]  
   
                                        Comment on above:   Order Comment: Campu  
s: M   
   
                                                            Performed By: #### L  
200.60058 ####[facility]   
[id number] [place], [place] [address]Gq# [phone]   
   
                      WBC        10.5 K/CUMM Normal     4.5-11.0   [facility]  
   
                                        Comment on above:   Order Comment: Campu  
s: M   
   
                                                            Performed By: #### L  
200.70849 ####[facility]   
[id number] [place], [place] [address]Sv# [phone]   
   
                                                    GFR ESTon 2021   
   
                      IF  AMER Greater than 60 Normal                [facility]  
   
                                        Comment on above:   Order Comment: Campu  
s: M   
   
                                                            Performed By: #### L  
500.55079, L500.69284 ####[facility]   
[id number] [place], [place] [address]Yz# [phone]   
   
                      IF non-AFR AMER Greater than 60 Normal                [facility]  
   
                                        Comment on above:   Order Comment: Campu  
s: M   
   
                                                            Performed By: #### L  
500.58639, L500.48191 ####[facility]   
ASNLSPXGDP8084 [place], [place] [address]Ry# [phone]   
   
                                                    GLUCOSE METERon 2021   
   
                                                    Glucose   
[Mass/Vol]      200 mg/dL       High                      [facility]  
   
                                                    Glucose   
[Mass/Vol]      138 mg/dL       High                      [facility]  
   
                                                    LTACH HPon 2021   
   
                      LTACH HP              Normal                [facility]  
   
                                                    LTACHDSon 2021   
   
                      LTACHDS               Normal                [facility]  
   
                                                    LTACHPNon 2021   
   
                      LTACH PNY             Normal                [facility]  
   
                      LTACHPN               Normal                [facility]  
   
                                                    OTPNon 2021   
   
                      OT Progress Note            Normal                [facility]  
   
                      OTPN                  [facility]  
   
                                                    PBNP TESTon 2021   
   
                                                    Natriuretic   
peptide B (Bld)   
[Mass/Vol]      5141 pg/mL      High            0-125           [facility]  
   
                                        Comment on above:   Order Comment: Campu  
s: M   
   
                                                            Result Comment: NT-p  
roBNP results of less than 300 pg/ml likely   
rules outacute congestive heart failure with 99% predictive   
value.NOTE: These cuttoff points are suggested for ACUTE   
CHFDIAGNOSIS onlyLess than 50 years Greater than 450 pg/ml50-75   
years Greater than 900 pg/mlGreater than 75 years Greater than 1800   
pg/mlNOTE NEW NORMAL RANGE   
   
                                                            Performed By: #### L  
500.91951 ####[facility]   
[id number] [place], [place] [address]Go# [phone]   
   
                                                    PROG IMSon 2021   
   
                      PROG IMS              Normal                [facility]  
   
                                                    Progress   
Note-Hospitalist                 Normal                          [facility]  
   
                                                    PROG.Mikayla 2021   
   
                      PROG.CARD             Normal                [facility]  
   
                                                    Progress   
Note-Cardiology                 Normal                          [facility]  
   
                                                    BMPon 2021   
   
                                                    Anion gap   
[Moles/Vol]     5 mmol/L        Normal          5-16            [facility]  
   
                                        Comment on above:   Order Comment: Campu  
s: M   
   
                                                            Performed By: #### L  
500.79314, L500.03335 ####[facility]   
[id number] [place], [place] [address]Uf# [phone]   
   
                                                    Calcium   
[Mass/Vol]      8.8 mg/dL       Normal          8.5-10.5        [facility]  
   
                                        Comment on above:   Order Comment: Campu  
s: M   
   
                                                            Result Comment: NOTE  
 NEW NORMAL RANGE DUE TO REAGENT CHANGE   
   
                                                            Performed By: #### L  
500.56265, L500.77502 ####[facility]   
[id number] [place], [place] [address]Tj# [phone]   
   
                                                    Chloride   
[Moles/Vol]     108 mmol/L      High                      [facility]  
   
                                        Comment on above:   Order Comment: Campu  
s: M   
   
                                                            Performed By: #### L  
500.99949, L500.61922 ####[facility]   
[id number] [place], [place] [address]Js# [phone]   
   
                      CO2 [Moles/Vol] 30.0 mmol/L Normal     21-32      [facility]  
   
                                        Comment on above:   Order Comment: Campu  
s: M   
   
                                                            Performed By: #### L  
500.29190, L500.38713 ####[facility]   
[id number] [place], [place] [address]Ne# [phone]   
   
                                                    Creatinine   
[Mass/Vol]      0.42 mg/dL      Low             0.510-0.950     [facility]  
   
                                        Comment on above:   Order Comment: Campu  
s: M   
   
                                                            Result Comment: Kayla  
ents receiving either N-Acetylcysteine (NAC)   
orMetamizole prior to venipuncture, may have falsely   
depressedresults.   
   
                                                            Performed By: #### L  
500.04863, L500.91431 ####[facility]   
[id number] [place], [place] [address]Co# [phone]   
   
                                                    Glucose   
[Mass/Vol]      175 mg/dL       High                      [facility]  
   
                                        Comment on above:   Order Comment: Campu  
s: M   
   
                                                            Result Comment: 70-1  
00- Normal Fasting; 100-125 Impaired Fasting;   
greaterthan 126 on more than one result- Diabetes. ADA   
guidelines.Results may be falsely elevated after the administration   
ofSulfapyridine.Results may be falsely depressed after the   
administration ofSulfasalazine.   
   
                                                            Performed By: #### L  
500.57264, L5.96407 ####[facility]   
[id number] [place], [place] [address]Ge# [phone]   
   
                                                    Potassium   
[Moles/Vol]     3.2 mmol/L      Low             3.5-5.1         [facility]  
   
                                        Comment on above:   Order Comment: Campu  
s: M   
   
                                                            Result Comment: Slig  
ht Hemolysis, Result may be affected.   
   
                                                            Performed By: #### L  
500.91899, L5.08530 ####[facility]   
[id number] [place], [place] [address]Ea# [phone]   
   
                                                    Sodium   
[Moles/Vol]     143 mmol/L      Normal          136-145         [facility]   
[place]  
   
                                        Comment on above:   Order Comment: Campu  
s: M   
   
                                                            Performed By: #### L  
500.77791, L5.06906 ####[facility]   
[id number] [place], [place] [address]Gm# [phone]   
   
                                                    Urea nitrogen   
[Mass/Vol]      7 mg/dL         Normal          7-26            [facility]  
   
                                        Comment on above:   Order Comment: Campu  
s: M   
   
                                                            Performed By: #### L  
500.02623, L5.80289 ####[facility]   
[id number] [place], [place] [address]Vn# [phone]   
   
                                                    Urea   
nitrogen/Creatini  
ne [Mass ratio] 17 mg/mg        Normal          15-24           [facility]  
   
                                        Comment on above:   Order Comment: Campu  
s: M   
   
                                                            Performed By: #### L  
500.66018, L500.21954 ####[facility]   
[id number] [place], [place] [address]Hi# [phone]   
   
                                                    CARD.STENTon 2021   
   
                      CARD.STENT            Normal                [facility]  
   
                                                    CBC W/DIFFon 2021   
   
                      BASO ABS   0.00 K/CU MM Normal     0-0.2      [facility]  
   
                                        Comment on above:   Order Comment: Campu  
s: M   
   
                                                            Performed By: #### L  
200.15790 ####[address] [address]Tw# [phone]   
   
                                                    Basophils/100 WBC   
(Bld)           0.3 %           Normal          0-2             [facility]  
   
                                        Comment on above:   Order Comment: Campu  
s: M   
   
                                                            Performed By: #### L  
200.71602 ####[facility]   
[id number] [place] [address]Tf# [phone]   
   
                      EOS ABS    0.40 K/CU MM Normal     0-0.5      [facility]  
   
                                        Comment on above:   Order Comment: Campu  
s: M   
   
                                                            Performed By: #### L  
200.73937 ####[facility]   
[id number] [place], [place] [address]Zu# [phone]   
   
                                                    Eosinophils/100   
WBC (Bld)       4.0 %           Normal          0-5             [facility]  
   
                                        Comment on above:   Order Comment: Campu  
s: M   
   
                                                            Performed By: #### L  
200.58315 ####[facility]   
[id number] [place], [place] [address]Wv# [phone]   
   
                                                    Erythrocyte   
distribution   
width (RBC)   
[Ratio]         17.9 %          High            11-14.5         [facility]  
   
                                        Comment on above:   Order Comment: Campu  
s: M   
   
                                                            Performed By: #### L  
200.03129 ####[address] [place], [place] [address]Mt# [phone]   
   
                                                    Hematocrit (Bld)   
[Volume fraction] 30.9 %          Low             35.0-47.0       [facility]   
[place]  
   
                                        Comment on above:   Order Comment: Campu  
s: M   
   
                                                            Performed By: #### L  
200.65814 ####[facility]   
[id number] [place] [address]Wr# [phone]   
   
                                                    Hemoglobin (Bld)   
[Mass/Vol]      9.4 g/dL        Low             11.5-15.5       [facility]   
[place]  
   
                                        Comment on above:   Order Comment: Campu  
s: M   
   
                                                            Performed By: #### L  
200.41387 ####[place] [id number]# [phone]   
   
                      IMMATR GRAN ABS 0.10 K/CU MM Normal     Less than 2 [facility]   
[place]  
   
                                        Comment on above:   Order Comment: Campu  
s: M   
   
                                                            Performed By: #### L  
200.00663 ####[facility]   
[id number] [place], [place] [address]Ph# [phone]   
   
                      IMMATURE GRAN % 1.0 %      Normal     Less than 2 [facility]   
[place]  
   
                                        Comment on above:   Order Comment: Campu  
s: M   
   
                                                            Performed By: #### L  
200.05919 ####[facility]   
[id number] [place] [address]Vl# [phone]   
   
                      LYMPH ABS  2.70 K/CU MM Normal     0.9-4.4    [facility]   
[place]  
   
                                        Comment on above:   Order Comment: Campu  
s: M   
   
                                                            Performed By: #### L  
200.27885 ####[facility]   
[id number] [place], [place] [address]Ph# [phone]   
   
                                                    Lymphocytes/100   
WBC (Bld)       25.5 %          Normal          20-40           [facility]   
[place]  
   
                                        Comment on above:   Order Comment: Campu  
s: M   
   
                                                            Performed By: #### L  
200.10596 ####[facility]   
[id number] [place], [place] [address]Ph# [phone]   
   
                                                    MCHC (RBC)   
[Mass/Vol]      30.4 g/dL       Low             32.0-36.0       [facility]  
   
                                        Comment on above:   Order Comment: Campu  
s: M   
   
                                                            Performed By: #### L  
200.20208 ####[facility]   
[id number] [place], [place] [address]Kr# [phone]   
   
                                                    MCV (RBC)   
[Entitic vol]   86.6 fL         Normal          80.0-99.0       [facility]  
   
                                        Comment on above:   Order Comment: Campu  
s: M   
   
                                                            Performed By: #### L  
200.54816 ####[facility]   
[id number] [place], [place] [address]Ph# [phone]   
   
                      MONO ABS   1.00 K/CU MM Normal     0.1-1.1    [facility]  
   
                                        Comment on above:   Order Comment: Campu  
s: M   
   
                                                            Performed By: #### L  
200.25153 ####[address] [address]Sp# [phone]   
   
                                                    Monocytes/100 WBC   
(Bld)           9.1 %           Normal          2-10            [facility]  
   
                                        Comment on above:   Order Comment: Campu  
s: M   
   
                                                            Performed By: #### L  
200.35540 ####[facility]   
[id number] [place] [address]Dn# [phone]   
   
                      NEUTROPHIL ABS 6.40 K/CU MM Normal     2.0-8.3    [facility]  
   
                                        Comment on above:   Order Comment: Campu  
s: M   
   
                                                            Performed By: #### L  
200.77350 ####[facility]   
[id number] [place] [address]De# [phone]   
   
                                                    Neutrophils/100   
WBC (Bld)       60.1 %          Normal          45-75           [facility]  
   
                                        Comment on above:   Order Comment: Campu  
s: M   
   
                                                            Performed By: #### L  
200.37586 ####[facility]   
[id number] [place] [address]Ue# [phone]   
   
                                                    Nucleated RBC/100   
WBC (Bld) [Ratio] 0.0 %           Normal          Less than 1     [facility]  
   
                                        Comment on above:   Order Comment: Campu  
s: M   
   
                                                            Performed By: #### L  
200.99127 ####[facility]   
[id number] [place], [place] [address]Ow# [phone]   
   
                                                    Platelet mean   
volume (Bld)   
[Entitic vol]   8.9 fL          Low             9.4-12.4        [facility]   
[place]  
   
                                        Comment on above:   Order Comment: Campu  
s: M   
   
                                                            Performed By: #### L  
200.07411 ####[facility]   
[id number] [place], [place] [address]Pu# [phone]   
   
                      PLT        418 K/CU MM Normal     150-450    [facility]   
[place]  
   
                                        Comment on above:   Order Comment: Campu  
s: M   
   
                                                            Performed By: #### L  
200.88487 ####[facility]   
[id number] [place] [address]Pp# [phone]   
   
                      RBC        3.57 M/CU MM Low        3.90-5.30  [facility]   
[place]  
   
                                        Comment on above:   Order Comment: Campu  
s: M   
   
                                                            Performed By: #### L  
200.27480 ####[facility]   
[id number] [place] [address]Th# [phone]   
   
                      WBC        10.7 K/CUMM Normal     4.5-11.0   [facility]   
[place]  
   
                                        Comment on above:   Order Comment: Campu  
s: M   
   
                                                            Performed By: #### L  
200.86088 ####[facility]   
[id number] [place] [address]Zo# [phone]   
   
                                                    GFR ESTon 2021   
   
                      IF  AMER Greater than 60 Normal                [facility]   
[place]  
   
                                        Comment on above:   Order Comment: Campu  
s: M   
   
                                                            Performed By: #### L  
500.73480, L500.51443 ####[facility]   
[id number] [place], [place] [address]Re# [phone]   
   
                      IF non-AFR AMER Greater than 60 Normal                [facility]   
[place]  
   
                                        Comment on above:   Order Comment: Campu  
s: M   
   
                                                            Performed By: #### L  
500.72990, L500.28463 ####[facility]   
[id number] [place], [place] [address]Ai# [phone]   
   
                                                    GLUCOSE METERon 2021   
   
                                                    Glucose   
[Mass/Vol]      150 mg/dL       High                      [facility]  
   
                                                    Glucose   
[Mass/Vol]      278 mg/dL       High                      [facility]  
   
                                                    Glucose   
[Mass/Vol]      200 mg/dL       High                      [facility]  
   
                                                    Glucose   
[Mass/Vol]      221 mg/dL       High                      [facility]  
   
                                                    Glucose   
[Mass/Vol]      161 mg/dL       High                      [facility]  
   
                                                    Glucose   
[Mass/Vol]      285 mg/dL       High                      [facility]  
   
                                                    OTPNon 2021   
   
                      OT Progress Note            Normal                [facility]  
   
                      OTPN                  Normal                [facility]  
   
                                                    PROG IMSon 2021   
   
                      PROG IMS              Normal                [facility]  
   
                                                    Progress   
Note-Hospitalist                 Normal                          [facility]  
   
                                                    PROG.Mikayla 2021   
   
                      PROG.CARD             Normal                [facility]  
   
                                                    Progress   
Note-Cardiology                 Normal                          [facility]  
   
                                                    PTPNon 2021   
   
                      PT Progress Note            Normal                [facility]  
   
                      PTPN                  Normal                [facility]  
   
                                                    BMPon 2021   
   
                                                    Anion gap   
[Moles/Vol]     4 mmol/L        Low             5-16            [facility]  
   
                                        Comment on above:   Order Comment: Campu  
s: M   
   
                                                            Performed By: #### L  
500.17126, L500.19823 ####[facility]   
[id number] [place], [place] [address]Vu# [phone]   
   
                                                    Calcium   
[Mass/Vol]      8.8 mg/dL       Normal          8.5-10.5        [facility]  
   
                                        Comment on above:   Order Comment: Campu  
s: M   
   
                                                            Result Comment: NOTE  
 NEW NORMAL RANGE DUE TO REAGENT CHANGE   
   
                                                            Performed By: #### L  
500.34348, L500.64187 ####[facility]   
[id number] [place], [place] [address]Od# [phone]   
   
                                                    Chloride   
[Moles/Vol]     108 mmol/L      High                      [facility]  
   
                                        Comment on above:   Order Comment: Campu  
s: M   
   
                                                            Performed By: #### L  
500.94690, L500.05791 ####[facility]   
[id number] [place], [place] [address]Ed# [phone]   
   
                      CO2 [Moles/Vol] 31.0 mmol/L Normal     21-32      [facility]   
[place]  
   
                                        Comment on above:   Order Comment: Campu  
s: M   
   
                                                            Performed By: #### L  
500.72191, L500.08108 ####[facility]   
[id number] [place], [place] [address]Um# [phone]   
   
                                                    Creatinine   
[Mass/Vol]      0.43 mg/dL      Low             0.510-0.950     [facility]  
   
                                        Comment on above:   Order Comment: Campu  
s: M   
   
                                                            Result Comment: Kayla  
ents receiving either N-Acetylcysteine (NAC)   
orMetamizole prior to venipuncture, may have falsely   
depressedresults.   
   
                                                            Performed By: #### L  
500.98860, L5.16343 ####[facility]   
[id number] [place], [place] [address]Dl# [phone]   
   
                                                    Glucose   
[Mass/Vol]      173 mg/dL       High                      [facility]  
   
                                        Comment on above:   Order Comment: Campu  
s: M   
   
                                                            Result Comment: Delt  
a check [id number] Normal Fasting; 100-125   
Impaired Fasting; greaterthan 126 on more than one result- Diabetes.   
ADA guidelines.Results may be falsely elevated after the   
administration ofSulfapyridine.Results may be falsely depressed   
after the administration ofSulfasalazine.   
   
                                                            Performed By: #### L  
500.16818, L5.39884 ####[facility]   
[id number] [place], [place] [address]Ae# [phone]   
   
                                                    Potassium   
[Moles/Vol]     3.9 mmol/L      Normal          3.5-5.1         [facility]  
   
                                        Comment on above:   Order Comment: Campu  
s: M   
   
                                                            Performed By: #### L  
500.27601, L500.35780 ####[facility]   
[id number] [place], [place] [address]Gj# [phone]   
   
                                                    Sodium   
[Moles/Vol]     143 mmol/L      Normal          136-145         [facility]  
   
                                        Comment on above:   Order Comment: Campu  
s: M   
   
                                                            Performed By: #### L  
500.56448, L5.01428 ####[facility]   
[id number] [place], [place] [address]Ph# [phone]   
   
                                                    Urea nitrogen   
[Mass/Vol]      9 mg/dL         Normal          7-26            [facility]  
   
                                        Comment on above:   Order Comment: Campu  
s: M   
   
                                                            Performed By: #### L  
500.12478, L500.08482 ####[facility]   
[id number] [place], [place] [address]Al# [phone]   
   
                                                    Urea   
nitrogen/Creatini  
ne [Mass ratio] 21 mg/mg        Normal          15-24           [facility]  
   
                                        Comment on above:   Order Comment: Campu  
s: M   
   
                                                            Performed By: #### L  
500.11512, L500.42412 ####[facility]   
[id number] [place], [place] [address]Ny# [phone]   
   
                                                    GFR ESTon 2021   
   
                      IF  AMER Greater than 60 [facility]  
   
                                        Comment on above:   Order Comment: Campu  
s: M   
   
                                                            Performed By: #### L  
500.89153, L500.40756 ####[facility]   
[id number] [place] [address]Oe# [phone]   
   
                      IF non-AFR AMER Greater than 60 Normal                [facility]  
   
                                        Comment on above:   Order Comment: Campu  
s: M   
   
                                                            Performed By: #### L  
500.21296, L500.80955 ####[facility]   
[id number] [place], [place] [address]Vb# [phone]   
   
                                                    GLUCOSE METERon 2021   
   
                                                    Glucose   
[Mass/Vol]      176 mg/dL       High                      [facility]  
   
                                                    Glucose   
[Mass/Vol]      311 mg/dL       High                      [facility]  
   
                                                    Glucose   
[Mass/Vol]      181 mg/dL       High                      [facility]  
   
                                                    OTPNon 2021   
   
                      OT Progress Note            Normal                [facility]  
   
                      OTPN                  Normal                [facility]  
   
                                                    PROG IMSon 2021   
   
                      PROG IMS              Normal                [facility]  
   
                                                    Progress   
Note-Hospitalist                 Normal                          [facility]  
   
                                                    PROG.Mikayla 2021   
   
                      PROG.CARD             Normal                [facility]  
   
                                                    Progress   
Note-Cardiology                 Normal                          [facility]  
   
                                                    PTPNon 2021   
   
                      PT Progress Note            Normal                [facility]   
[place]  
   
                      PTPN                  Normal                [facility]   
[place]  
   
                                                    CARD.CATHon 03-   
   
                      [facility].CATH             Normal                [facility]  
   
                                                    CBC W/DIFFon 03-   
   
                      BASO ABS   0.00 K/CU MM Normal     0-0.2      [facility]  
   
                                        Comment on above:   Order Comment: Campu  
s: M   
   
                                                            Performed By: #### L  
200.48592 ####[facility]   
[id number] [place] [address]Kb# [phone]   
   
                                                    Basophils/100 WBC   
(Bld)           0.2 %           Normal          0-2             [facility]  
   
                                        Comment on above:   Order Comment: Campu  
s: M   
   
                                                            Performed By: #### L  
200.77147 ####[facility]   
[id number] [place] [address]Su# [phone]   
   
                      EOS ABS    0.40 K/CU MM Normal     0-0.5      [facility]  
   
                                        Comment on above:   Order Comment: Campu  
s: M   
   
                                                            Performed By: #### L  
200.11230 ####[facility]   
[id number] [place] [address]Jc# [phone]   
   
                                                    Eosinophils/100   
WBC (Bld)       3.4 %           Normal          0-5             [facility]  
   
                                        Comment on above:   Order Comment: Campu  
s: M   
   
                                                            Performed By: #### L  
200.65983 ####[facility]   
[id number] [place] [address]Td# [phone]   
   
                                                    Erythrocyte   
distribution   
width (RBC)   
[Ratio]         16.9 %          High            11-14.5         [facility]  
   
                                        Comment on above:   Order Comment: Campu  
s: M   
   
                                                            Performed By: #### L  
200.83887 ####[facility]   
[id number] [place] [address]Lb# [phone]   
   
                                                    Hematocrit (Bld)   
[Volume fraction] 27.5 %          Low             35.0-47.0       [facility]  
   
                                        Comment on above:   Order Comment: Campu  
s: M   
   
                                                            Performed By: #### L  
200.90806 ####[facility]   
[id number] [place], [place] [address]Ph# [phone]   
   
                                                    Hemoglobin (Bld)   
[Mass/Vol]      8.4 g/dL        Low             11.5-15.5       [facility]   
[place]  
   
                                        Comment on above:   Order Comment: Campu  
s: M   
   
                                                            Performed By: #### L  
200.22251 ####[facility]   
[id number] [place] [address]Fk# [phone]   
   
                      IMMATR GRAN ABS 0.20 K/CU MM Normal     Less than 2 [facility]   
[place]  
   
                                        Comment on above:   Order Comment: Campu  
s: M   
   
                                                            Performed By: #### L  
200.14628 ####[address] [address]Kn# [phone]   
   
                      IMMATURE GRAN % 1.5 %      Normal     Less than 2 [facility]   
[place]  
   
                                        Comment on above:   Order Comment: Campu  
s: M   
   
                                                            Performed By: #### L  
200.76313 ####[place] [address]Ph# [phone]   
   
                      LYMPH ABS  3.30 K/CU MM Normal     0.9-4.4    [facility]  
   
                                        Comment on above:   Order Comment: Campu  
s: M   
   
                                                            Performed By: #### L  
200.82255 ####[place] [address]Ph# [phone]   
   
                                                    Lymphocytes/100   
WBC (Bld)       27.1 %          Normal          20-40           [facility]  
   
                                        Comment on above:   Order Comment: Campu  
s: M   
   
                                                            Performed By: #### L  
200.03963 ####[facility]   
[id number] [place], [place] [address]Ph# [phone]   
   
                                                    MCHC (RBC)   
[Mass/Vol]      30.5 g/dL       Low             32.0-36.0       [facility]   
[place]  
   
                                        Comment on above:   Order Comment: Campu  
s: M   
   
                                                            Performed By: #### L  
200.66835 ####[facility]   
[id number] [place] [address]Fu# [phone]   
   
                                                    MCV (RBC)   
[Entitic vol]   84.4 fL         Normal          80.0-99.0       [facility]  
   
                                        Comment on above:   Order Comment: Campu  
s: M   
   
                                                            Performed By: #### L  
200.40544 ####[facility]   
[id number] [place], [place] [address]Fc# [phone]   
   
                      MONO ABS   0.90 K/CU MM Normal     0.1-1.1    [facility]   
[place]  
   
                                        Comment on above:   Order Comment: Campu  
s: M   
   
                                                            Performed By: #### L  
200.61359 ####[facility]   
[id number] [place] [address]Eg# [phone]   
   
                                                    Monocytes/100 WBC   
(Bld)           7.3 %           Normal          2-10            [facility]  
   
                                        Comment on above:   Order Comment: Campu  
s: M   
   
                                                            Performed By: #### L  
200.83757 ####[address] [address]Ne# [phone]   
   
                      NEUTROPHIL ABS 7.40 K/CU MM Normal     2.0-8.3    [facility]  
   
                                        Comment on above:   Order Comment: Campu  
s: M   
   
                                                            Performed By: #### L  
200.45724 ####[facility]   
[id number] [place] [address]Gm# [phone]   
   
                                                    Neutrophils/100   
WBC (Bld)       60.5 %          Normal          45-75           [facility]  
   
                                        Comment on above:   Order Comment: Campu  
s: M   
   
                                                            Performed By: #### L  
200.35637 ####[facility]   
[id number] [place] [address]Mz# [phone]   
   
                                                    Nucleated RBC/100   
WBC (Bld) [Ratio] 0.2 %           Normal          Less than 1     [facility]  
   
                                        Comment on above:   Order Comment: Campu  
s: M   
   
                                                            Performed By: #### L  
200.28843 ####[facility]   
[id number] [place] [address]Tt# [phone]   
   
                                                    Platelet mean   
volume (Bld)   
[Entitic vol]   9.2 fL          Low             9.4-12.4        [facility]  
   
                                        Comment on above:   Order Comment: Campu  
s: M   
   
                                                            Performed By: #### L  
200.14095 ####[facility]   
[id number] [place], [place] [address]Ph# [phone]   
   
                      PLT        409 K/CU MM Normal     150-450    [facility]  
   
                                        Comment on above:   Order Comment: Campu  
s: M   
   
                                                            Performed By: #### L  
200.24217 ####[facility]   
[id number] [place], [place] [address]Ge# [phone]   
   
                      RBC        3.26 M/CU MM Low        3.90-5.30  [facility]  
   
                                        Comment on above:   Order Comment: Campu  
s: M   
   
                                                            Performed By: #### L  
200.81914 ####[facility]   
[id number] [place] [address]Cu# [phone]   
   
                      WBC        12.3 K/CUMM High       4.5-11.0   [facility]  
   
                                        Comment on above:   Order Comment: Campu  
s: M   
   
                                                            Performed By: #### L  
200.01461 ####[facility]   
[id number] [place] [address]Fx# [phone]   
   
                                                    CMPon 03-   
   
                                                    Albumin   
[Mass/Vol]      1.8 g/dL        Low             3.2-5.0         [facility]  
   
                                        Comment on above:   Order Comment: Campu  
s: M   
   
                                                            Performed By: #### L  
500.87637, L500.14515 ####[facility]   
[id number] [place], [place] [address]Mf# [phone]   
   
                                                    Albumin/Globulin   
[Mass ratio]    0.5 {ratio}     Low             0.8-2.0         [facility]  
   
                                        Comment on above:   Order Comment: Campu  
s: M   
   
                                                            Performed By: #### L  
500.73057, L500.05940 ####[facility]   
[id number] [place], [place] [address]Ux# [phone]   
   
                      ALK PHOS   88 U/L     Normal          [facility]  
   
                                        Comment on above:   Order Comment: Campu  
s: M   
   
                                                            Performed By: #### L  
500.10946, L500.10534 ####[facility]   
[id number] [place], [place] [address]Jj# [phone]   
   
                                                    ALT [Catalytic   
activity/Vol]   7 U/L           Low             13-61           [facility]  
   
                                        Comment on above:   Order Comment: Campu  
s: M   
   
                                                            Result Comment: RESU  
LTS MAY BE FALSELY DEPRESSED AFTER THE   
ADMINISTRATION OFSULFASALAZINE AND/OR SULFAPYRIDINE.   
   
                                                            Performed By: #### L  
500.34981, L500.28634 ####[facility]   
[id number] [place], [place] [address]Uf# [phone]   
   
                                                    Anion gap   
[Moles/Vol]     3 mmol/L        Low             5-16            [facility]  
   
                                        Comment on above:   Order Comment: Campu  
s: M   
   
                                                            Performed By: #### L  
500.84361, L500.20279 ####[facility]   
[id number] [place], [place] [address]Uy# [phone]   
   
                                                    AST [Catalytic   
activity/Vol]   17 U/L          Normal          8-34            [facility]  
   
                                        Comment on above:   Order Comment: Campu  
s: M   
   
                                                            Result Comment: RESU  
LTS MAY BE FALSELY DEPRESSED AFTER THE   
ADMINISTRATION OFSULFASALAZINE AND/OR SULFAPYRIDINE.   
   
                                                            Performed By: #### L  
500.32434, L500.51026 ####[facility]   
[id number] [place], [place] [address]Rd# [phone]   
   
                      BILI TOTAL 0.60 MG/DL Normal     0.2-1.0    [facility]  
   
                                        Comment on above:   Order Comment: Campu  
s: M   
   
                                                            Performed By: #### L  
500.47578, L500.83442 ####[facility]   
[id number] [place], [place] [address]Fz# [phone]   
   
                                                    Calcium   
[Mass/Vol]      8.6 mg/dL       Normal          8.5-10.5        [facility]  
   
                                        Comment on above:   Order Comment: Campu  
s: M   
   
                                                            Result Comment: NOTE  
 NEW NORMAL RANGE DUE TO REAGENT CHANGE   
   
                                                            Performed By: #### L  
500.34355, L500.38601 ####[facility]   
[id number] [place], [place] [address]Jt# [phone]   
   
                                                    Chloride   
[Moles/Vol]     108 mmol/L      High                      [facility]  
   
                                        Comment on above:   Order Comment: Campu  
s: M   
   
                                                            Performed By: #### L  
500.68222, L500.92373 ####[facility]   
[id number] [place], [place] [address]Dr# [phone]   
   
                      CO2 [Moles/Vol] 31.0 mmol/L Normal     21-32      [facility]  
   
                                        Comment on above:   Order Comment: Darinu  
s: M   
   
                                                            Performed By: #### L  
500.71719, L500.61074 ####[facility]   
[id number] [place], [place] [address]Jy# [phone]   
   
                                                    Creatinine   
[Mass/Vol]      0.44 mg/dL      Low             0.510-0.950     [facility]  
   
                                        Comment on above:   Order Comment: Darinu  
s: M   
   
                                                            Result Comment: Kayla  
ents receiving either N-Acetylcysteine (NAC)   
orMetamizole prior to venipuncture, may have falsely   
depressedresults.   
   
                                                            Performed By: #### L  
500.71478, L500.28888 ####[facility]   
[id number] [place], [place] [address]Vv# [phone]   
   
                                                    Globulin (S)   
[Mass/Vol]      3.6 g/dL        Normal          2.2-4.2         [facility]  
   
                                        Comment on above:   Order Comment: Darinu  
s: M   
   
                                                            Performed By: #### L  
500.18757, L500.49003 ####[facility]   
[id number] [place], [place] [address]Fq# [phone]   
   
                                                    Glucose   
[Mass/Vol]      109 mg/dL       High                      [facility]  
   
                                        Comment on above:   Order Comment: Campu  
s: M   
   
                                                            Result Comment: 70-1  
00- Normal Fasting; 100-125 Impaired Fasting;   
greaterthan 126 on more than one result- Diabetes. ADA   
guidelines.Results may be falsely elevated after the administration   
ofSulfapyridine.Results may be falsely depressed after the   
administration ofSulfasalazine.   
   
                                                            Performed By: #### L  
500.83255, L500.07147 ####[facility]   
[id number] [place], [place] [address]Ii# [phone]   
   
                                                    Potassium   
[Moles/Vol]     3.4 mmol/L      Low             3.5-5.1         [facility]   
[place]  
   
                                        Comment on above:   Order Comment: Campu  
s: M   
   
                                                            Result Comment: Slig  
ht Hemolysis, Result may be affected.   
   
                                                            Performed By: #### L  
500.25944, L500.81739 ####[facility]   
[id number] [place], [place] [address]Xj# [phone]   
   
                                                    Protein   
[Mass/Vol]      5.4 g/dL        Low             6.0-8.5         [facility]  
   
                                        Comment on above:   Order Comment: Campu  
s: M   
   
                                                            Performed By: #### L  
500.53663, L500.92059 ####[facility]   
[id number] [place], [place] [address]Yv# [phone]   
   
                                                    Sodium   
[Moles/Vol]     142 mmol/L      Normal          136-145         [facility]  
   
                                        Comment on above:   Order Comment: Campu  
s: M   
   
                                                            Performed By: #### L  
500.42479, L500.74939 ####[facility]   
[id number] [place], [place] [address]Oo# [phone]   
   
                                                    Urea nitrogen   
[Mass/Vol]      8 mg/dL         Normal          7-26            [facility]  
   
                                        Comment on above:   Order Comment: Campu  
s: M   
   
                                                            Performed By: #### L  
500.03281, L500.57376 ####[facility]   
[id number] [place], [place] [address]Bq# [phone]   
   
                                                    Urea   
nitrogen/Creatini  
ne [Mass ratio] 18 mg/mg        Normal          15-24           [facility]  
   
                                        Comment on above:   Order Comment: Campu  
s: M   
   
                                                            Performed By: #### L  
500.18712, L500.23329 ####[facility]   
[id number] [place], [place] [address]Gp# [phone]   
   
                                                    DIET.ASSMTon 03-   
   
                      DIET.ASSMT            Normal                [facility]   
[place]  
   
                                                    Nutrition   
Assessments                     Normal                          [facility]   
[place]  
   
                                                    GFR ESTon 03-   
   
                      IF  AMER Greater than 60 Normal                [facility]  
   
                                        Comment on above:   Order Comment: Campu  
s: M   
   
                                                            Performed By: #### L  
500.24315, L500.40376 ####[facility]   
[id number] [place], [place] [address]Qm# [phone]   
   
                      IF non-AFR AMER Greater than 60 Normal                [facility]  
   
                                        Comment on above:   Order Comment: Ruddy MAE   
   
                                                            Performed By: #### L  
500.73931, L500.76050 ####[facility]   
[id number] [place], [place] [address]Sv# [phone]   
   
                                                    GLUCOSE METERon 03-   
   
                                                    Glucose   
[Mass/Vol]      147 mg/dL       High                      [facility]   
[place]  
   
                                                    Glucose   
[Mass/Vol]      132 mg/dL       High                      [facility]   
[place]  
   
                                                    Glucose   
[Mass/Vol]      146 mg/dL       High                      [facility]  
   
                                                    Glucose   
[Mass/Vol]      210 mg/dL       High                      [facility]  
   
                                                    OTPNon 03-   
   
                      OT Progress Note            Normal                [facility]  
   
                      OTPN                  Normal                [facility]  
   
                                                    PBNP TESTon 03-   
   
                                                    Natriuretic   
peptide B (Bld)   
[Mass/Vol]      4916 pg/mL      High            0-125           [facility]  
   
                                        Comment on above:   Order Comment: Ruddy esparza: TU   
   
                                                            Result Comment: NT-p  
roBNP results of less than 300 pg/ml likely   
rules outacute congestive heart failure with 99% predictive   
value.NOTE: These cuttoff points are suggested for ACUTE   
CHFDIAGNOSIS onlyLess than 50 years Greater than 450 pg/ml50-75   
years Greater than 900 pg/mlGreater than 75 years Greater than 1800   
pg/mlNOTE NEW NORMAL RANGE   
   
                                                            Performed By: #### L  
[id number] ####[facility]   
[id number] [place], [place] [address]Oo# [phone]   
   
                                                    PROG IMSon 03-   
   
                      PROG IMS              Normal                [facility]  
   
                                                    PROG.Mikayla 03-   
   
                      PROG.CARD             Normal                [facility]  
   
                                                    Progress   
Note-Cardiology                 Normal                          [facility]  
   
                                                    PROG.NOTEon 03-   
   
                      PROG.NOTE             Normal                [facility]  
   
                                                    Progress   
Note-Physician                  Normal                          [facility]  
   
                                                    PTPNon 03-   
   
                      PT Progress Note            Normal                [facility]  
   
                      PTPN                  Normal                [facility]  
   
                                                    BMPon 2021   
   
                                                    Anion gap   
[Moles/Vol]     3 mmol/L        Low             5-16            [facility]   
[place]  
   
                                        Comment on above:   Order Comment: Campu  
s: M   
   
                                                            Performed By: #### L  
500.92523, L500.84290 ####[facility]   
[id number] [place], [place] [address]Qc# [phone]   
   
                                                    Calcium   
[Mass/Vol]      8.7 mg/dL       Normal          8.5-10.5        [facility]  
   
                                        Comment on above:   Order Comment: Campu  
s: M   
   
                                                            Result Comment: NOTE  
 NEW NORMAL RANGE DUE TO REAGENT CHANGE   
   
                                                            Performed By: #### L  
[id number], L500.95848 ####[facility]   
[id number] [place], [place] [address]Or# [phone]   
   
                                                    Chloride   
[Moles/Vol]     112 mmol/L      High                      [facility]  
   
                                        Comment on above:   Order Comment: Campu  
s: M   
   
                                                            Performed By: #### L  
500.62489, L500.17870 ####[facility]   
[id number] [place], [place] [address]We# [phone]   
   
                      CO2 [Moles/Vol] 31.0 mmol/L Normal     21-32      [facility]  
   
                                        Comment on above:   Order Comment: Campu  
s: M   
   
                                                            Performed By: #### L  
500.99362, L500.94208 ####[facility]   
[id number] [place], [place] [address]Sp# [phone]   
   
                                                    Creatinine   
[Mass/Vol]      0.43 mg/dL      Low             0.510-0.950     [facility]  
   
                                        Comment on above:   Order Comment: Campu  
s: M   
   
                                                            Result Comment: Kayla  
ents receiving either N-Acetylcysteine (NAC)   
orMetamizole prior to venipuncture, may have falsely   
depressedresults.   
   
                                                            Performed By: #### L  
500.08668, L500.54545 ####[facility]   
[id number] [place], [place] [address]Op# [phone]   
   
                                                    Glucose   
[Mass/Vol]      118 mg/dL       High                      [facility]  
   
                                        Comment on above:   Order Comment: Campu  
s: M   
   
                                                            Result Comment: 70-1  
00- Normal Fasting; 100-125 Impaired Fasting;   
greaterthan 126 on more than one result- Diabetes. ADA   
guidelines.Results may be falsely elevated after the administration   
ofSulfapyridine.Results may be falsely depressed after the   
administration ofSulfasalazine.   
   
                                                            Performed By: #### L  
500.81699, L500.79135 ####[facility]   
[id number] [place], [place] [address]Vm# [phone]   
   
                                                    Potassium   
[Moles/Vol]     3.1 mmol/L      Low             3.5-5.1         [facility]  
   
                                        Comment on above:   Order Comment: Campu  
s: M   
   
                                                            Performed By: #### L  
500.38209, L500.98860 ####[facility]   
[id number] [place] [address]Bz# [phone]   
   
                                                    Sodium   
[Moles/Vol]     143 mmol/L      Normal          136-145         [facility]  
   
                                        Comment on above:   Order Comment: Campu  
s: M   
   
                                                            Performed By: #### L  
500.15154, L500.19065 ####[facility]   
[id number] [place] [address]Kb# [phone]   
   
                                                    Urea nitrogen   
[Mass/Vol]      9 mg/dL         Normal          7-26            [facility]  
   
                                        Comment on above:   Order Comment: Campu  
s: M   
   
                                                            Performed By: #### L  
500.85828, L500.81787 ####[facility]   
[id number] [place], [place] [address]Pu# [phone]   
   
                                                    Urea   
nitrogen/Creatini  
ne [Mass ratio] 21 mg/mg        Normal          15-24           [facility]  
   
                                        Comment on above:   Order Comment: Campu  
s: M   
   
                                                            Performed By: #### L  
500.82024, L500.60327 ####[facility]   
[id number] [place] [address]Vp# [phone]   
   
                                                    CBCon 2021   
   
                                                    Erythrocyte   
distribution   
width (RBC)   
[Ratio]         16.5 %          High            11-14.5         [facility]  
   
                                        Comment on above:   Order Comment: Campu  
s: M   
   
                                                            Performed By: #### L  
200.41916 ####[facility]   
[id number] [place] [address]Ke# [phone]   
   
                                                    Hematocrit (Bld)   
[Volume fraction] 24.7 %          Low             35.0-47.0       [facility]  
   
                                        Comment on above:   Order Comment: Campu  
s: M   
   
                                                            Performed By: #### L  
200.93060 ####[facility]   
[id number] [place], [place] [address]Yp# [phone]   
   
                                                    Hemoglobin (Bld)   
[Mass/Vol]      7.4 g/dL        Low             11.5-15.5       [facility]  
   
                                        Comment on above:   Order Comment: Campu  
s: M   
   
                                                            Performed By: #### L  
200.50208 ####[facility]   
[id number] [place] [address]Zc# [phone]   
   
                                                    MCHC (RBC)   
[Mass/Vol]      30.0 g/dL       Low             32.0-36.0       [facility]  
   
                                        Comment on above:   Order Comment: Campu  
s: M   
   
                                                            Performed By: #### L  
200.67671 ####[address] [address]Zh# [phone]   
   
                                                    MCV (RBC)   
[Entitic vol]   84.6 fL         Normal          80.0-99.0       [facility]  
   
                                        Comment on above:   Order Comment: Campu  
s: M   
   
                                                            Performed By: #### L  
200.23387 ####[facility]   
[id number] [place] [address]Lx# [phone]   
   
                                                    Nucleated RBC/100   
WBC (Bld) [Ratio] 0.2 %           Normal          Less than 1     [facility]  
   
                                        Comment on above:   Order Comment: Campu  
s: M   
   
                                                            Performed By: #### L  
200.43818 ####[facility]   
[id number] [place] [address]Yh# [phone]   
   
                                                    Platelet mean   
volume (Bld)   
[Entitic vol]   9.3 fL          Low             9.4-12.4        [facility]  
   
                                        Comment on above:   Order Comment: Campu  
s: M   
   
                                                            Performed By: #### L  
200.18369 ####[facility]   
[id number] [place] [address]Hp# [phone]   
   
                      PLT        429 K/CU MM Normal     150-450    [facility]  
   
                                        Comment on above:   Order Comment: Campu  
s: M   
   
                                                            Performed By: #### L  
[id number] ####[facility]   
[id number] [place], [place] [address]Xt# [phone]   
   
                      RBC        2.92 M/CU MM Low        3.90-5.30  [facility]  
   
                                        Comment on above:   Order Comment: Campu  
s: M   
   
                                                            Performed By: #### L  
200.90501 ####[facility]   
[id number] [place] [address]Bm# [phone]   
   
                      WBC        12.1 K/CUMM High       4.5-11.0   [facility]  
   
                                        Comment on above:   Order Comment: Campu  
s: M   
   
                                                            Performed By: #### L  
200.56720 ####[facility]   
[id number] [place], [place] [address]Hu# [phone]   
   
                                                    GFR ESTon 2021   
   
                      IF  AMER Greater than 60 Normal                [facility]  
   
                                        Comment on above:   Order Comment: Campu  
s: M   
   
                                                            Performed By: #### L  
500.97275, L500.00992 ####[facility]   
[id number] [place] [address]Yr# [phone]   
   
                      IF non-AFR AMER Greater than 60 Normal                [facility]  
   
                                        Comment on above:   Order Comment: Campu  
s: M   
   
                                                            Performed By: #### L  
500.96645, L500.03886 ####[facility]   
[id number] [place] [address]Rn# [phone]   
   
                                                    GLUCOSE METERon 2021   
   
                                                    Glucose   
[Mass/Vol]      149 mg/dL       High                      [facility]   
[place]  
   
                                                    Glucose   
[Mass/Vol]      171 mg/dL       High                      [facility]   
[place]  
   
                                                    Glucose   
[Mass/Vol]      124 mg/dL       Normal                    [facility]   
[place]  
   
                                                    Glucose   
[Mass/Vol]      217 mg/dL       High                      [facility]   
[place]  
   
                                                    Eloy 2021   
   
                                                    Potassium   
[Moles/Vol]     3.7 mmol/L      Normal          3.5-5.1         [facility]  
   
                                        Comment on above:   Order Comment: Campu  
s: M   
   
                                                            Performed By: #### L  
500.15198 ####[facility]   
[id number] [place], [place] [address]Rd# [phone]   
   
                                                    PROG IMSon 2021   
   
                      PROG IMS              Normal                [facility]  
   
                                                    Progress   
Note-Hospitalist                 Normal                          [facility]  
   
                                                    PROG.Mikayla 2021   
   
                      PROG.CARD             Normal                [facility]  
   
                                                    PTPNon 2021   
   
                      PT Progress Note            Normal                [facility]  
   
                      PTPN                  Normal                [facility]  
   
                                                    RBC NO Pablo 2021   
   
                                        RBC NO ACT          TRANSFUSED PRODUCT:   
RBC NO   
ACTIVE BLEED COUNT: 1 Normal                                  [facility]  
   
                                                    STL OCCULT BLDon 2021   
   
                      STL OCCULT BLD Negative   Normal     NEGATIVE   [facility]  
   
                                        Comment on above:   Order Comment: Darinu  
s: M   
   
                                                            Performed By: #### L  
[id number] ####[facility]   
[id number] [place] [address]Uf# [phone]   
   
                                                    TSon 2021   
   
                                                    ABO and Rh group   
Nom (Bld)       Blood group A Rh(D) positive Normal                          [facility]  
   
                                        Comment on above:   Order Comment: Ruddy  
s: MTranfuse Now? YPatient transfused or   
pregnant in the past 3 months: YESNon - Acute Hemorrhage Indication:   
Hgb<8 w ACS/EKG chg/CPIrradiated: NWashed: NTransfuse slowly @   
60mL/hr x15min, then increase to infuse:Over 3 Hours   
   
                                                    CBC W/DIFFon 2021   
   
                      BASO ABS   0.00 K/CU MM Normal     0-0.2      [facility]  
   
                                        Comment on above:   Order Comment: Campu  
s: M   
   
                                                            Performed By: #### L  
[id number] ####[facility]   
[id number] [place], [place] [address]Mx# [phone]   
   
                                                    Basophils/100 WBC   
(Bld)           0.2 %           Normal          0-2             [facility]  
   
                                        Comment on above:   Order Comment: Darinu  
s: M   
   
                                                            Performed By: #### L  
200.00438 ####[facility]   
[id number] [place], [place] [address]Ln# [phone]   
   
                      EOS ABS    0.40 K/CU MM Normal     0-0.5      [facility]   
[place]  
   
                                        Comment on above:   Order Comment: Campu  
s: M   
   
                                                            Performed By: #### L  
200.25888 ####[facility]   
[id number] [place], [place] [address]Ph# [phone]   
   
                                                    Eosinophils/100   
WBC (Bld)       1.9 %           Normal          0-5             [facility]   
[place]  
   
                                        Comment on above:   Order Comment: Campu  
s: M   
   
                                                            Performed By: #### L  
200.68024 ####[place] [address]Ph# [phone]   
   
                                                    Erythrocyte   
distribution   
width (RBC)   
[Ratio]         16.0 %          High            11-14.5         [facility]   
[place]  
   
                                        Comment on above:   Order Comment: Campu  
s: M   
   
                                                            Performed By: #### L  
[id number] ####[place] [address]Ph# [phone]   
   
                                                    Hematocrit (Bld)   
[Volume fraction] 25.0 %          Low             35.0-47.0       [facility]   
[place]  
   
                                        Comment on above:   Order Comment: Campu  
s: M   
   
                                                            Performed By: #### L  
[id number] ####[place] [address]Ph# [phone]   
   
                                                    Hemoglobin (Bld)   
[Mass/Vol]      7.8 g/dL        Low             11.5-15.5       [facility]   
[place]  
   
                                        Comment on above:   Order Comment: Campu  
s: M   
   
                                                            Performed By: #### L  
200.19270 ####[facility]   
[id number] [place], [place] [address]Ph# [phone]   
   
                      IMMATR GRAN ABS 0.20 K/CU MM Normal     Less than 2 [facility]   
[place]  
   
                                        Comment on above:   Order Comment: Campu  
s: M   
   
                                                            Performed By: #### L  
200.99943 ####[address] [address]Ri# [phone]   
   
                      IMMATURE GRAN % 0.9 %      Normal     Less than 2 [facility]   
[place]  
   
                                        Comment on above:   Order Comment: Campu  
s: M   
   
                                                            Performed By: #### L  
200.08639 ####[facility]   
[id number] [place], [place] [address]Ev# [phone]   
   
                      LYMPH ABS  1.90 K/CU MM Normal     0.9-4.4    [facility]   
[place]  
   
                                        Comment on above:   Order Comment: Campu  
s: M   
   
                                                            Performed By: #### L  
200.74124 ####[place] [address]Ph# [phone]   
   
                                                    Lymphocytes/100   
WBC (Bld)       9.6 %           Low             20-40           [facility]  
   
                                        Comment on above:   Order Comment: Campu  
s: M   
   
                                                            Performed By: #### L  
200.12029 ####[address] [address]Or# [phone]   
   
                                                    MCHC (RBC)   
[Mass/Vol]      31.2 g/dL       Low             32.0-36.0       [facility]  
   
                                        Comment on above:   Order Comment: Campu  
s: M   
   
                                                            Performed By: #### L  
200.34905 ####[address] [address]Tz# [phone]   
   
                                                    MCV (RBC)   
[Entitic vol]   84.2 fL         Normal          80.0-99.0       [facility]  
   
                                        Comment on above:   Order Comment: Campu  
s: M   
   
                                                            Performed By: #### L  
200.75949 ####[address] [address]Eb# [phone]   
   
                      MONO ABS   1.20 K/CU MM High       0.1-1.1    [facility]   
[place]  
   
                                        Comment on above:   Order Comment: Campu  
s: M   
   
                                                            Performed By: #### L  
200.14131 ####[facility]   
[id number] [place] [address]Qz# [phone]   
   
                                                    Monocytes/100 WBC   
(Bld)           6.0 %           Normal          2-10            [facility]  
   
                                        Comment on above:   Order Comment: Campu  
s: M   
   
                                                            Performed By: #### L  
200.22535 ####[facility]   
[id number] [place], [place] [address]Ph# [phone]   
   
                      NEUTROPHIL ABS 16.30 K/CU MM High       2.0-8.3    [facility]   
[place]  
   
                                        Comment on above:   Order Comment: Campu  
s: M   
   
                                                            Performed By: #### L  
200.49246 ####[facility]   
[id number] [place], [place] [address]Da# [phone]   
   
                                                    Neutrophils/100   
WBC (Bld)       81.4 %          High            45-75           [facility]  
   
                                        Comment on above:   Order Comment: Campu  
s: M   
   
                                                            Performed By: #### L  
200.93951 ####[facility]   
[id number] [place] [address]Yv# [phone]   
   
                                                    Nucleated RBC/100   
WBC (Bld) [Ratio] 0.2 %           Normal          Less than 1     [facility]  
   
                                        Comment on above:   Order Comment: Campu  
s: M   
   
                                                            Performed By: #### L  
200.81278 ####[address] [address]Au# [phone]   
   
                                                    Platelet mean   
volume (Bld)   
[Entitic vol]   9.3 fL          Low             9.4-12.4        [facility]   
[place]  
   
                                        Comment on above:   Order Comment: Campu  
s: M   
   
                                                            Performed By: #### L  
200.17577 ####[facility]   
[id number] [place], [place] [address]Ng# [phone]   
   
                      PLT        459 K/CU MM High       150-450    [facility]  
   
                                        Comment on above:   Order Comment: Campu  
s: M   
   
                                                            Performed By: #### L  
200.57659 ####[facility]   
[id number] [place] [address]Ec# [phone]   
   
                      RBC        2.97 M/CU MM Low        3.90-5.30  [facility]  
   
                                        Comment on above:   Order Comment: Campu  
s: M   
   
                                                            Performed By: #### L  
200.10823 ####[facility]   
[id number] [place] [address]Zi# [phone]   
   
                      WBC        20.0 K/CUMM High       4.5-11.0   [facility]   
[place]  
   
                                        Comment on above:   Order Comment: Campu  
s: M   
   
                                                            Performed By: #### L  
200.48663 ####[facility]   
[id number] [place], [place] [address]Ul# [phone]   
   
                                                    CMPon 2021   
   
                                                    Albumin   
[Mass/Vol]      1.8 g/dL        Low             3.2-5.0         [facility]  
   
                                        Comment on above:   Order Comment: Campu  
s: M   
   
                                                            Performed By: #### L  
500.15406, L500.56389 ####[facility]   
[id number] [place], [place] [address]Bk# [phone]   
   
                                                    Albumin/Globulin   
[Mass ratio]    0.5 {ratio}     Low             0.8-2.0         [facility]  
   
                                        Comment on above:   Order Comment: Campu  
s: M   
   
                                                            Performed By: #### L  
500.26155, L500.67551 ####[facility]   
[id number] [place], [place] [address]Ue# [phone]   
   
                      ALK PHOS   87 U/L     Normal          [facility]  
   
                                        Comment on above:   Order Comment: Campu  
s: M   
   
                                                            Performed By: #### L  
500.30274, L500.10689 ####[facility]   
INPIHZYCEK7500 [place], [place] [address]Fb# [phone]   
   
                                                    ALT [Catalytic   
activity/Vol]   11 U/L          Low             13-61           [facility]  
   
                                        Comment on above:   Order Comment: Campu  
s: M   
   
                                                            Result Comment: RESU  
LTS MAY BE FALSELY DEPRESSED AFTER THE   
ADMINISTRATION OFSULFASALAZINE AND/OR SULFAPYRIDINE.   
   
                                                            Performed By: #### L  
500.16254, L500.72472 ####[facility]   
[id number] [place], [place] [address]Zk# [phone]   
   
                                                    Anion gap   
[Moles/Vol]     3 mmol/L        Low             5-16            [facility]  
   
                                        Comment on above:   Order Comment: Campu  
s: M   
   
                                                            Performed By: #### L  
500.32418, L500.35552 ####[facility]   
[id number] [place], [place] [address]Ev# [phone]   
   
                                                    AST [Catalytic   
activity/Vol]   49 U/L          High            8-34            [facility]  
   
                                        Comment on above:   Order Comment: Campu  
s: M   
   
                                                            Result Comment: RESU  
LTS MAY BE FALSELY DEPRESSED AFTER THE   
ADMINISTRATION OFSULFASALAZINE AND/OR SULFAPYRIDINE.   
   
                                                            Performed By: #### L  
500.27703, L500.72563 ####[facility]   
[id number] [place], [place] [address]Ud# [phone]   
   
                      BILI TOTAL 0.80 MG/DL Normal     0.2-1.0    [facility]  
   
                                        Comment on above:   Order Comment: Campu  
s: M   
   
                                                            Performed By: #### L  
500.74555, L500.04232 ####[facility]   
IPCIZKMLQG5292 [place], [place] [address]Wf# [phone]   
   
                                                    Calcium   
[Mass/Vol]      8.2 mg/dL       Low             8.5-10.5        [facility]  
   
                                        Comment on above:   Order Comment: Campu  
s: M   
   
                                                            Result Comment: NOTE  
 NEW NORMAL RANGE DUE TO REAGENT CHANGE   
   
                                                            Performed By: #### L  
500.65106, L500.64115 ####[facility]   
[id number] [place], [place] [address]Xc# [phone]   
   
                                                    Chloride   
[Moles/Vol]     107 mmol/L      Normal                    [facility]  
   
                                        Comment on above:   Order Comment: Campu  
s: M   
   
                                                            Performed By: #### L  
500.66822, L500.07877 ####[facility]   
SYNQUPMGSD3969 [place], [place] [address]Et# [phone]   
   
                      CO2 [Moles/Vol] 30.0 mmol/L Normal     21-32      [facility]  
   
                                        Comment on above:   Order Comment: Campu  
s: M   
   
                                                            Performed By: #### L  
500.14176, L500.89019 ####[facility]   
[id number] [place], [place] [address]Sa# [phone]   
   
                                                    Creatinine   
[Mass/Vol]      0.53 mg/dL      Normal          0.510-0.950     [facility]  
   
                                        Comment on above:   Order Comment: Campu  
s: M   
   
                                                            Result Comment: Kayla  
ents receiving either N-Acetylcysteine (NAC)   
orMetamizole prior to venipuncture, may have falsely   
depressedresults.   
   
                                                            Performed By: #### L  
500.48316, L500.59240 ####[facility]   
[id number] [place], [place] [address]Wn# [phone]   
   
                                                    Globulin (S)   
[Mass/Vol]      3.6 g/dL        Normal          2.2-4.2         [facility]  
   
                                        Comment on above:   Order Comment: Darinu  
s: M   
   
                                                            Performed By: #### L  
500.76218, L500.76473 ####[facility]   
[id number] [place], [place] [address]Ub# [phone]   
   
                                                    Glucose   
[Mass/Vol]      167 mg/dL       High                      [facility]  
   
                                        Comment on above:   Order Comment: Darinu  
s: M   
   
                                                            Result Comment: Delt  
a check [id number]- Normal Fasting; 100-125   
Impaired Fasting; greaterthan 126 on more than one result- Diabetes.   
ADA guidelines.Results may be falsely elevated after the   
administration ofSulfapyridine.Results may be falsely depressed   
after the administration ofSulfasalazine.   
   
                                                            Performed By: #### L  
500.28974, L500.22986 ####[facility]   
[id number] [place], [place] [address]Pb# [phone]   
   
                                                    Potassium   
[Moles/Vol]     2.9 mmol/L      Critically low  3.5-5.1         [facility]  
   
                                        Comment on above:   Order Comment: Campu  
s: M   
   
                                                            Result Comment: Slig  
ht Hemolysis, Result may be affected.Critical   
Result(s)Called at: 06:46:18 on 2021 by maria victoria. Called to nelida   
back by: KENYA PHAN   
   
                                                            Performed By: #### L  
500.24785, L5.90592 ####[facility]   
[id number] [place], [place] [address]Tw# [phone]   
   
                                                    Protein   
[Mass/Vol]      5.4 g/dL        Low             6.0-8.5         [facility]  
   
                                        Comment on above:   Order Comment: Darinu  
s: M   
   
                                                            Performed By: #### L  
500.96564, L500.64998 ####[facility]   
[id number] [place], [place] [address]Na# [phone]   
   
                                                    Sodium   
[Moles/Vol]     140 mmol/L      Normal          136-145         [facility]  
   
                                        Comment on above:   Order Comment: Campu  
s: M   
   
                                                            Performed By: #### L  
500.02977, L500.55421 ####[facility]   
[id number] [place], [place] [address]Cy# [phone]   
   
                                                    Urea nitrogen   
[Mass/Vol]      7 mg/dL         Normal          7-26            [facility]  
   
                                        Comment on above:   Order Comment: Campu  
s: M   
   
                                                            Performed By: #### L  
500.05606, L500.90253 ####[facility]   
DYSTEICIKG1835 [place], [place] [address]Dy# [phone]   
   
                                                    Urea   
nitrogen/Creatini  
ne [Mass ratio] 13 mg/mg        Low             15-24           [facility]  
   
                                        Comment on above:   Order Comment: Campu  
s: M   
   
                                                            Performed By: #### L  
500.70089, L500.79470 ####[facility]   
[id number] [place] [address]Rb# [phone]   
   
                                                    GFR ESTon 2021   
   
                      IF  AMER Greater than 60 Normal                [facility]  
   
                                        Comment on above:   Order Comment: Campu  
s: M   
   
                                                            Performed By: #### L  
500.46306, L500.17268 ####[facility]   
[id number] [place], [place] [address]Ee# [phone]   
   
                      IF non-AFR AMER Greater than 60 Normal                [facility]  
   
                                        Comment on above:   Order Comment: Campu  
s: M   
   
                                                            Performed By: #### L  
500.03105, L500.55137 ####[facility]   
[id number] [place] [address]Be# [phone]   
   
                                                    GLUCOSE METERon 2021   
   
                                                    Glucose   
[Mass/Vol]      257 mg/dL       High                      [facility]  
   
                                                    Glucose   
[Mass/Vol]      241 mg/dL       High                      [facility]  
   
                                                    Glucose   
[Mass/Vol]      159 mg/dL       High                      [facility]  
   
                                                    HHon 2021   
   
                                                    Hematocrit (Bld)   
[Volume fraction] 25.0 %          Low             35.0-47.0       [facility]  
   
                                        Comment on above:   Order Comment: Campu  
s: M   
   
                                                            Performed By: #### L  
200.37298 ####[facility]   
[id number] [place], [place] [address]Cr# [phone]   
   
                                                    Hemoglobin (Bld)   
[Mass/Vol]      7.7 g/dL        Low             11.5-15.5       [facility]  
   
                                        Comment on above:   Order Comment: Campu  
s: M   
   
                                                            Performed By: #### L  
200.98103 ####[facility]   
[id number] [place], [place] [address]Dl# [phone]   
   
                                                    Eloy 2021   
   
                                                    Potassium   
[Moles/Vol]     3.8 mmol/L      Normal          3.5-5.1         [facility]  
   
                                        Comment on above:   Order Comment: Campu  
s: M   
   
                                                            Result Comment: Slig  
ht Hemolysis, Result may be affected.   
   
                                                            Performed By: #### L  
500.25457 ####[facility]   
[id number] [place], [place] [address]Fi# [phone]   
   
                                                    OTPNon 2021   
   
                      OT Progress Note            Normal                [facility]  
   
                      OTPN                  Normal                [facility]  
   
                                                    PROG IMSon 2021   
   
                      PROG IMS              Normal                [facility]  
   
                                                    Progress   
Note-Hospitalist                 Normal                          [facility]  
   
                                                    PROG.Mikayla 2021   
   
                      PROG.CARD             Normal                [facility]  
   
                                                    Progress   
Note-Cardiology                 Normal                          [facility]  
   
                                                    PTPNon 2021   
   
                      PT Progress Note            Normal                [facility]  
   
                      PTPN                  Normal                [facility]  
   
                                                    ABO/RHon 2021   
   
                                                    ABO and Rh group   
Nom (Bld)       Blood group A Rh(D) positive Normal                          [facility]  
   
                                        Comment on above:   Order Comment: Ruddy  
s: MPatient transfused or pregnant in the   
past 3   
months: YESComments: NO RECORD PT STATES RECIEVED BLOOD IN [place]   
This Patient Going To Surgery? N   
   
                                                    CBCon 2021   
   
                                                    Erythrocyte   
distribution   
width (RBC)   
[Ratio]         15.6 %          High            11-14.5         [facility]  
   
                                        Comment on above:   Order Comment: Ruddy  
s: MMinimal Draw: Y   
   
                                                            Performed By: #### L  
200.43511, L200.09876 ####[facility]   
[id number] [place], [place] [address]Vl# [phone]   
   
                                                    Hematocrit (Bld)   
[Volume fraction] 19.7 %          Critically low  35.0-47.0       [facility]  
   
                                        Comment on above:   Order Comment: Campu  
s: MMinimal Draw: Y   
   
                                                            Result Comment: Repe  
ated and verified.. Critical result verified and   
hasbeen called to STACEY Edmond on 21 at   
03:22, and has been readback.   
   
                                                            Performed By: #### L  
200.52641, L200.19966 ####[address] [place], [place] [address]Sy# [phone]   
   
                                                    Hemoglobin (Bld)   
[Mass/Vol]      5.8 g/dL        Critically low  11.5-15.5       [facility]  
   
                                        Comment on above:   Order Comment: Campu  
s: MMinimal Draw: Y   
   
                                                            Result Comment: Repe  
ated and verified.. Critical result verified and   
hasbeen called to STACEY Edmond on 21 at   
03:22, and has been readback.   
   
                                                            Performed By: #### L  
200.71607, L200.45441 ####[facility]   
[id number] [place], [place] [address]Js# [phone]   
   
                                                    MCHC (RBC)   
[Mass/Vol]      29.4 g/dL       Low             32.0-36.0       [facility]  
   
                                        Comment on above:   Order Comment: Campu  
s: MMinimal Draw: Y   
   
                                                            Performed By: #### L  
200.15504, L200.37963 ####[facility]   
[id number] [place], [place] [address]Tq# [phone]   
   
                                                    MCV (RBC)   
[Entitic vol]   80.7 fL         Normal          80.0-99.0       [facility]  
   
                                        Comment on above:   Order Comment: Campu  
s: MMinimal Draw: Y   
   
                                                            Performed By: #### L  
200.26807, L200.34640 ####[facility]   
[id number] [place], [place] [address]Dp# [phone]   
   
                                                    Nucleated RBC/100   
WBC (Bld) [Ratio] 0.2 %           Normal          Less than 1     [facility]  
   
                                        Comment on above:   Order Comment: Campu  
s: MMinimal Draw: Y   
   
                                                            Performed By: #### L  
200.06599, L200.93671 ####[facility]   
[id number] [place], [place] [address]Ok# [phone]   
   
                                                    Platelet mean   
volume (Bld)   
[Entitic vol]   9.4 fL          Normal          9.4-12.4        [facility]  
   
                                        Comment on above:   Order Comment: Campu  
s: MMinimal Draw: Y   
   
                                                            Performed By: #### L  
200.11861, L200.75261 ####[facility]   
[id number] [place], [place] [address]Rh# [phone]   
   
                      PLT        472 K/CU MM High       150-450    [facility]  
   
                                        Comment on above:   Order Comment: Campu  
s: MMinimal Draw: Y   
   
                                                            Performed By: #### L  
200.48154, L200.50289 ####[facility]   
[id number] [place] [address]Vb# [phone]   
   
                      RBC        2.44 M/CU MM Low        3.90-5.30  [facility]  
   
                                        Comment on above:   Order Comment: Campu  
s: MMinimal Draw: Y   
   
                                                            Performed By: #### L  
200.36709, L200.81964 ####[facility]   
[id number] [place] [address]Aj# [phone]   
   
                      WBC        17.7 K/CUMM High       4.5-11.0   [facility]  
   
                                        Comment on above:   Order Comment: Campu  
s: MMinimal Draw: Y   
   
                                                            Performed By: #### L  
200.25241, L200.33714 ####[facility]   
[id number] [place] [address]Bq# [phone]   
   
                                                    CONS.Mikayla 2021   
   
                      CONS.CARD             Normal                [facility]  
   
                                                    Consultation-Card  
iology                          Normal                          [facility]  
   
                                                    GLUCOSE METERon 2021   
   
                                                    Glucose   
[Mass/Vol]      184 mg/dL       High                      [facility]  
   
                                                    Glucose   
[Mass/Vol]      180 mg/dL       High                      [facility]  
   
                                                    Glucose   
[Mass/Vol]      189 mg/dL       High                      [facility]  
   
                                                    Glucose   
[Mass/Vol]      168 mg/dL       High                      [facility]  
   
                                                    HHon 2021   
   
                                                    Hematocrit (Bld)   
[Volume fraction] 27.2 %          Low             35.0-47.0       [facility]  
   
                                        Comment on above:   Order Comment: Campu  
s: M   
   
                                                            Performed By: #### L  
[id number] ####[facility]   
[id number] [place], [place] [address]Kx# [phone]   
   
                                                    Hemoglobin (Bld)   
[Mass/Vol]      8.6 g/dL        Low             11.5-15.5       [facility]  
   
                                        Comment on above:   Order Comment: Campu  
s: M   
   
                                                            Performed By: #### L  
200.56303 ####[facility]   
[id number] [place], [place] [address]Nw# [phone]   
   
                                                    OTPNon 2021   
   
                      OT Progress Note            Normal                [facility]  
   
                      OTPN                  Normal                [facility]  
   
                                                    PATIENT RETYPEon 2021   
   
                      RETYPE INTERP Positive   Normal                [facility]  
   
                                                    PROG.NOTEon 2021   
   
                      PROG.NOTE             Normal                [facility]  
   
                                                    Progress   
Note-Physician                  Normal                          [facility]  
   
                                                    PROG.ORTHOon 2021   
   
                      PROG.ORTHO            Normal                [facility]  
   
                                                    Progress   
Note-Ortho                      Normal                          [facility]  
   
                                                    PTon 2021   
   
                                                    INR Coag (PPP)   
[Relative time] 1.12 {INR}      High            0.9-1.1         [facility]  
   
                                        Comment on above:   Order Comment: Campu  
s: MMinimal Draw: Y   
   
                                                            Result Comment: Khari  
mmended PT INR therapeutic range for long term   
andprophylactic therapy is 2.0 - 3.0. For heart valve andshunt   
patients the range is 2.5 - 3.5.   
   
                                                            Performed By: #### L  
300.40396, L300.78022 ####[facility]   
[id number] [place], [place] [address]Bu# [phone]   
   
                      PTS        11.9 SECONDS Normal     9.5-12.0   [facility]  
   
                                        Comment on above:   Order Comment: Ruddy  
s: MMinimal Draw: Y   
   
                                                            Performed By: #### L  
300.31064, L300.52369 ####[facility]   
[id number] [place], [place] [address]El# [phone]   
   
                                                    PTPNon 2021   
   
                      PT Progress Note            Normal                [facility]  
   
                      PTPN                  Normal                [facility]  
   
                                                    PTTon 2021   
   
                                                    aPTT Coag (Bld)   
[Time]          24.0 s          Normal          22.0-31.5       [facility]  
   
                                        Comment on above:   Order Comment: Darinu  
s: MMinimal Draw: Y   
   
                                                            Result Comment: Ther  
apeutic Heparin Reference Range: High Dose:   
46-75 seconds (DVT/PE) Low Dose: 39-60 seconds (Acute Coronary   
Syndrome)For low molecular weight heparin or danaparoid,   
monitoringis often NOT necessary, but the heparin assay, Xa   
inhibitionassay (send-out) may be used in certain circumstances,   
asthe PTT is generally insensitive to the effect of theseagents.   
Direct thrombin inhibitors are becoming more widelyutilized and   
these drugs are often monitored using the PTT.   
   
                                                            Performed By: #### L  
300.76154, L300.55263 ####[facility]   
[id number] [place], [place] [address]Rv# [phone]   
   
                                                    RBC NO Pablo 2021   
   
                                        RBC NO ACT          TRANSFUSED PRODUCT:   
RBC NO   
ACTIVE BLEED COUNT: 2 Normal                                  [facility]  
   
                                                    SMEAR REVIEWon 2021   
   
                      SMEAR REVIEW            Normal                [facility]  
   
                                        Comment on above:   Order Comment: Ruddy  
s: MMinimal Draw: Y   
   
                                                            Result Comment: Darrick  
ed hypochromic, normocytic anemia with   
increasedpolychromasia, consistent with recent blood loss   
orhemolysis. Few nucleated red blood cells and spherocytesnoted.   
Suggest iron profile.Reviewed by Gracy Donovan M.D., Pathologist.   
3/12/[id number]   
   
                                                            Performed By: #### L  
200.78831, L200.60865 ####[facility]   
[id number] [place], [place] [address]Du# [phone]   
   
                                                    TROPONIN Ion 2021   
   
                      TROPONIN I 50922.6 pg/mL Critically high 0-34       [facility]  
   
                                        Comment on above:   Order Comment: Ruddy esparza: TU   
   
                                                            Result Comment: Prev  
ious Critical within one week. Critical   
Result(s)verified at: 12:35:03 on 2021 by: Carole Broderick   
NEW NORMAL RANGE DUE TO REAGENT CHANGEThis assay uses different   
antibodies than our current assay,and assays, even by the same   
 may recognizedifferent regions of the antibody and   
cannot be usedinterchangeably. Expect results of this assay to run   
higherthan the previous assay.   
   
                                                            Performed By: #### L  
550.19556 ####[address] [address]Wq# [phone]   
   
                      TROPONIN I 1038.8 pg/mL Critically high 0-34       [facility]  
   
                                        Comment on above:   Order Comment: Ruddy esparza: TU   
   
                                                            Result Comment: Crit  
ical Result(s) Called at: 23:56:40 on 2021   
bypa. Called to and read back by:STANISLAW THAKKAR NEW NORMAL RANGE   
DUE TO REAGENT CHANGEThis assay uses different antibodies than our   
current assay,and assays, even by the same  may   
recognizedifferent regions of the antibody and cannot be   
usedinterchangeably. Expect results of this assay to run higherthan   
the previous assay.   
   
                                                            Performed By: #### L  
550.95446 ####[address] [address]Nq# [phone]   
   
                      TROPONIN I 84561.0 pg/mL Critically high 0-34       [facility]  
   
                                        Comment on above:   Order Comment: Ruddy esparza: TU   
   
                                                            Result Comment: Prev  
ious Critical within one week. Critical   
Result(s)verified at: 04:29:52 on 2021 by: Katty Garcia NEW NORMAL RANGE DUE TO REAGENT CHANGEThis assay   
uses different antibodies than our current assay,and assays, even by   
the same  may recognizedifferent regions of the antibody   
and cannot be usedinterchangeably. Expect results of this assay to   
run higherthan the previous assay.   
   
                                                            Performed By: #### L  
550.39220 ####[address], [place] [address]Ih# [phone]   
   
                                                    UA COMPLETEon 2021   
   
                      Color (U)  Jaye      Normal                [facility]  
   
                                        Comment on above:   Order Comment: Campu  
s: M   
   
                                                            Performed By: #### L  
600.34268 ####[facility]   
[id number] [place], [place] [address]Tk# [phone]   
   
                                                    Glucose (U)   
[Mass/Vol]      500 mg/dL       Normal          NORMAL          [facility]  
   
                                        Comment on above:   Order Comment: Campu  
s: M   
   
                                                            Performed By: #### L  
600.24250 ####[facility]   
[id number] [place] [address]Pu# [phone]   
   
                      UA APPEARANCE Clear      Normal     CLEAR      [facility]  
   
                                        Comment on above:   Order Comment: Campu  
s: M   
   
                                                            Performed By: #### L  
600.86219 ####[facility]   
[id number] [place] [address]Ka# [phone]   
   
                      UA BILIRUBIN Negative   Normal     NEGATIVE   [facility]  
   
                                        Comment on above:   Order Comment: Campu  
s: M   
   
                                                            Performed By: #### L  
600.62580 ####[facility]   
[id number] [place] [address]Tj# [phone]   
   
                      UA BLOOD   Negative   Normal     NEGATIVE   [facility]  
   
                                        Comment on above:   Order Comment: Campu  
s: M   
   
                                                            Performed By: #### L  
600.64185 ####[facility]   
[id number] [place] [address]Hg# [phone]   
   
                      UA KETONE  Negative   Normal     NEGATIVE   [facility]  
   
                                        Comment on above:   Order Comment: Campu  
s: M   
   
                                                            Performed By: #### L  
600.34516 ####[facility]   
[id number] [place] [address]Bs# [phone]   
   
                      UA LK ESTERASE Negative   Normal     NEGATIVE   [facility]  
   
                                        Comment on above:   Order Comment: Campu  
s: M   
   
                                                            Performed By: #### L  
600.69265 ####[facility]   
[id number] [place] [address]Ck# [phone]   
   
                      UA NITRITE Negative   Normal     NEGATIVE   [facility]  
   
                                        Comment on above:   Order Comment: Campu  
s: M   
   
                                                            Performed By: #### L  
600.88041 ####[facility]   
[id number] [place], [place] [address]Hj# [phone]   
   
                      UA PH      5.0        Normal     5-6        [facility]   
[place]  
   
                                        Comment on above:   Order Comment: Campu  
s: M   
   
                                                            Performed By: #### L  
600.99013 ####[facility]   
[id number] [place], [place] [address]Li# [phone]   
   
                      UA PROTEIN Negative   Normal     NEGATIVE   [facility]  
   
                                        Comment on above:   Order Comment: Campu  
s: M   
   
                                                            Performed By: #### L  
600.77700 ####[facility]   
[id number] [place], [place] [address]Uj# [phone]   
   
                      UA SPEC GRAV 1.011      Normal     1.005-1.030 [facility]  
   
                                        Comment on above:   Order Comment: Campu  
s: M   
   
                                                            Performed By: #### L  
600.34240 ####[facility]   
[id number] [place] [address]It# [phone]   
   
                      UA UROBILINOGEN Negative   Normal     NORMAL     [facility]  
   
                                        Comment on above:   Order Comment: Campu  
s: M   
   
                                                            Performed By: #### L  
600.09689 ####[facility]   
[id number] [place] [address]Pn# [phone]   
   
                                                    BLOOD CULTUREon 2021   
   
                                                    Bacteria   
identified Cx Nom   
(Bld)           NO GROWTH AFTER 5 DAYS Normal                          [facility]  
   
                                        Comment on above:   Order Comment: Campu  
s: M   
   
                                                            Performed By: #### M  
050.94532 ####[facility]   
[id number] [place] [address]We# [phone]   
   
                                                    BMPon 2021   
   
                                                    Anion gap   
[Moles/Vol]     5 mmol/L        Normal          5-16            [facility]  
   
                                        Comment on above:   Order Comment: Campu  
s: M   
   
                                                            Performed By: #### L  
500.96151, L500.03001 ####[facility]   
[id number] [place], [place] [address]Xd# [phone]   
   
                                                    Calcium   
[Mass/Vol]      8.1 mg/dL       Low             8.5-10.5        [facility]  
   
                                        Comment on above:   Order Comment: Campu  
s: M   
   
                                                            Result Comment: NOTE  
 NEW NORMAL RANGE DUE TO REAGENT CHANGE   
   
                                                            Performed By: #### L  
500.26334, L500.48776 ####[facility]   
[id number] [place], [place] [address]Kl# [phone]   
   
                                                    Chloride   
[Moles/Vol]     110 mmol/L      High                      [facility]  
   
                                        Comment on above:   Order Comment: Campu  
s: M   
   
                                                            Performed By: #### L  
500.26301, L500.66962 ####[facility]   
[id number] [place], [place] [address]Kn# [phone]   
   
                      CO2 [Moles/Vol] 26.0 mmol/L Normal     21-32      [facility]  
   
                                        Comment on above:   Order Comment: Campu  
s: M   
   
                                                            Performed By: #### L  
500.92674, L500.21803 ####[facility]   
[id number] [place], [place] [address]Jx# [phone]   
   
                                                    Creatinine   
[Mass/Vol]      0.57 mg/dL      Normal          0.510-0.950     [facility]  
   
                                        Comment on above:   Order Comment: Campu  
s: M   
   
                                                            Result Comment: Kayla  
ents receiving either N-Acetylcysteine (NAC)   
orMetamizole prior to venipuncture, may have falsely   
depressedresults.   
   
                                                            Performed By: #### L  
500.67919, L500.99316 ####[facility]   
[id number] [place], [place] [address]Rs# [phone]   
   
                                                    Glucose   
[Mass/Vol]      128 mg/dL       High                      [facility]  
   
                                        Comment on above:   Order Comment: Campu  
s: M   
   
                                                            Result Comment: 70-1  
00- Normal Fasting; 100-125 Impaired Fasting;   
greaterthan 126 on more than one result- Diabetes. ADA   
guidelines.Results may be falsely elevated after the administration   
ofSulfapyridine.Results may be falsely depressed after the   
administration ofSulfasalazine.   
   
                                                            Performed By: #### L  
500.42701, L500.17207 ####[facility]   
[id number] [place], [place] [address]Us# [phone]   
   
                                                    Potassium   
[Moles/Vol]     3.7 mmol/L      Normal          3.5-5.1         [facility]  
   
                                        Comment on above:   Order Comment: Campu  
s: M   
   
                                                            Performed By: #### L  
500.67110, L500.00514 ####[facility]   
[id number] [place], [place] [address]Kj# [phone]   
   
                                                    Sodium   
[Moles/Vol]     141 mmol/L      Normal          136-145         [facility]  
   
                                        Comment on above:   Order Comment: Campu  
s: M   
   
                                                            Performed By: #### L  
500.05723, L500.14415 ####[facility]   
[id number] [place], [place] [address]Wf# [phone]   
   
                                                    Urea nitrogen   
[Mass/Vol]      8 mg/dL         Normal          7-26            [facility]  
   
                                        Comment on above:   Order Comment: Campu  
s: M   
   
                                                            Performed By: #### L  
500.05405, L500.23392 ####[facility]   
[id number] [place], [place] [address]Ea# [phone]   
   
                                                    Urea   
nitrogen/Creatini  
ne [Mass ratio] 14 mg/mg        Low             15-24           [facility]  
   
                                        Comment on above:   Order Comment: Campu  
s: M   
   
                                                            Performed By: #### L  
500.13298, L500.36891 ####[facility]   
[id number] [place], [place] [address]Pa# [phone]   
   
                                                    FL LACTIC ACIDon 2021   
   
                      FL LACTIC ACID 7.9 mmol/L Normal     ()         [facility]  
   
                                        Comment on above:   Order Comment: Campu  
s: M   
   
                                                            Result Comment: INTE  
RPRETIVE INFORMATION: Lactic Acid, Body   
FluidReference ranges for this assay have not been established for   
bodyfluid. Results should be interpreted in comparison to the   
lacticacid concentration in blood and in conjunction with the   
clinicalcontextThis test was developed and its performance   
characteristicsdetermined by [employer]. It has not been   
cleared orapproved by the [place] Food and Drug Administration. This test   
wasperformed in a CLIA certified laboratory and is intended   
forclinical purposes.Performed At: [address] [id number] RAYGOZA [id number]   
   
                                                            Performed By: #### L  
400.03804, L400.11704 ####[facility]   
[id number] [place], [place] [address]Dy# [phone]####   
L400.27737 ####[facility] [id number] [place]   
[address]Ae# [phone]   
   
                                                    GFR ESTon 2021   
   
                      IF  AMER Greater than [address] [facility]  
   
                                        Comment on above:   Order Comment: Campu  
s: M   
   
                                                            Performed By: #### L  
500.43126, L500.96066 ####[facility]   
[id number] [place], [place] [address]Mk# [phone]   
   
                      IF non-AFR AMER Greater than 60 [facility]  
   
                                        Comment on above:   Order Comment: Campu  
s: M   
   
                                                            Performed By: #### L  
500.63763, L500.30891 ####[facility]   
[id number] [place], [place] [address]Yq# [phone]   
   
                                                    GLUCOSE METERon 2021   
   
                                                    Glucose   
[Mass/Vol]      245 mg/dL       High                      [facility]  
   
                                                    Glucose   
[Mass/Vol]      108 mg/dL       Normal                    [facility]  
   
                                                    Glucose   
[Mass/Vol]      231 mg/dL       High                      [facility]  
   
                                                    Glucose   
[Mass/Vol]      134 mg/dL       High                      [facility]  
   
                                                    OTARon 2021   
   
                                                    OT Assessment   
Report                          Normal                          [facility]  
   
                      OTAR                  Normal                [facility]   
[place]  
   
                                                    PROG IMSon 2021   
   
                      PROG IMS              Normal                [facility]  
   
                                                    Progress   
Note-Hospitalist                 Normal                          [facility]  
   
                                                    PROG.NOTEon 2021   
   
                      PROG.NOTE             Normal                [facility]  
   
                                                    Progress   
Note-Physician                  Normal                          [facility]  
   
                                                    PROG.ORTHOon 2021   
   
                      PROG.ORTHO            Normal                [facility]  
   
                                                    Progress   
Note-Ortho                      Normal                          [facility]  
   
                                                    PTPNon 2021   
   
                      PT Progress Note            Normal                [facility]  
   
                      PTPN                  Normal                [facility]   
[place]  
   
                                                    ANAER CULTUREon 03-   
   
                                        ANAER CULTURE       RESULT NO GROWTH OF   
ANAEROBES           Normal                                  [facility]  
   
                                        Comment on above:   Order Comment: Ruddy  
s: M: #1 LEFT KNEE SYNOVIAL FLUID   
ANAEROBIC: #2   
LEFT KNEE SYNOVIAL FLUID AEROBIC   
   
                                                            Performed By: #### M  
100.36060, M1.90168 ####[facility]   
[id number] [place], [place] [address]Sk# [phone]   
   
                                                            Order Comment: Ruddy  
s: M: #4 LEFT KNEE SYNOVIAL TISSUE FOR AEROBIC   
AND ANAEROBIC   
   
                                                    GLUCOSE METERon 03-   
   
                                                    Glucose   
[Mass/Vol]      155 mg/dL       High                      [facility]   
[place]  
   
                                                    Glucose   
[Mass/Vol]      188 mg/dL       High                      [facility]  
   
                                                    Glucose   
[Mass/Vol]      185 mg/dL       High                      [facility]   
[place]  
   
                                                    Glucose   
[Mass/Vol]      137 mg/dL       High                      [facility]  
   
                                                    Glucose   
[Mass/Vol]      147 mg/dL       High                      [facility]  
   
                                                    Glucose   
[Mass/Vol]      77 mg/dL        Low                       [facility]  
   
                                                    OR.OPRPTon 03-   
   
                      Operative Report            Normal                [facility]  
   
                      OR.OPRPT              Normal                [facility]  
   
                                                    PHA.NOTEon 03-   
   
                      PHA.NOTE              Normal                [facility]  
   
                      Pharmacy Note            Normal                [facility]  
   
                                                    PROG.IDon 03-   
   
                      PROG.ID               Normal                [facility]  
   
                                                    Progress   
Note-Infectious   
Dis                             Normal                          [facility]  
   
                                                    PROG.ORTHOon 03-   
   
                      PROG.ORTHO            Normal                [facility]  
   
                                                    Progress   
Note-Ortho                      Normal                          [facility]  
   
                                                    PTARon 03-   
   
                                                    PT Assessment   
Report                          Normal                          [facility]  
   
                      PTAR                  Normal                [facility]   
[place]  
   
                                                    SURGon 03-   
   
                      SURG                  Normal                [facility]  
   
                                                    SURG TISSUEon 03-   
   
                      SURG TISSUE            Normal                [facility]  
   
                                        Comment on above:   Order Comment: Ruddy  
s: M: #4 LEFT KNEE SYNOVIAL TISSUE FOR   
AEROBIC   
AND ANAEROBIC   
   
                                                            Performed By: #### M  
100.86192, M1.89636 ####[facility]   
[id number] [place], [place] [address]Dw# [phone]   
   
                      SURG TISSUE            Normal                [facility]  
   
                                        Comment on above:   Order Comment: Campu  
s: M: #1 LEFT KNEE SYNOVIAL FLUID   
ANAEROBIC: #2   
LEFT KNEE SYNOVIAL FLUID AEROBIC   
   
                                                            Performed By: #### M  
100.56593, M100.60658 ####[facility]   
[id number] [place], [place] [address]Ap# [phone]   
   
                                                    BMPon 2021   
   
                                                    Anion gap   
[Moles/Vol]     3 mmol/L        Low             5-16            [facility]  
   
                                        Comment on above:   Order Comment: Campu  
s: M   
   
                                                            Performed By: #### L  
500.89918, L500.20275 ####[facility]   
[id number] [place], [place] [address]Pm# [phone]   
   
                                                    Calcium   
[Mass/Vol]      8.6 mg/dL       Normal          8.5-10.5        [facility]  
   
                                        Comment on above:   Order Comment: Campu  
s: M   
   
                                                            Result Comment: NOTE  
 NEW NORMAL RANGE DUE TO REAGENT CHANGE   
   
                                                            Performed By: #### L  
500.90044, L500.81479 ####[facility]   
[id number] [place], [place] [address]Xd# [phone]   
   
                                                    Chloride   
[Moles/Vol]     110 mmol/L      High                      [facility]  
   
                                        Comment on above:   Order Comment: Campu  
s: M   
   
                                                            Performed By: #### L  
500.32441, L500.11520 ####[facility]   
[id number] [place], [place] [address]Vi# [phone]   
   
                      CO2 [Moles/Vol] 27.0 mmol/L Normal     21-32      [facility]  
   
                                        Comment on above:   Order Comment: Campu  
s: M   
   
                                                            Performed By: #### L  
500.85547, L500.57319 ####[facility]   
[id number] [place], [place] [address]Gs# [phone]   
   
                                                    Creatinine   
[Mass/Vol]      0.54 mg/dL      Normal          0.510-0.950     [facility]  
   
                                        Comment on above:   Order Comment: Campu  
s: M   
   
                                                            Result Comment: Kayla  
ents receiving either N-Acetylcysteine (NAC)   
orMetamizole prior to venipuncture, may have falsely   
depressedresults.   
   
                                                            Performed By: #### L  
500.08948, L500.77803 ####[facility]   
[id number] [place], [place] [address]Mw# [phone]   
   
                                                    Glucose   
[Mass/Vol]      165 mg/dL       High                      [facility]  
   
                                        Comment on above:   Order Comment: Campu  
s: M   
   
                                                            Result Comment: 70-1  
00- Normal Fasting; 100-125 Impaired Fasting;   
greaterthan 126 on more than one result- Diabetes. ADA   
guidelines.Results may be falsely elevated after the administration   
ofSulfapyridine.Results may be falsely depressed after the   
administration ofSulfasalazine.   
   
                                                            Performed By: #### L  
500.96362, L500.38424 ####[facility]   
[id number] [place] [address]Ey# [phone]   
   
                                                    Potassium   
[Moles/Vol]     4.1 mmol/L      Normal          3.5-5.1         [facility]  
   
                                        Comment on above:   Order Comment: Campu  
s: M   
   
                                                            Performed By: #### L  
500.77318, L5.43351 ####[facility]   
[id number] [place] [address]Zr# [phone]   
   
                                                    Sodium   
[Moles/Vol]     140 mmol/L      Normal          136-145         [facility]  
   
                                        Comment on above:   Order Comment: Campu  
s: M   
   
                                                            Performed By: #### L  
500.34684, L500.15824 ####[facility]   
[id number] [place] [address]Ak# [phone]   
   
                                                    Urea nitrogen   
[Mass/Vol]      9 mg/dL         Normal          7-26            [facility]  
   
                                        Comment on above:   Order Comment: Campu  
s: M   
   
                                                            Result Comment: Delt  
a check reviewed   
   
                                                            Performed By: #### L  
500.43973, L500.93996 ####[facility]   
[id number] [place] [address]Rw# [phone]   
   
                                                    Urea   
nitrogen/Creatini  
ne [Mass ratio] 17 mg/mg        Normal          15-24           [facility]  
   
                                        Comment on above:   Order Comment: Campu  
s: M   
   
                                                            Performed By: #### L  
500.53384, L500.38002 ####[facility]   
[id number] [place], [place] [address]Mq# [phone]   
   
                                                    BODY FLDon 2021   
   
                      BODY FLD              Normal                [facility]  
   
                                        Comment on above:   Order Comment: Darinu  
s: M   
   
                                                            Performed By: #### M  
100.41606 ####[facility]   
[id number] [place], [place] [address]Aw# [phone]   
   
                                                    GFR ESTon 2021   
   
                      IF  AMER Greater than [address] [facility]  
   
                                        Comment on above:   Order Comment: Campu  
s: M   
   
                                                            Performed By: #### L  
500.64502, L500.06062 ####[facility]   
[id number] [place], [place] [address]Od# [phone]   
   
                      IF non-AFR AMER Greater than 60 [facility]  
   
                                        Comment on above:   Order Comment: Darinu  
s: M   
   
                                                            Performed By: #### L  
500.42680, L500.53415 ####[facility]   
[id number] [place], [place] [address]Ee# [phone]   
   
                                                    GLUCOSE METERon 2021   
   
                                                    Glucose   
[Mass/Vol]      87 mg/dL        Normal                    [facility]  
   
                                                    Glucose   
[Mass/Vol]      151 mg/dL       High                      [facility]  
   
                                                    Glucose   
[Mass/Vol]      155 mg/dL       High                      [facility]  
   
                                                    Glucose   
[Mass/Vol]      144 mg/dL       High                      [facility]  
   
                                                    PROG IMSon 2021   
   
                      PROG IMS              Normal                [facility]  
   
                                                    Progress   
Note-Hospitalist                 Normal                          [facility]  
   
                                                    PROG.ORTHOon 2021   
   
                      PROG.ORTHO            Normal                [facility]  
   
                                                    Progress   
Note-Ortho                      Normal                          [facility]  
   
                                                    FLC CELL W/DIFFon 2021  
   
   
                      PATH COMMENTS            [facility]  
   
                                        Comment on above:   Order Comment: Campu  
s: M   
   
                                                            Result Comment: CYTO  
PREP INTERPRETATION: Marked acute   
inflammation.Reviewed by Gracy Donovan M.D., Pathologist.   
[id number].   
   
                                                            Performed By: #### L  
400.51283, L400.33083 ####[facility]   
[id number] [place], [place] [address]Wk# [phone]####   
L400.02395 ####[facility] [id number] [place]   
[address]Jj# [phone]   
   
                                                    GLUCOSE METERon 2021   
   
                                                    Glucose   
[Mass/Vol]      115 mg/dL       Normal                    [facility]  
   
                                                    Glucose   
[Mass/Vol]      225 mg/dL       High                      [facility]  
   
                                                    Glucose   
[Mass/Vol]      137 mg/dL       High                      [facility]  
   
                                                    Glucose   
[Mass/Vol]      222 mg/dL       High                      [facility]  
   
                                                    PHA.NOTEon 2021   
   
                      PHA.NOTE              Normal                [facility]  
   
                      Pharmacy Note            Normal                [facility]  
   
                                                    PROG IMSon 2021   
   
                      PROG IMS              Normal                [facility]  
   
                                                    Progress   
Note-Hospitalist                 Normal                          [facility]  
   
                                                    PROG.IDon 2021   
   
                      PROG.ID               Normal                [facility]  
   
                                                    Progress   
Note-Infectious   
Dis                             Normal                          [facility]  
   
                                                    PROG.ORTHOon 2021   
   
                      PROG.ORTHO            Normal                [facility]  
   
                                                    Progress   
Note-Ortho                      Normal                          [facility]  
   
                                                    VANC TROUGHon 2021   
   
                      VANC TROUGH 15.0 MCG/ML Normal     15.0-20.0  [facility]  
   
                                        Comment on above:   Order Comment: Campu  
s: M   
   
                                                            Performed By: #### L  
520.21533 ####[facility]   
[id number] [place], [place] [address]Th# [phone]   
   
                                                    BMPon 2021   
   
                                                    Anion gap   
[Moles/Vol]     8 mmol/L        Normal          5-16            [facility]  
   
                                        Comment on above:   Order Comment: Campu  
s: M   
   
                                                            Performed By: #### L  
500.76721, L500.61140 ####[facility]   
[id number] [place], [place] [address]Wl# [phone]   
   
                                                    Calcium   
[Mass/Vol]      9.4 mg/dL       Normal          8.5-10.5        [facility]  
   
                                        Comment on above:   Order Comment: Campu  
s: M   
   
                                                            Result Comment: NOTE  
 NEW NORMAL RANGE DUE TO REAGENT CHANGE   
   
                                                            Performed By: #### L  
500.84058, L500.50066 ####[facility]   
[id number] [place], [place] [address]Ul# [phone]   
   
                                                    Chloride   
[Moles/Vol]     106 mmol/L      Normal                    [facility]  
   
                                        Comment on above:   Order Comment: Campu  
s: M   
   
                                                            Performed By: #### L  
500.61706, L500.41874 ####[facility]   
[id number] [place], [place] [address]Zr# [phone]   
   
                      CO2 [Moles/Vol] 25.0 mmol/L Normal     21-32      [facility]  
   
                                        Comment on above:   Order Comment: Campu  
s: M   
   
                                                            Performed By: #### L  
500.43402, L500.40080 ####[facility]   
[id number] [place], [place] [address]Kt# [phone]   
   
                                                    Creatinine   
[Mass/Vol]      0.59 mg/dL      Normal          0.510-0.950     [facility]  
   
                                        Comment on above:   Order Comment: Campu  
s: M   
   
                                                            Result Comment: Kayla  
ents receiving either N-Acetylcysteine (NAC)   
orMetamizole prior to venipuncture, may have falsely   
depressedresults.   
   
                                                            Performed By: #### L  
500.89915, L500.92232 ####[facility]   
[id number] [place], [place] [address]Dj# [phone]   
   
                                                    Glucose   
[Mass/Vol]      105 mg/dL       High                      [facility]  
   
                                        Comment on above:   Order Comment: Campu  
s: M   
   
                                                            Result Comment: 70-1  
00- Normal Fasting; 100-125 Impaired Fasting;   
greaterthan 126 on more than one result- Diabetes. ADA   
guidelines.Results may be falsely elevated after the administration   
ofSulfapyridine.Results may be falsely depressed after the   
administration ofSulfasalazine.   
   
                                                            Performed By: #### L  
500.65374, L500.59960 ####[facility]   
[id number] [place], [place] [address]Dy# [phone]   
   
                                                    Potassium   
[Moles/Vol]     4.6 mmol/L      Normal          3.5-5.1         [facility]  
   
                                        Comment on above:   Order Comment: Campu  
s: M   
   
                                                            Performed By: #### L  
500.42567, L500.16196 ####[facility]   
[id number] [place] [address]Oj# [phone]   
   
                                                    Sodium   
[Moles/Vol]     139 mmol/L      Normal          136-145         [facility]  
   
                                        Comment on above:   Order Comment: Campu  
s: M   
   
                                                            Performed By: #### L  
500.10431, L500.81316 ####[facility]   
[id number] [place] [address]Mt# [phone]   
   
                                                    Urea nitrogen   
[Mass/Vol]      20 mg/dL        Normal          7-26            [facility]  
   
                                        Comment on above:   Order Comment: Campu  
s: M   
   
                                                            Performed By: #### L  
500.96624, L500.26629 ####[address] [address]Zm# [phone]   
   
                                                    Urea   
nitrogen/Creatini  
ne [Mass ratio] 34 mg/mg        High            15-24           [facility]  
   
                                        Comment on above:   Order Comment: Campu  
s: M   
   
                                                            Performed By: #### L  
500.50480, L500.74908 ####[address] [address]Wd# [phone]   
   
                                                    CBC W/DIFFon 2021   
   
                      BASO ABS   0.00 K/CU MM Normal     0-0.2      [facility]  
   
                                        Comment on above:   Order Comment: Campu  
s: M   
   
                                                            Performed By: #### L  
200.91809 ####[facility]   
[id number] [place] [address]Jp# [phone]   
   
                                                    Basophils/100 WBC   
(Bld)           0.5 %           Normal          0-2             [facility]   
[place]  
   
                                        Comment on above:   Order Comment: Campu  
s: M   
   
                                                            Performed By: #### L  
200.77824 ####[facility]   
[id number] [place] [address]Lm# [phone]   
   
                      EOS ABS    0.10 K/CU MM Normal     0-0.5      [facility]   
[place]  
   
                                        Comment on above:   Order Comment: Campu  
s: M   
   
                                                            Performed By: #### L  
200.29875 ####[facility]   
[id number] [place] [address]Bq# [phone]   
   
                                                    Eosinophils/100   
WBC (Bld)       0.9 %           Normal          0-5             [facility]   
[place]  
   
                                        Comment on above:   Order Comment: Campu  
s: M   
   
                                                            Performed By: #### L  
200.37827 ####[place] [address]Ph# [phone]   
   
                                                    Erythrocyte   
distribution   
width (RBC)   
[Ratio]         14.9 %          High            11-14.5         [facility]   
[place]  
   
                                        Comment on above:   Order Comment: Campu  
s: M   
   
                                                            Performed By: #### L  
200.75251 ####[place] [id number]# [phone]   
   
                                                    Hematocrit (Bld)   
[Volume fraction] 34.0 %          Low             35.0-47.0       [facility]  
   
                                        Comment on above:   Order Comment: Campu  
s: M   
   
                                                            Performed By: #### L  
200.22377 ####[place] [address]Ph# [phone]   
   
                                                    Hemoglobin (Bld)   
[Mass/Vol]      9.9 g/dL        Low             11.5-15.5       [facility]   
[place]  
   
                                        Comment on above:   Order Comment: Campu  
s: M   
   
                                                            Performed By: #### L  
200.00792 ####[facility]   
[id number] [place], [place] [address]Ph# [phone]   
   
                      IMMATR GRAN ABS 0.00 K/CU MM Normal     Less than 2 [facility]   
[place]  
   
                                        Comment on above:   Order Comment: Campu  
s: M   
   
                                                            Performed By: #### L  
200.25879 ####[facility]   
[id number] [place], [place] [address]Ph# [phone]   
   
                      IMMATURE GRAN % 0.4 %      Normal     Less than 2 [facility]   
[place]  
   
                                        Comment on above:   Order Comment: Campu  
s: M   
   
                                                            Performed By: #### L  
200.14990 ####[facility]   
[id number] [place], [place] [address]Ph# [phone]   
   
                      LYMPH ABS  1.70 K/CU MM Normal     0.9-4.4    [facility]   
[place]  
   
                                        Comment on above:   Order Comment: Campu  
s: M   
   
                                                            Performed By: #### L  
[id number] ####[facility]   
[id number] [place], [place] [address]Kx# [phone]   
   
                                                    Lymphocytes/100   
WBC (Bld)       21.0 %          Normal          20-40           [facility]   
[place]  
   
                                        Comment on above:   Order Comment: Campu  
s: M   
   
                                                            Performed By: #### L  
200.54014 ####[address] [address]Hs# [phone]   
   
                                                    MCHC (RBC)   
[Mass/Vol]      29.1 g/dL       Low             32.0-36.0       [facility]   
[place]  
   
                                        Comment on above:   Order Comment: Campu  
s: M   
   
                                                            Performed By: #### L  
200.12570 ####[place] [address]Ph# [phone]   
   
                                                    MCV (RBC)   
[Entitic vol]   84.0 fL         Normal          80.0-99.0       [facility]   
[place]  
   
                                        Comment on above:   Order Comment: Campu  
s: M   
   
                                                            Performed By: #### L  
200.99022 ####[facility]   
[id number] [place] [address]Gb# [phone]   
   
                      MONO ABS   0.80 K/CU MM Normal     0.1-1.1    [facility]   
[place]  
   
                                        Comment on above:   Order Comment: Campu  
s: M   
   
                                                            Performed By: #### L  
200.38125 ####[facility]   
[id number] [place] [address]Rr# [phone]   
   
                                                    Monocytes/100 WBC   
(Bld)           10.3 %          High            2-10            [facility]   
[place]  
   
                                        Comment on above:   Order Comment: Campu  
s: M   
   
                                                            Performed By: #### L  
200.37118 ####[facility]   
[id number] [place] [address]Pz# [phone]   
   
                      NEUTROPHIL ABS 5.20 K/CU MM Normal     2.0-8.3    [facility]   
[place]  
   
                                        Comment on above:   Order Comment: Campu  
s: M   
   
                                                            Performed By: #### L  
200.36460 ####[facility]   
[id number] [place], [place] [address]Pr# [phone]   
   
                                                    Neutrophils/100   
WBC (Bld)       66.9 %          Normal          45-75           [facility]  
   
                                        Comment on above:   Order Comment: Campu  
s: M   
   
                                                            Performed By: #### L  
200.15443 ####[facility]   
[id number] [place] [address]Wp# [phone]   
   
                                                    Nucleated RBC/100   
WBC (Bld) [Ratio] 0.0 %           Normal          Less than 1     [facility]  
   
                                        Comment on above:   Order Comment: Campu  
s: M   
   
                                                            Performed By: #### L  
200.39705 ####[facility]   
[id number] [place] [address]Wb# [phone]   
   
                                                    Platelet mean   
volume (Bld)   
[Entitic vol]   9.0 fL          Low             9.4-12.4        [facility]  
   
                                        Comment on above:   Order Comment: Campu  
s: M   
   
                                                            Performed By: #### L  
200.11399 ####[facility]   
[id number] [place] [address]Ao# [phone]   
   
                      PLT        423 K/CU MM Normal     150-450    [facility]  
   
                                        Comment on above:   Order Comment: Campu  
s: M   
   
                                                            Performed By: #### L  
200.75332 ####[facility]   
[id number] [place] [address]Rz# [phone]   
   
                      RBC        4.05 M/CU MM Normal     3.90-5.30  [facility]  
   
                                        Comment on above:   Order Comment: Campu  
s: M   
   
                                                            Performed By: #### L  
200.91159 ####[facility]   
[id number] [place] [address]Qv# [phone]   
   
                      WBC        7.8 K/CUMM Normal     4.5-11.0   [facility]  
   
                                        Comment on above:   Order Comment: Campu  
s: M   
   
                                                            Performed By: #### L  
200.50844 ####[facility]   
[id number] [place], [place] [address]Ph# [phone]   
   
                                                    CONS.ORTHOon 2021   
   
                      CONS.ORTHO            Normal                [facility]  
   
                                                    CONSULTATION-ORTH  
O                               Normal                          [facility]   
[place]  
   
                                                    GFR ESTon 2021   
   
                      IF  AMER Greater than 60 [facility]  
   
                                        Comment on above:   Order Comment: Campu  
s: M   
   
                                                            Performed By: #### L  
500.30354, L500.92139 ####[facility]   
[id number] [place], [place] [address]Oh# [phone]   
   
                      IF non-AFR AMER Greater than 60 [facility]  
   
                                        Comment on above:   Order Comment: Campu  
s: M   
   
                                                            Performed By: #### L  
500.71256, L500.12821 ####[facility]   
[id number] [place], [place] [address]Jb# [phone]   
   
                                                    GLUCOSE METERon 2021   
   
                                                    Glucose   
[Mass/Vol]      185 mg/dL       High                      [facility]  
   
                                                    Glucose   
[Mass/Vol]      184 mg/dL       High                      [facility]  
   
                                                    Glucose   
[Mass/Vol]      150 mg/dL       High                      [facility]  
   
                                                    Glucose   
[Mass/Vol]      142 mg/dL       High                      [facility]  
   
                                                    Glucose   
[Mass/Vol]      156 mg/dL       High                      [facility]  
   
                                                    HP.IMS.CONon 2021   
   
                      CONSULTATION-H&P            [facility]  
   
                      HP.IMS.CON            Normal                [facility]  
   
                                                    OR.OPRPTon 2021   
   
                      Operative Report            Normal                [facility]  
   
                      OR.OPRPT              Normal                [facility]  
   
                                                    PHA.NOTEon 2021   
   
                      PHA.NOTE              Normal                [facility]  
   
                      Pharmacy Note            Normal                [facility]  
   
                                                    PROG IMSon 2021   
   
                      PROG IMS              [facility]  
   
                                                    Progress   
Note-Hospitalist                 Normal                          [facility]  
   
                                                    BMPon 2021   
   
                                                    Anion gap   
[Moles/Vol]     6 mmol/L        Normal          5-16            [facility]  
   
                                        Comment on above:   Order Comment: Campu  
s: M   
   
                                                            Performed By: #### L  
550.20443, L550.34128, L500.61341, L500.28394   
####[facility] [id number] [place], [place]   
[address]Sb# [phone]   
   
                                                    Calcium   
[Mass/Vol]      10.0 mg/dL      Normal          8.5-10.5        [facility]   
[place]  
   
                                        Comment on above:   Order Comment: Campu  
s: M   
   
                                                            Result Comment: NOTE  
 NEW NORMAL RANGE DUE TO REAGENT CHANGE   
   
                                                            Performed By: #### L  
550.04532, L550.17307, L500.29222, L500.69284   
####[facility] [id number] [place], [place]   
[address]On# [phone]   
   
                                                    Chloride   
[Moles/Vol]     104 mmol/L      Normal                    [facility]  
   
                                        Comment on above:   Order Comment: Campu  
s: M   
   
                                                            Performed By: #### L  
550.14174, L550.66088, L500.72826, L500.52857   
####[facility] [id number] [place], [place]   
[address]Fz# [phone]   
   
                      CO2 [Moles/Vol] 28.0 mmol/L Normal     21-32      [facility]   
[place]  
   
                                        Comment on above:   Order Comment: Campu  
s: M   
   
                                                            Performed By: #### L  
550.03985, L550.47994, L500.04921, L500.42532   
####[facility] [id number] [place], [place]   
[address]Xi# [phone]   
   
                                                    Creatinine   
[Mass/Vol]      0.61 mg/dL      Normal          0.510-0.950     [facility]   
[place]  
   
                                        Comment on above:   Order Comment: Campu  
s: M   
   
                                                            Result Comment: Kayla  
ents receiving either N-Acetylcysteine (NAC)   
orMetamizole prior to venipuncture, may have falsely   
depressedresults.   
   
                                                            Performed By: #### L  
550.27253, L550.34962, L500.32173, L500.39379   
####[facility] [id number] [place], [place]   
[address]Oj# [phone]   
   
                                                    Glucose   
[Mass/Vol]      189 mg/dL       High                      [facility]   
[place]  
   
                                        Comment on above:   Order Comment: Campu  
s: M   
   
                                                            Result Comment: 70-1  
00- Normal Fasting; 100-125 Impaired Fasting;   
greaterthan 126 on more than one result- Diabetes. ADA   
guidelines.Results may be falsely elevated after the administration   
ofSulfapyridine.Results may be falsely depressed after the   
administration ofSulfasalazine.   
   
                                                            Performed By: #### L  
550.86021, L550.06405, L500.97604, L500.45036   
####[facility] [id number] [place], [place]   
[address]Uc# [phone]   
   
                                                    Potassium   
[Moles/Vol]     4.5 mmol/L      Normal          3.5-5.1         [facility]  
   
                                        Comment on above:   Order Comment: Campu  
s: M   
   
                                                            Performed By: #### L  
550.50549, L550.28581, L500.42083, L500.33150   
####[facility] [id number] [place], [place]   
[address]Hd# [phone]   
   
                                                    Sodium   
[Moles/Vol]     138 mmol/L      Normal          136-145         [facility]  
   
                                        Comment on above:   Order Comment: Campu  
s: M   
   
                                                            Performed By: #### L  
550.79422, L550.74563, L500.74424, L500.76199   
####[facility] [id number] [place], [place]   
[address]Ef# [phone]   
   
                                                    Urea nitrogen   
[Mass/Vol]      25 mg/dL        Normal          7-26            [facility]  
   
                                        Comment on above:   Order Comment: Campu  
s: M   
   
                                                            Performed By: #### L  
550.65745, L550.43170, L500.43569, L500.44004   
####[facility] [id number] [place], [place]   
[address]Po# [phone]   
   
                                                    Urea   
nitrogen/Creatini  
ne [Mass ratio] 41 mg/mg        High            15-24           [facility]  
   
                                        Comment on above:   Order Comment: Campu  
s: M   
   
                                                            Performed By: #### L  
550.47541, L550.19543, L500.62872, L500.77634   
####[facility] [id number] [place], [place]   
[address]Ez# [phone]   
   
                                                    CBC W/DIFFon 2021   
   
                      BASO ABS   0.10 K/CU MM Normal     0-0.2      [facility]   
[place]  
   
                                        Comment on above:   Order Comment: Campu  
s: M   
   
                                                            Performed By: #### L  
200.62848, L200.33990 ####[facility]   
[id number] [place], [place] [address]Hi# [phone]   
   
                                                    Basophils/100 WBC   
(Bld)           0.5 %           Normal          0-2             [facility]   
[place]  
   
                                        Comment on above:   Order Comment: Campu  
s: M   
   
                                                            Performed By: #### L  
200.85844, L200.13093 ####[facility]   
[id number] [place], [place] [address]Iq# [phone]   
   
                      EOS ABS    0.10 K/CU MM Normal     0-0.5      [facility]   
[place]  
   
                                        Comment on above:   Order Comment: Campu  
s: M   
   
                                                            Performed By: #### L  
200.63874, L200.42346 ####[facility]   
[id number] [place] [address]Gd# [phone]   
   
                                                    Eosinophils/100   
WBC (Bld)       1.2 %           Normal          0-5             [facility]   
[place]  
   
                                        Comment on above:   Order Comment: Campu  
s: M   
   
                                                            Performed By: #### L  
200.70434, L200.52111 ####[facility]   
[id number] [place], [place] [address]Th# [phone]   
   
                                                    Erythrocyte   
distribution   
width (RBC)   
[Ratio]         15.0 %          High            11-14.5         [facility]  
   
                                        Comment on above:   Order Comment: Campu  
s: M   
   
                                                            Performed By: #### L  
200.08176, L200.90099 ####[facility]   
[id number] [place], [place] [address]Uq# [phone]   
   
                                                    Hematocrit (Bld)   
[Volume fraction] 34.1 %          Low             35.0-47.0       [facility]  
   
                                        Comment on above:   Order Comment: Campu  
s: M   
   
                                                            Performed By: #### L  
200.29208, L200.67027 ####[facility]   
[id number] [place], [place] [address]Pi# [phone]   
   
                                                    Hemoglobin (Bld)   
[Mass/Vol]      9.9 g/dL        Low             11.5-15.5       [facility]   
[place]  
   
                                        Comment on above:   Order Comment: Campu  
s: M   
   
                                                            Performed By: #### L  
200.70798, L200.92780 ####[facility]   
[id number] [place] [id number]# [phone]   
   
                      IMMATR GRAN ABS 0.10 K/CU MM Normal     Less than 2 [facility]   
[place]  
   
                                        Comment on above:   Order Comment: Campu  
s: M   
   
                                                            Performed By: #### L  
200.35610, L200.65776 ####[facility]   
[id number] [place], [place] [address]Ph# [phone]   
   
                      IMMATURE GRAN % 0.7 %      Normal     Less than 2 [facility]   
[place]  
   
                                        Comment on above:   Order Comment: Campu  
s: M   
   
                                                            Performed By: #### L  
200.11602, L200.58013 ####[facility]   
[id number] [place], [place] [address]Ph# [phone]   
   
                      LYMPH ABS  1.40 K/CU MM Normal     0.9-4.4    [facility]   
[place]  
   
                                        Comment on above:   Order Comment: Campu  
s: M   
   
                                                            Performed By: #### L  
200.65546, L200.76252 ####[facility]   
[id number] [place] [address]Cu# [phone]   
   
                                                    Lymphocytes/100   
WBC (Bld)       14.3 %          Low             20-40           [facility]  
   
                                        Comment on above:   Order Comment: Campu  
s: M   
   
                                                            Performed By: #### L  
200.19915, L200.88636 ####[facility]   
[id number] [place] [address]Do# [phone]   
   
                                                    MCHC (RBC)   
[Mass/Vol]      29.0 g/dL       Low             32.0-36.0       [facility]   
[place]  
   
                                        Comment on above:   Order Comment: Campu  
s: M   
   
                                                            Performed By: #### L  
200.93941, L200.25341 ####[facility]   
[id number] [place], [place] [address]Ph# [phone]   
   
                                                    MCV (RBC)   
[Entitic vol]   83.4 fL         Normal          80.0-99.0       [facility]   
[place]  
   
                                        Comment on above:   Order Comment: Campu  
s: M   
   
                                                            Performed By: #### L  
200.98751, L200.73610 ####[facility]   
[id number] [place], [place] [address]Il# [phone]   
   
                      MONO ABS   0.70 K/CU MM Normal     0.1-1.1    [facility]  
   
                                        Comment on above:   Order Comment: Campu  
s: M   
   
                                                            Performed By: #### L  
200.61681, L200.32690 ####[facility]   
[id number] [place], [place] [address]Ud# [phone]   
   
                                                    Monocytes/100 WBC   
(Bld)           7.4 %           Normal          2-10            [facility]  
   
                                        Comment on above:   Order Comment: Campu  
s: M   
   
                                                            Performed By: #### L  
200.24132, L200.69816 ####[facility]   
[id number] [place], [place] [address]Ph# [phone]   
   
                      NEUTROPHIL ABS 7.50 K/CU MM Normal     2.0-8.3    [facility]  
   
                                        Comment on above:   Order Comment: Campu  
s: M   
   
                                                            Performed By: #### L  
200.13177, L200.15082 ####[facility]   
[id number] [place] [address]Ug# [phone]   
   
                                                    Neutrophils/100   
WBC (Bld)       75.9 %          High            45-75           [facility]  
   
                                        Comment on above:   Order Comment: Campu  
s: M   
   
                                                            Performed By: #### L  
200.27457, L200.22919 ####[facility]   
[id number] [place] [address]Pm# [phone]   
   
                                                    Nucleated RBC/100   
WBC (Bld) [Ratio] 0.0 %           Normal          Less than 1     [facility]  
   
                                        Comment on above:   Order Comment: Campu  
s: M   
   
                                                            Performed By: #### L  
200.54222, L200.89069 ####[facility]   
[id number] [place], [place] [address]Jc# [phone]   
   
                                                    Platelet mean   
volume (Bld)   
[Entitic vol]   9.4 fL          Normal          9.4-12.4        [facility]  
   
                                        Comment on above:   Order Comment: Campu  
s: M   
   
                                                            Performed By: #### L  
200.23687, L200.79348 ####[facility]   
[id number] [place], [place] [address]Ha# [phone]   
   
                      PLT        541 K/CU MM High       150-450    [facility]  
   
                                        Comment on above:   Order Comment: Campu  
s: M   
   
                                                            Performed By: #### L  
200.59441, L200.37438 ####[facility]   
UEILZVCNMH9351 [place], [place] [address]Ar# [phone]   
   
                      RBC        4.09 M/CU MM Normal     3.90-5.30  [facility]  
   
                                        Comment on above:   Order Comment: Campu  
s: M   
   
                                                            Performed By: #### L  
200.31055, L200.01584 ####[facility]   
[id number] [place], [place] [address]Me# [phone]   
   
                      WBC        9.9 K/CUMM Normal     4.5-11.0   [facility]  
   
                                        Comment on above:   Order Comment: Campu  
s: M   
   
                                                            Performed By: #### L  
200.93836, L200.79462 ####[facility]   
[id number] [place], [place] [address]Zm# [phone]   
   
                                                    CDLECHOon 2021   
   
                      CDLECHO               Normal                [facility]  
   
                                                    ECHOCARDIOGRAM   
REPORT                          Normal                          [facility]  
   
                                                    CRPon 2021   
   
                      CRP        18.42 MG/DL Normal     LESS THAN 1 [facility]  
   
                                        Comment on above:   Order Comment: Campu  
s: M   
   
                                                            Performed By: #### L  
550.29554, L550.39573, L500.58734, L500.77306   
####[facility] [id number] [place], [place]   
[address]Yu# [phone]   
   
                                                    DSon 2021   
   
                      DS                    Normal                [facility]  
   
                                                    Ray 2021   
   
                                                    EMERGENCY   
PHYSICIAN REPORT                        This is a preliminary report   
only, as the practitioner   
review and authentication   
has not occurred.   Normal                                  [facility]   
[place]  
   
                      ER                    Normal                [facility]   
[place]  
   
                                                    FLC CELL W/DIFFon 2021  
   
   
                      FLD APPEAR TURBID     Normal                [facility]  
   
                                        Comment on above:   Order Comment: Campu  
s: M   
   
                                                            Performed By: #### L  
400.92327, L400.03965 ####[facility]   
[id number] [place] [address]Ro# [phone]####   
L400.67022 ####[facility] [id number] [place]   
[address]Sg# [phone]   
   
                      FLD BASO   0 %        Normal                [facility]  
   
                                        Comment on above:   Order Comment: Campu  
s: M   
   
                                                            Performed By: #### L  
400.50437, L400.94239 ####[address] [address]Dl# [phone]####   
L400.75523 ####[facility] [id number] [place]   
[address]Mg# [phone]   
   
                      FLD BLAST  0 %        Normal                [facility]  
   
                                        Comment on above:   Order Comment: Campu  
s: M   
   
                                                            Performed By: #### L  
400.98251, L400.14617 ####[address] [address]Ze# [phone]####   
L400.28254 ####[facility] [id number] [place]   
[address]Tf# [phone]   
   
                      FLD EOS    0 %        Normal                [facility]  
   
                                        Comment on above:   Order Comment: Campu  
s: M   
   
                                                            Performed By: #### L  
400.44007, L400.37319 ####[facility]   
[id number] [place], [place] [address]Bf# [phone]####   
L400.09206 ####[facility] [address] [place]   
[address]Wo# [phone]   
   
                      FLD LYMPH  1 %        Normal     NONE       [facility]  
   
                                        Comment on above:   Order Comment: Campu  
s: M   
   
                                                            Performed By: #### L  
400.82714, L400.54668 ####[facility]   
ORHMORAARR3019 [place], [place] [address]Eh# [phone]####   
L400.57421 ####[facility] EDNFHYZ[address] [place]   
[address]Dv# [phone]   
   
                      FLD MONO/HISTIO 1 %        Normal     NONE       [facility]   
[place]  
   
                                        Comment on above:   Order Comment: Campu  
s: M   
   
                                                            Performed By: #### L  
400.34983, L400.25506 ####[place] [address]Ph# [phone]####   
L400.95862 ####[facility] [id number] [place]   
[address]Ww# [phone]   
   
                      FLD NEUTS  98 %       Normal     NONE       [facility]  
   
                                        Comment on above:   Order Comment: Campu  
s: M   
   
                                                            Performed By: #### L  
400.92220, L400.18646 ####[place] [address]Ph# [phone]####   
L400.09408 ####[facility] DCSTIAG[address] [place]   
[address]Ey# [phone]   
   
                      FLD OTHER CELLS 0          Normal                [facility]  
   
                                        Comment on above:   Order Comment: Campu  
s: M   
   
                                                            Performed By: #### L  
400.25907, L400.98678 ####[facility]   
[id number] [place] [address]Tg# [phone]####   
L400.27659 ####[facility] [id number] [facility] [place]   
[address]Db# [phone]   
   
                      FLD PMN%   98.1 %     Normal                [facility]  
   
                                        Comment on above:   Order Comment: Campu  
s: M   
   
                                                            Result Comment: 2 pa  
rt automated diff (polymorphonuclear cells)   
   
                                                            Performed By: #### L  
400.66147, L400.60057 ####[facility]   
[id number] [place], [place] [address]Ph# [phone]####   
L400.36650 ####[facility] [id number] [place]   
[address]Zh# [phone]   
   
                      FLD RBC    499[address] /CU MM Normal                [facility]   
[place]  
   
                                        Comment on above:   Order Comment: Campu  
s: M   
   
                                                            Performed By: #### L  
400.33339, L400.70462 ####[facility]   
PYXAFIEGJQ6185 [place], [place] [address]Aw# [phone]####   
L400.27626 ####[facility] RUWNGHG[address] [place]   
[address]Tj# [phone]   
   
                      FLD WBC    88687 /CU MM High       0-5        [facility]   
[place]  
   
                                        Comment on above:   Order Comment: Campu  
s: M   
   
                                                            Performed By: #### L  
400.06834, L400.65728 ####[address] [address]Ei# [phone]####   
L400.87725 ####[facility] CYCWCEA[address] [place]   
[address]Ij# [phone]   
   
                      FLDMN%     1.9 %      Normal                [facility]   
[place]  
   
                                        Comment on above:   Order Comment: Campu  
s: M   
   
                                                            Result Comment: 2 pa  
rt automated diff (mononuclear cells)   
   
                                                            Performed By: #### L  
400.95205, L400.55586 ####[facility]   
SDFHFRBWRF8450 [place], [place] [address]Ms# [phone]####   
L400.60823 ####[facility] FZWHNPC[address] [place]   
[address]Pj# [phone]   
   
                      FLUID COLOR RED        Normal                [facility]   
[place]  
   
                                        Comment on above:   Order Comment: Campu  
s: M   
   
                                                            Performed By: #### L  
400.14322, L400.91591 ####[facility]   
[id number] [place], [place] [address]Hv# [phone]####   
L400.11370 ####[facility] [id number] [place]   
[address]Wa# [phone]   
   
                      SOURCE     SYNOVIAL FLD Normal                [facility]  
   
                                        Comment on above:   Order Comment: Darinu  
s: M   
   
                                                            Performed By: #### L  
400.54980, L400.98872 ####[facility]   
TETGQEATHJ5896 [place], [place] [address]Dy# [phone]####   
L400.45360 ####[facility] [id number] [place]   
[address]El# [phone]   
   
                                                    FLUID CRYSTALSon 2021   
   
                      FLUID CRYSTALS NC         Normal                [facility]  
   
                                        Comment on above:   Order Comment: Darinu  
s: M   
   
                                                            Result Comment: NO C  
RYSTALS SEEN   
   
                                                            Performed By: #### L  
400.14331, L400.06281 ####[facility]   
SOJSKDSPRO2134 [place], [place] [address]Yf# [phone]####   
L400.95347 ####[facility] [id number] [place]   
[address]Qt# [phone]   
   
                                                    GFR ESTon 2021   
   
                      IF  AMER Greater than 60 Normal                [facility]  
   
                                        Comment on above:   Order Comment: Campu  
s: M   
   
                                                            Performed By: #### L  
550.59158, L550.76690, L500.95254, L500.58059   
####[facility] [id number] [place], [place]   
[address]Vv# [phone]   
   
                      IF non-AFR AMER Greater than 60 [facility]  
   
                                        Comment on above:   Order Comment: Darinu  
s: M   
   
                                                            Performed By: #### L  
550.57209, L550.46238, L500.82103, L500.81610   
####[facility] [id number] [place], [place]   
[address]Dz# [phone]   
   
                                                    GLUCOSE METERon 2021   
   
                                                    Glucose   
[Mass/Vol]      117 mg/dL       Normal                    [facility]  
   
                                                    HP.IMS.ADMon 2021   
   
                      Admission-H&P            Normal                [facility]  
   
                      HP.IMS.ADM            Normal                [facility]  
   
                                                    KNEE COMP 4 OR MORE VWS LTon  
 2021   
   
                                                    KNEE COMP 4 OR   
MORE VWS LT                     Normal                          [facility]  
   
                                                    LACTATE BLOODon 2021   
   
                      LACTATE BLOOD 2.96 MMOL/L High       0.40-2.00  [facility]  
   
                                        Comment on above:   Order Comment: Ruddy MAE   
   
                                                            Performed By: #### L  
550.11634 ####[facility]   
[id number] [place], [place] [address]Oe# [phone]   
   
                                                    LACTIC ACIDon 2021   
   
                                                    Lactate   
[Moles/Vol]     1.98 mmol/L     Normal          0.40-2.00       [facility]  
   
                                        Comment on above:   Performed By: #### L  
550.91011 ####[facility]   
URIPZQNUOA6958 [place], [place] [address]Zw# [phone]   
   
                                                    PNon 2021   
   
                      PN                    Normal                [facility]  
   
                      PROGRESS NOTE            Normal                [facility]  
   
                                                    PORTABLE CHESTon 2021   
   
                      PORTABLE CHEST            Normal                [facility]  
   
                                                    PROCAL Aon 2021   
   
                      PROCAL A   0.26 NG/ML Normal     0-0.50     [facility]  
   
                                        Comment on above:   Order Comment: Ruddy MAE   
   
                                                            Result Comment: PCT   
Concentration InterpretationPCT <=0.1   
NG/ML:Normal range for healthy adultsPCT >0.1 NG/ML and <0.5   
NG/ML:Systemic infection (sepsis) is possible and may   
requireantibiotic treatment, but other conditions are known   
toelevate PCT as well.PCT >0.5 NG/ML:Should ne considered at risk   
for developing severe sepsisor septic shock.PCT >2.0 NG/ML:Important   
systemic inflammatory response. Almost exclusivelyindicates episode   
of severe bacterial sepsis or septicshock.This assy uses differnt   
methodologies and produces resultssignificantly lower than the   
Vidas. It is recommended toevaluate patients for sepsis based on   
results obtained fromthe [employer] platform vs the [employer]   
platform(previous).NOTE NEW NORMAL RANGE DUE TO REAGENT CHANGE   
   
                                                            Performed By: #### L  
550.59474, L550.16764, L500.83217, L500.74170   
####[facility] [id number] [place], [place]   
[address]Sj# [phone]   
   
                                                    VCFPXAYNLI70in 2021   
   
                                                    SARS-CoV-2   
(COVID-19) RNA   
RIDDHI+probe Ql   
(Unsp spec)               Negative                  Invalid   
Interpretation   
Code                      Negative                  [facility]  
   
                                        Comment on above:   Order Comment: Campu  
s: M   
   
                                                            Result Comment: RESU  
LTS CALLED TO A. HOSLER UAAT 1658 21 BY   
MILLIE PICHARDONegative results do not preclude SARS-CoV-2   
infection andshould not be used as the sole basis for treatment or   
otherpatient management decisions. Negative results must becombined   
with clinical observation, patient history, andepidemiological   
information. This test has been authorizedby the FDA under the   
Emergency Use Authorization (EUA) foruse by authorized   
laboratories.This test was performed by PCR.   
   
                                                            Performed By: #### L  
770.81556 ####[facility]   
AMYSOLNIAS8185 [place], [place] [address]Xg# [phone]   
   
                                                    TROPONIN Ion 2021   
   
                      TROPONIN I 11.6 pg/mL Normal     0-34       [facility]  
   
                                        Comment on above:   Order Comment: Campu  
s: MPatient care unavailable   
   
                                                            Result Comment: NOTE  
 NEW NORMAL RANGE DUE TO REAGENT CHANGEThis   
assay uses different antibodies than our current assay,and assays,   
even by the same  may recognizedifferent regions of the   
antibody and cannot be usedinterchangeably. Expect results of this   
assay to run higherthan the previous assay.   
   
                                                            Performed By: #### L  
550.17647 ####[facility]   
[id number] [place], [place] [address]Wy# [phone]   
   
                                                    UA COMPLETEon 2021   
   
                      Color (U)  Yellow     Normal                [facility]  
   
                                        Comment on above:   Order Comment: Campu  
s: M   
   
                                                            Performed By: #### L  
600.19115 ####[facility]   
RFOBDNJTMN7077 [place], [place] [address]Ds# [phone]   
   
                                                    Glucose (U)   
[Mass/Vol]      500 mg/dL       Normal          NORMAL          [facility]  
   
                                        Comment on above:   Order Comment: Campu  
s: M   
   
                                                            Performed By: #### L  
600.39008 ####[facility]   
PGJYVTUCXO9100 [place], [place] [address]Kq# [phone]   
   
                      UA APPEARANCE Clear      Normal     CLEAR      [facility]   
[place]  
   
                                        Comment on above:   Order Comment: Campu  
s: M   
   
                                                            Performed By: #### L  
600.83355 ####[facility]   
[id number] [place], [place] [address]At# [phone]   
   
                      UA BILIRUBIN Negative   Normal     NEGATIVE   [facility]   
[place]  
   
                                        Comment on above:   Order Comment: Campu  
s: M   
   
                                                            Performed By: #### L  
600.11901 ####[facility]   
[id number] [place] [address]Hx# [phone]   
   
                      UA BLOOD   Negative   Normal     NEGATIVE   [facility]   
[place]  
   
                                        Comment on above:   Order Comment: Campu  
s: M   
   
                                                            Performed By: #### L  
600.10892 ####[facility]   
[id number] [place] [address]Cu# [phone]   
   
                      UA KETONE  Negative   Normal     NEGATIVE   [facility]   
[place]  
   
                                        Comment on above:   Order Comment: Campu  
s: M   
   
                                                            Performed By: #### L  
600.92088 ####[facility]   
BNULZYHFYM[address] [place] [address]Gi# [phone]   
   
                      UA LK ESTERASE Negative   Normal     NEGATIVE   [facility]   
[place]  
   
                                        Comment on above:   Order Comment: Campu  
s: M   
   
                                                            Performed By: #### L  
600.74892 ####[facility]   
VXEZMELHON[address] [place] [address]Mn# [phone]   
   
                      UA NITRITE Negative   Normal     NEGATIVE   [facility]   
[place]  
   
                                        Comment on above:   Order Comment: Campu  
s: M   
   
                                                            Performed By: #### L  
600.93415 ####[facility]   
[id number] [place] [address]Fm# [phone]   
   
                      UA PH      5.0        Normal     5-6        [facility]   
[place]  
   
                                        Comment on above:   Order Comment: Campu  
s: M   
   
                                                            Performed By: #### L  
600.53460 ####[facility]   
[id number] [place] [address]Zu# [phone]   
   
                      UA PROTEIN Negative   Normal     NEGATIVE   [facility]   
[place]  
   
                                        Comment on above:   Order Comment: Campu  
s: M   
   
                                                            Performed By: #### L  
600.71149 ####[facility]   
[id number] [place] [address]Sw# [phone]   
   
                      UA SPEC GRAV 1.028      Normal     1.005-1.030 [facility]  
   
                                        Comment on above:   Order Comment: Campu  
s: M   
   
                                                            Performed By: #### L  
600.85053 ####[facility]   
[id number] [place] [address]Lh# [phone]   
   
                      UA UROBILINOGEN Negative   Normal     NORMAL     [facility]  
   
                                        Comment on above:   Order Comment: Campu  
s: M   
   
                                                            Performed By: #### L  
600.31841 ####[address] [address]Xd# [phone]   
   
                                                    WSR/MODon 2021   
   
                      WSR/MOD    83 MM/HR   High       0-30       [facility]  
   
                                        Comment on above:   Order Comment: Campu  
s: M   
   
                                                            Performed By: #### L  
200.76479, L200.05130 ####[facility]   
[id number] [place], [place] [address]Ph# [phone]   
   
                                                    CBC W/DIFFon 2021   
   
                      BASO ABS   0.10 K/CU MM Normal     0-0.2      [facility]  
   
                                        Comment on above:   Order Comment: Campu  
s: M   
   
                                                            Performed By: #### L  
200.26085 ####[facility]   
[id number] [place] [address]Iu# [phone]   
   
                                                    Basophils/100 WBC   
(Bld)           0.5 %           Normal          0-2             [facility]  
   
                                        Comment on above:   Order Comment: Campu  
s: M   
   
                                                            Performed By: #### L  
200.59163 ####[facility]   
[id number] [place] [address] 16[address]Sb# [phone]   
   
                      EOS ABS    0.70 K/CU MM High       0-0.5      [facility]  
   
                                        Comment on above:   Order Comment: Campu  
s: M   
   
                                                            Performed By: #### L  
200.77044 ####[facility]   
[id number] [place], [place] [address]Ph# [phone]   
   
                                                    Eosinophils/100   
WBC (Bld)       5.9 %           High            0-5             [facility]   
[place]  
   
                                        Comment on above:   Order Comment: Campu  
s: M   
   
                                                            Performed By: #### L  
200.39462 ####[facility]   
[id number] [place], [place] [address]Nl# [phone]   
   
                                                    Erythrocyte   
distribution   
width (RBC)   
[Ratio]         14.5 %          Normal          11-14.5         [facility]   
[place]  
   
                                        Comment on above:   Order Comment: Campu  
s: M   
   
                                                            Performed By: #### L  
200.23611 ####[facility]   
[id number] [place], [place] [address]Ph# [phone]   
   
                                                    Hematocrit (Bld)   
[Volume fraction] 33.2 %          Low             35.0-47.0       [facility]  
   
                                        Comment on above:   Order Comment: Campu  
s: M   
   
                                                            Performed By: #### L  
200.63601 ####[address] [address]As# [phone]   
   
                                                    Hemoglobin (Bld)   
[Mass/Vol]      9.9 g/dL        Low             11.5-15.5       [facility]   
[place]  
   
                                        Comment on above:   Order Comment: Campu  
s: M   
   
                                                            Performed By: #### L  
200.79763 ####[facility]   
[id number] [place], [place] [address]Ph# [phone]   
   
                      IMMATR GRAN ABS 0.10 K/CU MM Normal     Less than 2 [facility]   
[place]  
   
                                        Comment on above:   Order Comment: Campu  
s: M   
   
                                                            Performed By: #### L  
200.87350 ####[facility]   
[id number] [place] [address]Tx# [phone]   
   
                      IMMATURE GRAN % 0.5 %      Normal     Less than 2 [facility]   
[place]  
   
                                        Comment on above:   Order Comment: Campu  
s: M   
   
                                                            Performed By: #### L  
200.12173 ####[facility]   
[id number] [place] [address]Xa# [phone]   
   
                      LYMPH ABS  3.80 K/CU MM Normal     0.9-4.4    [facility]   
[place]  
   
                                        Comment on above:   Order Comment: Campu  
s: M   
   
                                                            Performed By: #### L  
200.51313 ####[facility]   
[id number] [place], [place] [address]Du# [phone]   
   
                                                    Lymphocytes/100   
WBC (Bld)       30.6 %          Normal          20-40           [facility]  
   
                                        Comment on above:   Order Comment: Campu  
s: M   
   
                                                            Performed By: #### L  
200.96441 ####[facility]   
[id number] [place], [place] [address]La# [phone]   
   
                                                    MCHC (RBC)   
[Mass/Vol]      29.8 g/dL       Low             32.0-36.0       [facility]  
   
                                        Comment on above:   Order Comment: Campu  
s: M   
   
                                                            Performed By: #### L  
200.36577 ####[place] [address]Ph# [phone]   
   
                                                    MCV (RBC)   
[Entitic vol]   83.2 fL         Normal          80.0-99.0       [facility]  
   
                                        Comment on above:   Order Comment: Campu  
s: M   
   
                                                            Performed By: #### L  
200.49009 ####[facility]   
[id number] [place] [address]Hy# [phone]   
   
                      MONO ABS   1.00 K/CU MM Normal     0.1-1.1    [facility]  
   
                                        Comment on above:   Order Comment: Campu  
s: M   
   
                                                            Performed By: #### L  
200.56546 ####[address] [address]Kn# [phone]   
   
                                                    Monocytes/100 WBC   
(Bld)           8.1 %           Normal          2-10            [facility]  
   
                                        Comment on above:   Order Comment: Campu  
s: M   
   
                                                            Performed By: #### L  
200.71924 ####[facility]   
[id number] [place] [address]Sd# [phone]   
   
                      NEUTROPHIL ABS 6.70 K/CU MM Normal     2.0-8.3    [facility]  
   
                                        Comment on above:   Order Comment: Campu  
s: M   
   
                                                            Performed By: #### L  
200.43385 ####[facility]   
[id number] [place] [address]Ni# [phone]   
   
                                                    Neutrophils/100   
WBC (Bld)       54.4 %          Normal          45-75           [facility]  
   
                                        Comment on above:   Order Comment: Campu  
s: M   
   
                                                            Performed By: #### L  
200.88860 ####[facility]   
[id number] [place], [place] [address]My# [phone]   
   
                                                    Nucleated RBC/100   
WBC (Bld) [Ratio] 0.0 %           Normal          Less than 1     [facility]  
   
                                        Comment on above:   Order Comment: Campu  
s: M   
   
                                                            Performed By: #### L  
200.43376 ####[facility]   
[id number] [place] [address]Ep# [phone]   
   
                                                    Platelet mean   
volume (Bld)   
[Entitic vol]   9.7 fL          Normal          9.4-12.4        [facility]  
   
                                        Comment on above:   Order Comment: Campu  
s: M   
   
                                                            Performed By: #### L  
200.42687 ####[facility]   
[id number] [place] [address]He# [phone]   
   
                      PLT        396 K/CU MM Normal     150-450    [facility]   
[place]  
   
                                        Comment on above:   Order Comment: Campu  
s: M   
   
                                                            Performed By: #### L  
200.56146 ####[facility]   
[id number] [place] [address]Zz# [phone]   
   
                      RBC        3.99 M/CU MM Normal     3.90-5.30  [facility]  
   
                                        Comment on above:   Order Comment: Campu  
s: M   
   
                                                            Performed By: #### L  
200.02470 ####[facility]   
[id number] [place] [address]Eb# [phone]   
   
                      WBC        12.3 K/CUMM High       4.5-11.0   [facility]  
   
                                        Comment on above:   Order Comment: Campu  
s: M   
   
                                                            Performed By: #### L  
200.96455 ####[facility]   
[id number] [place] [address]Vo# [phone]   
   
                                                    CMPon 2021   
   
                                                    Albumin   
[Mass/Vol]      2.8 g/dL        Low             3.2-5.0         [facility]  
   
                                        Comment on above:   Order Comment: Campu  
s: M   
   
                                                            Performed By: #### L  
500.06846, L500.37108, L500.76771, L500.20250   
####[facility] [id number] [place], [place]   
[address]My# [phone]   
   
                                                    Albumin/Globulin   
[Mass ratio]    0.8 {ratio}     Normal          0.8-2.0         [facility]  
   
                                        Comment on above:   Order Comment: Campu  
s: M   
   
                                                            Performed By: #### L  
500.05090, L500.66625, L500.67797, L500.15300   
####[facility] [id number] [place], [place]   
[address]Xo# [phone]   
   
                      ALK PHOS   86 U/L     Normal          [facility]  
   
                                        Comment on above:   Order Comment: Campu  
s: M   
   
                                                            Performed By: #### L  
500.53691, L500.17620, L500.88599, L500.01307   
####[facility] [id number] [place], [place]   
[address]Lz# [phone]   
   
                                                    ALT [Catalytic   
activity/Vol]   20 U/L          Normal          13-61           [facility]  
   
                                        Comment on above:   Order Comment: Campu  
s: M   
   
                                                            Result Comment: RESU  
LTS MAY BE FALSELY DEPRESSED AFTER THE   
ADMINISTRATION OFSULFASALAZINE AND/OR SULFAPYRIDINE.   
   
                                                            Performed By: #### L  
500.56225, L500.98862, L500.95482, L500.23883   
####[facility] FIYANQLVCP5194 [place], [place]   
[address]Fr# [phone]   
   
                                                    Anion gap   
[Moles/Vol]     5 mmol/L        Normal          5-16            [facility]  
   
                                        Comment on above:   Order Comment: Campu  
s: M   
   
                                                            Performed By: #### L  
500.48773, L500.38778, L500.69525, L500.82731   
####[facility] [id number] [place], [place]   
[address]Hu# [phone]   
   
                                                    AST [Catalytic   
activity/Vol]   20 U/L          Normal          8-34            [facility]  
   
                                        Comment on above:   Order Comment: Campu  
s: M   
   
                                                            Result Comment: RESU  
LTS MAY BE FALSELY DEPRESSED AFTER THE   
ADMINISTRATION OFSULFASALAZINE AND/OR SULFAPYRIDINE.   
   
                                                            Performed By: #### L  
500.19021, L500.61503, L500.47998, L500.38125   
####[facility] [id number] [place], [place]   
[address]Gd# [phone]   
   
                      BILI TOTAL 0.20 MG/DL Normal     0.2-1.0    [facility]  
   
                                        Comment on above:   Order Comment: Campu  
s: M   
   
                                                            Performed By: #### L  
500.59737, L500.57248, L500.90000, L500.06497   
####[facility] [id number] [place], [place]   
[address]Db# [phone]   
   
                                                    Calcium   
[Mass/Vol]      9.6 mg/dL       Normal          8.5-10.5        [facility]  
   
                                        Comment on above:   Order Comment: Campu  
s: M   
   
                                                            Result Comment: NOTE  
 NEW NORMAL RANGE DUE TO REAGENT CHANGE   
   
                                                            Performed By: #### L  
500.05872, L500.75269, L500.37805, L500.49832   
####[facility] [id number] [place], [place]   
[address]Sj# [phone]   
   
                                                    Chloride   
[Moles/Vol]     107 mmol/L      Normal                    [facility]  
   
                                        Comment on above:   Order Comment: Campu  
s: M   
   
                                                            Performed By: #### L  
500.09873, L500.69482, L500.01944, L500.38838   
####[facility] [id number] [place], [place]   
[address]Bh# [phone]   
   
                      CO2 [Moles/Vol] 28.0 mmol/L Normal     21-32      [facility]  
   
                                        Comment on above:   Order Comment: Campu  
s: M   
   
                                                            Performed By: #### L  
500.29237, L500.64716, L500.90405, L500.53078   
####[facility] [id number] [place], [place]   
[address]Lp# [phone]   
   
                                                    Creatinine   
[Mass/Vol]      0.66 mg/dL      Normal          0.510-0.950     [facility]  
   
                                        Comment on above:   Order Comment: Campu  
s: M   
   
                                                            Result Comment: Kayla  
ents receiving either N-Acetylcysteine (NAC)   
orMetamizole prior to venipuncture, may have falsely   
depressedresults.   
   
                                                            Performed By: #### L  
500.93700, L500.41012, L500.46936, L500.23998   
####[facility] YSJMJSPYPV1945 [place], [place]   
[address]Vh# [phone]   
   
                                                    Globulin (S)   
[Mass/Vol]      3.3 g/dL        Normal          2.2-4.2         [facility]  
   
                                        Comment on above:   Order Comment: Campu  
s: M   
   
                                                            Performed By: #### L  
500.98205, L500.64130, L500.00350, L500.83353   
####[facility] [id number] [place], [place]   
[address]Cr# [phone]   
   
                                                    Glucose   
[Mass/Vol]      109 mg/dL       High                      [facility]   
[place]  
   
                                        Comment on above:   Order Comment: Campu  
s: M   
   
                                                            Result Comment: 70-1  
00- Normal Fasting; 100-125 Impaired Fasting;   
greaterthan 126 on more than one result- Diabetes. ADA   
guidelines.Results may be falsely elevated after the administration   
ofSulfapyridine.Results may be falsely depressed after the   
administration ofSulfasalazine.   
   
                                                            Performed By: #### L  
500.86777, L500.08491, L500.27524, L500.19730   
####[facility] [id number] [place], [place]   
[address]Ct# [phone]   
   
                                                    Potassium   
[Moles/Vol]     4.3 mmol/L      Normal          3.5-5.1         [facility]  
   
                                        Comment on above:   Order Comment: Campu  
s: M   
   
                                                            Performed By: #### L  
500.72090, L500.64066, L500.83124, L500.50204   
####[facility] [id number] [place], [place]   
[address]Ft# [phone]   
   
                                                    Protein   
[Mass/Vol]      6.1 g/dL        Normal          6.0-8.5         [facility]  
   
                                        Comment on above:   Order Comment: Campu  
s: M   
   
                                                            Performed By: #### L  
500.74510, L500.59432, L500.87996, L500.11461   
####[facility] [id number] [place], [place]   
[address]Pc# [phone]   
   
                                                    Sodium   
[Moles/Vol]     140 mmol/L      Normal          136-145         [facility]   
[place]  
   
                                        Comment on above:   Order Comment: Campu  
s: M   
   
                                                            Performed By: #### L  
500.43252, L500.19251, L500.31026, L500.17917   
####[facility] PJJNGVXWSN6577 [place], [place]   
[address]Ab# [phone]   
   
                                                    Urea nitrogen   
[Mass/Vol]      25 mg/dL        Normal          7-26            [facility]  
   
                                        Comment on above:   Order Comment: Campu  
s: M   
   
                                                            Performed By: #### L  
500.29601, L500.81753, L500.30002, L500.63034   
####[facility] [id number] [place], [place]   
[address]Ep# [phone]   
   
                                                    Urea   
nitrogen/Creatini  
ne [Mass ratio] 38 mg/mg        High            15-24           [facility]   
[place]  
   
                                        Comment on above:   Order Comment: Campu  
s: M   
   
                                                            Performed By: #### L  
500.78529, L500.29960, L500.48626, L500.98270   
####[facility] [id number] [place] [address]   
[address]Ho# [phone]   
   
                                                    GFR ESTon 2021   
   
                      IF  AMER Greater than 60 Normal                [facility]   
[place]  
   
                                        Comment on above:   Order Comment: Campu  
s: M   
   
                                                            Performed By: #### L  
500.43682, L500.39509, L500.99957, L500.66419   
####[facility] LIQLZUIPHY1416 [place], [place]   
[address]Aw# [phone]   
   
                      IF non-AFR AMER Greater than 60 Normal                [facility]   
[place]  
   
                                        Comment on above:   Order Comment: Campu  
s: M   
   
                                                            Performed By: #### L  
500.23638, L500.75569, L500.17616, L500.81691   
####[facility] INTDVMQOQE7678 [place], [place]   
[address]Cx# [phone]   
   
                                                    MAGNESIUMon 2021   
   
                      MAGNESIUM  2.1 MG/CL  Normal     1.6-2.6    [facility]   
[place]  
   
                                        Comment on above:   Order Comment: Darinu  
s: M   
   
                                                            Performed By: #### L  
500.20345, L500.05876, L500.59577, L500.59716   
####[facility] IACSGIKUYJ7444 [place], [place]   
[address]Ar# [phone]   
   
                                                    PHOSon 2021   
   
                                                    Phosphate   
[Mass/Vol]      4.90 mg/dL      Normal          2.5-4.9         [facility]   
[place]  
   
                                        Comment on above:   Order Comment: Campu  
s: M   
   
                                                            Result Comment: Elev  
ated m-protein (paraprotein) levels in the serum   
may beexhibited in patients with monoclonal gammopathies,   
causingfalsely elevated inorganic phosphorus results.   
   
                                                            Performed By: #### L  
500.13386, L500.85292, L500.23612, L500.31923   
####[facility] EPFBEOOVUU2719 [place], [place]   
[address]Wh# [phone]   
   
                                                    Established Visit (Neurosurg  
domenica)on 2020   
   
                                                    Established Visit   
(Neurosurgery)                          Diagnoses/Problems  
Assessed  
Epidural abscess (324.9)   
(G06.2)  
Orders  
SocHx: Non-smoker  
Tobacco Use Screening;   
Status:Complete; Done:   
[id number]  
Patient Discussion/Summary  
Ms. Soto is seen today in   
postoperative follow-up of   
T7-10 and L3-4 posterior   
decompression and evacuation   
of epidural abscess by Dr. Kingsley Dodd on 2020. Patient is continuing   
an acute rehabilitation   
facility where staples   
removal and initial wound   
care are performed. She has   
2 separate incisions one in   
the thoracic and with a   
lumbar of which each have 2   
locations of incomplete   
incision closure   
approximately 2 cm in length   
and grossly superficial. Her   
facility had been endorsing   
minor wound drainage and   
follow-up MRI performed to   
evaluate for underlying   
infection. Lumbar region   
does have a degree of   
posterior fluid collection   
and postsurgical changes   
identified however no   
continued spinal cord   
compression or foraminal   
stenosis. Patient endorses   
sensation in her lower   
extremities and improving   
muscular strength as she   
works through physical   
therapy. She is continued on   
IV antibiotics through   
 she reports   
however this may continue   
secondary to concern and   
diabetic wound healing. Next   
follow-up visit with Dr. Dodd will be at   
approximately 3 months   
postoperative.  
  
Chief Complaint  
Patient is being seen for   
POST-OP and a follow-up   
Neurosurgical visit.  
  
History of Present Illness  
Ms. Soto is seen today in   
postoperative follow-up of   
T7-10 and L3-4 posterior   
decompression and evacuation   
of epidural abscess by Dr. Kingsley Dodd on 2020. Patient is continuing   
an acute rehabilitation   
facility where staples   
removal and initial wound   
care are performed. She has   
2 separate incisions one in   
the thoracic and with a   
lumbar of which each have 2   
locations of incomplete   
incision closure   
approximately 2 cm in length   
and grossly superficial. Her   
facility had been endorsing   
minor wound drainage and   
follow-up MRI performed to   
evaluate for underlying   
infection. Lumbar region   
does have a degree of   
posterior fluid collection   
and postsurgical changes   
identified however no   
continued spinal cord   
compression or foraminal   
stenosis. Patient endorses   
sensation in her lower   
extremities and improving   
muscular strength as she   
works through physical   
therapy. She is continued on   
IV antibiotics through   
 she reports   
however this may continue   
secondary to concern and   
diabetic wound healing. Next   
follow-up visit with Dr. Dodd will be at   
approximately 3 months   
postoperative.  
  
Social History  
Problems  
No alcohol use  
Non-smoker (V49.89) (Z78.9)  
Allergies  
Medication  
ACE Inhibitors  
Recorded By: Melanie Newsome;   
2020 11:03:02 AM  
acetaminophen  
Recorded By: Melanie Newsome;   
2020 11:03:02 AM  
hydrocodone  
Recorded By: Melanie Newsome;   
2020 11:03:02 AM  
morphine  
Recorded By: Melanie Newsome;   
2020 11:03:02 AM  
Current Meds  
  
Medication NameInstruction  
Amiodarone HCl TABS  
Aspirin 81 MG TABS  
Clopidogrel Bisulfate 75 MG   
Oral Tablet  
DULoxetine HCl - 30 MG Oral   
Capsule Delayed Release   
Particles  
Gabapentin TABS  
HumaLOG SOLN  
Jardiance 25 MG Oral Tablet  
Lantus SoloStar 100 UNIT/ML   
Subcutaneous Solution   
Pen-injector  
Metoprolol Tartrate 75 MG   
Oral Tablet  
NexIUM 40 MG Oral Capsule   
Delayed Release   
(Esomeprazole Magnesium)  
Nitroglycerin 0.4 MG   
Sublingual Tablet Sublingual  
oxyCODONE-Acetaminophen   
5-325 MG Oral Tablet  
Ranexa 1000 MG Oral Tablet   
Extended Release 12 Hour   
(Ranolazine ER)  
Vitals  
Vital Signs  
Recorded: [id number] 10:37AM  
Ruslnrgjrfx80.6 F  
Pwkgye055 lb 5.92 oz  
Tobacco Useb) No  
Fall Screeninga) No falls   
within the last year  
Signatures  
Electronically signed by :   
Jt Lynch PA-C; Dec 21   
2020 11:30AM EST (Author)  
Reviewed by : Kingsley Dodd MD; Dec 21 2020 12:35PM EST Normal                                     
[employer]  
   
                                                    NOVEL CORONAVIRUS NASOPHARYN  
GEAL - [facility] SPECIMEN ONLYon 2020   
   
                          SARS-COV-2   NOT DETECTED Normal       NOT   
DETECTED                                [facility]  
   
                                        Comment on above:   Order Comment: Submi  
tter Name: [facility]   
Agent   
Suspected: SARS-COV-2  
This test was performed using real time PCR and has been approved   
for the qualitative detection of SARS-CoV-2 nucleic acid. The test   
has been authorized by the FDA under an emergency use authorization   
for use by authorized laboratories.   
   
                                                            Result Comment: Nega  
tive results do not preclude SARS-CoV-2   
infection and should not be used as the sole basis for treatment or   
other patient management decisions. Optimum specimen types and   
timing for peak viral levels during infections caused by SARS-CoV-2   
has not been determined. The possibility of a false negative result   
should especially be considered if the patient's recent exposures or   
clinical presentation suggest that SARS-CoV-2 infection is probable,   
and diagnostic tests for other causes of illness (e.g., other   
respiratory illness) are negative. Collection of a new specimen and   
re-testing may be necessary if the patient is critically ill or   
clinically deteriorating.   
   
                                                            Performed By: #### L  
[id number] ####  
[facility] (DEFAULT)  
[address] [place] [address]   
   
                                                    NOVEL CORONAVIRUS NASOPHARYN  
GEAL - [facility] SPECIMEN ONLYon 2020   
   
                          SARS-COV-2   NOT DETECTED Normal       NOT   
DETECTED                                [facility]  
   
                                        Comment on above:   Order Comment: Submi  
tter Name: [facility]   
Agent   
Suspected: SARS-COV-2  
This test was performed using real time PCR and has been approved   
for the qualitative detection of SARS-CoV-2 nucleic acid. The test   
has been authorized by the FDA under an emergency use authorization   
for use by authorized laboratories.   
   
                                                            Result Comment: Nega  
tive results do not preclude SARS-CoV-2   
infection and should not be used as the sole basis for treatment or   
other patient management decisions. Optimum specimen types and   
timing for peak viral levels during infections caused by SARS-CoV-2   
has not been determined. The possibility of a false negative result   
should especially be considered if the patient's recent exposures or   
clinical presentation suggest that SARS-CoV-2 infection is probable,   
and diagnostic tests for other causes of illness (e.g., other   
respiratory illness) are negative. Collection of a new specimen and   
re-testing may be necessary if the patient is critically ill or   
clinically deteriorating.   
   
                                                            Performed By: #### L  
[id number] ####  
[facility] (DEFAULT)  
[address] [place] [address]   
   
                                                    Chart Updateon 2020   
   
                                        Chart Update        Chart Update  
I spoke with MS. JAYDEN SOTO's nurse at [facility]. I   
did give a verbal order to   
allow for Ms. JAYDEN SOTO to   
have her laminectomy wound   
evaluated and to have the   
staples removed when   
appropriate. I advised   
Mimi HOWARD that MS. JAYDEN SOTO will need a 3 months   
follow up with Dr. Dodd. I   
advised our office would be   
contacting to schedule. Ms. JAYDEN SOTO was in agreement   
with this plan and all her   
questions were answered.  
  
Signatures  
Electronically signed by :   
HARSHAD Nevarez; 2020 4:50PM EST (Author) Normal                                     
[employer]  
   
                                                    Clinical Event [facility]  
 [facility]   
   
                                                    Clinical Event   
Note-TRC call re:   
[facility]                                Clinical Event:  
Clinical Event Note:  
TopicTRC call re: [facility]   
(inpatient) -> [facility]  
Hospitalist Transfer  
Details  
60F with h/o DM2, CAD (s/p   
3vCAB), chronic OM L heel   
who, per h/o call via TRC  
@ 8373 with [facility]   
Dr Blanco:  sent here on   
10/23/20 from wound  
center b/o leukocytosis WBC   
30sK; (had surgery L heel in   
 in [place]  
partial calcanectomy vs   
I&D); started on Vanc/Zosyn,   
MRI +OM, bedside bone  
culture MSSA + others and   
BCs +MSSA; back pain -> MRI   
L-spine 10/27 @ 2020:  
multilevel infection   
including abscesses in psoas   
muscle, R ventral and dorsal  
epidural @ L3 and septic   
arthritis R L4-5 and L L5-S1   
facet joints; edema R L3,  
L4 and L5 pedicles which   
could be r/t OM; ID r/c   
transfer b/o Radiology  
concerned about cord   
compression (not sure what   
level); 5/5 str all extrem   
but  
can't raise legs b/o pain;   
spoke with Neurosurgery   
([facility]) at , r/c  
transfer to Medicine at    
with ID and NS consults;   
current VS: 141/63, 99.1,  
87, 18, 96% RA; remains on   
Vanc/Zosyn; getting 1250   
Vanc q12h, last at 1711; no  
contact precautions   
  
Terrance Rankin MD, MS [facility] Hospitalist, [facility] [place]  
  
  
  
Electronic Signatures:  
Wilson Rankin) (Signed   
27-Oct-2020 23:58)  
Authored: Clinical Event   
Note  
  
  
Last Updated: 27-Oct-2020   
23:58 by Wilson Rankin) [facility]  
   
                                                    Provider Note - ED v2on 07-0  
3-2020   
   
                                                    Provider Note -   
ED v2                                   Provider Note - ED v2:  
Chart Review:  
ED NOTES  
ED NOTES:  
Patient came in with   
complaints of left foot   
wound. Patient is a   
diabetic.  
Patient says she's had for   
about a week. Patient says   
the wound opened up  
today. Patient says there is   
some redness around the   
wound. Patient says she  
did travel in a car for 9   
hours. Patient says she got   
in and out of the car.  
Patient denies any other   
symptoms at this time no   
headache fever blurred   
vision  
nausea vomiting chest pain   
cough.  
  
  
  
HISTORY OF PRESENTING   
ILLNESS  
JAYDEN is a 60 year old Female   
and was seen by me at   
2020 13:51. The  
historian is the patient.  
  
Triage Information: Most   
recent Vital Sign Value Date  
  
  
  
  
PAST MEDICAL HISTORY  
ATTESTATION: I have reviewed   
and confirmed   
nurse's/medic's notes for   
patient's  
medications, allergies,   
medical history, and   
surgical history  
  
PSYCHOSOCIAL SCREENING: NO:   
concerns for safety at home,   
feelings of  
depression, feels like   
hurting others and feels   
like hurting self  
  
ALLERGIES/INTOLERANCES:   
Allergy  
Allergen: statins  
Type: Drug Category  
Reaction: Other  
  
Allergen: morphine  
Type: Drug  
Reaction: Unknown  
  
HEALTH HISTORY: No   
documented data.  
  
OUTPATIENT MEDICATIONS: Home   
Medications Review Status   
for Reconciliation:  
Complete  
Med Status: Patient   
Currently Takes Medications  
  
Drug Name: metFORMIN 500 mg   
oral tablet  
Instructions: 1 tab(s)   
orally 2 times a day  
  
Drug Name: esomeprazole 40   
mg oral delayed release   
capsule  
Instructions: 1 cap(s)   
orally once a day  
  
Drug Name: HumuLIN 50/50   
subcutaneous suspension  
Instructions: null  
  
Drug Name: semaglutide 2   
mg/1.5 mL subcutaneous   
solution  
Instructions: null  
  
Drug Name: Jardiance 25 mg   
oral tablet  
Instructions: 1 tab(s)   
orally once a day (in the   
morning)  
  
Drug Name: DULoxetine 30 mg   
oral delayed release capsule  
Instructions: 1 cap(s)   
orally 2 times a day  
  
Drug Name: ezetimibe 10 mg   
oral tablet  
Instructions: 1 tab(s)   
orally once a day  
  
Drug Name: Pacerone 100 mg   
oral tablet  
Instructions: 2 tab(s)   
orally once a day  
  
Drug Name: clopidogrel 75 mg   
oral tablet  
Instructions: 1 tab(s)   
orally once a day  
  
Drug Name: metoprolol   
tartrate 25 mg oral tablet  
Instructions: 1 tab(s)   
orally 2 times a day  
  
Drug Name: isosorbide   
mononitrate 30 mg oral   
tablet, extended release  
Instructions: 1 tab(s)   
orally once a day (in the   
morning)  
  
Drug Name: furosemide 40 mg   
oral tablet  
Instructions: 1 tab(s)   
orally once a day  
  
Drug Name: Vitamin D3 50,000   
intl units (1250 mcg) oral   
capsule  
Instructions: null  
  
Drug Name: potassium   
chloride 20 mEq oral tablet,   
extended release  
Instructions: 1 tab(s)   
orally 2 times a day  
  
Drug Name: gabapentin 800 mg   
oral tablet  
Instructions: 1 tab(s)   
orally 3 times a day  
  
Drug Name: nitroglycerin 0.4   
mg/hr transdermal film,   
extended release  
Instructions: 1 patch   
transdermal once a day  
  
Drug Name: ranolazine 1000   
mg oral tablet, extended   
release  
Instructions: 1 tab(s)   
orally 2 times a day  
  
Drug Name: Low Dose ASA 81   
mg oral tablet  
Instructions: 1 tab(s)   
orally once a day  
  
Drug Name: B-12 1000 mcg   
oral tablet  
Instructions: 1 tab(s)   
orally once a day  
  
Drug Name: doxycycline   
hyclate 100 mg oral tablet  
Instructions: 1 tab(s)   
orally 2 times a day  
  
SIGNIFICANT EVENTS: No   
documented data.  
  
OB/GYN: Is Pregnant: no Is   
Breastfeeding: no  
  
  
  
REVIEW OF SYSTEMS  
INTEGUMENTARY: POSITIVE for:   
abrasions;  
All other systems reviewed   
and are negative  
  
  
  
PHYSICAL EXAM  
CONSTITUTIONAL: Well   
appearing, well nourished,   
awake, alert, oriented to  
person, place,   
time/situation and in no   
apparent distress.  
  
HENMT: Airway patent,  
  
EYES: pupils are   
accommodating  
  
MUSCULOSKELETAL: , range of   
motion is not limited, no   
muscle or joint  
tenderness.  
  
NEUROLOGICAL: Alert and   
oriented, no focal deficits,   
no motor or sensory  
deficits.  
  
SKIN: Skin normal color for   
race, warm, dry patient does   
have 3 and half  
centimeter open blister on   
the lateral aspect of the   
left foot. Minimal  
Erythema noted around the   
wound. PSYCHIATRIC: Alert   
and oriented to person,  
place, time/situation.   
normal mood and affect. No   
apparent risk to self or  
others.  
  
  
  
  
  
  
  
  
RESULTS/VITAL SIGNS  
  
VITAL SIGNS:  
  
T PRBP SpO2O2(LPM) %FiO2   
Method  
2020   
13:37:[id number]  
  
  
  
  
CLINICAL IMPRESSION  
Diagnosis/Annotation: ED Dx  
Name:Open wound of foot  
Code:S91.309A  
  
Dispostion: discharged  
  
Type: home  
  
  
ATTESTATION  
  
Comments/Additional   
Findings: At this time   
patient was prescribed   
doxycycline  
twice a day for 10 days.   
Patient's son is an EMT and   
patient son were  
instructed to monitor   
redness and swelling make   
sure it's going down and not  
spreading. Patient was   
instructed to go straight to   
the ER if the redness seems  
to be spreading. Patient has   
a specialist of follows her   
diabetes and patient  
will go home on Monday   
patient was instructed to   
call this physician and see   
if  
he would see her or set her   
up for wound care. Patient   
was educated about the  
importance of taking care of   
this wound and the   
complications that can arise   
if  
she does not and how to care   
and clean for the wound and   
do dressings twice a  
day until she gets home and   
can have a care plan from   
her physician. Supplies  
were given to patient.   
patient and son were   
okaywith this care plan.  
  
CRITICAL CARE TIME  
Is this a critically ill   
patient: no  
  
  
  
  
  
Electronic Signatures:  
Eunice Prado (APRN-CNP)   
(Signed 2020 14:18)  
Authored: Provider Note - ED   
v2  
  
  
Last Updated: 2020   
14:18 by Eunice Prado   
(APRN-CNP)          [facility]  
  
  
  
Vital Signs  
  
  
                      Date Time  Vital Sign Value      Performing Clinician Faci  
jc  
   
                                                    2022   
13:          Body temperature    97.59 [degF]        Alvin So MD  
Work Phone:   
[phone]                          [facility]  
   
                                                    2022   
13:                              Diastolic blood   
pressure                  64 mm[Hg]                 Alvin So MD  
Work Phone:   
[phone]                          [facility]  
   
                                                    2022   
13:          Heart rate          73 /min             Alvin So MD  
Work Phone:   
[phone]                          [facility]  
   
                                                    2022   
13:          Respiratory rate    18 /min             Alvin So MD  
Work Phone:   
[phone]                          [facility]  
   
                                                    2022   
13:                              SaO2% (BldA) [Mass   
fraction]                 100 %                     Alvin So MD  
Work Phone:   
[phone]                          [facility]  
   
                                                    2022   
13:                              Systolic blood   
pressure                  137 mm[Hg]                Alvin So MD  
Work Phone:   
[phone]                          [facility]  
   
                                                    2022   
14:          Body height         170.2 cm            Damon Eckert MD  
Work Phone:   
[phone]                          [facility]  
   
                                                    2022   
14:          Body weight         102.06 kg           Damon Eckert MD  
Work Phone:   
[phone]                          [facility]  
   
                                                    2022   
14:          Respiratory rate    20 /min             Damon Eckert MD  
Work Phone:   
[phone]                          [facility]  
   
                                                    04-   
09:          Body height         170.2 cm            Basil Mitchell MD  
Work Phone:   
[phone]                          [facility]  
   
                                                    04-   
09:          Body weight         102.06 kg           Basil Mitchell MD  
Work Phone:   
[phone]                          [facility]  
   
                                                    04-   
09:          Respiratory rate    16 /min             Basil Mitchell MD  
Work Phone:   
[phone]                          [facility]  
   
                                                    2022   
09:          Body height         170.2 cm            Damon Eckert MD  
Work Phone:   
[phone]                          [facility]  
   
                                                    2022   
09:          Body weight         102.06 kg           Damon Eckert MD  
Work Phone:   
[phone]                          [facility]  
   
                                                    2022   
09:          Respiratory rate    20 /min             Damon Eckert MD  
Work Phone:   
[phone]                          [facility]  
   
                                                    2021   
14:          Body height         170.2 cm            Basil Mitchell MD  
Work Phone:   
[phone]                          [facility]  
   
                                                    2021   
14:          Body weight         95.25 kg            Basil Mitchell MD  
Work Phone:   
[phone]                          [facility]  
   
                                                    2021   
14:          Respiratory rate    16 /min             Basil Mitchell MD  
Work Phone:   
[phone]                          [facility]  
  
  
  
Encounters  
  
  
                          Encounter Date Encounter Type Care Provider Facility  
   
                          Start: 2024 ambulatory   MICHAELA CABA [facility]  
   
                                                    Start: 2024  
End: 2024           ambulatory                CHERISE PAL                             Facility:[id number]  
   
                                                    Start: 2024  
End: 2024     ambulatory          FROY CHANG [facility]  
   
                          Start: 2024 [facility]  
   
                                                    Start: 10-  
End: 10-           ambulatory                MAMADOU STARK                                  Facility:[facility]  
   
                                Start: 2022 Telephone encounter Ag Victor Hugo  
Work Phone:   
[phone]                          [facility]   
Orthopedics  
   
                                        Comment on above:   Appointment   
   
                                Start: 2022 Telephone encounter Alvin hardin MD  
Work Phone:   
[phone]                          [facility]   
Department of   
Infectious Disease  
   
                                        Comment on above:   Results, Lab   
   
                                                            Physician orders   
   
                                                    Start: 2022  
End: 2022                         Emergency department   
patient visit             BECKY CORRALES          Facility:[facility]  
   
                          Start: 2022 ambulatory   ALVIN London  
ty:[facility]  
   
                                                    Start: 2022  
End: 2022                         Subsequent hospital visit   
by physician              [facility]           [facility]  
   
                                        Comment on above:   arm pain from picc l  
ine   
   
                                                    Start: 2022  
End: 2022                         Patient encounter   
procedure                               Alvin So MD  
Work Phone:   
[phone]                          [facility]   
Department of   
Infectious Disease  
   
                                        Comment on above:   Hospital discharge f  
ollow-up (Primary Dx);  
MSSA (methicillin susceptible Staphylococcus aureus) infection;  
Long term (current) use of antibiotics;  
Acute embolism and thrombosis of deep vein of right upper extremity   
(HCC)   
   
                                Start: 2022 Telephone encounter Alvin hardin MD  
Work Phone:   
[phone]                          [facility]   
Department of   
Infectious Disease  
   
                                        Comment on above:   Results, Lab; CoPat   
Management   
   
                                                    Start: 2022  
End: 2022     ambulatory          DAMON ECKERT      Facility:[facility]  
al  
   
                                                    Start: 2022  
End: 2022                         Patient encounter   
procedure                               Damon Eckert MD  
Work Phone:   
[phone]                          [facility]   
Orthopedics  
   
                                        Comment on above:   S/P trigger finger r  
elease (Primary Dx)   
   
                                                    Start: 04-  
End: 04-     ambulatory          BASIL PAULA         Facility:[facility]  
al  
   
                                                    Start: 04-  
End: 04-                         Patient encounter   
procedure                               Basil Mitchell MD  
Work Phone:   
[phone]                          [facility]   
Orthopedics  
   
                                        Comment on above:   Acquired absence of   
knee joint following explantation of joint  
   
prosthesis with presence of antibiotic-impregnated cement spacer   
(Primary Dx)   
   
                                Start: 2022 Telephone encounter Alvin hardin MD  
Work Phone:   
[phone]                          [facility]   
Department of   
Infectious Disease  
   
                                        Comment on above:   Results, Lab; CoPat   
Management   
   
                                Start: 2022 Telephone encounter Alvin hardin MD  
Work Phone:   
[phone]                          [facility]   
Department of   
Infectious Disease  
   
                                        Comment on above:   plan of care   
   
                                                    Start: 2022  
End: 2022     ambulatory          DAMON ECKERT      [facility]  
al  
   
                                        Start: 2022   Encounter for other   
preprocedural examination [place]            [facility] [facility]  
   
                                                    Start: 2022  
End: 2022     ambulatory          DAMON ECKERT      Facility:[facility]  
al  
   
                                                    Start: 2022  
End: 2022                         Patient encounter   
procedure                               Damon Eckert MD  
Work Phone:   
[phone]                          [facility]   
Orthopedics  
   
                                        Comment on above:   Trigger finger, righ  
t middle finger (Primary Dx)   
   
                                Start: 2022 Telephone encounter Basil lozano MD  
Work Phone:   
[phone]                          [facility]   
Orthopedics  
   
                                        Comment on above:   Appointment   
   
                                                    Start: 2022  
End: 2022                         Evaluation and management   
of inpatient              BASIL MITCHELL               Facility:[facility]  
   
                                                    Start: 2022  
End: 2022     ambulatory          BASLI MITCHELL         Facility:[facility]  
al  
   
                                                    Start: 2022  
End: 2022     ambulatory          BASIL MITCHELL         Facility:[facility]  
al  
   
                          Start: 2021 ambulatory   BASIL MITCHELL  [facility]  
   
                                                    Start: 2021  
End: 2021                         Subsequent hospital visit   
by physician                            [facility]  
Work Phone:   
[phone]                          [facility]  
   
                                        Comment on above:   Pain in prosthetic j  
oint, initial encounter (Formerly [facility]) [T84.84XA]   
   
                                                    Start: 2021  
End: 2021     ambulatory          BASIL MITCHELL         [facility]  
   
                                                    Start: 2021  
End: 2021                         Patient encounter   
procedure                               Basli Mitchell MD  
Work Phone:   
[phone]                          [facility]   
Orthopedics  
   
                                        Comment on above:   Status post left par  
tial knee replacement (Primary Dx);  
Pain in prosthetic joint, initial encounter (Formerly [facility])   
   
                                Start: 12- Telephone encounter [facility]  
Work Phone:   
[phone]                          [facility]   
Orthopedics  
   
                                        Comment on above:   Future Appointment   
   
                                                    Start: 2021  
End: 2021                         Patient encounter   
procedure                 MICHAEL ROBLES        [facility]  
   
                                        Start: 2021   Patient encounter   
procedure                               Annalee M Esterle DO  
Work Phone:   
[phone]                          [facility]  
   
                                Start: 2021 Progress Note   Annalee M Esterle  
 DO  
Work Phone:   
[phone]                          [facility]  
   
                                        Start: 2021   Patient encounter   
procedure                               Annalee M Esterle DO  
Work Phone:   
[phone]                          [facility]  
   
                                Start: 2021 Progress Note   Annalee M Esterle  
 DO  
Work Phone:   
[phone]                          [facility]  
   
                                        Start: 2021   Patient encounter   
procedure                               Michael Robles MD  
Work Phone:   
[phone]                          [facility]  
   
                                Start: 2021 Progress Note   Michael garcía MD  
Work Phone:   
[phone]                          [facility]  
   
                                        Start: 2021   Patient encounter   
procedure                               Annalee M Esterle DO  
Work Phone:   
[phone]                          [facility]  
   
                                Start: 2021 Progress Note   Annalee M Esterle  
 DO  
Work Phone:   
[phone]                          [facility]  
   
                                        Start: 2021   Patient encounter   
procedure                               Michael Robles MD  
Work Phone:   
[phone]                          [facility]  
   
                                Start: 2021 Progress Note   Michael garcía MD  
Work Phone:   
[phone]                          [facility]  
   
                                        Start: 2021   Patient encounter   
procedure                               Annalee M Esterle DO  
Work Phone:   
[phone]                          [facility]  
   
                                Start: 2021 Progress Note   Annalee M Esterle  
 DO  
Work Phone:   
[phone]                          [facility]  
   
                                        Start: 2021   Patient encounter   
procedure                               Annalee M Esterle DO  
Work Phone:   
[phone]                          [facility]  
   
                                Start: 2021 Progress Note   Annalee M Esterle  
 DO  
Work Phone:   
[phone]                          [facility]  
   
                                        Start: 2021   Patient encounter   
procedure                               Annalee M Esterle DO  
Work Phone:   
[phone]                          [facility]  
   
                                Start: 2021 Progress Note   Annalee M Esterle  
 DO  
Work Phone:   
[phone]                          [facility]  
   
                                        Start: 2021   Patient encounter   
procedure                               Annalee M Esterle DO  
Work Phone:   
[phone]                          [facility]  
   
                                Start: 2021 Progress Note   Annalee M Esterle  
 DO  
Work Phone:   
[phone]                          [facility]  
   
                                        Start: 2021   Patient encounter   
procedure                               Michael Robles MD  
Work Phone:   
[phone]                          [facility]  
   
                                Start: 2021 Progress Note   Michael garcía MD  
Work Phone:   
[phone]                          [facility]  
   
                                        Start: 2021   Patient encounter   
procedure                               Annalee M Esterle DO  
Work Phone:   
[phone]                          [facility]  
   
                                Start: 2021 Progress Note   Annalee M Esterle  
 DO  
Work Phone:   
[phone]                          [facility]  
   
                                        Start: 2021   Patient encounter   
procedure                               Michael Robles MD  
Work Phone:   
[phone]                          [facility]  
   
                                Start: 2021 Progress Note   Michael garcía MD  
Work Phone:   
[phone]                          [facility]  
   
                                        Start: 2021   Patient encounter   
procedure                               Annalee M Esterle DO  
Work Phone:   
[phone]                          [facility]  
   
                                Start: 2021 Progress Note   Annalee M Esterle  
 DO  
Work Phone:   
[phone]                          [facility]  
   
                                        Start: 2021   Patient encounter   
procedure                               Annalee M Esterle DO  
Work Phone:   
[phone]                          [facility]  
   
                                Start: 2021 Progress Note   Annalee M Esterle  
 DO  
Work Phone:   
[phone]                          [facility]  
   
                                        Start: 2021   Patient encounter   
procedure                               Michael Robles MD  
Work Phone:   
[phone]                          [facility]  
   
                                Start: 2021 Progress Note   Michael garcía MD  
Work Phone:   
[phone]                          [facility]  
   
                                        Start: 2021   Patient encounter   
procedure                               Michael Robles MD  
Work Phone:   
[phone]                          [facility]  
   
                                Start: 2021 Progress Note   Michael garcía MD  
Work Phone:   
[phone]                          [facility]  
   
                                        Start: 2021   Patient encounter   
procedure                               Michael Robles MD  
Work Phone:   
[phone]                          [facility]  
   
                                Start: 2021 Progress Note   Michael garcía MD  
Work Phone:   
[phone]                          [facility]  
   
                                        Start: 2021   Patient encounter   
procedure                               Michael Robles MD  
Work Phone:   
[phone]                          [facility]  
   
                                Start: 2021 Progress Note   Michael garcía MD  
Work Phone:   
[phone]                          [facility]  
   
                                        Start: 2021   Patient encounter   
procedure                               Annalee M Esterle DO  
Work Phone:   
[phone]                          [facility]  
   
                                Start: 2021 Progress Note   Annalee M Esterle  
 DO  
Work Phone:   
[phone]                          [facility]  
   
                                        Start: 02-   Patient encounter   
procedure                               Annalee M Esterle DO  
Work Phone:   
[phone]                          [facility]  
   
                                Start: 02- Progress Note   Annalee M Esterle  
 DO  
Work Phone:   
[phone]                          [facility]  
   
                                        Start: 2021   Patient encounter   
procedure                               Annalee M Esterle DO  
Work Phone:   
[phone]                          [facility]  
   
                                Start: 2021 Progress Note   Annalee M Esterle  
 DO  
Work Phone:   
[phone]                          [facility]  
   
                                        Start: 02-   Patient encounter   
procedure                               Michael Robles MD  
Work Phone:   
[phone]                          [facility]  
   
                                Start: 02- Progress Note   Michael garcía MD  
Work Phone:   
[phone]                          [facility]  
   
                                        Start: 2021   Patient encounter   
procedure                               Annalee M Esterle DO  
Work Phone:   
[phone]                          [facility]  
   
                                Start: 2021 Progress Note   Annalee M Esterle  
 DO  
Work Phone:   
[phone]                          [facility]  
   
                                        Start: 2021   Patient encounter   
procedure                               Michael Robles MD  
Work Phone:   
[phone]                          [facility]  
   
                                Start: 2021 Progress Note   Michael garcía MD  
Work Phone:   
[phone]                          [facility]  
   
                                        Start: 2021   Patient encounter   
procedure                               Michael Robles MD  
Work Phone:   
[phone]                          [facility]  
   
                                Start: 2021 Progress Note   Michael garcía MD  
Work Phone:   
[phone]                          [facility]  
   
                                        Start: 2021   Patient encounter   
procedure                               Annalee M Esterle DO  
Work Phone:   
[phone]                          [facility]  
   
                                Start: 2021 Progress Note   Annalee M Esterle  
 DO  
Work Phone:   
[phone]                          [facility]  
   
                                        Start: 2021   Patient encounter   
procedure                               Michael Robles MD  
Work Phone:   
[phone]                          [facility]  
   
                                Start: 2021 Progress Note   Michael garcía MD  
Work Phone:   
[phone]                          [facility]  
   
                                        Start: 2021   Patient encounter   
procedure                               Annalee M Esterle DO  
Work Phone:   
[phone]                          [facility]  
   
                                Start: 2021 Progress Note   Annalee M Esterle  
 DO  
Work Phone:   
[phone]                          [facility]  
   
                                        Start: 2021   Patient encounter   
procedure                               Annalee M Esterle DO  
Work Phone:   
[phone]                          [facility]  
   
                                Start: 2021 Progress Note   Annalee M Esterle  
 DO  
Work Phone:   
[phone]                          [facility]  
   
                                        Start: 2021   Patient encounter   
procedure                               Annalee M Esterle DO  
Work Phone:   
[phone]                          [facility]  
   
                                Start: 2021 Progress Note   Annalee M Esterle  
 DO  
Work Phone:   
[phone]                          [facility]  
   
                                        Start: 2021   Patient encounter   
procedure                               Michael Robles MD  
Work Phone:   
[phone]                          [facility]  
   
                                Start: 2021 Progress Note   Michael garcía MD  
Work Phone:   
[phone]                          [facility]  
   
                                        Start: 2021   Patient encounter   
procedure                               Annalee M Esterle DO  
Work Phone:   
[phone]                          [facility]  
   
                                Start: 2021 Progress Note   Annalee M Esterle  
 DO  
Work Phone:   
[phone]                          [facility]  
   
                                        Start: 2021   Patient encounter   
procedure                               Annalee M Esterle DO  
Work Phone:   
[phone]                          [facility]  
   
                                Start: 2021 Progress Note   Annalee M Esterle  
 DO  
Work Phone:   
[phone]                          [facility]  
   
                                        Start: 2021   Patient encounter   
procedure                               Michael Robles MD  
Work Phone:   
[phone]                          [facility]  
   
                                Start: 2021 Progress Note   Michael garcía MD  
Work Phone:   
[phone]                          [facility]  
   
                                        Start: 2021   Patient encounter   
procedure                               Annalee M Esterle DO  
Work Phone:   
[phone]                          [facility]  
   
                                Start: 2021 Progress Note   Annalee M Esterle  
 DO  
Work Phone:   
[phone]                          [facility]  
   
                                        Start: 2021   Patient encounter   
procedure                               Michael Robles MD  
Work Phone:   
[phone]                          [facility]  
   
                                Start: 2021 Progress Note   Michael garcía MD  
Work Phone:   
[phone]                          [facility]  
   
                                        Start: 2021   Patient encounter   
procedure                               Michael Robles MD  
Work Phone:   
[phone]                          [facility]  
   
                                Start: 2021 Progress Note   Michael garcía MD  
Work Phone:   
[phone]                          [facility]  
   
                                        Start: 01-   Patient encounter   
procedure                               Michael Robles MD  
Work Phone:   
[phone]                          [facility]  
   
                                Start: 01- Progress Note   Michael garcía MD  
Work Phone:   
[phone]                          [facility]  
   
                                        Start: 2021   Patient encounter   
procedure                               Annalee M Esterle DO  
Work Phone:   
[phone]                          [facility]  
   
                                Start: 2021 Progress Note   Annalee M Esterle  
 DO  
Work Phone:   
[phone]                          [facility]  
   
                                        Start: 2021   Patient encounter   
procedure                               Annalee M Esterle DO  
Work Phone:   
[phone]                          [facility]  
   
                                Start: 2021 Progress Note   Annalee M Esterle  
 DO  
Work Phone:   
[phone]                          [facility]  
   
                                        Start: 2021   Patient encounter   
procedure                               Annalee M Esterle DO  
Work Phone:   
[phone]                          [facility]  
   
                                Start: 2021 Progress Note   Annalee M Esterle  
 DO  
Work Phone:   
[phone]                          [facility]  
   
                                        Start: 2021   Patient encounter   
procedure                               Michael Robles MD  
Work Phone:   
[phone]                          [facility]  
   
                                Start: 2021 Progress Note   Michael garcía MD  
Work Phone:   
[phone]                          [facility]  
   
                                        Start: 2021   Patient encounter   
procedure                               Annalee M Esterle DO  
Work Phone:   
[phone]                          [facility]  
   
                                Start: 2021 Progress Note   Annalee MAE Esterle  
 DO  
Work Phone:   
[phone]                          [facility]  
   
                                        Start: 2021   Patient encounter   
procedure                               Michael Robles MD  
Work Phone:   
[phone]                          [facility]  
   
                                Start: 2021 Progress Note   Michael garcía MD  
Work Phone:   
[phone]                          [facility]  
   
                                        Start: 2021   Patient encounter   
procedure                               Annalee M Esterle DO  
Work Phone:   
[phone]                          [facility]  
   
                                Start: 2021 Progress Note   Annalee MAE Esterle  
 DO  
Work Phone:   
[phone]                          [facility]  
   
                                        Start: 2021   Patient encounter   
procedure                               Annalee M Esterle DO  
Work Phone:   
[phone]                          [facility]  
   
                                Start: 2021 Progress Note   Annalee MAE Esterle  
 DO  
Work Phone:   
[phone]                          [facility]  
  
  
  
Procedures  
  
  
                          Date         Procedure    Procedure Detail Performing   
Clinician  
   
                                        Start: 2022   Dup-scan xtr veins   
unilateral/limited study                            Alvin So MD  
Work Phone:   
[phone]  
   
                          Start: 2022 Antibody screen              BECKY CORRALES  
   
                                        Comment on above:   Order Comment: Speci  
men Type: BLOOD SPECIMEN  
Ordering Facility: [facility]  
Address: [address] [place], [place] [id number]   
   
                                                            Performed By: #### T  
SCR ####  
[facility] BLOOD [facility]  
CLIA [id number]  
[address] [place] [address] [place]   
   
                          Start: 2022 Follow-up visit Follow Up    BASIL PRINCE  
   
                                        Start: 2021   Bone &/joint imaging  
 3   
phase study                                         Basil Mitchell MD  
Work Phone:   
[phone]  
   
                          Start: 2021 Cardiac catheterization               
   
   
                          Start: 03- Cardiac catheterization               
   
   
                          Start: 2021 Antibody screen                
   
                                        Comment on above:   Order Comment: Camp[facility]  
s: MTranfuse Now? YPatient transfused or   
pregnant in the past 3 months: YESNon - Acute Hemorrhage   
Indication: Hgb<8 w ACS/EKG chg/CPIrradiated: NWashed: NTransfuse   
slowly @ 60mL/hr x15min, then increase to infuse:Over 3 Hours   
   
                          Start: 2021 Antibody screen                
   
                                        Comment on above:   Order Comment: [facility]  
s: MPatient transfused or pregnant in the   
past   
3 months: YESComments: NO RECORD PT STATES RECIEVED BLOOD IN   
[place] Going [place] Surgery? N   
   
                                        Start: 2021   Ecg routine ecg w/le  
ast 12   
lds i&r only                                          
   
                                                H/O: surgery    S/P trigger fing  
er   
release                                 Damon Eckert MD  
Work Phone:   
[phone]  
   
                                                            History of operative  
   
procedure on knee                       Status post left   
partial knee   
replacement                             Basil Mitchell MD  
Work Phone:   
[phone]  
  
  
  
Plan of Treatment  
  
  
                          Date         Care Activity Detail       Author  
   
                                        Start: 2022   Hemoglobin   
A1c/Hemoglobin.total in   
Blood                     HBA1C                     [facility]  
   
                          Start: 2022 Influenza vaccination              [facility]  
   
                                        Start: 2022   Hemoglobin   
A1c/Hemoglobin.total in   
Blood                     HBA1C                     [facility]  
   
                                                    Start: 2021  
End: 2022                         C reactive protein   
[Mass/volume] in Serum   
or Plasma                               C-REACTIVE PROTEIN   
(CRP) Lab Routine Pain   
in prosthetic joint,   
initial encounter (Formerly [facility])   
Expected: 2021,   
Expires: 2022                     [facility]  
Work Phone:   
[phone]  
   
                                        Comment on above:   Expected: 2021  
, Expires: 2022   
   
                                                    Start: 2021  
End: 2022                         Erythrocyte   
sedimentation rate                      [facility]   
Routine Pain in   
prosthetic joint,   
initial encounter (HCC)   
Expected: 2021,   
Expires: 2022                     [facility]  
Work Phone:   
[phone]  
   
                                        Comment on above:   Expected: 2021  
, Expires: 2022   
   
                          Start: 2021 Influenza vaccination INFLUENZA (#1)  
 [facility]  
   
                                        Start: 2011   SHINGRIX VACCINE (1   
of   
2)                                      SHINGRIX VACCINE (1 of   
2)                                      [facility]  
   
                          Start: 2006 COLOGUARD (FIT-DNA) COLOGUARD (FIT-D  
NA) [facility]  
   
                          Start: 2006 Colonoscopy  COLONOSCOPY  [facility]  
   
                                        Start: 2006   COLORECTAL CANCER   
SCREENING                               COLORECTAL CANCER   
SCREENING                               [facility]  
   
                          Start: 2006 CT COLONOGRAPHY CT COLONOGRAPHY [facility]  
   
                          Start: 2006 DIABETES SCREEN DIABETES SCREEN [facility]  
   
                          Start: 2006 FECAL OCCULT BLOOD FECAL OCCULT BLOO  
D [facility]  
   
                          Start: 2006 LIPID SCREEN LIPID SCREEN [facility]  
   
                          Start: 2006 SIGMOIDOSCOPY SIGMOIDOSCOPY [facility]  
   
                          Start: 2001 Mammography  MAMMOGRAM    [facility]  
   
                          Start: 1991 HPV TESTING  HPV TESTING  [facility]  
   
                          Start: 1982 PAP TESTING  PAP TESTING  [facility]  
   
                                        Start: 1980   Urine microalbumin   
profile                   DTAP,TDAP,TD (1 - Tdap)   [facility]  
   
                                        Start: 1979   ANNUAL PCP TEAM   
JAREK   
DISEASE VISIT                           ANNUAL PCP TEAM CHRONIC   
DISEASE VISIT                           [facility]  
   
                          Start: 1979 BP CONTROLLED (<130/80) BP CONTROLLE  
D (<130/80) [facility]  
   
                                        Start: 1979   Hepatitis B surface   
antibody level            LDL CHOLESTEROL           [facility]  
   
                          Start: 1979 HEPATITIS C SCREENING HEPATITIS C SC  
REENING [facility]  
   
                          Start: 1979 HIV SCREENING HIV SCREENING [facility]  
   
                                        Start: 1977   ONE PNEUMOVAX PRIOR   
TO   
AGE 66                                  ONE PNEUMOVAX PRIOR TO   
AGE 66                                  [facility]  
   
                                        Start: 1973   Adult depression   
screening assessment      DEPRESSION SCREENING      [facility]  
   
                                        Start: 1971   3 comp foot exam   
completed                 DIABETIC FOOT EXAM        [facility]  
   
                                Start: 1971 Hepatitis B screening URINE   
ALBUMIN:CREATININE   
RATIO                                   [facility]  
   
                                        Start: 1971   Hepatitis C antibody  
,   
confirmatory test         DILATED RETINAL EXAM      [facility]  
   
                          Start: 1967 PNEUMOCOCCAL (1 - PCV) PNEUMOCOCCAL   
(1 - PCV) [facility]  
   
                          Start: 1966 COVID-19 VACCINE (#1) COVID-19 VACCI  
NE (#1) [facility]  
   
                          Start: 1966 COVID-19 VACCINE (1) COVID-19 VACCIN  
E (1) [facility]  
   
                          Start: 1961 COVID-19 VACCINE (#1) COVID-19 VACCI  
NE (#1) [facility]  
   
                                                      
End: 2023                         Bone &/joint imaging 3   
phase study                             NM BONE 3 PHASE   
Radiology Routine Pain   
in prosthetic joint,   
initial encounter (HCC)   
1 Occurrences starting   
2021 until   
2023                              [facility]  
Work Phone:   
[phone]  
   
                                        Comment on above:   1 Occurrences starti  
ng 2021 until 2023   
   
                                                            XR KNEE 3V   
FLEX/LAT/MERCH LEFT (AK)                XR KNEE 3V   
FLEX/LAT/MERCH LEFT   
(AK) Radiology Routine   
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   
joint, initial   
encounter (HCC)   
Ordered: 2021                     [facility]  
Work Phone:   
[phone]  
   
                                        Comment on above:   Ordered: 2021   
   
                                                                 [facility]  
c  
   
                                                                 [facility]  
c  
   
                                                                 [facility]  
c  
   
                                                                 [facility]  
c  
   
                                                                 [facility]  
c  
   
                                                                 [facility]  
c  
   
                                                                 [facility]  
c  
  
  
  
Payers  
  
  
                          Date         Payer Category Payer        Policy ID  
   
                                2021      Medicaid [place] [employer] [employer] [employer] [id number]   
2021-Present   
[phone] [address]   
[place] [address]   
Medicaid                                [id number]   
[id number]  
[id number]  
   
                                2021      Medicaid [place] [employer] [employer] [place] [id number]   
2021-Present   
[phone] [address]   
[place] [address]   
Medicaid                                [id number]  
[id number]  
   
                          2021   Medicaid                  [id number]  
   
                          1960   Unknown                   [id number]   
[id number]  
   
                          1960   Unknown                   [id number]   
[id number]  
   
                          1960   Unknown                   [id number]   
[id number]  
   
                          1960   Unknown                   [id number]   
[id number]  
   
                          1960   Unknown                   [id number]   
[id number]  
   
                                       Medicare                  [id number]  
  
  
  
Social History  
  
  
                          Date         Type         Detail       Facility  
   
                                                            Tobacco smoking stat  
[facility]                                    Tobacco smoking   
consumption unknown                     [facility]  
   
                          Start: 1960 Sex Assigned At Birth Not on file  C  
[facility]  
   
                                                    Start: 2022  
End: 2022                         Exposure to SARS-CoV-2   
(event)                   Not sure                  [facility]  
   
                                        Start: 2022   Tobacco smoking stat  
[facility]                      Never smoked tobacco      [facility]  
   
                          Start: 2022 Tobacco use and exposure Smokeless t  
obacco non-user [facility]  
   
                                                    Start: 2022  
End: 2022           Alcohol intake            Lifetime non-drinker   
(finding)                               [facility]  
   
                                        Start: 2022   History SDOH Alcohol  
   
Frequency                 1                         [facility]  
  
  
  
Medical Equipment  
  
  
                          Procedure Code Equipment Code Equipment Original Text   
Equipment   
Identifier                              Dates  
   
                                                                Cement Simplex   
Gentamicin Bone High   
Viscosity 20ml Sterile   
40gm - [id number]         [id number]               Start:   
2022  
   
                                                                Cement Simplex   
Gentamicin Bone High   
Viscosity 20ml Sterile   
40gm - [id number]         [id number]               Start:   
2022  
   
                                                                Cement Simplex   
Gentamicin Bone High   
Viscosity 20ml Sterile   
40gm - [id number]         [id number]               Start:   
2022  
   
                                                                Insert 4-11 E-F   
12mm   
Ultracongruent Knee   
-982-12            [id number]               Start:   
2022  
   
                                                                Persona Imp Knee  
 Fem   
Stem Lt 0 Sz7   
-765-01            [id number]               Start:   
2022  
  
  
  
Clinical Notes 2021 to 2023  
    Telephone Encounter - Roma Reagan [facility] - 2022 4:16 PM   
EDTTelephone Encounter - Roma Reagan [facility] - 2022 4:15 PM 
EDTPatient Instructions  
  
                                Note Date & Type Note            Facility  
   
                                        2023 Note     .  
MICRO - Microbiology  
PROCEDURE: Catheter Tip Culture ACCESSION:   
[id number]  
[*1]  
SOURCE: Catheter Tip BODY SITE:  
COLLECTED DATE/TIME: 2023 15:00 EST   
RECEIVED DATE/TIME: 2023 16:11 EST  
START DATE/TIME: 2023 16:11 EST FREE TEXT   
SOURCE:  
***FINAL REPORTS***  
Final Report []  
Verified Date/Time/Personnel: 2023 07:54   
EST  
No growth at 48 hours.  
***PRELIMINARY REPORTS***  
Preliminary Report []  
Verified Date/Time/Personnel: 2/15/2023 09:15   
EST  
No growth to date  
Performing Locations  
*1: This test was performed at:  
[facility], [address] [place] [address], [address] , [facility] ([place])  
   
                                        10- Note     HNO ID: [id number]  
Author: Rosalinda Cazares PA-C  
Service: ?  
Author Type: Physician Assistant  
Type: Progress Notes  
Filed: 10/4/2022 12:43 PM  
Note Text:  
ORTHOPAEDIC SHOULDER AND ELBOW SERVICE  
HISTORY AND PHYSICAL EXAM  
REFERRING PROVIDER:  
Damon Eckert  
[address]  
[facility] [address]  
CHIEF COMPLAINT: right shoulder pain  
PAIN EVALUATION  
10/4/2022  
0954  
Pain Level: 8  
Description: Aching;Sore  
Frequency: Continuous  
Intervention/Comfort measure: Medication  
SUBJECTIVE:  
Jayden Soto is a 62 year old female who   
presents to clinic with right  
shoulder pain. History of right shoulder rotator   
cuff repair maybe 20  
years ago. It did well following surgery until   
about 2 years ago. Two  
years ago she became septic due to diabetic   
ulcer on her left heel. She  
ended up having her left partial knee   
replacement washed out with an  
antibiotic spacer placed and a washout of her   
back. Due to the  
positioning during her back procedure she awoke   
with a  dead arm . While  
in the hospital she underwent a right shoulder   
corticosteroid injection  
and therapy without improvement. She was seen at   
an outside clinic  
following that with a subsequent corticosteroid   
injection without  
improvement. Shoulder is painful over the   
anterior aspect. Worse with  
range of motion. She has pain into her forearm   
and elbow. She does  
ambulate with a cane in her right hand. She is   
on chronic suppressive  
antibiotics and the plan is to keep the   
antibiotic spacer in place in her  
left knee. She is accompanied by her daughter   
today.  
PAST MEDICAL HISTORY:  
PAST MEDICAL HISTORY  
Diagnosis Date  
Chronic pain of left knee  
Coronary artery disease  
1 cardiac stent  
Diabetes (HCC)  
Hypercholesteremia  
Hypertension  
MRSA (methicillin resistant staph aureus)   
culture positive  
multiple joints/bones  
Pain of right hand  
PAST SURGICAL HISTORY:  
PAST SURGICAL HISTORY  
Procedure Laterality Date  
BACK SURGERY HX  
ELBOW SURGERY HX Bilateral  
for bone spurs  
IR VASCULAR ACCESS TEAM PICC INSERTION RADIO   
2022  
KNEE SURGERY HX  
PAST SURGICAL HISTORY OF Left 2022  
left knee  
PAST SURGICAL HISTORY OF Left  
heel surgery  
PAST SURGICAL HISTORY OF Right 2022  
right middle finger  
SHOULDER SURGERY HX Bilateral  
SOCIAL HISTORY:  
Social History  
Tobacco Use  
Smoking status: Never  
Smokeless tobacco: Never  
Vaping Use  
Vaping Use: Never used  
Substance Use Topics  
Alcohol use: Never  
Drug use: Never  
ALLERGIES:  
ALLERGIES  
Allergen Reactions  
Morphine GI Upset  
Statins-Hmg-Coa Red* Unknown  
MEDICATIONS:  
Current Outpatient Medications on File Prior to   
Visit  
Medication Sig  
cephALEXin (KEFLEX) 500 mg capsule Take 1   
capsule by mouth twice daily.  
Lactobacillus acidophilus (ACIDOPHILUS) cap Take   
1 capsule by mouth twice  
daily.  
apixaban (ELIQUIS DVT-PE TREAT 30D START) 5 mg   
(74 tabs) Take 2 tablets  
(10 mg) by mouth twice daily for 7 days. Then   
take 1 tablet (5 mg) by  
mouth twice daily for 23 days  
apixaban (ELIQUIS) 5 mg tab(s) Take 1 tablet by   
mouth twice daily.  
aspirin 81 mg cap Take 1 capsule by mouth once   
daily. Resume once BID  
dosing is completed  
aspirin, enteric coated (ASPIRIN, ENTERIC   
COATED) 81 mg EC tablet Take 1  
tablet by mouth twice daily for 21 days. Once   
BID dosing is completed in  
21 days, resume home daily dose  
insulin glargine (LANTUS U-100 INSULIN) 100   
unit/mL injection Inject 36  
Units subcutaneously daily at bedtime. (Patient   
taking differently: Inject  
40 Units subcutaneously daily at bedtime.)  
insulin aspart U-100 (NOVOLOG) 100 unit/mL 14   
units with breakfast  
8 units with lunch  
10 units with dinner  
Admin Instructions: ADMINISTER CORRECTIONAL   
INSULIN REGARDLESS OF MEAL OR  
NUTRITION INTAKE  
Custom Scale  
If Blood Glucose (mg/dL) is  
Less than 100 0 units  
101-150 0 units  
151-175 2 units  
176-200 3 units  
201-225 4 units  
226-250 5 units  
251-275 6 units  
276-300 7 units  
301-325 8 units  
326-350 9 units  
>350 10 units and Notify Provider  
Notify provider if 2 consecutive blood glucose   
values in the previous 24  
hours are greater than 250 mg/mL and there have   
been no changes to the  
insulin regimen in the previous 24 hours.  
doxepin capsule 10 mg Take 20 mg by mouth daily   
at bedtime.  
BISACODYL ORAL Take 10 mg by mouth once daily.  
melatonin 10 mg cap Take 1 capsule by mouth   
daily at bedtime.  
alirocumab (PRALUENT) 75 mg/mL pen Inject 150 mg   
subcutaneously every  
other week.  
DULoxetine (CYMBALTA) 60 mg capsule Take 60 mg   
by mouth twice daily.  
ranolazine SR (RANEXA) 1,000 mg tab ER 12 hr   
Take 1 tablet by mouth twice  
daily.  
dilTIAZem CD (CARDIZEM CD, CARTIA XT) 180 mg 24   
hr capsule Take 180 mg by  
mouth once daily.  
GABAPENTIN ORAL Take 200 mg by mouth three times   
daily.  
METOPROLOL TARTRATE ORAL Take 25 mg by mouth   
twice daily.  
isosorbide mononitrate ER (IMDUR) 30 mg 24 hr   
tablet Take 90 mg by mouth  
once daily.  
clopidogrel (PLAVIX) 75 mg tablet Take 75 mg by   
mouth once daily (more content not included)... [facility]  
   
                                                    2022   
Miscellaneous Notes                     Formatting of this note might be differe  
nt from   
the original.  
Please schedule with Kit.  
  
Roma Reagan  [facility]pg  
2022 4:16 PM  
  
Electronically signed by Roma Reagan   
[facility] at 2022 4:16 PM EDT  
Formatting of this note might be different from   
the original.  
----- Message from Fauzia Moreira sent at 2022   
4:09 PM EDT -----  
Regarding: Orthopedics / Open Shoulder: Pain /   
Previous Surgery By A Non- Provider  
Contact: [phone]  
  
Patient has been identified by name and Date of   
birth (Y/N): y  
  
Patient: Jayden Soto  
YOB: 1960  
MRN: [id number]  
Previous Provider Seen: n/a  
Body Part(s) Identified: Right Shoulder  
Diagnosis/Reason For Visit: Right Shoulder  
Reason for the call/escalation: Right Shoulder   
Pain / Previous Surgery in [place] several yrs   
ago ? Please reach out  
If reason for call/escalation is discharge from   
ED/ER or Hospital, which facility was the   
patient seen at: n/a  
  
Was an appointment scheduled (Y/N): n  
  
Person calling if other than patient: n  
Return call to if other than patient: n  
  
Best contact number: [phone]  
  
Thank you,  
Fauzia Moreira  
2022 4:09 PM  
  
  
  
Electronically signed by Roma Reagan   
[facility] at 2022 4:15 PM EDT  
documented in this encounter            [facility]  
   
                                                    2022   
Instructions                              
  
  
Alvin So MD - 2022 1:43 PM EDT  
  
  
Formatting of this note might be different from   
the original.  
- to send to [facility]iology [facility] for   
stat US rt arm at 3 pm to rule out DVT  
- fu 2 months  
  
  
  
Electronically signed by Alvin So MD at   
2022 1:46 PM EDT  
documented in this encounter            [facility]  
   
                                                    2022 History   
of Present illness   
Narrative                                 
  
  
Formatting of this note is different from the   
original.  
INFECTIOUS DISEASES OUTPATIENT FOLLOW-UP NOTE  
  
SERVICE DATE: 2022  
  
Subjective  
  
INTERVAL HPI : The patient is seen in fu of left   
prosthetic knee joint infection secondary to   
MSSA s/p resection arthroplasty and spacer   
placement on 3/24/2022. Patient is currently on   
ceftriaxone for total 6 weeks with projected end   
date of May 4. Today, patient is accompanied by   
her daughter. She endorses new pain in right arm   
at the site of PICC line x1 week. There is no   
blockage during ceftriaxone infusion. Denies   
having fevers or chills. No nausea or vomiting.   
Left knee surgical wound has healed completely.   
No diarrhea. No sore throat. No new joint pain.   
Weekly safety monitoring labs were reviewed.  
  
Has had right middle trigger finger operated on   
about 3 weeks ago.  
  
ROS completed x14 and only pertinent data   
documented, the rest is negative except as   
documented above  
  
MEDICATIONS:  
  
Current Outpatient Medications  
Medication Sig  
Lactobacillus acidophilus (ACIDOPHILUS) cap Take   
1 capsule by mouth twice daily.  
aspirin 81 mg cap Take 1 capsule by mouth once   
daily. Resume once BID dosing is completed  
cyclobenzaprine (FLEXERIL) 10 mg tablet Take 1   
tablet by mouth three times daily.  
insulin glargine (LANTUS U-100 INSULIN) 100   
unit/mL injection Inject 36 Units subcutaneously   
daily at bedtime. (Patient taking differently:   
Inject 40 Units subcutaneously daily at bedtime.  
)  
insulin aspart U-100 (NOVOLOG) 100 unit/mL 14   
units with breakfast  
8 units with lunch  
10 units with dinner  
  
Admin Instructions: ADMINISTER CORRECTIONAL   
INSULIN REGARDLESS OF MEAL OR NUTRITION INTAKE  
Custom Scale  
If Blood Glucose (mg/dL) is  
Less than 100 0 units  
101-150 0 units  
151-175 2 units  
176-200 3 units  
201-225 4 units  
226-250 5 units  
251-275 6 units  
276-300 7 units  
301-325 8 units  
326-350 9 units  
>350 10 units and Notify Provider  
Notify provider if 2 consecutive blood glucose   
values in the previous 24 hours are greater than   
250 mg/mL and there have been no changes to the   
insulin regimen in the previous 24 hours.  
cefTRIAXone sodium (ROCEPHIN) 1 gram solr Inject   
2 g intramuscularly every 24 hours.  
doxepin capsule 10 mg Take 20 mg by mouth daily   
at bedtime.  
BISACODYL ORAL Take 10 mg by mouth once daily.  
melatonin 10 mg cap Take 1 capsule by mouth   
daily at bedtime.  
alirocumab (PRALUENT) 75 mg/mL pen Inject 150 mg   
subcutaneously every other week.  
  
DULoxetine (CYMBALTA) 60 mg capsule Take 60 mg   
by mouth twice daily.  
  
ranolazine SR (RANEXA) 1,000 mg tab ER 12 hr   
Take 1 tablet by mouth twice daily.  
  
dilTIAZem CD (CARDIZEM CD) 180 mg 24 hr capsule   
Take 180 mg by mouth once daily.  
GABAPENTIN ORAL Take 200 mg by mouth three times   
daily.  
  
METOPROLOL TARTRATE ORAL Take 25 mg by mouth   
twice daily.  
  
isosorbide mononitrate ER (IMDUR) 30 mg 24 hr   
tablet Take 90 mg by mouth once daily.  
  
clopidogrel (PLAVIX) 75 mg tablet Take 75 mg by   
mouth once daily.  
aspirin, enteric coated (ASPIRIN, ENTERIC   
COATED) 81 mg EC tablet Take 1 tablet by mouth   
twice daily for 21 days. Once BID dosing is   
completed in 21 days, resume home daily dose  
empagliflozin (JARDIANCE) 25 mg tablet Take 25   
mg by mouth daily with breakfast. (Patient not   
taking: Reported on 2022  
)  
  
No current facility-administered medications for   
this visit.  
  
  
Objective  
  
Social History  
  
Tobacco Use  
Smoking status: Never Smoker  
Smokeless tobacco: Never Used  
Vaping Use  
Vaping Use: Never used  
Substance Use Topics  
Alcohol use: Never  
Drug use: Never  
  
FAMILY HISTORY  
Problem Relation Age of Onset  
Heart Mother  
Stroke Father  
  
PHYSICAL EXAM  
22  
1325  
BP: 137/64  
BP Site: Left Arm  
BP Position: Sitting  
BP Cuff Size: Regular Adult  
Pulse: 73  
Resp: 18  
Temp: 36.4 C (97.6 F)  
TempSrc: Temporal  
SpO2: 100%  
  
PSYCHIATRIC: no apparent distress, oriented to   
time, place and person  
SKIN: no rashes, no ulcers, no pressure ulcers  
EYES: no scleral icterus, no conjunctivitis  
HEENT: normal inspection of teeth, lips, gums,   
and oropharynx  
NECK: normal appearance, normal movement  
RESPIRATORY: symmetrical chest expansion and   
respiratory effort, clear to auscultation  
CARDIOVASCULAR: S1, S2, no murmurs, no gallops,   
no rubs, no edema  
ABDOMINAL: soft, non-distended, non-tender  
MUSCULOSKELETAL: normal muscle strength, normal   
muscle tone  
EXTREMITIES:  
Right arm PICC line site with no induration or   
discharge.  
Surgical wound at the base of right middle   
finger has no open ulcer or drainage.  
Left knee surgical wound healed closed, no   
erythema or dehiscence.  
NEUROLOGICAL: nonfocal  
  
DIAGNOSTICS REVIEWED/OPAT LABS REVIEWED  
  
PERTINENT LABS  
  
CBC:  
WBC 9.93 3/29/2022  
Hemoglobin 8.3 3/29/2022  
Hematocrit 27.7 3/29/2022  
Platelet Count 381 3/29/2022  
  
CMP:  
Sodium 138 3/29/2022  
Potassium 4.3 3/29/2022  
BUN 17 3/29/2022  
Creatinine 1.12 3/29/2022  
Glucose 74 3/29/2022  
  
Creatinine clearance:  
Serum creatinine: 1.12 mg/dL (H) 22 0537  
Estimated creatinine clearance: 65.6 mL/min (A)   
INFLAMMATORY MARKERS  
  
Sed Rate/CRP:  
WSR 23 2022  
CRP 1.9 3/21/2022  
CRP 0.7 2022  
PERTINENT MICROBIOLOGY  
  
Reviewed, see micro PERTINENT RADIOLOGY REPORTS  
  
Reviewed  
  
  
Impression/Recommendations  
Jayden Soto is a 61 year old female with a   
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   
s/p resection arthroplasty and articulating   
spacer placement on 3/24/2022.  
  
1. 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  
2. Right arm pain with a PICC in place, to rule   
out acute DVT  
3. Receiving IV antibiotics at home  
4. Comorbidities: Type 2 diabetes mellitus,   
obesity  
  
With the new onset of right arm pain at the site   
of PICC line, it is concerning for DVT.  
Patient has so far received 4 weeks +5 days of   
IV ceftriaxone since the joint surgery.  
  
Plan:  
--Patient sent to the ultrasound radiology for   
stat venous ultrasound--> showed  Positive study   
for DVT in the right subclavian, axillary, and   
basilic veins.   
--Patient will go to the ED for further Rx of   
acute DVT  
--PICC line can be removed  
--To switch antibiotics from IV ceftriaxone to   
oral Keflex 500 mg p.o. 4 times daily x 4 months  
-- Probiotic supplement daily  
-- ID clinic follow-up 1 month  
  
Case d/w ER physician - Dr. Mack, over the   
phone.  
  
  
  
MEDICAL DECISION MAKING:  
I have spent 40 minutes with this patient, >50%   
of time spent in a face to face encounter. This   
included time spent on counseling including lab   
results, diagnostic testing, medications,   
further management planning, prognosis, risks   
and benefits of treatment options. All questions   
were answered to the patient and/or family   
members satisfaction.  
  
SIGNATURE: Alvin So MD PATIENT NAME:   
Jayden Soto  
DATE: 2022 MRN: [id number]  
TIME: 1:26 PM  
  
  
  
Electronically signed by Alvin So MD at   
2022 3:20 PM EDTdocumented in this   
encounter                               [facility]  
   
                                        2022 Note     HNO ID: [id number]  
Author: Damon Eckert MD  
Service: ?  
Author Type: Physician  
Type: Progress Notes  
Filed: 2022 2:29 PM  
Note Text:  
Patient presents with:  
Right Hand - Follow Up, Post Op, Pain  
SURGEON  
Damon Eckert MD  
PROCEDURE  
2022  
A1 pulley release right middle finger  
HISTORY OF PRESENT ILLNESS  
Jayden Soto presents for post operative follow   
up 2 weeks from surgery.  
The patient is doing well. She is very pleased   
with the results of the  
surgery and reports minimal pain in the hand. No   
wound concerns.  
Current Concerns: None  
Pain control: Well-controlled  
Currently taking pain medication:No  
Fever, chills or other signs of infection:   
Denies  
PHYSICAL EXAMINATION  
Right hand  
Inspection shows very minimal residual swelling  
A slight middle finger PIP flexion contracture   
remains, passively  
reducible and nonpainful  
Incision(s) clean, dry, intact. Nylon sutures in   
place  
No erythema, cellulitis or drainage  
Sutures removed without difficulty. Patient   
tolerated procedure well  
ROM: Full composite fist, middle fingertip to   
palm  
No residual mechanical locking or catching  
Sensation:Normal sensation  
AIN/PIN/ulnar motor intact  
Brisk cap refill  
IMAGING  
No new imaging obtained  
ASSESSMENT AND PLAN  
ASSESSMENT/PLAN:  
1. S/P trigger finger release - ICD9: V45.89,   
ICD10: Z98.890  
The patient is doing well and has healed the   
wound nicely. She is pleased  
with the results. We will follow-up together on   
an as-needed basis.  
Damon Eckert  
Pt education provided on palmar massage and   
desensitization.  
Patient was instructed to call the office with   
any questions and/or  
concerns                                [facility]  
   
                                                    2022 History   
of Present illness   
Narrative                                 
  
  
Formatting of this note might be different from   
the original.  
Patient presents with:  
Right Hand - Follow Up, Post Op, Pain  
  
SURGEON  
Damon Eckert MD  
  
PROCEDURE  
2022  
A1 pulley release right middle finger  
  
HISTORY OF PRESENT ILLNESS  
Jayden Soto presents for post operative follow   
up 2 weeks from surgery.  
The patient is doing well. She is very pleased   
with the results of the surgery and reports   
minimal pain in the hand. No wound concerns.  
  
Current Concerns: None  
Pain control: Well-controlled  
Currently taking pain medication:No  
Fever, chills or other signs of infection:   
Denies  
  
PHYSICAL EXAMINATION  
Right hand  
Inspection shows very minimal residual swelling  
A slight middle finger PIP flexion contracture   
remains, passively reducible and nonpainful  
Incision(s) clean, dry, intact. Nylon sutures in   
place  
No erythema, cellulitis or drainage  
Sutures removed without difficulty. Patient   
tolerated procedure well  
ROM: Full composite fist, middle fingertip to   
palm  
No residual mechanical locking or catching  
Sensation:Normal sensation  
AIN/PIN/ulnar motor intact  
Brisk cap refill  
  
IMAGING  
No new imaging obtained  
  
ASSESSMENT AND PLAN  
ASSESSMENT/PLAN:  
1. S/P trigger finger release - ICD9: V45.89,   
ICD10: Z98.890  
  
The patient is doing well and has healed the   
wound nicely. She is pleased with the results.   
We will follow-up together on an as-needed   
basis.  
  
Damon Eckert  
  
Pt education provided on palmar massage and   
desensitization.  
Patient was instructed to call the office with   
any questions and/or concerns  
  
  
  
  
Electronically signed by Damon Eckert MD at   
2022 2:29 PM EDTdocumented in this   
encounter                               [facility]  
   
                                        04- Note     HNO ID: [id number]  
Author: Basil Mitchell MD  
Service: ?  
Author Type: Physician  
Type: Progress Notes  
Filed: 4/15/2022 10:29 AM  
Note Text:  
Follow-up Patient Visit  
Jayden Soto is a 61 year old female who   
presents to follow up for  
Acquired absence of knee joint following   
explantation of joint prosthesis  
with presence of antibiotic-impregnated cement   
spacer (primary encounter  
diagnosis)  
Current or previous treatment regimens: NSAIDS  
Medications:  
Current Outpatient Medications  
Medication Sig  
- aspirin 81 mg cap Take 1 capsule by mouth once   
daily. Resume once BID  
dosing is completed  
- cyclobenzaprine (FLEXERIL) 10 mg tablet Take 1   
tablet by mouth three  
times daily.  
- aspirin, enteric coated (ASPIRIN, ENTERIC   
COATED) 81 mg EC tablet Take 1  
tablet by mouth twice daily for 21 days. Once   
BID dosing is completed in  
21 days, resume home daily dose  
- insulin glargine (LANTUS U-100 INSULIN) 100   
unit/mL injection Inject 36  
Units subcutaneously daily at bedtime.  
- insulin aspart U-100 (NOVOLOG) 100 unit/mL 14   
units with breakfast  
8 units with lunch  
10 units with dinner  
Admin Instructions: ADMINISTER CORRECTIONAL   
INSULIN REGARDLESS OF MEAL OR  
NUTRITION INTAKE  
Custom Scale  
If Blood Glucose (mg/dL) is  
Less than 100 ? 0 units  
101-150 ? 0 units  
151-175 ? 2 units  
176-200 ? 3 units  
201-225 ? 4 units  
226-250 ? 5 units  
251-275 ? 6 units  
276-300 ? 7 units  
301-325 ? 8 units  
326-350 ? 9 units  
>350 ? 10 units and Notify Provider  
Notify provider if 2 consecutive blood glucose   
values in the previous 24  
hours are greater than 250 mg/mL and there have   
been no changes to the  
insulin regimen in the previous 24 hours.  
- cefTRIAXone sodium (ROCEPHIN) 1 gram solr   
Inject 2 g intramuscularly  
every 24 hours.  
- doxepin capsule 10 mg Take 20 mg by mouth   
daily at bedtime.  
- BISACODYL ORAL Take 10 mg by mouth once daily.  
- melatonin 10 mg cap Take 1 capsule by mouth   
daily at bedtime.  
- alirocumab (PRALUENT) 75 mg/mL pen Inject 75   
mg subcutaneously every  
other week.  
- DULoxetine (CYMBALTA) 60 mg capsule Take 60 mg   
by mouth twice daily.  
- ranolazine SR (RANEXA) 1,000 mg tab ER 12 hr   
Take 1 tablet by mouth  
twice daily.  
- dilTIAZem CD (CARDIZEM CD) 180 mg 24 hr   
capsule Take 180 mg by mouth  
once daily.  
- empagliflozin (JARDIANCE) 25 mg tablet Take 25   
mg by mouth daily with  
breakfast.  
- GABAPENTIN ORAL Take 200 mg by mouth three   
times daily.  
- METOPROLOL TARTRATE ORAL Take 25 mg by mouth   
twice daily.  
- isosorbide mononitrate ER (IMDUR) 30 mg 24 hr   
tablet Take 90 mg by mouth  
once daily.  
- clopidogrel (PLAVIX) 75 mg tablet Take 75 mg   
by mouth once daily.  
No current facility-administered medications for   
this visit.  
Allergies:  
ALLERGIES  
Allergen Reactions  
- Morphine GI Upset  
- Statins-Hmg-Coa Red* Unknown  
Physical Examination:  
Resp 16   Ht 5' 7  (1.70m)   Wt 225 lb (102.1kg)   
  BMI 35.23 kg/(m2).  
Ortho Exam  
Ambulating with a walker  
Incision CDI  
ROM 5-100  
NVI  
Images: na  
Procedures  
Oxford Score: see report  
Assessment and Plan:  
1. Acquired absence of knee joint following   
explantation of joint  
prosthesis with presence of   
antibiotic-impregnated cement spacer - ICD9:  
V88.22, ICD10: Z89.529  
Continue antibiotics per ID  
Overall she is doing well, much better than   
prior to surgery  
As long as she is doing well clinically we can   
ride out the spacer  
FU in 6 weeks  
No follow-ups on file.  
Basil Mitchell MD                         [facility]  
   
                                        04- Note     HNO ID: [id number]  
Author: Radha Novak MA  
Service: ?  
Author Type: Medical Assistant  
Type: Progress Notes  
Filed: 4/15/2022 10:29 AM  
Note Text:  
REVIEW OF SYSTEMS:  
GENERAL: Well developed, well nourished. No   
acute distress  
PAIN: Pain left knee  
CARDIOVASCULAR: HTN  
MSK: Positive for joint swelling  
SKIN: Negative for lesions, rash, itching, metal   
sensitivity  
NEURO: Negative for seizure, trauma,   
numbness/tingling of extremities.  
ENDOCRINE: Positive for diabetic associated   
symptoms  
HEMATOLOGY: Negative for excessive bleeding,   
clots, bleeding disorders.              [facility]  
   
                                                    04- History   
of Present illness   
Narrative                                 
  
  
Formatting of this note is different from the   
original.  
Follow-up Patient Visit  
  
Jayden Soto is a 61 year old female who   
presents to follow up for Acquired absence of   
knee joint following explantation of joint   
prosthesis with presence of   
antibiotic-impregnated cement spacer (primary   
encounter diagnosis)  
  
Current or previous treatment regimens: NSAIDS  
  
Medications:  
Current Outpatient Medications  
Medication Sig  
aspirin 81 mg cap Take 1 capsule by mouth once   
daily. Resume once BID dosing is completed  
cyclobenzaprine (FLEXERIL) 10 mg tablet Take 1   
tablet by mouth three times daily.  
aspirin, enteric coated (ASPIRIN, ENTERIC   
COATED) 81 mg EC tablet Take 1 tablet by mouth   
twice daily for 21 days. Once BID dosing is   
completed in 21 days, resume home daily dose  
insulin glargine (LANTUS U-100 INSULIN) 100   
unit/mL injection Inject 36 Units subcutaneously   
daily at bedtime.  
insulin aspart U-100 (NOVOLOG) 100 unit/mL 14   
units with breakfast  
8 units with lunch  
10 units with dinner  
  
Admin Instructions: ADMINISTER CORRECTIONAL   
INSULIN REGARDLESS OF MEAL OR NUTRITION INTAKE  
Custom Scale  
If Blood Glucose (mg/dL) is  
Less than 100 0 units  
101-150 0 units  
151-175 2 units  
176-200 3 units  
201-225 4 units  
226-250 5 units  
251-275 6 units  
276-300 7 units  
301-325 8 units  
326-350 9 units  
>350 10 units and Notify Provider  
Notify provider if 2 consecutive blood glucose   
values in the previous 24 hours are greater than   
250 mg/mL and there have been no changes to the   
insulin regimen in the previous 24 hours.  
cefTRIAXone sodium (ROCEPHIN) 1 gram solr Inject   
2 g intramuscularly every 24 hours.  
doxepin capsule 10 mg Take 20 mg by mouth daily   
at bedtime.  
BISACODYL ORAL Take 10 mg by mouth once daily.  
melatonin 10 mg cap Take 1 capsule by mouth   
daily at bedtime.  
alirocumab (PRALUENT) 75 mg/mL pen Inject 75 mg   
subcutaneously every other week.  
DULoxetine (CYMBALTA) 60 mg capsule Take 60 mg   
by mouth twice daily.  
  
ranolazine SR (RANEXA) 1,000 mg tab ER 12 hr   
Take 1 tablet by mouth twice daily.  
  
dilTIAZem CD (CARDIZEM CD) 180 mg 24 hr capsule   
Take 180 mg by mouth once daily.  
empagliflozin (JARDIANCE) 25 mg tablet Take 25   
mg by mouth daily with breakfast.  
GABAPENTIN ORAL Take 200 mg by mouth three times   
daily.  
  
METOPROLOL TARTRATE ORAL Take 25 mg by mouth   
twice daily.  
  
isosorbide mononitrate ER (IMDUR) 30 mg 24 hr   
tablet Take 90 mg by mouth once daily.  
  
clopidogrel (PLAVIX) 75 mg tablet Take 75 mg by   
mouth once daily.  
  
No current facility-administered medications for   
this visit.  
  
Allergies:  
ALLERGIES  
Allergen Reactions  
Morphine GI Upset  
Statins-Hmg-Coa Red* Unknown  
  
Physical Examination:  
  
Resp 16   Ht 5' 7  (1.70m)   Wt 225 lb (102.1kg)   
  BMI 35.23 kg/(m^2).  
  
  
Ortho Exam  
  
Ambulating with a walker  
Incision CDI  
ROM 5-100  
NVI  
  
Images: na  
  
Procedures  
  
Oxford Score: see report  
  
Assessment and Plan:  
  
1. Acquired absence of knee joint following   
explantation of joint prosthesis with presence   
of antibiotic-impregnated cement spacer - ICD9:   
V88.22, ICD10: Z89.529  
  
Continue antibiotics per ID  
Overall she is doing well, much better than   
prior to surgery  
As long as she is doing well clinically we can   
ride out the spacer  
FU in 6 weeks  
  
No follow-ups on file.  
  
Basil Mitchell MD  
  
  
Electronically signed by Basil Mitchell MD at   
04/15/2022 10:29 AM EDT  
  
  
Formatting of this note might be different from   
the original.  
REVIEW OF SYSTEMS:  
  
GENERAL: Well developed, well nourished. No   
acute distress  
PAIN: Pain left knee  
CARDIOVASCULAR: HTN  
MSK: Positive for joint swelling  
SKIN: Negative for lesions, rash, itching, metal   
sensitivity  
NEURO: Negative for seizure, trauma,   
numbness/tingling of extremities.  
ENDOCRINE: Positive for diabetic associated   
symptoms  
HEMATOLOGY: Negative for excessive bleeding,   
clots, bleeding disorders.  
  
  
  
  
Electronically signed by Radha Novak MA   
at 04/15/2022 10:29 AM EDTdocumented in this   
encounter                               [facility]  
   
                                        2022 Note     HNO ID: [id number]  
Author: Damon Eckert MD  
Service: ?  
Author Type: Physician  
Type: Progress Notes  
Filed: 2022 9:52 AM  
Note Text:  
Patient presents with:  
Right Hand - New, Stiffness, Pain  
HISTORY OF PRESENT ILLNESS  
Jayden Soto is a 61 year old female right hand   
dominant who presents for  
evaluation of a right middle trigger finger. The   
patient reports this  
worsening over the last year or more. It   
frequently becomes locked and is  
rather painful. Of note she is recovering from a   
left septic knee  
arthroplasty which underwent explantation and   
placement of a cement  
spacer. She is being treated by Dr. Mitchell.  
Location: Right hand, middle finger  
Severity: 5 on a scale of 0-10  
Duration of symptoms: Over 1 year  
Date of injury n/a  
Symptoms have Worsened  
Previous treatment: Observation  
Context worse with  
Activity/Motion and Gripping  
Occupation: Retired   
Smoking status:  
Tobacco Use: Never  
REVIEW OF SYSTEMS  
Cardiovascular ROS:No history of chest pain,   
palpitation, orthopnea,  
cyanosis, pedal edema  
Neurologic ROS: Numbness and Tingling:No  
PAST MEDICAL HISTORY  
Past medical, surgical, family, and social   
histories have been reviewed  
and updated with the patient today and are   
located elsewhere in the  
medical record.  
Diabetes:Yes  
ALLERGIES  
ALLERGIES  
Allergen Reactions  
- Morphine GI Upset  
- Statins-Hmg-Coa Red* Unknown  
PHYSICAL EXAMINATION  
Resp 20   Ht 170.2 cm (5' 7 )   Wt 102.1 kg (225   
lb)   BMI 35.24 kg/m?  
Body mass index is 35.24 kg/m?.  
General Appearance Well appearing, alert, in no   
acute distress,  
well-hydrated, well nourished.  
Alert and oriented times: 3  
Normal affect times: 3  
Appears stated age and well nourished  
Gait and station:normal  
Right Upper Extremity Exam:  
Inspection shows a resting flexed posture at the   
middle finger  
No wounds or lacerations. No surgical incisions   
noted  
Skin: WNL  
Tenderness to palpation: Tender at middle finger   
A1 pulley  
ROM: Deficit at middle finger, 1 cm tip to palm  
Instability: none  
Sensation:Normal sensation  
Atrophy: Intact  
Brisk capillary refill  
Special tests: Pain, mechanical locking with   
terminal passive flexion of  
the middle finger  
REVIEW OF STUDIES  
No new imaging obtained  
ASSESSMENT AND PLAN  
ASSESSMENT/PLAN:  
1. Trigger finger, right middle finger - ICD9:   
727.03, ICD10: M65.331  
I discussed the diagnosis with the patient and   
reviewed some diagrams to  
explain the relevant anatomy. We reviewed   
treatment options including  
injections and surgery. She has a history of   
fragile diabetes and has  
experienced some significant blood sugar   
fluctuations following previous  
injections years ago. I think that the safest   
and most predictable  
measure in her case would be surgical release of   
the A1 pulley. This  
could be done under local anesthesia as a short   
outpatient procedure.  
Risks, benefits, alternatives were reviewed with   
her and she consider  
these carefully and elects to proceed. Consent   
was obtained. We will  
plan on outpatient surgery at her convenience.   
All of her questions were  
answered to her satisfaction.  
Damon Eckert  
Patient educated on expected postoperative   
course and recovery.  
Patient instructed to call the office with   
questions or concerns.                  [facility]  
   
                                                    2022 History   
of Present illness   
Narrative                                 
  
  
Formatting of this note is different from the   
original.  
Patient presents with:  
Right Hand - New, Stiffness, Pain  
  
HISTORY OF PRESENT ILLNESS  
Jayden Soto is a 61 year old female right hand   
dominant who presents for evaluation of a right   
middle trigger finger. The patient reports this   
worsening over the last year or more. It   
frequently becomes locked and is rather painful.   
Of note she is recovering from a left septic   
knee arthroplasty which underwent explantation   
and placement of a cement spacer. She is being   
treated by Dr. Mitchell.  
  
Location: Right hand, middle finger  
Severity: 5 on a scale of 0-10  
Duration of symptoms: Over 1 year  
Date of injury n/a  
Symptoms have Worsened  
Previous treatment: Observation  
  
Context worse with  
Activity/Motion and Gripping  
  
Occupation: Retired   
Smoking status:  
Tobacco Use: Never  
  
REVIEW OF SYSTEMS  
Cardiovascular ROS:No history of chest pain,   
palpitation, orthopnea, cyanosis, pedal edema  
Neurologic ROS: Numbness and Tingling:No  
  
PAST MEDICAL HISTORY  
Past medical, surgical, family, and social   
histories have been reviewed and updated with   
the patient today and are located elsewhere in   
the medical record.  
Diabetes:Yes  
  
ALLERGIES  
ALLERGIES  
Allergen Reactions  
Morphine GI Upset  
Statins-Hmg-Coa Red* Unknown  
  
PHYSICAL EXAMINATION  
Resp 20   Ht 170.2 cm (5' 7 )   Wt 102.1 kg (225   
lb)   BMI 35.24 kg/m  
Body mass index is 35.24 kg/m .  
General Appearance Well appearing, alert, in no   
acute distress, well-hydrated, well nourished.  
Alert and oriented times: 3  
Normal affect times: 3  
Appears stated age and well nourished  
Gait and station:normal  
  
Right Upper Extremity Exam:  
Inspection shows a resting flexed posture at the   
middle finger  
No wounds or lacerations. No surgical incisions   
noted  
Skin: WNL  
Tenderness to palpation: Tender at middle finger   
A1 pulley  
ROM: Deficit at middle finger, 1 cm tip to palm  
Instability: none  
Sensation:Normal sensation  
Atrophy: Intact  
Brisk capillary refill  
Special tests: Pain, mechanical locking with   
terminal passive flexion of the middle finger  
  
REVIEW OF STUDIES  
No new imaging obtained  
  
ASSESSMENT AND PLAN  
ASSESSMENT/PLAN:  
1. Trigger finger, right middle finger - ICD9:   
727.03, ICD10: M65.331  
  
I discussed the diagnosis with the patient and   
reviewed some diagrams to explain the relevant   
anatomy. We reviewed treatment options including   
injections and surgery. She has a history of   
fragile diabetes and has experienced some   
significant blood sugar fluctuations following   
previous injections years ago. I think that the   
safest and most predictable measure in her case   
would be surgical release of the A1 pulley. This   
could be done under local anesthesia as a short   
outpatient procedure. Risks, benefits,   
alternatives were reviewed with her and she   
consider these carefully and elects to proceed.   
Consent was obtained. We will plan on outpatient   
surgery at her convenience. All of her questions   
were answered to her satisfaction.  
  
Damon Eckert  
  
Patient educated on expected postoperative   
course and recovery.  
Patient instructed to call the office with   
questions or concerns.  
  
  
  
  
Electronically signed by Damon Eckert MD at   
2022 9:52 AM EDTdocumented in this   
encounter                               [facility]  
   
                                                    2022   
Miscellaneous Notes                       
  
  
Formatting of this note might be different from   
the original.  
Received Plan of Care to be reviewed and signed   
by Dr LOBITO So.  
  
Placed in folder to be reviewed/signature  
  
  
Electronically signed by TRENTON Gordon at   
2022 10:03 AM EDTdocumented in this   
encounter                               [facility]  
   
                                                    2022   
Miscellaneous Notes                       
  
  
Formatting of this note might be different from   
the original.  
Received 2022 weekly [facility] lab results  
  
  
Electronically signed by TRENTON Gordon at   
2022 2:58 PM EDTdocumented in this   
encounter                               [facility]  
   
                                                    2022   
Miscellaneous Notes                       
  
  
Formatting of this note might be different from   
the original.  
Received 2022 weekly [facility] lab results  
  
Placed in folder for review  
  
  
Electronically signed by TRENTON Gordon at   
2022 4:36 PM EDTdocumented in this   
encounter                               [facility]  
   
                                                    2022   
Miscellaneous Notes                       
  
  
Formatting of this note might be different from   
the original.  
Received lab results to be reviewed by Dr LOBITO So  
  
Placed in folder for review  
  
  
Electronically signed by TRENTON Gordon at   
2022 1:36 PM EDTdocumented in this   
encounter                               [facility]  
   
                                                    2022   
Miscellaneous Notes                       
  
  
Formatting of this note might be different from   
the original.  
Received Physician orders to be reviewed and   
signed by Dr LOBITO So.  
  
Placed in folder for review/signature  
  
*note PICC line was removed and Ceftriaxone   
discontinued 2022 due to DVT and   
transitioned to Keflex  
  
  
Electronically signed by TRENTON Gordon at   
2022 1:47 PM EDTdocumented in this   
encounter                               [facility]  
   
                                                    2022   
Miscellaneous Notes                       
  
  
Formatting of this note might be different from   
the original.  
Appointment scheduled with  4/15/22 @   
9:30 am.  
  
Maco Cardona [facility]  
  
  
  
Electronically signed by Maco Cardona   
 Ppg at 2022 1:54 PM EDT  
  
  
Formatting of this note might be different from   
the original.  
----- Message from Amita Crooks sent at   
3/30/2022 1:31 PM EDT -----  
Regarding: Orthopedics / Paula Knee: Pain /   
Post Op Within 90 Day Period  
Subject Line Format: Orthopedics / [Provider   
Name or  Open & Body Part ] / [Issue]  
  
Patient has been identified by name and Date of   
birth (Y/N): Y  
  
Patient: Jayden Soto  
YOB: 1960  
MRN: [id number]  
Previous Provider Seen: DR. HANNAH MITCHELL  
Body Part(s) Identified: KNEE  
Diagnosis/Reason For Visit: POST OP  
Reason for the call/escalation: POST OP  
If reason for call/escalation is discharge from   
ED/ER or Hospital, which facility was the   
patient seen at: NA  
  
Was an appointment scheduled (Y/N): N  
  
Person calling if other than patient: DAUGHTER   
IN [facility]  
Return call to if other than patient:     
  
Best contact number: [phone]  
  
Thank you,  
Amita Crooks  
2022 1:31 PM  
  
  
  
  
Electronically signed by Maco Cardona   
 Ppg at 2022 1:53 PM   
EDTdocumented in this encounter         [facility]  
   
                                        2022 Note     HNO ID: [id number]  
Author: Iqra Pearce DO  
Service: Hospital Medicine  
Author Type: Physician  
Type: Progress Notes  
Filed: 3/29/2022 6:35 AM  
Note Text:  
DEPARTMENT OF HOSPITAL MEDICINE  
PROGRESS NOTE  
SERVICE DATE: 3/29/2022  
SERVICE TIME: 6:33 AM  
Hospital Medicine/Primary Attending: Iqra Pearce DO  
NIGHT AND WEEKEND COVERAGE:  
After 7pm please page [phone]  
CHIEF COMPLAINT: Follow up on MMP  
SUBJECTIVE: Pt seen and examined. States that   
she did have a small BM  
last night. No chest pain or SOB. Wondering if   
she is going home today  
or not  
OBJECTIVE:  
PHYSICAL EXAM: BP 99/49   Pulse 60   Temp (Src)   
97.9 (Oral)   Resp 18   Ht  
5' 7  (1.70m)   Wt 248 lb 10.9 oz (112.8kg)     
SpO2 100%   BMI 38.94  
kg/(m2).  
O2 Therapy: Room Air  
General - AANDOx3, NAD, Calm  
CV - RRR S1 S2, No M/R/G  
RESP - CTA B/L No wheezes, ronchi, rales  
ABD - soft, NT, ND +BS  
EXT - dressing in place  
NEURO - CN II-XII grossly intact, no focal   
deficits  
MEDICATIONS:  
Current Facility-Administered Medications  
Medication Dose Route Frequency  
- ondansetron (PF) 4 mg injection (ZOFRAN) 4 mg   
INTRAVENOUS q 6 H PRN  
- NaCl 0.9% iv flush bag 20 mL INTRAVENOUS PRN  
- sodium chloride 0.9 % (flush) 3-5 mL (BD   
POSIFLUSH) 3-5 mL INTRAVENOUS  
q 12 H  
- sodium chloride 0.9 % (flush) 2-10 mL (BD   
POSIFLUSH) 2-10 mL  
INTRAVENOUS q 12 H  
- DULoxetine 60 mg cap(s) (CYMBALTA) 60 mg ORAL   
BID  
- ranolazine ER 1,000 mg tab(s) (RANEXA) 1,000   
mg ORAL BID  
- dilTIAZem  mg cap(s) (CARDIZEM CD,   
CARTIA XT) 180 mg ORAL DAILY  
- gabapentin 200 mg cap(s) (NEURONTIN) 200 mg   
ORAL TID  
- isosorbide mononitrate ER 90 mg tab(s) (IMDUR)   
90 mg ORAL DAILY  
- clopidogrel 75 mg tab(s) (PLAVIX) 75 mg ORAL   
DAILY  
- metoprolol tartrate (short acting) 25 mg   
tab(s) (LOPRESSOR) 25 mg ORAL  
q 12 H  
- dextrose 40 % 15 g 15 g ORAL PRN  
Or  
- glucagon 1 mg injection 1 mg INTRAMUSCULAR PRN  
Or  
- dextrose 50% in water 25 mL syringe 12.5 g   
INTRAVENOUS PRN  
- empagliflozin 25 mg tab(s) (JARDIANCE) 25 mg   
ORAL DAILY  
- docusate sodium 100 mg cap(s) (COLACE) 100 mg   
ORAL BID  
- aspirin, enteric coated 81 mg tab(s) 81 mg   
ORAL BID  
- sodium chloride 0.9 % (flush) 3-5 mL (BD   
POSIFLUSH) 3-5 mL INTRAVENOUS  
q 12 H  
- miconazole 2 % 1 application topical powder   
(LOTRIMIN AF, DESENEX) 1  
application TOPICAL BID  
- acetaminophen 1,000 mg tab(s) (TYLENOL) 1,000   
mg ORAL q 6 H  
- oxyCODONE IR 5-10 mg tab(s) (ROXICODONE) 5-10   
mg ORAL q 4 H PRN  
- traMADol 50 mg tab(s) (ULTRAM) 50 mg ORAL q 6   
H  
- morphine 2 mg injection 2 mg INTRAVENOUS q 3 H   
PRN  
- melatonin 6 mg tab(s) 6 mg ORAL DAILY (8 PM)  
- insulin lispro pen (rapid acting) (HumaLOG   
KWIKPEN) SUBCUTANEOUS w  
MEALS  
- sodium chloride 0.9 % (flush) 10 mL (BD   
POSIFLUSH) 10 mL INTRAVENOUS q  
12 H  
- sodium chloride 0.9 % (flush) 20 mL (BD   
POSIFLUSH) 20 mL INTRAVENOUS  
PRN  
- senna-docusate 8.6-50 mg 1 tablet (SENNA-S) 1   
tablet ORAL BID  
- calcium carbonate 500 mg chewable tab(s)   
(TUMS) 500 mg ORAL TID PRN  
- insulin glargine 36 Units pen (long acting)   
(LANTUS SOLOSTAR, BASAGLAR  
KWIKPEN) 36 Units SUBCUTANEOUS AT BEDTIME  
- insulin lispro 8 Units pen (rapid acting)   
(HumaLOG KWIKPEN) 8 Units  
SUBCUTANEOUS DAILY wLUNCH  
- insulin lispro 10 Units pen (rapid acting)   
(HumaLOG KWIKPEN) 10 Units  
SUBCUTANEOUS DAILY wDINNER  
- insulin lispro 14 Units pen (rapid acting)   
(HumaLOG KWIKPEN) 14 Units  
SUBCUTANEOUS DAILY WITH BREAKFAST  
- cefTRIAXone iv piggyback 2 g in dextrose   
(iso-osmotic) 50 mL (ROCEPHIN)  
2 g INTRAVENOUS q 24 H  
- doxycycline hyclate 100 mg cap(s) (VIBRAMYCIN)   
100 mg ORAL q 12 h  
7am/7pm  
- diphenhydrAMINE 50 mg injection (BENADRYL) 50   
mg INTRAVENOUS q 6 H PRN  
- cyclobenzaprine 5 mg tab(s) (FLEXERIL) 5 mg   
ORAL TID PRN  
DATA:  
Diagnostic tests reviewed for today's visit:  
CBC:  
Recent Labs  
22  
0536  
WBC 9.93  
RBC 2.98*  
HB 8.3*  
HCT 27.7*  
  
MCV 93.0  
MCH 27.9  
MPV 9.5  
Coags: No results for input(s): PT, INR, APTT in   
the last 24 hours.  
BMP:  
Recent Labs  
22  
0537  
  
K 4.3  
CHLOR 103  
CO2 30  
BUN 17  
CREAT 1.12*  
GLUC 74  
CMP:  
Recent Labs  
22  
0537  
  
K 4.3  
CHLOR 103  
CO2 30  
BUN 17  
CREAT 1.12*  
GLUC 74  
CA 9.0  
ANION 5*  
Cardiac Enzymes: No results for input(s): CK,   
MB, CKMB, TROPT in the last  
24 hours.  
Liver Function, Amylase, Lipase: No results for   
input(s): TPROT, ALB, ALT,  
AST, ALKPHOS, TBILI, AMYLASE, LIPASE, LACTATE in   
the last 24 hours.  
MG/PHOS: No results for input(s): MG, P in the   
last 24 hours.  
Renal Panel:  
Recent Labs  
22  
0537  
CREAT 1.12*  
BUN 17  
GLUC 74  
CA 9.0  
CHLOR 103  
K 4.3  
CO2 30  
  
Heme: No results for input(s): RETICP, ABSRETIC,   
LD, KARI, FE, TIBC,  
TRANSFERSAT in the last 24 hours.  
No results found for: UALBCR  
Estimated Creatinine Clearance: 69.2 mL/min (A)   
(based on SCr of 1.12  
mg/dL (H)).  
Assessment/Plan  
1. POD #5?Irrigation and Debridement Left Knee,   
Removal Unicompartmental  
Knee Arthroplasty Le (more content not   
included)...                            [facility]  
   
                                        2022 Note     HNO ID: [id number]  
Author: Jayme Galo MD  
Service: Orthopaedic Surgery  
Author Type: Resident  
Type: Progress Notes  
Filed: 3/29/2022 6:31 AM  
Note Text:  
Assessment  
Jayden Soto is a 61 year old female who is POD   
#5 Irrigation and  
Debridement Left Knee, Removal Unicompartmental   
Knee Arthroplasty Left  
Knee, Insertion Articulating Antibiotic Spacer   
Left Knee  
Plan  
? Pain control  
? WBAT LLE  
? PT/OT  
? Medical management per IM - appreciate recs  
? Antibiotics per ID - PICC line placed Friday.   
Change to ceftriaxone-  
stop date=22  
? DVT ppx: Continue home plavix start Aspirin   
81mg BID x 21 days POD1  
? Discharge planning: home with [facility] when   
medically stable and abx set up -  
likely 3/28 or 3/29  
? Will need outpatient referral to hand surgery   
for R middle finger  
trigger finger at discharge  
Subjective  
No acute events overnight.  
Physical Examination  
Vitals  
BP (!) 99/49   Pulse 60   Temp 36.6 ?C (97.9 ?F)   
(Oral)   Resp 18   Ht  
170.2 cm (5' 7 )   Wt 112.8 kg (248 lb 10.9 oz)   
  SpO2 100%   BMI 38.95  
kg/m?  
General  
No acute distress.  
Left Lower Extremity  
Soft dressing d/c/i  
Appropriate tenderness to palpation about the   
incision site  
SILT Breaux/Sa/DP/SP/T.  
Motor intact EHL/DF/PF.  
DP/PT pulses palpable; BCR all digits.  
Labs  
Recent Labs  
22  
0536 22  
0209 22  
0401  
NA -- 138 139  
K -- 4.3 4.2  
CHLOR -- 102 101  
CO2 -- 29 29  
BUN -- 16 16  
CREAT -- 1.28* 1.11*  
GLUC -- 159* 146*  
ANION -- 7* 9  
CA -- 8.7 9.1  
WBC 9.93 9.68 11.01*  
HB 8.3* 7.8* 8.6*  
HCT 27.7* 25.9* 28.2*  
 372 396  
Imaging  
NNI  
Jayme Galo MD  
Orthopaedic Surgery  
Pager #[phone]  
2022                          [facility]  
   
                                        2022 Note     HNO ID: [id number]  
Author: Iqra Pearce DO  
Service: Hospital Medicine  
Author Type: Physician  
Type: Progress Notes  
Filed: 3/28/2022 6:44 AM  
Note Text:  
DEPARTMENT OF HOSPITAL MEDICINE  
PROGRESS NOTE  
SERVICE DATE: 3/28/2022  
SERVICE TIME: 6:41 AM  
Hospital Medicine/Primary Attending: Iqra Pearce DO  
NIGHT AND WEEKEND COVERAGE:  
After 7pm please page [phone]  
CHIEF COMPLAINT: Follow up on MMP  
SUBJECTIVE: Pt seen and examined. States that   
she didn't have an BM  
with the enema. Admits to frequent enema use at   
home. No chest pain or  
SOB. Did have low blood sugar yesterday  
OBJECTIVE:  
PHYSICAL EXAM: /45   Pulse 67   Temp (Src)   
97.9 (Oral)   Resp 18    
Ht 5' 7  (1.70m)   Wt 248 lb 10.9 oz (112.8kg)     
SpO2 96%   BMI 38.94  
kg/(m2).  
O2 Therapy: Room Air  
General - AANDOx3, NAD, Calm  
CV - RRR S1 S2, No M/R/G  
RESP - CTA B/L No wheezes, ronchi, rales  
ABD - soft, NT, ND +BS, Obese  
EXT - no gross joint deformity, no clubbing,   
cyanosis, edema  
NEURO - CN II-XII grossly intact, no focal   
deficits  
MEDICATIONS:  
Current Facility-Administered Medications  
Medication Dose Route Frequency  
- ondansetron (PF) 4 mg injection (ZOFRAN) 4 mg   
INTRAVENOUS q 6 H PRN  
- NaCl 0.9% iv flush bag 20 mL INTRAVENOUS PRN  
- sodium chloride 0.9 % (flush) 3-5 mL (BD   
POSIFLUSH) 3-5 mL INTRAVENOUS  
q 12 H  
- sodium chloride 0.9 % (flush) 2-10 mL (BD   
POSIFLUSH) 2-10 mL  
INTRAVENOUS q 12 H  
- DULoxetine 60 mg cap(s) (CYMBALTA) 60 mg ORAL   
BID  
- ranolazine ER 1,000 mg tab(s) (RANEXA) 1,000   
mg ORAL BID  
- dilTIAZem  mg cap(s) (CARDIZEM CD,   
CARTIA XT) 180 mg ORAL DAILY  
- gabapentin 200 mg cap(s) (NEURONTIN) 200 mg   
ORAL TID  
- isosorbide mononitrate ER 90 mg tab(s) (IMDUR)   
90 mg ORAL DAILY  
- clopidogrel 75 mg tab(s) (PLAVIX) 75 mg ORAL   
DAILY  
- metoprolol tartrate (short acting) 25 mg   
tab(s) (LOPRESSOR) 25 mg ORAL  
q 12 H  
- dextrose 40 % 15 g 15 g ORAL PRN  
Or  
- glucagon 1 mg injection 1 mg INTRAMUSCULAR PRN  
Or  
- dextrose 50% in water 25 mL syringe 12.5 g   
INTRAVENOUS PRN  
- empagliflozin 25 mg tab(s) (JARDIANCE) 25 mg   
ORAL DAILY  
- docusate sodium 100 mg cap(s) (COLACE) 100 mg   
ORAL BID  
- aspirin, enteric coated 81 mg tab(s) 81 mg   
ORAL BID  
- sodium chloride 0.9 % (flush) 3-5 mL (BD   
POSIFLUSH) 3-5 mL INTRAVENOUS  
q 12 H  
- vancomycin dosing and monitoring per pharmacy   
OTHER As Directed  
- miconazole 2 % 1 application topical powder   
(LOTRIMIN AF, DESENEX) 1  
application TOPICAL BID  
- acetaminophen 1,000 mg tab(s) (TYLENOL) 1,000   
mg ORAL q 6 H  
- oxyCODONE IR 5-10 mg tab(s) (ROXICODONE) 5-10   
mg ORAL q 4 H PRN  
- traMADol 50 mg tab(s) (ULTRAM) 50 mg ORAL q 6   
H  
- morphine 2 mg injection 2 mg INTRAVENOUS q 3 H   
PRN  
- melatonin 6 mg tab(s) 6 mg ORAL DAILY (8 PM)  
- insulin lispro pen (rapid acting) (HumaLOG   
KWIKPEN) SUBCUTANEOUS w  
MEALS  
- sodium chloride 0.9 % (flush) 10 mL (BD   
POSIFLUSH) 10 mL INTRAVENOUS q  
12 H  
- sodium chloride 0.9 % (flush) 20 mL (BD   
POSIFLUSH) 20 mL INTRAVENOUS  
PRN  
- senna-docusate 8.6-50 mg 1 tablet (SENNA-S) 1   
tablet ORAL BID  
- calcium carbonate 500 mg chewable tab(s)   
(TUMS) 500 mg ORAL TID PRN  
- insulin glargine 36 Units pen (long acting)   
(LANTUS SOLOSTAR, BASAGLAR  
KWIKPEN) 36 Units SUBCUTANEOUS AT BEDTIME  
- insulin lispro 8 Units pen (rapid acting)   
(HumaLOG KWIKPEN) 8 Units  
SUBCUTANEOUS DAILY wLUNCH  
- insulin lispro 10 Units pen (rapid acting)   
(HumaLOG KWIKPEN) 10 Units  
SUBCUTANEOUS DAILY wDINNER  
- insulin lispro 14 Units pen (rapid acting)   
(HumaLOG KWIKPEN) 14 Units  
SUBCUTANEOUS DAILY WITH BREAKFAST  
- NaCl 0.9% 1,000 mL iv bolus 1,000 mL   
INTRAVENOUS ONCE  
DATA:  
Diagnostic tests reviewed for today's visit:  
CBC:  
Recent Labs  
22  
WBC 9.68  
RBC 2.78*  
HB 7.8*  
HCT 25.9*  
  
MCV 93.2  
MCH 28.1  
MPV 9.7  
Coags: No results for input(s): PT, INR, APTT in   
the last 24 hours.  
BMP:  
Recent Labs  
22  
020  
  
K 4.3  
CHLOR 102  
CO2 29  
BUN 16  
CREAT 1.28*  
GLUC 159*  
CMP:  
Recent Labs  
22  
0209  
  
K 4.3  
CHLOR 102  
CO2 29  
BUN 16  
CREAT 1.28*  
GLUC 159*  
CA 8.7  
ANION 7*  
Cardiac Enzymes: No results for input(s): CK,   
MB, CKMB, TROPT in the last  
24 hours.  
Liver Function, Amylase, Lipase: No results for   
input(s): TPROT, ALB, ALT,  
AST, ALKPHOS, TBILI, AMYLASE, LIPASE, LACTATE in   
the last 24 hours.  
MG/PHOS: No results for input(s): MG, P in the   
last 24 hours.  
Renal Panel:  
Recent Labs  
22  
0209  
CREAT 1.28*  
BUN 16  
GLUC 159*  
CA 8.7  
CHLOR 102  
K 4.3  
CO2 29  
  
Heme: No results for input(s): RETICP, ABSRETIC,   
LD, KARI, FE, TIBC,  
TRANSFERSAT in the last 24 hours.  
No results found for: UALBCR  
Estimated Creatinine Clearance: 60.6 mL/min (A)   
(based on SCr of 1.28  
mg/dL (H)).  
Assessment/Plan  
1. POD #4?Irrigation and Debridement Left Knee,   
Removal Unicompartmental  
Knee Arthroplasty Left Knee, Insertion   
Articulating Antibiotic Spacer Left  
Knee Left septic knee - management per ortho and   
ID.? (more content not included)...     [facility]  
   
                                        2022 Note     HNO ID: [id number]  
Author: Jayme Galo MD  
Service: Orthopaedic Surgery  
Author Type: Resident  
Type: Progress Notes  
Filed: 3/28/2022 6:13 AM  
Note Text:  
Assessment  
Jayden Soto is a 61 year old female who is POD   
#4 Irrigation and  
Debridement Left Knee, Removal Unicompartmental   
Knee Arthroplasty Left  
Knee, Insertion Articulating Antibiotic Spacer   
Left Knee  
Plan  
? Pain control  
? WBAT LLE  
? PT/OT  
? Medical management per IM - appreciate recs  
? Antibiotics per ID - PICC line placed Friday,   
continuing vanc but  
stopped ceftriaxone and no need for rifampin -   
final recs to be given  
tomorrow  
? F/U intra-op culture results - wound culture   
growing rare GPC  
? DVT ppx: Continue home plavix POD1, start   
Aspirin 81mg BID x 21 days  
POD1  
? Discharge planning: home with [facility] when   
medically stable and abx set up -  
likely 3/28 or 3/29  
? Will need outpatient referral to hand surgery   
for R middle finger  
trigger finger at discharge  
Subjective  
No acute events overnight.  
Physical Examination  
Vitals  
BP (!) 111/45   Pulse 67   Temp 36.6 ?C (97.9   
?F) (Oral)   Resp 18    
Ht 170.2 cm (5' 7 )   Wt 112.8 kg (248 lb 10.9   
oz)   SpO2 96%   BMI  
38.95 kg/m?  
General  
No acute distress.  
Left Lower Extremity  
Soft dressing d/c/i  
Appropriate tenderness to palpation about the   
incision site  
SILT Breaux/Sa/DP/SP/T.  
Motor intact EHL/DF/PF.  
DP/PT pulses palpable; BCR all digits.  
Labs  
Recent Labs  
22  
0209 22  
0401  
 139  
K 4.3 4.2  
CHLOR 102 101  
CO2 29 29  
BUN 16 16  
CREAT 1.28* 1.11*  
GLUC 159* 146*  
ANION 7* 9  
CA 8.7 9.1  
WBC 9.68 11.01*  
HB 7.8* 8.6*  
HCT 25.9* 28.2*  
 396  
Imaging  
NNI  
Jayme Galo MD  
Orthopaedic Surgery  
Pager #[phone]  
2022                          [facility]  
   
                                        2022 Note     HNO ID: [id number]  
Author: Iqra Pearce DO  
Service: Hospital Medicine  
Author Type: Physician  
Type: Progress Notes  
Filed: 3/28/2022 6:44 AM  
Note Text:  
DEPARTMENT OF HOSPITAL MEDICINE  
PROGRESS NOTE  
SERVICE DATE: 3/27/2022  
SERVICE TIME: 6:41 AM  
Hospital Medicine/Primary Attending: Iqra Pearce DO  
NIGHT AND WEEKEND COVERAGE:  
After 7pm please page [phone]  
CHIEF COMPLAINT: Constipated  
SUBJECTIVE: Pt seen and examined. States that   
she is constipated. Has  
to use enemas at home frequently. Wants one   
here. No nausea or vomiting  
OBJECTIVE:  
PHYSICAL EXAM: /45   Pulse 71   Temp (Src)   
98.2 (Oral)   Resp 18    
Ht 5' 7  (1.70m)   Wt 250 lb 3.6 oz (113.5kg)     
SpO2 100%   BMI 39.18  
kg/(m2).  
O2 Therapy: Room Air  
General - AANDOx3, NAD, Calm  
CV - RRR S1 S2, No M/R/G  
RESP - CTA B/L No wheezes, ronchi, rales  
ABD - soft, NT, ND +BS, Obese  
EXT - Dressing in tact  
NEURO - CN II-XII grossly intact, no focal   
deficits  
MEDICATIONS:  
Current Facility-Administered Medications  
Medication Dose Route Frequency  
- ondansetron (PF) 4 mg injection (ZOFRAN) 4 mg   
INTRAVENOUS q 6 H PRN  
- NaCl 0.9% iv flush bag 20 mL INTRAVENOUS PRN  
- sodium chloride 0.9 % (flush) 3-5 mL (BD   
POSIFLUSH) 3-5 mL INTRAVENOUS  
q 12 H  
- sodium chloride 0.9 % (flush) 2-10 mL (BD   
POSIFLUSH) 2-10 mL  
INTRAVENOUS q 12 H  
- DULoxetine 60 mg cap(s) (CYMBALTA) 60 mg ORAL   
BID  
- ranolazine ER 1,000 mg tab(s) (RANEXA) 1,000   
mg ORAL BID  
- dilTIAZem  mg cap(s) (CARDIZEM CD,   
CARTIA XT) 180 mg ORAL DAILY  
- gabapentin 200 mg cap(s) (NEURONTIN) 200 mg   
ORAL TID  
- isosorbide mononitrate ER 90 mg tab(s) (IMDUR)   
90 mg ORAL DAILY  
- clopidogrel 75 mg tab(s) (PLAVIX) 75 mg ORAL   
DAILY  
- metoprolol tartrate (short acting) 25 mg   
tab(s) (LOPRESSOR) 25 mg ORAL  
q 12 H  
- dextrose 40 % 15 g 15 g ORAL PRN  
Or  
- glucagon 1 mg injection 1 mg INTRAMUSCULAR PRN  
Or  
- dextrose 50% in water 25 mL syringe 12.5 g   
INTRAVENOUS PRN  
- empagliflozin 25 mg tab(s) (JARDIANCE) 25 mg   
ORAL DAILY  
- docusate sodium 100 mg cap(s) (COLACE) 100 mg   
ORAL BID  
- aspirin, enteric coated 81 mg tab(s) 81 mg   
ORAL BID  
- sodium chloride 0.9 % (flush) 3-5 mL (BD   
POSIFLUSH) 3-5 mL INTRAVENOUS  
q 12 H  
- vancomycin dosing and monitoring per pharmacy   
OTHER As Directed  
- insulin glargine 40 Units pen (long acting)   
(LANTUS SOLOSTAR, BASAGLAR  
KWIKPEN) 40 Units SUBCUTANEOUS AT BEDTIME  
- miconazole 2 % 1 application topical powder   
(LOTRIMIN AF, DESENEX) 1  
application TOPICAL BID  
- acetaminophen 1,000 mg tab(s) (TYLENOL) 1,000   
mg ORAL q 6 H  
- oxyCODONE IR 5-10 mg tab(s) (ROXICODONE) 5-10   
mg ORAL q 4 H PRN  
- traMADol 50 mg tab(s) (ULTRAM) 50 mg ORAL q 6   
H  
- morphine 2 mg injection 2 mg INTRAVENOUS q 3 H   
PRN  
- melatonin 6 mg tab(s) 6 mg ORAL DAILY (8 PM)  
- insulin lispro 16 Units pen (rapid acting)   
(HumaLOG KWIKPEN) 16 Units  
SUBCUTANEOUS DAILY WITH BREAKFAST  
- insulin lispro 10 Units pen (rapid acting)   
(HumaLOG KWIKPEN) 10 Units  
SUBCUTANEOUS DAILY wLUNCH  
- insulin lispro 12 Units pen (rapid acting)   
(HumaLOG KWIKPEN) 12 Units  
SUBCUTANEOUS DAILY wDINNER  
- insulin lispro pen (rapid acting) (HumaLOG   
KWIKPEN) SUBCUTANEOUS w  
MEALS  
- sodium chloride 0.9 % (flush) 10 mL (BD   
POSIFLUSH) 10 mL INTRAVENOUS q  
12 H  
- sodium chloride 0.9 % (flush) 20 mL (BD   
POSIFLUSH) 20 mL INTRAVENOUS  
PRN  
- senna-docusate 8.6-50 mg 1 tablet (SENNA-S) 1   
tablet ORAL BID  
- vancomycin 1.5 g in D5W 250 mL (VANCOCIN) 1.5   
g INTRAVENOUS q 12 HR  
DATA:  
Diagnostic tests reviewed for today's visit:  
CBC:  
Recent Labs  
22  
WBC 11.01*  
RBC 3.06*  
HB 8.6*  
HCT 28.2*  
  
MCV 92.2  
MCH 28.1  
MPV 9.5  
Coags: No results for input(s): PT, INR, APTT in   
the last 24 hours.  
BMP:  
Recent Labs  
22  
040  
  
K 4.2  
CHLOR 101  
CO2 29  
BUN 16  
CREAT 1.11*  
GLUC 146*  
CMP:  
Recent Labs  
22  
040  
  
K 4.2  
CHLOR 101  
CO2 29  
BUN 16  
CREAT 1.11*  
GLUC 146*  
CA 9.1  
ANION 9  
Cardiac Enzymes: No results for input(s): CK,   
MB, CKMB, TROPT in the last  
24 hours.  
Liver Function, Amylase, Lipase: No results for   
input(s): TPROT, ALB, ALT,  
AST, ALKPHOS, TBILI, AMYLASE, LIPASE, LACTATE in   
the last 24 hours.  
MG/PHOS: No results for input(s): MG, P in the   
last 24 hours.  
Renal Panel:  
Recent Labs  
22  
0401  
CREAT 1.11*  
BUN 16  
GLUC 146*  
CA 9.1  
CHLOR 101  
K 4.2  
CO2 29  
  
Heme: No results for input(s): RETICP, ABSRETIC,   
LD, KARI, FE, TIBC,  
TRANSFERSAT in the last 24 hours.  
No results found for: UALBCR  
Estimated Creatinine Clearance: 70.1 mL/min (A)   
(based on SCr of 1.11  
mg/dL (H)).  
Assessment/Plan  
1. POD #3?Irrigation and Debridement Left Knee,   
Removal Unicompartmental  
Knee Arthroplasty Left Knee, Insertion   
Articulating Antibiotic Spacer Left  
KneeLeft septic knee - management per ortho and   
ID.?MSSA ?Cont with IV abx  
2. ROMEO - small bolus today  
3. Diabetes with insulin?with hyperglycemia?-   
accu checks ac and hs. ?Carb  
control (more content not included)...  [facility]  
   
                                        2022 Note     HNO ID: [id number]  
Author: Anna Rodriguez MD  
Service: Orthopaedic Surgery  
Author Type: Resident  
Type: Progress Notes  
Filed: 3/27/2022 6:59 AM  
Note Text:  
Orthopaedic Surgery Inpatient Progress Note  
Assessment  
Jayden Soto is a 61 year old female who is POD   
#3 Irrigation and  
Debridement Left Knee, Removal Unicompartmental   
Knee Arthroplasty Left  
Knee, Insertion Articulating Antibiotic Spacer   
Left Knee  
Plan  
Pain control  
WBAT LLE  
PT/OT  
Medical management per IM - appreciate recs  
Antibiotics per ID - PICC line placed Friday,   
continuing vanc but stopped  
ceftriaxone and no need for rifampin - final   
recs to be given tomorrow  
F/U intra-op culture results - wound culture   
growing rare GPC  
Dressing: Aquacel + Drain  
Monitor drain output: pull when less than 30cc   
per shift - pulled this  
morning, patient tolerated well  
DVT ppx: Continue home plavix POD1, start   
Aspirin 81mg BID x 21 days POD1  
Discharge planning: home with [facility] when medically   
stable and abx set up -  
likely 3/28 or 3/29  
Will need outpatient referral to hand surgery   
for R middle finger trigger  
finger at discharge  
Subjective  
No acute events overnight. Pain well controlled.   
No fevers, chills, chest  
pain, or shortness of breath. No new complaints.  
Physical Examination  
Vitals  
BP (!) 102/45   Pulse 71   Temp 36.8 ?C (98.2   
?F) (Oral)   Resp 18    
Ht 170.2 cm (5' 7 )   Wt 113.5 kg (250 lb 3.6   
oz)   SpO2 100%   BMI  
39.19 kg/m?  
General  
Alert and oriented. No acute distress.  
Cooperative with interview.  
Left Lower Extremity  
Alignment normal. No gross deformities.  
Dressing c/d/i. Drain in place, removed today   
without incident.  
No swelling, ecchymosis, or erythema.  
SILT Breaux/Sa/DP/SP/T.  
Motor intact EHL/DF/PF.  
DP/PT pulses palpable; BCR all digits.  
Labs  
Recent Labs  
22  
0401 22  
0135  
 140  
K 4.2 4.2  
CHLOR 101 103  
CO2 29 30  
BUN 16 17  
CREAT 1.11* 0.93  
GLUC 146* 107*  
ANION 9 7*  
CA 9.1 8.8  
WBC 11.01* 10.82  
HB 8.6* 8.4*  
HCT 28.2* 26.9*  
 329  
Imaging  
No new imaging  
Anna Rodriguez MD  
Orthopedic Surgery Resident  
2022  
6:43 AM                                 [facility]  
   
                                        2022 Note     HNO ID: [id number]  
Author: Floridalma Mckeon PA-C  
Service: Infectious Disease  
Author Type: Physician Assistant  
Type: Plan of Care  
Filed: 3/26/2022 3:40 PM  
Note Text:  
Infectious Diseases Plan of Care Note  
Cultures from the left knee are growing rare   
MSSA. Interestingly, it is  
susceptible to tetracycline. She was apparently   
on doxycycline for  
suppression of MRSA left knee arthroplasty   
prosthetic joint infection at  
the time of presentation. For now, we will stop   
the ceftriaxone. The  
vancomycin will cover both the newly isolated   
MSSA and the previous MRSA.  
Of note, she had undergone resection   
arthroplasty with articulating  
antibiotic spacer placement so we will not need   
to start rifampin. Will  
review her history and provide final   
recommendations for dismissal  
antibiotics in the next day.  
Discussed with Dr. Lovett.             [facility]  
   
                                        2022 Note     HNO ID: [id number]  
Author: Iqra Paerce DO  
Service: Hospital Medicine  
Author Type: Physician  
Type: Progress Notes  
Filed: 3/26/2022 6:58 AM  
Note Text:  
DEPARTMENT OF HOSPITAL MEDICINE  
PROGRESS NOTE  
SERVICE DATE: 3/26/2022  
SERVICE TIME: 6:55 AM  
Hospital Medicine/Primary Attending: Iqra Pearce DO  
NIGHT AND WEEKEND COVERAGE:  
After 7pm please page [phone]  
CHIEF COMPLAINT: Knee pain  
SUBJECTIVE: Pt seen and examined. States that   
her knee is bothering  
her. No chest pain or SOB. Gets constipated   
easily.  
OBJECTIVE:  
PHYSICAL EXAM: /40   Pulse 66   Temp (Src)   
98.4 (Oral)   Resp 18    
Ht 5' 7  (1.70m)   Wt 230 lb 13.2 oz (104.7kg)     
SpO2 94%   BMI 36.14  
kg/(m2).  
O2 Therapy: Room Air  
General - AANDOx3, NAD, Calm  
CV - RRR S1 S2, No M/R/G  
RESP - CTA B/L No wheezes, ronchi, rales  
ABD - soft, NT, ND +BS  
EXT - Left knee with dressing and hemovac drain  
NEURO - CN II-XII grossly intact, no focal   
deficits  
MEDICATIONS:  
Current Facility-Administered Medications  
Medication Dose Route Frequency  
- ondansetron (PF) 4 mg injection (ZOFRAN) 4 mg   
INTRAVENOUS q 6 H PRN  
- NaCl 0.9% iv flush bag 20 mL INTRAVENOUS PRN  
- sodium chloride 0.9 % (flush) 3-5 mL (BD   
POSIFLUSH) 3-5 mL INTRAVENOUS  
q 12 H  
- sodium chloride 0.9 % (flush) 2-10 mL (BD   
POSIFLUSH) 2-10 mL  
INTRAVENOUS q 12 H  
- DULoxetine 60 mg cap(s) (CYMBALTA) 60 mg ORAL   
BID  
- ranolazine ER 1,000 mg tab(s) (RANEXA) 1,000   
mg ORAL BID  
- dilTIAZem  mg cap(s) (CARDIZEM CD,   
CARTIA XT) 180 mg ORAL DAILY  
- gabapentin 200 mg cap(s) (NEURONTIN) 200 mg   
ORAL TID  
- isosorbide mononitrate ER 90 mg tab(s) (IMDUR)   
90 mg ORAL DAILY  
- clopidogrel 75 mg tab(s) (PLAVIX) 75 mg ORAL   
DAILY  
- metoprolol tartrate (short acting) 25 mg   
tab(s) (LOPRESSOR) 25 mg ORAL  
q 12 H  
- dextrose 40 % 15 g 15 g ORAL PRN  
Or  
- glucagon 1 mg injection 1 mg INTRAMUSCULAR PRN  
Or  
- dextrose 50% in water 25 mL syringe 12.5 g   
INTRAVENOUS PRN  
- empagliflozin 25 mg tab(s) (JARDIANCE) 25 mg   
ORAL DAILY  
- docusate sodium 100 mg cap(s) (COLACE) 100 mg   
ORAL BID  
- aspirin, enteric coated 81 mg tab(s) 81 mg   
ORAL BID  
- sodium chloride 0.9 % (flush) 3-5 mL (BD   
POSIFLUSH) 3-5 mL INTRAVENOUS  
q 12 H  
- vancomycin dosing and monitoring per pharmacy   
OTHER As Directed  
- cefTRIAXone iv piggyback 2 g in dextrose   
(iso-osmotic) 50 mL (ROCEPHIN)  
2 g INTRAVENOUS q 24 H  
- insulin glargine 40 Units pen (long acting)   
(LANTUS SOLOSTAR, BASAGLAR  
KWIKPEN) 40 Units SUBCUTANEOUS AT BEDTIME  
- miconazole 2 % 1 application topical powder   
(LOTRIMIN AF, DESENEX) 1  
application TOPICAL BID  
- acetaminophen 1,000 mg tab(s) (TYLENOL) 1,000   
mg ORAL q 6 H  
- oxyCODONE IR 5-10 mg tab(s) (ROXICODONE) 5-10   
mg ORAL q 4 H PRN  
- traMADol 50 mg tab(s) (ULTRAM) 50 mg ORAL q 6   
H  
- morphine 2 mg injection 2 mg INTRAVENOUS q 3 H   
PRN  
- melatonin 6 mg tab(s) 6 mg ORAL DAILY (8 PM)  
- insulin lispro 16 Units pen (rapid acting)   
(HumaLOG KWIKPEN) 16 Units  
SUBCUTANEOUS DAILY WITH BREAKFAST  
- insulin lispro 10 Units pen (rapid acting)   
(HumaLOG KWIKPEN) 10 Units  
SUBCUTANEOUS DAILY wLUNCH  
- insulin lispro 12 Units pen (rapid acting)   
(HumaLOG KWIKPEN) 12 Units  
SUBCUTANEOUS DAILY wDINNER  
- insulin lispro pen (rapid acting) (HumaLOG   
KWIKPEN) SUBCUTANEOUS w  
MEALS  
- sodium chloride 0.9 % (flush) 10 mL (BD   
POSIFLUSH) 10 mL INTRAVENOUS q  
12 H  
- sodium chloride 0.9 % (flush) 20 mL (BD   
POSIFLUSH) 20 mL INTRAVENOUS  
PRN  
DATA:  
Diagnostic tests reviewed for today's visit:  
CBC:  
Recent Labs  
22  
0135  
WBC 10.82  
RBC 2.98*  
HB 8.4*  
HCT 26.9*  
  
MCV 90.3  
MCH 28.2  
MPV 9.6  
Coags: No results for input(s): PT, INR, APTT in   
the last 24 hours.  
BMP:  
Recent Labs  
22  
0135  
  
K 4.2  
CHLOR 103  
CO2 30  
BUN 17  
CREAT 0.93  
GLUC 107*  
CMP:  
Recent Labs  
22  
0135  
  
K 4.2  
CHLOR 103  
CO2 30  
BUN 17  
CREAT 0.93  
GLUC 107*  
CA 8.8  
ANION 7*  
Cardiac Enzymes: No results for input(s): CK,   
MB, CKMB, TROPT in the last  
24 hours.  
Liver Function, Amylase, Lipase: No results for   
input(s): TPROT, ALB, ALT,  
AST, ALKPHOS, TBILI, AMYLASE, LIPASE, LACTATE in   
the last 24 hours.  
MG/PHOS: No results for input(s): MG, P in the   
last 24 hours.  
Renal Panel:  
Recent Labs  
22  
0135  
CREAT 0.93  
BUN 17  
GLUC 107*  
CA 8.8  
CHLOR 103  
K 4.2  
CO2 30  
  
Heme: No results for input(s): RETICP, ABSRETIC,   
LD, KARI, FE, TIBC,  
TRANSFERSAT in the last 24 hours.  
No results found for: UALBCR  
Estimated Creatinine Clearance: 80 mL/min (based   
on SCr of 0.93 mg/dL).  
Assessment/Plan  
1. POD #3?Irrigation and Debridement Left Knee,   
Removal Unicompartmental  
Knee Arthroplasty Left Knee, Insertion   
Articulating Antibiotic Spacer Left  
KneeLeft septic knee - management per ortho and   
ID.??Follow up on  
cultures. ?Cont with IV abx  
2. ROMEO - resolved  
3. Diabetes with insulin?with hyperglycemia?-   
accu checks ac and hs. ?Carb  
controlled diet post op. appreciate endocrine   
managin (more content not included)...  [facility]  
   
                                        2022 Note     HNO ID: [id number]  
Author: Anna Rodriguez MD  
Service: Orthopaedic Surgery  
Author Type: Resident  
Type: Progress Notes  
Filed: 3/26/2022 7:36 AM  
Note Text:  
------------------------------------------------  
--------------------------------  
Attestation signed by Basil Mitchell MD at   
3/26/2022 10:47 AM  
PICC placed 3/25.  
Pull drain on 3/27.  
Plan to DC to home 3/28 vs 3/29 when pain   
control optimized.  
I will be out of the office beginning 3/27. Dr Tanner Delgado will be covering  
in my absence.  
------------------------------------------------  
--------------------------------  
Orthopaedic Surgery Inpatient Progress Note  
Assessment  
Jayden Soto is a 61 year old female who is POD   
#3 Irrigation and  
Debridement Left Knee, Removal Unicompartmental   
Knee Arthroplasty Left  
Knee, Insertion Articulating Antibiotic Spacer   
Left Knee  
Plan  
Pain control  
WBAT LLE  
PT/OT  
Medical management per IM - appreciate recs  
Antibiotics per ID - PICC line placed yesterday,   
planning on continuing  
vanc and ceftr and adding rifampin on Monday  
F/U intra-op culture results - wound culture   
growing rare GPC  
Dressing: Aquacel + Drain  
Monitor drain output: pull when less than 30cc   
per shift - output 35  
overnight, maintain, may pull later today vs   
tomorrow  
DVT ppx: Continue home plavix POD1, start   
Aspirin 81mg BID x 21 days POD1  
Discharge planning: home with [facility] when medically   
stable and abx set up  
Will need outpatient referral to hand surgery   
for R middle finger trigger  
finger  
Subjective  
No acute events overnight. Pain well controlled.   
No fevers, chills, chest  
pain, or shortness of breath. No new complaints.  
Physical Examination  
Vitals  
BP (!) 105/40   Pulse 66   Temp 36.9 ?C (98.4   
?F) (Oral)   Resp 18    
Ht 170.2 cm (5' 7 )   Wt 104.7 kg (230 lb 13.2   
oz)   SpO2 94%   BMI  
36.15 kg/m?  
General  
Alert and oriented. No acute distress.  
Cooperative with interview.  
Left Lower Extremity  
Alignment normal. No gross deformities.  
Dressing c/d/i. Drain in place.  
No swelling, ecchymosis, or erythema.  
SILT Breaux/Sa/DP/SP/T.  
Motor intact EHL/DF/PF.  
DP/PT pulses palpable; BCR all digits.  
Labs  
Recent Labs  
22  
0135 22  
0314  
 141  
K 4.2 4.3  
CHLOR 103 101  
CO2 30 28  
BUN 17 17  
CREAT 0.93 1.11*  
GLUC 107* 132*  
ANION 7* 12  
CA 8.8 9.1  
WBC 10.82 15.83*  
HB 8.4* 9.2*  
HCT 26.9* 30.3*  
 437*  
Imaging  
No new imaging  
Anna Rodriguez MD  
Orthopedic Surgery Resident  
2022  
6:43 AM                                 [facility]  
   
                                        2022 Note     HNO ID: [id number]  
Author: Angeles Preston RN  
Service: PICC Team  
Author Type: Registered Nurse  
Type: Procedures  
Filed: 3/25/2022 1:19 PM  
Note Text:  
PICC NURSE INSERTION NOTE  
DATE OF PROCEDURE: 2022  
TIME OF PROCEDURE: 1240  
ORDERING PHYSICIAN: Dr. So  
INFORMED CONSENT: Obtained per hospital policy.  
INDICATION FOR LINE PLACEMENT: COPAT  
CONDITION OF LINE PLACEMENT: Sterile  
PRIMARY PROCEDURALIST: Rossana Medina RN  
ASSISTANT: Angeles Preston RN  
PRE-PROCEDURE REVIEW  
ALLERGIES  
Allergen Reactions  
- Morphine GI Upset  
- Statins-Hmg-Coa Red* Unknown  
Known History of Upper Venous Thrombosis: No  
Known History of Permanent Pacemaker or   
Automated Implanted Cardiac  
Device: No  
Previous Breast Surgery of Lymph Node   
Dissection: No  
Estimated Glomerular Filtration Rate  
Date Value Ref Range Status  
2022 57 (L) >=60 mL/min/1.73m? Final  
Comment:  
Estimated Glomerular Filtration Rate (eGFR) is   
calculated using the   
CKD-EPI creatinine equation. This equation   
utilizes serum creatinine, sex,  
and age as parameters. The creatinine assay has   
traceable calibration to  
isotope dilution-mass spectrometry. Refer to   
KDIGO guidelines for clinical  
interpretation. In patients with unstable renal   
function, e.g. those with  
acute kidney injury, the eGFR may not accurately   
reflect actual GFR.  
History of Renal Disease: No  
Ultrasound Assessment Complete: Yes  
PROCEDURE NARRATIVE  
SAFE PRACTICE  
Hand Hygiene per Hospital Policy: Yes  
Skin Preparation Unit Dose Applicator Used:   
Chloraprep (CHG + alcohol),  
allowed to dry.  
Procedure Surface Cleansed with Antimicrobial   
Wipes: Yes  
Barriers Used by Proceduralist and all Assisting   
Personnel: Yes  
UNIVERSAL PROTOCOL / SAFETY CHECKLIST  
Procedure to be Performed: peripherally inserted   
central catheter  
Sign In:  
A Moment of CARE was completed.  
Personnel directly involved with the procedure   
wore the appropriate PPE  
(Personal Protective Equipment).  
Special equipment: ultrasound and tip location   
system  
Patient/Surrogate Stated/Verified: PATIENT   
VERIFIED(optional for EMERGENT  
procedures): Patient name, Date of Birth,   
Relevant allergies and The  
intended procedure  
Time Out Communication:  
Intended patient and procedure match the source   
documents.  
Consent documented and matches the intended   
procedure.  
Relevant labs, photos, and/or imaging studies   
have been reviewed.  
Correct side/site marked and visible.  
Medications required for procedure verified.  
Fire risk assessed and interventions discussed.  
No implant(s) inserted.  
Sign Out:  
SIGN OUT (optional for EMERGENT procedures): No   
specimen collected.  
All instruments, equipment, possible retained   
foreign bodies accounted  
for.  
Post-procedure follow-up management communicated   
and Plan of Care Visit  
completed when applicable.  
Angeles Preston RN  
CATHETER PLACEMENT  
Brand: Evolv Lot: [id number]  
Number of Lumens: 1  
Type of PICC: Power Injectable PICC  
Lumen Size: 4 French  
PLACEMENT TECHNIQUE  
Lidocaine: Yes, Lidocaine 1% Volume 1 mL   
Subcutaneous  
Modified Seldinger Technique Used to Place Line   
via the Right Basilic  
Ultrasound Guidance: Yes  
Number of Attempts at Insertion: 1  
Ensured control of guidewire during all aspects   
of the procedure: Yes  
Accounted for entire guidewire upon removal: Yes  
Internal Length: 40 cm External Length: 2 cm   
Trim Length: 42 cm  
Mid-Arm Circumference: 35 centimeters  
Post Insertion Pain Level Related to Procedure:   
0  
Action Taken to Address Pain: None needed  
Verified Placement: Blood return and flushes   
with ease and Tip location  
system or device indicates the tip is located in   
the SVC/CAJ.  
Line was Flushed with 10 cc normal saline  
Line Secured with: Securement device  
Sterile Dressing Applied and Dated: Yes  
Sterile Caps on all Ports Prior to Leaving   
Procedure Area: Yes,  
Disinfection caps applied  
SPECIMENS: None  
COMPLICATIONS: None  
Patient Education Materials: Given to patient  
QUESTIONS or PROBLEMS: Call [phone]  
SIGNATURE: Angeles Preston RN PATIENT NAME: Jayden Soto  
DATE: 2022 MRN: [id number]  
TIME: 12:58 PM PAGER/CONTACT PHONE: [phone] [facility]  
   
                                        2022 Note     HNO ID: [id number]  
Author: Chantale Hong RN  
Service: Diabetes Education  
Author Type: Registered Nurse  
Type: Allied Health  
Filed: 3/25/2022 12:10 PM  
Note Text:  
DIABETES EDUCATION INPATIENT PROGRESS NOTE  
INITIAL OR FOLLOW-UP: First diabetes care AND   
education visit this admission  
VISIT TYPE: in-person  
SERVICE DATE: 3/25/2022  
SERVICE TIME: 1115  
RECOMMENDATIONS TO DISCHARGING PROVIDER FOR   
DISCHARGE ORDERS: Follow-up  
with PCP after discharge  
Patient with hx of diabetes and insulin and left   
knee infection. Discussed  
importance of blood sugar management with   
infection,new insulin  
doses.Discussed A1C and blood sugar goals.   
Patient states understanding.  
Diabetes literature provided with follow up   
phone number.  
RECOMMENDATIONS TO INPATIENT NURSE: N/A  
PATIENT HISTORY/ASSESSMENT:  
Previously diagnosed: Type 2 and 30 years  
Treatment prior to hospitalization: Oral   
Agent(s): jardiance and Insulin:  
Lantus,Humalog  
Has meter: Frequency of self-blood glucose   
monitoring: 3 times per day.  
Usual blood glucose results at home: 200.  
Admission Dx: knee infection  
BG on admission: 201 mg/dl  
Last HbA1c and date: 9.4%  
INTERVENTIONS/TOPICS COVERED:  
-Diabetes Basics: role of insulin in the body,   
role of glucose in the  
body and symptoms of diabetes  
-Monitoring: BG targets, A1c meaning and target   
<7%, using a home glucose  
monitor, logging and testing frequency  
-Medications: reviewed home DM meds, oral agents   
discussed: empagliflozin  
(Jardiance) and injectable insulin discussed:   
lispro (Humalog) and  
glargine (Lantus)  
-Problem Solving: hypoglycemia s/sx/tx and   
hyperglycemia s/sx/tx  
EDUCATION:  
Cognitive ability: Alert and Oriented.  
Motivation to learn: Interested.  
Barriers to learning: None  
Family support: Unable to assess - Family not   
present  
Education Type: Individual instruction  
Written instruction - handouts  
Verbal instruction  
Response to education: States/Identifies.  
Education provided to: Patient.  
Teachback method used: Yes  
Handouts provided: Diabetes survival packet  
Time Spent (Minutes): 15  
SIGNATURE: Chantale Hong RN PATIENT NAME: Jayden Soto  
DATE: 2022 MRN: [id number]  
TIME: 12:05 PM PAGER: [phone]              [facility]  
   
                                        2022 Note     HNO ID: [id number]  
Author: Americo Parisi MD  
Service: Infectious Disease  
Author Type: Resident  
Type: Progress Notes  
Filed: 3/25/2022 12:14 PM  
Note Text:  
------------------------------------------------  
--------------------------------  
Attestation signed by Alvin So MD at   
3/25/2022 12:20 PM  
I reviewed the resident's note. I agree with the   
resident's assessment and plan  
unless otherwise noted.  
Operative cx growing SA.  
Plan:  
- to cont vanc and ceftr  
- picc placement  
- to add rifampin on Monday  
Dr. Con Lovett is covering this weekend and   
is available if questions  
arise.  
Alvin So MD  
Infectious Disease  
[facility] [place]  
Pager: [phone]   
------------------------------------------------  
--------------------------------  
PROGRESS NOTE INFECTIOUS DISEASE  
All of the below reviewed 3/25/2022  
Portions of this note were copied so as to   
provide important historical  
information essential in contributing to medical   
decision making today.  
The copied portions of this note were carefully   
reviewed and edited as  
needed today so as to accurately reflect my own   
independent evaluation on  
this patient. I have confirmed and edited as   
necessary, the PFSH and ROS  
obtained by others.  
BRIEF SUMMARY:  
61 year old female?with?diabetes mellitus, a   
history of left knee  
replacement, disseminated rt foot wound   
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  
infection and left knee seeding in Dec 2020,   
left prosthetic knee washout  
in 2021,?s/p iv abx followed by oral doxy   
and rifampin for 6 months,  
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   
to [facility]?3/21/2022?for  
further treatment of prosthetic joint infection.   
?Symptomatic with  
worsening left knee pain along with swelling,   
started in 2021  
after twisting the knee while walking. ?Also   
symptomatic with chills and  
hyperglycemia. ?Was seen in the Ortho clinic on   
3/17, a diagnostic  
arthrocentesis of left knee revealed 15 cc of   
cloudy and bloody synovial  
fluid. ?Fluid ,000, nucleated cells   
17,472, 97% neutrophils. ?CRP  
1.9. ?WBC count 12.0.  
ASSESSMENT:  
1. ?Left prosthetic knee pain and swelling,   
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  
2. ?Chills, no recorded fevers, no significant   
leukocytosis  
3. ?Uncontrolled Type 2 Diabetes Mellitus  
4. 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   
infection in 2020,  
history of lumbar spinal infection, history of   
left knee prosthetic joint  
infection?s/p joint washout in 2021  
Culture from left knee is growing GPC. Patient   
is currently on ceftriaxone  
and vancomycin.  
Estimated Creatinine Clearance: 67 mL/min (A)   
(based on SCr of 1.11 mg/dL  
(H)).  
RECOMMENDATIONS:  
-Culture from left knee is growing GPC. Continue   
patient on ceftriaxone  
and vancomycin.  
-Will consult PICC line team for line placement.   
Patient will need IV  
antibiotics for 6 weeks. Will await culture   
results for choice of  
antibiotics.  
-Patient's prosthetic joint was removed two days   
ago. Will follow-up with  
fluid aspiration that was obtained in the OR for   
further specification of  
what may be the appropriate antibiotic for her.  
-Hemoglobin A1c obtained was elevated at 9.4.   
Endocrinology has been  
consulted.  
Subjective  
SUBJECTIVE:  
Interval Events:  
Afebrile. No acute events overnight. Today   
patient denies any fever,  
chills, nausea or vomiting.  
Active Antimicrobials (From admission, onward)  
Start Stop  
22 1030 vancomycin 1.5 g in D5W 250 mL   
(VANCOCIN) 1.5 g,  
INTRAVENOUS, EVERY 12 HOURS  
22 1029  
22 1300 miconazole 2 % 1 application   
topical powder (LOTRIMIN AF,  
DESENEX) 1 application, TOPICAL, 2 TIMES DAILY  
--  
22 vancomycin dosing and monitoring   
per pharmacy OTHER, AS  
DIRECTED  
--  
22 cefTRIAXone iv piggyback 2 g in   
dextrose (iso-osmotic) 50  
mL (ROCEPHIN) 2 g, INTRAVENOUS, EVERY 24 HOURS  
--  
Immunosuppressant:  
None  
Medications:  
Current Facility-Administered Medications  
Medication Dose Route Frequency  
- ondansetron (PF) 4 mg injection (ZOFRAN) 4 mg   
INTRAVENOUS q 6 H PRN  
- NaCl 0.9% iv flush bag 20 mL INTRAVENOUS PRN  
- sodium chloride 0.9 % (flush) 3-5 mL (BD   
POSIFLUSH) 3-5 mL INTRAVENOUS  
q 12 H  
- sodium chloride 0.9 % (flush) 2-10 mL (BD   
POSIFLUSH) 2-10 mL  
INTRAVENOUS q 12 H  
- DULoxetine 60 mg cap(s) (CYMBALTA) 60 mg ORAL   
BID  
- ranolazine ER 1,000 mg tab(s) (RANEXA) 1,000   
mg ORAL BID  
- dilTIAZem  mg cap(s) (CARDIZEM CD,   
CARTIA XT) 180 mg ORAL DAILY  
- gabapentin 200 mg cap(s) (NEURONTIN) 200 mg   
ORAL TID  
- isosorbide mononitrate ER 90 mg tab(s) (IMDUR)   
90 mg ORAL DAILY  
- clopidogrel 75 mg tab(s) (PLAVIX) 75 mg ORAL   
DAILY  
- metoprolol tartrate (short acting) 25 mg   
tab(s) (LOPRESSOR) 25 mg ORAL  
q 12 H  
- dextrose 40 % 15 g 15 g ORAL PRN  
Or  
- glucagon 1 mg injection 1 mg INTRAMUSCULAR PRN  
Or  
- dextrose 50% in water 25 mL syringe 12.5 g   
INTRAVENOUS PRN  
- empagliflozin 25 mg tab(s) (JARDIANCE) 25 mg   
ORAL DAILY  
- docusate sodium 100 mg cap(s) (COLACE) 100   
(more content not included)...          [facility]  
   
                                        2022 Note     HNO ID: [id number]  
Author: Anna Rodriguez MD  
Service: Orthopaedic Surgery  
Author Type: Resident  
Type: Progress Notes  
Filed: 3/25/2022 6:58 AM  
Note Text:  
Orthopaedic Surgery Inpatient Progress Note  
Assessment  
Jayden Soto is a 61 year old female who is POD   
#2 Irrigation and  
Debridement Left Knee, Removal Unicompartmental   
Knee Arthroplasty Left  
Knee, Insertion Articulating Antibiotic Spacer   
Left Knee  
Plan  
Pain control  
WBAT LLE  
PT/OT  
Medical management per IM - fluid bolus given   
this am for slight bump in  
creatinine  
Antibiotics per ID  
Currently on IV Vancomycin  
F/U intra-op culture results - wound culture   
growing rare GPC  
Dressing: Aquacel + Drain  
Monitor drain output: pull when less than 30cc   
per shift - output 60  
overnight  
DVT ppx: Continue home plavix POD1, start   
Aspirin 81mg BID x 21 days POD1  
Discharge planning: home with [facility] when medically   
stable and abx set up  
Subjective  
No acute events overnight. Pain well controlled.   
No fevers, chills, chest  
pain, or shortness of breath. No new complaints.  
Physical Examination  
Vitals  
/54   Pulse 72   Temp 36.4 ?C (97.5 ?F)   
(Oral)   Resp 16   Ht  
170.2 cm (5' 7 )   Wt 104.7 kg (230 lb 13.2 oz)   
  SpO2 96%   BMI 36.15  
kg/m?  
General  
Alert and oriented. No acute distress.  
Cooperative with interview.  
Left Lower Extremity  
Alignment normal. No gross deformities.  
Dressing c/d/i. Drain in place.  
No swelling, ecchymosis, or erythema.  
SILT Breaux/Sa/DP/SP/T.  
Motor intact EHL/DF/PF.  
DP/PT pulses palpable; BCR all digits.  
Labs  
Recent Labs  
22  
0314 22  
0413  
 135*  
K 4.3 5.3*  
CHLOR 101 99  
CO2 28 26  
BUN 17 15  
CREAT 1.11* 0.73  
GLUC 132* 330*  
ANION 12 10  
CA 9.1 8.6  
WBC 15.83* 14.24*  
HB 9.2* 9.2*  
HCT 30.3* 29.8*  
* 381  
Imaging  
Post operative xrays demonstrating placement of   
spacer in good alignment  
Anna Rodriguez MD  
Orthopedic Surgery Resident  
2022  
6:43 AM                                 [facility]  
   
                                        2022 Note     HNO ID: [id number]  
Author: Iqra Pearce DO  
Service: Hospital Medicine  
Author Type: Physician  
Type: Progress Notes  
Filed: 3/25/2022 6:33 AM  
Note Text:  
DEPARTMENT OF HOSPITAL MEDICINE  
PROGRESS NOTE  
SERVICE DATE: 3/25/2022  
SERVICE TIME: 6:31 AM  
Hospital Medicine/Primary Attending: Iqra Pearce DO  
NIGHT AND WEEKEND COVERAGE:  
After 7pm please page [phone]  
CHIEF COMPLAINT: Follow up on MMP  
SUBJECTIVE: Pt seen and examined. States that   
she had a rough night due  
to pain. No chest pain or SOB  
OBJECTIVE:  
PHYSICAL EXAM: /54   Pulse 72   Temp (Src)   
97.5 (Oral)   Resp 16    
Ht 5' 7  (1.70m)   Wt 230 lb 13.2 oz (104.7kg)     
SpO2 96%   BMI 36.14  
kg/(m2).  
O2 Therapy: Room Air  
General - AANDOx3, NAD, Calm  
CV - RRR S1 S2, No M/R/G  
RESP - CTA B/L No wheezes, ronchi, rales  
ABD - soft, NT, ND +BS, Obese  
EXT -left knee dressing in place with drain  
NEURO - CN II-XII grossly intact, no focal   
deficits  
MEDICATIONS:  
Current Facility-Administered Medications  
Medication Dose Route Frequency  
- ondansetron (PF) 4 mg injection (ZOFRAN) 4 mg   
INTRAVENOUS q 6 H PRN  
- NaCl 0.9% iv flush bag 20 mL INTRAVENOUS PRN  
- sodium chloride 0.9 % (flush) 3-5 mL (BD   
POSIFLUSH) 3-5 mL INTRAVENOUS  
q 12 H  
- sodium chloride 0.9 % (flush) 2-10 mL (BD   
POSIFLUSH) 2-10 mL  
INTRAVENOUS q 12 H  
- DULoxetine 60 mg cap(s) (CYMBALTA) 60 mg ORAL   
BID  
- ranolazine ER 1,000 mg tab(s) (RANEXA) 1,000   
mg ORAL BID  
- dilTIAZem  mg cap(s) (CARDIZEM CD,   
CARTIA XT) 180 mg ORAL DAILY  
- gabapentin 200 mg cap(s) (NEURONTIN) 200 mg   
ORAL TID  
- isosorbide mononitrate ER 90 mg tab(s) (IMDUR)   
90 mg ORAL DAILY  
- clopidogrel 75 mg tab(s) (PLAVIX) 75 mg ORAL   
DAILY  
- metoprolol tartrate (short acting) 25 mg   
tab(s) (LOPRESSOR) 25 mg ORAL  
q 12 H  
- dextrose 40 % 15 g 15 g ORAL PRN  
Or  
- glucagon 1 mg injection 1 mg INTRAMUSCULAR PRN  
Or  
- dextrose 50% in water 25 mL syringe 12.5 g   
INTRAVENOUS PRN  
- empagliflozin 25 mg tab(s) (JARDIANCE) 25 mg   
ORAL DAILY  
- insulin lispro 13 Units pen (rapid acting)   
(HumaLOG KWIKPEN) 13 Units  
SUBCUTANEOUS w MEALS  
- docusate sodium 100 mg cap(s) (COLACE) 100 mg   
ORAL BID  
- aspirin, enteric coated 81 mg tab(s) 81 mg   
ORAL BID  
- sodium chloride 0.9 % (flush) 3-5 mL (BD   
POSIFLUSH) 3-5 mL INTRAVENOUS  
q 12 H  
- vancomycin dosing and monitoring per pharmacy   
OTHER As Directed  
- cefTRIAXone iv piggyback 2 g in dextrose   
(iso-osmotic) 50 mL (ROCEPHIN)  
2 g INTRAVENOUS q 24 H  
- insulin lispro pen (rapid acting) (HumaLOG   
KWIKPEN) SUBCUTANEOUS w  
MEALS AND HS  
- insulin glargine 40 Units pen (long acting)   
(LANTUS SOLOSTAR, BASAGLAR  
KWIKPEN) 40 Units SUBCUTANEOUS AT BEDTIME  
- vancomycin 1.5 g in D5W 250 mL (VANCOCIN) 1.5   
g INTRAVENOUS q 12 HR  
- miconazole 2 % 1 application topical powder   
(LOTRIMIN AF, DESENEX) 1  
application TOPICAL BID  
- acetaminophen 1,000 mg tab(s) (TYLENOL) 1,000   
mg ORAL q 6 H  
- oxyCODONE IR 5-10 mg tab(s) (ROXICODONE) 5-10   
mg ORAL q 4 H PRN  
- traMADol 50 mg tab(s) (ULTRAM) 50 mg ORAL q 6   
H  
- morphine 2 mg injection 2 mg INTRAVENOUS q 3 H   
PRN  
- melatonin 6 mg tab(s) 6 mg ORAL DAILY (8 PM)  
DATA:  
Diagnostic tests reviewed for today's visit:  
CBC:  
Recent Labs  
22  
WBC 15.83*  
RBC 3.44*  
HB 9.2*  
HCT 30.3*  
*  
MCV 88.1  
MCH 26.7  
MPV 9.5  
Coags: No results for input(s): PT, INR, APTT in   
the last 24 hours.  
BMP:  
Recent Labs  
22  
  
K 4.3  
CHLOR 101  
CO2 28  
BUN 17  
CREAT 1.11*  
GLUC 132*  
CMP:  
Recent Labs  
22  
  
K 4.3  
CHLOR 101  
CO2 28  
BUN 17  
CREAT 1.11*  
GLUC 132*  
CA 9.1  
ANION 12  
Cardiac Enzymes: No results for input(s): CK,   
MB, CKMB, TROPT in the last  
24 hours.  
Liver Function, Amylase, Lipase: No results for   
input(s): TPROT, ALB, ALT,  
AST, ALKPHOS, TBILI, AMYLASE, LIPASE, LACTATE in   
the last 24 hours.  
MG/PHOS: No results for input(s): MG, P in the   
last 24 hours.  
Renal Panel:  
Recent Labs  
22  
CREAT 1.11*  
BUN 17  
GLUC 132*  
CA 9.1  
CHLOR 101  
K 4.3  
CO2 28  
  
Heme: No results for input(s): RETICP, ABSRETIC,   
LD, KARI, FE, TIBC,  
TRANSFERSAT in the last 24 hours.  
No results found for: UALBCR  
Estimated Creatinine Clearance: 67 mL/min (A)   
(based on SCr of 1.11 mg/dL  
(H)).  
Assessment/Plan  
1. POD #2?Irrigation and Debridement Left Knee,   
Removal Unicompartmental  
Knee Arthroplasty Left Knee, Insertion   
Articulating Antibiotic Spacer Left  
KneeLeft septic knee - management per ortho and   
ID. ?Follow up on  
cultures. Cont with IV abx  
2. ROMEO - will give small fluid bolus today. Re   
check  
3. Diabetes with insulin?with hyperglycemia?-   
accu checks ac and hs. ?Carb  
controlled diet post op. Blood sugars were   
erratic yesterday. Endo  
consulted  
4. Hyperkalemia -resolved  
5. Obesity - weight loss  
6. HTN - monitor and adjust as needed  
7. CAD - Cont with home meds  
Medication and Non-Pharmacologic VTE   
Prophylaxis/Anticoagulants  
Anticoagulant AND Antiplatelet Medicati (more   
content not included)...                [facility]  
   
                                        2022 Note     HNO ID: [id number]  
Author: Divya Brasher, RN  
Service: Diabetes Education  
Author Type: Registered Nurse  
Type: Allied Health  
Filed: 3/24/2022 5:51 PM  
Note Text:  
DM Ed Note: Met with pt. DM ed initiated. Endo   
consult placed. Will await  
endo orders and proceed with DM ed accordingly. [facility]  
   
                                        2022 Note     HNO ID: [id number]  
Author: Iqra Pearce DO  
Service: Hospital Medicine  
Author Type: Physician  
Type: Plan of Care  
Filed: 3/24/2022 5:13 PM  
Note Text:  
Blood sugars are very labile. Will ask endo to   
see patient.  
  
Iqra Pearce DO  
3/24/2022  
5:13 PM                                 [facility]  
   
                                        2022 Note     HNO ID: [id number]  
Author: Americo Parisi MD  
Service: Infectious Disease  
Author Type: Resident  
Type: Progress Notes  
Filed: 3/24/2022 1:06 PM  
Note Text:  
------------------------------------------------  
--------------------------------  
Attestation signed by Alvin So MD at   
3/24/2022 3:52 PM  
I reviewed the resident's note. I agree with the   
resident's assessment and plan  
unless otherwise noted.  
Operative culture from 3/23 growing GPC.  
Plan: To continue vancomycin and ceftriaxone  
Alvin So MD  
Infectious Disease  
[facility] [place]  
Pager: [phone]   
------------------------------------------------  
--------------------------------  
PROGRESS NOTE INFECTIOUS DISEASE  
All of the below reviewed 3/24/2022  
Portions of this note were copied so as to   
provide important historical  
information essential in contributing to medical   
decision making today.  
The copied portions of this note were carefully   
reviewed and edited as  
needed today so as to accurately reflect my own   
independent evaluation on  
this patient. I have confirmed and edited as   
necessary, the PFSH and ROS  
obtained by others.  
BRIEF SUMMARY:  
61 year old female?with?diabetes mellitus, a   
history of left knee  
replacement, disseminated rt foot wound   
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  
infection and left knee seeding in Dec 2020,   
left prosthetic knee washout  
in 2021,?s/p iv abx followed by oral doxy   
and rifampin for 6 months,  
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   
to [facility]?3/21/2022?for  
further treatment of prosthetic joint infection.   
?Symptomatic with  
worsening left knee pain along with swelling,   
started in 2021  
after twisting the knee while walking. ?Also   
symptomatic with chills and  
hyperglycemia. ?Was seen in the Ortho clinic on   
3/17, a diagnostic  
arthrocentesis of left knee revealed 15 cc of   
cloudy and bloody synovial  
fluid. ?Fluid ,000, nucleated cells   
17,472, 97% neutrophils. ?CRP  
1.9. ?WBC count 12.0.  
ASSESSMENT:  
1. ?Left prosthetic knee pain and swelling,   
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  
2. ?Chills, no recorded fevers, no significant   
leukocytosis  
3. ?Uncontrolled Type 2 Diabetes Mellitus  
4. 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   
infection in 2020,  
history of lumbar spinal infection, history of   
left knee prosthetic joint  
infection?s/p joint washout in 2021  
Estimated Creatinine Clearance: 101.9 mL/min   
(based on SCr of 0.73 mg/dL).  
RECOMMENDATIONS:  
-Patient was started on ceftriaxone and   
vancomycin after OR yesterday.  
-Patient's prosthetic joint was removed   
yesterday. Will follow-up with  
fluid aspiration that was obtained in the OR   
yesterday for further  
specification of what may be the appropriate   
antibiotic for her.  
-Hemoglobin A1c obtained was elevated at 9.4.   
Recommend endocrinology  
consult.  
Subjective  
SUBJECTIVE:  
Interval Events:  
Afebrile. No acute events overnight. Today   
patient denies any fever,  
chills, nausea or vomiting.  
Active Antimicrobials (From admission, onward)  
Start Stop  
22 0900 vancomycin 1.5 g in D5W 250 mL   
(VANCOCIN) 1.5 g,  
INTRAVENOUS, EVERY 12 HOURS  
--  
22 vancomycin dosing and monitoring   
per pharmacy OTHER, AS  
DIRECTED  
--  
22 cefTRIAXone iv piggyback 2 g in   
dextrose (iso-osmotic) 50  
mL (ROCEPHIN) 2 g, INTRAVENOUS, EVERY 24 HOURS  
--  
Immunosuppressant:  
None  
Medications:  
Current Facility-Administered Medications  
Medication Dose Route Frequency  
- ondansetron (PF) 4 mg injection (ZOFRAN) 4 mg   
INTRAVENOUS q 6 H PRN  
- NaCl 0.9% iv flush bag 20 mL INTRAVENOUS PRN  
- sodium chloride 0.9 % (flush) 3-5 mL (BD   
POSIFLUSH) 3-5 mL INTRAVENOUS  
q 12 H  
- sodium chloride 0.9 % (flush) 2-10 mL (BD   
POSIFLUSH) 2-10 mL  
INTRAVENOUS q 12 H  
- DULoxetine 60 mg cap(s) (CYMBALTA) 60 mg ORAL   
BID  
- ranolazine ER 1,000 mg tab(s) (RANEXA) 1,000   
mg ORAL BID  
- dilTIAZem  mg cap(s) (CARDIZEM CD,   
CARTIA XT) 180 mg ORAL DAILY  
- gabapentin 200 mg cap(s) (NEURONTIN) 200 mg   
ORAL TID  
- isosorbide mononitrate ER 90 mg tab(s) (IMDUR)   
90 mg ORAL DAILY  
- clopidogrel 75 mg tab(s) (PLAVIX) 75 mg ORAL   
DAILY  
- metoprolol tartrate (short acting) 25 mg   
tab(s) (LOPRESSOR) 25 mg ORAL  
q 12 H  
- traMADol 50 mg tab(s) (ULTRAM) 50 mg ORAL q 6   
H PRN  
- acetaminophen 1,000 mg tab(s) (TYLENOL) 1,000   
mg ORAL q 6 H PRN  
- oxyCODONE IR 5-10 mg tab(s) (ROXICODONE) 5-10   
mg ORAL q 6 H PRN  
- dextrose 40 % 15 g 15 g ORAL PRN  
Or  
- glucagon 1 mg injection 1 mg INTRAMUSCULAR PRN  
Or  
- dextrose 50% in water 25 mL syringe 12.5 g   
INTRAVENOUS PRN  
- empagliflozin 25 mg tab(s) (JARDIANCE) 25 mg   
ORAL DAILY  
- insulin lispro 13 Units pen (rapid acting)   
(HumaLOG KWIKPEN) 13 Units  
SUBCUTANEOUS w MEALS  
- docusate sodium 100 mg cap(s) (COLACE) 100 mg   
ORAL BID  
- aspirin, enteric coated 81 mg tab(s) 81 mg   
ORAL BID  
- sodium chloride 0.9 % (flush) 3-5 mL (BD   
POSIFLUSH) 3-5 mL INTRAVENOUS  
q 12 H  
- vancomycin dosing and monitoring per pharmacy   
OTHER As Directed  
- cef (more content not included)...    [facility]  
   
                                        2022 Note     HNO ID: [id number]  
Author: Iqra Pearce DO  
Service: Hospital Medicine  
Author Type: Physician  
Type: Progress Notes  
Filed: 3/24/2022 6:33 AM  
Note Text:  
DEPARTMENT OF HOSPITAL MEDICINE  
PROGRESS NOTE  
SERVICE DATE: 3/24/2022  
SERVICE TIME: 6:30 AM  
Hospital Medicine/Primary Attending: Iqra Pearce DO  
NIGHT AND WEEKEND COVERAGE:  
After 7pm please page [phone]  
CHIEF COMPLAINT: I'm fine  
SUBJECTIVE: Pt seen and examined. States that   
she feels ok today. No  
chest pain or SOB. No nausea or vomiting  
OBJECTIVE:  
PHYSICAL EXAM: /55   Pulse 68   Temp (Src)   
98.1 (Oral)   Resp 16    
Ht 5' 7  (1.70m)   Wt 230 lb 13.2 oz (104.7kg)     
SpO2 95%   BMI 36.14  
kg/(m2).  
O2 Therapy: Room Air, Liters: 2  
General - AANDOx3, NAD, Calm  
CV - RRR S1 S2, No M/R/G  
RESP - CTA B/L No wheezes, ronchi, rales  
ABD - soft, NT, ND +BS, Obese  
EXT - Did not take down dressing, does have   
drain  
NEURO - CN II-XII grossly intact, no focal   
deficits  
MEDICATIONS:  
Current Facility-Administered Medications  
Medication Dose Route Frequency  
- ondansetron (PF) 4 mg injection (ZOFRAN) 4 mg   
INTRAVENOUS q 6 H PRN  
- NaCl 0.9% iv flush bag 20 mL INTRAVENOUS PRN  
- sodium chloride 0.9 % (flush) 3-5 mL (BD   
POSIFLUSH) 3-5 mL INTRAVENOUS  
q 12 H  
- sodium chloride 0.9 % (flush) 2-10 mL (BD   
POSIFLUSH) 2-10 mL  
INTRAVENOUS q 12 H  
- DULoxetine 60 mg cap(s) (CYMBALTA) 60 mg ORAL   
BID  
- ranolazine ER 1,000 mg tab(s) (RANEXA) 1,000   
mg ORAL BID  
- dilTIAZem  mg cap(s) (CARDIZEM CD,   
CARTIA XT) 180 mg ORAL DAILY  
- gabapentin 200 mg cap(s) (NEURONTIN) 200 mg   
ORAL TID  
- isosorbide mononitrate ER 90 mg tab(s) (IMDUR)   
90 mg ORAL DAILY  
- clopidogrel 75 mg tab(s) (PLAVIX) 75 mg ORAL   
DAILY  
- metoprolol tartrate (short acting) 25 mg   
tab(s) (LOPRESSOR) 25 mg ORAL  
q 12 H  
- traMADol 50 mg tab(s) (ULTRAM) 50 mg ORAL q 6   
H PRN  
- acetaminophen 1,000 mg tab(s) (TYLENOL) 1,000   
mg ORAL q 6 H PRN  
- oxyCODONE IR 5-10 mg tab(s) (ROXICODONE) 5-10   
mg ORAL q 6 H PRN  
- dextrose 40 % 15 g 15 g ORAL PRN  
Or  
- glucagon 1 mg injection 1 mg INTRAMUSCULAR PRN  
Or  
- dextrose 50% in water 25 mL syringe 12.5 g   
INTRAVENOUS PRN  
- empagliflozin 25 mg tab(s) (JARDIANCE) 25 mg   
ORAL DAILY  
- insulin lispro 13 Units pen (rapid acting)   
(HumaLOG KWIKPEN) 13 Units  
SUBCUTANEOUS w MEALS  
- docusate sodium 100 mg cap(s) (COLACE) 100 mg   
ORAL BID  
- aspirin, enteric coated 81 mg tab(s) 81 mg   
ORAL BID  
- sodium chloride 0.9 % (flush) 3-5 mL (BD   
POSIFLUSH) 3-5 mL INTRAVENOUS  
q 12 H  
- vancomycin dosing and monitoring per pharmacy   
OTHER As Directed  
- cefTRIAXone iv piggyback 2 g in dextrose   
(iso-osmotic) 50 mL (ROCEPHIN)  
2 g INTRAVENOUS q 24 H  
- vancomycin 1.5 g in D5W 250 mL (VANCOCIN) 1.5   
g INTRAVENOUS q 24 HR  
- insulin lispro pen (rapid acting) (HumaLOG   
KWIKPEN) SUBCUTANEOUS w  
MEALS AND HS  
- insulin glargine 40 Units pen (long acting)   
(LANTUS SOLOSTAR, BASAGLAR  
KWIKPEN) 40 Units SUBCUTANEOUS AT BEDTIME  
- sodium polystyrene sulfonate (with sorbitol)   
15 g liquid (SPS) 15 g  
ORAL ONCE  
DATA:  
Diagnostic tests reviewed for today's visit:  
CBC:  
Recent Labs  
22  
WBC 14.24*  
RBC 3.45*  
HB 9.2*  
HCT 29.8*  
  
MCV 86.4  
MCH 26.7  
MPV 9.5  
Coags: No results for input(s): PT, INR, APTT in   
the last 24 hours.  
BMP:  
Recent Labs  
22  
*  
K 5.3*  
CHLOR 99  
CO2 26  
BUN 15  
CREAT 0.73  
GLUC 330*  
CMP:  
Recent Labs  
22  
*  
K 5.3*  
CHLOR 99  
CO2 26  
BUN 15  
CREAT 0.73  
GLUC 330*  
CA 8.6  
ANION 10  
Cardiac Enzymes: No results for input(s): CK,   
MB, CKMB, TROPT in the last  
24 hours.  
Liver Function, Amylase, Lipase: No results for   
input(s): TPROT, ALB, ALT,  
AST, ALKPHOS, TBILI, AMYLASE, LIPASE, LACTATE in   
the last 24 hours.  
MG/PHOS: No results for input(s): MG, P in the   
last 24 hours.  
Renal Panel:  
Recent Labs  
22  
CREAT 0.73  
BUN 15  
GLUC 330*  
CA 8.6  
CHLOR 99  
K 5.3*  
CO2 26  
*  
Heme: No results for input(s): RETICP, ABSRETIC,   
LD, KARI, FE, TIBC,  
TRANSFERSAT in the last 24 hours.  
No results found for: UALBCR  
Estimated Creatinine Clearance: 101.9 mL/min   
(based on SCr of 0.73 mg/dL).  
Assessment/Plan  
1. POD #1 Irrigation and Debridement Left Knee,   
Removal Unicompartmental  
Knee Arthroplasty Left Knee, Insertion   
Articulating Antibiotic Spacer Left  
KneeLeft septic knee - management per ortho and   
ID. Follow up on  
cultures. Cont with IV abx  
2. Diabetes with insulin with hyperglycemia -   
accu checks ac and hs. Carb  
controlled diet post op. Will increase lantus to   
home dose for tonight.  
SSI. Change diet to carb controlled. DC LR  
3. Hyperkalemia - will treat. Re check in AM  
4. Obesity - weight loss  
5. HTN - monitor and adjust as needed  
6. CAD - Cont with home meds  
?  
Medication and Non-Pharmacologic VTE   
Prophylaxis/Anticoagulants  
Anticoagulant AND Antiplatelet Medications (From   
admission, onward)  
Start Dose Route Frequency Last Action Ordered   
Stop  
22 0900 (more content not included)... [facility]  
   
                                        2022 Note     HNO ID: [id number]  
Author: Mahad Stinson MD  
Service: Orthopaedic Surgery  
Author Type: Resident  
Type: Progress Notes  
Filed: 3/24/2022 6:10 AM  
Note Text:  
------------------------------------------------  
--------------------------------  
Attestation signed by Basil Mitchell MD at   
3/24/2022 8:18 AM  
Agree. Continue ASA and Plavix. Monitor drain   
output, pull when less than 30cc  
per shift. Monitor cultures-IV antibiotics per   
ID. Will need PICC line. PT/OT  
eval-she is WBAT.  
------------------------------------------------  
--------------------------------  
Orthopaedic Surgery Inpatient Progress Note  
Assessment  
Jayden Soto is a 61 year old female who is POD   
#1 Irrigation and  
Debridement Left Knee, Removal Unicompartmental   
Knee Arthroplasty Left  
Knee, Insertion Articulating Antibiotic Spacer   
Left Knee  
Plan  
Pain control  
WBAT LLE  
PT/OT  
Medical management per IM  
Antibiotics per ID  
Currently on IV Vancomycin  
F/U intra-op culture results  
Dressing: Aquacel + Drain  
Monitor drain output: pull when less than 30cc   
per shift  
DVT ppx: Continue home plavix POD1, start   
Aspirin 81mg BID x 21 days POD1  
F/U XR L Knee  
Discharge planning: pending PT recs/ when final   
abx recs obtained  
Subjective  
No acute events overnight. Pain well controlled.   
No fevers, chills, chest  
pain, or shortness of breath. No new complaints.  
Physical Examination  
Vitals  
/55   Pulse 68   Temp 36.7 ?C (98.1 ?F)   
(Oral)   Resp 16   Ht  
170.2 cm (5' 7 )   Wt 104.7 kg (230 lb 13.2 oz)   
  SpO2 95%   BMI 36.15  
kg/m?  
General  
Alert and oriented. No acute distress.  
Cooperative with interview.  
Left Lower Extremity  
Alignment normal. No gross deformities.  
Dressing c/d/i. Drain in place.  
No swelling, ecchymosis, or erythema.  
SILT Breaux/Sa/DP/SP/T.  
Motor intact EHL/DF/PF.  
DP/PT pulses palpable; BCR all digits.  
Labs  
Recent Labs  
22  
0413 22  
0104 22  
0409 22  
2309  
* 135* < > --  
K 5.3* 4.2 < > --  
CHLOR 99 100 < > --  
CO2 26 27 < > --  
BUN 15 15 < > --  
CREAT 0.73 0.79 < > --  
GLUC 330* 285* < > --  
ANION 10 8* < > --  
CA 8.6 8.8 < > --  
WBC 14.24* 10.63 -- 12.01*  
HB 9.2* 9.7* -- 11.3*  
HCT 29.8* 30.6* -- 36.3  
 364 -- 478*  
INR -- -- -- 1.0  
< > = values in this interval not displayed.  
Imaging  
No new  
Mahad Stinson MD  
2022 5:59 AM                  [facility]  
   
                                        2022 Note     HNO ID: [id number]  
Author: [email or web address] [email or web address]  
Service: Anesthesiology  
Author Type: Nurse Anesthetist  
Type: Anesthesia Procedure Notes  
Filed: 3/23/2022 3:30 PM  
Note Text:  
ANESTHESIOLOGY PROCEDURE NOTE  
Airway  
General Information  
Procedure Start Time/Medication Administration:   
3/23/2022 3:28 PM  
Patient location during procedure: OR  
Timeout Performed Pre-procedure: timeout   
performed  
Consent Obtained: Yes  
Patient identity confirmed: arm band and patient  
Staffing  
CRNA: JASON CoburnCRNA  
Performed by: GRICELDA  
Indications and Patient Condition  
Preoxygenated: yes  
Patient position: sniffing  
Indications for airway management: anesthesia  
anesthesia circuit  
Method: asleep  
Final Airway Details  
Final airway type: endotracheal airway  
Final Endotracheal Airway: ETT  
Cuffed: yes  
Successful intubation technique: direct   
laryngoscopy  
Devices used: Agee  
Endotracheal tube insertion site: oral  
Blade: Collette  
Blade size: #3  
ETT size (mm): 7.5  
Measured from: lips  
Measurement (cm): 21  
Placement verified by: chest auscultation and   
capnometry  
Cormack-Lehane Classification: grade I - full   
view of glottis  
Number of attempts at approach: 1  
SIGNATURE: JASON CoburnCRNA PATIENT NAME:   
Jayden Soto  
DATE: 2022 MRN: [id number]  
TIME: 3:30 PM CSN: [id number]            [facility]  
   
                                        2022 Note     HNO ID: [id number]  
Author: Iqra Pearce DO  
Service: Hospital Medicine  
Author Type: Physician  
Type: Plan of Care  
Filed: 3/23/2022 12:49 PM  
Note Text:  
Will keep lower dose of lantus tonight as   
surgery is delayed until this  
afternoon.  
Iqra Pearce DO  
3/23/2022  
12:49 PM                                [facility]  
   
                                        2022 Note     HNO ID: [id number]  
Author: Americo Parisi MD  
Service: Infectious Disease  
Author Type: Resident  
Type: Progress Notes  
Filed: 3/23/2022 12:19 PM  
Note Text:  
------------------------------------------------  
--------------------------------  
Attestation signed by Alvin So MD at   
3/23/2022 7:51 PM  
I reviewed the resident's note. I agree with the   
resident's assessment and plan  
unless otherwise noted.  
Alvin So MD  
Infectious Disease  
[facility] [place]  
Pager: [phone]   
------------------------------------------------  
--------------------------------  
PROGRESS NOTE INFECTIOUS DISEASE  
All of the below reviewed 3/23/2022  
Portions of this note were copied so as to   
provide important historical  
information essential in contributing to medical   
decision making today.  
The copied portions of this note were carefully   
reviewed and edited as  
needed today so as to accurately reflect my own   
independent evaluation on  
this patient. I have confirmed and edited as   
necessary, the PFSH and ROS  
obtained by others.  
BRIEF SUMMARY:  
61 year old female?with?diabetes mellitus, a   
history of left knee  
replacement, disseminated rt foot wound   
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  
infection and left knee seeding in Dec 2020,   
left prosthetic knee washout  
in 2021,?s/p iv abx followed by oral doxy   
and rifampin for 6 months,  
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   
to [facility]?3/21/2022?for  
further treatment of prosthetic joint infection.   
?Symptomatic with  
worsening left knee pain along with swelling,   
started in 2021  
after twisting the knee while walking. ?Also   
symptomatic with chills and  
hyperglycemia. ?Was seen in the Ortho clinic on   
3/17, a diagnostic  
arthrocentesis of left knee revealed 15 cc of   
cloudy and bloody synovial  
fluid. ?Fluid ,000, nucleated cells   
17,472, 97% neutrophils. ?CRP  
1.9. ?WBC count 12.0.  
ASSESSMENT:  
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  
2. Chills, no recorded fevers, no significant   
leukocytosis  
3. Uncontrolled Type 2 Diabetes Mellitus  
4. 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   
infection in 2020,  
history of lumbar spinal infection, history of   
left knee prosthetic joint  
infection?s/p joint washout in 2021  
Estimated Creatinine Clearance: 94.2 mL/min   
(based on SCr of 0.79 mg/dL).  
RECOMMENDATIONS:  
-Will hold off starting antibiotics until   
cultures obtained in the OR.  
-Will look at medical records from [facility].  
-Hemoglobin A1c obtained was elevated at 9.4.   
Recommend endocrinology  
consult.  
Subjective  
SUBJECTIVE:  
Interval Events:  
No acute events overnight. Patient is   
complaining of chills but denies any  
fevers, nausea or vomiting. Patient is scheduled   
to have left prosthetic  
of joint being removed today and will have   
aspiration of the knee joint  
today.  
Active Antimicrobials (From admission, onward)  
None  
Immunosuppressant:  
None  
Medications:  
Current Facility-Administered Medications  
Medication Dose Route Frequency  
- lactated ringers iv infusion 125 mL/hr   
INTRAVENOUS CONTINUOUS  
- ondansetron (PF) 4 mg injection (ZOFRAN) 4 mg   
INTRAVENOUS q 6 H PRN  
- NaCl 0.9% iv flush bag 20 mL INTRAVENOUS PRN  
- sodium chloride 0.9 % (flush) 3-5 mL (BD   
POSIFLUSH) 3-5 mL INTRAVENOUS  
q 12 H  
- sodium chloride 0.9 % (flush) 2-10 mL (BD   
POSIFLUSH) 2-10 mL  
INTRAVENOUS q 12 H  
- DULoxetine 60 mg cap(s) (CYMBALTA) 60 mg ORAL   
BID  
- ranolazine ER 1,000 mg tab(s) (RANEXA) 1,000   
mg ORAL BID  
- dilTIAZem  mg cap(s) (CARDIZEM CD,   
CARTIA XT) 180 mg ORAL DAILY  
- gabapentin 200 mg cap(s) (NEURONTIN) 200 mg   
ORAL TID  
- isosorbide mononitrate ER 90 mg tab(s) (IMDUR)   
90 mg ORAL DAILY  
- clopidogrel 75 mg tab(s) (PLAVIX) 75 mg ORAL   
DAILY  
- metoprolol tartrate (short acting) 25 mg   
tab(s) (LOPRESSOR) 25 mg ORAL  
q 12 H  
- traMADol 50 mg tab(s) (ULTRAM) 50 mg ORAL q 6   
H PRN  
- acetaminophen 1,000 mg tab(s) (TYLENOL) 1,000   
mg ORAL q 6 H PRN  
- oxyCODONE IR 5-10 mg tab(s) (ROXICODONE) 5-10   
mg ORAL q 6 H PRN  
- dextrose 40 % 15 g 15 g ORAL PRN  
Or  
- glucagon 1 mg injection 1 mg INTRAMUSCULAR PRN  
Or  
- dextrose 50% in water 25 mL syringe 12.5 g   
INTRAVENOUS PRN  
- [START ON 3/24/2022] empagliflozin 25 mg   
tab(s) (JARDIANCE) 25 mg ORAL  
DAILY  
- insulin lispro 13 Units pen (rapid acting)   
(HumaLOG KWIKPEN) 13 Units  
SUBCUTANEOUS w MEALS  
- insulin lispro pen (rapid acting) (HumaLOG   
KWIKPEN) SUBCUTANEOUS q 6 H  
- insulin glargine 40 Units pen (long acting)   
(LANTUS SOLOSTAR, BASAGLAR  
KWIKPEN) 40 Units SUBCUTANEOUS AT BEDTIME  
Objective  
OBJECTIVE:  
Physical Exam:  
Exam: Left knee is warm to touch. Left knee   
slightly swollen, incision  
wound well-healed, no erythema, painful on   
flexion.  
/66   Pulse 67   Temp (Src) 97.9 (Oral)     
Resp 18   Ht 5' 7  (1.70m)  
  Wt 230 lb 13.2 oz (104.7kg)   SpO2 92%   BMI   
36.14 kg/(m2).  
O2 Therapy: Room Air  
Lines, Drains, and Airways  
Line  
Peripheral 22 2317 Short Left Hand 20   
Gauge 1 d (more content not included)... [facility]  
   
                                        2022 Note     HNO ID: [id number]  
Author: Iqra Pearce DO  
Service: Hospital Medicine  
Author Type: Physician  
Type: Progress Notes  
Filed: 3/23/2022 6:36 AM  
Note Text:  
DEPARTMENT OF HOSPITAL MEDICINE  
PROGRESS NOTE  
SERVICE DATE: 3/23/2022  
SERVICE TIME: 6:30 AM  
Hospital Medicine/Primary Attending: Iqra Pearce DO  
NIGHT AND WEEKEND COVERAGE:  
After 7pm please page [phone]  
CHIEF COMPLAINT: Knee pain  
SUBJECTIVE: Pt seen and examined. States that   
her knee is bothering  
her. Pt denies chest pain, shortness of breath,   
nausea, vomiting, or  
diarrhea.  
OBJECTIVE:  
PHYSICAL EXAM: /52   Pulse 73   Temp (Src)   
97.9 (Oral)   Resp 16    
Ht 5' 7  (1.70m)   Wt 230 lb 13.2 oz (104.7kg)     
SpO2 98%   BMI 36.14  
kg/(m2).  
O2 Therapy: Room Air  
General - AANDOx3, NAD, Calm  
CV - RRR S1 S2, No M/R/G  
RESP - CTA B/L No wheezes, ronchi, rales  
ABD - soft, NT, ND +BS  
EXT - left knee is swollen  
NEURO - CN II-XII grossly intact, no focal   
deficits  
MEDICATIONS:  
Current Facility-Administered Medications  
Medication Dose Route Frequency  
- lactated ringers iv infusion 125 mL/hr   
INTRAVENOUS CONTINUOUS  
- ondansetron (PF) 4 mg injection (ZOFRAN) 4 mg   
INTRAVENOUS q 6 H PRN  
- NaCl 0.9% iv flush bag 20 mL INTRAVENOUS PRN  
- sodium chloride 0.9 % (flush) 3-5 mL (BD   
POSIFLUSH) 3-5 mL INTRAVENOUS  
q 12 H  
- sodium chloride 0.9 % (flush) 2-10 mL (BD   
POSIFLUSH) 2-10 mL  
INTRAVENOUS q 12 H  
- DULoxetine 60 mg cap(s) (CYMBALTA) 60 mg ORAL   
BID  
- ranolazine ER 1,000 mg tab(s) (RANEXA) 1,000   
mg ORAL BID  
- dilTIAZem  mg cap(s) (CARDIZEM CD,   
CARTIA XT) 180 mg ORAL DAILY  
- gabapentin 200 mg cap(s) (NEURONTIN) 200 mg   
ORAL TID  
- isosorbide mononitrate ER 90 mg tab(s) (IMDUR)   
90 mg ORAL DAILY  
- clopidogrel 75 mg tab(s) (PLAVIX) 75 mg ORAL   
DAILY  
- metoprolol tartrate (short acting) 25 mg   
tab(s) (LOPRESSOR) 25 mg ORAL  
q 12 H  
- traMADol 50 mg tab(s) (ULTRAM) 50 mg ORAL q 6   
H PRN  
- acetaminophen 1,000 mg tab(s) (TYLENOL) 1,000   
mg ORAL q 6 H PRN  
- oxyCODONE IR 5-10 mg tab(s) (ROXICODONE) 5-10   
mg ORAL q 6 H PRN  
- dextrose 40 % 15 g 15 g ORAL PRN  
Or  
- glucagon 1 mg injection 1 mg INTRAMUSCULAR PRN  
Or  
- dextrose 50% in water 25 mL syringe 12.5 g   
INTRAVENOUS PRN  
- insulin lispro pen (rapid acting) (HumaLOG   
KWIKPEN) SUBCUTANEOUS w  
MEALS  
- insulin glargine 25 Units pen (long acting)   
(LANTUS SOLOSTAR, BASAGLAR  
KWIKPEN) 25 Units SUBCUTANEOUS AT BEDTIME  
- [START ON 3/24/2022] empagliflozin 25 mg   
tab(s) (JARDIANCE) 25 mg ORAL  
DAILY  
DATA:  
Diagnostic tests reviewed for today's visit:  
CBC:  
Recent Labs  
22  
0104  
WBC 10.63  
RBC 3.48*  
HB 9.7*  
HCT 30.6*  
  
MCV 87.9  
MCH 27.9  
MPV 9.3  
Coags: No results for input(s): PT, INR, APTT in   
the last 24 hours.  
BMP:  
Recent Labs  
22  
0104  
*  
K 4.2  
CHLOR 100  
CO2 27  
BUN 15  
CREAT 0.79  
GLUC 285*  
CMP:  
Recent Labs  
22  
0104  
*  
K 4.2  
CHLOR 100  
CO2 27  
BUN 15  
CREAT 0.79  
GLUC 285*  
CA 8.8  
ANION 8*  
Cardiac Enzymes: No results for input(s): CK,   
MB, CKMB, TROPT in the last  
24 hours.  
Liver Function, Amylase, Lipase: No results for   
input(s): TPROT, ALB, ALT,  
AST, ALKPHOS, TBILI, AMYLASE, LIPASE, LACTATE in   
the last 24 hours.  
MG/PHOS: No results for input(s): MG, P in the   
last 24 hours.  
Renal Panel:  
Recent Labs  
22  
0104  
CREAT 0.79  
BUN 15  
GLUC 285*  
CA 8.8  
CHLOR 100  
K 4.2  
CO2 27  
*  
Heme: No results for input(s): RETICP, ABSRETIC,   
LD, KARI, FE, TIBC,  
TRANSFERSAT in the last 24 hours.  
No results found for: UALBCR  
Estimated Creatinine Clearance: 94.2 mL/min   
(based on SCr of 0.79 mg/dL).  
Assessment/Plan  
1. Left septic knee - management per ortho and   
ID. Plans for OR today.  
Cultures intraoperatively  
2. Diabetes with insulin with hyperglycemia -   
accu checks ac and hs. Carb  
controlled diet post op. Will increase lantus to   
home dose for tonight.  
SSI. Will add meal time insulin when she is po  
3. Obesity - weight loss  
4. HTN - monitor and adjust as needed  
5. CAD - Cont with home meds when ok with ortho   
(asa and plavix)  
Medication and Non-Pharmacologic VTE   
Prophylaxis/Anticoagulants  
Anticoagulant AND Antiplatelet Medications (From   
admission, onward)  
Start Dose Route Frequency Last Action Ordered   
Stop  
22 0900 clopidogrel 75 mg tab(s) (PLAVIX)  
75 mg ORAL DAILY Given,  0853 22   
--  
22 vte pharmacologic prophylaxis   
contraindicated (fl,oh)  
22 pneumatic compression stockings   
(fl,oh)  
Lines, Drains, and Airways  
Line  
Peripheral 22 2885 Short Left Hand 20   
Gauge 1 day  
VTE Prophylaxis: As per primary team  
Disposition: To be determined  
Functional Status Prior to Admit:  
Medical Necessity for Continued Hospitalization   
MMP  
Plan of care discussed with: Patient  
SIGNATURE: Iqra Pearce DO PATIENT NAME:   
Jayden Soto  
DATE: 2022 MRN: [id number]  
TIME: 6:30 AM PAGER/CONTACT #: Team color pager  
Disclaimer: Portions of this note may have (more   
content not included)...                [facility]  
   
                                        2022 Note     HNO ID: [id number]  
Author: Mahad Stinson MD  
Service: Orthopaedic Surgery  
Author Type: Resident  
Type: Progress Notes  
Filed: 3/23/2022 6:21 AM  
Note Text:  
------------------------------------------------  
--------------------------------  
Attestation signed by Basil Mitchell MD at   
3/23/2022 12:00 PM  
OR today  
------------------------------------------------  
--------------------------------  
Orthopaedic Surgery Inpatient Progress Note  
Assessment  
Jayden Soto is a 61 year old female here with   
left septic knee after  
total knee replacement and left periprosthetic   
joint infection with  
previous washout  
Plan  
- Admitted to Orthopaedic Surgery.  
- Pain control.  
- DVT PPx: SCDs.  
- Antibiotic PPx: Hold antibiotics  
- Weight-bearing Status: NWB LLE.  
- Medicine consult, appreciate recommendations.  
- Diet: regular. NPO pending surgery  
- OR today for removal prosthesis and   
implantation of antibiotic cement  
spacer.  
Subjective  
No acute events overnight. Pain well controlled.   
No fevers, chills, chest  
pain, or shortness of breath. No new complaints.  
Physical Examination  
Vitals  
/52   Pulse 73   Temp 36.6 ?C (97.9 ?F)   
(Oral)   Resp 16   Ht  
170.2 cm (5' 7 )   Wt 104.7 kg (230 lb 13.2 oz)   
  SpO2 98%   BMI 36.15  
kg/m?  
General  
Alert and oriented. No acute distress.  
Cooperative with interview.  
Left Lower Extremity  
Alignment normal. No gross deformities.  
There is mild swelling to the left knee.  
No open wounds or lacerations.  
Patient has mild pain with gross ranging of the   
left knee.  
SILT Breaux/Sa/DP/SP/T.  
Motor intact EHL/DF/PF.  
DP/PT pulses palpable; BCR all digits.  
Labs  
Recent Labs  
22  
0104 22  
0409 22  
2309  
* 138 --  
K 4.2 4.0 --  
CHLOR 100 101 --  
CO2 27 25 --  
BUN 15 14 --  
CREAT 0.79 0.67 --  
GLUC 285* 201* --  
ANION 8* 12 --  
CA 8.8 8.7 --  
WBC 10.63 -- 12.01*  
HB 9.7* -- 11.3*  
HCT 30.6* -- 36.3  
 -- 478*  
INR -- -- 1.0  
Imaging  
No new  
Mahad Stinson MD  
2022 6:12 AM                  [facility]  
   
                                        2022 Note     HNO ID: [id number]  
Author: Mahad Stinson MD  
Service: Orthopaedic Surgery  
Author Type: Resident  
Type: Progress Notes  
Filed: 3/22/2022 6:23 AM  
Note Text:  
Orthopaedic Surgery Inpatient Progress Note  
Assessment  
Jayden Soto is a 61 year old female here with   
left septic knee after  
total knee replacement and left periprosthetic   
joint infection with  
previous washout  
Plan  
- Admit to Orthopaedic Surgery.  
- Pain control.  
- DVT PPx: SCDs.  
- Antibiotic PPx: Hold antibiotics  
- Weight-bearing Status: NWB LLE.  
- Medicine consult, appreciate recommendations.  
- Pre-op labs: CBC, BMP, PT/INR, Type AND Screen  
- Diet: regular. NPO/IVF at midnight on 3/23.  
- OR 3/23 for removal prosthesis and   
implantation of antibiotic cement  
spacer.  
Subjective  
No acute events overnight. Pain well controlled.   
No fevers, chills, chest  
pain, or shortness of breath. No new complaints.  
Physical Examination  
Vitals  
/65   Pulse 72   Temp 36.8 ?C (98.2 ?F)   
(Oral)   Resp 18   Ht  
170.2 cm (5' 7 )   Wt 104.7 kg (230 lb 13.2 oz)   
  SpO2 97%   BMI 36.15  
kg/m?  
General  
Alert and oriented. No acute distress.  
Cooperative with interview.  
Left Lower Extremity  
Alignment normal. No gross deformities.  
There is mild swelling to the left knee.  
No open wounds or lacerations.  
Patient has mild pain with gross ranging of the   
left knee.  
SILT Breaux/Sa/DP/SP/T.  
Motor intact EHL/DF/PF.  
DP/PT pulses palpable; BCR all digits.  
Labs  
Recent Labs  
22  
0409 22  
2309  
 --  
K 4.0 --  
CHLOR 101 --  
CO2 25 --  
BUN 14 --  
CREAT 0.67 --  
GLUC 201* --  
ANION 12 --  
CA 8.7 --  
WBC -- 12.01*  
HB -- 11.3*  
HCT -- 36.3  
PLT -- 478*  
INR -- 1.0  
Imaging  
No new  
Mahad Stinson MD  
2022 6:15 AM                  [facility]  
   
                                        2022 Note     HNO ID: [id number]  
Author: Basil Mitchell MD  
Service: ?  
Author Type: Physician  
Type: Progress Notes  
Filed: 3/17/2022 10:12 AM  
Note Text:  
Follow-up Patient Visit  
Jayden Soto is a 61 year old female who   
presents to follow up for  
Infection of total knee replacement, subsequent   
encounter (primary  
encounter diagnosis)  
Status post left partial knee replacement  
Pain in prosthetic joint, initial encounter   
([facility])  
Worsening pain and swelling in the left knee   
joint. She missed a dose of  
her Doxy last week. Reports low grade fever not   
taken but feels warm,  
blood sugars have been high.  
Current or previous treatment regimens: NSAIDS  
Medications:  
Current Outpatient Medications  
Medication Sig  
- DULoxetine (CYMBALTA) 60 mg capsule Take 60 mg   
by mouth once daily.  
- ranolazine SR (RANEXA) 1,000 mg tab ER 12 hr   
Take by mouth.  
- dilTIAZem CD (CARDIZEM CD) 180 mg 24 hr   
capsule Take 180 mg by mouth  
once daily.  
- empagliflozin (JARDIANCE) 25 mg tablet Take 25   
mg by mouth daily with  
breakfast.  
- doxycycline monohydrate (MONODOX) 100 mg   
capsule Take 100 mg by mouth  
twice daily.  
- GABAPENTIN ORAL Take by mouth.  
- METOPROLOL TARTRATE ORAL Take by mouth.  
- isosorbide mononitrate ER (IMDUR) 30 mg 24 hr   
tablet Take 30 mg by mouth  
once daily.  
- insulin glargine,hum.rec.anlog (LANTUS   
SUBCUTANEOUS) Inject  
subcutaneously.  
- clopidogrel (PLAVIX) 75 mg tablet Take 75 mg   
by mouth once daily.  
No current facility-administered medications for   
this visit.  
Allergies:  
ALLERGIES  
Allergen Reactions  
- Morphine GI Upset  
- Statins-Hmg-Coa Red* Unknown  
Physical Examination:  
Resp 16   Ht 5' 7  (1.70m)   Wt 210 lb (95.3kg)   
  BMI 32.88 kg/(m2).  
Ortho Exam  
Seen today in wheelchair  
Moderate swelling and effusion  
Painful ROM  
Images: 3 views left knee taken today and   
reviewed by myself shows No  
obvious loosening of the prosthesis, interval   
worsening of joint space  
narrowing, possible Air in the suprapatellar   
space and effusion as well.  
Large Joint Arthro/Inj: L knee joint  
Informed Consent  
Consent Obtained: Verbal  
Universal Protocol  
A moment to CARE was completed.  
SIGN IN  
Personnel directly involved with the procedure   
wore the appropriate PPE.  
Patient/Surrogate Stated/Verified: Patient name,   
Date of birth, Relevant  
allergies and Intended procedure  
TIME OUT  
3/17/2022 10:11 AM  
The procedure site was prepped in the usual   
sterile fashion.  
Site: L knee joint  
Aspirate: 15 mL  
cloudy and bloody  
Outcome: Tolerated well, no immediate   
complications  
Post-injection instructions were reviewed with   
the patient and the patient  
voiced understanding of these instructions.  
SIGN OUT  
All specimen containers correctly labeled.  
Oxford Score: see report  
Assessment and Plan:  
1. Infection of total knee replacement,   
subsequent encounter - ICD9:  
V58.89, ICD10: T84.59XD, Z96.659 (primary   
diagnosis)  
2. Status post left partial knee replacement -   
ICD9: V43.65, ICD10:  
Z96.652  
3. Pain in prosthetic joint, initial encounter   
([facility]) - ICD9: 996.77,  
338.18, ICD10: T84.84XA  
Suspect possible recurrent infection in the   
joint. Her serology labs were  
normal 2 months ago. I aspirated the knee today   
and sent for analysis.  
Will call the patient and daughter with results.   
Briefly discussed  
possible explant and spacer in the future. Any   
signs symptoms of sepsis  
they should go to the ED.  
No follow-ups on file.  
Basil Mitchell MD                         [facility]  
   
                                        2022 Note     HNO ID: [id number]  
Author: Basil Mitchell MD  
Service: ?  
Author Type: Physician  
Type: Progress Notes  
Filed: 2022 3:07 PM  
Note Text:  
Follow-up Patient Visit  
Jayden Soto is a 61 year old female who   
presents to follow up for Pain  
in prosthetic joint, initial encounter ([facility])   
(primary encounter  
diagnosis)  
Status post left partial knee replacement  
Ongoing pain in the left knee with swelling that   
worsens throughout the  
day. She is on chronic doxycycline for previous   
infection in the knee.  
CRP and ESR normal.  
Current or previous treatment regimens: NSAIDS  
Medications:  
Current Outpatient Medications  
Medication Sig  
- DULoxetine (CYMBALTA) 60 mg capsule Take 60 mg   
by mouth once daily.  
- ranolazine SR (RANEXA) 1,000 mg tab ER 12 hr   
Take by mouth.  
- dilTIAZem CD (CARDIZEM CD) 180 mg 24 hr   
capsule Take 180 mg by mouth  
once daily.  
- empagliflozin (JARDIANCE) 25 mg tablet Take 25   
mg by mouth daily with  
breakfast.  
- doxycycline monohydrate (MONODOX) 100 mg   
capsule Take 100 mg by mouth  
twice daily.  
- GABAPENTIN ORAL Take by mouth.  
- METOPROLOL TARTRATE ORAL Take by mouth.  
- isosorbide mononitrate ER (IMDUR) 30 mg 24 hr   
tablet Take 30 mg by mouth  
once daily.  
- insulin glargine,hum.rec.anlog (LANTUS   
SUBCUTANEOUS) Inject  
subcutaneously.  
- clopidogrel (PLAVIX) 75 mg tablet Take 75 mg   
by mouth once daily.  
No current facility-administered medications for   
this visit.  
Allergies:  
ALLERGIES  
Allergen Reactions  
- Morphine GI Upset  
- Statins-Hmg-Coa Red* Unknown  
Physical Examination:  
Resp 16   Ht 5' 7  (1.70m)   Wt 210 lb (95.3kg)   
  BMI 32.88 kg/(m2).  
Ortho Exam  
Ambulates with a walker  
Brace in place left lower extremity  
Incision left knee well healed  
Swelling noted to the left knee joint  
ROM 5-110  
Images: no new images today  
Procedures  
Oxford Score: see report  
Assessment and Plan:  
1. Pain in prosthetic joint, initial encounter   
([facility]) - ICD9: 996.77,  
338.18, ICD10: T84.84XA (primary diagnosis)  
2. Status post left partial knee replacement -   
ICD9: V43.65, ICD10:  
Z96.652  
Discussed with the patient and her son today the   
possible plans moving  
forward if going to be surgical. Would include   
conversion to TKA versus  
placement of an articulating spacer. I will come   
up with a surgical plan  
and contact them. Ideally she would be off her   
Doxy so that we could  
obtain cultures intraoperatively.  
No follow-ups on file.  
Basil Mitchell MD                         [facility]  
   
                                        2022 Note     HNO ID: [id number]  
Author: Radha Novak MA  
Service: ?  
Author Type: Medical Assistant  
Type: Progress Notes  
Filed: 2022 3:07 PM  
Note Text:  
REVIEW OF SYSTEMS:  
GENERAL: Well developed, well nourished. No   
acute distress  
PAIN: Pain left knee  
CARDIOVASCULAR: HTN  
MSK: Positive for joint swelling  
SKIN: Negative for lesions, rash, itching, metal   
sensitivity  
NEURO: Negative for seizure, trauma,   
numbness/tingling of extremities.  
ENDOCRINE: Positive for diabetic associated   
symptoms  
HEMATOLOGY: Negative for excessive bleeding,   
clots, bleeding disorders.              [facility]  
   
                                        2021 Note     HNO ID: [id number]  
Author: RT Raúl(R)  
Service: Nuclear Medicine  
Author Type: Technologist  
Type: Progress Notes  
Filed: 2021 10:06 AM  
Note Text:  
RADIOLOGY SERVICE PROGRESS NOTE  
SERVICE DATE: 2021  
SERVICE TIME: 10:05 AM  
PATIENT IDENTITY VERIFICATION COMPLETED USING   
TWO (2) STANDARD  
IDENTIFIERS: Name and Date of Birth confirmed by   
patient verbally  
FALL SCREENING: Has the patient had 2 falls in   
the last year or 1 fall  
with injury or currently using an Ambulatory   
Assistive Device (Walker,  
Cane, Wheelchair, Crutches, etc.)? No  
PATIENT GENDER DATA: .female  
Pregnant: No  
ALLERGIES: Reviewed and unchanged  
MEDICATIONS REVIEWED: Yes  
PATIENT RELEVANT IMPLANT DATA REVIEWED: Not   
Applicable  
CREATININE: No results found for: CREAT,   
EGFROTH, EGFRAA  
P.O.C.T. RESULTS: N/A 2021  
DIAGNOSTIC CT PERFORMED: No  
IV SITE: Ambulatory: NM only - direct IV   
injection in the Right  
antecubital site  
POST EXAM PIV STATUS: Not applicable  
PROCEDURE TYPE: NM INJECT: nm bone 3 phase. 20.6   
mCi Tc99m MDP. No other  
medications given..  
ADMINISTRATION TIME: 932  
PATIENT DISCHARGED TO: Ambulatory patient, left   
NM department area.  
A Diagnostic radioactive procedure has taken   
place, with no further  
precautions necessary other than routine body   
substance precautions. More  
information regarding radiation safety can be   
found using this link:  
[email or web address]  
[email or web address]  
[email or web address]-%20Diagnostic%20Nuclear%20Medicine%20Procedu  
[email or web address]  
SIGNATURE: RT Raúl(R) PATIENT NAME: Jayden Soto  
DATE: 2021 MRN: [id number]  
TIME: 10:05 AM PAGER/CONTACT #:         [facility]  
   
                                                    2021 History   
of Present illness   
Narrative                                 
  
  
Formatting of this note is different from the   
original.  
RADIOLOGY SERVICE PROGRESS NOTE  
  
SERVICE DATE: 2021  
SERVICE TIME: 10:05 AM  
  
PATIENT IDENTITY VERIFICATION COMPLETED USING   
TWO (2) STANDARD IDENTIFIERS: Name and Date of   
Birth confirmed by patient verbally  
FALL SCREENING: Has the patient had 2 falls in   
the last year or 1 fall with injury or currently   
using an Ambulatory Assistive Device (Walker,   
Cane, Wheelchair, Crutches, etc.)? No  
PATIENT GENDER DATA: .female  
Pregnant: No  
ALLERGIES: Reviewed and unchanged  
MEDICATIONS REVIEWED: Yes  
PATIENT RELEVANT IMPLANT DATA REVIEWED: Not   
Applicable  
CREATININE: No results found for: CREAT,   
EGFROTH, EGFRAA  
P.O.C.T. RESULTS: N/A 2021  
  
DIAGNOSTIC CT PERFORMED: No  
  
IV SITE: Ambulatory: NM only - direct IV   
injection in the Right antecubital site  
POST EXAM PIV STATUS: Not applicable  
  
PROCEDURE TYPE: NM INJECT: nm bone 3 phase. 20.6   
mCi Tc99m MDP. No other medications given..  
ADMINISTRATION TIME: 932  
PATIENT DISCHARGED TO: Ambulatory patient, left   
NM department area.  
  
A Diagnostic radioactive procedure has taken   
place, with no further precautions necessary   
other than routine body substance precautions.   
More information regarding radiation safety can   
be found using this link:   
[email or web address]  
[email or web address]-%20Diagnostic%20N  
uclear%20Medicine%20Procedures.pdf  
  
SIGNATURE: RT Raúl(R) PATIENT NAME: Jayden Soto  
DATE: 2021 MRN: [id number]  
TIME: 10:05 AM PAGER/CONTACT #:  
  
  
  
  
Electronically signed by CHRISTELLE Olsen) at   
2021 10:06 AM ESTdocumented in this   
encounter                               [facility]  
   
                                        2021 Note     HNO ID: [id number]  
Author: Basil Mitchell MD  
Service: ?  
Author Type: Physician  
Type: Progress Notes  
Filed: 2021 4:06 PM  
Note Text:  
Patient ID: Jayden Soto is a 61 year old   
female.  
CC: Consultation requested by Annalee Eckert DO   
for evaluation of:  
Patient presents with:  
Left Knee - New  
HPI: Jayden Soto is a 61 year old female who   
presents with a long history  
of activity-related Left knee pain. The patient   
states she twisted the  
knee and has had pain medially. The pain is   
described as dull aching and  
sharp shooting pain and is present in themedial   
regions of the knee. The  
patient does require ambulatory aids. The   
patient does have difficulty  
ascending and descending stairs as well as   
getting out of a chair. The  
pain is to the point where it is adversely   
affecting activities of daily  
living.  
Previous treatment has consisted of left partial   
knee replacement in   
with a washout in March with PICC line. This   
started from a foot ulcer  
that ended up in her spine. This was washed out   
in [facility]  
The patient is referred for orthopedic   
evaluation and management.  
Pain Assessment  
The following portions of the patient's history   
were reviewed and updated  
as appropriate: allergies, current medications,   
past family history, past  
medical history, past social history, past   
surgical history and problem  
list.  
No past medical history on file.  
No past surgical history on file.  
ROS  
No family history on file.  
Social History  
Tobacco Use  
- Smoking status: Not on file  
- Smokeless tobacco: Not on file  
Substance Use Topics  
- Alcohol use: Not on file  
- Drug use: Not on file  
Current Outpatient Medications  
Medication Sig Dispense Refill  
- DULoxetine (CYMBALTA) 60 mg capsule Take 60 mg   
by mouth once daily.  
- ranolazine SR (RANEXA) 1,000 mg tab ER 12 hr   
Take by mouth.  
- dilTIAZem CD (CARDIZEM CD) 180 mg 24 hr   
capsule Take 180 mg by mouth  
once daily.  
- empagliflozin (JARDIANCE) 25 mg tablet Take 25   
mg by mouth daily with  
breakfast.  
- doxycycline monohydrate (MONODOX) 100 mg   
capsule Take 100 mg by mouth  
twice daily.  
- GABAPENTIN ORAL Take by mouth.  
- METOPROLOL TARTRATE ORAL Take by mouth.  
- isosorbide mononitrate ER (IMDUR) 30 mg 24 hr   
tablet Take 30 mg by mouth  
once daily.  
- insulin glargine,hum.rec.anlog (LANTUS   
SUBCUTANEOUS) Inject  
subcutaneously.  
- clopidogrel (PLAVIX) 75 mg tablet Take 75 mg   
by mouth once daily.  
No current facility-administered medications for   
this visit.  
ALLERGIES  
Allergen Reactions  
- Morphine GI Upset  
- Statins-Hmg-Coa Red* Unknown  
There is no problem list on file for this   
patient.  
Objective:  
On physical examination, the patient is a well   
appearing female in no  
respiratory distress. The patient is awake,   
alert and oriented to person,  
place and time. Body mass index is 32.89 kg/m?.  
Inspection of the bilateral lower extremities   
does not demonstrate color,  
temperature or trophic changes. There is   
satisfactory alignment and no  
asymmetry.  
Examination of theLeft knee finds mild   
intra-articular effusion. There is  
no varus deformity. The skin is noted to be   
intact. There is no  
lymphadenopathy. There is tenderness to   
palpation in the medial portion of  
the knee Range of motion is 5-100 degrees. There   
is satisfactory  
varus/valgus stability. There is no midflexion   
instability. Reynaldo test  
is negative. There is no calf tenderness. There   
is no evidence of distal  
neurovascular compromise.  
Examination of the Right knee finds no   
intra-articular effusion. There is  
no varus deformity. The skin is noted to be   
intact. There is no  
lymphadenopathy. There is no tenderness to   
palpation in the medial and  
lateral portion of the knee Range of motion is   
0-120 degrees. There is  
satisfactory varus/valgus stability. There is no   
midflexion instability.  
Reynaldo test is negative. There is no calf   
tenderness. There is no  
evidence of distal neurovascular compromise.  
Examination of the bilateral hips exhibits   
physiologic range of motion  
without difficulty, equal leg lengths, no pain   
with resisted hip flexion  
and no tenderness to palpation over greater   
trochanters.  
Further examination of the bilateral lower   
extremities finds satisfactory  
ankle dorsiflexion and plantar flexion strength.   
Sensation is intact to  
light touch distally. Distal pulses are   
palpable. The feet are warm and  
viable and there is brisk capillary refill.  
Questa Knee Score: see report  
Radiographs:  
Radiographs are reviewed from office today to   
include right and left knee  
PA flexion, lateral and Merchant views. Per my   
interpretation, these show  
stable left partial knee replacement no obvious   
loosening  
Assessment:  
The primary encounter diagnosis was Status post   
left partial knee  
replacement. A diagnosis of Pain in prosthetic   
joint, initial encounter  
(Formerly [facility]) was also pertinent to this visit.  
Plan:  
The clinical and radiographic findings as well   
as a risks, benefits and  
alternatives of treatme (more content not   
included)...                            [facility]  
   
                                        2021 Note     HNO ID: [id number]  
Author: Radha Novak MA  
Service: ?  
Author Type: Medical Assistant  
Type: Progress Notes  
Filed: 2021 4:06 PM  
Note Text:  
REVIEW OF SYSTEMS:  
GENERAL: Well developed, well nourished. No   
acute distress  
PAIN: Pain left knee  
CARDIOVASCULAR: HTN, CAD  
MSK: Positive for joint swelling  
SKIN: Negative for lesions, rash, itching, metal   
sensitivity  
NEURO: Negative for seizure, trauma,   
numbness/tingling of extremities.  
ENDOCRINE: Positive for diabetic associated   
symptoms  
HEMATOLOGY: Negative for excessive bleeding,   
clots, bleeding disorders.              [facility]  
   
                                                    2021 History   
of Present illness   
Narrative                                 
  
  
Formatting of this note is different from the   
original.  
Patient ID: Jayden Soto is a 61 year old   
female.  
  
CC: Consultation requested by Annalee Eckert DO   
for evaluation of: Patient presents with:  
Left Knee - New  
  
HPI: Jayden Soto is a 61 year old female who   
presents with a long history of activity-related   
Left knee pain. The patient states she twisted   
the knee and has had pain medially. The pain is   
described as dull aching and sharp shooting pain   
and is present in themedial regions of the knee.   
The patient does require ambulatory aids. The   
patient does have difficulty ascending and   
descending stairs as well as getting out of a   
chair. The pain is to the point where it is   
adversely affecting activities of daily living.  
  
Previous treatment has consisted of left partial   
knee replacement in  with a washout in March   
with PICC line. This started from a foot ulcer   
that ended up in her spine. This was washed out   
in   
  
The patient is referred for orthopedic   
evaluation and management.  
  
Pain Assessment  
  
The following portions of the patient's history   
were reviewed and updated as appropriate:   
allergies, current medications, past family   
history, past medical history, past social   
history, past surgical history and problem list.  
  
No past medical history on file.  
  
No past surgical history on file.  
  
ROS  
  
No family history on file.  
  
Social History  
  
Tobacco Use  
Smoking status: Not on file  
Smokeless tobacco: Not on file  
Substance Use Topics  
Alcohol use: Not on file  
Drug use: Not on file  
  
Current Outpatient Medications  
Medication Sig Dispense Refill  
DULoxetine (CYMBALTA) 60 mg capsule Take 60 mg   
by mouth once daily.  
ranolazine SR (RANEXA) 1,000 mg tab ER 12 hr   
Take by mouth.  
dilTIAZem CD (CARDIZEM CD) 180 mg 24 hr capsule   
Take 180 mg by mouth once daily.  
empagliflozin (JARDIANCE) 25 mg tablet Take 25   
mg by mouth daily with breakfast.  
doxycycline monohydrate (MONODOX) 100 mg capsule   
Take 100 mg by mouth twice daily.  
GABAPENTIN ORAL Take by mouth.  
METOPROLOL TARTRATE ORAL Take by mouth.  
isosorbide mononitrate ER (IMDUR) 30 mg 24 hr   
tablet Take 30 mg by mouth once daily.  
insulin glargine,hum.rec.anlog (LANTUS   
SUBCUTANEOUS) Inject subcutaneously.  
clopidogrel (PLAVIX) 75 mg tablet Take 75 mg by   
mouth once daily.  
  
No current facility-administered medications for   
this visit.  
  
ALLERGIES  
Allergen Reactions  
Morphine GI Upset  
Statins-Hmg-Coa Red* Unknown  
  
There is no problem list on file for this   
patient.  
  
Objective:  
  
On physical examination, the patient is a well   
appearing female in no respiratory distress. The   
patient is awake, alert and oriented to person,   
place and time. Body mass index is 32.89 kg/m .  
  
Inspection of the bilateral lower extremities   
does not demonstrate color, temperature or   
trophic changes. There is satisfactory alignment   
and no asymmetry.  
  
  
  
Examination of theLeft knee finds mild   
intra-articular effusion. There is no varus   
deformity. The skin is noted to be intact. There   
is no lymphadenopathy. There is tenderness to   
palpation in the medial portion of the knee   
Range of motion is 5-100 degrees. There is   
satisfactory varus/valgus stability. There is no   
midflexion instability. Reynaldo test is   
negative. There is no calf tenderness. There is   
no evidence of distal neurovascular compromise.  
  
Examination of the Right knee finds no   
intra-articular effusion. There is no varus   
deformity. The skin is noted to be intact. There   
is no lymphadenopathy. There is no tenderness to   
palpation in the medial and lateral portion of   
the knee Range of motion is 0-120 degrees. There   
is satisfactory varus/valgus stability. There is   
no midflexion instability. Reynaldo test is   
negative. There is no calf tenderness. There is   
no evidence of distal neurovascular compromise.  
  
Examination of the bilateral hips exhibits   
physiologic range of motion without difficulty,   
equal leg lengths, no pain with resisted hip   
flexion and no tenderness to palpation over   
greater trochanters.  
  
Further examination of the bilateral lower   
extremities finds satisfactory ankle   
dorsiflexion and plantar flexion strength.   
Sensation is intact to light touch distally.   
Distal pulses are palpable. The feet are warm   
and viable and there is brisk capillary refill.  
  
Questa Knee Score: see report  
  
Radiographs:  
  
Radiographs are reviewed from office today to   
include right and left knee PA flexion, lateral   
and Merchant views. Per my interpretation, these   
show stable left partial knee replacement no   
obvious loosening  
  
Assessment:  
  
The primary encounter diagnosis was Status post   
left partial knee replacement. A diagnosis of   
Pain in prosthetic joint, initial encounter   
(Formerly [facility]) was also pertinent to this visit.  
  
Plan:  
The clinical and radiographic findings as well   
as a risks, benefits and alternatives of   
treatment have been reviewed in detail with the   
patient. Checking CRP and ESR and bone scan for   
possible infection or loosening based on her   
history. She is on chronic antibiotics for her   
previous infection. Briefly discussed continuing   
with this treatment versus proceeding with   
antibiotic spacer placement  
  
The patient will return in after studies to   
assess their response to the above treatment   
plan, sooner if there are questions or problems.  
  
  
Electronically signed by Basil Mitchell MD at   
2021 4:06 PM EST  
  
  
Formatting of this note might be different from   
the original.  
REVIEW OF SYSTEMS:  
  
GENERAL: Well developed, well nourished. No   
acute distress  
PAIN: Pain left knee  
CARDIOVASCULAR: HTN, CAD  
MSK: Positive for joint swelling  
SKIN: Negative for lesions, rash, itching, metal   
sensitivity  
NEURO: Negative for seizure, trauma,   
numbness/tingling of extremities.  
ENDOCRINE: Positive for diabetic associated   
symptoms  
HEMATOLOGY: Negative for excessive bleeding,   
clots, bleeding disorders.  
  
  
  
  
Electronically signed by Radha Novak MA   
at 2021 4:06 PM ESTdocumented in this   
encounter                               [facility]  
   
                                                    12-   
Miscellaneous Notes                       
  
  
Formatting of this note might be different from   
the original.  
Spoke to the patient's daughter-in-law, Nneka,   
to obtain a little more information. She states   
Dr. Pepper would like her to see a joint   
replacement specialist. In  she had a   
partial knee replacement and this year she had   
had to have two I&D's to clean it out. Will   
Paula see for this?  
  
Roma Reagan [facility]  
December 15, 2021 10:20 AM  
  
  
  
  
Electronically signed by Roma Reagan   
 [facility] at 12/15/2021 10:21 AM EST  
  
  
Formatting of this note might be different from   
the original.  
----- Message from Amita Crooks sent at   
12/15/2021 9:42 AM EST -----  
Regarding: Orthopedics / Open Knee: Pain /   
Previous Surgery By A Non-[facility] Provider  
Subject Line Format: Orthopedics / [Provider   
Name or  Open & Body Part ] / [Issue]  
  
Patient has been identified by name and Date of   
birth (Y/N): Y  
  
Patient: Jayden Soto  
YOB: 1960  
MRN: [id number]  
Previous Provider Seen: NA  
Body Part(s) Identified: L KNEE  
Diagnosis/Reason For Visit: PAIN - INFECTED   
IMPLANT IN KNEE  
Reason for the call/escalation: PREVIOUS   
SURGERIES ON KNEE  
If reason for call/escalation is discharge from   
ED/ER or Hospital, which facility was the   
patient seen at: NA  
  
Was an appointment scheduled (Y/N): DR. PEPPER   
FROM [facility] REFERRED PATIENT  
  
Person calling if other than patient: DAUGHTER   
IN [facility]  
Return call to if other than patient: DAUGHTER   
IN [facility] contact number: [phone]  
  
Thank you,  
Amita Crooks  
December 15, 2021 9:42 AM  
  
  
  
  
Electronically signed by Roma Reagan   
[facility] at 12/15/2021 10:18 AM   
ESTdocumented in this encounter         [facility]  
   
                                                    2021 History   
of Present illness   
Narrative                                 
  
  
Formatting of this note might be different from   
the original.  
DATE OF SERVICE: 2021  
  
SUBJECTIVE: Patient is doing okay today. She is   
up ambulating in the hallway. She  
has no complaints.  
  
PHYSICAL EXAMINATION: Vital Signs: Stable.   
Heart: Regular. Lungs: Clear.  
Abdomen: Benign. Extremities: No edema.  
  
IMPRESSION:  
1. Prosthetic knee infection, status post   
incision and drainage and removal of  
hardware with polyethylene exchange on   
antibiotics through . Patient will be  
discharged home on Thursday with home health   
care.  
2. History of anemia requiring transfusions.  
3. Coronary artery disease, status post   
non-ST-elevation myocardial infarction,  
status post restenting and percutaneous   
transluminal coronary angioplasty.  
4. History of atrial fibrillation.  
5. Hypertension.  
6. Methicillin-sensitive Staphylococcus aureus   
infection.  
7. Debility.  
8. Depression.  
9. Hypothyroidism.  
10. Sacral decubitus, status post flap surgery.  
11. History of osteomyelitis.  
12. Constipation.  
  
PLAN: Continue with current plan of care, PT,   
OT, rehab, and wound care. Patient to  
be discharged home on Thursday with home health   
care.  
  
_______________________  
DO SOFI Cruz/[id number]  
DD: 2021 14:03  
DT: 2021 14:59  
SSI File#:   
[id number]  
Job #: [id number]  
  
Verified/Reviewed by  
  
[facility] PATIENT NAME:   
JAYDEN GUTIERREZ  
[facility] [address] MED[facility] REC #: [id number]  
[place] [address] ACCOUNT #: [id number]  
ADMIT DATE:  
DISCHARGE DATE:  
ATTENDING PHY: Michael Robles MD  
  
  
Electronically signed by Annalee Eckert at   
2021 7:42 AM EDTdocumented in this   
encounter                               [facility]  
   
                                                    2021 History   
of Present illness   
Narrative                                 
  
  
Formatting of this note might be different from   
the original.  
DATE OF SERVICE: 2021  
  
SUBJECTIVE: Patient is doing well today. She   
states that her insurance so far is  
paying through Wednesday. She is hoping they   
will pay for another week because then  
she will be done with her antibiotics, and she   
will be able to go home.  
  
OBJECTIVE: Vital Signs: Blood pressure is   
142/73, pulse 99, respires 24,  
temperature 98.1, O2 saturation 98%. Heart:   
Regular. Lungs: Clear. Abdomen:  
Benign. Extremities: No edema.  
  
LABORATORY DATA: White count 8.6, hemoglobin   
11.5, hematocrit 38.1, platelets of  
303. Sodium is 141, potassium 4.1, chloride 107,   
CO2 of 27. BUN 19, creatinine  
0.48.  
  
IMPRESSION:  
1. Prosthetic knee infection status post   
incision and drainage and removal of  
hardware with polyethylene exchange on   
antibiotics through .  
2. History of anemia requiring transfusions.  
3. Coronary artery disease status post   
non-St-elevation myocardial infarction status  
post resenting and percutaneous transluminal   
coronary angioplasty.  
4. History of atrial fibrillation.  
5. Hypertension.  
6. Methicillin-sensitive Staphylococcus aureus.  
7. Depression.  
8. Debility.  
9. Hypothyroidism.  
10. Sacral decubitus status post flap surgery.  
11. History of osteomyelitis.  
12. Constipation.  
  
PLAN: We will continue with current plan of   
care, PT, OT, rehab, and wound care.  
  
_______________________  
DO SOFI Cruz/[id number]  
DD: 2021 10:44  
DT: 2021 11:27  
SSI File#:   
[id number]  
Job #: [id number]  
  
Verified/Reviewed by  
  
[facility] PATIENT NAME:   
JAYDEN GUTIERREZ [address] MEDICAL REC #: [id number]  
[place] [address] ACCOUNT #: [id number]  
ADMIT DATE:  
DISCHARGE DATE:  
ATTENDING PHY: Michael Robles MD  
  
  
Electronically signed by Annalee Eckert at   
2021 1:04 PM EDTdocumented in this   
encounter                               [facility]  
   
                                                    2021 History   
of Present illness   
Narrative                                 
  
  
Formatting of this note might be different from   
the original.  
DATE OF SERVICE: 2021  
  
She is resting in her bed in very good spirits.   
She has been up walking with  
physical therapy. She says the knee incision has   
been healing very, very well. She  
says she still has a very small area of   
nonhealing in her back flap, but that is  
getting smaller and better as well.  
  
OBJECTIVE: Vital Signs: Vitals include a   
temperature of 97.8, pulse 85,  
respirations 16, blood pressure 114/76. Lungs:   
Her lungs are clear. Heart: Regular  
rate and rhythm. Abdomen: Soft and nontender.   
Bowel sounds are positive.  
Extremities: No edema.  
  
LABORATORIES: There were no new labs today.  
  
IMPRESSION:  
1. Prosthetic knee infection status post   
incision and drainage and removal of  
hardware with polyethylene exchange on   
antibiotics through .  
2. History of anemia requiring transfusion.  
3. Coronary artery disease status post non-ST   
elevation myocardial infarction status  
post restenting and percutaneous transluminal   
coronary angioplasty.  
4. History of atrial fibrillation.  
5. Hypertension.  
6. Methicillin-susceptible Staphylococcus   
aureus.  
7. Depression.  
8. Debility.  
9. Hypothyroidism.  
10. Sacral decubitus status post flap surgery.  
11. History of osteomyelitis.  
12. Constipation.  
  
PLAN: Continue current plan of care with   
physical and occupational therapy, wound  
care, rehabilitation, and discharge planning.  
  
_______________________  
Michael Robles MD  
  
/[id number]  
DD: 2021 11:31  
DT: 2021 12:18  
SSI File#:   
[id number]  
Job #: [id number]  
  
Verified/Reviewed by  
  
[facility] PATIENT NAME:   
JAYDEN GUTIERREZ [address] MEDICAL REC #: [id number]  
[place] [address] ACCOUNT #: [id number]  
ADMIT DATE:  
DISCHARGE DATE:  
ATTENDING PHY: Michael Robles MD  
  
  
Electronically signed by Michael Robles at   
2021 11:43 AM EDTdocumented in this   
encounter                               [facility]  
   
                                                    2021 History   
of Present illness   
Narrative                                 
  
  
Formatting of this note might be different from   
the original.  
DATE OF SERVICE: 2021  
  
SUBJECTIVE: Patient is doing well today. She   
states that her wound on her back side  
is healing up nicely. It is almost completely   
healed, so she can finally sit on her  
bottom again. We are waiting to hear from her   
insurance to see if they are going to  
cover her to stay here. Dr. Luther would prefer   
her to stay here and finish off her  
antibiotics. Apparently, there are no homecare   
agencies in her area that will be  
able to accommodate her antibiotics, and she   
really does not want to go to a skilled  
facility.  
  
OBJECTIVE: Her vitals are stable.  
  
White count 9.0, hemoglobin 11, hematocrit 36.5,   
platelets 298. Sodium is 142,  
potassium 4.1, chloride 109, CO2 of 27, BUN 18,   
creatinine 0.46.  
  
Heart is regular. Lungs are clear. Abdomen is   
benign. Extremities: No edema.  
  
IMPRESSION:  
1. Prosthetic knee infection, status post   
incision and drainage and removal of  
hardware with polyethylene exchange. On IV   
antibiotics through .  
2. History of anemia requiring transfusions.  
3. Coronary artery disease, status post   
non-ST-elevation myocardial infarction.  
Status post restenting and PTCA.  
4. History of atrial fibrillation.  
5. Methicillin-sensitive Staphylococcus aureus   
infection.  
6. Hypertension.  
7. Depression.  
8. Debility.  
9. Hypothyroidism.  
10. Sacral decubitus status post flap surgery.  
11. History of osteomyelitis.  
12. Constipation.  
  
PLAN: Will continue with the current plan of   
care, PT, OT, rehab, and wound care.  
  
_______________________  
DO SOFI Cruz/[id number]  
DD: 2021 13:52  
DT: 2021 14:39  
SSI File#:   
[id number]  
Job #: [id number]  
  
Verified/Reviewed by  
  
[facility] PATIENT NAME:   
JAYDEN GUTIERREZ [address] MEDICAL REC #: [id number]  
[place] [address] ACCOUNT #: [id number]  
ADMIT DATE:  
DISCHARGE DATE:  
ATTENDING PHY: Michael Robles MD  
  
  
Electronically signed by Annalee Eckert at   
2021 10:29 AM EDTdocumented in this   
encounter                               [facility]  
   
                                                    2021 History   
of Present illness   
Narrative                                 
  
  
Formatting of this note might be different from   
the original.  
DATE OF SERVICE: 2021  
  
SUBJECTIVE: She is resting in her bed, awake,   
alert, and looking forward to going  
home soon. She is on antibiotics and we are   
still waiting for Dr. Rogers to  
address whether she could potentially get her   
antibiotics at home or whether she  
needs to stay here.  
  
PHYSICAL EXAMINATION: Vitals include a   
temperature of 98.0, pulse 85, respirations  
18, blood pressure 156/77, oxygen at 95%. Her   
lungs are clear. Heart has a regular  
rate and rhythm. Abdomen soft, nontender,   
benign. Extremities: No edema.  
  
LABORATORIES: No new labs.  
  
IMPRESSION:  
1. Prosthetic knee infection, status post   
incision and drainage, and removal of  
hardware with polyethylene exchange on IV   
antibiotics through mid April.  
2. History of anemia requiring transfusions.  
3. Coronary artery disease, status post   
non-ST-elevation myocardial infarction,  
status post restenting and percutaneous   
transluminal coronary angioplasty.  
4. History of atrial fibrillation.  
5. Methicillin-sensitive Staphylococcus aureus   
infection.  
6. Hypertension.  
7. Depression.  
8. Debility.  
9. History of hypothyroidism.  
10. History of sacral decubitus, status flap   
surgery.  
11. History of osteomyelitis.  
12. Constipation.  
  
CONTINUE: Continue with current plan of care   
with physical and occupational therapy  
per rehab, antibiotics and wound care. Await   
further word from Infectious Disease.  
  
_______________________  
Michael Robles MD  
  
RG/[id number]  
DD: 2021 06:22  
DT: 2021 07:18  
SSI File#:   
[id number]  
Job #: [id number]  
  
Verified/Reviewed by  
  
[facility] PATIENT NAME:   
JAYDEN GUTIERREZ [address] MEDICAL REC #: [id number]  
[place] [address] ACCOUNT #: [id number]  
ADMIT DATE:  
DISCHARGE DATE:  
ATTENDING PHY: Michael Robles MD  
  
  
Electronically signed by Michael Robles at   
2021 10:34 AM EDTdocumented in this   
encounter                               [facility]  
   
                                                    2021 History   
of Present illness   
Narrative                                 
  
  
Formatting of this note might be different from   
the original.  
DATE OF SERVICE: 2021  
  
SUBJECTIVE: Patient is doing well today. She is   
sitting up in the chair, eating her  
lunch. I spoke with Case Management. They are   
looking into hopefully getting her  
home on antibiotics, although Dr. Rogers told   
the patient previously that she had  
to stay her the whole time, so they are going to   
talk with him and see what his  
recommendations area. From my standpoint, I   
think she is fine to go home on  
antibiotics.  
  
OBJECTIVE: Blood pressure 148/77, pulse 90,   
respirations 24, temp 98.2, O2  
saturation 100%. Heart is regular. Lungs are   
clear. Abdomen is benign.  
Extremities: No edema.  
  
IMPRESSION:  
1. Prosthetic knee infection status post I and D   
and removal of hardware with  
polyethylene exchange. On IV antibiotics through   
mid April.  
2. History of anemia, requiring transfusions.  
3. Methicillin-sensitive Staphylococcus aureus   
infection.  
4. History of atrial fibrillation.  
5. Coronary artery disease status post   
non-ST-elevation myocardial infarction status  
post re-stenting and percutaneous transluminal   
coronary angioplasty.  
6. Hypertension.  
7. Debility.  
8. Depression.  
9. History of hypothyroidism.  
10. History of sacral decubitus status post flap   
surgery.  
11. History of osteomyelitis.  
12. Constipation.  
  
PLAN: Will continue with current plan of care,   
PT, OT, rehab, and wound care and  
will await word from Dr. Rogers.  
  
_______________________  
DO SOFI Cruz/[id number]  
DD: 2021 12:49  
DT: 2021 13:24  
SSI File#:   
[id number]  
Job #: [id number]  
  
Verified/Reviewed by  
  
[facility] PATIENT NAME:   
JAYDEN GUTIERREZ [address] MEDICAL REC #: [id number]  
[place] [address] ACCOUNT #: [id number]  
ADMIT DATE:  
DISCHARGE DATE:  
ATTENDING PHY: Michael Robles MD  
  
  
Electronically signed by Annalee Eckert at   
2021 2:02 PM EDTdocumented in this   
encounter                               [facility]  
   
                                                    2021 History   
of Present illness   
Narrative                                 
  
  
Formatting of this note might be different from   
the original.  
DATE OF SERVICE: 2021  
  
SUBJECTIVE: Patient is doing well this morning,   
up and was walking to the bathroom.  
Apparently, cardiology said they could take off   
her nitroglycerin patch today. She  
said within a half-hour, she noticed she was   
having angina again, and she wants it  
put back on. So, I did add that back on today   
and will address with cardiology.  
  
OBJECTIVE: Vital Signs: Stable. Heart: Regular.   
Lungs: Clear. Abdomen:  
Benign. Extremities: No edema.  
  
LABORATORY RESULTS: White count of 8.5,   
hemoglobin 11.8, hematocrit 39.3, platelets  
of 356. Sodium is 144, potassium 4.1, chloride   
107, CO2 of 29. BUN 17, creatinine  
0.50.  
  
IMPRESSION:  
1. Prosthetic knee infection, status post   
incision and drainage and removal of  
hardware with polyethylene exchange on IV   
antibiotics through mid-April.  
2. History of anemia requiring transfusions.  
3. History of methicillin-sensitive   
Staphylococcus aureus infection.  
4. History of atrial fibrillation.  
5. Coronary artery disease status post   
non-ST-elevation myocardial infarction status  
post restenting and percutaneous transluminal   
coronary angioplasty.  
6. Hypertension.  
7. Debility.  
8. Depression.  
9. History of hypothyroidism.  
10. History of sacral decubitus status post flap   
surgery.  
11. History of osteomyelitis.  
12. Constipation.  
  
PLAN: We will continue with current plan of   
care, PT, OT, and rehab and wound care.  
I did add back on her nitroglycerin patch today   
for angina and will address with  
cardiology.  
  
_______________________  
DO SOFI Cruz/[id number]  
DD: 2021 10:38  
DT: 2021 11:26  
SSI File#:   
[id number]  
Job #: [id number]  
  
Verified/Reviewed by  
  
[facility] PATIENT NAME:   
JAYDEN GUTIERREZ [facility] [address] [address] MEDICAL REC #: [id number]  
[place] [address] ACCOUNT #: [id number]  
ADMIT DATE:  
DISCHARGE DATE:  
ATTENDING PHY: Michael Robles MD  
  
  
Electronically signed by Annalee Eckert at   
2021 12:33 PM EDTdocumented in this   
encounter                               [facility]  
   
                                                    2021 History   
of Present illness   
Narrative                                 
  
  
Formatting of this note might be different from   
the original.  
DATE OF SERVICE: 2021  
  
SUBJECTIVE: Patient is doing fine today. She has   
no complaints or issues.  
  
OBJECTIVE: Blood pressure 122/63, pulse 88,   
respirations 16, temp 97.7. Heart is  
regular. Lungs are clear. Abdomen is benign.   
Extremities: No edema.  
  
IMPRESSION:  
1. Prosthetic knee infection, status post   
incision and drainage and removal of  
hardware with polyethylene exchange on IV   
antibiotics through mid-April.  
2. History of anemia requiring transfusions.  
3. History of methicillin-sensitive   
Staphylococcus aureus infection.  
4. History of atrial fibrillation.  
5. Coronary artery disease, status post   
non-ST-elevation myocardial infarction,  
status post restenting and percutaneous   
transluminal coronary angioplasty.  
6. Hypertension.  
7. Debility.  
8. Depression.  
9. History of hypothyroidism.  
10. History of sacral decubitus, status post   
flap surgery.  
11. History of osteomyelitis.  
12. Constipation.  
  
PLAN: Will continue with current plan of care,   
PT, OT, rehab and wound care.  
  
_______________________  
DO SOFI Cruz/[id number]  
DD: 2021 10:45  
DT: 2021 11:30  
SSI File#:   
[id number]  
Job #: [id number]  
  
Verified/Reviewed by  
  
[facility] PATIENT NAME:   
JAYDEN GUTIERREZ [address] MEDICAL REC #: [id number]  
[place] [address] ACCOUNT #: [id number]  
ADMIT DATE:  
DISCHARGE DATE:  
ATTENDING PHY: Michael Robles MD  
  
  
Electronically signed by Annalee Eckert at   
2021 10:25 AM EDTdocumented in this   
encounter                               [facility]  
   
                                                    2021 History   
of Present illness   
Narrative                                 
  
  
Formatting of this note might be different from   
the original.  
DATE OF SERVICE: 2021  
  
SUBJECTIVE: Patient is doing very well this   
morning. She had her staples removed  
yesterday from her knee. Looks really good.  
  
OBJECTIVE: Blood pressure is 114/70, pulse 88,   
respiratory rate is 18, temperature  
98. Heart is regular. Lungs: Diminished at the   
bases. Abdomen is benign.  
Extremities: No edema.  
  
IMPRESSION:  
1. Prostatic knee infection, status post   
incision and drainage and removal of  
hardware with polyethylene exchange. On   
intravenous antibiotics through mid-April.  
2. History of anemia, requiring transfusions.  
3. History of methicillin-sensitive   
Staphylococcus aureus infection.  
4. History of atrial fibrillation.  
5. Coronary artery disease, status post   
non-STEMI myocardial infarction, status post  
re-stenting and percutaneous transluminal   
coronary angioplasty.  
6. Hypertension.  
7. Debility.  
8. Depression.  
9. History of hypothyroidism.  
10. History of sacral decubitus, status post   
flap surgery.  
11. History of osteomyelitis.  
12. Constipation.  
  
PLAN: Will continue with current plan of care,   
PT, OT, rehabilitation, and wound  
care.  
  
_______________________  
DO SOFI Cruz/[id number]  
DD: 2021 11:06  
DT: 2021 11:50  
SSI File#:   
[id number]  
Job #: [id number]  
  
Verified/Reviewed by  
  
[facility] PATIENT NAME:   
JAYDEN GUTIERREZ [address] MEDICAL REC #: [id number]  
[place] [address] ACCOUNT #: [id number]  
ADMIT DATE:  
DISCHARGE DATE:  
ATTENDING PHY: Michael Robles MD  
  
  
Electronically signed by Annalee Eckert at   
2021 10:55 AM EDTdocumented in this   
encounter                               [facility]  
   
                                                    2021 History   
of Present illness   
Narrative                                 
  
  
Formatting of this note might be different from   
the original.  
DATE OF SERVICE: 2021  
  
SUBJECTIVE: She is resting in her bed, she   
arouses when I arrive but then she falls  
back to sleep. She is in no distress.  
  
OBJECTIVE: Vitals: Temperature 97.9, pulse 89,   
respirations 18, blood pressure  
128/68, oxygen 99%. Lungs: Clear but diminished.   
Heart: Regular rate and rhythm.  
Abdomen: Soft, nontender, benign. Extremities:   
No edema. Her dressing is intact  
and there is little swelling.  
  
LABORATORY: No new labs today.  
  
IMPRESSION:  
1. Prostatic drain infection of the left knee   
status post incision and drainage and  
removal of hardware with polyethylene exchange   
on intravenous antibiotics through  
mid-April.  
2. History of anemia requiring transfusion.  
3. History of Methicillin-sensitive   
Staphylococcus aureus infection.  
4. History of atrial fibrillation.  
5. Coronary artery disease status post non-ST   
segment elevation myocardial  
infarction status post restenting and   
percutaneous transluminal coronary angioplasty.  
6. Hypertension.  
7. Debility.  
8. Depression.  
9. History of hypotension.  
10. History of sacral decubitus ulcer status   
post flap surgery.  
11. History of osteomyelitis.  
12. History of constipation.  
  
PLAN: Continue current plan of care with her   
therapies, rehab, and wound care.  
  
_______________________  
Michael Robles MD  
  
/[id number]  
DD: 2021 05:27  
DT: 2021 07:14  
SSI File#:   
[id number]  
Job #: [id number]  
  
Verified/Reviewed by  
  
[facility] PATIENT NAME:   
JAYDEN GUTIERREZ [address] [facility] MEDICAL REC #: [id number]  
[place] [address] ACCOUNT #: [id number]  
ADMIT DATE:  
DISCHARGE DATE:  
ATTENDING PHY: Michael Robles MD  
  
  
Electronically signed by Michael Robles at   
2021 5:57 AM EDTdocumented in this   
encounter                               [facility]  
   
                                                    2021 History   
of Present illness   
Narrative                                 
  
  
Formatting of this note might be different from   
the original.  
DATE OF SERVICE: 2021  
  
SUBJECTIVE: Patient is doing very well today.   
She finally had a BM. She was very  
happy about that. No other complaints.  
  
OBJECTIVE: Her vital signs are stable. Heart is   
regular. Lungs are clear. Abdomen  
is benign. Extremities: No edema.  
  
LABORATORY STUDIES: White count of 10,   
hemoglobin 10.9, hematocrit 36.7, platelets  
of 427. Sodium is 141, potassium 4.3, chloride   
108, CO2 of 29, BUN 20, creatinine  
0.57.  
  
IMPRESSION:  
1. Prosthetic drain infection of the left knee,   
status post incision and drainage  
and removal of hardware with polyethylene   
exchange. On intravenous antibiotics  
through mid-April.  
2. History of anemia, requiring transfusions.  
3. History of methicillin-sensitive   
Staphylococcus aureus infection.  
4. History of atrial fibrillation.  
5. Coronary artery disease, status post non-ST   
segment elevation myocardial  
infarction, status post re-stenting and   
percutaneous transluminal coronary  
angioplasty.  
6. Hypertension.  
7. Debility.  
8. Depression.  
9. History of hypotension.  
10. History of sacral decubitus ulcer, status   
post flap surgery.  
11. History of osteomyelitis.  
12. History of constipation.  
  
PLAN: Will continue with current plan of care,   
PT, OT, rehabilitation, and wound  
care.  
  
_______________________  
DO SOFI Cruz/[id number]  
DD: 2021 13:40  
DT: 2021 15:21  
SSI File#:   
[id number]  
Job #: [id number]  
  
Verified/Reviewed by  
  
[facility] PATIENT NAME:   
JAYDEN GUTIERREZ [facility] [address] [address] MEDICAL REC #: [id number]  
[place] [address] ACCOUNT #: [id number]  
ADMIT DATE:  
DISCHARGE DATE:  
ATTENDING PHY: Michael Robles MD  
  
  
Electronically signed by Annalee Eckert at   
2021 10:51 AM EDTdocumented in this   
encounter                               [facility]  
   
                                                    2021 History   
of Present illness   
Narrative                                 
  
  
Formatting of this note might be different from   
the original.  
DATE OF SERVICE: 2021  
  
SUBJECTIVE: She is resting. She wakes up when I   
arrive. She denies any aches,  
pains or complaints. She said her knee is   
feeling better at this time. She  
apparently has been having some problems with   
constipation.  
  
OBJECTIVE: Vital Signs: Include a temperature of   
98.6, pulse 90, respiratory rate  
17, blood pressure 138/64, oxygen is 100%.   
Lungs: Clear. Heart: Regular rate and  
rhythm. Abdomen: Soft, nontender, benign.   
Extremities: No edema. Dressing is in  
place.  
  
LABORATORIES: There are no new labs today.  
  
IMPRESSION:  
1. 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   
status post incision and drainage and  
removal of hardware with polyethylene exchange   
on IV antibiotics through mid April.  
2. History of anemia requiring transfusions.  
3. History of methicillin-sensitive   
Staphylococcus aureus infection.  
4. History of atrial fibrillation.  
5. Coronary artery disease status post non-ST   
elevation myocardial infarction,  
status post re-stenting and percutaneous   
transluminal coronary angioplasty.  
6. Hypertension.  
7. Debility.  
8. Depression.  
9. History of hypotension.  
10. History of sacral decubitus ulcer status   
post flap surgery.  
11. History of osteomyelitis.  
12. History of constipation.  
  
PLAN: Continue with current plan of care with   
physical and occupational therapy,  
wound care, antibiotics and bowel management.  
  
_______________________  
MD ELIO Ventura/[id number]  
DD: 2021 05:29  
DT: 2021 07:42  
SSI File#:   
[id number]  
Job #: [id number]  
  
Verified/Reviewed by  
  
[facility] PATIENT NAME:   
JAYDEN GUTIERREZ [facility] [address] [facility] MED[facility] REC #: [id number]  
[place] [address] ACCOUNT #: [id number]  
ADMIT DATE:  
DISCHARGE DATE:  
ATTENDING PHY: Michael Robles MD  
  
  
Electronically signed by Michael Robles at   
2021 4:25 AM EDTdocumented in this   
encounter                               [facility]  
   
                                                    2021 History   
of Present illness   
Narrative                                 
  
  
Formatting of this note might be different from   
the original.  
DATE OF SERVICE: 2021  
  
Patient is doing okay. She did not get her   
suppository. I did talk to the nurse  
about that. So, will get that for her. She is   
passing some mucus which I think some  
of that is from the [facility] after they removed it.   
Some of it could be her getting  
constipated now again and she is just passing   
mucus and not stool.  
  
PHYSICAL EXAMINATION: Vital Signs: Blood   
pressure is 118/64, pulse 87, respirations  
16, temp 98.4, O2 saturation 99%. Heart:   
Regular. Lungs: Clear. Abdomen:  
Benign. Extremities: No edema.  
  
IMPRESSION:  
1. 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   
status post insertion, drainage, and  
removal of hardware with polyethylene exchange   
on IV antibiotics through mid-April.  
2. History of methicillin-sensitive   
Staphylococcus aureus infection.  
3. History of anemia requiring transfusions.  
4. History of atrial fibrillation.  
5. Coronary artery disease status post   
non-ST-elevation myocardial infarction status  
post restenting and percutaneous transluminal   
coronary angioplasty.  
6. Hypertension.  
7. History of sacral decubitus ulcer status post   
flap surgery.  
8. Debility.  
9. Depression.  
10. History of hypotension.  
11. History of osteomyelitis.  
12. History of constipation.  
  
PLAN: Will continue with current plan of care,   
PT, OT, and wound care.  
  
_______________________  
DO SOFI Cruz/[id number]  
DD: 2021 12:40  
DT: 2021 13:18  
SSI File#:   
[id number]  
Job #: [id number]  
CC: Michael Robles MD  
  
Verified/Reviewed by  
  
[facility] PATIENT NAME:   
JAYDEN GUTIERREZ [address] [facility] MED[facility] REC #: [id number]  
[place] [address] ACCOUNT #: [id number]  
ADMIT DATE:  
DISCHARGE DATE:  
ATTENDING PHY: Michael Robles MD  
  
  
Electronically signed by Annalee Eckert at   
2021 1:15 PM EDTdocumented in this   
encounter                               [facility]  
   
                                                    2021 History   
of Present illness   
Narrative                                 
  
  
Formatting of this note might be different from   
the original.  
DATE OF SERVICE: 2021  
  
SUBJECTIVE: Patient is doing very well this   
morning. She has no complaints other  
than she would like her suppository back. We had   
backed off on some of her bowel  
regimen because she was having diarrhea when she   
was downstairs.  
  
OBJECTIVE: Vital Signs: Blood pressure is   
134/74, pulse 72, respires 23,  
temperature 98, O2 saturation 99%. Heart:   
Regular. Lungs: Diminished at the  
bases. Abdomen: Benign. Extremities: No edema.  
  
LABORATORY RESULTS: White count of 8.6,   
hemoglobin 10.0, hematocrit 34.0, platelets  
of 449. Sodium is 144, potassium 4.1, chloride   
109, CO2 of 30. BUN 18, creatinine  
0.60.  
  
IMPRESSION:  
1. 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   
status post insertion, drainage, and  
removal of hardware with polyethylene exchange   
on intravenous antibiotics through  
mid-April.  
2. History of methicillin-sensitive   
Staphylococcus aureus infection.  
3. History of anemia requiring transfusions.  
4. History of atrial fibrillation.  
5. Coronary artery disease status post   
non-ST-elevation myocardial infarction,  
status post restenting and percutaneous   
transluminal coronary angioplasty.  
6. Hypertension.  
7. History of sacral decubitus ulcer status post   
flap surgery.  
8. Debility.  
9. Depression.  
10. History of hypotension.  
11. History of osteomyelitis.  
12. History of constipation.  
  
PLAN: We will continue with current plan of   
care, PT, OT, rehab, and wound care. We  
will add back on her daily suppository p.r.n.  
  
_______________________  
Annalee EckertDO FARAH/[id number]  
DD: 2021 11:10  
DT: 2021 12:06  
SSI File#:   
[id number]  
Job #: [id number]  
  
Verified/Reviewed by  
  
[facility] PATIENT NAME:   
JAYDEN GUTIERREZ [facility] [address] MEDICAL REC #: [id number]  
[place] [address] ACCOUNT #: [id number]  
ADMIT DATE:  
DISCHARGE DATE:  
ATTENDING PHY: Michael Robles MD  
  
  
Electronically signed by Annalee Eckert at   
2021 12:29 PM EDTdocumented in this   
encounter                               [facility]  
   
                                                    2021 History   
of Present illness   
Narrative                                 
  
  
Formatting of this note might be different from   
the original.  
DATE OF SERVICE: 2021  
  
SUBJECTIVE: She is resting in her bed. States   
that the [place] seems to be helping  
her knee as well as the ice that she had on all   
night. She was not awakened by any  
pain and the swelling and redness seem to be   
less.  
  
OBJECTIVE: Vitals: Temperature of 97.6, pulse   
95, respirations 12, blood pressure  
128/69. Her lungs are clear. Heart has a regular   
rate and rhythm. Abdomen is soft  
and nontender. Bowel sounds are positive.   
Extremities: There is some very slight  
edema at the left knee. Dressing is clean, dry,   
and intact.  
  
LABORATORIES: There were no labs today.  
  
IMPRESSION:  
1. 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   
status post insertion, drainage and  
removal of hardware with polyethylene exchange   
on intravenous antibiotics through mid  
April.  
2. History of methicillin-sensitive   
Staphylococcus aureus infection.  
3. History of anemia requiring transfusions.  
4. History of atrial fibrillation.  
5. Coronary artery disease status post non-ST   
elevation myocardial infarction,  
status post re-stenting and percutaneous   
transluminal coronary angioplasty.  
6. Hypertension.  
7. History of sacral decubitus ulcer status post   
recent flap surgery.  
8. Debility.  
9. Depression.  
10. History of hypotension.  
11. History of osteomyelitis.  
12. History of constipation.  
  
PLAN: Continue current plan of care, physical   
and occupational therapy, wound care,  
antibiotics. Awaiting consult.  
  
_______________________  
Michael Robles MD  
  
RG/[id number]  
DD: 2021 12:02  
DT: 2021 15:25  
SSI File#:   
[id number]  
Job #: [id number]  
  
Verified/Reviewed by  
  
[facility] PATIENT NAME:   
JAYDEN GUTIERREZ [facility] [address] MED[facility] REC #: [id number]  
[place] [address] ACCOUNT #: [id number]  
ADMIT DATE:  
DISCHARGE DATE:  
ATTENDING PHY: Michael Robles MD  
  
  
Electronically signed by Michael Robles at   
2021 4:46 AM EDTdocumented in this   
encounter                               [facility]  
   
                                                    2021 History   
of Present illness   
Narrative                                 
  
  
Formatting of this note might be different from   
the original.  
DATE OF SERVICE: 2021  
  
SUBJECTIVE: She is resting in her bed in no   
distress. She says that her sleep was  
interrupted from time to time with some   
discomfort in her left knee, and there has  
been a little swelling there as well. She saw   
infectious disease, has not seen the  
wound care doctor yet, and cardiology apparently   
has not been following her up here.  
  
OBJECTIVE: Vital signs include a temperature of   
98.4, pulse 84, respiratory rate is  
18, blood pressure 134/76, oxygen is 100%. Her   
lungs are clear but diminished.  
Heart has got a regular rate and rhythm. Abdomen   
is soft, nontender. Bowel sounds  
are positive. Extremities: There is no edema on   
the right. The left knee is mildly  
swollen. It is not warm. The dressing is dry,   
clean, and intact.  
  
LABORATORIES: There were no new laboratories   
today.  
  
IMPRESSION:  
1. 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   
status post incision and drainage and  
removal of hardware with polyethylene exchange.   
On intravenous antibiotics through  
mid-April.  
2. History of methicillin sensitive   
Staphylococcus aureus infection.  
3. History of anemia, requiring transfusions.  
4. History of atrial fibrillation.  
5. Coronary artery disease, status post non-ST   
elevation myocardial infarction,  
status post re-stenting and percutaneous   
transluminal coronary angioplasty.  
6. Hypertension.  
7. Debility.  
8. History of sacral decubitus ulcers status   
post recent flap surgery.  
9. Depression.  
10. History of hypotension.  
11. History of constipation.  
12. History of osteomyelitis.  
  
PLAN: Continue current plan of care with   
antibiotics and wound care, and I will ask  
cardiology to continue to follow her after her   
procedure.  
  
_______________________  
MD ELIO Ventura/[id number]  
DD: 2021 10:35  
DT: 2021 11:08  
SSI File#:   
[id number]  
Job #: [id number]  
  
Verified/Reviewed by  
  
[facility] PATIENT NAME:   
JAYDEN GUTIERREZ  
[address] MEDICAL REC #: [id number]  
[place] [address] ACCOUNT #: [id number]  
ADMIT DATE:  
DISCHARGE DATE:  
ATTENDING PHY: Michael Robles MD  
  
  
Electronically signed by Michael Robles at   
2021 11:52 AM EDTdocumented in this   
encounter                               [facility]  
   
                                2021 Note                 [facility]  
   
                                                    2021 History   
of Present illness   
Narrative                                 
  
  
Formatting of this note might be different from   
the original.  
DATE OF SERVICE: 2021  
  
SUBJECTIVE: She is resting in her bed looking   
forward to hopefully going home after  
the weekend. Apparently yesterday she had some   
dehiscence of her wound and the  
surgeon apparently threw 4 more stitches in   
according to the patient without any  
local anesthesia and she felt every suture. She   
is otherwise in good spirits.  
Denies any itching from the rash on her back.  
  
OBJECTIVE: Vital Signs: Her vitals include a   
temperature of 99.1, pulse 81,  
respirations 16, blood pressure 126/68, oxygen   
is 97%. Lungs: Her lungs are clear.  
Heart: Has a regular rate and rhythm. Abdomen:   
Soft, nontender. Bowel sounds are  
positive. Extremities: No edema. Her dressings   
are intact.  
  
LABORATORIES: There were new labs today.  
  
IMPRESSION:  
1. Stage 4 sacral ulcer status post flap   
surgery.  
2. Intertriginous Candida.  
3. Left calcaneus fracture.  
4. Ulcer of the left foot with fat layer   
exposed.  
5. Acute osteomyelitis of the left calcaneus.  
6. History of paraspinal abscess.  
7. History of psoas muscle abscess.  
8. Epidural abscess, status post-surgery.  
9. Hypertension.  
10. Malnutrition.  
11. Debility.  
12. Constipation.  
13. Type 1 diabetes with neuropathy.  
  
PLAN: Continue the current plan of care with   
physical and occupational therapy and  
rehabilitation and flap management per surgery,   
discharge planning.  
  
_______________________  
Michael Robles MD  
  
/[id number]  
DD: 2021 10:34  
DT: 2021 11:10  
SSI File#:   
[id number]  
Job #: [id number]  
  
Verified/Reviewed by  
  
[facility] PATIENT NAME:   
JAYDEN GUTIERREZ [address] MED[facility] REC #: [id number]  
[place] [id number] ACCOUNT #: [id number]  
ADMIT DATE:  
DISCHARGE DATE:  
ATTENDING PHY: Annalee Eckert DO  
  
  
Electronically signed by Michael Robles at   
2021 11:24 AM ESTdocumented in this   
encounter                               [facility]  
   
                                                    2021 History   
of Present illness   
Narrative                                 
  
  
Formatting of this note might be different from   
the original.  
DATE OF SERVICE: 2021  
  
SUBJECTIVE: She is resting in her bed. She says   
her constipation was relieved by  
the Fleet's enema yesterday. She is in good   
spirits. Finally, the stitches have  
been removed from her back flap. She says things   
are comfortable there and not  
itching. She is not complaining of any pain and   
is looking forward to being released  
in a day or two.  
  
PHYSICAL EXAMINATION: Vital Signs: Include a   
temperature of 98.7, pulse 60,  
respirations 14, blood pressure 126/65. Lungs:   
Her lungs are clear. Cardiac: Her  
heart has got a regular rate and rhythm.   
Abdomen: Soft, nontender. Bowel sounds  
are positive. Extremities: No edema. Wrappings   
are intact.  
  
LABORATORIES: There were no new labs today.  
  
IMPRESSION:  
1. Stage 4 sacral ulcer status post flap   
surgery.  
2. Intertriginous candida.  
3. Left calcaneus fracture.  
4. Ulcer of the left foot with fat layer   
exposed.  
5. Acute osteomyelitis of left calcaneus.  
6. History of paraspinal abscess.  
7. History of psoas muscle abscess.  
8. Epidural abscess status post surgery.  
9. Hypertension.  
10. Debility.  
11. Malnutrition.  
12. Type 1 diabetes with neuropathy.  
13. Constipation.  
  
PLAN: Continue physical and occupational therapy   
rehabilitation, and wound care, and  
discharge planning.  
  
_______________________  
MD ELIO Ventura/[id number]  
DD: 2021 11:14  
DT: 2021 11:47  
SSI File#:   
[id number]  
Job #: [id number]  
  
Verified/Reviewed by  
  
[facility] PATIENT NAME:   
[address] MEDICAL REC #: [id number]  
[place] [address] ACCOUNT #: [id number]  
ADMIT DATE:  
DISCHARGE DATE:  
ATTENDING PHY: Annalee Eckert DO  
  
  
Electronically signed by Michael Robles at   
2021 9:40 AM ESTdocumented in this   
encounter                               [facility]  
   
                                                    2021 History   
of Present illness   
Narrative                                 
  
  
Formatting of this note might be different from   
the original.  
DATE OF SERVICE: 2021  
  
Patient is doing okay this morning. She is back   
to being constipated again. Is  
requesting an enema so I did write for 1 daily   
p.r.n. and hopefully that will help.  
Plan is now for discharge home on Friday. They   
were supposed to discontinue her  
Bonilla yesterday, but I noticed it was still in.   
She denies any other complaints.  
  
PHYSICAL EXAMINATION: Vitals: Stable. Heart:   
Regular. Lungs: Clear. Abdomen:  
Benign. Extremities: No edema.  
  
IMPRESSION:  
1. Stage IV sacral ulcer status post flap   
surgery.  
2. Intertriginous candida.  
3. Left calcaneal fracture.  
4. Ulcer of the left foot with fat layer   
exposed.  
5. Acute osteomyelitis of left calcaneus.  
6. History of paraspinal abscess.  
7. Psoas muscle abscess.  
8. Epidural abscess status post surgery.  
9. Malnutrition.  
10. Hypertension.  
11. Debility.  
12. Type 1 diabetes with neuropathy.  
13. Constipation.  
  
PLAN: Will get a Fleet enema today. Will   
continue with PT, OT, rehab and wound  
care. Plan is again to discharge home on Friday.  
  
_______________________  
Annalee Eckert DO  
  
LE/[id number]  
DD: 2021 10:40  
DT: 2021 11:09  
SSI File#:   
[id number]  
Job #: [id number]  
  
Verified/Reviewed by  
  
[facility] PATIENT NAME:   
JAYDEN GUTIERRE[address] MEDICAL REC #: [id number]  
[place] [address] ACCOUNT #: [id number]  
ADMIT DATE:  
DISCHARGE DATE:  
ATTENDING PHY: Annalee Eckert DO  
  
  
Electronically signed by Annalee Eckert at   
2021 1:30 PM ESTdocumented in this   
encounter                               [facility]  
   
                                                    02- History   
of Present illness   
Narrative                                 
  
  
Formatting of this note might be different from   
the original.  
DATE OF SERVICE: 02/15/2021  
  
SUBJECTIVE: Patient is doing well this morning.   
She would like to go home on the  
insulin pen versus the injectable, which is   
perfectly fine. We are going to  
discontinue her Bonilla today. Plan is for   
discharge on Wednesday with home health  
care.  
  
OBJECTIVE: Vitals are stable. White count 11.6,   
hemoglobin 9.7, hematocrit 31.8,  
platelets of 409. Sodium 141, potassium 4.3,   
chloride 108, CO2 of 29, BUN 27,  
creatinine 0.58. Heart is regular. Lungs are   
clear. Abdomen is benign.  
Extremities: No edema.  
  
IMPRESSION:  
1. Stage IV sacral ulcer, status post flap   
surgery.  
2. Candidal rash in the skin folds.  
3. Left calcaneus fracture.  
4. Ulcer of the left foot with fat layer   
exposed.  
5. Acute osteomyelitis of the left calcaneus.  
6. History of paraspinal abscess.  
7. Psoas muscle abscess.  
8. Epidural abscess, status post surgery.  
9. Malnutrition.  
10. Hypertension.  
11. Debility.  
12. Type 1 diabetes with neuropathy.  
  
PLAN: Will continue with current plan of care,   
PT, OT, rehab and wound care. Plan  
is to discharge home on Wednesday with home   
health care.  
  
_______________________  
Annalee Eckert DO  
  
LE/[id number]  
DD: 02/15/2021 10:50  
DT: 02/15/2021 11:44  
SSI File#:   
[id number]  
Job #: [id number]  
  
Verified/Reviewed by  
  
[facility] PATIENT NAME:   
JAYDEN GUTIERREZ [facility] [address] [address] MEDICAL REC #: [id number]  
[place] [address] ACCOUNT #: [id number]  
ADMIT DATE:  
DISCHARGE DATE:  
ATTENDING PHY: Annalee Eckert DO  
  
  
Electronically signed by Annalee Eckert at   
2021 10:19 AM ESTdocumented in this   
encounter                               [facility]  
   
                                                    2021 History   
of Present illness   
Narrative                                 
  
  
Formatting of this note might be different from   
the original.  
DATE OF SERVICE: 2021  
  
SUBJECTIVE: Patient is resting this morning. No   
new issues with her overnight.  
  
OBJECTIVE: Blood pressure 112/62, pulse 70,   
respirations 18, temp 98, O2 saturation  
99%. Heart is regular. Lungs are clear. Abdomen   
is benign. Extremities: No  
edema.  
  
IMPRESSION:  
1. Stage IV sacral ulcer, status post flap   
surgery.  
2. Candidiasis rash in the skin folds.  
3. Left calcaneus fracture.  
4. Ulcer of the left foot with fat layer   
exposed.  
5. Acute osteomyelitis of the left calcaneus.  
6. History of methicillin-sensitive   
Staphylococcus aureus bacteremia.  
7. Paraspinal abscess.  
8. History of spinal cord infection.  
9. Psoas muscle abscess.  
10. Epidural abscess, status post surgery.  
11. Malnutrition.  
12. Hypertension.  
13. Debility.  
14. Constipation, improved.  
15. Type 1 diabetes with neuropathy.  
16. Hemorrhoids.  
  
PLAN: Will continue with current plan of care,   
PT, OT, rehab and wound care. Plan  
is to discharge home on Tuesday.  
  
_______________________  
DO SOFI Cruz/[id number]  
DD: 2021 10:09  
DT: 2021 11:30  
SSI File#:   
[id number]  
Job #: [id number]  
  
Verified/Reviewed by  
  
[facility] PATIENT NAME:   
JAYDEN GUTIERREZ [facility] [address] [facility] MEDICAL REC #: [id number]  
[place] [address] ACCOUNT #: [id number]  
ADMIT DATE:  
DISCHARGE DATE:  
ATTENDING PHY: Annalee Eckert DO  
  
  
Electronically signed by Annalee Eckert at   
02/15/2021 10:53 AM ESTdocumented in this   
encounter                               [facility]  
   
                                                    02- History   
of Present illness   
Narrative                                 
  
  
Formatting of this note might be different from   
the original.  
DATE OF SERVICE: 02/10/2021  
  
SUBJECTIVE: She is resting in her bed, hoping to   
go home soon. She says she has  
been in hospitals for 5 months. She says that   
her back is not itching, and she  
thinks her wound is healing well.  
  
OBJECTIVE: Vital Signs: Include a temperature of   
97.6, pulse 74, respires 18, blood  
pressure 110/60. Lungs: Clear but diminished.   
Heart: Regular rate and rhythm.  
Abdomen: Soft, nontender. Benign. Extremities:   
No edema.  
  
LABORATORY RESULTS: There were no new labs   
today.  
  
IMPRESSION:  
1. Stage IV sacral ulcer status post flap   
surgery.  
2. Surrounding rash consistent with what appears   
to be a fungal infection.  
3. Left calcaneus fracture.  
4. Ulcer of the left foot with fat layer   
exposed.  
5. Acute osteomyelitis of the left calcaneus.  
6. History of methicillin-sensitive   
Staphylococcus aureus bacteremia.  
7. Paraspinal abscess.  
8. History of spinal cord infection.  
9. Psoas muscle abscess.  
10. Epidural abscess status post surgery.  
11. Malnutrition.  
12. Hypertension.  
13. Debility.  
14. Constipation, resolved.  
15. Type 1 diabetes with neuropathy.  
16. Hemorrhoids.  
  
PLAN: Continue current plan of care with   
physical and occupational therapy, rehab,  
and wound care.  
  
_______________________  
MD ELIO Ventura/[id number]  
DD: 02/10/2021 10:46  
DT: 02/10/2021 11:15  
SSI File#:   
[id number]  
Job #: [id number]  
  
Verified/Reviewed by  
  
[facility] PATIENT NAME:   
JAYDEN GUTIERREZ [address] [address] MEDICAL REC #: [id number]  
[place] [address] ACCOUNT #: [id number]  
ADMIT DATE:  
DISCHARGE DATE:  
ATTENDING PHY: Annalee Eckert DO  
  
  
Electronically signed by Michael Robles at   
2021 9:59 AM ESTdocumented in this   
encounter                               [facility]  
   
                                                    2021 History   
of Present illness   
Narrative                                 
  
  
Formatting of this note might be different from   
the original.  
DATE OF SERVICE: 2021  
  
SUBJECTIVE: Patient is doing well this morning.   
She said she got up to the side of  
the bed and even used the bedside commode   
yesterday, so she is making progress. I  
told her if by the end of the week she has   
continued to improve, we will possibly get  
her downstairs to rehab.  
  
OBJECTIVE: Vital Signs: Stable. Heart: Regular.   
Lungs: Diminished at the bases.  
Abdomen: Benign. Extremities: No edema.  
  
LABORATORY DATA: Her white count is 12.5,   
hemoglobin 9.2, hematocrit 30.3, platelets  
of 383.  
  
IMPRESSION:  
1. Stage IV sacral ulcer, status post flap   
surgery.  
2. Surrounding rash, consistent with what   
appears to be a fungal infection.  
3. Left calcaneus fracture.  
4. Ulcer of the left foot, with fat layer   
exposed.  
5. Acute osteomyelitis of the left calcaneus.  
6. History of MSSA bacteremia.  
7. Paraspinal abscess.  
8. History of spinal cord infection.  
9. Psoas muscle abscess.  
10. Epidural abscess, status post surgery.  
11. Malnutrition.  
12. Debility.  
13. Hypertension.  
14. Type 1 diabetes with neuropathy.  
15. Constipation.  
16. Hemorrhoids.  
  
PLAN: Will continue with current plan of care,   
PT, OT, rehab and wound care.  
  
_______________________  
DO SOFI Cruz/[id number]  
DD: 2021 12:55  
DT: 2021 13:57  
SSI File#:   
[id number]  
Job #: [id number]  
  
Verified/Reviewed by  
  
[facility] PATIENT NAME:   
JAYDEN GUTIERREZ [address] MEDICAL REC #: [id number]  
[place] [address] ACCOUNT #: [id number]  
ADMIT DATE:  
DISCHARGE DATE:  
ATTENDING PHY: Stalin Menjivar MD  
  
  
Electronically signed by Annalee Eckert at   
2021 1:27 PM ESTdocumented in this   
encounter                               [facility]  
   
                                                    2021 History   
of Present illness   
Narrative                                 
  
  
Formatting of this note might be different from   
the original.  
DATE OF SERVICE: 2021  
  
SUBJECTIVE: She is resting very soundly in her   
room right now, in no distress.  
  
OBJECTIVE: Vital signs include a temperature of   
98.3, pulse 76, respiratory rate is  
16, blood pressure 118/60, oxygen is 99%. Her   
lungs are clear but diminished. Heart  
has got a regular rate and rhythm. Abdomen is   
soft, nontender. Bowel sounds are  
positive. Extremities: No edema.  
  
LABORATORIES: There were no new laboratories   
today.  
  
IMPRESSION:  
1. Stage 4 sacral ulcer disease, status post   
flap surgery.  
2. Surrounding rash consistent with what appears   
to be a fungal infection.  
3. Left calcaneus fracture.  
4. Ulcer of the left foot with fat layer   
exposed.  
5. Acute osteomyelitis of the left calcaneus.  
6. History of methicillin-sensitive   
Staphylococcus aureus bacteremia.  
7. Paraspinal abscess.  
8. Psoas muscle abscess.  
9. History of spinal cord infection  
10. Epidural abscess, status post surgery.  
11. Malnutrition.  
12. Hypertension.  
13. Debility.  
14. Type 1 diabetes with neuropathy.  
15. Constipation, resolved.  
16. Hemorrhoids.  
  
PLAN: Continue current plan of care with   
therapies, rehabilitation, and wound care.  
  
_______________________  
MD ELIO Ventura/[id number]  
DD: 2021 10:49  
DT: 2021 11:35  
SSI File#:   
[id number]  
Job #: [id number]  
  
Verified/Reviewed by  
  
[facility] PATIENT NAME:   
JAYDEN GUTIERREZ [address] MEDICAL REC #: [id number]  
[place] [address] ACCOUNT #: [id number]  
ADMIT DATE:  
DISCHARGE DATE:  
ATTENDING PHY: Stalin Menjivar MD  
  
  
Electronically signed by Michael Robles at   
2021 8:59 AM ESTdocumented in this   
encounter                               [facility]  
   
                                                    2021 History   
of Present illness   
Narrative                                 
  
  
Formatting of this note might be different from   
the original.  
DATE OF SERVICE: 2021  
  
SUBJECTIVE: She is resting in her bed in no   
distress. She has no complaints at this  
time.  
  
OBJECTIVE: Vitals: temperature 98.3, pulse 72,   
respiratory rate 18, blood pressure  
18, blood pressure 101/66, oxygen saturation   
100%. Lungs: Clear, but diminished at  
the bases. Heart: Regular rate and rhythm.   
Abdomen: Soft, nontender, benign.  
Extremities: No edema.  
  
LABORATORIES: There were no new labs today.  
  
IMPRESSION:  
1. Stage 4 sacral ulcer status post flap   
surgery.  
2. Surrounding rash consistent with what appears   
to be a fungal infection.  
3. Left calcaneus fracture.  
4. Ulcer of the left foot with fat layer   
exposed.  
5. Acute osteomyelitis of the left calcaneus.  
6. History of methicillin-sensitive   
Staphylococcus aureus bacteremia.  
7. Paraspinal abscess.  
8. History of spinal cord infection.  
9. Psoas muscle abscess.  
10. Epidural abscess status post surgery.  
11. Malnutrition.  
12. Debility.  
13. Hypertension.  
14. Type 1 diabetes with neuropathy.  
15. Constipation, resolved.  
16. Hemorrhoids.  
  
PLAN: Continue current plan of care. _____   
rehabilitation and wound care.  
  
_______________________  
MD ELIO Ventura/[id number]  
DD: 2021 09:46  
DT: 2021 11:02  
SSI File#:   
[id number]  
Job #: [id number]  
  
Verified/Reviewed by  
  
[facility] PATIENT NAME:   
JAYDEN GUTIERREZ [facility] [address] MEDICAL REC #: [id number]  
[place] [address] ACCOUNT #: [id number]  
ADMIT DATE:  
DISCHARGE DATE:  
ATTENDING PHY: Annalee Eckert DO  
  
  
Electronically signed by Michael Robles at   
2021 9:31 AM ESTdocumented in this   
encounter                               [facility]  
   
                                                    2021 History   
of Present illness   
Narrative                                 
  
  
Formatting of this note might be different from   
the original.  
DATE OF SERVICE: 2021  
  
SUBJECTIVE: Patient is doing well this morning.   
No new issues overnight.  
  
OBJECTIVE: Blood pressure 111/65, pulse 67,   
respirations 18, O2 saturation 99%.  
Heart is regular. Lungs: Diminished in the   
bases. Abdomen is benign. Extremities:  
No edema.  
  
Her white count is 12, hemoglobin 9.0,   
hematocrit 29.9, platelets of 381. Sodium was  
142, potassium 4.4, chloride 108, CO2 of 28, BUN   
28, creatinine 0.58.  
  
IMPRESSION:  
1. Stage 4 sacral ulcer status post flap   
surgery.  
2. Surrounding rash consistent with what appears   
to be a fungal infection.  
3. Left calcaneus fracture.  
4. Ulcer to the left foot with fat layer   
exposed.  
5. Acute osteomyelitis of the left calcaneus.  
6. History of methicillin-susceptible   
Staphylococcus aureus bacteremia.  
7. Paraspinal abscess.  
8. History of spinal cord infection.  
9. Psoas muscle abscess.  
10. Epidural abscess status post surgery.  
11. Malnutrition.  
12. Debility.  
13. Hypertension.  
14. Type 1 diabetes with neuropathy.  
15. Hemorrhoids.  
16. Constipation.  
  
PLAN: We will continue with current plan of   
care, PT, OT, and wound care.  
  
_______________________  
DO SOFI Cruz/[id number]  
DD: 2021 14:52  
DT: 2021 18:17  
SSI File#:   
[id number]  
Job #: [id number]  
  
Verified/Reviewed by  
  
[facility] PATIENT NAME:   
JAYDEN GUTIERREZ  
[address] MEDICAL REC #: [id number]  
[place] [address] ACCOUNT #: [id number]  
ADMIT DATE:  
DISCHARGE DATE:  
ATTENDING PHY: Stalin Menjivar MD  
  
  
Electronically signed by Annalee Eckert at   
2021 9:27 AM ESTdocumented in this   
encounter                               [facility]  
   
                                                    2021 History   
of Present illness   
Narrative                                 
  
  
Formatting of this note might be different from   
the original.  
DATE OF SERVICE: 2021  
  
SUBJECTIVE: She is resting in her bed. She still   
has a bit of a rash around her  
flap but it is apparently better according to   
the nurses. She feels well.  
  
PHYSICAL EXAMINATION: Vitals: Temperature of   
97.5, respirations 26, pulse is 67,  
blood pressure is 106/55. Lungs: Diminished at   
the bases. Heart: Regular rate and  
rhythm. Abdomen: Soft, nontender. Extremities:   
No edema.  
  
LABORATORIES: There were no new labs today.  
  
IMPRESSION:  
1. Stage 4 sacral ulcer, status post flap   
surgery.  
2. Surrounding rash, consistent with what   
appears to be a fungal infection.  
3. Left calcaneus fracture.  
4. Ulcer to the left foot with fat layer   
exposed.  
5. Acute osteomyelitis of the left calcaneus.  
6. History of methicillin-sensitive   
Staphylococcus aureus bacteremia.  
7. Paraspinal abscess.  
8. History of spinal cord infection.  
9. Psoas muscle abscess.  
10. Epidural abscess, status post surgery.  
11. Malnutrition.  
12. Debility.  
13. Hypertension.  
14. Type 1 diabetes with neuropathy.  
15. Hemorrhoids.  
16. Constipation, resolved.  
  
PLAN: Continue current plan of care with   
physical and occupational therapy, rehab,  
and wound care.  
  
_______________________  
MD ELIO Ventura/[id number]  
DD: 2021 10:15  
DT: 2021 11:46  
SSI File#:   
[id number]  
Job #: [id number]  
  
Verified/Reviewed by  
  
[facility] PATIENT NAME:   
JAYDEN GUTIERREZ  
[address] MEDICAL REC #: [id number]  
[place] [address] ACCOUNT #: [id number]  
ADMIT DATE:  
DISCHARGE DATE:  
ATTENDING PHY: Annalee Eckert DO  
  
  
Electronically signed by Michael Robles at   
2021 8:34 AM ESTdocumented in this   
encounter                               [facility]  
   
                                                    2021 History   
of Present illness   
Narrative                                 
  
  
Formatting of this note might be different from   
the original.  
DATE OF SERVICE: 2021  
  
SUBJECTIVE: The patient is doing okay today.   
Still no bowel movement. She is  
asking if I can order her a suppository daily,   
so I did do that.  
  
OBJECTIVE: Vital Signs: Her vitals are stable.   
Heart: Regular. Lungs: Clear.  
Abdomen: Benign. Extremities: No edema.  
  
IMPRESSION:  
1. Stage IV sacral ulcer. Patient is status post   
flap surgery.  
2. Surrounding rash consistent with what appears   
to be a fungal infection.  
3. Left calcaneus fracture.  
4. Ulcer of the left foot with fat layer   
exposed.  
5. Acute osteomyelitis of the left calcaneus.  
6. History of methicillin-sensitive   
Staphylococcus aureus bacteremia.  
7. Paraspinal abscess.  
8. History of spinal cord infection.  
9. Psoas muscle abscess.  
10. Epidural abscess status post surgery.  
11. Constipation. We will add the Dulcolax   
suppository daily and also change her  
hemorrhoid cream to Anusol-HC.  
12. Malnutrition  
13. Debility.  
14. Hypertension.  
15. Type 1 diabetes with neuropathy.  
16. Hemorrhoids.  
  
PLAN: Will continue with current plan of care,   
PT, OT, rehab, and wound care, and  
did make those changes in her bowel regimen   
also.  
  
_______________________  
Annalee EckertDO  
  
SOFI/[id number]  
DD: 2021 12:49  
DT: 2021 15:47  
SSI File#:   
[id number]  
Job #: [id number]  
  
Verified/Reviewed by  
  
[facility] PATIENT NAME:   
JAYDEN GUTIERREZ [facility] [address] MEDICAL REC #: [id number]  
[place] [address] ACCOUNT #: [id number]  
ADMIT DATE:  
DISCHARGE DATE:  
ATTENDING PHY: Stalin Menjivar MD  
  
  
Electronically signed by Annalee Eckert at   
2021 2:16 PM ESTdocumented in this   
encounter                               [facility]  
   
                                                    2021 History   
of Present illness   
Narrative                                 
  
  
Formatting of this note might be different from   
the original.  
DATE OF SERVICE: 2021  
  
SUBJECTIVE: Patient is doing well today. She   
states her surgery went well, and the  
wound seems to be healing over well.  
  
OBJECTIVE: Her blood pressure is 128/68, pulse   
67, respirations 18, temp 97.2, O2  
saturation 97%. Heart: Regular. Lungs:   
Diminished at the bases. Abdomen:  
Benign. Extremities: No edema.  
  
White count of 10.6, hemoglobin 8.4, hematocrit   
28.1, platelets of 296. Sodium is  
140, potassium 4.1, chloride 108, CO2 of 29, BUN   
18, creatinine 0.54.  
  
IMPRESSION:  
1. Stage 4 sacral ulcer. Patient is status post   
flap surgery last Friday.  
2. Surrounding rash consistent with what   
appeared to be a fungal infection.  
3. Left calcaneus fracture.  
4. Ulcer to the left foot with fat layer   
exposed.  
5. Acute osteomyelitis of the left calcaneus.  
6. History of methicillin-sensitive   
Staphylococcus aureus bacteremia.  
7. Paraspinal abscess.  
8. History of spinal cord infection.  
9. Psoas muscle abscess.  
10. Epidural abscess status post surgery.  
11. Constipation, improving.  
12. Malnutrition.  
13. Debility.  
14. Hypertension.  
15. Type 1 diabetes with neuropathy.  
16. Hemorrhoids.  
  
PLAN: We will continue with current plan of   
care, PT, OT, rehab, and wound care.  
  
_______________________  
Annalee Eckert DO  
  
/[id number]  
DD: 2021 10:36  
DT: 2021 13:29  
SSI File#:   
[id number]  
Job #: [id number]  
  
Verified/Reviewed by  
  
[facility] PATIENT NAME:   
JAYDEN GUTIERREZ [address] MEDICAL REC #: [id number]  
[place] [address] ACCOUNT #: [id number]  
ADMIT DATE:  
DISCHARGE DATE:  
ATTENDING PHY: Annalee Eckert DO  
  
  
Electronically signed by Annalee Eckert at   
2021 11:59 AM ESTdocumented in this   
encounter                               [facility]  
   
                                                    2021 History   
of Present illness   
Narrative                                 
  
  
Formatting of this note might be different from   
the original.  
DATE OF SERVICE: 2021  
  
I came to see patient today, but unfortunately   
she remains off the floor getting her  
procedure.  
  
_______________________  
Michael Robles MD  
  
/[id number]  
DD: 2021 10:20  
DT: 2021 11:18  
SSI File#:   
[id number]  
Job #: [id number]  
  
Verified/Reviewed by  
  
[facility] PATIENT NAME:   
JAYDEN GUTIERREZ [address] MEDICAL REC #: [id number]  
[place] [id number] ACCOUNT #: [id number]  
ADMIT DATE:  
DISCHARGE DATE:  
ATTENDING PHY: Stalin Menjivar MD  
  
  
Electronically signed by Michael Robles at   
2021 8:43 AM ESTdocumented in this   
encounter                               [facility]  
   
                                                    2021 History   
of Present illness   
Narrative                                 
  
  
Formatting of this note might be different from   
the original.  
DATE OF SERVICE: 2021  
  
SUBJECTIVE: Patient is doing well today. She is   
scheduled for her surgery tomorrow.  
I did adjust her insulin for this evening so   
that she is only getting half her  
Lantus since she is going to be NPO.  
  
OBJECTIVE: Vital Signs: Blood pressure is   
112/62, pulse 70, respiratory rate 20,  
temp 97.6, O2 saturation 99%. Heart: Regular.   
Lungs: Clear. Abdomen: Benign.  
Extremities: No edema.  
  
LABORATORY: White count of 12.2, hemoglobin   
10.5, hematocrit 34.8, platelets of  
347,000. Sodium is 141, potassium 4.5, chloride   
101.5, CO2 of 31, BUN 28, creatinine  
0.62.  
  
IMPRESSION:  
1. Stage IV sacral ulcer. Patient to go to   
surgery tomorrow for a flap.  
2. Surrounding rash consistent with what   
appeared to be a fungal infection.  
3. Left calcaneus fracture.  
4. Ulcer to the left foot with fat layer   
exposed.  
5. Acute osteomyelitis of the left calcaneus.  
6. History of methicillin-sensitive   
Staphylococcus aureus bacteremia.  
7. Paraspinal abscess.  
8. History of spinal cord infection.  
9. Psoas muscle abscess.  
10. Epidural abscess status post surgery.  
11. Constipation, improving.  
12. Malnutrition.  
13. Debility.  
14. Hypertension.  
15. Type 1 diabetes with neuropathy.  
16. Hemorrhoids.  
  
PLAN: Will continue to hold her Plavix and   
aspirin. I did adjust her insulin for  
this evening and she is currently on the   
nystatin cream for the yeast infection.  
  
_______________________  
DO SOFI Cruz/[id number]  
DD: 2021 12:07  
DT: 2021 15:11  
SSI File#:   
[id number]  
Job #: [id number]  
  
Verified/Reviewed by  
  
[facility] PATIENT NAME:   
JAYDEN GUTIERREZ [facility] [address] MEDICAL REC #: [id number]  
[place] [address] ACCOUNT #: [id number]  
ADMIT DATE:  
DISCHARGE DATE:  
ATTENDING PHY: Annalee Eckert DO  
  
  
Electronically signed by Annalee Eckert at   
2021 9:56 AM ESTdocumented in this   
encounter                               [facility]  
   
                                                    2021 History   
of Present illness   
Narrative                                 
  
  
Formatting of this note might be different from   
the original.  
DATE OF SERVICE: 2021  
  
SUBJECTIVE: She is resting in her bed. The nurse   
mentions that she has an area  
surrounding her decubitus ulcer where the skin   
is mottled or red and quite itchy.  
The Calmoseptine cream does not appear to be   
helping at all. Otherwise, she is  
feeling fine and denies any other problems but   
admits to the itch.  
  
PHYSICAL EXAMINATION: Vitals: Include a   
temperature of 97.4, pulse 65, respirations  
11, blood pressure is 128/70, oxygen is 99%.   
Lungs: Clear. Heart: Regular rate  
and rhythm. Abdomen: Soft, nontender, benign.   
Extremities: No edema. Skin: On  
her buttocks, there is a widespread area of   
reddish rash surrounding her ulcer.  
  
IMPRESSION:  
1. Stage 4 ulcer on the sacrum.  
2. Surrounding rash consistent with what   
appeared to be a fungal infection.  
3. Left calcaneus fracture.  
4. Ulcer to the left foot with fat layer   
exposed.  
5. Acute osteomyelitis of the left calcaneus.  
6. History of methicillin-sensitive   
Staphylococcus aureus bacteremia.  
7. Paraspinal abscess.  
8. History of spinal cord infection.  
9. Psoas muscle abscess.  
10. Epidural abscess, status post surgery.  
11. Constipation, improved.  
12. Malnutrition.  
13. Debility.  
14. Hypertension.  
15. Type 1 diabetes with neuropathy.  
16. Hemorrhoids.  
  
PLAN: Continue to hold Plavix and aspirin in   
anticipation of her flap procedure this  
Friday. In the meantime, we will add nystatin   
cream to the area twice daily and  
continue physical, occupational, rehab, and   
wound care.  
  
_______________________  
Michael Robles MD  
  
[id number]  
DD: 2021 10:42  
DT: 2021 11:46  
SSI File#:   
[id number]  
Job #: [id number]  
  
Verified/Reviewed by  
  
[facility] PATIENT NAME:   
JAYDEN GUTIERREZ [facility] MEDICAL REC #: [id number]  
[place] [address] ACCOUNT #: [id number]  
ADMIT DATE:  
DISCHARGE DATE:  
ATTENDING PHY: Annalee Eckert DO  
  
  
Electronically signed by Michael Robles at   
2021 8:49 AM ESTdocumented in this   
encounter                               [facility]  
   
                                                    2021 History   
of Present illness   
Narrative                                 
  
  
Formatting of this note might be different from   
the original.  
DATE OF SERVICE: 2021  
  
SUBJECTIVE: Patient is doing well today. She has   
no complaints. I did hold her  
aspirin today as well for her procedure on   
Friday.  
  
OBJECTIVE: Blood pressure is 134/67, pulse 71,   
respirations 11, temperature 98.3, O2  
saturation 98%. Heart is regular. Lungs are   
clear. Abdomen is benign.  
Extremities, no edema.  
  
IMPRESSION:  
1. Stage 4 sacral ulcer.  
2. Left calcaneus fracture  
3. Ulcer to the left foot with fat layer   
exposed.  
4. Acute osteomyelitis of the left calcaneus.  
5. History of methicillin-sensitive   
staphylococcus aureus bacteremia.  
6. Paraspinal abscess.  
7. History of spinal cord infection.  
8. Psoas muscle abscess.  
9. Epidural abscess status post-surgery.  
10. Malnutrition.  
11. Constipation, improved.  
12. Debility.  
13. Hypertension.  
14. Type 1 diabetes with neuropathy.  
15. Hemorrhoids.  
  
PLAN: We will continue to hold her Plavix and   
aspirin. Patient going to have a flap  
procedure on Friday. We will continue with   
PT/OT/rehabilitation and wound care.  
  
_______________________  
DO SOFI Cruz/[id number]  
DD: 2021 12:41  
DT: 2021 13:47  
SSI File#:   
[id number]  
Job #: [id number]  
  
Verified/Reviewed by  
  
[facility] PATIENT NAME:   
JAYDEN GUTIERREZ [facility] MEDICAL REC #: [id number]  
[place] [address] ACCOUNT #: [id number]  
ADMIT DATE:  
DISCHARGE DATE:  
ATTENDING PHY: Annalee Eckert DO  
  
  
Electronically signed by Annalee Eckert at   
2021 11:19 AM ESTdocumented in this   
encounter                               [facility]  
   
                                                    2021 History   
of Present illness   
Narrative                                 
  
  
Formatting of this note might be different from   
the original.  
DATE OF SERVICE: 2021  
  
SUBJECTIVE: Patient is doing okay today. She is   
sitting up eating her lunch. She  
states her bowels are still difficult to move at   
times but she just started on the  
MiraLAX daily so hopefully that will help.  
  
OBJECTIVE: Vital Signs: Blood pressure is   
132/56, pulse 81, respirations 18,  
temperature 98.1. Heart: Regular. Lungs: Clear.   
Abdomen: Benign. Extremities:  
No edema.  
  
LABORATORY DATA: Her white count today is 14.2,   
hemoglobin 10.9, hematocrit 34.4,  
platelets 360. Sodium 139, potassium 3.8,   
chloride 103, CO2 of 30, BUN 30,  
creatinine 0.49.  
  
IMPRESSION:  
1. Stage 4 sacral ulcer.  
2. Left calcaneus fracture.  
3. Ulcer of the left foot with fat layer   
exposed.  
4. Acute osteomyelitis of the left calcaneus.  
5. History of methicillin-sensitive   
Staphylococcus aureus bacteremia.  
6. Paraspinal abscess.  
7. History of spinal cord infections.  
8. Psoas muscle abscess.  
9. Epidural abscess status post surgery.  
10. Malnutrition.  
11. Debility.  
12. Constipation.  
13. Hypertension.  
14. Type 1 diabetes with neuropathy.  
  
PLAN: Will continue with PT, OT, wound care and   
rehab and will continue with the  
MiraLAX daily for her bowels.  
  
_______________________  
DO SOFI Cruz/[id number]  
DD: 2021 13:17  
DT: 2021 16:55  
SSI File#:   
[id number]  
Job #: [id number]  
  
Verified/Reviewed by  
  
[facility] PATIENT NAME:   
JAYDEN GUTIERREZ [facility] [address] [facility] MEDICAL REC #: [id number]  
[place] [address] ACCOUNT #: [id number]  
ADMIT DATE:  
DISCHARGE DATE:  
ATTENDING PHY: Annalee Eckret DO  
  
  
Electronically signed by Annalee Eckert at   
2021 10:48 AM ESTdocumented in this   
encounter                               [facility]  
   
                                                    2021 History   
of Present illness   
Narrative                                 
  
  
Formatting of this note might be different from   
the original.  
DATE OF SERVICE: 2021  
  
SUBJECTIVE: She is resting in her bed. She had   
some bowel movements yesterday, and  
now she has got a small hemorrhoid which is   
annoying her.  
  
OBJECTIVE: Vital Signs: Include a temperature of   
98.0, pulse 65, respirations 20,  
blood pressure 100/60, oxygen is 96%. Lungs:   
Clear but diminished. Heart: Regular  
rate and rhythm. Abdomen: Soft, nontender. Bowel   
sounds positive. Extremities:  
No edema.  
  
LABORATORIES: No new labs today.  
  
IMPRESSION:  
1. Stage 4 sacral ulcer.  
2. Left calcaneus.  
3. Ulcer of the left foot with fat layer   
exposed.  
4. Acute osteomyelitis on left calcaneus.  
5. History of methicillin-sensitive   
Staphylococcus aureus bacteremia.  
6. Paraspinal abscess.  
7. History of spinal cord infections.  
8. Psoas muscle abscess.  
9. Epidural abscess, status post surgery.  
10. Malnutrition.  
11. Debility.  
12. Constipation.  
13. Hypertension.  
14. Type 1 diabetes with neuropathy.  
  
PLAN: Continue physical and occupational   
therapy, rehab, wound care, bowel support  
and hemorrhoidal cream.  
  
_______________________  
Michael Robles MD  
  
RG/[id number]  
DD: 2021 11:14  
DT: 2021 12:10  
SSI File#:   
[id number]  
Job #: [id number]  
  
Verified/Reviewed by  
  
[facility] PATIENT NAME:   
JAYDEN GUTIERREZ [facility] [address] [facility] MEDICAL REC #: [id number]  
[place] [address] ACCOUNT #: [id number]  
ADMIT DATE:  
DISCHARGE DATE:  
ATTENDING PHY: Annalee Eckert DO  
  
  
Electronically signed by Michael Robles at   
2021 10:07 AM ESTdocumented in this   
encounter                               [facility]  
   
                                                    2021 History   
of Present illness   
Narrative                                 
  
  
Formatting of this note might be different from   
the original.  
DATE OF SERVICE: 2021  
  
SUBJECTIVE: Patient still has not had a bowel   
movement. She had an x-ray, which  
showed fecal impaction in the rectal area. We   
are going to try another Fleets enema  
today. If that does not work, she may need a   
manual disimpaction.  
  
OBJECTIVE: Vital Signs: Blood pressure is   
118/62, pulse 78, respires 20,  
temperature 98. Heart: Regular. Lungs:   
Diminished throughout. Abdomen: Benign.  
Extremities: No edema.  
  
LABORATORY DATA: White count of 19.6, hemoglobin   
12.3, hematocrit 38.6, platelets of  
435. Sodium is 137, potassium 4.0, chloride 100,   
CO2 of 30. BUN 31, creatinine  
0.53.  
  
IMPRESSION:  
1. Stage IV sacral ulcer.  
2. Left calcaneus fracture.  
3. Ulcer of the left foot with fat layer   
exposed.  
4. Acute osteomyelitis of the left calcaneus.  
5. History of methicillin-sensitive   
Staphylococcus aureus bacteremia.  
6. Paraspinal abscess.  
7. History of spinal cord compression and   
infections.  
8. Psoas muscle abscess.  
9. Epidural abscess status post surgery.  
10. Debility.  
11. Malnutrition.  
12. Hypertension.  
13. Type 1 diabetes with neuropathy.  
14. Constipation with fecal impaction.  
  
PLAN: We will continue with PT, OT, rehab, and   
wound care. We will try a Fleets  
enema today, and if that does not work may need   
a manual fecal disimpaction.  
  
_______________________  
DO SOFI Cruz/[id number]  
DD: 2021 11:25  
DT: 2021 12:13  
SSI File#:   
[id number]  
Job #: [id number]  
  
Verified/Reviewed by  
  
[facility] PATIENT NAME:   
JAYDEN GUTIERREZ  
[address] MEDICAL REC #: [id number]  
[place] [address] ACCOUNT #: [id number]  
ADMIT DATE:  
DISCHARGE DATE:  
ATTENDING PHY: Annalee Eckert DO  
  
  
Electronically signed by Annalee Eckert at   
2021 1:28 PM ESTdocumented in this   
encounter                               [facility]  
   
                                                    2021 History   
of Present illness   
Narrative                                 
  
  
Formatting of this note might be different from   
the original.  
DATE OF SERVICE: 2021  
  
SUBJECTIVE: She had her room changed because her   
roommate was noisy last night. She  
is in pretty good spirits today. Had no   
complaints, but the nurse tells me that she  
told them that she was having problems with   
constipation and requested either an  
enema or a rectal suppository.  
  
OBJECTIVE: Vitals include temperature of 97.7,   
pulse 66, respiratory rate is 18,  
blood pressure 130/60. Her lungs are clear.   
Heart: Regular rate and rhythm.  
Abdomen is soft, nontender. Bowel sounds are   
positive. Extremities: No edema.  
Dressings are clean, dry, and intact.  
  
LABORATORIES: There were no new labs today.  
  
IMPRESSION:  
1. Sacral ulcer, stage 4.  
2. Left calcaneus fracture.  
3. Ulcer of the left foot with fat layer   
exposed.  
4. Acute osteomyelitis of the left calcaneus.  
5. History of methicillin-resistant   
Staphylococcus aureus bacteremia.  
6. Paraspinal abscess.  
7. History of spinal cord compression and   
infection.  
8. Psoas muscle abscess.  
9. Epidural abscess status post surgery.  
10. Debility.  
11. Malnutrition.  
12. Hypertension.  
13. Type 1 diabetes with neuropathy.  
14. Constipation.  
  
PLAN: Continue no antibiotics, physical and   
occupational therapy, wound care, and we  
will offer her a Dulcolax suppository to see if   
that helps with constipation.  
  
_______________________  
Michael Robles MD  
  
RG/[id number]  
DD: 2021 11:16  
DT: 2021 14:31  
SSI File#:   
[id number]  
Job #: [id number]  
  
Verified/Reviewed by  
  
[facility] PATIENT NAME:   
JAYDEN GUTIERREZ [facility] [address] MEDICAL REC #: [id number]  
[place] [address] ACCOUNT #: [id number]  
ADMIT DATE:  
DISCHARGE DATE:  
ATTENDING PHY: Annalee Eckert DO  
  
  
Electronically signed by Michael Robles at   
2021 9:56 AM ESTdocumented in this   
encounter                               [facility]  
   
                                                    2021 History   
of Present illness   
Narrative                                 
  
  
Formatting of this note might be different from   
the original.  
DATE OF SERVICE: 2021  
  
SUBJECTIVE: She is sitting on the edge of her   
bed. She is in no distress. She  
slept well. The food is good and comes on time.   
She has no aches, pains or  
complaints at this time.  
  
PHYSICAL EXAMINATION: Vital Signs: Temperature   
97.2, pulse 69, respirations 18,  
blood pressure 130/54, oxygen 94%. Her lungs are   
clear. Heart: She has a regular  
rate and rhythm. Abdomen is soft and nontender.   
Bowel sounds are positive.  
Extremities: No edema. Dressings are clean and   
intact.  
  
LABORATORIES: There were no new labs today.  
  
IMPRESSION:  
1. Sacral ulcer, stage 4.  
2. Left calcaneus fracture.  
3. Ulcer of the left foot with fat layer   
exposed.  
4. Acute osteomyelitis of the left calcaneus.  
5. History of methicillin-resistant   
Staphylococcus aureus bacteremia.  
6. Paraspinal abscess.  
7. History of spinal cord compression and   
infection.  
8. Psoas muscle abscess.  
9. Epidural abscess status post surgery.  
10. Debility.  
11. Malnutrition.  
12. Hypertension.  
13. Type 1 diabetes with neuropathy.  
  
PLAN: Continue antibiotics, physical and   
occupational therapy, wound care.  
  
_______________________  
MD ELIO Ventura/[id number]  
DD: 2021 11:34  
DT: 2021 12:04  
SSI File#:   
[id number]  
Job #: [id number]  
  
Verified/Reviewed by  
  
[facility] PATIENT NAME:   
JAYDEN GUTIERREZ [facility] [address] [facility] MEDICAL REC #: [id number]  
[place] [address] ACCOUNT #: [id number]  
ADMIT DATE:  
DISCHARGE DATE:  
ATTENDING PHY: Annalee Eckert DO  
  
  
Electronically signed by Michael Robles at   
2021 9:44 AM ESTdocumented in this   
encounter                               [facility]  
   
                                                    01- History   
of Present illness   
Narrative                                 
  
  
Formatting of this note might be different from   
the original.  
DATE OF SERVICE: 01/15/2021  
  
SUBJECTIVE: She is resting and sitting up on the   
side of her bed. She is in no  
distress. She has no complaints or issues. She   
has no problems with food quality or  
delivery time, but she says sometimes it is   
potentially a half hour late.  
  
OBJECTIVE: Vital Signs: Include a temperature of   
98.1, pulse 68, respiratory rate  
is 20, blood pressure 128/60, oxygen saturation   
is 98%. Lungs: Diminished at the  
bases. Heart: Regular rate and rhythm. Abdomen:   
Soft, nontender, benign.  
Extremities: No edema. Dressings are clean and   
intact.  
  
LABORATORIES: There are no new labs today.  
  
IMPRESSION:  
1. Sacral ulcer, stage 4.  
2. Left calcaneus fracture.  
3. Ulcer of the left foot with fat layer   
exposed.  
4. Acute osteomyelitis of the left calcaneus.  
5. History of methicillin-resistant   
Staphylococcus aureus bacteremia.  
6. Paraspinal abscess.  
7. History of spinal cord compression and   
infection.  
8. Psoas muscle abscess.  
9. Epidural abscess, status post surgery.  
10. Debility.  
11. Hypertension.  
12. Malnutrition.  
13. Type 1 diabetes with neuropathy.  
  
PLAN: Continue with antibiotics, physical and   
occupational therapy, wound care and I  
will renew her Ultram today.  
  
_______________________  
MD ELIO Ventura/[id number]  
DD: 01/15/2021 12:53  
DT: 01/15/2021 13:29  
SSI File#:   
[id number]  
Job #: [id number]  
  
Verified/Reviewed by  
  
[facility] PATIENT NAME:   
JAYDEN GUTIERREZ  
[address] MEDICAL REC #: [id number]  
[place] [address] ACCOUNT #: [id number]  
ADMIT DATE:  
DISCHARGE DATE:  
ATTENDING PHY: Annalee Eckert DO  
  
  
Electronically signed by Michael Robles at   
01/15/2021 1:46 PM ESTdocumented in this   
encounter                               [facility]  
   
                                                    2021 History   
of Present illness   
Narrative                                 
  
  
Formatting of this note might be different from   
the original.  
DATE OF SERVICE: 2021  
  
SUBJECTIVE: The patient is currently FaceTiming   
with her family. She has noted no  
new issues overnight.  
  
OBJECTIVE: Vital Signs: Blood pressure is   
134/70, pulse 72, respiratory rate 14,  
temp 97.8, O2 saturation 99%. Laboratory Data:   
White count of 12.2, hemoglobin 9.8,  
hematocrit 30.8, platelets 336. Sodium is 143,   
potassium 4.3, chloride 108, CO2 of  
30, BUN 29, creatinine 0.51. Heart: Regular.   
Lungs: Diminished at the bases.  
Abdomen: Benign. Extremities: No edema.  
  
IMPRESSION:  
1. Sacral ulcer, stage IV.  
2. Left calcaneus fracture.  
3. Ulcer of the left foot with fat layer   
exposed.  
4. Acute osteomyelitis of the left calcaneus.  
5. History of methicillin-sensitive   
Staphylococcus aureus bacteremia.  
6. Paraspinal abscess.  
7. History of spinal cord compression and   
infection.  
8. Psoas muscle abscess.  
9. Epidural abscess, status post surgery.  
10. Malnutrition.  
11. Debility.  
12. Hypertension.  
13. Type 1 diabetes with neuropathy.  
  
PLAN: We will continue with empirical   
antibiotics: Vancomycin, cefepime and Flagyl.  
Also continue with PT, OT, rehab and wound care.  
  
_______________________  
DO SOFI Cruz/[id number]  
DD: 2021 15:30  
DT: 2021 18:23  
SSI File#:   
[id number]  
Job #: [id number]  
  
Verified/Reviewed by  
  
[facility] PATIENT NAME:   
JAYDEN GUTIERREZ  
[address] MEDICAL REC #: [id number]  
[place] [address] ACCOUNT #: [id number]  
ADMIT DATE:  
DISCHARGE DATE:  
ATTENDING PHY: Annalee Eckert DO  
  
  
Electronically signed by Annalee Eckert at   
2021 10:14 AM ESTdocumented in this   
encounter                               [facility]  
   
                                                    2021 History   
of Present illness   
Narrative                                 
  
  
Formatting of this note might be different from   
the original.  
DATE OF SERVICE: 2021  
  
SUBJECTIVE: Patient is doing okay today. She has   
no complaints.  
  
OBJECTIVE: Vital Signs: Blood pressure 108/60,   
pulse 61, respiratory is 14,  
temperature 97.8, O2 saturation 99%. Heart:   
Regular. Lungs: Clear. Abdomen:  
Benign. Extremities: No edema.  
  
IMPRESSION:  
1. Sacral ulcer, stage IV.  
2. Left calcaneus fracture.  
3. Ulcer of the left foot, with fat layer   
exposed.  
4. Acute osteomyelitis of the left calcaneus.  
5. History of methicillin-sensitive   
Staphylococcus aureus bacteremia.  
6. Paraspinal abscess.  
7. History of spinal cord compression and   
infection.  
8. Psoas muscle abscess.  
9. Epidural abscess, status post surgery.  
10. Malnutrition.  
11. Debility.  
12. Hypertension.  
13. Type 1 diabetes with neuropathy.  
  
PLAN: Will continue with empiric vancomycin,   
cefepime, and Flagyl. Will continue  
with PT, OT, rehab and wound care.  
  
_______________________  
DO SOFI Cruz/[id number]  
DD: 2021 12:54  
DT: 2021 13:46  
SSI File#:   
[id number]  
Job #: [id number]  
  
Verified/Reviewed by  
  
[facility] PATIENT NAME:   
JAYDEN GUTIERREZ  
[address] MEDICAL REC #: [id number]  
[place] [address] ACCOUNT #: [id number]  
ADMIT DATE:  
DISCHARGE DATE:  
ATTENDING PHY: Annalee Eckert DO  
  
  
Electronically signed by Annalee Eckert at   
2021 2:33 PM ESTdocumented in this   
encounter                               [facility]  
   
                                                    2021 History   
of Present illness   
Narrative                                 
  
  
Formatting of this note might be different from   
the original.  
DATE OF SERVICE: 2021  
  
Patient quite upset this morning. Apparently her   
83-year-old mother has been having  
multiple TIAs and there is nothing else they can   
for her. Her son has taken her home  
on hospice care. So, she did get to talk with   
her a little bit this morning, as much  
as her mother could talk. She is kind of   
speaking in word salad right now, but  
patient is very upset and obviously she cannot   
see her mother and she is an only  
child which makes it very difficult for her.  
  
PHYSICAL EXAMINATION: Vital Signs: Blood   
pressure 132/66, pulse 68, respirations  
15, temp 98.3, O2 saturation 100%. Heart:   
Regular. Lungs: Diminished at the  
bases. Abdomen: Benign. Extremities: No edema.  
  
White count of 11.9, hemoglobin 9.7, hematocrit   
32, platelets of 341. Sodium 140,  
potassium 4.3, chloride 104, CO2 of 29, BUN 23,   
creatinine 0.57.  
  
IMPRESSION:  
1. Sacral ulcer stage IV.  
2. Left calcaneus fracture.  
3. Ulcer of the left foot with fat layer   
exposed.  
4. Acute osteomyelitis of the left calcaneus.  
5. History of methicillin-sensitive   
Staphylococcus aureus bacteremia.  
6. Paraspinal abscess.  
7. History of spinal cord compression, cord   
infection.  
8. Psoas muscle abscess.  
9. Epidural abscess status post surgery.  
10. Malnutrition.  
11. Debility.  
12. Hypertension.  
13. Type 1 diabetes with neuropathy.  
  
She is on empiric vancomycin, cefepime and   
Flagyl for the sacral osteomyelitis and ID  
is following. Will continue with PT, OT, rehab   
and wound care.  
  
_______________________  
DO SOFI Cruz/[id number]  
DD: 2021 10:48  
DT: 2021 13:55  
SSI File#:   
[id number]  
Job #: [id number]  
  
Verified/Reviewed by  
  
[facility] PATIENT NAME:   
JAYDEN GUTIERREZ [address] MEDICAL REC #: [id number]  
[place] [address] ACCOUNT #: [id number]  
ADMIT DATE:  
DISCHARGE DATE:  
ATTENDING PHY: Annalee Eckert DO  
  
  
Electronically signed by Annalee Eckert at   
2021 12:17 PM ESTdocumented in this   
encounter                               [facility]  
   
                                                    2021 History   
of Present illness   
Narrative                                 
  
  
Formatting of this note might be different from   
the original.  
DATE OF SERVICE: 2021  
  
She is resting in the bed. Slept well. Has no   
pain. Is feeling very well and has  
no complaints today. Dressing was changed last   
evening.  
  
PHYSICAL EXAMINATION: Vitals: Include   
temperature of 98.0, pulse 60, respirations  
18, blood pressure _____ /57, oxygen is 98%.   
Lungs: Clear. Heart: Regular rate  
and rhythm. Abdomen: Soft, nontender, benign.   
Extremities: No edema. Dressing is  
clean, dry and intact.  
  
LABORATORIES: There were no new labs today.  
  
IMPRESSION:  
1. Sacral ulcer, stage IV.  
2. Left calcaneus fracture.  
3. Ulcer of the left foot with fat layer   
exposed.  
4. Acute osteomyelitis of the left calcaneus.  
5. History of methicillin-resistant   
Staphylococcus aureus bacteremia.  
6. Paraspinal abscess history.  
7. History of spinal cord infection.  
8. Psoas muscle abscess.  
9. Epidural abscess status post surgery.  
10. Malnutrition.  
11. Debility.  
12. Hypertension.  
13. Type 1 diabetes with neuropathy.  
  
PLAN: Continue current plan of care with   
physical and occupational therapy, rehab  
and wound care.  
  
_______________________  
MD ELIO Ventura/[id number]  
DD: 2021 10:46  
DT: 2021 12:00  
SSI File#:   
[id number]  
Job #: [id number]  
  
Verified/Reviewed by  
  
[facility] PATIENT NAME:   
JAYDEN GUTIERREZ [facility] MEDICAL REC #: [id number]  
[place] [address] ACCOUNT #: [id number]  
ADMIT DATE:  
DISCHARGE DATE:  
ATTENDING PHY: Annalee Eckert DO  
  
  
Electronically signed by Michael Robles at   
2021 9:57 AM ESTdocumented in this   
encounter                               [facility]  
   
                                                    2021 History   
of Present illness   
Narrative                                 
  
  
Formatting of this note might be different from   
the original.  
DATE OF SERVICE: 2021  
  
SUBJECTIVE: Patient is doing okay today. She did   
finally have a bowel movement the  
other day and is feeling much better. She denies   
any complaints today.  
  
OBJECTIVE: Her vitals are stable. White count is   
14.6, hemoglobin 9.7, hematocrit  
31.3, platelets of 328. Sodium is 141, potassium   
4.4, chloride 104, CO2 of 30, BUN  
26, creatinine 0.60. Heart is regular. Lungs are   
clear. Abdomen is benign.  
Extremities: No edema.  
  
IMPRESSION:  
1. Sacral ulcer stage IV.  
2. Left calcaneus fracture.  
3. Ulcer to the left foot with fat layer   
exposed.  
4. Acute osteomyelitis of the left calcaneus.  
5. History of methicillin-sensitive   
Staphylococcus aureus bacteremia.  
6. Paraspinal abscess history.  
7. History of spinal cord infection.  
8. Psoas muscle abscess.  
9. Epidural abscess, status post surgery.  
10. Malnutrition.  
11. Debility.  
12. Type 1 diabetes with neuropathy.  
13. Hypertension.  
  
PLAN: Will continue with current plan of care,   
PT, OT, rehab and wound care.  
  
_______________________  
Annalee Eckert DO  
  
LE/[id number]  
DD: 2021 13:46  
DT: 2021 16:26  
SSI File#:   
[id number]  
Job #: [id number]  
  
Verified/Reviewed by  
  
[facility] PATIENT NAME:   
JAYDEN GUTIERREZ [facility] MEDICAL REC #: [id number]  
[place] [address] ACCOUNT #: [id number]  
ADMIT DATE:  
DISCHARGE DATE:  
ATTENDING PHY: Annalee Eckert DO  
  
  
Electronically signed by Annalee Eckert at   
2021 10:12 AM ESTdocumented in this   
encounter                               [facility]  
   
                                                    2021 History   
of Present illness   
Narrative                                 
  
  
Formatting of this note might be different from   
the original.  
DATE OF SERVICE: 2021  
  
SUBJECTIVE: She is resting in her bed. She is in   
good spirits because she finally  
passed stool yesterday following her Fleets   
enema. She is very pleasant, has no  
aches, pains, or complaints today.  
  
OBJECTIVE: Vital Signs: Include a temperature of   
98.0, pulse 67, respires 18, blood  
pressure 130/60, oxygen 97%. Lungs: Diminished   
at the bases. Heart: Regular rate  
and rhythm. Abdomen: Soft, nontender. Benign.   
Extremities: No edema.  
  
LABORATORY RESULTS: There were no new labs   
today.  
  
IMPRESSION:  
1. Sacral ulcer stage IV.  
2. Left calcaneus fracture.  
3. Ulcer to the left foot with fat layer   
exposed.  
4. Acute osteomyelitis of the left calcaneus.  
5. History of methicillin-sensitive   
Staphylococcus aureus bacteremia.  
6. Paraspinal abscess history.  
7. History of spinal cord infection.  
8. Psoas muscle abscess.  
9. Epidural abscess status post surgery.  
10. Malnutrition.  
11. Debility.  
12. Type 1 diabetes with neuropathy.  
13. Hypertension.  
14. Constipation relieved.  
  
PLAN: Continue with current plan of care with   
physical and occupational therapy,  
rehab, and wound care.  
  
_______________________  
MD ELIO Ventura/[id number]  
DD: 2021 07:56  
DT: 2021 09:16  
SSI File#:   
[id number]  
Job #: [id number]  
  
Verified/Reviewed by  
  
[facility] PATIENT NAME:   
JAYDEN GUTIERREZ [facility] MEDICAL REC #: [id number]  
[place] [address] ACCOUNT #: [id number]  
ADMIT DATE:  
DISCHARGE DATE:  
ATTENDING PHY: Annalee Eckert DO  
  
  
Electronically signed by Michael Robles at   
2021 9:23 AM ESTdocumented in this   
encounter                               [facility]  
   
                                                    2021 History   
of Present illness   
Narrative                                 
  
  
Formatting of this note might be different from   
the original.  
DATE OF SERVICE: 2021  
  
The patient is doing okay today. She has been   
trying to have a BM and, I guess, has  
a big ball of hard stool back there. So, nursing   
was asking about Fleet enema, so I  
did order that for her.  
  
Blood pressure is 121/64, pulse 63, respirations   
18, temperature 98.3, O2 saturation  
98%. Heart is regular. Lungs diminished at the   
bases. Abdomen is benign.  
Extremities, no edema.  
  
IMPRESSION:  
1. Sacral ulcer, stage 4.  
2. Left calcaneus fracture.  
3. Ulcer to the left foot with fat layer   
exposed.  
4. Acute osteomyelitis of the left calcaneus.  
5. History of Methicillin-sensitive   
Staphylococcus aureus bacteremia.  
6. Paraspinal abscess history.  
7. Spinal cord compression history.  
8. Psoas muscle abscess.  
9. Epidural abscess status post surgery.  
10. Malnutrition.  
11. Debility.  
12. Type 1 diabetes with neuropathy.  
13. Hypertension.  
14. Hyperlipidemia.  
  
PLAN: We will continue with current plan of   
care, PT, OT, rehabilitation, and wound  
care and also I did order a Fleet enema for her   
today.  
  
_______________________  
DO SOFI Cruz/[id number]  
DD: 2021 13:25  
DT: 2021 16:05  
SSI File#:   
[id number]  
Job #: [id number]  
CC: Annalee Eckert DO  
  
Verified/Reviewed by  
  
[facility] PATIENT NAME:   
JAYDEN GUTIERREZ [facility] MEDICAL REC #: [id number]  
[place] [address] ACCOUNT #: [id number]  
ADMIT DATE:  
DISCHARGE DATE:  
ATTENDING PHY: Annalee Eckert DO  
  
  
Electronically signed by Annalee Eckert at   
2021 12:59 PM ESTdocumented in this   
encounter                               [facility]  
   
                                                    2021 History   
of Present illness   
Narrative                                 
  
  
Formatting of this note might be different from   
the original.  
DATE OF SERVICE: 2021  
  
SUBJECTIVE: Patient is awake today. She states   
that she is having some pain in her  
back and the bed is not doing her any good. I   
told her therapy should be here  
tomorrow to get her up and moving. She would   
like a bedside commode. Supposedly  
they were going to get her one yesterday and   
that did not happen.  
  
OBJECTIVE: Blood pressure is 105/58, pulse 60,   
respirations 18, temp 98. Heart is  
regular. Lungs: Diminished at the bases. Abdomen   
is benign. Extremities: No  
edema.  
  
IMPRESSION:  
1. Sacral pressure ulcer stage IV.  
2. Left calcaneus fracture.  
3. Ulcer to the left foot with fat layer   
exposed.  
4. Acute osteomyelitis of the left calcaneus.  
5. History of methicillin-sensitive   
Staphylococcus aureus bacteremia.  
6. Paraspinal abscess history.  
7. Spinal cord compression history.  
8. Psoas muscle abscess.  
9. Epidural abscess status post surgery.  
10. Malnutrition.  
11. Debility.  
12. Type 1 diabetes with neuropathy.  
13. Hypertension.  
14. Hyperlipidemia.  
  
PLAN: Will continue with current plan of care,   
PT, OT, rehab and wound care.  
  
_______________________  
DO SOFI Cruz/[id number]  
DD: 2021 10:21  
DT: 2021 14:15  
SSI File#:   
[id number]  
Job #: [id number]  
  
Verified/Reviewed by  
  
[facility] PATIENT NAME:   
JAYDEN GUTIERREZ [facility] [address] MEDICAL REC #: [id number]  
[place] [address] ACCOUNT #: [id number]  
ADMIT DATE:  
DISCHARGE DATE:  
ATTENDING PHY: Annalee Eckert DO  
  
  
Electronically signed by Annalee Eckert at   
2021 10:17 AM ESTdocumented in this   
encounter                               [facility]  
  
  
  
                                                    Evaluation note   
  
  
  
                                                    Diagnosis  
   
                                                      
  
  
Status post left partial knee replacement- Primary  
  
  
Knee joint replacement by other means  
   
                                                      
  
  
Pain in prosthetic joint, initial encounter (HCC)  
  
documented in this encounter  
[facility]   
  
                                                    Diagnosis  
   
                                                      
  
  
Pain in prosthetic joint, initial encounter (HCC)  
  
documented in this encounter  
[facility]   
  
                                                    Diagnosis  
   
                                                      
  
  
Trigger finger, right middle finger- Primary  
   
                                                      
  
  
Trigger middle finger of right hand  
  
  
Trigger finger (acquired)  
  
documented in this encounter  
[facility]   
  
                                                    Diagnosis  
   
                                                      
  
  
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   
presence of antibiotic-impregnated cement spacer- Primary  
  
  
Acquired absence of knee joint  
  
documented in this encounter  
[facility]   
  
                                                    Diagnosis  
   
                                                      
  
  
S/P trigger finger release- Primary  
  
  
Other postprocedural status  
  
documented in this encounter  
[facility]   
  
                                                    Diagnosis  
   
                                                      
  
  
Hospital discharge follow-up- Primary  
  
  
Other follow-up examination  
   
                                                      
  
  
MSSA (methicillin susceptible Staphylococcus aureus) infection  
  
  
Methicillin susceptible Staphylococcus aureus in conditions classified elsewhere
 and   
of unspecified site  
   
                                                      
  
  
Long term (current) use of antibiotics  
   
                                                      
  
  
Acute embolism and thrombosis of deep vein of right upper extremity (HCC)  
  
  
Acute venous embolism and thrombosis of deep veins of upper extremity  
  
documented in this encounter  
[facility]   
  
                                                    Diagnosis  
   
                                                      
  
  
MSSA (methicillin susceptible Staphylococcus aureus) infection  
  
  
Methicillin susceptible Staphylococcus aureus in conditions classified elsewhere
 and   
of unspecified site  
  
documented in this encounter  
[facility] Diagnostic Procedure Only 
  (Routine) - Authorized  
  
                          Specialty    Diagnoses / Procedures Referred By Contac  
t Referred To Contact  
   
                                                    MOLECULAR & FUNCTIONAL   
IMAGING                                   
  
  
Diagnoses  
  
  
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   
joint, initial   
encounter ([facility])  
  
  
  
Procedures  
  
  
NM BONE 3 PHASE  
  
  
R N 3 PHASE BONE SCAN                     
  
  
Basil Mitchell MD  
  
  
[address]   
[place] [address] [place] [address]  
  
  
Phone: [phone]  
  
  
Fax: [phone]                         
  
  
Molecular &   
Functional Imaging  
  
  
[address] [place] [address]  
  
  
Phone: [phone]  
  
  
  
                          Referral ID  Status       Reason       Start   
Date                                    Expiration   
Date                                    Visits   
Requested                               Visits   
Authorized  
   
                                [id number]        Authorized        
  
  
Auto-Generat  
ed Referral                               
1                   2023           1                   1  
  
  
  
  
Electronically signed by Basil Mitchell MD at 2021 3:34 PM EST  
  
  
* Diagnostic Procedure Only (Routine) - Pending Review  
  
                          Specialty    Diagnoses / Procedures Referred By Contac  
t Referred To Contact  
   
                                        [facility]            
  
  
Diagnoses  
  
  
Pain in prosthetic joint,   
initial encounter ([facility])  
  
  
  
Procedures  
  
  
XR KNEE 3V FLEX/LAT/MERCH   
LEFT (AK)  
  
  
X-RAY KNEE 3+ VW                          
  
  
Basil Mitchell MD  
  
  
[address] [place] [address]  
  
  
[place] [address]  
  
  
Phone: [phone]  
  
  
Fax: [phone]                         
  
  
[facility]  
  
  
  
                          Referral ID  Status       Reason       Start   
Date                                    Expiration   
Date                                    Visits   
Requested                               Visits   
Authorized  
   
                                        [id number]            Pending   
Review                                    
  
  
Auto-Generat  
ed Referral                             12/2023           1                   1  
  
  
  
  
Electronically signed by Basil Mitchell MD at 2021 3:34 PM EST  
  
  
[facility] for referral (narrative)* Diagnostic Procedure Only 
  (Routine) - Closed  
  
                          Specialty    Diagnoses / Procedures Referred By Contac  
t Referred To Contact  
   
                                                    MOLECULAR & FUNCTIONAL   
IMAGING                                   
  
  
Diagnoses  
  
  
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   
joint, initial   
encounter (HCC)  
  
  
  
Procedures  
  
  
NM BONE 3 PHASE  
  
  
R N 3 PHASE BONE SCAN                     
  
  
Basil Mitchell MD  
  
  
[address]   
[place] [address]  
  
  
[place] [address]  
  
  
Phone: [phone]  
  
  
Fax: [phone]                         
  
  
Molecular &   
Functional Imaging  
  
  
[address] [place] [address]  
  
  
Phone: [phone]  
  
  
  
                    Referral ID Status    Reason    Start Date Expiration Date V  
isits   
Requested                               Visits   
Authorized  
   
                                [id number]        Closed            
  
  
Auto-Generate  
d Referral      2021       1               1  
  
  
  
  
Electronically signed by Basil Mitchell MD at 2021 9:07 AM EST  
  
  
[facility] for referral (narrative)* Diagnostic Procedure Only 
  (Routine) - Closed  
  
                          Specialty    Diagnoses / Procedures Referred By Contac  
t Referred To Contact  
   
                                        US [facility]            
  
  
Diagnoses  
  
  
MSSA (methicillin susceptible   
Staphylococcus aureus)   
infection  
  
  
  
Procedures  
  
  
US DVT UPPER RT  
  
  
DUP-SCAN XTR VEINS   
UNILATERAL/LIMITED STUDY                  
  
  
Alvin So MD  
  
  
[address]  
  
  
[address]  
  
  
[place] [address]  
  
  
Phone: [phone]  
  
  
Fax: [phone]                         
  
  
[facility]  
  
  
  
                    Referral ID Status    Reason    Start Date Expiration Date V  
isits   
Requested                               Visits   
Authorized  
   
                                [id number]        Closed            
  
  
Auto-Generate  
d Referral      2022      1               1  
  
  
  
  
Electronically signed by Alvin So MD at 2022 1:41 PM EDT  
  
  
[facility] for referral (narrative)* Diagnostic Procedure Only 
  (Routine) - Closed  
  
                          Specialty    Diagnoses / Procedures Referred By Contac  
t Referred To Contact  
   
                                        [facility]            
  
  
Diagnoses  
  
  
MSSA (methicillin susceptible   
Staphylococcus aureus)   
infection  
  
  
  
Procedures  
  
  
US DVT UPPER RT  
  
  
DUP-SCAN XTR VEINS   
UNILATERAL/LIMITED STUDY                  
  
  
Alvin So MD  
  
  
[address]  
  
  
[address]  
  
  
[place] [address]  
  
  
Phone: [phone]  
  
  
Fax: [phone]                         
  
  
[facility]  
  
  
  
                    Referral ID Status    Reason    Start Date Expiration Date V  
isits   
Requested                               Visits   
Authorized  
   
                                [id number]        Closed            
  
  
Auto-Generate  
d Referral      2022      1               1  
  
  
  
  
Electronically signed by Alvin So MD at 2022 2:20 PM EDT  
  
  
[facility] for visit Narrative* Diagnostic Procedure Only (Routine) 
  - Closed  
  
                          Specialty    Diagnoses / Procedures Referred By Contac  
t Referred To Contact  
   
                                                    MOLECULAR & FUNCTIONAL   
IMAGING                                   
  
  
Diagnoses  
  
  
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   
joint, initial   
encounter (HCC)  
  
  
  
Procedures  
  
  
NM BONE 3 PHASE  
  
  
R N 3 PHASE BONE SCAN                     
  
  
Basil Mitchell MD  
  
  
[address]   
[place] [address]  
  
  
[place] [address]  
  
  
Phone: [phone]  
  
  
Fax: [phone]                         
  
  
Molecular &   
Functional Imaging  
  
  
[address] [place] [address]  
  
  
Phone: [phone]  
  
  
  
                    Referral ID Status    Reason    Start Date Expiration Date V  
isits   
Requested                               Visits   
Authorized  
   
                                [id number]        Closed            
  
  
Auto-Generate  
d Referral      2021       1               1  
  
  
  
[facility] for visit Narrative* Diagnostic Procedure Only (Routine) 
  - Closed  
  
                          Specialty    Diagnoses / Procedures Referred By Contac  
t Referred To Contact  
   
                                        [facility]            
  
  
Diagnoses  
  
  
MSSA (methicillin susceptible   
Staphylococcus aureus)   
infection  
  
  
  
Procedures  
  
  
US DVT UPPER RT  
  
  
DUP-SCAN XTR VEINS   
UNILATERAL/LIMITED STUDY                  
  
  
Alvin So MD  
  
  
[address]  
  
  
[address]  
  
  
[place] [address]  
  
  
Phone: [phone]  
  
  
Fax: [phone]                         
  
  
[facility]  
  
  
  
                    Referral ID Status    Reason    Start Date Expiration Date V  
isits   
Requested                               Visits   
Authorized  
   
                                [id number]        Closed            
  
  
Auto-Generate  
d Referral      2022      1               1  
  
  
  
[facility]  
  
Summary Purpose  
  
  
                                                      
  
  
  
Family History  
No Family History Records FoundNo Family History Records FoundNo Family History 
Records FoundNo Family History Records FoundNo Family History Records FoundNo 
Family History Records FoundNo Family History Records FoundNo Family History 
Records FoundNo Family History Records FoundNo Family History Records FoundNo 
Family History Records FoundNo Family History Records FoundNo Family History 
Records Found  
  
Advance Directives  
No Advanced Directives Records FoundDocuments on File  
  
                          Type         Date Recorded Patient Representative Expl  
anation  
   
                          Advance Directive(s) 3/22/2022 11:57 AM                
  
                                Documents on File  
  
                          Type         Date Recorded Patient Representative Expl  
anation  
   
                          Advance Directive(s) 2022 8:38 AM                
   
                          Advance Directive(s) 3/22/2022 11:57 AM                
  
                                Documents on File  
  
                          Type         Date Recorded Patient Representative Expl  
anation  
   
                          Advance Directive(s) 2022 6:25 PM                
   
                          Advance Directive(s) 2022 8:38 AM                
   
                          Advance Directive(s) 3/22/2022 11:57 AM                
  
                                Documents on File  
  
                          Type         Date Recorded Patient Representative Expl  
anation  
   
                          Advance Directive(s) 2022 6:25 PM                
   
                          Advance Directive(s) 2022 8:38 AM                
   
                          Advance Directive(s) 3/22/2022 11:57 AM                
  
  
  
Health Concerns  
  
  
                          Infection    Onset Date   Last Indicated Resolved Time  
   
                          COVID-19 Rule-Out 2022  
 11:45 PM EDT  
  
  
  
Additional Source Comments  
  
  
  
                                                    INFORMATION SOURCE (unrecogn  
ized section and content)  
   
                                          
  
  
  
                                        DATE CREATED        AUTHOR  
   
                                10/28/2020                      [facility]  
  
  
  
                                DATE CREATED    AUTHOR          AUTHOR'S ORGANIZ  
ATION  
   
                                2020                      [facility]  
  
  
  
                                DATE CREATED    AUTHOR          AUTHOR'S ORGANIZ  
ATION  
   
                                2020                       [employer]  
  
  
  
                                DATE CREATED    AUTHOR          AUTHOR'S ORGANIZ  
ATION  
   
                                2021                      [facility]  
  
  
  
                                DATE CREATED    AUTHOR          AUTHOR'S ORGANIZ  
ATION  
   
                                2021                      [facility]  
[facility]  
  
  
  
                                DATE CREATED    AUTHOR          AUTHOR'S ORGANIZ  
ATION  
   
                                2022                      [facility]  
  
  
  
                                DATE CREATED    AUTHOR          AUTHOR'S ORGANIZ  
ATION  
   
                                2022                      [facility]  
[facility]ital  
  
  
  
                                DATE CREATED    AUTHOR          AUTHOR'S ORGANIZ  
ATION  
   
                                05/10/2022                      [facility]  
  
  
  
                                DATE CREATED    AUTHOR          AUTHOR'S ORGANIZ  
ATION  
   
                                10/05/2022                      [facility]  
  
  
  
                                DATE CREATED    AUTHOR          AUTHOR'S ORGANIZ  
ATION  
   
                                2023                      [facility]  
[place]  
  
  
  
                                DATE CREATED    AUTHOR          AUTHOR'S ORGANIZ  
ATION  
   
                                2023                      [facility]  
  
  
  
                                DATE CREATED    AUTHOR          AUTHOR'S ORGANIZ  
ATION  
   
                                2024                      [facility]  
nt  
  
  
  
                                DATE CREATED    AUTHOR          AUTHOR'S ORGANIZ  
ATION  
   
                                2024                      [facility]  
  
  
  
  
  
                                                    Source Comments (unrecognize  
d section and content)  
   
                                                    In the event this informatio  
n is protected by the Federal Confidentiality of   
Alcohol   
and Drug Abuse Patient Records regulations: This information has been disclosed 
to   
you from records protected by Federal confidentiality rules (42 CFR Part 2). The
   
Federal rules prohibit you from making any further disclosure of this 
information   
unless further disclosure is expressly permitted by the written consent of the 
person   
to whom it pertains or as otherwise permitted by 42 CFR Part 2. A general   
authorization for the release of medical or other information is NOT sufficient 
for   
this purpose. The Federal rules restrict any use of the information to 
criminally   
investigate or prosecute any alcohol or drug abuse patient.[facility] 
the   
event this information is protected by the Federal Confidentiality of Alcohol 
and   
Drug Abuse Patient Records regulations: This information has been disclosed to 
you   
from records protected by Federal confidentiality rules (42 CFR Part 2). The 
Federal   
rules prohibit you from making any further disclosure of this information unless
   
further disclosure is expressly permitted by the written consent of the person 
to   
whom it pertains or as otherwise permitted by 42 CFR Part 2. A general 
authorization   
for the release of medical or other information is NOT sufficient for this 
purpose.   
The Federal rules restrict any use of the information to criminally investigate 
or   
prosecute any alcohol or drug abuse patient.[facility] DaytonIn the event this   
information is protected by the Federal Confidentiality of Alcohol and Drug 
Abuse   
Patient Records regulations: This information has been disclosed to you from 
records   
protected by Federal confidentiality rules (42 CFR Part 2). The Federal rules   
prohibit you from making any further disclosure of this information unless 
further   
disclosure is expressly permitted by the written consent of the person to whom 
it   
pertains or as otherwise permitted by 42 CFR Part 2. A general authorization for
 the   
release of medical or other information is NOT sufficient for this purpose. The   
Federal rules restrict any use of the information to criminally investigate or   
prosecute any alcohol or drug abuse patient.[facility]In the event this   
information is protected by the Federal Confidentiality of Alcohol and Drug 
Abuse   
Patient Records regulations: This information has been disclosed to you from 
records   
protected by Federal confidentiality rules (42 CFR Part 2). The Federal rules   
prohibit you from making any further disclosure of this information unless 
further   
disclosure is expressly permitted by the written consent of the person to whom 
it   
pertains or as otherwise permitted by 42 CFR Part 2. A general authorization for
 the   
release of medical or other information is NOT sufficient for this purpose. The   
Federal rules restrict any use of the information to criminally investigate or   
prosecute any alcohol or drug abuse patient.[facility]In the event this   
information is protected by the Federal Confidentiality of Alcohol and Drug 
Abuse   
Patient Records regulations: This information has been disclosed to you from 
records   
protected by Federal confidentiality rules (42 CFR Part 2). The Federal rules   
prohibit you from making any further disclosure of this information unless 
further   
disclosure is expressly permitted by the written consent of the person to whom 
it   
pertains or as otherwise permitted by 42 CFR Part 2. A general authorization for
 the   
release of medical or other information is NOT sufficient for this purpose. The   
Federal rules restrict any use of the information to criminally investigate or   
prosecute any alcohol or drug abuse patient.[facility] DaytonIn the event this   
information is protected by the Federal Confidentiality of Alcohol and Drug 
Abuse   
Patient Records regulations: This information has been disclosed to you from 
records   
protected by Federal confidentiality rules (42 CFR Part 2). The Federal rules   
prohibit you from making any further disclosure of this information unless 
further   
disclosure is expressly permitted by the written consent of the person to whom 
it   
pertains or as otherwise permitted by 42 CFR Part 2. A general authorization for
 the   
release of medical or other information is NOT sufficient for this purpose. The   
Federal rules restrict any use of the information to criminally investigate or   
prosecute any alcohol or drug abuse patient.[facility] DaytonIn the event this   
information is protected by the Federal Confidentiality of Alcohol and Drug 
Abuse   
Patient Records regulations: This information has been disclosed to you from 
records   
protected by Federal confidentiality rules (42 CFR Part 2). The Federal rules   
prohibit you from making any further disclosure of this information unless 
further   
disclosure is expressly permitted by the written consent of the person to whom 
it   
pertains or as otherwise permitted by 42 CFR Part 2. A general authorization for
 the   
release of medical or other information is NOT sufficient for this purpose. The   
Federal rules restrict any use of the information to criminally investigate or   
prosecute any alcohol or drug abuse patient.[facility]In the event this   
information is protected by the Federal Confidentiality of Alcohol and Drug 
Abuse   
Patient Records regulations: This information has been disclosed to you from 
records   
protected by Federal confidentiality rules (42 CFR Part 2). The Federal rules   
prohibit you from making any further disclosure of this information unless 
further   
disclosure is expressly permitted by the written consent of the person to whom 
it   
pertains or as otherwise permitted by 42 CFR Part 2. A general authorization for
 the   
release of medical or other information is NOT sufficient for this purpose. The   
Federal rules restrict any use of the information to criminally investigate or   
prosecute any alcohol or drug abuse patient.[facility]In the event this   
information is protected by the Federal Confidentiality of Alcohol and Drug 
Abuse   
Patient Records regulations: This information has been disclosed to you from 
records   
protected by Federal confidentiality rules (42 CFR Part 2). The Federal rules   
prohibit you from making any further disclosure of this information unless 
further   
disclosure is expressly permitted by the written consent of the person to whom 
it   
pertains or as otherwise permitted by 42 CFR Part 2. A general authorization for
 the   
release of medical or other information is NOT sufficient for this purpose. The   
Federal rules restrict any use of the information to criminally investigate or   
prosecute any alcohol or drug abuse patient.[facility]In the event this   
information is protected by the Federal Confidentiality of Alcohol and Drug 
Abuse   
Patient Records regulations: This information has been disclosed to you from 
records   
protected by Federal confidentiality rules (42 CFR Part 2). The Federal rules   
prohibit you from making any further disclosure of this information unless 
further   
disclosure is expressly permitted by the written consent of the person to whom 
it   
pertains or as otherwise permitted by 42 CFR Part 2. A general authorization for
 the   
release of medical or other information is NOT sufficient for this purpose. The   
Federal rules restrict any use of the information to criminally investigate or   
prosecute any alcohol or drug abuse patient.[facility]In the event this   
information is protected by the Federal Confidentiality of Alcohol and Drug 
Abuse   
Patient Records regulations: This information has been disclosed to you from 
records   
protected by Federal confidentiality rules (42 CFR Part 2). The Federal rules   
prohibit you from making any further disclosure of this information unless 
further   
disclosure is expressly permitted by the written consent of the person to whom 
it   
pertains or as otherwise permitted by 42 CFR Part 2. A general authorization for
 the   
release of medical or other information is NOT sufficient for this purpose. The   
Federal rules restrict any use of the information to criminally investigate or   
prosecute any alcohol or drug abuse patient.[facility]In the event this   
information is protected by the Federal Confidentiality of Alcohol and Drug 
Abuse   
Patient Records regulations: This information has been disclosed to you from 
records   
protected by Federal confidentiality rules (42 CFR Part 2). The Federal rules   
prohibit you from making any further disclosure of this information unless 
further   
disclosure is expressly permitted by the written consent of the person to whom 
it   
pertains or as otherwise permitted by 42 CFR Part 2. A general authorization for
 the   
release of medical or other information is NOT sufficient for this purpose. The   
Federal rules restrict any use of the information to criminally investigate or   
prosecute any alcohol or drug abuse patient.[facility]In the event this   
information is protected by the Federal Confidentiality of Alcohol and Drug 
Abuse   
Patient Records regulations: This information has been disclosed to you from 
records   
protected by Federal confidentiality rules (42 CFR Part 2). The Federal rules   
prohibit you from making any further disclosure of this information unless 
further   
disclosure is expressly permitted by the written consent of the person to whom 
it   
pertains or as otherwise permitted by 42 CFR Part 2. A general authorization for
 the   
release of medical or other information is NOT sufficient for this purpose. The   
Federal rules restrict any use of the information to criminally investigate or   
prosecute any alcohol or drug abuse patient.[facility]In the event this   
information is protected by the Federal Confidentiality of Alcohol and Drug 
Abuse   
Patient Records regulations: This information has been disclosed to you from 
records   
protected by Federal confidentiality rules (42 CFR Part 2). The Federal rules   
prohibit you from making any further disclosure of this information unless 
further   
disclosure is expressly permitted by the written consent of the person to whom 
it   
pertains or as otherwise permitted by 42 CFR Part 2. A general authorization for
 the   
release of medical or other information is NOT sufficient for this purpose. The   
Federal rules restrict any use of the information to criminally investigate or   
prosecute any alcohol or drug abuse patient.[facility] DaytonIn the event this   
information is protected by the Federal Confidentiality of Alcohol and Drug 
Abuse   
Patient Records regulations: This information has been disclosed to you from 
records   
protected by Federal confidentiality rules (42 CFR Part 2). The Federal rules   
prohibit you from making any further disclosure of this information unless 
further   
disclosure is expressly permitted by the written consent of the person to whom 
it   
pertains or as otherwise permitted by 42 CFR Part 2. A general authorization for
 the   
release of medical or other information is NOT sufficient for this purpose. The   
Federal rules restrict any use of the information to criminally investigate or   
prosecute any alcohol or drug abuse patient.[facility] DaytonIn the event this   
information is protected by the Federal Confidentiality of Alcohol and Drug 
Abuse   
Patient Records regulations: This information has been disclosed to you from 
records   
protected by Federal confidentiality rules (42 CFR Part 2). The Federal rules   
prohibit you from making any further disclosure of this information unless 
further   
disclosure is expressly permitted by the written consent of the person to whom 
it   
pertains or as otherwise permitted by 42 CFR Part 2. A general authorization for
 the   
release of medical or other information is NOT sufficient for this purpose. The   
Federal rules restrict any use of the information to criminally investigate or   
prosecute any alcohol or drug abuse patient.[facility]In the event this   
information is protected by the Federal Confidentiality of Alcohol and Drug 
Abuse   
Patient Records regulations: This information has been disclosed to you from 
records   
protected by Federal confidentiality rules (42 CFR Part 2). The Federal rules   
prohibit you from making any further disclosure of this information unless 
further   
disclosure is expressly permitted by the written consent of the person to whom 
it   
pertains or as otherwise permitted by 42 CFR Part 2. A general authorization for
 the   
release of medical or other information is NOT sufficient for this purpose. The   
Federal rules restrict any use of the information to criminally investigate or   
prosecute any alcohol or drug abuse patient.[facility]In the event this   
information is protected by the Federal Confidentiality of Alcohol and Drug 
Abuse   
Patient Records regulations: This information has been disclosed to you from 
records   
protected by Federal confidentiality rules (42 CFR Part 2). The Federal rules   
prohibit you from making any further disclosure of this information unless 
further   
disclosure is expressly permitted by the written consent of the person to whom 
it   
pertains or as otherwise permitted by 42 CFR Part 2. A general authorization for
 the   
release of medical or other information is NOT sufficient for this purpose. The   
Federal rules restrict any use of the information to criminally investigate or   
prosecute any alcohol or drug abuse patient.[facility] DaytonIn the event this   
information is protected by the Federal Confidentiality of Alcohol and Drug 
Abuse   
Patient Records regulations: This information has been disclosed to you from 
records   
protected by Federal confidentiality rules (42 CFR Part 2). The Federal rules   
prohibit you from making any further disclosure of this information unless 
further   
disclosure is expressly permitted by the written consent of the person to whom 
it   
pertains or as otherwise permitted by 42 CFR Part 2. A general authorization for
 the   
release of medical or other information is NOT sufficient for this purpose. The   
Federal rules restrict any use of the information to criminally investigate or   
prosecute any alcohol or drug abuse patient.[facility] DaytonIn the event this   
information is protected by the Federal Confidentiality of Alcohol and Drug 
Abuse   
Patient Records regulations: This information has been disclosed to you from 
records   
protected by Federal confidentiality rules (42 CFR Part 2). The Federal rules   
prohibit you from making any further disclosure of this information unless 
further   
disclosure is expressly permitted by the written consent of the person to whom 
it   
pertains or as otherwise permitted by 42 CFR Part 2. A general authorization for
 the   
release of medical or other information is NOT sufficient for this purpose. The   
Federal rules restrict any use of the information to criminally investigate or   
prosecute any alcohol or drug abuse patient.[facility] DaytonIn the event this   
information is protected by the Federal Confidentiality of Alcohol and Drug 
Abuse   
Patient Records regulations: This information has been disclosed to you from 
records   
protected by Federal confidentiality rules (42 CFR Part 2). The Federal rules   
prohibit you from making any further disclosure of this information unless 
further   
disclosure is expressly permitted by the written consent of the person to whom 
it   
pertains or as otherwise permitted by 42 CFR Part 2. A general authorization for
 the   
release of medical or other information is NOT sufficient for this purpose. The   
Federal rules restrict any use of the information to criminally investigate or   
prosecute any alcohol or drug abuse patient.[facility] DaytonIn the event this   
information is protected by the Federal Confidentiality of Alcohol and Drug 
Abuse   
Patient Records regulations: This information has been disclosed to you from 
records   
protected by Federal confidentiality rules (42 CFR Part 2). The Federal rules   
prohibit you from making any further disclosure of this information unless 
further   
disclosure is expressly permitted by the written consent of the person to whom 
it   
pertains or as otherwise permitted by 42 CFR Part 2. A general authorization for
 the   
release of medical or other information is NOT sufficient for this purpose. The   
Federal rules restrict any use of the information to criminally investigate or   
prosecute any alcohol or drug abuse patient.[facility] DaytonIn the event this   
information is protected by the Federal Confidentiality of Alcohol and Drug 
Abuse   
Patient Records regulations: This information has been disclosed to you from 
records   
protected by Federal confidentiality rules (42 CFR Part 2). The Federal rules   
prohibit you from making any further disclosure of this information unless 
further   
disclosure is expressly permitted by the written consent of the person to whom 
it   
pertains or as otherwise permitted by 42 CFR Part 2. A general authorization for
 the   
release of medical or other information is NOT sufficient for this purpose. The   
Federal rules restrict any use of the information to criminally investigate or   
prosecute any alcohol or drug abuse patient.[facility] DaytonIn the event this   
information is protected by the Federal Confidentiality of Alcohol and Drug 
Abuse   
Patient Records regulations: This information has been disclosed to you from 
records   
protected by Federal confidentiality rules (42 CFR Part 2). The Federal rules   
prohibit you from making any further disclosure of this information unless 
further   
disclosure is expressly permitted by the written consent of the person to whom 
it   
pertains or as otherwise permitted by 42 CFR Part 2. A general authorization for
 the   
release of medical or other information is NOT sufficient for this purpose. The   
Federal rules restrict any use of the information to criminally investigate or   
prosecute any alcohol or drug abuse patient.[facility] DaytonIn the event this   
information is protected by the Federal Confidentiality of Alcohol and Drug 
Abuse   
Patient Records regulations: This information has been disclosed to you from 
records   
protected by Federal confidentiality rules (42 CFR Part 2). The Federal rules   
prohibit you from making any further disclosure of this information unless 
further   
disclosure is expressly permitted by the written consent of the person to whom 
it   
pertains or as otherwise permitted by 42 CFR Part 2. A general authorization for
 the   
release of medical or other information is NOT sufficient for this purpose. The   
Federal rules restrict any use of the information to criminally investigate or   
prosecute any alcohol or drug abuse patient.[facility] DaytonIn the event this   
information is protected by the Federal Confidentiality of Alcohol and Drug 
Abuse   
Patient Records regulations: This information has been disclosed to you from 
records   
protected by Federal confidentiality rules (42 CFR Part 2). The Federal rules   
prohibit you from making any further disclosure of this information unless 
further   
disclosure is expressly permitted by the written consent of the person to whom 
it   
pertains or as otherwise permitted by 42 CFR Part 2. A general authorization for
 the   
release of medical or other information is NOT sufficient for this purpose. The   
Federal rules restrict any use of the information to criminally investigate or   
prosecute any alcohol or drug abuse patient.[facility] DaytonIn the event this   
information is protected by the Federal Confidentiality of Alcohol and Drug 
Abuse   
Patient Records regulations: This information has been disclosed to you from 
records   
protected by Federal confidentiality rules (42 CFR Part 2). The Federal rules   
prohibit you from making any further disclosure of this information unless 
further   
disclosure is expressly permitted by the written consent of the person to whom 
it   
pertains or as otherwise permitted by 42 CFR Part 2. A general authorization for
 the   
release of medical or other information is NOT sufficient for this purpose. The   
Federal rules restrict any use of the information to criminally investigate or   
prosecute any alcohol or drug abuse patient.[facility] DaytonIn the event this   
information is protected by the Federal Confidentiality of Alcohol and Drug 
Abuse   
Patient Records regulations: This information has been disclosed to you from 
records   
protected by Federal confidentiality rules (42 CFR Part 2). The Federal rules   
prohibit you from making any further disclosure of this information unless 
further   
disclosure is expressly permitted by the written consent of the person to whom 
it   
pertains or as otherwise permitted by 42 CFR Part 2. A general authorization for
 the   
release of medical or other information is NOT sufficient for this purpose. The   
Federal rules restrict any use of the information to criminally investigate or   
prosecute any alcohol or drug abuse patient.[facility] DaytonIn the event this   
information is protected by the Federal Confidentiality of Alcohol and Drug 
Abuse   
Patient Records regulations: This information has been disclosed to you from 
records   
protected by Federal confidentiality rules (42 CFR Part 2). The Federal rules   
prohibit you from making any further disclosure of this information unless 
further   
disclosure is expressly permitted by the written consent of the person to whom 
it   
pertains or as otherwise permitted by 42 CFR Part 2. A general authorization for
 the   
release of medical or other information is NOT sufficient for this purpose. The   
Federal rules restrict any use of the information to criminally investigate or   
prosecute any alcohol or drug abuse patient.[facility] the event this   
information is protected by the Federal Confidentiality of Alcohol and Drug 
Abuse   
Patient Records regulations: This information has been disclosed to you from 
records   
protected by Federal confidentiality rules (42 CFR Part 2). The Federal rules   
prohibit you from making any further disclosure of this information unless 
further   
disclosure is expressly permitted by the written consent of the person to whom 
it   
pertains or as otherwise permitted by 42 CFR Part 2. A general authorization for
 the   
release of medical or other information is NOT sufficient for this purpose. The   
Federal rules restrict any use of the information to criminally investigate or   
prosecute any alcohol or drug abuse patient.[facility]In the event this   
information is protected by the Federal Confidentiality of Alcohol and Drug 
Abuse   
Patient Records regulations: This information has been disclosed to you from 
records   
protected by Federal confidentiality rules (42 CFR Part 2). The Federal rules   
prohibit you from making any further disclosure of this information unless 
further   
disclosure is expressly permitted by the written consent of the person to whom 
it   
pertains or as otherwise permitted by 42 CFR Part 2. A general authorization for
 the   
release of medical or other information is NOT sufficient for this purpose. The   
Federal rules restrict any use of the information to criminally investigate or   
prosecute any alcohol or drug abuse patient.[facility]In the event this   
information is protected by the Federal Confidentiality of Alcohol and Drug 
Abuse   
Patient Records regulations: This information has been disclosed to you from 
records   
protected by Federal confidentiality rules (42 CFR Part 2). The Federal rules   
prohibit you from making any further disclosure of this information unless 
further   
disclosure is expressly permitted by the written consent of the person to whom 
it   
pertains or as otherwise permitted by 42 CFR Part 2. A general authorization for
 the   
release of medical or other information is NOT sufficient for this purpose. The   
Federal rules restrict any use of the information to criminally investigate or   
prosecute any alcohol or drug abuse patient.[facility]In the event this   
information is protected by the Federal Confidentiality of Alcohol and Drug 
Abuse   
Patient Records regulations: This information has been disclosed to you from 
records   
protected by Federal confidentiality rules (42 CFR Part 2). The Federal rules   
prohibit you from making any further disclosure of this information unless 
further   
disclosure is expressly permitted by the written consent of the person to whom 
it   
pertains or as otherwise permitted by 42 CFR Part 2. A general authorization for
 the   
release of medical or other information is NOT sufficient for this purpose. The   
Federal rules restrict any use of the information to criminally investigate or   
prosecute any alcohol or drug abuse patient.[facility]In the event this   
information is protected by the Federal Confidentiality of Alcohol and Drug 
Abuse   
Patient Records regulations: This information has been disclosed to you from 
records   
protected by Federal confidentiality rules (42 CFR Part 2). The Federal rules   
prohibit you from making any further disclosure of this information unless 
further   
disclosure is expressly permitted by the written consent of the person to whom 
it   
pertains or as otherwise permitted by 42 CFR Part 2. A general authorization for
 the   
release of medical or other information is NOT sufficient for this purpose. The   
Federal rules restrict any use of the information to criminally investigate or   
prosecute any alcohol or drug abuse patient.[facility]In the event this   
information is protected by the Federal Confidentiality of Alcohol and Drug 
Abuse   
Patient Records regulations: This information has been disclosed to you from 
records   
protected by Federal confidentiality rules (42 CFR Part 2). The Federal rules   
prohibit you from making any further disclosure of this information unless 
further   
disclosure is expressly permitted by the written consent of the person to whom 
it   
pertains or as otherwise permitted by 42 CFR Part 2. A general authorization for
 the   
release of medical or other information is NOT sufficient for this purpose. The   
Federal rules restrict any use of the information to criminally investigate or   
prosecute any alcohol or drug abuse patient.[facility]In the event this   
information is protected by the Federal Confidentiality of Alcohol and Drug 
Abuse   
Patient Records regulations: This information has been disclosed to you from 
records   
protected by Federal confidentiality rules (42 CFR Part 2). The Federal rules   
prohibit you from making any further disclosure of this information unless 
further   
disclosure is expressly permitted by the written consent of the person to whom 
it   
pertains or as otherwise permitted by 42 CFR Part 2. A general authorization for
 the   
release of medical or other information is NOT sufficient for this purpose. The   
Federal rules restrict any use of the information to criminally investigate or   
prosecute any alcohol or drug abuse patient.[facility] [place]In the event this   
information is protected by the Federal Confidentiality of Alcohol and Drug 
Abuse   
Patient Records regulations: This information has been disclosed to you from 
records   
protected by Federal confidentiality rules (42 CFR Part 2). The Federal rules   
prohibit you from making any further disclosure of this information unless 
further   
disclosure is expressly permitted by the written consent of the person to whom 
it   
pertains or as otherwise permitted by 42 CFR Part 2. A general authorization for
 the   
release of medical or other information is NOT sufficient for this purpose. The   
Federal rules restrict any use of the information to criminally investigate or   
prosecute any alcohol or drug abuse patient.[facility] [place]In the event this   
information is protected by the Federal Confidentiality of Alcohol and Drug 
Abuse   
Patient Records regulations: This information has been disclosed to you from 
records   
protected by Federal confidentiality rules (42 CFR Part 2). The Federal rules   
prohibit you from making any further disclosure of this information unless 
further   
disclosure is expressly permitted by the written consent of the person to whom 
it   
pertains or as otherwise permitted by 42 CFR Part 2. A general authorization for
 the   
release of medical or other information is NOT sufficient for this purpose. The   
Federal rules restrict any use of the information to criminally investigate or   
prosecute any alcohol or drug abuse patient.[facility]In the event this   
information is protected by the Federal Confidentiality of Alcohol and Drug 
Abuse   
Patient Records regulations: This information has been disclosed to you from 
records   
protected by Federal confidentiality rules (42 CFR Part 2). The Federal rules   
prohibit you from making any further disclosure of this information unless 
further   
disclosure is expressly permitted by the written consent of the person to whom 
it   
pertains or as otherwise permitted by 42 CFR Part 2. A general authorization for
 the   
release of medical or other information is NOT sufficient for this purpose. The   
Federal rules restrict any use of the information to criminally investigate or   
prosecute any alcohol or drug abuse patient.[facility]In the event this   
information is protected by the Federal Confidentiality of Alcohol and Drug 
Abuse   
Patient Records regulations: This information has been disclosed to you from 
records   
protected by Federal confidentiality rules (42 CFR Part 2). The Federal rules   
prohibit you from making any further disclosure of this information unless 
further   
disclosure is expressly permitted by the written consent of the person to whom 
it   
pertains or as otherwise permitted by 42 CFR Part 2. A general authorization for
 the   
release of medical or other information is NOT sufficient for this purpose. The   
Federal rules restrict any use of the information to criminally investigate or   
prosecute any alcohol or drug abuse patient.[facility]In the event this   
information is protected by the Federal Confidentiality of Alcohol and Drug 
Abuse   
Patient Records regulations: This information has been disclosed to you from 
records   
protected by Federal confidentiality rules (42 CFR Part 2). The Federal rules   
prohibit you from making any further disclosure of this information unless 
further   
disclosure is expressly permitted by the written consent of the person to whom 
it   
pertains or as otherwise permitted by 42 CFR Part 2. A general authorization for
 the   
release of medical or other information is NOT sufficient for this purpose. The   
Federal rules restrict any use of the information to criminally investigate or   
prosecute any alcohol or drug abuse patient.[facility]In the event this   
information is protected by the Federal Confidentiality of Alcohol and Drug 
Abuse   
Patient Records regulations: This information has been disclosed to you from 
records   
protected by Federal confidentiality rules (42 CFR Part 2). The Federal rules   
prohibit you from making any further disclosure of this information unless 
further   
disclosure is expressly permitted by the written consent of the person to whom 
it   
pertains or as otherwise permitted by 42 CFR Part 2. A general authorization for
 the   
release of medical or other information is NOT sufficient for this purpose. The   
Federal rules restrict any use of the information to criminally investigate or   
prosecute any alcohol or drug abuse patient.[facility]In the event this   
information is protected by the Federal Confidentiality of Alcohol and Drug 
Abuse   
Patient Records regulations: This information has been disclosed to you from 
records   
protected by Federal confidentiality rules (42 CFR Part 2). The Federal rules   
prohibit you from making any further disclosure of this information unless 
further   
disclosure is expressly permitted by the written consent of the person to whom 
it   
pertains or as otherwise permitted by 42 CFR Part 2. A general authorization for
 the   
release of medical or other information is NOT sufficient for this purpose. The   
Federal rules restrict any use of the information to criminally investigate or   
prosecute any alcohol or drug abuse patient.[facility]In the event this   
information is protected by the Federal Confidentiality of Alcohol and Drug 
Abuse   
Patient Records regulations: This information has been disclosed to you from 
records   
protected by Federal confidentiality rules (42 CFR Part 2). The Federal rules   
prohibit you from making any further disclosure of this information unless 
further   
disclosure is expressly permitted by the written consent of the person to whom 
it   
pertains or as otherwise permitted by 42 CFR Part 2. A general authorization for
 the   
release of medical or other information is NOT sufficient for this purpose. The   
Federal rules restrict any use of the information to criminally investigate or   
prosecute any alcohol or drug abuse patient.[facility]In the event this   
information is protected by the Federal Confidentiality of Alcohol and Drug 
Abuse   
Patient Records regulations: This information has been disclosed to you from 
records   
protected by Federal confidentiality rules (42 CFR Part 2). The Federal rules   
prohibit you from making any further disclosure of this information unless 
further   
disclosure is expressly permitted by the written consent of the person to whom 
it   
pertains or as otherwise permitted by 42 CFR Part 2. A general authorization for
 the   
release of medical or other information is NOT sufficient for this purpose. The   
Federal rules restrict any use of the information to criminally investigate or   
prosecute any alcohol or drug abuse patient.[facility]In the event this   
information is protected by the Federal Confidentiality of Alcohol and Drug 
Abuse   
Patient Records regulations: This information has been disclosed to you from 
records   
protected by Federal confidentiality rules (42 CFR Part 2). The Federal rules   
prohibit you from making any further disclosure of this information unless 
further   
disclosure is expressly permitted by the written consent of the person to whom 
it   
pertains or as otherwise permitted by 42 CFR Part 2. A general authorization for
 the   
release of medical or other information is NOT sufficient for this purpose. The   
Federal rules restrict any use of the information to criminally investigate or   
prosecute any alcohol or drug abuse patient.[facility]In the event this   
information is protected by the Federal Confidentiality of Alcohol and Drug 
Abuse   
Patient Records regulations: This information has been disclosed to you from 
records   
protected by Federal confidentiality rules (42 CFR Part 2). The Federal rules   
prohibit you from making any further disclosure of this information unless 
further   
disclosure is expressly permitted by the written consent of the person to whom 
it   
pertains or as otherwise permitted by 42 CFR Part 2. A general authorization for
 the   
release of medical or other information is NOT sufficient for this purpose. The   
Federal rules restrict any use of the information to criminally investigate or   
prosecute any alcohol or drug abuse patient.[facility]In the event this   
information is protected by the Federal Confidentiality of Alcohol and Drug 
Abuse   
Patient Records regulations: This information has been disclosed to you from 
records   
protected by Federal confidentiality rules (42 CFR Part 2). The Federal rules   
prohibit you from making any further disclosure of this information unless 
further   
disclosure is expressly permitted by the written consent of the person to whom 
it   
pertains or as otherwise permitted by 42 CFR Part 2. A general authorization for
 the   
release of medical or other information is NOT sufficient for this purpose. The   
Federal rules restrict any use of the information to criminally investigate or   
prosecute any alcohol or drug abuse patient.[facility]In the event this   
information is protected by the Federal Confidentiality of Alcohol and Drug 
Abuse   
Patient Records regulations: This information has been disclosed to you from 
records   
protected by Federal confidentiality rules (42 CFR Part 2). The Federal rules   
prohibit you from making any further disclosure of this information unless 
further   
disclosure is expressly permitted by the written consent of the person to whom 
it   
pertains or as otherwise permitted by 42 CFR Part 2. A general authorization for
 the   
release of medical or other information is NOT sufficient for this purpose. The   
Federal rules restrict any use of the information to criminally investigate or   
prosecute any alcohol or drug abuse patient.[facility]In the event this   
information is protected by the Federal Confidentiality of Alcohol and Drug 
Abuse   
Patient Records regulations: This information has been disclosed to you from 
records   
protected by Federal confidentiality rules (42 CFR Part 2). The Federal rules   
prohibit you from making any further disclosure of this information unless 
further   
disclosure is expressly permitted by the written consent of the person to whom 
it   
pertains or as otherwise permitted by 42 CFR Part 2. A general authorization for
 the   
release of medical or other information is NOT sufficient for this purpose. The   
Federal rules restrict any use of the information to criminally investigate or   
prosecute any alcohol or drug abuse patient.[facility]In the event this   
information is protected by the Federal Confidentiality of Alcohol and Drug 
Abuse   
Patient Records regulations: This information has been disclosed to you from 
records   
protected by Federal confidentiality rules (42 CFR Part 2). The Federal rules   
prohibit you from making any further disclosure of this information unless 
further   
disclosure is expressly permitted by the written consent of the person to whom 
it   
pertains or as otherwise permitted by 42 CFR Part 2. A general authorization for
 the   
release of medical or other information is NOT sufficient for this purpose. The   
Federal rules restrict any use of the information to criminally investigate or   
prosecute any alcohol or drug abuse patient.[facility]In the event this   
information is protected by the Federal Confidentiality of Alcohol and Drug 
Abuse   
Patient Records regulations: This information has been disclosed to you from 
records   
protected by Federal confidentiality rules (42 CFR Part 2). The Federal rules   
prohibit you from making any further disclosure of this information unless 
further   
disclosure is expressly permitted by the written consent of the person to whom 
it   
pertains or as otherwise permitted by 42 CFR Part 2. A general authorization for
 the   
release of medical or other information is NOT sufficient for this purpose. The   
Federal rules restrict any use of the information to criminally investigate or   
prosecute any alcohol or drug abuse patient.[facility]In the event this   
information is protected by the Federal Confidentiality of Alcohol and Drug 
Abuse   
Patient Records regulations: This information has been disclosed to you from 
records   
protected by Federal confidentiality rules (42 CFR Part 2). The Federal rules   
prohibit you from making any further disclosure of this information unless 
further   
disclosure is expressly permitted by the written consent of the person to whom 
it   
pertains or as otherwise permitted by 42 CFR Part 2. A general authorization for
 the   
release of medical or other information is NOT sufficient for this purpose. The   
Federal rules restrict any use of the information to criminally investigate or   
prosecute any alcohol or drug abuse patient.[facility]In the event this   
information is protected by the Federal Confidentiality of Alcohol and Drug 
Abuse   
Patient Records regulations: This information has been disclosed to you from 
records   
protected by Federal confidentiality rules (42 CFR Part 2). The Federal rules   
prohibit you from making any further disclosure of this information unless 
further   
disclosure is expressly permitted by the written consent of the person to whom 
it   
pertains or as otherwise permitted by 42 CFR Part 2. A general authorization for
 the   
release of medical or other information is NOT sufficient for this purpose. The   
Federal rules restrict any use of the information to criminally investigate or   
prosecute any alcohol or drug abuse patient.[facility]In the event this   
information is protected by the Federal Confidentiality of Alcohol and Drug 
Abuse   
Patient Records regulations: This information has been disclosed to you from 
records   
protected by Federal confidentiality rules (42 CFR Part 2). The Federal rules   
prohibit you from making any further disclosure of this information unless 
further   
disclosure is expressly permitted by the written consent of the person to whom 
it   
pertains or as otherwise permitted by 42 CFR Part 2. A general authorization for
 the   
release of medical or other information is NOT sufficient for this purpose. The   
Federal rules restrict any use of the information to criminally investigate or   
prosecute any alcohol or drug abuse patient.[facility]In the event this   
information is protected by the Federal Confidentiality of Alcohol and Drug 
Abuse   
Patient Records regulations: This information has been disclosed to you from 
records   
protected by Federal confidentiality rules (42 CFR Part 2). The Federal rules   
prohibit you from making any further disclosure of this information unless 
further   
disclosure is expressly permitted by the written consent of the person to whom 
it   
pertains or as otherwise permitted by 42 CFR Part 2. A general authorization for
 the   
release of medical or other information is NOT sufficient for this purpose. The   
Federal rules restrict any use of the information to criminally investigate or   
prosecute any alcohol or drug abuse patient.[facility]In the event this   
information is protected by the Federal Confidentiality of Alcohol and Drug 
Abuse   
Patient Records regulations: This information has been disclosed to you from 
records   
protected by Federal confidentiality rules (42 CFR Part 2). The Federal rules   
prohibit you from making any further disclosure of this information unless 
further   
disclosure is expressly permitted by the written consent of the person to whom 
it   
pertains or as otherwise permitted by 42 CFR Part 2. A general authorization for
 the   
release of medical or other information is NOT sufficient for this purpose. The   
Federal rules restrict any use of the information to criminally investigate or   
prosecute any alcohol or drug abuse patient.[facility]In the event this   
information is protected by the Federal Confidentiality of Alcohol and Drug 
Abuse   
Patient Records regulations: This information has been disclosed to you from 
records   
protected by Federal confidentiality rules (42 CFR Part 2). The Federal rules   
prohibit you from making any further disclosure of this information unless 
further   
disclosure is expressly permitted by the written consent of the person to whom 
it   
pertains or as otherwise permitted by 42 CFR Part 2. A general authorization for
 the   
release of medical or other information is NOT sufficient for this purpose. The   
Federal rules restrict any use of the information to criminally investigate or   
prosecute any alcohol or drug abuse patient.[facility]In the event this   
information is protected by the Federal Confidentiality of Alcohol and Drug 
Abuse   
Patient Records regulations: This information has been disclosed to you from 
records   
protected by Federal confidentiality rules (42 CFR Part 2). The Federal rules   
prohibit you from making any further disclosure of this information unless 
further   
disclosure is expressly permitted by the written consent of the person to whom 
it   
pertains or as otherwise permitted by 42 CFR Part 2. A general authorization for
 the   
release of medical or other information is NOT sufficient for this purpose. The   
Federal rules restrict any use of the information to criminally investigate or   
prosecute any alcohol or drug abuse patient.[facility]In the event this   
information is protected by the Federal Confidentiality of Alcohol and Drug 
Abuse   
Patient Records regulations: This information has been disclosed to you from 
records   
protected by Federal confidentiality rules (42 CFR Part 2). The Federal rules   
prohibit you from making any further disclosure of this information unless 
further   
disclosure is expressly permitted by the written consent of the person to whom 
it   
pertains or as otherwise permitted by 42 CFR Part 2. A general authorization for
 the   
release of medical or other information is NOT sufficient for this purpose. The   
Federal rules restrict any use of the information to criminally investigate or   
prosecute any alcohol or drug abuse patient.[facility] DaytonIn the event this   
information is protected by the Federal Confidentiality of Alcohol and Drug 
Abuse   
Patient Records regulations: This information has been disclosed to you from 
records   
protected by Federal confidentiality rules (42 CFR Part 2). The Federal rules   
prohibit you from making any further disclosure of this information unless 
further   
disclosure is expressly permitted by the written consent of the person to whom 
it   
pertains or as otherwise permitted by 42 CFR Part 2. A general authorization for
 the   
release of medical or other information is NOT sufficient for this purpose. The   
Federal rules restrict any use of the information to criminally investigate or   
prosecute any alcohol or drug abuse patient.[facility] the event this   
information is protected by the Federal Confidentiality of Alcohol and Drug 
Abuse   
Patient Records regulations: This information has been disclosed to you from 
records   
protected by Federal confidentiality rules (42 CFR Part 2). The Federal rules   
prohibit you from making any further disclosure of this information unless 
further   
disclosure is expressly permitted by the written consent of the person to whom 
it   
pertains or as otherwise permitted by 42 CFR Part 2. A general authorization for
 the   
release of medical or other information is NOT sufficient for this purpose. The   
Federal rules restrict any use of the information to criminally investigate or   
prosecute any alcohol or drug abuse patient.[facility] the event this   
information is protected by the Federal Confidentiality of Alcohol and Drug 
Abuse   
Patient Records regulations: This information has been disclosed to you from 
records   
protected by Federal confidentiality rules (42 CFR Part 2). The Federal rules   
prohibit you from making any further disclosure of this information unless 
further   
disclosure is expressly permitted by the written consent of the person to whom 
it   
pertains or as otherwise permitted by 42 CFR Part 2. A general authorization for
 the   
release of medical or other information is NOT sufficient for this purpose. The   
Federal rules restrict any use of the information to criminally investigate or   
prosecute any alcohol or drug abuse patient.[facility]  
  
  
  
  
                                                    Reason for Visit (unrecogniz  
ed section and content)  
   
                                          
  
  
  
                                        Reason              Comments  
   
                                        Future Appointment    
  
  
  
                                        Reason              Comments  
   
                                        New                   
  
  
  
                                        Reason              Comments  
   
                                        Appointment           
  
  
  
                                        Reason              Comments  
   
                                        New                   
   
                                        Stiffness             
   
                                        Pain                  
  
  
  
                                        Reason              Comments  
   
                                        plan of care          
  
  
  
                                        Reason              Comments  
   
                                        Results, Lab          
   
                                        CoPat Management      
  
  
  
                                        Reason              Comments  
   
                                        Post Op               
  
  
  
                                        Reason              Comments  
   
                                        Follow Up             
   
                                        Post Op               
   
                                        Pain                  
  
  
  
                                        Reason              Comments  
   
                                        Infection Follow Up   
  
  
  
                                        Reason              Comments  
   
                                        Results, Lab          
  
  
  
                                        Reason              Comments  
   
                                        Physician orders      
  
  
  
  
  
                                                    Care Teams (unrecognized sec  
tion and content)  
   
                                          
  
  
  
                      Team Member Relationship Specialty  Start Date End Date  
   
                                                      
  
  
Annalee Eckert, DO  
  
  
[address] [facility]  
  
  
[address]  
  
  
[place] [address]  
  
  
[phone] (Work)  
  
  
[phone] (Fax) PCP - General   Internal Medicine 21          
  
  
  
                      Team Member Relationship Specialty  Start Date End Date  
   
                                                      
  
  
Annalee Eckert DO  
  
  
[address] [facility]  
  
  
[address]  
  
  
[place] [address]  
  
  
[phone] (Work)  
  
  
[phone] (Fax) PCP - General   Internal Medicine 21          
  
  
  
                      Team Member Relationship Specialty  Start Date End Date  
   
                                                      
  
  
Annalee Eckert DO  
  
  
[address]  
  
  
[address]  
  
  
[place] [address]  
  
  
[phone] (Work)  
  
  
[phone] (Fax) PCP - General   Internal Medicine 21          
  
  
  
                      Team Member Relationship Specialty  Start Date End Date  
   
                                                      
  
  
Becky Corrales MD  
  
  
[address]  
  
  
[address]  
  
  
[place] [address]  
  
  
[phone] (Work)  
  
  
[phone] (Fax) PCP - General   Internal Medicine 3/1/22            
  
  
  
                      Team Member Relationship Specialty  Start Date End Date  
   
                                                      
  
  
Becky Corrales MD  
  
  
[address]  
  
  
[address]  
  
  
[place] [address]  
  
  
[phone] (Work)  
  
  
[phone] (Fax) PCP - General   Internal Medicine 3/1/22            
  
  
  
                      Team Member Relationship Specialty  Start Date End Date  
   
                                                      
  
  
Becky Corrales MD  
  
  
[address]  
  
  
[address]  
  
  
[place] [address]  
  
  
[phone] (Work)  
  
  
[phone] (Fax) PCP - General   Internal Medicine 3/1/22            
  
  
  
                      Team Member Relationship Specialty  Start Date End Date  
   
                                                      
  
  
Becky Corrales MD  
  
  
[address]  
  
  
[address]  
  
  
[place] [address]  
  
  
[phone] (Work)  
  
  
 (Fax) PCP - General   Internal Medicine 3/1/22            
  
  
  
                      Team Member Relationship Specialty  Start Date End Date  
   
                                                      
  
  
Becky Corrales MD  
  
  
[address]  
  
  
[address]  
  
  
[place] [address]  
  
  
[phone] (Work)  
  
  
 (Fax) PCP - General   Internal Medicine 3/1/22            
  
  
  
                      Team Member Relationship Specialty  Start Date End Date  
   
                                                      
  
  
Becky Corrales MD  
  
  
[address]  
  
  
[address]  
  
  
[place] [address]  
  
  
[phone] (Work)  
  
  
 (Fax) PCP - General   Internal Medicine 3/1/22            
  
  
  
                      Team Member Relationship Specialty  Start Date End Date  
   
                                                      
  
  
Becky Corrales MD  
  
  
[address]  
  
  
[address]  
  
  
[place] [address]  
  
  
[phone] (Work)  
  
  
 (Fax) PCP - General   Internal Medicine 3/1/22            
  
  
  
                      Team Member Relationship Specialty  Start Date End Date  
   
                                                      
  
  
Annalee Eckert, DO  
  
  
[address]  
  
  
[address]  
  
  
[place], [place] [address] [phone] (Work)  
  
  
[phone] (Fax) PCP - General   Internal Medicine 21  
   
                                                      
  
  
Becky Corrales MD  
  
  
[address]  
  
  
[address]  
  
  
[place] [address]  
  
  
[phone] (Work)  
  
  
[phone] (Fax) PCP - General   Internal Medicine 3/1/22            
  
  
  
                      Team Member Relationship Specialty  Start Date End Date  
   
                                                      
  
  
Becky Corrales MD  
  
  
[address]  
  
  
[address]  
  
  
[place] [address]  
  
  
[phone] (Work)  
  
  
[phone] (Fax) PCP - General   Internal Medicine 3/1/22            
  
  
FOR RECORDS PERTAINING TO PATIENTS WHO ARE OR HAVE BEEN ENROLLED IN A CHEMICAL 
DEPENDENCY/SUBSTANCEABUSE PROGRAM, SOME INFORMATION MAY BE OMITTED. This 
clinical summary was aggregated from multiple sources. Caution should be 
exercised in using it in the provision of clinical care. This summary normalizes
 information from multiple sources, and as a consequence, information in this 
document may materially change the coding, format and clinical context of 
patient data. In addition, data may be omitted in some cases. CLINICAL DECISIONS
 SHOULD BE BASED ON THE PRIMARY CLINICAL RECORDS. [facility]. provides
 no warranty or guarantee of the accuracy or completeness of information in this
 document.

## 2024-09-02 NOTE — NURSING
Patient arrived from the cath lab with heparin gtt running @ 1000 units/hr and nitroglycerin gtt running @ 5 mcg/min. No orders for these medications in the MAR. Dr. Guerrero paged and he gave orders for nitroglycerin and heparin. He requested that we 
stop the heparin gtt at this time and then restart it once the TR band is off. Due to the large amount of heparin she received prior to the cath lab and then through the sheath, he asked that it be restarted at a lower dose of 800 un/hr once the TR 
band is off. Orders placed and pharmacy notified of heparin gtt instructions.

## 2024-09-02 NOTE — PRO.PCM_ITS
Procedure Report    
Date of Procedure: 09/02/24    
Left heart catheterization;    
1.  Moderate sedation    
2.  Selective cholangiography    
3.  Selective right coronary angiography    
4.  Selective SVG to RCA    
5.  Selective SVG VG graft to OM1    
6.  Nonselective LIMA to LAD    
Access;    
Right radial artery approach    
Consent;    
Risk and benefit of the procedure explained informed consent obtained as patient  
presented as an emergency with chest pain and abnormal EKG.    
Preprocedure diagnosis patient is a 64-year-old with extensive cardiac history    
Has history of coronary artery bypass surgery/CABG 2013    
Subsequently has stent to the left main LAD and circumflex artery.    
She presented to the ED complaining of symptoms of chest pain, evidently the   
symptoms have been ongoing for the last 3 to 4 weeks and progressively getting   
worse    
The time she came to the ER she has a change in the EKG with diffuse ST   
depression and ST elevation noted in aVR.    
She was given nitroglycerin and Brilinta in the ER her symptoms of chest pain   
resolved with nitroglycerin sublingual    
Other medical problem patient had history of diabetes mellitus    
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   
foot and she had left these replacement which got infected and underwent   
amputation.    
She does not smoke.    
Diagnostic catheters;    
1.  6 Grenadian sheath placed in the right radial artery    
2.  5 Grenadian JL 3 oh    
3.  5 Grenadian JR4    
4.  5 Grenadian LCB    
Procedure in detail;    
Patient brought to the emergency to the Cath Lab.  [facility]    
Access obtained from the right radial artery and a cocktail of verapamil,   
heparin as well as nitroglycerin was given through the right radial artery   
sheath    
Unguinal proceed with the diagnostic catheter which is 5 Grenadian  advancing  
[place] cannulated the left main multiple views (to obtain    
Following this catheter exchanged for 5 Grenadian JR4 and cannulated the RCA in the  
native RCA    
The same catheter was used to cannulate the SVG graft RCA.    
LCB catheter was used and selectively engage the SVG graft to OM1 which is   
occluded proximally    
The 5 Grenadian JR4 was used to nonselective LIMA to LAD through the left   
subclavian angiography.    
JR4 catheter was used to cross the aortic valve in place in the mid ventricle    
LVEDP was measured and a pullback pressure was recorded.    
Hemodynamic;    
1.  LVEDP measured 15 mmHg    
2.  No systolic gradient across aortic valve.    
No systolic gradient across aortic valve'    
    
Coronary angiography;    
1.  Left main stent has diffuse 90% stenosis    
It bifurcates into LAD and left circumflex    
2.  Left anterior descending artery stent is occluded mid    
3.  Left circumflex stent occluded proximally    
4.  Native RCA occluded proximally    
Graft angiography;    
1.  LIMA to LAD is patent    
2.  SVG graft to RCA is patent    
3.  SVG graft to the OM1 is occluded    
Conclusion recommendation    
64-year-old female with severe coronary atherosclerosis and prior coronary   
artery bypass surgery    
Multiple coronary artery stent to the left main LAD and left circumflex    
Presented with severe retrosternal chest pain typical of angina and had change   
in the EKG with diffuse ST depression noted    
She has been off her nitroglycerin and her symptoms has been ongoing for 3 to 4   
weeks    
Based on the change in the EKG and her significant history she was taken as an   
emergency to the Cath Lab and the cardiac catheterization demonstrated showed   
severe in-stent stenosis of the left main stent around 90% distally.    
Patient continues to have symptoms of chest pain.    
Patient will be admitted to the intensive care unit on heparin nitroglycerin    
TR band applied to right radial artery arteriotomy site    
I talked to the referring facility at the [facility]    
And the patient is awaiting for bed for transfer as she may require a PCI of the  
in-stent restenosis of the left main.    
Which is a complex history and will need and require high risk PCI    
Her LV systolic function need to be evaluated prior to that and she may require   
hemodynamic support for PCI of the left main which is the protected left main.    
I discussed cardiac catheterization finding with the patient as well as the   
family and her daughter-in-law.  She had symptoms of chest pain and was started   
on nitroglycerin and morphine and heparin.  Will continue on the antiplatelet   
with aspirin and Brilinta as well as the rest of her medication.    
She will be admitted to the intensive care unit under care of the hospitalist.    
No complication in the Cath Lab    
Lux Guerrero MD,FACC,[facility]

## 2024-09-02 NOTE — RAD_ITS
REPORT-ID:[id number] 
 
INDICATION: chest pain 
 
EXAMINATION/TECHNIQUE: 
X-RAY - XR Chest 1 View 
 
COMPARISON: 4/17/2023. 
 
FINDINGS: 
 
Low lung volumes. Left basilar atelectasis. The lungs are otherwise clear. 
 
Sternal cerclage wires and vascular clips are present from a prior 
sternotomy and coronary artery bypass graft procedure (CABG).  The heart is 
borderline enlarged. Tortuous and calcified thoracic aorta. 
 
No pleural effusion or pneumothorax. 
 
Degenerative changes of the thoracic spine. 
 
ORDER #: [id number] RAD/Chest 1 View (Portable)  
IMPRESSION:  
 
  
No acute radiographic abnormalities.  
 
  
Electronically Signed:  
Christ Ruiz MD  
2024/09/02 at 19:51 EDT  
Reading Location ID and State: [id number] / [place]  
Tel [phone], Service support  [phone], Fax [phone]

## 2024-09-02 NOTE — PCM.OP.PRO
Procedure Report
Date of Procedure: 09/02/24
Left heart catheterization;
1.  Moderate sedation
2.  Selective cholangiography
3.  Selective right coronary angiography
4.  Selective SVG to RCA
5.  Selective SVG VG graft to OM1
6.  Nonselective LIMA to LAD
Access;
Right radial artery approach
Consent;
Risk and benefit of the procedure explained informed consent obtained as patient presented as an emergency with chest pain and abnormal EKG.
Preprocedure diagnosis patient is a 64-year-old with extensive cardiac history
Has history of coronary artery bypass surgery/CABG 2013
Subsequently has stent to the left main LAD and circumflex artery.
She presented to the ED complaining of symptoms of chest pain, evidently the symptoms have been ongoing for the last 3 to 4 weeks and progressively getting worse
The time she came to the ER she has a change in the EKG with diffuse ST depression and ST elevation noted in aVR.
She was given nitroglycerin and Brilinta in the ER her symptoms of chest pain resolved with nitroglycerin sublingual
Other medical problem patient had history of diabetes mellitus
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
She does not smoke.
Diagnostic catheters;
1.  6 Armenian sheath placed in the right radial artery
2.  5 Armenian JL 3 oh
3.  5 Armenian JR4
4.  5 Armenian LCB
Procedure in detail;
Patient brought to the emergency to the Cath Lab.  [facility]
Access obtained from the right radial artery and a cocktail of verapamil, heparin as well as nitroglycerin was given through the right radial artery sheath
Unguinal proceed with the diagnostic catheter which is 5 Armenian  advancing [place] cannulated the left main multiple views (to obtain
Following this catheter exchanged for 5 Armenian JR4 and cannulated the RCA in the native RCA
The same catheter was used to cannulate the SVG graft RCA.
LCB catheter was used and selectively engage the SVG graft to OM1 which is occluded proximally
The 5 Armenian JR4 was used to nonselective LIMA to LAD through the left subclavian angiography.
JR4 catheter was used to cross the aortic valve in place in the mid ventricle
LVEDP was measured and a pullback pressure was recorded.
Hemodynamic;
1.  LVEDP measured 15 mmHg
2.  No systolic gradient across aortic valve.
No systolic gradient across aortic valve'

Coronary angiography;
1.  Left main stent has diffuse 90% stenosis
It bifurcates into LAD and left circumflex
2.  Left anterior descending artery stent is occluded mid
3.  Left circumflex stent occluded proximally
4.  Native RCA occluded proximally
Graft angiography;
1.  LIMA to LAD is patent
2.  SVG graft to RCA is patent
3.  SVG graft to the OM1 is occluded
Conclusion recommendation
64-year-old female with severe coronary atherosclerosis and prior coronary artery bypass surgery
Multiple coronary artery stent to the left main LAD and left circumflex
Presented with severe retrosternal chest pain typical of angina and had change in the EKG with diffuse ST depression noted
She has been off her nitroglycerin and her symptoms has been ongoing for 3 to 4 weeks
Based on the change in the EKG and her significant history she was taken as an emergency to the Cath Lab and the cardiac catheterization demonstrated showed severe in-stent stenosis of the left main stent around 90% distally.
Patient continues to have symptoms of chest pain.
Patient will be admitted to the intensive care unit on heparin nitroglycerin
TR band applied to right radial artery arteriotomy site
I talked to the referring facility at the [facility]
And the patient is awaiting for bed for transfer as she may require a PCI of the in-stent restenosis of the left main.
Which is a complex history and will need and require high risk PCI
Her LV systolic function need to be evaluated prior to that and she may require hemodynamic support for PCI of the left main which is the protected left main.
I discussed cardiac catheterization finding with the patient as well as the family and her daughter-in-law.  She had symptoms of chest pain and was started on nitroglycerin and morphine and heparin.  Will continue on the antiplatelet with aspirin 
and Brilinta as well as the rest of her medication.
She will be admitted to the intensive care unit under care of the hospitalist.
No complication in the Cath Lab
Lux Guerrero MD,FACC,[facility]

## 2024-09-02 NOTE — EDS_ITS
HPI    
History of Present Illness    
Chief Complaint: Chest Pain    
    
[facility]    
Medical History (Reviewed 08/15/24 @ 13:44 by SAM Macdonald)    
    
Cellulitis of left foot    
Non-pressure chronic ulcer of other part of left foot with necrosis of bone    
History of MI (myocardial infarction)    
Diabetic foot infection    
Diabetes mellitus    
HLD (hyperlipidemia)    
HTN (hypertension)    
Myocardial infarct    
    
    
                                Home Medications    
    
    
    
?Medication ?Instructions ?Recorded ?Last Taken ?Type    
     
clopidogrel 75 mg tablet 75 mg PO DAILY BLOOD THINNER 10/23/20 12/22/22 History    
     
nitroglycerin 0.4 mg sublingual 0.4 mg sublingual Q5M PRN Chest 11/18/20 Unknown  
History    
    
tablet Pain       
     
alirocumab 150 mg/mL subcutaneous 150 mg subcut Q14D CHOLESTEROL 12/22/22   
Unknown History    
    
pen injector (Praluent Pen)        
     
aspirin 81 mg chewable tablet 81 mg PO DAILY HEART HEALTH 12/22/22 12/22/22   
History    
     
bisacodyl 5 mg tablet 10 mg PO DAILY PRN Constipation 12/22/22 Unknown History    
     
diltiazem HCl 180 mg 180 mg PO LUNCH BLOOD PRESSURE 12/22/22 12/21/22 History    
    
capsule,extended release 24 hr        
     
duloxetine 60 mg capsule,delayed 60 mg PO BID DEPRESSION 12/22/22 12/22/22   
History    
    
release        
     
isosorbide mononitrate 30 mg 30 mg PO QHS ANGINA 12/22/22 12/21/22 History    
    
tablet,extended release 24 hr        
     
isosorbide mononitrate 60 mg 60 mg PO QHS ANGINA 12/22/22 12/21/22 History    
    
tablet,extended release 24 hr        
     
pantoprazole 40 mg tablet,delayed 40 mg PO DAILY 30 days #30 tabs 12/29/22   
Unknown Rx    
    
release        
     
buspirone 5 mg tablet 5 mg PO BID 04/11/23 Unknown History    
     
gabapentin 100 mg capsule 300 mg PO TID NERVE PAIN 04/11/23 Unknown History    
     
acetaminophen 325 mg tablet 650 mg (2 x 325 mg) PO Q4H PRN PRN 04/21/23 Unknown   
Rx    
    
 Fever, pain 1-10/10 #0 tabs       
     
nystatin 100,000 unit/gram topical 1 applic topical BID #0 grams 04/21/23   
Unknown Rx    
    
powder (Nyamyc)        
     
loratadine 10 mg tablet 10 mg PO DAILY 10/17/23 Unknown History    
     
tamsulosin 0.4 mg capsule (Flomax) 0.4 mg PO DAILY 10/17/23 Unknown History    
     
sotalol 120 mg tablet 120 mg PO BID 02/19/24 Unknown History    
     
insulin glargine-yfgn 100 unit/mL 75 unit (0.75 mL) subcut QAM #22.5 02/21/24   
Unknown Rx    
    
(3 mL) subcutaneous pen mL       
     
pen needle, diabetic 32 gauge x #100 ea 02/21/24 Unknown Rx    
    
5/32  (BD Ultra-Fine Beth Pen        
    
Needle)        
     
ranolazine 1,000 mg 1,000 mg PO BID 05/09/24 Unknown History    
    
tablet,extended release,12 hr        
     
sennosides 8.6 mg-docusate sodium 1 tab-cap PO QHS PRN 05/09/24 Unknown History    
    
50 mg capsule (Senna Plus)        
     
benzonatate 100 mg capsule 100 mg PO TID PRN cough #45 caps 05/10/24 Unknown Rx    
     
Fiasp FlexTouch U-100 Insulin 100 30 unit subcut TID #30 mL 07/29/24 Unknown Rx    
    
unit/mL (3 mL) subcutaneous pen        
    
(insulin aspart (niacinamide))        
     
doxepin 10 mg capsule 20 mg PO QHS 08/15/24 Unknown History    
     
flash glucose sensor (FreeStyle #6 ea 08/15/24 Unknown Rx    
    
Tu 2 Sensor kit)        
     
oxybutynin chloride 5 mg 5 mg PO QDAY 08/15/24 Unknown History    
    
tablet,extended release 24 hr        
     
pen needle, diabetic 29 gauge x #100 ea 08/15/24 Unknown Rx    
    
1/2  (Ultra-Thin II Insulin Pen        
    
Needles)        
     
semaglutide 2 mg/dose (8 mg/3 mL) 2 mg (0.75 mL) subcut QWEEK #3 mL 08/15/24   
Unknown Rx    
    
subcutaneous pen injector (Ozempic)        
     
sertraline 25 mg tablet 25 mg PO QDAY 08/15/24 Unknown History    
     
aluminum-mag hydroxide-simethicone 30 ml PO DAILY PRN constipation 09/02/24   
Unknown History    
    
200 mg-200 mg-20 mg/5 mL oral susp        
    
(Advanced Antacid-Antigas)        
     
carvedilol 12.5 mg tablet 12.5 mg PO BID 09/02/24 Unknown History    
     
dapagliflozin propanediol 10 mg 10 mg PO DAILY 09/02/24 Unknown History    
    
tablet (Farxiga)        
     
insulin glargine 100 unit/mL (3 60 unit subcut QHS BLOOD SUGAR 09/02/24 Unknown   
History    
    
mL) subcutaneous pen (Basaglar        
    
KwikPen U-100 Insulin)        
     
insulin lispro 100 unit/mL 20 unit subcut TIDCM BLOOD SUGAR 09/02/24 Unknown   
History    
    
subcutaneous pen        
     
metformin 500 mg tablet 500 mg PO DAILY DM 09/02/24 Unknown History    
     
zolpidem 10 mg tablet 10 mg PO QHS PRN PRN insomnia 09/02/24 Unknown History    
    
    
    
                                            
    
    
    
Allergy/AdvReac Type Severity Reaction Status Date / Time    
     
morphine Allergy Severe Other Verified 09/02/24 19:26    
     
ACE Inhibitors Allergy   gets sick  Verified 09/02/24 19:26    
     
hydrocodone (From Lortab) Allergy  Nausea/Vom/ Verified 09/02/24 19:26    
    
   Diarrhea      
     
niacin (From Niaspan Allergy  Nausea Verified 09/02/24 19:26    
    
Extended-Release)         
     
Statins-HMG-CoA Reductase Allergy   locks up Verified 09/02/24 19:26    
    
Inhibitor (Statins-Hmg-Coa   muscles in      
    
Reductase Inhibitor)   legs       
    
    
    
Family History (Reviewed 08/15/24 @ 13:44 by Diana Elizondo NP-C)    
Mother   CVA (cerebral vascular accident)    
   Dementia    
    
    
Surgical History (Reviewed 08/15/24 @ 13:44 by Diana Elizondo NP-C)    
    
Status post above-knee amputation of left lower extremity    
History of left knee replacement    
Hx of CABG    
    
    
Social History (Reviewed 08/15/24 @ 13:44 by Diana Elizondo NP-C)    
Smoking Status:  Never smoker     
alcohol intake:  never     
substance use type:  does not use     
what type of physical activity do you participate in:  none     
    
    
    
EXAM    
Physical Exam    
Const    
Vital Signs:     
    
                                            
    
    
    
 09/02/24    
19:21 09/02/24    
19:33 09/02/24    
19:38    
     
Temperature 98.7 F      
     
Temperature Source Oral      
     
Pulse Rate 95 101 H 100    
     
Respiratory Rate 18      
     
Blood Pressure 160/91 H 159/79 H 112/55 L    
     
Blood Pressure Mean 114      
     
Pulse Ox 100      
     
Oxygen Delivery Method Room Air      
     
Oxygen Flow Rate (L/min)       
    
    
    
    
 09/02/24    
19:39 09/02/24    
19:48    
     
Temperature  98.0 F    
     
Temperature Source      
     
Pulse Rate  100    
     
Respiratory Rate  18    
     
Blood Pressure  100/56 L    
     
Blood Pressure Mean  70    
     
Pulse Ox  96    
     
Oxygen Delivery Method Nasal Cannula     
     
Oxygen Flow Rate (L/min) 3     
    
    
    
    
    
MDM    
MDM    
MDM Narrative    
Medical decision making narrative:     
    
HISTORY OF PRESENT ILLNESS:    
    
64 female history of ACS, status post CABG, hyperlipidemia, hypertension   
presents with 2 hours of midsternal chest tightness that is reminiscent of prior  
issues with her heart.  Most recent catheterization in May.  No shortness of   
breath.  Notes a dry cough.  The patient denies recent surgery in the last 4   
weeks or immobilization in the last 3 days, denies previous diagnosis of DVT or   
PE, hemoptysis, unilateral leg swelling or malignancy with treatment the last 6   
months or palliative.  No estrogen use noted.  Patient denies sudden onset of   
pain, no tearing sensation, no migratory symptoms, no new numbness, weakness or   
loss of sensation.  Patient denies family history or personal history of   
Connective tissue disorders (Marfan's Syndrome, Flavio Danlos etc)    
    
REVIEW OF SYSTEMS:    
    
Pertinent positives: Chest pain    
    
Pertinent negatives: Weakness, loss of sensation    
    
PHYSICAL EXAM:    
    
Nursing triage notes reviewed, Vital signs reviewed    
    
Constitutional: please see mdm    
HENT: MMM    
Eyes:  Pupils equal round and reactive to light, Extraocular muscles intact    
Neck:  No stridor, no JVD, full neck ROM    
Lungs: Clear to auscultation,  No wheezing or rales.  No increased work of   
breathing, no conversational dyspnea, no accessory muscle use, no nasal flaring.  
 No respiratory distress noted    
Heart:  Regular rate and rhythm, No murmurs, No rubs and No gallops, 2+ distal   
pulses (radial, femoral, posterior tibial) in all extremities    
Abdomen: Soft, there is no tenderness, rigidity, rebound or guarding, no obvious  
peritoneal signs, no palpable pulsatile abdominal masses, no auscultated   
abdominal bruit    
: No CVAT    
Extremities: No edema    
Neuro: No focal neurological deficits, cranial nerves II through XII intact, 5/5  
strength in all extremities. Intact sensation to light touch in all extremities,  
2+ reflexes bilateral patella tendons.  Normal gait.  No ataxia.    
Skin: No rash or lesions noted    
    
MEDICAL DECISION MAKING:    
    
Chief Complaint: Chest pain    
    
External records reviewed: Reviewed prior imaging studies.  Last echocardiogram   
from 2022 shows EF 55 to 60%    
    
Factors affecting care: Hyperlipidemia, hypertension, PAD, type 2 diabetes, CKD,  
CAD    
    
    
History obtained from others: EMS    
    
Consults: Interventional cardiology, internal medicine    
    
[facility] Narrative:    
    
Patient was initially hemodynamically stable, afebrile, nontoxic-appearing.  No   
pulse deficits.  No focal cardiopulmonary normalities.    
    
I considered the following differential diagnosis: STEMI, PE, dissection    
    
Initial EKG showed evidence of an anterior STEMI with lateral depressions.  Dis  
cussed with STEMI cardiologist Dr. Guerrero who agreed and activated the   
catheterization laboratory.  Patient was treated with aspirin (324 mg), 4000   
units of heparin, and 100 mg of Brilinta.    
    
Patient was transferred to Cath Lab in stable condition.  Discussed case with   
internal medicine physician who will admit the patient after Cath Lab Dr. Pemberton.     
    
The patient and/or family, caregivers express understanding.  The patient and/or  
family, caregivers agrees with the plan.    
    
Shared decision making:    
    
I will have a discussion with the patient and or visitors regarding   
risk/benefits of further testing or admission.   They will be made aware of of   
the risk/benefits inherent in this decision they will be given the opportunity   
to voice understanding.    
    
Total critical care time today provided was at least 35 minutes. This excludes   
separately billable procedures. Critical care time (if documented) is secondary   
to  the patient having high probability of clinically significant/life   
threatening deterioration in the patient's condition which required my urgent   
intervention.    
    
Impression:     
    
1.  STEMI    
2.  History of CAD    
3.  History of type 2 diabetes    
    
Dispo: Admit to Cath Lab    
    
This note was generated with Dragon dictation software. It may contain incorrect  
words, spelling, and punctuation that were not noted in review of the chart   
prior to signing.    
Lab Data    
Labs:     
    
                         Laboratory Results - last 24 hr    
    
    
    
  09/02/24    
    
  19:24    
     
WBC  10.9    
     
RBC  3.88 L    
     
Hgb  10.8 L    
     
Hct  35.1 L    
     
MCV  90.5    
     
MCH  27.8    
     
MCHC  30.8 L    
     
RDW Std Deviation  48.6 H    
     
RDW Coeff of Lexus  14.6    
     
Plt Count  274    
     
MPV  10.0    
     
Immature Gran % (Auto)  1.000 H    
     
Neut % (Auto)  61.8    
     
Lymph % (Auto)  25.3    
     
Mono % (Auto)  8.7    
     
Eos % (Auto)  2.6    
     
Baso % (Auto)  0.6    
     
Absolute Neuts (auto)  6.7    
     
Absolute Lymphs (auto)  2.76    
     
Nucleated RBC %  0    
     
PT  13.7    
     
INR  1.0    
     
APTT  22.8 L    
     
Sodium  136    
     
Potassium  4.2    
     
Chloride  104    
     
Carbon Dioxide  27.0    
     
Anion Gap  5    
     
BUN  19 H    
     
Creatinine  1.11 H    
     
Estim Creat Clear Calc  68.69    
     
Est GFR (MDRD) Af Amer  64    
     
Est GFR (MDRD) Non-Af  53 L    
     
BUN/Creatinine Ratio  17.1    
     
Glucose  372 H    
     
Calcium  9.2    
     
Troponin I High Sens  467 H*    
    
    
    
    
Radiography    
Diagnostic Testing:     
    
Clinical Impression(s) from Imaging Studies    
    
Chest X-Ray  09/02/24 19:35    
IMPRESSION:    
     
No acute radiographic abnormalities.    
     
Electronically Signed:    
Christ Ruiz MD    
2024/09/02 at 19:51 EDT    
Reading Location ID and State: [id number] / [place]    
Tel [phone], Service support  [phone], Fax [phone]    
     
    
    
    
    
    
Discharge Plan    
Triage    
Chief Complaint: Chest Pain    
    
ED Provider: Zander Prbahakar    
    
Dx/Rx/DC Orders    
Primary Care Provider: Becky Reilly

## 2024-09-02 NOTE — EKG12_ITS
Test Reason : post stemi 
Blood Pressure : ***/*** mmHG 
Vent. Rate : 090 BPM     Atrial Rate : 090 BPM 
   P-R Int : 140 ms          QRS Dur : 108 ms 
    QT Int : 396 ms       P-R-T Axes : 048 012 148 degrees 
   QTc Int : 484 ms 
  
Normal sinus rhythm 
Minimal voltage criteria for LVH, may be normal variant ( Yarnell product ) 
Marked ST abnormality, possible inferolateral subendocardial injury 
Abnormal ECG 
When compared with ECG of 02-SEP-2024 19:21, 
Criteria for Septal infarct are no longer Present 
Confirmed by KAREN CHANG, BRENDA ([id number]),  DERRICK GUILLERMO ([id number]) on 9/9/2024 8:25:38 AM 
  
Referred By: Lux Guerrero           Confirmed By:BRENDA JONES MD

## 2024-09-02 NOTE — XMS RPT_ITS
Comprehensive CCD (C-CDA v2.1)  
  
                          Created on: 2024  
  
  
JAYDEN SOTO  
External Reference #: CDR,PersonID:[id number]  
: 1960  
Sex: Female  
  
Demographics  
  
  
                                        Address             [id number]  
[place]  [address]  
   
                                        Home Phone          [phone]  
   
                                        Home Phone          [phone]  
   
                                        Home Phone          [phone]  
   
                                        Home Phone          [phone]  
   
                                        Preferred Language  en  
   
                                        Marital Status        
   
                                        Synagogue Affiliation Unknown  
   
                                        Race                White  
   
                                        Ethnic Group        Not  or Lati  
no  
  
  
Author  
  
  
                                        Organization        [facility]  
  
  
Care Team Providers  
  
  
                                Care Team Member Name Role            Phone  
   
                                MICHAEL ROBLES MD Attending       Unavailable  
   
                                MICHAEL ROBLES MD Primary Care    Unavailable  
   
                                MICHAEL ROBLES MD Admitting       Unavailable  
   
                                Unavailable     Primary Care Provider UnavailAnnalee Noel DO Primary Care Provider [phone]   
   
                                Becky Corrales MD Primary Care Provider [phone]  
[phone]  
   
                                Annalee Eckert DO Primary Care Provider [phone]   
   
                                Becky Corrales MD Primary Care Provider [phone]  
   
                                BECKY CORRALES Primary Care    Unavailable  
   
                                ARNULFO GALVIN Attending       UnavailBASIL Molina    Referring       Unavailable  
   
                                ESTERSOFI, ANNALEE M Primary Care    Unavailable  
   
                                BASIL MITCHELL    Referring       Unavailable  
   
                                ESTERSOFI, ANNALEE M Primary Care    Unavailable  
   
                                BASIL MITCHELL    Attending       Unavailable  
   
                                RADHA, ANNALEE M Primary Care    Unavailable  
   
                                BASIL MITCHELL    Attending       Unavailable  
   
                                ANNALEE ECKERT M Primary Care    Unavailable  
   
                                DAMON ECKERT Admitting       Unavailable  
   
                                DAMON ECKERT Attending       Unavailable  
   
                                BECKY CORRALES Primary Care    Unavailable  
   
                                BASIL MITCHELL    Admitting       Unavailable  
   
                                BASIL MITCHELL    Attending       Unavailable  
   
                                BASIL MITCHELL    Referring       Unavailable  
   
                                SAVANNA, BECKY Primary Care    Unavailable  
   
                                DUMCON SANDOVAL III Consulting      UnavailBASIL Molina    Attending       Unavailable  
   
                                RADHA, ANNALEE M Primary Care    Unavailable  
   
                                DAMON ECKERT Attending       Unavailable  
   
                                BECKY CORRALES Primary Care    Unavailable  
   
                                BASIL MITCHELL    Attending       Unavailable  
   
                                BASIL MITCHELL    Referring       Unavailable  
   
                                KARENI, BECKY Primary Care    Unavailable  
   
                                DAMON ECKERT Attending       Unavailable  
   
                                SAVANNA, BECKY Primary Care    Unavailable  
   
                                ALVIN SO Referring       Unavailable  
   
                                SAVANNA, BECKY Primary Care    Unavailable  
   
                                MAMADOU STARK Attending       Unavailab  
DAMON aCbrera Referring       Unavailable  
   
                                KALISEBECKY CARLISLE Primary Care    Unavailable  
   
                                STEWART WEBBER CHERISE PAL Admitting       Unav  
ailable  
   
                                CHERISE WEST Attending       Unav  
ailable  
   
                                BECKY CORRALES Primary Care    Unavailable  
   
                                FROY ALVARADO MD Admitting       Unavailable  
   
                                BECKY CORRALES MD Consulting      Unavailable  
   
                                FROY ALVARADO MD Attending       Unavailable  
   
                                FROY ALVARADO MD Primary Care    Unavailable  
   
                                PROVIDER, UNKNOWN Consulting      Unavailable  
   
                                PROVIDER, UNKNOWN Consulting      Unavailable  
   
                                BECKY CORRALES MD Admitting       Unavailable  
   
                                BECKY CORRALES MD Attending       Unavailable  
   
                                BECKY CORRALES MD Consulting      Unavailable  
   
                                BECKY CORRALES MD Primary Care    Unavailable  
   
                                PROVIDER, UNKNOWN Consulting      Unavailable  
   
                                PROVIDER, UNKNOWN Consulting      Unavailable  
   
                                CABA, MICHAELA T Admitting       Unavailable  
   
                                BECKY CORRALES MD Consulting      Unavailable  
   
                                CABA, MICHAELA T Attending       Unavailable  
   
                                CABA, MICHAELA T Primary Care    Unavailable  
   
                                PROVIDER, UNKNOWN Consulting      Unavailable  
   
                                PROVIDER, UNKNOWN Consulting      Unavailable  
   
                                BECKY CORRALES MD Admitting       Unavailable  
   
                                BECKY CORRALES MD Attending       Unavailable  
   
                                BECKY CORRALES MD Consulting      Unavailable  
   
                                BECKY CORRALES MD Primary Care    Unavailable  
   
                                PROVIDER, UNKNOWN Consulting      Unavailable  
   
                                PROVIDER, UNKNOWN Consulting      Unavailable  
   
                                BECKY CORRALES MD Admitting       Unavailable  
   
                                BECKY CORRALES MD Attending       Unavailable  
   
                                BECKY CORRALES MD Consulting      Unavailable  
   
                                BECKY CORRALES MD Primary Care    Unavailable  
   
                                PROVIDER, UNKNOWN Consulting      Unavailable  
   
                                PROVIDER, UNKNOWN Consulting      Unavailable  
  
  
  
Allergies  
  
  
                                                    Allergy   
Classification                          Reported   
Allergen(s)               Allergy Type              Date of   
Onset                     Reaction(s)               Facility  
   
                                                      
(2 sources)                             Acetaminophen /   
HYDROcodone     Drug Allergy                                    [facility]   
Repository  
   
                                                      
(2 sources)                             Angiotensin   
Converting Enzyme   
(Ace) Inhibitors                        Drug allergy   
(disorder)                                                  [facility]   
Repository  
   
                                                      
(4 sources)                             Morphine;   
Translations:   
[MORPHINE]                Drug Allergy                
1                                                   [facility]   
Repository  
   
                                                      
(20 sources)                            HMG-CoA reductase   
inhibitor;   
Translations:   
[STATINS-HMG-COA   
REDUCTASE   
INHIBITORS]                             Drug   
Intolerance                               
1                         Unknown                   [facility]  
   
                                                      
(20 sources)        Morphine            Drug Allergy          
1                         GI Upset                  [facility]  
   
                                                      
(1 source)                              HMG-CoA reductase   
inhibitor                               Drug   
Intolerance                               
1                         Unknown                   [facility]  
   
                                                      
(1 source)                              STATINS (HMG-COA   
REDUCTASE   
INHIBITORS)                             Drug allergy   
(disorder)                                                  [facility]   
Repository  
  
  
  
Medications  
Current Medications  
  
  
  
                      Medication Drug Class(es) Dates      Sig (Normalized) Sig   
(Original)  
   
                                                    acetaminophen 325 mg   
/ oxyCODONE   
hydrochloride 5 mg   
oral tablet  
(1 source)                Opioid Agonist            Start:   
2022  
End:   
2022                              take 1 tablet by   
mouth every six   
hours as needed   
for pain                                oxyCODONE-acetaminop  
hen (PERCOCET) 5-325   
mg tablet   
Indications: Trigger   
middle finger of   
right hand Take 1   
tablet by mouth   
every 6 hours as   
needed for pain for   
up to 3 days. 5   
tablet 0 2022 Active  
   
                                        Comment on above:   Take 1 tablet by selina  
th every 6 hours as needed for pain for up  
  
to 3 days.   
   
                                                    cyclobenzaprine   
hydrochloride 10 mg   
oral tablet  
(9 sources)               Muscle Relaxant           Start:   
2022  
End:   
2022                              take 1 tablet by   
mouth three times   
daily                                   cyclobenzaprine   
(FLEXERIL) 10 mg   
tablet Take 1 tablet   
by mouth three times   
daily. 90 tablet 0   
2022 Active  
   
                                        Comment on above:   Take 1 tablet by selina  
th three times daily.   
  
  
  
Completed/Discontinued Medications  
  
  
  
                      Medication Drug Class(es) Dates      Sig (Normalized) Sig   
(Original)  
   
                                                    1 ml alirocumab 75   
mg/ml auto-injector  
(12 sources)        PCSK9 Inhibitor                         inject 150 mg by   
subcutaneous   
injection every other   
week                                    alirocumab (PRALUENT)   
75 mg/mL pen Inject 150   
mg subcutaneously every   
other week. 0 Active  
  
  
  
                                                            inject 75 mg by subc  
utaneous injection   
every other week                        alirocumab (PRALUENT) 75 mg/mL pen Injec  
t   
75 mg subcutaneously every other week. 0   
Active  
  
  
  
                                        Comment on above:   Inject 75 mg subcuta  
neously every other week.   
   
                                                            Inject 150 mg subcut  
aneously every other week.  
   
   
                                                    apixaban 5 mg oral   
tablet  
(6 sources)               Factor Xa Inhibitor       Start:   
2022                              take 1 tablet by   
mouth twice   
daily                                   apixaban   
(ELIQUIS) 5 mg   
tab(s) Take 1   
tablet by mouth   
twice daily. 60   
tablet 0   
2022 Active  
  
  
  
                                        Start: 2022   take 1 tablet by selina  
th twice   
daily                                   apixaban (ELIQUIS) 5 mg tab(s) Take   
1 tablet by mouth twice daily. 60   
tablet 0 2022 Active  
   
                                        Start: 2022   take 1 tablet by selina  
th twice   
daily                                   apixaban (ELIQUIS) 5 mg tab(s) Take   
1 tablet by mouth twice daily. 60   
tablet 0 2022 Active  
   
                                        Start: 2022   take 2 tablets by mo  
uth twice   
daily, then take 1 tablet by   
mouth twice daily                       apixaban (ELIQUIS DVT-PE TREAT 30D   
START) 5 mg (74 tabs) Take 2   
tablets (10 mg) by mouth twice   
daily for 7 days. Then take 1   
tablet (5 mg) by mouth twice daily   
for 23 days 74 tablet 0 2022   
Active  
  
  
  
                                        Comment on above:   Take 1 tablet by selina  
 twice daily.   
   
                                                            Take 2 tablets (10 m  
g) by mouth twice daily for 7 days. Then   
take 1 tablet (5 mg) by mouth twice daily for 23 days   
   
                                                    aspirin 81 mg oral   
tablet  
(20 sources)                            Platelet Aggregation   
Inhibitor, Nonsteroidal   
Anti-inflammatory Drug                  Start:   
2022                                          aspirin 81 mg cap Take   
1 capsule by mouth   
once daily. Resume   
once BID dosing is   
completed 0 2022   
Active  
  
  
  
                                                    Start: 2022  
End: 2022                         take 1 tablet by mouth twice   
daily, then take 1 tablet by   
mouth twice daily                       aspirin, enteric coated (ASPIRIN,   
ENTERIC COATED) 81 mg EC tablet   
Take 1 tablet by mouth twice daily   
for 21 days. Once BID dosing is   
completed in 21 days, resume home   
daily dose 42 tablet 0 2022   
Active  
  
  
  
                                        Comment on above:   Take 1 tablet by [facility] twice daily for 21 days. Once BID   
dosing is   
completed in 21 days, resume home daily dose   
   
                                                            Take 1 capsule by mo  
uth once daily. Resume once BID dosing is   
completed   
   
                                                    bisacodyl 10 mg   
delayed release   
oral tablet  
(12 sources)        Stimulant Laxative                      take 10 mg by mouth   
once daily                              BISACODYL ORAL Take   
10 mg by mouth once   
daily. 0 Active  
   
                                        Comment on above:   Take 10 mg by mouth   
once daily.   
   
                                                    cefTRIAXone 1000   
mg injection  
(9 sources)                             Cephalosporin   
Antibacterial                           Start:   
2022  
End:   
2022                                    inject 2 g by   
intramuscular   
injection every   
twenty-four hours                       cefTRIAXone sodium   
(ROCEPHIN) 1 gram   
solr Inject 2 g   
intramuscularly every   
24 hours. 0   
2022   
Discontinued (Patient   
chooses alternative   
therapy)  
   
                                        Comment on above:   Inject 2 g intramusc  
ularly every 24 hours.   
   
                                                    cephalexin 500 mg   
oral capsule  
(3 sources)                             Cephalosporin   
Antibacterial                           Start:   
2022                                    take 1 capsule by   
mouth twice daily                       cephALEXin (KEFLEX)   
500 mg capsule Take 1   
capsule by mouth   
twice daily. 60   
capsule 5 2022   
Active  
  
  
  
                                                    Start: 2022  
End: 2022                         take 1 capsule by mouth four   
times daily                             cephALEXin (KEFLEX) 500 mg capsule   
Take 1 capsule by mouth four times   
daily. 240 capsule 1 2022 Active  
  
  
  
                                        Comment on above:   Take 1 capsule by mo  
uth four times daily.   
   
                                                            Take 1 capsule by mo  
uth twice daily.   
   
                                                    clopidogrel 75 mg oral   
tablet  
(15 sources)                            P2Y12 Platelet   
Inhibitor                                           take 1 tablet by   
mouth once daily                        clopidogrel (PLAVIX)   
75 mg tablet Take 75   
mg by mouth once   
daily. 0 Active  
   
                                        Comment on above:   Take 75 mg by mouth   
once daily.   
   
                                                    24 hr dilTIAZem   
hydrochloride 180 mg   
extended release oral   
capsule  
(15 sources)        Calcium Channel Blocker                     take 1 capsule b  
y   
mouth once daily,   
then take 1   
capsule by mouth   
every twenty-four   
hours                                   dilTIAZem CD   
(CARDIZEM CD) 180 mg   
24 hr capsule Take   
180 mg by mouth once   
daily. 0 Active  
   
                                        Comment on above:   Take 180 mg by mouth  
 once daily.   
   
                                                    doxepin hydrochloride   
10 mg oral capsule  
(12 sources)                            Tricyclic   
Antidepressant                                      take 2 capsules   
by mouth once   
daily at bedtime                        doxepin capsule 10   
mg Take 20 mg by   
mouth daily at   
bedtime. 0 Active  
   
                                        Comment on above:   Take 20 mg by mouth   
daily at bedtime.   
   
                                                    doxycycline monohydrate   
100 mg oral capsule  
(3 sources)         Tetracycline-class Drug                     take 1 capsule b  
y   
mouth twice daily                       doxycycline   
monohydrate   
(MONODOX) 100 mg   
capsule Take 100 mg   
by mouth twice   
daily. 0 Active  
   
                                        Comment on above:   Take 100 mg by mouth  
 twice daily.   
   
                                                    DULoxetine 60 mg   
delayed release oral   
capsule  
(15 sources)                            Serotonin and   
Norepinephrine Reuptake   
Inhibitor                                           take 1 capsule by   
mouth twice daily                       DULoxetine   
(CYMBALTA) 60 mg   
capsule Take 60 mg   
by mouth twice   
daily. 0 Active  
  
  
  
                                                take 1 capsule by mouth once robel  
ly DULoxetine (CYMBALTA) 60 mg capsule Take 60   
mg   
by mouth once daily. 0 Active  
  
  
  
                                        Comment on above:   Take 60 mg by mouth   
once daily.   
   
                                                            Take 60 mg by mouth   
twice daily.  
   
   
                                                    empagliflozin 25 mg   
oral tablet  
(15 sources)                            Sodium-Glucose   
Cotransporter 2   
Inhibitor                                           take 1 tablet by   
mouth once daily   
at breakfast                            empagliflozin   
(JARDIANCE) 25 mg   
tablet Take 25 mg by   
mouth daily with   
breakfast. 0 Active  
   
                                        Comment on above:   Take 25 mg by mouth   
daily with breakfast.   
   
                                                    gabapentin  
(15 sources)        Anti-epileptic Agent                     take 200 mg by   
mouth three times   
daily                                   GABAPENTIN ORAL Take   
200 mg by mouth three   
times daily. 0 Active  
  
  
  
                                                                GABAPENTIN ORAL   
Take by mouth. 0 Active  
  
  
  
                                        Comment on above:   Take by mouth.   
   
                                                            Take 200 mg by mouth  
 three times daily.  
   
   
                                                    insulin aspart,   
human 100 unt/ml   
injectable   
solution  
(12 sources)              Insulin Analog            Start:   
                                                 insulin aspart U-100   
(NOVOLOG) 100   
unit/mL 14 units   
with breakfast 8   
units with lunch 10   
units with dinner   
Admin Instructions:   
ADMINISTER   
CORRECTIONAL INSULIN   
REGARDLESS OF MEAL   
OR NUTRITION INTAKE   
Custom Scale If   
Blood Glucose   
(mg/dL) is Less than   
100 0 units 101-150   
0 units 151-175 2   
units 176-200 3   
units 201-225 4   
units 226-250 5   
units 251-275 6   
units 276-300 7   
units 301-325 8   
units 326-350 9   
units >350 10 units   
and Notify Provider   
Notify provider if 2   
consecutive blood   
glucose values in   
the previous 24   
hours are greater   
than 250 mg/mL and   
there have been no   
changes to the   
insulin regimen in   
the previous 24   
hours. 0 2022   
Active  
   
                                        Comment on above:   14 units with breakf  
ast  
8 units with lunch  
10 units with dinner  
  
Admin Instructions: ADMINISTER CORRECTIONAL INSULIN REGARDLESS OF   
MEAL OR NUTRITION INTAKE  
Custom Scale  
If Blood Glucose (mg/dL) is  
Less than 100 0 units  
101-150 0 units  
151-175 2 units  
176-200 3 units  
201-225 4 units  
226-250 5 units  
251-275 6 units  
276-300 7 units  
301-325 8 units  
326-350 9 units  
>350 10 units and Notify Provider  
Notify provider if 2 consecutive blood glucose values in the   
previous 24 hours are greater than 250 mg/mL and there have been no   
changes to the insulin regimen in the previous 24 hours.   
   
                                                    insulin glargine   
100 unt/ml   
injectable   
solution  
(12 sources)              Insulin Analog            Start:   
                                     inject 36 [IU] by   
subcutaneous   
injection once   
daily at bedtime                        insulin glargine   
(LANTUS U-100   
INSULIN) 100 unit/mL   
injection Inject 36   
Units subcutaneously   
daily at bedtime. 0   
2022 Active  
   
                                        Comment on above:   Inject 36 Units subc  
utaneously daily at bedtime.   
   
                                                    insulin   
glargine,hum.rec.a  
nlog (LANTUS   
SUBCUTANEOUS)  
(3 sources)                                                     insulin   
glargine,hum.rec.anl  
og (LANTUS   
SUBCUTANEOUS) Inject   
subcutaneously. 0   
Active  
   
                                        Comment on above:   Inject subcutaneousl  
y.   
   
                                                    24 hr isosorbide   
mononitrate 30 mg   
extended release   
oral tablet  
(15 sources)    Nitrate Vasodilator                                 isosorbide   
mononitrate ER   
(IMDUR) 30 mg 24 hr   
tablet Take 90 mg by   
mouth once daily. 0   
Active  
  
  
  
                                                            take 1 tablet by selina  
th once daily, then   
take 1 tablet by mouth every twenty-four   
hours                                   isosorbide mononitrate ER (IMDUR) 30 mg   
24 hr tablet Take 30 mg by mouth once   
daily. 0 Active  
  
  
  
                                        Comment on above:   Take 30 mg by mouth   
once daily.   
   
                                                            Take 90 mg by mouth   
once daily.  
   
   
                                                    Lactobacillus   
acidophilus  
(5 sources)                                                 take 1 capsule by   
mouth twice daily                       Lactobacillus   
acidophilus   
(ACIDOPHILUS) cap Take   
1 capsule by mouth   
twice daily. 0 Active  
   
                                        Comment on above:   Take 1 capsule by mo  
uth twice daily.   
   
                                                    melatonin 10 mg oral   
capsule  
(12 sources)                                                take 1 capsule by   
mouth once daily   
at bedtime                              melatonin 10 mg cap   
Take 1 capsule by mouth   
daily at bedtime. 0   
Active  
   
                                        Comment on above:   Take 1 capsule by mo  
uth daily at bedtime.   
   
                                                    metoprolol tartrate 25   
mg oral tablet  
(15 sources)                            beta-Adrenergic   
Blocker                                             take 25 mg by   
mouth twice daily                       METOPROLOL TARTRATE   
ORAL Take 25 mg by   
mouth twice daily. 0   
Active  
  
  
  
                                                                METOPROLOL TARTR  
ATE ORAL Take by mouth. 0 Active  
  
  
  
                                        Comment on above:   Take by mouth.   
   
                                                            Take 25 mg by mouth   
twice daily.  
   
   
                                                    12 hr ranolazine 1000   
mg extended release   
oral tablet  
(15 sources)        Anti-anginal                            take 1 tablet by   
mouth twice daily                       ranolazine SR (RANEXA)   
1,000 mg tab ER 12 hr   
Take 1 tablet by mouth   
twice daily. 0 Active  
  
  
  
                                                                ranolazine SR (R  
ANEXA) 1,000 mg tab ER 12 hr Take by mouth. 0 Active  
  
  
  
                                        Comment on above:   Take by mouth.   
   
                                                            Take 1 tablet by selina  
th twice daily.  
   
  
  
  
Problems  
Active Problems  
  
  
                                                    Problem   
Classification  Problem         Date            Documented Date Episodic/Chronic  
   
                                                    Bacterial infection;   
unspecified site  
(2 sources)                             Infection by   
methicillin sensitive   
Staphylococcus   
aureus; Translations:   
[Methicillin   
susceptible   
Staphylococcus aureus   
infection,   
unspecified site]                                           Episodic  
   
                                                    Coronary   
atherosclerosis and   
other heart disease  
(2 sources)                             Atherosclerotic heart   
disease of native   
coronary artery   
without angina   
pectoris;   
Translations:   
[Unstable angina]                       Onset:   
2022                                          Chronic  
   
                                                    Deficiency and other   
anemia  
(1 source)                              Anemia, unspecified;   
Translations:   
[Anemia, unspecified]                   Onset:   
2024                                          Episodic  
   
                                                    Diabetes mellitus   
without complication  
(20 sources)                            Type 2 diabetes   
mellitus without   
complication;   
Translations: [Type 2   
diabetes mellitus   
without   
complications]                          Onset:   
2022                Chronic  
   
                                                    Disorders of lipid   
metabolism  
(1 source)                              Hyperlipidemia,   
unspecified;   
Translations:   
[Hyperlipidemia,   
unspecified   
hyperlipidemia type]                    Onset:   
2022                                          Chronic  
   
                                                    Essential   
hypertension  
(20 sources)                            Essential   
hypertension;   
Translations:   
[Essential (primary)   
hypertension]                           Onset:   
2022                Chronic  
   
                                                    Other aftercare  
(1 source)                              Post-discharge   
follow-up;   
Translations:   
[Encounter for   
follow-up examination   
after completed   
treatment for   
conditions other than   
malignant neoplasm]                                         Episodic  
   
                                                    Other aftercare  
(1 source)                              Long-term current use   
of antibiotic;   
Translations: [Long   
term (current) use of   
antibiotics]                                                Episodic  
   
                                                    Other bone disease   
and musculoskeletal   
deformities  
(1 source)                              Knee joint finding;   
Translations:   
[Acquired absence of   
unspecified knee]                                           Chronic  
   
                                                    Other connective   
tissue disease  
(1 source)                              Presence of   
unspecified   
artificial knee   
joint; Translations:   
[Infection of total   
knee replacement,   
subsequent encounter]                   Onset:   
2022                                          Chronic  
   
                                                    Other connective   
tissue disease  
(1 source)                              Presence of left   
artificial knee   
joint; Translations:   
[Status post left   
partial knee   
replacement]                            Onset:   
2022                                          Chronic  
   
                                                    Other non-traumatic   
joint disorders  
(1 source)                              Pain in right   
shoulder;   
Translations: [Right   
shoulder pain,   
unspecified   
chronicity]                             Onset:   
10-                                          Episodic  
   
                                                    Other nutritional;   
endocrine; and   
metabolic disorders  
(20 sources)                            Obese class II;   
Translations:   
[Obesity,   
unspecified]                            Onset:   
2022                Chronic  
  
  
Past or Other Problems  
  
  
                      Problem Classification Problem    Date       Documented Da  
te Episodic/Chronic  
   
                                                    Complication of   
device; implant or   
graft  
(7 sources)                             Infection and   
inflammatory reaction   
due to internal left   
knee prosthesis,   
initial encounter;   
Translations: [Joint   
pain]                                   Onset:   
2021                                          Episodic  
   
                                                    Infective arthritis   
and osteomyelitis   
(except that caused by   
tuberculosis or   
sexually transmitted   
disease)  
(20 sources)                            Knee pyogenic   
arthritis;   
Translations:   
[Pyogenic arthritis,   
unspecified]                            Onset:   
2022                Episodic  
   
                                                    Other aftercare  
(1 source)                              Long term (current)   
use of insulin;   
Translations: [Type 2   
diabetes mellitus   
without complication,   
with long-term   
current use of   
insulin (HCC)]                          Onset:   
2022                                          Episodic  
   
                                                    Other aftercare  
(1 source)                              Long term (current)   
use of antibiotics;   
Translations: [Long   
term (current) use of   
antibiotics]                            Onset:   
2022                                          Episodic  
   
                                                    Other connective   
tissue disease  
(20 sources)                            Triggering of digit;   
Translations:   
[Trigger finger,   
right middle finger]                    Onset:   
2022                                          Episodic  
   
                                                    Other connective   
tissue disease  
(2 sources)                             Trigger finger, right   
middle finger;   
Translations:   
[Trigger middle   
finger of right hand]                   Onset:   
2022                                          Episodic  
   
                                                    Phlebitis;   
thrombophlebitis and   
thromboembolism  
(2 sources)                             Thromboembolism of   
vein; Translations:   
[Acute embolism and   
thrombosis of deep   
veins of right upper   
extremity]                              Onset:   
2022                                          Episodic  
   
                                                    Residual codes;   
unclassified  
(1 source)                              Other specified   
postprocedural   
states; Translations:   
[S/P trigger finger   
release]                                Onset:   
2022                                          Episodic  
   
                                                    Residual codes;   
unclassified  
(1 source)                              Localized edema;   
Translations:   
[Localized edema]                       Onset:   
2024                                          Episodic  
  
  
  
Results  
  
  
                          Test Name    Value        Interpretation Reference   
[facility]                                   Facility  
   
                                                    BRIEF OP NOTon 2024   
   
                                        BRIEF OP NOT        [facility] ID: [id number]  
Author: CHERISE WEST MD  
Service: Interventional   
Cardiology  
Author Type: Physician  
Type: Brief Op Note  
Filed: 2024 17:40  
Note Text:  
OPERATIVE NOTE  
Procedure DATE: 24  
Proceduralist(s) and   
Assistant(s):  
Cherise Pal MD  
Procedures:  
1. Left heart cath coronary   
angiography.  
2. Bypass grafts   
angiography.  
3. PTCA and IVUS of SVG to   
circumflex OM.  
Brief History :  
63 years old white female   
with long history of   
coronary artery disease she   
has  
history of coronary artery   
bypass surgery in [place]   
many years ago where she  
had SVG to circumflex OM and   
SVG to RCA and LIMA to LAD.   
In  she did  
receive bare-metal stenting   
of the degenerative disease   
of saphenous venous  
graft to circumflex and   
bare-metal stent of the   
degenerative disease of  
saphenous venous graft to   
PDA. She had occluded   
circumflex and occluded   
right  
coronary artery with   
occlusive disease of LAD.   
Patient was referred for  
coronary angiography and   
possible revascularization   
with unstable angina she is  
using many nitroglycerin   
despite maximal medical   
management. Per her son she  
use up to 20 of sublingual   
nitros. Patient is morbidly   
obese BMI 39.12. She  
has ischemic cardiomyopathy   
with ejection fraction 43%.   
She has been treated  
for type 2 diabetes   
mellitus, hypertension she   
quit smoking years ago and   
she  
has a family history of   
coronary artery disease. She   
lives with her son and  
hisfamily. She used to work   
as a schoolbus  for   
many years. Risk and  
benefit of procedure were   
discussed with the patient   
and she was consented.  
Procedure Details:  
Patient brought to cardiac   
Cath Lab, draped and   
positioned in usual fashion   
left  
radial artery approach,   
under complete aseptic and   
sterile condition 2%  
Xylocaine without   
epinephrine was used local   
anesthesia. Using modified  
Seldinger technique 5 French   
arterial sheath was placed   
left radial artery  
angiography was performed   
utilizing 5 French   
multipurpose and JL 3 5   
catheter  
after discussion with the   
patient decision was made to   
proceed with attempted to  
reach, lines the totally   
occluded saphenous venous   
graft to circumflex 5 French  
multipurpose guide was   
initially utilized 0.014.   
Wire was advanced at easily   
to  
cross to the native vessel   
followed by 2.75 20 balloon   
dilating catheter at low  
pressure inflation were able   
to create enough lumen to do   
intracoronary  
ultrasound which she   
revealed the intra graft   
thrombosis and the stent was   
wide  
patent with any stent   
restenosis. The 5 French   
sheath was upsized to 6   
French  
and a AR2 6 French guide was   
utilized and the 0.014 PT 2   
wire was readvanced to  
the circumflex OM and   
attempted to utilize   
aspiration atherectomy   
utilizing the  
penumbra catheter was noted   
successful due to the   
organized thrombosed lumen   
of  
the graft. We are unable to   
advance his penumbra   
catheter beyond this first   
to  
few millimeters of the   
graft. So decision was to   
abort attempted to restore   
the  
patency of the thrombosed   
graft. Arterial sheath was   
removed hemostasis secured  
by quick clot patient was   
transferred back to same-day   
unit in stable condition.  
The above was discussed with   
the patient and her son.  
Anesthesia: Conscious   
Sedation and Local  
Findings:  
1. Hemodynamic results:   
There was no gradient across   
the aortic valve LVEDP 16  
mmHg.  
2. Angiographic results:  
A. Native coronary   
angiography:  
1. Left main: Large-caliber   
vessel with distal 40 to 50%   
stenosis normally  
bifurcated into LAD and   
circumflex coronary artery.  
2. Left anterior descending   
coronary artery: Proximal   
with 80 to 90%  
stenosis mid vessel total   
occlusion after origin of   
the pheresed diagonal.  
3. Circumflex coronary   
artery: Proximally totally   
occluded.  
4. Right coronary artery:   
Proximally totally occluded.  
B. Bypass graft angiography:  
1. Left internal mammary   
graft to LAD: Wide patent   
with excellent  
antegrade flow.  
2. Reverse saphenous venous   
graft to PDA: Stent wide   
patent excellent  
LAMONTE-3 antegrade flow.  
3. Reverse saphenous venous   
graft to circumflex OM:   
Proximally totally  
occluded.  
3. Attempted to recanalize   
totally occluded SVG to   
circumflex OM was not  
successful.  
Estimated Blood Loss:   
minimal  
Complications: None  
Preop Diagnosis:  
Patient with multiple risk   
factors for coronary artery   
disease history of CABG  
stenting of grafts .   
With unstable angina   
utilizing mini sublingual   
nitro  
daily.  
Postop Diagnosis:  
1. Noninvasive echo evidence   
of decreased ejection   
fraction 43%.  
2. Three-vessel native   
occlusive coronary artery   
disease:  
A. LM: 50%.  
B. LAD: Proximal 90% mid   
vessel total occlusion.  
C. CX: Nondominant, proximal   
total occlusion.  
D. RCA: Dominant, total   
occlusion.  
3. LIMA to LAD: Wide patent.  
4. SVG to PDA: Wide patent.  
5. SVG to circumflex OM:   
Totally occluded   
unsuccessful attempted to   
recanalize  
the graft.  
Discussions and   
recommendations  
Patient with decreased left   
ventricular ejection   
fraction pat (more content   
not included)...    Normal                                  [facility]  
   
                                                    Basic metabolic 2000 panelon  
 2024   
   
                                                    Anion gap   
[Moles/Vol]     5 mmol/L        Normal          5-16            [facility]  
   
                                        Comment on above:   Order Comment: Speci  
men Type: BLOOD SPECIMEN  
Ordering Facility: [facility]  
Address: [address] [place] [address]   
   
                                                            Performed By: #### 2  
4321-2 ####  
[facility]  
CLIA [id number]  
[address] [place] [address] [place]   
   
                                                    Calcium   
[Mass/Vol]      9.7 mg/dL       Normal          8.5-10.5        [facility]  
   
                                        Comment on above:   Order Comment: Speci  
men Type: BLOOD SPECIMEN  
Ordering Facility: [facility]  
Address: [address] [place] [address]   
   
                                                            Performed By: #### 2  
4321-2 ####  
[facility]  
CLIA [id number]  
[address] [place] [address] [place]   
   
                                                    Chloride   
[Moles/Vol]     105 mmol/L      Normal                    [facility]  
   
                                        Comment on above:   Order Comment: Speci  
men Type: BLOOD SPECIMEN  
Ordering Facility: [facility]  
Address: [address] [place] [address]   
   
                                                            Performed By: #### 2  
4321-2 ####  
[facility]  
CLIA [id number]  
[address] [place], [place] [address] [place]   
   
                      CO2 [Moles/Vol] 29 mmol/L  Normal     21-32      [facility]  
   
                                        Comment on above:   Order Comment: Speci  
men Type: BLOOD SPECIMEN  
Ordering Facility: [facility]  
Address: [address] [place] [address]   
   
                                                            Performed By: #### 2  
4321-2 ####  
[facility]  
CLIA [id number]  
[address] [place] [address] [place]   
   
                                                    Creatinine   
[Mass/Vol]      0.96 mg/dL      High            0.51-0.95       [facility]  
   
                                        Comment on above:   Order Comment: Speci  
men Type: BLOOD SPECIMEN  
Ordering Facility: [facility]  
Address: [address] [place] [address]   
   
                                                            Result Comment: Kayla  
ents receiving either N-Acetylcysteine (NAC) or   
Metamizole prior to venipuncture, may have falsely depressed   
results.   
   
                                                            Performed By: #### 2  
4321-2 ####  
[facility]  
CLIA [id number]  
[address] [place] [address]   
   
                                                    Creatinine and   
Glomerular   
filtration   
rate.predicted   
panel (S/P/Bld) 67 mL/min/1.73m??? Normal          >=60            [facility]  
   
                                        Comment on above:   Order Comment: Speci  
men Type: BLOOD SPECIMEN  
Ordering Facility: [facility]  
Address: [address] [place] [address]   
   
                                                            Result Comment: Sydnee  
mated Glomerular Filtration Rate (eGFR) is   
calculated using the  CKD-EPI creatinine equation. This equation   
utilizes serum creatinine, sex, and age as parameters. The   
creatinine assay has traceable calibration to isotope dilution-mass   
spectrometry. Refer to KDIGO guidelines for clinical interpretation.   
In patients with unstable renal function, e.g. those with acute   
kidney injury, the eGFR may not accurately reflect actual GFR.   
   
                                                            Performed By: #### 2  
4321-2 ####  
[facility]  
CLIA [id number]  
[address] [place] [address] [place]   
   
                                                    Glucose   
[Mass/Vol]      270 mg/dL       High                      [facility]  
   
                                        Comment on above:   Order Comment: Speci  
men Type: BLOOD SPECIMEN  
Ordering Facility: [facility]  
Address: [address] [place], [place] [id number]   
   
                                                            Result Comment: The   
American Diabetes Association (ADA) provides   
guidance for cutoff values for fasting glucose and random glucose.   
The ADA defines fasting as no caloric intake for at least 8 hours.   
Fasting plasma glucose results between 100 to 125 mg/dL indicate   
increased risk for diabetes (prediabetes).  
Fasting plasma glucose results greater than or equal to 126 mg/dL   
meet the criteria for diagnosis of diabetes. In the absence of   
unequivocal hyperglycemia, results should be confirmed by repeat   
testing. In a patient with classic symptoms of hyperglycemia or   
hyperglycemic crisis, random plasma glucose results greater than or   
equal to 200 mg/dL meet the criteria for diagnosis of diabetes.  
Reference: Standards of Medical Care in Diabetes 2016, American   
Diabetes Association. Diabetes Care. 2016.39(Suppl 1).  
Results may be falsely elevated after the administration of   
Sulfapyridine.  
Results may be falsely depressed after the administration of   
Sulfasalazine.   
   
                                                            Performed By: #### 2  
4321-2 ####  
[facility]  
CLIA [id number]  
[address] [place] [address] [place]   
   
                                                    Potassium   
[Moles/Vol]     4.9 mmol/L      Normal          3.5-5.1         [facility]  
   
                                        Comment on above:   Order Comment: Speci  
men Type: BLOOD SPECIMEN  
Ordering Facility: [facility]  
Address: [address] [place] [address]   
   
                                                            Performed By: #### 2  
4321-2 ####  
[facility]  
CLIA [id number]  
[address] [place] [address] [place]   
   
                                                    Sodium   
[Moles/Vol]     139 mmol/L      Normal          136-145         [facility]  
   
                                        Comment on above:   Order Comment: Speci  
men Type: BLOOD SPECIMEN  
Ordering Facility: [facility]  
Address: [address] [place], [place] [id number]   
   
                                                            Performed By: #### 2  
4321-2 ####  
[facility]  
CLIA [id number]  
[address] [place], [place] [address] [place]   
   
                                                    Urea nitrogen   
[Mass/Vol]      18 mg/dL        Normal          -            [facility]  
   
                                        Comment on above:   Order Comment: Speci  
men Type: BLOOD SPECIMEN  
Ordering Facility: [facility]  
Address: [address] [place] [address]   
   
                                                            Performed By: #### 2  
4321-2 ####  
[facility]  
CLIA [id number]  
[address] [place], [place] [address] [facility] [place]   
   
                                                    CARD CATH DIAGNOSTICon    
   
                                                    CARD CATH   
DIAGNOSTIC                              Site Id: [facility]  
Lab #: DEFAULT  
Study Date: 3/26/2024  
Start Time:  
End Time:  
  
Name Duty  
Cherise Pal MD PROC MD 1  
Dilcia Ruiz RT(R), [facility]   
PROC SCRUB 1  
Coby Henry RN PROC   
CIRC 1  
Bryson Medina RN PROC   
CIRC 2  
Roxie Sloan RN PROC   
RECORD 1  
+-------------------+  
 PATIENT INFORMATION   
+-------------------+  
Name: JAYDEN SOTO  
MRN: [id number]  
Accession #:   
[id number]  
: 1960  
Age: 63 years  
Gender: F  
+---------------------------  
-+  
 CLINICAL   
HISTORY/INDICATIONS   
+---------------------------  
-+  
Abnormal ECG.  
Procedural Status: Elective  
CAD Presentation: Symptoms   
Likely to be Ischemic  
Angina Classification   
(within 2 weeks): CCS III  
No Heart Failure  
Clinical History:  
63 years old white female   
with long history of   
coronary artery disease she   
has history of coronary   
artery bypass surgery in   
[place] many years ago where   
she had SVG to circumflex OM   
and SVG to RCA and LIMA to   
LAD. In  she did receive   
bare-metal stenting of the   
degenerative disease of   
saphenous venous graft to   
circumflex and bare-metal   
stent of the degenerative   
disease of saphenous venous   
graft to PDA. She had   
occluded circumflex and   
occluded right coronary   
artery with occlusive   
disease of LAD. Patient was   
referred for coronary   
angiography and possible   
revascularization with   
unstable angina she is using   
many nitroglycerin despite   
maximal medical management.   
Per her son she use up to 20   
of sublingual nitros.   
Patient is morbidly obese   
BMI 39.12. She has ischemic   
cardiomyopathy with ejection   
fraction 43%. She has been   
treated for type 2 diabetes   
mellitus, hypertension she   
quit smoking years ago and   
she has a family history of   
coronary artery disease. She   
lives with her son and his   
family. She used to work as   
a schoolbus  for many   
years. Risk and benefit of   
procedure were discussed   
with the patient and she was   
consented.  
+-------------------+  
 DIAGNOSTIC FINDINGS   
+-------------------+  
Coronary Anatomy: Right   
Dominant  
Injection Site(s): lt rad 5f  
  
LMT: _ The LMT has moderate   
diffuse disease.  
LAD:  
_ The LAD has severe diffuse   
disease.  
LCX: _ The Circumflex has   
severe diffuse disease.  
RAMUS: _ Ramus Status: Not   
Applicable.  
RCA:  
_ The RCA has severe diffuse   
disease.  
GRAFTS:  
_ Saphenous Vein Graft to   
the Right Posterior   
Descending Artery .   
Additional  
Comments - wide patent.  
_ Left Internal Mammary   
Artery Graft to the Left   
Anterior Descending .  
Additional Comments - wide   
patent.  
_ Saphenous Vein Graft to   
the OM-1 First Obtuse   
Marginal Branch (100 %  
stenosis). Additional   
Comments - occluded.  
+---------------+  
 IMPRESSION/PLAN   
+---------------+  
Impression:Findings:  
1. Hemodynamic results:   
There was no gradient across   
the aortic valve LVEDP 16   
mmHg.  
2. Angiographic results:  
A. Native coronary   
angiography:  
1. Left main: Large-caliber   
vessel with distal 40 to 50%   
stenosis normally bifurcated   
into LAD and circumflex   
coronary artery.  
2. Left anterior descending   
coronary artery: Proximal   
with 80 to 90% stenosis mid   
vessel total occlusion after   
origin of the pheresed   
diagonal.  
3. Circumflex coronary   
artery: Proximally totally   
occluded.  
4. Right coronary artery:   
Proximally totally occluded.  
B. Bypass graft angiography:  
1. Left internal mammary   
graft to LAD: Wide patent   
with excellent antegrade   
flow.  
2. Reverse saphenous venous   
graft to PDA: Stent wide   
patent excellent LAMONTE-3   
antegrade flow.  
3. Reverse saphenous venous   
graft to circumflex OM:   
Proximally totally occluded.  
3. Attempted to recanalize   
totally occluded SVG to   
circumflex OM was not   
successful.  
  
Postop Diagnosis:  
1. Noninvasive echo evidence   
of decreased ejection   
fraction 43%.  
2. Three-vessel native   
occlusive coronary artery   
disease:  
A. LM: 50%.  
B. LAD: Proximal 90% mid   
vessel total occlusion.  
C. CX: Nondominant, proximal   
total occlusion.  
D. RCA: Dominant, total   
occlusion.  
3. LIMA to LAD: Wide patent.  
4. SVG to PDA: Wide patent.  
5. SVG to circumflex OM:   
Totally occluded   
unsuccessful attempted to   
recanalize the graft.  
  
Recommended Treatment:   
Medical Therapy.  
Plan: Patient with decreased   
left ventricular ejection   
fraction patent LIMA to LAD   
and saphenous venous graft   
to PDA. Medical management   
for ischemia which probably   
in the lateral wall has been   
challenging. Unfortunately   
we are unable to establish   
antegrade flow to the   
lateral wall. Would optimize   
patient medical management.   
Consider nuclear stress test   
to confirm his reperfusion   
defect in the future to   
consider complex   
intervention with the   
proximal LAD to supply   
adequate antegrade flow to   
the first diagonal.  
+-------------------+  
 HEMODYNAMICS - XPER   
+-------------------+  
+----------------+------+---  
--+-------+-----+------+----  
--+  
 Measurement Name Sys  Kiera   
 End Kiera Mean  A Wave V   
Wave   
+----------------+------+---  
--+-------+-----+------+----  
--+  
 AO  95.00  63.00   80.00      
   
+----------------+------+---  
--+-------+-----+------+----  
--+  
 AO  111.00 58.00   81.00   
(more content not   
included)...        Normal                                  [facility]  
   
                                                    CBC panel Auto (Bld)on    
   
                                                    Erythrocyte   
distribution   
width (RBC)   
[Ratio]         17.2 %          High            11.5-15.0       [facility]  
   
                                        Comment on above:   Order Comment: Speci  
men Type: BLOOD SPECIMEN  
Ordering Facility: [facility]  
Address: [address] [place] [address]   
   
                                                            Performed By: #### 5  
8410-2 ####  
[facility]  
CLIA [id number]  
[address] [place] [address] [place]   
   
                                                    Hematocrit (Bld)   
[Volume fraction] 37.4 %          Normal          36.0-46.0       [facility]  
   
                                        Comment on above:   Order Comment: Speci  
men Type: BLOOD SPECIMEN  
Ordering Facility: [facility]  
Address: [address] [place] [address]   
   
                                                            Performed By: #### 5  
8410-2 ####  
[facility]  
CLIA [id number]  
[address] [place] [address] [place]   
   
                                                    Hemoglobin (Bld)   
[Mass/Vol]      11.5 g/dL       Normal          11.5-15.5       [facility]  
   
                                        Comment on above:   Order Comment: Speci  
men Type: BLOOD SPECIMEN  
Ordering Facility: [facility]  
Address: [address] [place] [address]   
   
                                                            Performed By: #### 5  
8410-2 ####  
[facility]  
CLIA [id number]  
[address] [place] [address] [place]   
   
                                                    MCH (RBC)   
[Entitic mass]  25.8 pg         Low             26.0-34.0       [facility]  
   
                                        Comment on above:   Order Comment: Speci  
men Type: BLOOD SPECIMEN  
Ordering Facility: [facility]  
Address: [address] [place] [address]   
   
                                                            Performed By: #### 5  
8410-2 ####  
[facility]  
CLIA [id number]  
[address] [place] [address] [place]   
   
                                                    MCHC (RBC)   
[Mass/Vol]      30.7 g/dL       Normal          30.5-36.0       [facility]  
   
                                        Comment on above:   Order Comment: Speci  
men Type: BLOOD SPECIMEN  
Ordering Facility: [facility]  
Address: [address] [place] [address]   
   
                                                            Performed By: #### 5  
8410-2 ####  
[facility]  
CLIA [id number]  
[address] [place] [address] [place]   
   
                                                    MCV (RBC)   
[Entitic vol]   84.0 fL         Normal          80.0-100.0      [facility]  
   
                                        Comment on above:   Order Comment: Speci  
men Type: BLOOD SPECIMEN  
Ordering Facility: [facility]  
Address: [address] [place] [address]   
   
                                                            Performed By: #### 5  
8410-2 ####  
[facility]  
CLIA [id number]  
[address] [place] [address] [place]   
   
                                                    Nucleated RBC   
(Bld) [#/Vol]   10*3/uL         Normal          <0.01           [facility]  
   
                                        Comment on above:   Order Comment: Speci  
men Type: BLOOD SPECIMEN  
Ordering Facility: [facility]  
Address: [address] [place] [address]   
   
                                                            Performed By: #### 5  
8410-2 ####  
[facility]  
CLIA [id number]  
[address] [place] [address] [place]   
   
                                                    Platelet mean   
volume (Bld)   
[Entitic vol]   9.4 fL          Normal          9.0-12.7        [facility]  
   
                                        Comment on above:   Order Comment: Speci  
men Type: BLOOD SPECIMEN  
Ordering Facility: [facility]  
Address: [address] [place] [address]   
   
                                                            Performed By: #### 5  
8410-2 ####  
[facility]  
CLIA [id number]  
[address] [place] [address] [place]   
   
                                                    Platelets (Bld)   
[#/Vol]         331 10*3/uL     Normal          150-400         [facility]  
   
                                        Comment on above:   Order Comment: Speci  
men Type: BLOOD SPECIMEN  
Ordering Facility: [facility]  
Address: [address] [place] [address]   
   
                                                            Performed By: #### 5  
8410-2 ####  
[facility]  
CLIA [id number]  
[address] [place] [address] [place]   
   
                      RBC (Bld) [#/Vol] 4.45 10*6/uL Normal     3.90-5.20  [facility]  
   
                                        Comment on above:   Order Comment: Speci  
men Type: BLOOD SPECIMEN  
Ordering Facility: [facility]  
[address] [place] [address]   
   
                                                            Performed By: #### 5  
8410-2 ####  
[facility]  
CLIA [id number]  
[address] [place], [place] [address] [place]   
   
                      WBC (Bld) [#/Vol] 11.53 10*3/uL High       3.70-11.00 [facility]  
   
                                        Comment on above:   Order Comment: Speci  
men Type: BLOOD SPECIMEN  
Ordering Facility: [facility]  
Address: [address] [place] [address]   
   
                                                            Performed By: #### 5  
8410-2 ####  
[facility]  
CLIA [id number]  
[address] [place], [place] [address] [place]   
   
                                                    NURSING PROGon 2024   
   
                                        NURSING PROG        HNO ID: [id number]  
Author: KIMBERLEE MCCAULEY RN  
Service: Nursing  
Author Type: Registered   
Nurse  
Type: Nursing Progress Note  
Filed: 2024 11:59  
Note Text:  
Advised Dr. Pal that   
patients glucose was 270 on   
BMP. I rechecked blood sugar  
and it is down to 256.   
Patient took 75 units of   
Lantus last night 3/25/24 at  
2200. See orders    Normal                                  [facility]  
   
                                                    NURSING PROGon 2024   
   
                                        NURSING PROG        HNO ID: [id number]  
Author: LEO GOODEN RN  
Service: Nursing  
Author Type: Registered   
Nurse  
Type: Nursing Progress Note  
Filed: 2024 16:12  
Note Text:  
PRE-PROCEDURE INSTRUCTIONS  
TO PREPARE FOR YOUR   
PROCEDURE:  
Your arrival time for your   
procedure is 1100.  
Do NOT eat any solid foods   
after MIDNIGHT the night   
prior to your procedure -  
this includes gum or mints.  
You can drink clear liquids*   
up until 1030, which is 2   
hours before your arrival  
time.  
*Clear liquids = water,   
carbohydrate drink (sports   
drink that is clear or   
yellow  
in color), Ensure   
Pre-Surgery (given by PEAT   
or your DrKen), fruit juice   
without  
pulp (apple/cranberry),   
clear tea, black coffee (no   
cream).  
NO CARBONATED BEVERAGES AND   
NO ALCOHOL.  
Shower the morning of the   
procedure, put on clean   
clothes, and have clean   
sheets  
for your bed to help prevent   
infection after your   
procedure.  
Leave all valuables such as   
jewelry including rings,   
piercings, wallets, and  
purses at home.  
Wear comfortable,   
loose-fitting clothing.  
If you wear glasses or   
contacts, please bring a   
case.  
SPECIAL INSTRUCTIONS:  
If instructed, bring your   
first voided urine specimen   
with you.  
If you were provided skin   
preparation to use prior to   
your procedure, complete  
this as directed.  
If you were provided Ensure   
Pre-Surgery drink, you need   
to drink this at N/A.  
This should be consumed   
quickly (in less than 5   
minutes, rather than sipped   
over  
time)  
If a bowel preparation has   
been ordered by your   
physician, it is very   
important  
to follow the bowel prep   
instructions or your   
procedure may need to be  
rescheduled.  
If you use crutches or a   
walker, bring them with you.  
If you have a home   
CPAP/BIPAP machine, bring it   
with you.  
If you were instructed to   
complete a fleets enema or   
bowel prep, complete as  
directed.  
Bring copy of Living   
Will/Power of .  
Do not smoke or chew. If you   
use tobacco, quit or at   
least cut down before  
surgery. Do not smoke or   
chew after midnight the day   
before your surgery. This  
effects bleeding, infection,   
healing, and so much more.  
Do not take any Diet or   
Herbal Supplements 2 weeks   
prior to your surgery date.  
Please notify your physician   
if there is any change in   
your physical condition  
such as a cold, cough,   
fever, sore throat, or skin   
irritation near the surgical  
site.  
Visitors under the age of 14   
are restricted in the   
Surgery Center.  
UPON ARRIVAL:  
Access to [facility] (the   
[facility]) is located  
on [address].  
 parking is available   
for your convenience from   
5am-5pm- there is a $5.00  
charge for this service.  
Take the elevators directly   
inside the entrance to the   
1st Floor Surgery Lobby.  
Sign in at the podium   
located to the left when you   
get off the elevators.  
A payment may be expected at   
the time of service.  
One visitor may come back to   
the preoperative area with   
you. The preoperative  
staff will be reviewing your   
medical history, please let   
them know if you prefer  
not to have a visitor with   
you during this time. Once   
you are ready for your  
procedure, two visitors at a   
time are permitted in your   
preprocedure room.  
Follow all medication   
instructions as provided by   
your physician.     [facility]  
   
                                        NURSING PROG        HNO ID: [id number]  
Author: LEO GOODEN RN  
Service: Nursing  
Author Type: Registered   
Nurse  
Type: Nursing Progress Note  
Filed: 2024 14:38  
Note Text:  
PRE-PROCEDURE INSTRUCTIONS  
TO PREPARE FOR YOUR   
PROCEDURE:  
Your arrival time for your   
procedure is 0830.  
Do NOT eat any solid foods   
after MIDNIGHT the night   
prior to your procedure -  
this includes gum or mints.  
You can drink clear liquids*   
up until 0800, which is 2   
hours before your arrival  
time.  
*Clear liquids = water,   
carbohydrate drink (sports   
drink that is clear or   
yellow  
in color), Ensure   
Pre-Surgery (given by PEAT   
or your DrKen), fruit juice   
without  
pulp (apple/cranberry),   
clear tea, black coffee (no   
cream).  
NO CARBONATED BEVERAGES AND   
NO ALCOHOL.  
Shower the morning of the   
procedure, put on clean   
clothes, and have clean   
sheets  
for your bed to help prevent   
infection after your   
procedure.  
Leave all valuables such as   
jewelry including rings,   
piercings, wallets, and  
purses at home.  
Wear comfortable,   
loose-fitting clothing.  
If you wear glasses or   
contacts, please bring a   
case.  
SPECIAL INSTRUCTIONS:  
If instructed, bring your   
first voided urine specimen   
with you.  
If you were provided skin   
preparation to use prior to   
your procedure, complete  
this as directed.  
If you were provided Ensure   
Pre-Surgery drink, you need   
to drink this at N/A.  
This should be consumed   
quickly (in less than 5   
minutes, rather than sipped   
over  
time)  
If a bowel preparation has   
been ordered by your   
physician, it is very   
important  
to follow the bowel prep   
instructions or your   
procedure may need to be  
rescheduled.  
If you use crutches or a   
walker, bring them with you.  
If you have a home   
CPAP/BIPAP machine, bring it   
with you.  
If you were instructed to   
complete a fleets enema or   
bowel prep, complete as  
directed.  
Bring copy of Living   
Will/Power of .  
Do not smoke or chew. If you   
use tobacco, quit or at   
least cut down before  
surgery. Do not smoke or   
chew after midnight the day   
before your surgery. This  
effects bleeding, infection,   
healing, and so much more.  
Do not take any Diet or   
Herbal Supplements 2 weeks   
prior to your surgery date.  
Please notify your physician   
if there is any change in   
your physical condition  
such as a cold, cough,   
fever, sore throat, or skin   
irritation near the surgical  
site.  
Visitors under the age of 14   
are restricted in the   
Surgery Center.  
UPON ARRIVAL:  
Access to [facility] (the   
[facility]) is located  
on [address].  
 parking is available   
for your convenience from   
5am-5pm- there is a $5.00  
charge for this service.  
Take the elevators directly   
inside the entrance to the   
1st Floor Surgery Lobby.  
Sign in at the podium   
located to the left when you   
get off the elevators.  
A payment may be expected at   
the time of service.  
One visitor may come back to   
the preoperative area with   
you. The preoperative  
staff will be reviewing your   
medical history, please let   
them know if you prefer  
not to have a visitor with   
you during this time. Once   
you are ready for your  
procedure, two visitors at a   
time are permitted in your   
preprocedure room.  
Follow all medication   
instructions as provided by   
your physician.     [facility]  
   
                                        NURSING PROG        HNO ID: [id number]  
Author: LEO GOODEN,   
RN  
Service: Nursing  
Author Type: Registered   
Nurse  
Type: Nursing Progress Note  
Filed: 2024 09:36  
Note Text:  
I spoke to Dr. Alvarado's nurse   
and she notified Dr. Pal's   
office regarding the  
patient's Metformin. Dr. Pal is comfortable with   
proceeding with heart cath   
as  
long as the Metformin is   
held the 24 hours pre-   
procedure. I called the pt's  
daughter in law and she will   
hold today's doses. [facility]  
   
                                        NURSING PROG        HNO ID: [id number]  
Author: LEO GOODEN,   
RN  
Service: Nursing  
Author Type: Registered   
Nurse  
Type: Nursing Progress Note  
Filed: 2024 09:20  
Note Text:  
Hx completed with Pt's   
daughter in law. She states   
they did not receive any  
instruction regarding hold   
Metformin prior to   
procedure. I left a   
voicemail for  
Dr. Alvarado's nurse. Pt is   
going to hold AM dose for   
now until we hear back from  
the office.         [facility]  
   
                                                    CMP with eGFRon 2024   
   
                      AGE        63 years   [facility]  
   
                                        Comment on above:   Performed By: #### 2  
[id number] ####  
[facility],[address] [place] [address]   
[id number]   
   
                                                    Albumin   
[Mass/Vol]      2.9 g/dL        Low             3.4 - 5.0       [facility]  
   
                                        Comment on above:   Performed By: #### 2  
[id number] ####  
[facility],[address] [place] [address]   
[id number]   
   
                                                    Albumin/Globulin   
[Mass ratio]    0.6 {ratio}     Low             0.9 - 1.6       [facility]  
   
                                        Comment on above:   Performed By: #### 2  
[id number] ####  
[facility],[address] [place] [address]   
[id number]   
   
                      ALK PHOS   126 U/L    High       46 - 116   [facility]  
   
                                        Comment on above:   Performed By: #### 2  
25986 ####  
[facility],[address] [place] [address]   
[id number]   
   
                                                    ALT [Catalytic   
activity/Vol]   44 U/L          Normal          14 - 59         [facility]  
   
                                        Comment on above:   Performed By: #### 2  
79109 ####  
[facility],[address] [place] [address]   
[id number]   
   
                                                    Anion gap   
[Moles/Vol]     14 mmol/L       Normal          10 - 20         [facility]  
   
                                        Comment on above:   Performed By: #### 2  
[id number] ####  
[facility],[address] [place] [address]   
[id number]   
   
                                                    AST [Catalytic   
activity/Vol]   38 U/L          Normal          13 - 39         [facility]  
   
                                        Comment on above:   Performed By: #### 2  
[id number] ####  
[facility],[address] [place] [address]   
[id number]   
   
                      B/C RATIO  14 ratio   Normal     0 - 30     [facility]  
   
                                        Comment on above:   Performed By: #### 2  
42774 ####  
[facility],[address] [place] [address]   
[id number]   
   
                                                    Bilirubin   
[Mass/Vol]      0.4 mg/dL       Normal          0.2 - 1.0       [facility]  
   
                                        Comment on above:   Performed By: #### 2  
53699 ####  
[facility],[address] [place] [address]   
[id number]   
   
                                                    Calcium   
[Mass/Vol]      9.4 mg/dL       Normal          8.5 - 10.1      [facility]  
   
                                        Comment on above:   Performed By: #### 2  
[id number] ####  
[facility],[address] [place] [address]   
[id number]   
   
                                                    Chloride   
[Moles/Vol]     103 mmol/L      Normal          98 - 107        [facility]  
   
                                        Comment on above:   Performed By: #### 2  
21686 ####  
[facility],[address] [place] [address]   
[id number]   
   
                      CMP with eGFR            Normal                [facility] [facility]  
   
                                        Comment on above:   Result Comment: COMP  
REHENSIVE METABOLIC PANEL   
   
                                                            Performed By: #### 2  
26134 ####  
[facility],[address] [place] [address]   
[address]   
   
                      CO2 [Moles/Vol] 26.3 mmol/L Normal     21.0 - 32.0 [facility]  
   
                                        Comment on above:   Performed By: #### 2  
64637 ####  
[facility],[address] [place] [address]   
[address]   
   
                                                    Creatinine   
[Mass/Vol]      1.11 mg/dL      High            0.55 - 1.02     [facility]  
   
                                        Comment on above:   Performed By: #### 2  
09760 ####  
[facility],[address] [place] [address]   
[address]   
   
                      eGFR       50 ML/MINUTE Low        60 - 999   [facility]  
   
                                        Comment on above:   Performed By: #### 2  
74888 ####  
[facility],[address] [place] [address]   
[address]   
   
                      eGFR(AA)   60 ML/MINUTE Normal     60 - 999   [facility]  
   
                                        Comment on above:   Result Comment: ACCO  
RDING TO THE NATIONAL KIDNEY DISEASE   
EDUCATION   
PROGRAM(NKDE), A NORMAL eGFR  
IS A VALUE GREATER THAN OR EQUAL TO 60 ML/MIN/1.73 SQ METERS.  
CHRONIC KIDNEY DISEASE: <60mL/MIN/1.73 SQ METERS  
KIDNEY FAILURE: <15mL/MIN/1.73 SQ METERS  
THIS TEST SHOULD ONLY BE USED FOR PATIENTS 18 YEARS OF AGE AND   
OLDER.   
   
                                                            Performed By: #### 2  
08426 ####  
[facility],[address] [place] [address]   
[address]   
   
                                                    Globulin (S)   
[Mass/Vol]      4.7 g/dL        High            1.5 - 3.8       [facility]  
   
                                        Comment on above:   Performed By: #### 2  
08659 ####  
[facility],[address] [place] [address]   
[address]   
   
                                                    Glucose   
[Mass/Vol]      246 mg/dL       High            74 - 106        [facility]  
   
                                        Comment on above:   Performed By: #### 2  
65407 ####  
[facility],[address] [place] [address]   
[address]   
   
                                                    Potassium   
[Moles/Vol]     4.6 mmol/L      Normal          3.5 - 5.1       [facility]  
   
                                        Comment on above:   Performed By: #### 2  
19863 ####  
[facility],[address] [place] [address]   
[address]   
   
                                                    Protein   
[Mass/Vol]      7.6 g/dL        Normal          6.4 - 8.2       [facility]  
   
                                        Comment on above:   Performed By: #### 2  
37659 ####  
[address]   
[address]   
   
                                                    Sodium   
[Moles/Vol]     139 mmol/L      Normal          136 - 145       [facility]  
   
                                        Comment on above:   Performed By: #### 2  
93745 ####  
[facility],[address] [place] [address]   
[address]   
   
                                                    Urea nitrogen   
[Mass/Vol]      16 mg/dL        Normal          7 - 18          [facility]  
   
                                        Comment on above:   Performed By: #### 2  
20214 ####  
[facility],[address] [place] [address]   
[address]   
   
                                                    LIPID PROFILEon 2024   
   
                                                    Cholesterol   
[Mass/Vol]      241 mg/dL       High            0 - 240         [facility]  
   
                                        Comment on above:   Performed By: #### 2  
93663 ####  
[place] [address],[place]   
[address]   
   
                                                    Cholesterol in   
HDL [Mass/Vol]  33 mg/dL        Low             40 - 60         [facility]  
   
                                        Comment on above:   Performed By: #### 2  
98614 ####  
[facility],[address] [place] [address]   
[address]   
   
                                                    Cholesterol in   
LDL [Mass/Vol]  173 mg/dL       High            0 - 129         [facility]  
   
                                        Comment on above:   Performed By: #### 2  
99867 ####  
[facility],[address] [place] [address]   
[address]   
   
                                                    Cholesterol.total  
/Cholesterol in   
HDL [Mass ratio] 7.3 {ratio}     High            0.0 - 5.0       [facility]  
   
                                        Comment on above:   Performed By: #### 2  
44913 ####  
[facility],[address] [place] [address]   
[address]   
   
                      Lipid 1996 panel            Normal                [facility]  
   
                                        Comment on above:   Result Comment: LIPI  
D PROFILE   
   
                                                            Performed By: #### 2  
20291 ####  
[facility],[address] [place] [address]   
[address]   
   
                                                    Triglyceride   
[Mass/Vol]      177 mg/dL       High            0 - 150         [facility]  
   
                                        Comment on above:   Performed By: #### 2  
09826 ####  
[facility],[address] [place] [address]   
[address]   
   
                                                    MAGNESIUMon 2024   
   
                                                    Magnesium   
[Mass/Vol]      1.8 mg/dL       Normal          1.8 - 2.4       [facility]  
   
                                        Comment on above:   Performed By: #### 2  
10227 ####  
[facility],[address] [place] [address]   
[address]   
   
                                                    TSHon 2024   
   
                      TSH Qn     1.64 m[IU]/L Normal     0.35 - 3.74 [facility]  
   
                                        Comment on above:   Performed By: #### 2  
69625 ####  
[facility],[address] [place] [address]   
[address]   
   
                                                    LIPID PROFILEon 2023   
   
                                                    Cholesterol   
[Mass/Vol]      137 mg/dL       Normal          0 - 240         [facility]  
   
                                        Comment on above:   Performed By: #### 2  
05590 ####  
[facility],[address] [place] [address]   
[address]   
   
                                                    Cholesterol in   
HDL [Mass/Vol]  30 mg/dL        Low             40 - 60         [facility]  
   
                                        Comment on above:   Performed By: #### 2  
71112 ####  
[facility],[address] [place] [address]   
[address]   
   
                                                    Cholesterol in   
LDL [Mass/Vol]  76 mg/dL        Normal          0 - 129         [facility]  
   
                                        Comment on above:   Performed By: #### 2  
23438 ####  
[facility],[address] [place] [address]   
[address]   
   
                                                    Cholesterol.total  
/Cholesterol in   
HDL [Mass ratio] 4.6 {ratio}     Normal          0.0 - 5.0       [facility]  
   
                                        Comment on above:   Performed By: #### 2  
92275 ####  
[facility],[address] [place] [address]   
[address]   
   
                      Lipid 1996 panel            Normal                [facility]  
   
                                        Comment on above:   Result Comment: LIPI  
D PROFILE   
   
                                                            Performed By: #### 2  
01951 ####  
[address]   
[address]   
   
                                                    Triglyceride   
[Mass/Vol]      155 mg/dL       High            0 - 150         [facility]  
   
                                        Comment on above:   Performed By: #### 2  
50375 ####  
[address]   
[address]   
   
                                                    HGB A1C [CCL]on 2023   
   
                                                    HbA1c (Bld) [Mass   
fraction]       9.9 %           High            4.3-5.6         [facility]  
   
                                        Comment on above:   Result Comment: Amer  
ican Diabetes Association guidelines   
indicate   
that patients with  
HgbA1c in the range 5.7-6.4% are at increased risk for development   
of  
diabetes, and intervention by lifestyle modification may be   
beneficial.  
HgbA1c greater or equal to 6.5% is considered diagnostic of   
diabetes.   
   
                                                            Performed By: #### 2  
99372 ####  
[facility],[address] [place] [address]   
[address]   
   
                      Hemoglobin A0 237 mg/dL  Normal                [facility]  
   
                                        Comment on above:   Result Comment: eAG:  
 (Estimated average glucose) is a   
calculated   
value from HgbA1c and  
is representative of the average blood glucose level in the last 2-3  
month period[place] [address]  
Johnny Chauhan III, M.D.  
[id number]  
[phone]   
   
                                                            Performed By: #### 2  
95022 ####  
[address]   
[address]   
   
                                                    HbA1c (Bld)on 2023   
   
                                                    Average glucose   
Estimated from   
glycated   
hemoglobin (Bld)   
[Mass/Vol]      237 mg/dL       Normal                          [facility]  
   
                                        Comment on above:   Order Comment: Speci  
men Type: BLOOD SPECIMEN  
Ordering Facility: [facility]  
Address: [address] [place] [address]   
   
                                                            Result Comment: eAG:  
 (Estimated average glucose) is a calculated   
value from HgbA1c and is representative of the average blood glucose   
level in the last 2-3 month period.   
   
                                                            Performed By: #### 5  
5454-3 ####  
[facility]  
CLIA [id number]  
[address] [place] [address] [place]   
   
                                                    HbA1c (Bld) [Mass   
fraction]       9.9 %           High            4.3-5.6         [facility]  
   
                                        Comment on above:   Order Comment: Speci  
men Type: BLOOD SPECIMEN  
Ordering Facility: [facility]  
Address: [address] [place] [address]   
   
                                                            Result Comment: Amer  
ican Diabetes Association guidelines indicate   
that patients with HgbA1c in the range 5.7-6.4% are at increased   
risk for development of diabetes, and intervention by lifestyle   
modification may be beneficial. HgbA1c greater or equal to 6.5% is   
considered diagnostic of diabetes.   
   
                                                            Performed By: #### 5  
5454-3 ####  
[facility]  
CLIA [id number]  
[address] [place], [place] [id number] [place]   
   
                                                    HbA1c (Bld)on 2023   
   
                                                    Average glucose   
Estimated from   
glycated   
hemoglobin (Bld)   
[Mass/Vol]      183 mg/dL       Normal                          [facility]  
   
                                        Comment on above:   Order Comment: Speci  
men Type: BLOOD SPECIMEN  
Ordering Facility: [facility]  
Address: [facility] [place] [address]   
   
                                                            Result Comment: eAG:  
 (Estimated average glucose) is a calculated   
value from HgbA1c and is representative of the average blood glucose   
level in the last 2-3 month period.   
   
                                                            Performed By: #### 5  
5454-3 ####  
[facility]  
CLIA [id number]  
[address] [place] [address] [place]   
   
                                                    HbA1c (Bld) [Mass   
fraction]       8.0 %           High            4.3-5.6         [facility]  
   
                                        Comment on above:   Order Comment: Speci  
men Type: BLOOD SPECIMEN  
Ordering Facility: [facility]  
Address: [facility] [place] [address]   
   
                                                            Result Comment: Amer  
ican Diabetes Association guidelines indicate   
that patients with HgbA1c in the range 5.7-6.4% are at increased   
risk for development of diabetes, and intervention by lifestyle   
modification may be beneficial. HgbA1c greater or equal to 6.5% is   
considered diagnostic of diabetes.   
   
                                                            Performed By: #### 5  
5454-3 ####  
[facility]  
CLIA [id number]  
[address] [place] [address] [place]   
   
                                                    Bacteria Wnd Culton 20  
23   
   
                                                    Bacteria   
identified Cx Nom   
(Wound)                                 CULTURE, WOUND:  
Unable to isolate   
predominant organisms due to   
heavy mixed growth. No   
further workup. Recommend   
repeat collection of tissue   
obtained after cleansing and   
debridement.  
GRAM STAIN:  
Many Gram positive cocci  
Few Polymorphonuclear   
leukocytes          Abnormal                                [facility]  
   
                                        Comment on above:   Performed By: #### 6  
462-6 ####  
[facility]  
CLIA [id number]  
[address] [place] [address] [place]   
   
                                                    CNOVon 10-   
   
                                        CNOV                Office Visit (AGHWW1  
)  
----------------------------  
----------------------------  
------------------------  
JAYDEN SOTO ([id number])   
1960 F C[facility]  
Date Time Provider   
Department  
10/4/22 9:30 AM MAMAD[facility] AGHWW1  
During your visit today, we   
recorded the following   
information about you:  
Temperature Weight Height  
98.3 degrees 115.7 kg 1.702   
m  
Rosalinda Cazares PA-C   
10/4/2022 12:43 PM Signed  
ORTHOPAEDIC SHOULDER AND   
ELBOW SERVICE  
HISTORY AND PHYSICAL EXAM  
REFERRING PROVIDER:  
Damon Eckert  
[address] [facility] [address]  
[facility] [address]  
CHIEF COMPLAINT: right   
shoulder pain  
PAIN EVALUATION  
10/4/2022  
0954  
Pain Level: 8  
Description: Aching;Sore  
Frequency: Continuous  
Intervention/Comfort   
measure: Medication  
SUBJECTIVE:  
Jayden Soto is a 62 year   
old female who presents to   
clinic with right shoulder  
pain. History of right   
shoulder rotator cuff repair   
maybe 20 years ago. It  
did well following surgery   
until about 2 years ago. Two   
years ago she became  
septic due to diabetic ulcer   
on her left heel. She ended   
up having her left  
partial knee replacement   
washed out with an   
antibiotic spacer placed and   
a  
washout of her back. Due to   
the positioning during her   
back procedure she  
awoke with a  dead arm .   
While in the hospital she   
underwent a right shoulder  
corticosteroid injection and   
therapy without improvement.   
She was seen at an  
outside clinic following   
that with a subsequent   
corticosteroid injection  
without improvement.   
Shoulder is painful over the   
anterior aspect. Worse with  
range of motion. She has   
pain into her forearm and   
elbow. She does ambulate  
with a cane in her right   
hand. She is on chronic   
suppressive antibiotics and  
the plan is to keep the   
antibiotic spacer in place   
in her left knee. She is  
accompanied by her daughter   
today.  
PAST MEDICAL HISTORY:  
PAST MEDICAL HISTORY  
Diagnosis Date  
Chronic pain of left knee  
Coronary artery disease  
1 cardiac stent  
Diabetes (HCC)  
Hypercholesteremia  
Hypertension  
MRSA (methicillin resistant   
staph aureus) culture   
positive  
multiple joints/bones  
Pain of right hand  
PAST SURGICAL HISTORY:  
PAST SURGICAL HISTORY  
Procedure Laterality Date  
BACK SURGERY HX  
ELBOW SURGERY HX Bilateral  
for bone spurs  
IR VASCULAR ACCESS TEAM PICC   
INSERTION RADIO 2022  
KNEE SURGERY HX  
PAST SURGICAL HISTORY OF   
Left 2022  
left knee  
PAST SURGICAL HISTORY OF   
Left  
heel surgery  
PAST SURGICAL HISTORY OF   
Right 2022  
right middle finger  
SHOULDER SURGERY HX   
Bilateral  
SOCIAL HISTORY:  
Social History  
Tobacco Use  
Smoking status: Never  
Smokeless tobacco: Never  
Vaping Use  
Vaping Use: Never used  
Substance Use Topics  
Alcohol use: Never  
Drug use: Never  
ALLERGIES:  
ALLERGIES  
Allergen Reactions  
Morphine GI Upset  
Statins-Hmg-Coa Red* Unknown  
MEDICATIONS:  
Current Outpatient   
Medications on File Prior to   
Visit  
Medication Sig  
cephALEXin (KEFLEX) 500 mg   
capsule Take 1 capsule by   
mouth twice daily.  
Lactobacillus acidophilus   
(ACIDOPHILUS) cap Take 1   
capsule by mouth twice  
daily.  
apixaban (ELIQUIS DVT-PE   
TREAT 30D START) 5 mg (74   
tabs) Take 2 tablets (10  
mg) by mouth twice daily for   
7 days. Then take 1 tablet   
(5 mg) by mouth twice  
daily for 23 days  
apixaban (ELIQUIS) 5 mg   
tab(s) Take 1 tablet by   
mouth twice daily.  
aspirin 81 mg cap Take 1   
capsule by mouth once daily.   
Resume once BID dosing  
is completed  
aspirin, enteric coated   
(ASPIRIN, ENTERIC COATED) 81   
mg EC tablet Take 1  
tablet by mouth twice daily   
for 21 days. Once BID dosing   
is completed in 21  
days, resume home daily dose  
insulin glargine (LANTUS   
U-100 INSULIN) 100 unit/mL   
injection Inject 36 Units  
subcutaneously daily at   
bedtime. (Patient taking   
differently: Inject 40 Units  
subcutaneously daily at   
bedtime.)  
insulin aspart U-100   
(NOVOLOG) 100 unit/mL 14   
units with breakfast  
8 units with lunch  
10 units with dinner  
Admin Instructions:   
ADMINISTER CORRECTIONAL   
INSULIN REGARDLESS OF MEAL   
OR  
NUTRITION INTAKE  
Custom Scale  
If Blood Glucose (mg/dL) is  
Less than 100 0 units  
101-150 0 units  
151-175 2 units  
176-200 3 units  
201-225 4 units  
226-250 5 units  
251-275 6 units  
276-300 7 units  
301-325 8 units  
326-350 9 units  
>350 10 units and Notify   
Provider  
Notify provider if 2   
consecutive blood glucose   
values in the previous 24   
hours  
are greater than 250 mg/mL   
and there have been no   
changes to the insulin  
regimen in the previous 24   
hours.  
doxepin capsule 10 mg Take   
20 mg by mouth daily at   
bedtime.  
BISACODYL ORAL Take 10 mg by   
mouth once daily.  
melatonin 10 mg cap Take 1   
capsule by mouth daily at   
bedtime.  
alirocumab (PRALUENT) 75   
mg/mL pen Inject 150 mg   
subcutaneously every other  
week.  
DULoxetine (CYMBALTA) 60 mg   
capsule Take 60 mg by mouth   
twice daily.  
ranolazine SR (RANEXA) 1,000   
mg tab ER 12 hr Take 1   
tablet by mouth twice  
daily.  
dilTIAZem CD (CARDIZEM CD,   
CARTIA XT) 180 mg 24 hr   
capsule Take 180 mg by  
mouth once daily.  
GABAPENTIN ORAL Gaurav (more   
content not included)... Normal                                  [facility]  
   
                                                    CNPNon 2022   
   
                                        CNPN                Telephone (AGPOB1)  
----------------------------  
----------------------------  
------------------------  
JAYDEN SOTO ([id number])   
1960 F C[facility]  
Date Time Provider   
Department  
22  ORTH AGPOB1  
During your visit today, we   
recorded the following   
information about you:  
Roma Reagan [facility]   
2022 4:15 PM Signed  
----- Message from Fauzia Moreira sent at 2022 4:09   
PM EDT -----  
Regarding: Orthopedics /   
Open Shoulder: Pain /   
Previous Surgery By A Non-  
Provider  
Contact: [phone]  
Patient has been identified   
by name and Date of birth   
(Y/N): y  
Patient: Jayden Soto  
YOB: 1960  
MRN: [id number]  
Previous Provider Seen: n/a  
Body Part(s) Identified:   
Right Shoulder  
Diagnosis/Reason For Visit:   
Right Shoulder  
Reason for the   
call/escalation: Right   
Shoulder Pain / Previous   
Surgery in  
[place] several yrs ago   
? Please reach out  
If reason for   
call/escalation is discharge   
from ED/ER or Hospital,   
which  
facility was the patient   
seen at: n/a  
Was an appointment scheduled   
(Y/N): n  
Person calling if other than   
patient: n  
Return call to if other than   
patient: n  
Best contact number:   
[phone]  
Thank you,  
Fauzia Moreira  
2022 4:09 PM  
Roma Reagan  [facility]   
2022 4:16 PM Signed  
Please schedule with Karina Oliveirae D Tez [facility]  
2022 4:16 PM  
Allergies As of Date:   
2022 Noted Allergy   
Reaction  
MORPHINE 2021 8 - GI   
Upset  
STATINS-HMG-COA REDUCTASE   
INHIBIT*2021 16 -   
Unknown  
Date Reviewed: 2022  
Reviewed by: Sherron Martins RN - Fully Assessed  
Reason for Visit:  
Appointment [[id number]]  
Prescriptions as of   
2022  
- cephALEXin (KEFLEX) 500 mg   
capsule  
Take 1 capsule by mouth   
twice daily.  
- Lactobacillus acidophilus   
(ACIDOPHILUS) cap  
Take 1 capsule by mouth   
twice daily.  
- apixaban (ELIQUIS DVT-PE   
TREAT 30D START) 5 mg (74   
tabs)  
Take 2 tablets (10 mg) by   
mouth twice daily for 7   
days. Then take 1 tablet (5  
mg) by mouth twice daily for   
23 days  
- apixaban (ELIQUIS) 5 mg   
tab(s)  
Take 1 tablet by mouth twice   
daily.  
- aspirin 81 mg cap  
Take 1 capsule by mouth once   
daily. Resume once BID   
dosing is completed  
- aspirin, enteric coated   
(ASPIRIN, ENTERIC COATED) 81   
mg EC tablet  
Take 1 tablet by mouth twice   
daily for 21 days. Once BID   
dosing is completed  
in 21 days, resume home   
daily dose  
- insulin glargine (LANTUS   
U-100 INSULIN) 100 unit/mL   
injection  
Inject 36 Units   
subcutaneously daily at   
bedtime.  
- insulin aspart U-100   
(NOVOLOG) 100 unit/mL  
14 units with breakfast 8   
units with lunch 10 units   
with dinner Admin  
Instructions: ADMINISTER   
CORRECTIONAL INSULIN   
REGARDLESS OF MEAL OR   
NUTRITION  
INTAKE Custom Scale If Blood   
Glucose (mg/dL) is Less than   
100 ? 0 units  
101-150 ? 0 units 151-175 ?   
2 units 176-200 ? 3 units   
201-225 ? 4 units  
226-250 ? 5 units 251-275 ?   
6 units 276-300 ? 7 units   
301-325 ? 8 units  
326-350 ? 9 units >350 ? 10   
units and Notify Provider   
Notify provider  
if 2 consecutive blood   
glucose values in the   
previous 24 hours are   
greater  
than 250 mg/mL and there   
have been no changes to the   
insulin regimen in the  
previous 24 hours.  
- doxepin capsule 10 mg  
Take 20 mg by mouth daily at   
bedtime.  
- BISACODYL ORAL  
Take 10 mg by mouth once   
daily.  
- melatonin 10 mg cap  
Take 1 capsule by mouth   
daily at bedtime.  
- alirocumab (PRALUENT) 75   
mg/mL pen  
Inject 150 mg subcutaneously   
every other week.  
- DULoxetine (CYMBALTA) 60   
mg capsule  
Take 60 mg by mouth twice   
daily.  
- ranolazine SR (RANEXA)   
1,000 mg tab ER 12 hr  
Take 1 tablet by mouth twice   
daily.  
- dilTIAZem CD (CARDIZEM CD)   
180 mg 24 hr capsule  
Take 180 mg by mouth once   
daily.  
- empagliflozin (JARDIANCE)   
25 mg tablet  
Take 25 mg by mouth daily   
with breakfast.  
- GABAPENTIN ORAL  
Take 200 mg by mouth three   
times daily.  
- METOPROLOL TARTRATE ORAL  
Take 25 mg by mouth twice   
daily.  
- isosorbide mononitrate ER   
(IMDUR) 30 mg 24 hr tablet  
Take 90 mg by mouth once   
daily.  
- clopidogrel (PLAVIX) 75 mg   
tablet  
Take 75 mg by mouth once   
daily.  
Problem List As Of Date   
2022 Noted Resolved  
Septic joint of left knee   
joint (HCC) [M00.9]   
2022  
Obesity, Class II, BMI   
35-39.9 [E66.9] 2022  
Controlled type 2 diabetes   
mellitus without   
com*2022  
Primary hypertension [I10]   
2022  
Trigger finger, right middle   
finger [M65.331] 2022  
Encounter Number: [id number]  
Encounter Status:Closed by   
TEZ  ROMA BROWNE on 22        Normal                                  [facility]  
   
                                                    C-REACTIVE PROTEINon   
022   
   
                                                    C-REACTIVE   
PROTEIN         0.40 mg/dL      Normal                          [facility]  
   
                                        Comment on above:   Order Comment: [facility]  
[facility]   
   
                                                            Result Comment: REF   
VALUE  
< 1.00   
   
                                                            Performed By: #### E  
SRWS ####  
[address] [address]   
   
                                                    CBC AND DIFFERENTIALon    
   
                                                    Basophils (Bld)   
[#/Vol]         0.10 10*3/uL    Normal          0.00 - 0.10     [facility]  
   
                                        Comment on above:   Order Comment: [facility]  
[facility]   
   
                                                            Performed By: #### C  
BCDF ####  
[address] [address]   
   
                                                    Basophils/100 WBC   
(Bld)           1.1 %           Normal          0.0 - 2.0       [facility]  
   
                                        Comment on above:   Order Comment: [facility]  
[facility]   
   
                                                            Performed By: #### C  
BCDF ####  
[address] [address]   
   
                                                    Eosinophils (Bld)   
[#/Vol]         0.30 10*3/uL    Normal          0.00 - 0.70     [facility]  
   
                                        Comment on above:   Order Comment: [facility]  
[facility]   
   
                                                            Performed By: #### C  
BCDF ####  
[address] [address]   
   
                                                    Eosinophils/100   
WBC (Bld)       3.2 %           Normal          0.0 - 6.0       [facility]  
   
                                        Comment on above:   Order Comment: [facility]  
[facility]   
   
                                                            Performed By: #### C  
BCDF ####  
[address] [address]   
   
                                                    Erythrocyte   
distribution   
width (RBC)   
[Ratio]         16.2 %          High            11.5 - 14.5     [facility]  
   
                                        Comment on above:   Order Comment: [facility]  
[facility]   
   
                                                            Performed By: #### C  
BCDF ####  
[address] [address]   
   
                                                    Hematocrit (Bld)   
[Volume fraction] 30.6 %          Low             36.0 - 46.0     [facility]  
   
                                        Comment on above:   Order Comment: [facility]  
[facility]   
   
                                                            Performed By: #### C  
BCDF ####  
[address] [address]   
   
                                                    Hemoglobin (Bld)   
[Mass/Vol]      10.3 g/dL       Low             12.0 - 16.0     [facility]  
   
                                        Comment on above:   Order Comment: [facility]  
[facility]   
   
                                                            Performed By: #### C  
BCDF ####  
[address] [address]   
   
                                                    Lymphocytes (Bld)   
[#/Vol]         2.40 10*3/uL    Normal          1.20 - 4.80     [facility]  
   
                                        Comment on above:   Order Comment: [facility]  
[facility]   
   
                                                            Performed By: #### C  
BCDF ####  
[address] [address]   
   
                                                    Lymphocytes/100   
WBC (Bld)       24.1 %          Normal          13.0 - 44.0     [facility]  
   
                                        Comment on above:   Order Comment: [facility]  
[facility]   
   
                                                            Performed By: #### C  
BCDF ####  
[address] [address]   
   
                                                    MCHC (RBC)   
[Mass/Vol]      33.8 g/dL       Normal          32.0 - 36.0     [facility]  
   
                                        Comment on above:   Order Comment: [facility]  
[facility]   
   
                                                            Performed By: #### C  
BCDF ####  
[address] [address]   
   
                                                    MCV (RBC)   
[Entitic vol]   85 fL           Normal          80 - 100        [facility]  
   
                                        Comment on above:   Order Comment: [facility]  
[facility]   
   
                                                            Performed By: #### C  
BCDF ####  
[address] [address]   
   
                                                    Monocytes (Bld)   
[#/Vol]         0.70 10*3/uL    Normal          0.10 - 1.00     [facility]  
   
                                        Comment on above:   Order Comment: [facility]  
[facility]   
   
                                                            Performed By: #### C  
BCDF ####  
[address] [address]   
   
                                                    Monocytes/100 WBC   
(Bld)           7.5 %           Normal          2.0 - 10.0      [facility]  
   
                                        Comment on above:   Order Comment: [facility]  
[facility]   
   
                                                            Performed By: #### C  
BCDF ####  
[address] [address]   
   
                                                    Neutrophils (Bld)   
[#/Vol]         6.40 10*3/uL    Normal          1.20 - 7.70     [facility]  
   
                                        Comment on above:   Order Comment: [facility]  
[facility]   
   
                                                            Result Comment: Perc  
ent differential counts (%) should be   
interpreted in the  
context of the absolute cell counts (cells/L).   
   
                                                            Performed By: #### C  
BCDF ####  
[address] [address]   
   
                                                    Neutrophils/100   
WBC (Bld)       64.1 %          Normal          40.0 - 80.0     [facility]  
   
                                        Comment on above:   Order Comment: [facility]  
[facility]   
   
                                                            Performed By: #### C  
BCDF ####  
[address] [address]   
   
                                                    Platelets (Bld)   
[#/Vol]         258 10*3/uL     Normal          150 - 450       [facility]  
   
                                        Comment on above:   Order Comment: [facility]  
[facility]   
   
                                                            Performed By: #### C  
BCDF ####  
[address] [address]   
   
                      RBC        3.59 x10E12/L Low        4.00 - 5.20 [facility]  
   
                                        Comment on above:   Order Comment: [facility]  
[facility]   
   
                                                            Performed By: #### C  
BCDF ####  
[address] [address]   
   
                      WBC (Bld) [#/Vol] 9.9 10*3/uL Normal     4.4 - 11.3 [facility]  
   
                                        Comment on above:   Order Comment: [facility]  
[facility]   
   
                                                            Performed By: #### C  
BCDF ####  
[address] [address]   
   
                                                    CREATININEon 2022   
   
                                                    Creatinine   
[Mass/Vol]      0.94 mg/dL      Normal          0.50 - 1.05     [facility]  
   
                                        Comment on above:   Order Comment: [facility]  
[facility]   
   
                                                            Performed By: #### E  
SRWS ####  
[address] [address]   
   
                                                    GFR/1.73 sq   
M.predicted among   
non-blacks MDRD   
(S/P/Bld) [Vol   
rate/Area]      69 mL/min/{1.73_m2} Normal          >90             [facility]  
   
                                        Comment on above:   Order Comment: [facility]  
[facility]   
   
                                                            Result Comment: CALC  
ULATIONS OF ESTIMATED GFR ARE PERFORMED  
USING THE  CKD-EPI STUDY REFIT EQUATION  
WITHOUT THE RACE VARIABLE FOR THE IDMS-TRACEABLE  
CREATININE METHODS.  
https://jasn.asnjournals.org/content/early//ASN.5971783892   
   
                                                            Performed By: #### E  
SRWS ####  
[address] [address]   
   
                                                    HEPATIC FUNCTION PANELon    
   
                                                    Albumin   
[Mass/Vol]      3.6 g/dL        Normal          3.4 - 5.0       [facility]  
   
                                        Comment on above:   Order Comment: [facility]  
[facility]   
   
                                                            Performed By: #### E  
SRWS ####  
[address] [address]   
   
                                                    ALP [Catalytic   
activity/Vol]   131 U/L         Normal          33 - 136        [facility]  
   
                                        Comment on above:   Order Comment: [facility]  
[facility]   
   
                                                            Performed By: #### E  
SRWS ####  
[address] [address]   
   
                                                    ALT [Catalytic   
activity/Vol]   14 U/L          Normal          7 - 45          [facility]  
   
                                        Comment on above:   Order Comment: [facility]  
[facility]   
   
                                                            Result Comment: Kayla  
ents treated with Sulfasalazine may generate  
falsely decreased results for ALT.   
   
                                                            Performed By: #### E  
SRWS ####  
[address] [address]   
   
                                                    AST [Catalytic   
activity/Vol]   12 U/L          Normal          9 - 39          [facility]  
   
                                        Comment on above:   Order Comment: [facility]  
[facility]   
   
                                                            Performed By: #### E  
SRWS ####  
[address] [address]   
   
                                                    Bilirubin   
[Mass/Vol]      0.3 mg/dL       Normal          0.0 - 1.2       [facility]  
   
                                        Comment on above:   Order Comment: [facility]  
[facility]   
   
                                                            Performed By: #### E  
SRWS ####  
[address] [address]   
   
                                                    Bilirubin.indirec  
t [Mass/Vol]    0.0 mg/dL       Normal          0.0 - 0.3       [facility]  
   
                                        Comment on above:   Order Comment: [facility]  
[facility]   
   
                                                            Performed By: #### E  
SRWS ####  
[address] [address]   
   
                                                    Protein   
[Mass/Vol]      6.4 g/dL        Normal          6.4 - 8.2       [facility]  
   
                                        Comment on above:   Order Comment: [facility]  
[facility]   
   
                                                            Performed By: #### E  
SRWS ####  
[address] [address]   
   
                                                    SEDIMENTATION RATE, ERYTHROC  
YTEon 2022   
   
                                                    SEDIMENTATION   
RATE, ERYTHROCYTE 9 mm/h          Normal          0 - 30          [facility]  
   
                                        Comment on above:   Order Comment: [facility]  
[facility]   
   
                                                            Performed By: #### C  
REAT ####  
[address] [address]   
   
                                                    C-REACTIVE PROTEINon   
022   
   
                                                    C-REACTIVE   
PROTEIN         0.46 mg/dL      Normal                          [facility]  
   
                                        Comment on above:   Order Comment: [facility]  
[facility]   
   
                                                            Result Comment: REF   
VALUE  
< 1.00   
   
                                                            Performed By: #### E  
SRWS ####  
[address] [address]   
   
                                                    CBC AND DIFFERENTIALon    
   
                                                    Basophils (Bld)   
[#/Vol]         0.10 10*3/uL    Normal          0.00 - 0.10     [facility]  
   
                                        Comment on above:   Order Comment: [facility]  
[facility]   
   
                                                            Performed By: #### C  
BCDF ####  
[address] [address]   
   
                                                    Basophils/100 WBC   
(Bld)           0.9 %           Normal          0.0 - 2.0       [facility]  
   
                                        Comment on above:   Order Comment: [facility]  
[facility]   
   
                                                            Performed By: #### C  
BCDF ####  
[address] [address]   
   
                                                    Eosinophils (Bld)   
[#/Vol]         0.30 10*3/uL    Normal          0.00 - 0.70     [facility]  
   
                                        Comment on above:   Order Comment: [facility]  
[facility]   
   
                                                            Performed By: #### C  
BCDF ####  
[address] [address]   
   
                                                    Eosinophils/100   
WBC (Bld)       3.4 %           Normal          0.0 - 6.0       [facility]  
   
                                        Comment on above:   Order Comment: [facility]  
[facility]   
   
                                                            Performed By: #### C  
BCDF ####  
[address] [address]   
   
                                                    Erythrocyte   
distribution   
width (RBC)   
[Ratio]         16.5 %          High            11.5 - 14.5     [facility]  
   
                                        Comment on above:   Order Comment: [facility]  
[facility]   
   
                                                            Performed By: #### C  
BCDF ####  
[address] [address]   
   
                                                    Hematocrit (Bld)   
[Volume fraction] 32.4 %          Low             36.0 - 46.0     [facility]  
   
                                        Comment on above:   Order Comment: [facility]  
[facility]   
   
                                                            Performed By: #### C  
BCDF ####  
[address] [address]   
   
                                                    Hemoglobin (Bld)   
[Mass/Vol]      10.9 g/dL       Low             12.0 - 16.0     [facility]  
   
                                        Comment on above:   Order Comment: [facility]  
[facility]   
   
                                                            Performed By: #### C  
BCDF ####  
[address] [address]   
   
                                                    Lymphocytes (Bld)   
[#/Vol]         2.50 10*3/uL    Normal          1.20 - 4.80     [facility]  
   
                                        Comment on above:   Order Comment: [facility]  
[facility]   
   
                                                            Performed By: #### C  
BCDF ####  
[address] [address]   
   
                                                    Lymphocytes/100   
WBC (Bld)       26.6 %          Normal          13.0 - 44.0     [facility]  
   
                                        Comment on above:   Order Comment: [facility]  
[facility]   
   
                                                            Performed By: #### C  
BCDF ####  
[address] [address]   
   
                                                    MCHC (RBC)   
[Mass/Vol]      33.8 g/dL       Normal          32.0 - 36.0     [facility]  
   
                                        Comment on above:   Order Comment: [facility]   
   
                                                            Performed By: #### C  
BCDF ####  
[address] [address]   
   
                                                    MCV (RBC)   
[Entitic vol]   86 fL           Normal          80 - 100        [facility]  
   
                                        Comment on above:   Order Comment: [facility]   
   
                                                            Performed By: #### C  
BCDF ####  
[address] [address]   
   
                                                    Monocytes (Bld)   
[#/Vol]         0.60 10*3/uL    Normal          0.10 - 1.00     [facility]  
   
                                        Comment on above:   Order Comment: [facility]   
   
                                                            Performed By: #### C  
BCDF ####  
[address] [address]   
   
                                                    Monocytes/100 WBC   
(Bld)           6.5 %           Normal          2.0 - 10.0      [facility]  
   
                                        Comment on above:   Order Comment: [facility]   
   
                                                            Performed By: #### C  
BCDF ####  
[address] [address]   
   
                                                    Neutrophils (Bld)   
[#/Vol]         5.80 10*3/uL    Normal          1.20 - 7.70     [facility]  
   
                                        Comment on above:   Order Comment: [facility]   
   
                                                            Result Comment: Perc  
ent differential counts (%) should be   
interpreted in the  
context of the absolute cell counts (cells/L).   
   
                                                            Performed By: #### C  
BCDF ####  
[address] [address]   
   
                                                    Neutrophils/100   
WBC (Bld)       62.6 %          Normal          40.0 - 80.0     [facility]  
   
                                        Comment on above:   Order Comment: [facility]   
   
                                                            Performed By: #### C  
BCDF ####  
[address] [address]   
   
                      NUCLEATED RBC 0.1 /100 WBC Normal                [facility]  
   
                                        Comment on above:   Order Comment: [facility]   
   
                                                            Performed By: #### C  
BCDF ####  
[address] [address]   
   
                                                    Platelets (Bld)   
[#/Vol]         322 10*3/uL     Normal          150 - 450       [facility]  
   
                                        Comment on above:   Order Comment: [facility]   
   
                                                            Performed By: #### C  
BCDF ####  
[address] [address]   
   
                      RBC        3.78 x10E12/L Low        4.00 - 5.20 [facility]  
   
                                        Comment on above:   Order Comment: [facility]   
   
                                                            Performed By: #### C  
BCDF ####  
[address] [address]   
   
                      WBC (Bld) [#/Vol] 9.2 10*3/uL Normal     4.4 - 11.3 [facility]  
   
                                        Comment on above:   Order Comment: [facility]   
   
                                                            Performed By: #### C  
BCDF ####  
[address] [address]   
   
                                                    CREATININEon 2022   
   
                                                    Creatinine   
[Mass/Vol]      0.88 mg/dL      Normal          0.50 - 1.05     [facility]  
   
                                        Comment on above:   Order Comment: [facility]   
   
                                                            Performed By: #### C  
REAT ####  
[address] [address]   
   
                                                    GFR/1.73 sq   
M.predicted among   
non-blacks MDRD   
(S/P/Bld) [Vol   
rate/Area]      75 mL/min/{1.73_m2} Normal          >90             [facility]  
   
                                        Comment on above:   Order Comment: [facility]   
   
                                                            Result Comment: CALC  
ULATIONS OF ESTIMATED GFR ARE PERFORMED  
USING THE  CKD-EPI STUDY REFIT EQUATION  
WITHOUT THE RACE VARIABLE FOR THE IDMS-TRACEABLE  
CREATININE METHODS.  
https://jasn.asnjournals.org/content/early//ASN.3177247988   
   
                                                            Performed By: #### C  
REAT ####  
[place] [id number]   
   
                                                    HEPATIC FUNCTION PANELon    
   
                                                    Albumin   
[Mass/Vol]      3.8 g/dL        Normal          3.4 - 5.0       [facility]  
   
                                        Comment on above:   Order Comment: [place]  
[facility]   
   
                                                            Performed By: #### H  
EPFP ####  
[place] [id number]   
   
                                                    ALP [Catalytic   
activity/Vol]   131 U/L         Normal          33 - 136        [facility]  
   
                                        Comment on above:   Order Comment: [place]  
[facility]   
   
                                                            Performed By: #### H  
EPFP ####  
[address] [address]   
   
                                                    ALT [Catalytic   
activity/Vol]   15 U/L          Normal          7 - 45          [facility]  
   
                                        Comment on above:   Order Comment: [place]  
[facility]   
   
                                                            Result Comment: Kayla  
ents treated with Sulfasalazine may generate  
falsely decreased results for ALT.   
   
                                                            Performed By: #### H  
EPFP ####  
[address] [address]   
   
                                                    AST [Catalytic   
activity/Vol]   14 U/L          Normal          9 - 39          [facility]  
   
                                        Comment on above:   Order Comment: [place]  
[facility]   
   
                                                            Performed By: #### H  
EPFP ####  
[address] [address]   
   
                                                    Bilirubin   
[Mass/Vol]      0.3 mg/dL       Normal          0.0 - 1.2       [facility]  
   
                                        Comment on above:   Order Comment: [place]  
[facility]   
   
                                                            Performed By: #### H  
EPFP ####  
[address] [address]   
   
                                                    Bilirubin.indirec  
t [Mass/Vol]    0.1 mg/dL       Normal          0.0 - 0.3       [facility]  
   
                                        Comment on above:   Order Comment: [place]  
[facility]   
   
                                                            Performed By: #### H  
EPFP ####  
[address] [address]   
   
                                                    Protein   
[Mass/Vol]      6.7 g/dL        Normal          6.4 - 8.2       [facility]  
   
                                        Comment on above:   Order Comment: [place]  
[facility]   
   
                                                            Performed By: #### H  
EPFP ####  
[address] [address]   
   
                                                    SEDIMENTATION RATE, ERYTHROC  
YTEon 2022   
   
                                                    SEDIMENTATION   
RATE, ERYTHROCYTE 21 mm/h         Normal          0 - 30          [facility]  
   
                                        Comment on above:   Order Comment: [place]  
[facility]   
   
                                                            Performed By: #### E  
SRWS ####  
[address] [address]   
   
                                                    Basic metabolic 2000 panelon  
 2022   
   
                                                    Anion gap   
[Moles/Vol]     9 mmol/L        Normal          9-18            [facility]  
   
                                        Comment on above:   Order Comment: Speci  
men Type: BLOOD SPECIMEN  
Ordering Facility: [facility]  
Address: [address] [place] [address]   
   
                                                            Performed By: #### 2  
4321-2 ####  
[facility]  
CLIA [id number]  
[address] [place]   
   
                                                    Calcium   
[Mass/Vol]      9.5 mg/dL       Normal          8.5-10.2        [facility]  
   
                                        Comment on above:   Order Comment: Speci  
men Type: BLOOD SPECIMEN  
Ordering Facility: [facility]  
Address: [address] [place] [address]   
   
                                                            Performed By: #### 2  
4321-2 ####  
[facility]  
CLIA [id number]  
[address] [place] [address] [place]   
   
                                                    Chloride   
[Moles/Vol]     102 mmol/L      Normal                    [facility]  
   
                                        Comment on above:   Order Comment: Speci  
men Type: BLOOD SPECIMEN  
Ordering Facility: [facility]  
Address: [address] [address]   
   
                                                            Performed By: #### 2  
4321-2 ####  
[facility]  
CLIA [id number]  
[address]   
   
                      CO2 [Moles/Vol] 28 mmol/L  Normal     22-30      [facility]  
   
                                        Comment on above:   Order Comment: Speci  
men Type: BLOOD SPECIMEN  
Ordering Facility: [facility]  
Address: [address] [place] [address]   
   
                                                            Performed By: #### 2  
4321-2 ####  
[facility]  
CLIA [id number]  
[address]   
   
                                                    Creatinine   
[Mass/Vol]      0.90 mg/dL      Normal          0.58-0.96       [facility]  
   
                                        Comment on above:   Order Comment: Speci  
men Type: BLOOD SPECIMEN  
Ordering Facility: [facility]  
Address: [address] [place] [address]   
   
                                                            Performed By: #### 2  
4321-2 ####  
[facility]  
CLIA [id number]  
[address]   
   
                                                    ESTIMATED   
GLOMERULAR   
FILTRATION RATE 73 mL/min/1.73m??? Normal          >=60            [facility]  
   
                                        Comment on above:   Order Comment: Speci  
men Type: BLOOD SPECIMEN  
Ordering Facility: [facility]  
Address: [address] [place] [address]   
   
                                                            Result Comment: Sydnee  
mated Glomerular Filtration Rate (eGFR) is   
calculated using the  CKD-EPI creatinine equation. This equation   
utilizes serum creatinine, sex, and age as parameters. The   
creatinine assay has traceable calibration to isotope dilution-mass   
spectrometry. Refer to KDIGO guidelines for clinical interpretation.   
In patients with unstable renal function, e.g. those with acute   
kidney injury, the eGFR may not accurately reflect actual GFR.   
   
                                                            Performed By: #### 2  
4321-2 ####  
[facility]  
CLIA [id number]  
[address] [place] [facility]   
   
                                                    Glucose   
[Mass/Vol]      259 mg/dL       High            74-99           [facility]  
   
                                        Comment on above:   Order Comment: Speci  
men Type: BLOOD SPECIMEN  
Ordering Facility: [facility]  
Address: [address] [place] [address]   
   
                                                            Result Comment: The   
American Diabetes Association (ADA) provides   
guidance for cutoff values for fasting glucose and random glucose.   
The ADA defines fasting as no caloric intake for at least 8 hours.   
Fasting plasma glucose results between 100 to 125 mg/dL indicate   
increased risk for diabetes (prediabetes).  
Fasting plasma glucose results greater than or equal to 126 mg/dL   
meet the criteria for diagnosis of diabetes. In the absence of   
unequivocal hyperglycemia, results should be confirmed by repeat   
testing. In a patient with classic symptoms of hyperglycemia or   
hyperglycemic crisis, random plasma glucose results greater than or   
equal to 200 mg/dL meet the criteria for diagnosis of diabetes.  
Reference: Standards of Medical Care in Diabetes 2016, American   
Diabetes Association. Diabetes Care. 2016.39(Suppl 1).   
   
                                                            Performed By: #### 2  
4321-2 ####  
[facility]  
CLIA [id number]  
[address] [place] [facility]   
   
                                                    Potassium   
[Moles/Vol]     4.6 mmol/L      Normal          3.7-5.1         [facility]  
   
                                        Comment on above:   Order Comment: Speci  
men Type: BLOOD SPECIMEN  
Ordering Facility: [facility]  
Address: [address] [place] [address]   
   
                                                            Performed By: #### 2  
4321-2 ####  
[facility]  
CLIA [id number]  
[address] [place] [facility]   
   
                                                    Sodium   
[Moles/Vol]     139 mmol/L      Normal          136-144         [facility]  
   
                                        Comment on above:   Order Comment: Speci  
men Type: BLOOD SPECIMEN  
Ordering Facility: [facility]  
Address: [address] [place] [address]   
   
                                                            Performed By: #### 2  
4321-2 ####  
[facility]  
CLIA [id number]  
[address] [place] [facility]   
   
                                                    Urea nitrogen   
[Mass/Vol]      19 mg/dL        Normal          7-21            [facility]  
   
                                        Comment on above:   Order Comment: Speci  
men Type: BLOOD SPECIMEN  
Ordering Facility: [facility]  
Address: [address] [place] [address]   
   
                                                            Performed By: #### 2  
4321-2 ####  
[facility]  
CLIA [id number]  
[address] [place]   
   
                                                    CBC W Auto Differential pane  
l (Bld)on 2022   
   
                                                    Basophils (Bld)   
[#/Vol]         0.07 10*3/uL    Normal          <0.11           [facility]  
   
                                        Comment on above:   Order Comment: Speci  
men Type: BLOOD SPECIMENOrdering Facility:  
   
[facility] Address: [address] [address]   
   
                                                            Performed By: #### 6  
4626, 445-3 ####  
[facility]  
CLIA [id number]  
[address] [place]   
   
                                                    Basophils/100 WBC   
(Bld)           0.8 %           Normal                          [facility]  
   
                                        Comment on above:   Order Comment: Speci  
men Type: BLOOD SPECIMENOrdering Facility:  
   
[facility] Address: [address] [place] [address]   
   
                                                            Performed By: #### 6  
464-6, 605-3 ####  
[facility]  
CLIA [id number]  
[address] [place] [facility]   
   
                                                    Differential cell   
count method Nom   
(Bld)           Auto            Normal                          [facility]  
   
                                        Comment on above:   Order Comment: Speci  
men Type: BLOOD SPECIMENOrdering Facility:  
   
[facility] Address: [address] [place] [address]   
   
                                                            Performed By: #### 6  
141-6, 430-3 ####  
[facility]  
CLIA [id number]  
[address] [place]   
   
                                                    Eosinophils (Bld)   
[#/Vol]         0.50 10*3/uL    High            <0.46           [facility]  
   
                                        Comment on above:   Order Comment: Speci  
men Type: BLOOD SPECIMENOrdering Facility:  
   
[facility] Address: [address] [address]   
   
                                                            Performed By: #### 6  
460-9, 907-3 ####  
[facility]  
CLIA [id number]  
[address] [place]   
   
                                                    Eosinophils/100   
WBC (Bld)       5.4 %           Normal                          [facility]  
   
                                        Comment on above:   Order Comment: Speci  
men Type: BLOOD SPECIMENOrdering Facility:  
   
[facility] Address: [address] [address]   
   
                                                            Performed By: #### 6  
4626 635-3 ####  
[facility]  
CLIA [id number]  
[address]   
   
                                                    Erythrocyte   
distribution   
width (RBC)   
[Ratio]         15.3 %          High            11.5-15.0       [facility]  
   
                                        Comment on above:   Order Comment: Speci  
men Type: BLOOD SPECIMENOrdering Facility:  
   
[facility] Address: [address] [place] [address]   
   
                                                            Performed By: #### 6  
4626, 085-3 ####  
[facility]  
CLIA [id number]  
[address] [place] [facility]   
   
                                                    Hematocrit (Bld)   
[Volume fraction] 36.3 %          Normal          36.0-46.0       [facility]  
   
                                        Comment on above:   Order Comment: Speci  
men Type: BLOOD SPECIMENOrdering Facility:  
  
[facility] Address: [address] [place] [address]   
   
                                                            Performed By: #### 6  
467-0, 935-3 ####  
[facility]  
CLIA [id number]  
[address] [place] [facility]   
   
                                                    Hemoglobin (Bld)   
[Mass/Vol]      11.0 g/dL       Low             11.5-15.5       [facility]  
   
                                        Comment on above:   Order Comment: Speci  
men Type: BLOOD SPECIMENOrdering Facility:  
   
[facility] Address: [address] [place] [address]   
   
                                                            Performed By: #### 6  
468-6, 595-3 ####  
[facility]  
CLIA [id number]  
[address]   
   
                      IMMATURE GRAN % 0.5 %      Normal                [facility]  
   
                                        Comment on above:   Order Comment: Speci  
men Type: BLOOD SPECIMENOrdering Facility:  
   
[facility] Address: [address] [place] [address]   
   
                                                            Performed By: #### 6  
4626, 565-3 ####  
[facility]  
CLIA [id number]  
[address]   
   
                      IMMATURE GRAN ABS 0.05 k/uL  Normal     <0.10      [facility]  
   
                                        Comment on above:   Order Comment: Speci  
men Type: BLOOD SPECIMENOrdering Facility:  
   
[facility] Address: [address] [place] [address]   
   
                                                            Performed By: #### 6  
4626, 735-3 ####  
[facility]  
CLIA [id number]  
[address] [place]   
   
                                                    Lymphocytes (Bld)   
[#/Vol]         2.36 10*3/uL    Normal          1.00-4.00       [facility]  
   
                                        Comment on above:   Order Comment: Speci  
men Type: BLOOD SPECIMENOrdering Facility:  
   
[facility] Address: [address] [place] [address]   
   
                                                            Performed By: #### 6  
4626, 415-3 ####  
[facility]  
CLIA [id number]  
[address] [place] [facility]   
   
                                                    Lymphocytes/100   
WBC (Bld)       25.6 %          Normal                          [facility]  
   
                                        Comment on above:   Order Comment: Speci  
men Type: BLOOD SPECIMENOrdering Facility:  
   
[facility] Address: [address] [place] [address]   
   
                                                            Performed By: #### 6  
466, 916-3 ####  
[facility]  
CLIA [id number]  
[address] [place] [facility]   
   
                                                    MCH (RBC)   
[Entitic mass]  27.7 pg         Normal          26.0-34.0       [facility]  
   
                                        Comment on above:   Order Comment: Speci  
men Type: BLOOD SPECIMENOrdering Facility:  
  
[facility] Address: [address] [address]   
   
                                                            Performed By: #### 6  
463-6, 395-3 ####  
[facility]  
CLIA [id number]  
[address] [place]   
   
                                                    MCHC (RBC)   
[Mass/Vol]      30.3 g/dL       Low             30.5-36.0       [facility]  
   
                                        Comment on above:   Order Comment: Speci  
men Type: BLOOD SPECIMENOrdering Facility:  
   
[facility] Address: [address] [place] [address]   
   
                                                            Performed By: #### 6  
462-6 635-3 ####  
[facility]  
CLIA [id number]  
[address]   
   
                                                    MCV (RBC)   
[Entitic vol]   91.4 fL         Normal          80.0-100.0      [facility]  
   
                                        Comment on above:   Order Comment: Speci  
men Type: BLOOD SPECIMENOrdering Facility:  
  
[facility] Address: [address] [place] [address]   
   
                                                            Performed By: #### 6  
462-6, 635-3 ####  
[facility]  
CLIA [id number]  
[address]   
   
                                                    Monocytes (Bld)   
[#/Vol]         0.60 10*3/uL    Normal          <0.87           [facility]  
   
                                        Comment on above:   Order Comment: Speci  
men Type: BLOOD SPECIMENOrdering Facility:  
   
[facility] Address: [address] [place] [address]   
   
                                                            Performed By: #### 6  
4626, 445-3 ####  
[facility]  
CLIA [id number]  
[address]   
   
                                                    Monocytes/100 WBC   
(Bld)           6.5 %           Normal                          [facility]  
   
                                        Comment on above:   Order Comment: Speci  
men Type: BLOOD SPECIMENOrdering Facility:  
   
[facility] Address: [address] [place] [address]   
   
                                                            Performed By: #### 6  
462-6, 895-3 ####  
[facility]  
CLIA [id number]  
[address] [place]   
   
                                                    Neutrophils (Bld)   
[#/Vol]         5.64 10*3/uL    Normal          1.45-7.50       [facility]  
   
                                        Comment on above:   Order Comment: Speci  
men Type: BLOOD SPECIMENOrdering Facility:  
   
[facility] Address: [address] [place] [address]   
   
                                                            Performed By: #### 6  
462-6, 215-3 ####  
[facility]  
CLIA [id number]  
[address]   
   
                                                    Neutrophils/100   
WBC (Bld)       61.2 %          Normal                          [facility]  
   
                                        Comment on above:   Order Comment: Speci  
men Type: BLOOD SPECIMENOrdering Facility:  
   
[facility] Address: [address]   
   
                                                            Performed By: #### 6  
462-6, 635-3 ####  
[facility]  
CLIA [id number]  
[address]   
   
                                                    Nucleated RBC   
(Bld) [#/Vol]   10*3/uL         Normal          <0.01           [facility]  
   
                                        Comment on above:   Order Comment: Speci  
men Type: BLOOD SPECIMENOrdering Facility:  
   
[facility] Address: [address]   
   
                                                            Performed By: #### 6  
462-6 635-3 ####  
[facility]  
CLIA [id number]  
[address] [place]   
   
                                                    Nucleated RBC/100   
WBC (Bld) [Ratio] 0.0 /100 WBC    Normal                          [facility]  
   
                                        Comment on above:   Order Comment: Speci  
men Type: BLOOD SPECIMENOrdering Facility:  
   
[facility] Address: [address]   
   
                                                            Performed By: #### 6  
462-6 635-3 ####  
[facility]  
CLIA [id number]  
[address] [place]   
   
                                                    Platelet mean   
volume (Bld)   
[Entitic vol]   9.7 fL          Normal          9.0-12.7        [facility]  
   
                                        Comment on above:   Order Comment: Speci  
men Type: BLOOD SPECIMENOrdering Facility:  
  
[facility] Address: [address]   
   
                                                            Performed By: #### 6  
462-6 635-3 ####  
[facility]  
CLIA [id number]  
[address] [place]   
   
                                                    Platelets (Bld)   
[#/Vol]         428 10*3/uL     High            150-400         [facility]  
   
                                        Comment on above:   Order Comment: Speci  
men Type: BLOOD SPECIMENOrdering Facility:  
   
[facility] Address: [address]   
   
                                                            Performed By: #### 6  
462-6, 635-3 ####  
[facility]  
CLIA [id number]  
[address]   
   
                      RBC (Bld) [#/Vol] 3.97 10*6/uL Normal     3.90-5.20  [facility]  
   
                                        Comment on above:   Order Comment: Speci  
men Type: BLOOD SPECIMENOrdering Facility:  
   
[facility] Address: [address] [place] [address]   
   
                                                            Performed By: #### 6  
462-6, 635-3 ####  
[facility] LABORATORY  
CLIA [id number]  
[address]   
   
                      WBC (Bld) [#/Vol] 9.22 10*3/uL Normal     3.70-11.00 [facility]  
   
                                        Comment on above:   Order Comment: Speci  
men Type: BLOOD SPECIMENOrdering Facility:  
   
[facility] Address: [address] [place] [address]   
   
                                                            Performed By: #### 6  
462-6, 635-3 ####  
[facility] LABORATORY  
CLIA [id number]  
[address]   
   
                                                    ED NOTEon 2022   
   
                                        ED NOTE             HNO ID: [id number]  
Author: Jami Tamayo RN  
Service: Emergency Medicine  
Author Type: Registered   
Nurse  
Type: ED Notes  
Filed: 2022 6:35 PM  
Note Text:  
Pt alert and oriented x 4,   
speaking in full sentences   
with unlabored  
breathing. Pt verbalizes   
understanding of discharge   
paperwork. Pt  
ambulated from department   
without difficulty. Normal                                  [facility]  
   
                                        ED NOTE             HNO ID: [id number]  
Author: Sherron Martins RN  
Service: Emergency Medicine  
Author Type: Registered   
Nurse  
Type: ED Notes  
Filed: 2022 5:56 PM  
Note Text:  
PICC line removed from right   
arm. Line length noted @ 42   
cm. Verified by  
multiple caregivers.   
Pressure to be held for 20   
min                 Normal                                  [facility]  
   
                                                    ED PROV NOTEon 2022   
   
                                        ED PROV NOTE        HNO ID: [id number]  
Author: Arnulfo Galvin MD  
Service: Emergency Medicine  
Author Type: Physician  
Type: ED Provider Notes  
Filed: 2022 7:52 PM  
Note Text:  
ED Provider Note  
Patient Name: Jayden Soto  
MRN: [id number]  
: 1960  
SERVICE DATE: 22  
History  
Patient presents with:  
Arm Pain: Pt states she was   
told she has a blood clot   
around her PICC line  
in her right upper arm. pt   
states she is having pain   
but no other  
symptoms. Pt is receiving   
antbx for a staph infection   
and has not missed  
any treatments  
61-year-old female presents   
emergency department after   
she was found to  
have a DVT in her right   
upper extremity earlier   
today on an outpatient  
ultrasound. She is currently   
receiving Rocephin and a   
PICC line in her  
right arm after she was   
found to have a chronically   
infected knee. She  
had the hardware removed   
from her knee and and   
antibiotics spacer placed  
on . Her culture   
grew out MSSA.. She has had   
swelling in her  
right arm since last week.   
Denies any chest pain   
shortness of breath or  
hemoptysis. No previous   
history of DVT. She is   
currently on aspirin and  
Brilinta for a cardiac   
stent. Stent was placed   
almost 1 year ago. Denies  
any fever sweats chills.   
Otherwise has been feeling   
well except for the  
pain in her right arm.   
Denies any history of brain   
lesions. No recent  
surgeries on brain or spine.   
She did have a pulley   
release of the right  
middle finger tendon on   
 but that would not   
prevent her from  
receiving anticoagulation at   
this point. She denies any   
GI bleeding.  
Denies hematuria.  
She reports that the   
infectious disease doctor   
sent her in and was  
communicating additional   
thoughts via electronic   
medical record.  
PAST MEDICAL HISTORY  
Diagnosis Date  
- Chronic pain of left knee  
- Coronary artery disease  
1 cardiac stent  
- Diabetes (HCC)  
- Hypercholesteremia  
- Hypertension  
- MRSA (methicillin   
resistant staph aureus)   
culture positive  
multiple joints/bones  
- Pain of right hand  
PAST SURGICAL HISTORY  
Procedure Laterality Date  
- BACK SURGERY HX  
- ELBOW SURGERY HX Bilateral  
for bone spurs  
- IR VASCULAR ACCESS TEAM   
PICC INSERTION RADIO   
2022  
- KNEE SURGERY HX  
- PAST SURGICAL HISTORY OF   
Left 2022  
left knee  
- PAST SURGICAL HISTORY OF   
Left  
heel surgery  
- PAST SURGICAL HISTORY OF   
Right 2022  
right middle finger  
- SHOULDER SURGERY HX   
Bilateral  
FAMILY HISTORY  
Problem Relation Age of   
Onset  
- Heart Mother  
- Stroke Father  
Social History  
Tobacco Use  
- Smoking status: Never   
Smoker  
- Smokeless tobacco: Never   
Used  
Vaping Use  
- Vaping Use: Never used  
Substance and Sexual   
Activity  
- Alcohol use: Never  
- Drug use: Never  
- Sexual activity: Not on   
file  
ALLERGIES  
Allergen Reactions  
- Morphine GI Upset  
- Statins-Hmg-Coa Red*   
Unknown  
Review of Systems  
Constitutional: Negative for   
fever.  
HENT: Negative for trouble   
swallowing.  
Eyes: Negative for discharge   
and redness.  
Respiratory: Negative for   
cough, chest tightness and   
shortness of breath.  
Cardiovascular: Negative for   
chest pain and leg swelling.  
Gastrointestinal: Negative   
for abdominal pain.  
Genitourinary: Negative for   
difficulty urinating.  
Skin: Negative for rash and   
wound.  
Neurological: Negative for   
speech difficulty.  
Psychiatric/Behavioral:   
Negative for agitation.  
All other systems reviewed   
and are negative.  
Physical Exam  
Vitals [22 1538]  
BP Pulse Temp Temp src Resp   
SpO2 Weight Height  
(!) 124/39 77 36.7 ?C (98.1   
?F) Oral 18 100 % 102.1 kg   
(225 lb) 1.702 m  
(5' 7 )  
Physical Exam  
Vitals and nursing note   
reviewed.  
Constitutional:  
General: She is not in acute   
distress.  
Appearance: Normal   
appearance. She is not   
ill-appearing or  
toxic-appearing.  
HENT:  
Head: Normocephalic and   
atraumatic.  
Right Ear: External ear   
normal.  
Left Ear: External ear   
normal.  
Nose: Nose normal.  
Eyes:  
General: No scleral icterus.  
Conjunctiva/sclera:   
Conjunctivae normal.  
Cardiovascular:  
Rate and Rhythm: Normal rate   
and regular rhythm.  
Pulmonary:  
Effort: Pulmonary effort is   
normal. No respiratory   
distress.  
Breath sounds: Normal breath   
sounds. No wheezing.  
Abdominal:  
General: There is no   
distension.  
Palpations: Abdomen is soft.  
Tenderness: There is no   
abdominal tenderness. There   
is no guarding.  
Musculoskeletal:  
Comments: Patient with a   
PICC line in her right upper   
extremity. No  
signs of cellulitis or   
infection at the site of the   
PICC line insertion.  
She has mild tenderness with   
palpation of the upper arm.   
The arm itself  
is not tense in any way. No   
signs of compartment   
syndrome. Right upper  
extremity is neurovascular   
intact.  
Skin:  
General: Skin is warm and   
dry.  
Neurological:  
General: No focal deficit   
present.  
Mental Status: She is alert   
and oriented to person,   
place, and time.  
Psychiatric:  
Mood and Affect: Mood   
normal.  
Behavior: Behavior normal.  
Diagnostic Testing  
ED Labs Ordered and Reviewed  
BASIC METABOLIC PNL -   
Abnormal; (more content not   
included)...        Normal                                  [facility]  
   
                                                    ED Triage Noteon 2022   
   
                                        ED Triage Note      HNO ID: [id number]  
Author: Britney Rivas PA-C  
Service: Emergency Medicine  
Author Type: Physician   
Assistant  
Type: ED Triage Notes  
Filed: 2022 3:46 PM  
Note Text:  
ED INTAKE NOTE  
Patient Name: Jayden Soto  
MRN: [id number]  
Service Date: 22  
BRIEF HPI:  
61-year-old female presents   
with positive DVT study in   
her right upper  
extremity where she has a   
PICC line. She presents here   
for removal and to  
transition to oral   
antibiotics and to start   
anticoagulation.  
BRIEF EXAM:  
Awake and Alert  
RRR  
CTAB  
RAWLS  
INTAKE WORKUP:  
CBC CMP PT PTT  
SIGNATURE: Britney Rivas PA-C                Normal                                  [facility]  
   
                                                    No Panel Informationon    
   
                                                                  [facility]  
   
                                                    US DVT UPPER RTon 2022  
   
   
                                        US DVT UPPER RT     * * *Final Report* *  
 *  
DATE OF EXAM: 2022   
2:46PM  
[facility] 1004 - US DVT UPPER RT /   
ACCESSION # [id number]  
PROCEDURE REASON: MSSA   
(methicillin susceptible   
Staphylococcus aureus)  
infection  
  
* * * * Physician   
Interpretation * * * *  
EXAMINATION: RIGHT UPPER   
EXTREMITY DEEP VENOUS   
ULTRASOUND WITH DOPPLER  
IMAGING  
CLINICAL HISTORY: MSSA   
infection. Right arm pain   
right PICC  
basilic  
TECHNIQUE: Grayscale with   
compression maneuvers where   
accessible, color  
and spectral Doppler of the   
right internal jugular,   
subclavian, and  
axillary veins was   
performed. Grayscale with   
compression maneuvers of  
the right brachial, basilic   
and cephalic veins was also   
performed. The  
contralateral internal   
jugular and distal   
subclavian veins were imaged  
for comparison. Images were   
obtained and stored in a   
permanent archive.  
MQ: [id number]  
COMPARISON: None  
RESULT:  
RIGHT UPPER EXTREMITY  
DEEP VEINS  
Internal Jugular vein:   
Normal compression, normal   
spontaneous flow.  
Subclavian vein: Thrombus.   
PICC seen.  
Axillary vein: Thrombus.  
Brachial vein: Normal   
compression  
SUPERFICIAL VEINS  
Basilic vein: Thrombus.  
Cephalic vein: Normal   
compression.  
LEFT UPPER EXTREMITY (FOR   
COMPARISON)  
DEEP VEINS  
Internal Jugular and Distal   
Subclavian veins: Normal   
compression, normal  
spontaneous flow.  
IMPRESSION:  
Positive study for DVT in   
the right subclavian,   
axillary, and basilic  
veins.  
Findings submitted to the   
file room to be conveyed as   
a wet reading to  
the ordering physician as   
per protocol.  
: PSCB  
Transcribe Date/Time: 2022 2:49P  
Dictated by : MIO MCNEIL MD  
This examination was   
interpreted and the report   
reviewed and  
electronically signed by:  
MIO MCNEIL MD on 2022 2:52PM EST  
[id number] Normal                                  [facility]  
   
                                                    C-REACTIVE PROTEINon   
022   
   
                                                    C-REACTIVE   
PROTEIN         1.78 mg/dL      Abnormal                        [facility]  
   
                                        Comment on above:   Order Comment: [place]  
[facility]   
   
                                                            Result Comment: REF   
VALUE  
< 1.00   
   
                                                            Performed By: #### C  
REAT ####  
[address] [address]   
   
                                                    CBC AND DIFFERENTIALon    
   
                                                    Basophils (Bld)   
[#/Vol]         0.10 10*3/uL    Normal          0.00 - 0.10     [facility]  
   
                                        Comment on above:   Order Comment: [facility]  
[facility]   
   
                                                            Performed By: #### C  
BCDF ####  
[address] [address]   
   
                                                    Basophils/100 WBC   
(Bld)           0.7 %           Normal          0.0 - 2.0       [facility]  
   
                                        Comment on above:   Order Comment: [facility]  
[facility]   
   
                                                            Performed By: #### C  
BCDF ####  
[address] [address]   
   
                                                    Eosinophils (Bld)   
[#/Vol]         0.50 10*3/uL    Normal          0.00 - 0.70     [facility]  
   
                                        Comment on above:   Order Comment: [facility]  
[facility]   
   
                                                            Performed By: #### C  
BCDF ####  
[address] [address]   
   
                                                    Eosinophils/100   
WBC (Bld)       5.7 %           Normal          0.0 - 6.0       [facility]  
   
                                        Comment on above:   Order Comment: [facility]  
[facility]   
   
                                                            Performed By: #### C  
BCDF ####  
[address] [address]   
   
                                                    Erythrocyte   
distribution   
width (RBC)   
[Ratio]         16.8 %          High            11.5 - 14.5     [facility]  
   
                                        Comment on above:   Order Comment: [facility]  
[facility]   
   
                                                            Performed By: #### C  
BCDF ####  
[address] [address]   
   
                                                    Hematocrit (Bld)   
[Volume fraction] 30.3 %          Low             36.0 - 46.0     [facility]  
   
                                        Comment on above:   Order Comment: [facility]  
[facility]   
   
                                                            Performed By: #### C  
BCDF ####  
[address] [address]   
   
                                                    Hemoglobin (Bld)   
[Mass/Vol]      9.8 g/dL        Low             12.0 - 16.0     [facility]  
   
                                        Comment on above:   Order Comment: [facility]  
[facility]   
   
                                                            Performed By: #### C  
BCDF ####  
[address] [address]   
   
                                                    Lymphocytes (Bld)   
[#/Vol]         2.00 10*3/uL    Normal          1.20 - 4.80     [facility]  
   
                                        Comment on above:   Order Comment: [facility]  
[facility]   
   
                                                            Performed By: #### C  
BCDF ####  
[address] [address]   
   
                                                    Lymphocytes/100   
WBC (Bld)       23.6 %          Normal          13.0 - 44.0     [facility]  
   
                                        Comment on above:   Order Comment: [facility]  
[facility]   
   
                                                            Performed By: #### C  
BCDF ####  
[address] [address]   
   
                                                    MCHC (RBC)   
[Mass/Vol]      32.2 g/dL       Normal          32.0 - 36.0     [facility]  
   
                                        Comment on above:   Order Comment: [facility]  
[facility]   
   
                                                            Performed By: #### C  
BCDF ####  
[address] [address]   
   
                                                    MCV (RBC)   
[Entitic vol]   86 fL           Normal          80 - 100        [facility]  
   
                                        Comment on above:   Order Comment: [facility]  
[facility]   
   
                                                            Performed By: #### C  
BCDF ####  
[address] [address]   
   
                                                    Monocytes (Bld)   
[#/Vol]         0.60 10*3/uL    Normal          0.10 - 1.00     [facility]  
   
                                        Comment on above:   Order Comment: [facility]  
[facility]   
   
                                                            Performed By: #### C  
BCDF ####  
[address] [address]   
   
                                                    Monocytes/100 WBC   
(Bld)           6.8 %           Normal          2.0 - 10.0      [facility]  
   
                                        Comment on above:   Order Comment: [facility]  
[facility]   
   
                                                            Performed By: #### C  
BCDF ####  
[address] [address]   
   
                                                    Neutrophils (Bld)   
[#/Vol]         5.30 10*3/uL    Normal          1.20 - 7.70     [facility]  
   
                                        Comment on above:   Order Comment: [facility]  
[facility]   
   
                                                            Result Comment: Perc  
ent differential counts (%) should be   
interpreted in the  
context of the absolute cell counts (cells/L).   
   
                                                            Performed By: #### C  
BCDF ####  
[address] [address]   
   
                                                    Neutrophils/100   
WBC (Bld)       63.2 %          Normal          40.0 - 80.0     [facility]  
   
                                        Comment on above:   Order Comment: [facility]  
[facility]   
   
                                                            Performed By: #### C  
BCDF ####  
[address] [address]   
   
                      NUCLEATED RBC 0.2 /100 WBC Normal                [facility]  
   
                                        Comment on above:   Order Comment: [facility]  
[facility]   
   
                                                            Performed By: #### C  
BCDF ####  
[address] [address]   
   
                                                    Platelets (Bld)   
[#/Vol]         349 10*3/uL     Normal          150 - 450       [facility]  
   
                                        Comment on above:   Order Comment: [facility]  
[facility]   
   
                                                            Performed By: #### C  
BCDF ####  
[address] [address]   
   
                      RBC        3.51 x10E12/L Low        4.00 - 5.20 [facility]  
   
                                        Comment on above:   Order Comment: [facility]  
[facility]   
   
                                                            Performed By: #### C  
BCDF ####  
[address] [address]   
   
                      WBC (Bld) [#/Vol] 8.5 10*3/uL Normal     4.4 - 11.3 [facility]  
   
                                        Comment on above:   Order Comment: [facility]  
[facility]   
   
                                                            Performed By: #### C  
BCDF ####  
[address] [address]   
   
                                                    CREATININEon 2022   
   
                                                    Creatinine   
[Mass/Vol]      0.83 mg/dL      Normal          0.50 - 1.05     [facility]  
   
                                        Comment on above:   Order Comment: [facility]  
[facility]   
   
                                                            Performed By: #### C  
REAT ####  
[address] [address]   
   
                                                    GFR/1.73 sq   
M.predicted among   
non-blacks MDRD   
(S/P/Bld) [Vol   
rate/Area]      80 mL/min/{1.73_m2} Normal          >90             [facility]  
   
                                        Comment on above:   Order Comment: [facility]  
[facility]   
   
                                                            Result Comment: CALC  
ULATIONS OF ESTIMATED GFR ARE PERFORMED  
USING THE  CKD-EPI STUDY REFIT EQUATION  
WITHOUT THE RACE VARIABLE FOR THE IDMS-TRACEABLE  
CREATININE METHODS.  
https://jasn.asnjournals.org/content/early/ASN.6099332867   
   
                                                            Performed By: #### C  
REAT ####  
[address] [address]   
   
                                                    HEPATIC FUNCTION PANELon    
   
                                                    Albumin   
[Mass/Vol]      3.4 g/dL        Normal          3.4 - 5.0       [facility]  
   
                                        Comment on above:   Order Comment: [facility]  
[facility]   
   
                                                            Performed By: #### H  
EPFP ####  
[address] [address]   
   
                                                    ALP [Catalytic   
activity/Vol]   121 U/L         Normal          33 - 136        [facility]  
   
                                        Comment on above:   Order Comment: [facility]  
[facility]   
   
                                                            Performed By: #### H  
EPFP ####  
[address] [address]   
   
                                                    ALT [Catalytic   
activity/Vol]   11 U/L          Normal          7 - 45          [facility]  
   
                                        Comment on above:   Order Comment: [facility]  
[facility]   
   
                                                            Result Comment: Kayla  
ents treated with Sulfasalazine may generate  
falsely decreased results for ALT.   
   
                                                            Performed By: #### H  
EPFP ####  
[address] [address]   
   
                                                    AST [Catalytic   
activity/Vol]   12 U/L          Normal          9 - 39          [facility]  
   
                                        Comment on above:   Order Comment: [facility]  
[facility]   
   
                                                            Performed By: #### H  
EPFP ####  
[address] [address]   
   
                                                    Bilirubin   
[Mass/Vol]      0.3 mg/dL       Normal          0.0 - 1.2       [facility]  
   
                                        Comment on above:   Order Comment: [facility]  
[facility]   
   
                                                            Performed By: #### H  
EPFP ####  
[address] [address]   
   
                                                    Bilirubin.indirec  
t [Mass/Vol]    0.1 mg/dL       Normal          0.0 - 0.3       [facility]  
   
                                        Comment on above:   Order Comment: [facility]  
[facility]   
   
                                                            Performed By: #### H  
EPFP ####  
[address] [address]   
   
                                                    Protein   
[Mass/Vol]      6.4 g/dL        Normal          6.4 - 8.2       [facility]  
   
                                        Comment on above:   Order Comment: [facility]  
[facility]   
   
                                                            Performed By: #### H  
EPFP ####  
[address] [address]   
   
                                                    SEDIMENTATION RATE, ERYTHROC  
YTEon 2022   
   
                                                    SEDIMENTATION   
RATE, ERYTHROCYTE 23 mm/h         Normal          0 - 30          [facility]  
   
                                        Comment on above:   Order Comment: [facility]  
[facility]   
   
                                                            Performed By: #### C  
REAT ####  
[address] [address]   
   
                                                    CNOVon 2022   
   
                                        CNOV                Office Visit (AGHWW1  
)  
----------------------------  
----------------------------  
------------------------  
JAYDEN SOTO ([id number])   
1960 F T  
Date Time Provider   
Department  
22 1:45 PM DAMON ECKERT AGHWW1  
During your visit today, we   
recorded the following   
information about you:  
Respiration Weight Height  
20/minute 102.1 kg 1.702 m  
Damon Eckert MD   
2022 2:29 PM Signed  
Patient presents with:  
Right Hand - Follow Up, Post   
Op, Pain  
SURGEON  
Damon Eckert MD  
PROCEDURE  
2022  
A1 pulley release right   
middle finger  
HISTORY OF PRESENT ILLNESS  
Jayden Soto presents for   
post operative follow up 2   
weeks from surgery.  
The patient is doing well.   
She is very pleased with the   
results of the surgery  
and reports minimal pain in   
the hand. No wound concerns.  
Current Concerns: None  
Pain control:   
Well-controlled  
Currently taking pain   
medication:No  
Fever, chills or other signs   
of infection: Denies  
PHYSICAL EXAMINATION  
Right hand  
Inspection shows very   
minimal residual swelling  
A slight middle finger PIP   
flexion contracture remains,   
passively reducible and  
nonpainful  
Incision(s) clean, dry,   
intact. Nylon sutures in   
place  
No erythema, cellulitis or   
drainage  
Sutures removed without   
difficulty. Patient   
tolerated procedure well  
ROM: Full composite fist,   
middle fingertip to palm  
No residual mechanical   
locking or catching  
Sensation:Normal sensation  
AIN/PIN/ulnar motor intact  
Brisk cap refill  
IMAGING  
No new imaging obtained  
ASSESSMENT AND PLAN  
ASSESSMENT/PLAN:  
1. S/P trigger finger   
release - ICD9: V45.89,   
ICD10: Z98.890  
The patient is doing well   
and has healed the wound   
nicely. She is pleased with  
the results. We will   
follow-up together on an   
as-needed basis.  
Damon Eckert  
Pt education provided on   
palmar massage and   
desensitization.  
Patient was instructed to   
call the office with any   
questions and/or concerns  
Referring Provider: SELF   
[[id number]]  
Allergies As of Date:   
2022 Noted Allergy   
Reaction  
MORPHINE 2021 8 - GI   
Upset  
STATINS-HMG-COA REDUCTASE   
INHIBIT*2021 16 -   
Unknown  
Date Reviewed: 2022  
Reviewed by: Damon Eckert MD - Fully Assessed  
Reason for Visit:  
Follow Up [171]  
Post Op [174]  
Pain [78]  
Primary Visit Diagnosis:S/P   
trigger finger release   
[Z98.890]  
Prescriptions as of   
2022  
- aspirin 81 mg cap  
Take 1 capsule by mouth once   
daily. Resume once BID   
dosing is completed  
- cyclobenzaprine (FLEXERIL)   
10 mg tablet  
Take 1 tablet by mouth three   
times daily.  
- aspirin, enteric coated   
(ASPIRIN, ENTERIC COATED) 81   
mg EC tablet  
Take 1 tablet by mouth twice   
daily for 21 days. Once BID   
dosing is completed  
in 21 days, resume home   
daily dose  
- insulin glargine (LANTUS   
U-100 INSULIN) 100 unit/mL   
injection  
Inject 36 Units   
subcutaneously daily at   
bedtime.  
- insulin aspart U-100   
(NOVOLOG) 100 unit/mL  
14 units with breakfast 8   
units with lunch 10 units   
with dinner Admin  
Instructions: ADMINISTER   
CORRECTIONAL INSULIN   
REGARDLESS OF MEAL OR   
NUTRITION  
INTAKE Custom Scale If Blood   
Glucose (mg/dL) is Less than   
100 ? 0 units  
101-150 ? 0 units 151-175 ?   
2 units 176-200 ? 3 units   
201-225 ? 4 units  
226-250 ? 5 units 251-275 ?   
6 units 276-300 ? 7 units   
301-325 ? 8 units  
326-350 ? 9 units >350 ? 10   
units and Notify Provider   
Notify provider  
if 2 consecutive blood   
glucose values in the   
previous 24 hours are   
greater  
than 250 mg/mL and there   
have been no changes to the   
insulin regimen in the  
previous 24 hours.  
- cefTRIAXone sodium   
(ROCEPHIN) 1 gram solr  
Inject 2 g intramuscularly   
every 24 hours.  
- doxepin capsule 10 mg  
Take 20 mg by mouth daily at   
bedtime.  
- BISACODYL ORAL  
Take 10 mg by mouth once   
daily.  
- melatonin 10 mg cap  
Take 1 capsule by mouth   
daily at bedtime.  
- alirocumab (PRALUENT) 75   
mg/mL pen  
Inject 75 mg subcutaneously   
every other week.  
- DULoxetine (CYMBALTA) 60   
mg capsule  
Take 60 mg by mouth twice   
daily.  
- ranolazine SR (RANEXA)   
1,000 mg tab ER 12 hr  
Take 1 tablet by mouth twice   
daily.  
- dilTIAZem CD (CARDIZEM CD)   
180 mg 24 hr capsule  
Take 180 mg by mouth once   
daily.  
- empagliflozin (JARDIANCE)   
25 mg tablet  
Take 25 mg by mouth daily   
with breakfast.  
- GABAPENTIN ORAL  
Take 200 mg by mouth three   
times daily.  
- METOPROLOL TARTRATE ORAL  
Take 25 mg by mouth twice   
daily.  
- isosorbide mononitrate ER   
(IMDUR) 30 mg 24 hr tablet  
Take 90 mg by mouth once   
daily.  
- clopidogrel (PLAVIX) 75 mg   
tablet  
Take 75 mg by mouth once   
daily.  
Problem List As Of Date   
2022 Noted Resolved  
Septic joint of left knee   
joint (HCC) [M00.9]   
2022  
Obesity, Class II, BMI   
35-39.9 [E66.9] 2022  
Controlled type 2 diabetes   
mellitus without   
com*2022  
Primary hypertension [I10]   
2022  
Trigger finger, right middle   
finger [M65.331] 2022  
Encounter Number: [id number]  
Encounter Status:Closed by   
DAMON ECKERT on 22 Normal                                  [facility]  
   
                                                    C-REACTIVE PROTEINon   
022   
   
                                                    C-REACTIVE   
PROTEIN         0.62 mg/dL      Normal                          [facility]  
   
                                        Comment on above:   Order Comment: [place]  
[facility]   
   
                                                            Result Comment: REF   
VALUE  
< 1.00   
   
                                                            Performed By: #### C  
RP ####  
[address] [address]   
   
                                                    CBC AND DIFFERENTIALon    
   
                                                    Basophils (Bld)   
[#/Vol]         0.10 10*3/uL    Normal          0.00 - 0.10     [facility]  
   
                                        Comment on above:   Order Comment: [place]  
[facility]   
   
                                                            Performed By: #### E  
SRWS ####  
[address] [address]   
   
                                                    Basophils/100 WBC   
(Bld)           0.9 %           Normal          0.0 - 2.0       [facility]  
   
                                        Comment on above:   Order Comment: [place]  
[facility]   
   
                                                            Performed By: #### E  
SRWS ####  
[address] [address]   
   
                                                    Eosinophils (Bld)   
[#/Vol]         0.60 10*3/uL    Normal          0.00 - 0.70     [facility]  
   
                                        Comment on above:   Order Comment: [place]  
[facility]   
   
                                                            Performed By: #### E  
SRWS ####  
[address] [address]   
   
                                                    Eosinophils/100   
WBC (Bld)       6.5 %           Normal          0.0 - 6.0       [facility]  
   
                                        Comment on above:   Order Comment: [place]  
[facility]   
   
                                                            Performed By: #### E  
SRWS ####  
[address] [address]   
   
                                                    Erythrocyte   
distribution   
width (RBC)   
[Ratio]         17.5 %          High            11.5 - 14.5     [facility]  
   
                                        Comment on above:   Order Comment: [place]  
[facility]   
   
                                                            Performed By: #### E  
SRWS ####  
[address] [place] [address]   
   
                                                    Hematocrit (Bld)   
[Volume fraction] 29.2 %          Low             36.0 - 46.0     [facility]  
   
                                        Comment on above:   Order Comment: [place]  
[facility]   
   
                                                            Performed By: #### E  
SRWS ####  
[address] [address]   
   
                                                    Hemoglobin (Bld)   
[Mass/Vol]      9.6 g/dL        Low             12.0 - 16.0     [facility]  
   
                                        Comment on above:   Order Comment: [place]  
[facility]   
   
                                                            Performed By: #### E  
SRWS ####  
[address] [address]   
   
                                                    Lymphocytes (Bld)   
[#/Vol]         2.00 10*3/uL    Normal          1.20 - 4.80     [facility]  
   
                                        Comment on above:   Order Comment: [place]  
[facility]   
   
                                                            Performed By: #### E  
SRWS ####  
[address] [address]   
   
                                                    Lymphocytes/100   
WBC (Bld)       21.7 %          Normal          13.0 - 44.0     [facility]  
   
                                        Comment on above:   Order Comment: [place]  
[facility]   
   
                                                            Performed By: #### E  
SRWS ####  
[address] [address]   
   
                                                    MCHC (RBC)   
[Mass/Vol]      32.8 g/dL       Normal          32.0 - 36.0     [facility]  
   
                                        Comment on above:   Order Comment: [place]  
[facility]   
   
                                                            Performed By: #### E  
SRWS ####  
[address] [address]   
   
                                                    MCV (RBC)   
[Entitic vol]   86 fL           Normal          80 - 100        [facility]  
   
                                        Comment on above:   Order Comment: [place]  
[facility]   
   
                                                            Performed By: #### E  
SRWS ####  
[address] [address]   
   
                                                    Monocytes (Bld)   
[#/Vol]         0.60 10*3/uL    Normal          0.10 - 1.00     [facility]  
   
                                        Comment on above:   Order Comment: [place]  
[facility]   
   
                                                            Performed By: #### E  
SRWS ####  
[address] [address]   
   
                                                    Monocytes/100 WBC   
(Bld)           6.3 %           Normal          2.0 - 10.0      [facility]  
   
                                        Comment on above:   Order Comment: [place]  
[facility]   
   
                                                            Performed By: #### E  
SRWS ####  
[address] [address]   
   
                                                    Neutrophils (Bld)   
[#/Vol]         5.90 10*3/uL    Normal          1.20 - 7.70     [facility]  
   
                                        Comment on above:   Order Comment: [place]   
   
                                                            Result Comment: Perc  
ent differential counts (%) should be   
interpreted in the  
context of the absolute cell counts (cells/L).   
   
                                                            Performed By: #### E  
SRWS ####  
[address] [address]   
   
                                                    Neutrophils/100   
WBC (Bld)       64.6 %          Normal          40.0 - 80.0     [facility]  
   
                                        Comment on above:   Order Comment: [place]  
[facility]   
   
                                                            Performed By: #### E  
SRWS ####  
[address] [address]   
   
                      NUCLEATED RBC 0.1 /100 WBC Normal                [facility]  
   
                                        Comment on above:   Order Comment: [place]  
[facility]   
   
                                                            Performed By: #### E  
SRWS ####  
[address] [address]   
   
                                                    Platelets (Bld)   
[#/Vol]         248 10*3/uL     Normal          150 - 450       [facility]  
   
                                        Comment on above:   Order Comment: [place]  
[facility]   
   
                                                            Performed By: #### E  
SRWS ####  
[address] [address]   
   
                      RBC        3.41 x10E12/L Low        4.00 - 5.20 [facility]  
   
                                        Comment on above:   Order Comment: [place]  
[facility]   
   
                                                            Performed By: #### E  
SRWS ####  
[address] [address]   
   
                      WBC (Bld) [#/Vol] 9.1 10*3/uL Normal     4.4 - 11.3 [facility]  
   
                                        Comment on above:   Order Comment: [place]  
[facility]   
   
                                                            Performed By: #### E  
SRWS ####  
[address] [address]   
   
                                                    CREATININEon 2022   
   
                                                    Creatinine   
[Mass/Vol]      0.88 mg/dL      Normal          0.50 - 1.05     [facility]  
   
                                        Comment on above:   Order Comment: [place]  
[facility]   
   
                                                            Performed By: #### C  
REAT ####  
[address] [address]   
   
                                                    GFR/1.73 sq   
M.predicted among   
non-blacks MDRD   
(S/P/Bld) [Vol   
rate/Area]      75 mL/min/{1.73_m2} Normal          >90             [facility]  
   
                                        Comment on above:   Order Comment: [place]  
[facility]   
   
                                                            Result Comment: CALC  
ULATIONS OF ESTIMATED GFR ARE PERFORMED  
USING THE  CKD-EPI STUDY REFIT EQUATION  
WITHOUT THE RACE VARIABLE FOR THE IDMS-TRACEABLE  
CREATININE METHODS.  
https://jasn.asnjournals.org/content/early//ASN.4822237467   
   
                                                            Performed By: #### C  
REAT ####  
[place] [id number]   
   
                                                    HEPATIC FUNCTION PANELon    
   
                                                    Albumin   
[Mass/Vol]      3.3 g/dL        Low             3.4 - 5.0       [facility]  
   
                                        Comment on above:   Order Comment: [place]  
[facility]   
   
                                                            Performed By: #### C  
REAT ####  
[address] [address]   
   
                                                    ALP [Catalytic   
activity/Vol]   124 U/L         Normal          33 - 136        [facility]  
   
                                        Comment on above:   Order Comment: [place]  
[facility]   
   
                                                            Performed By: #### C  
REAT ####  
[address] [address]   
   
                                                    ALT [Catalytic   
activity/Vol]   12 U/L          Normal          7 - 45          [facility]  
   
                                        Comment on above:   Order Comment: [place]  
[facility]   
   
                                                            Result Comment: Kayla  
ents treated with Sulfasalazine may generate  
falsely decreased results for ALT.   
   
                                                            Performed By: #### C  
REAT ####  
[address] [address]   
   
                                                    AST [Catalytic   
activity/Vol]   10 U/L          Normal          9 - 39          [facility]  
   
                                        Comment on above:   Order Comment: [place]  
[facility]   
   
                                                            Performed By: #### C  
REAT ####  
[address] [address]   
   
                                                    Bilirubin   
[Mass/Vol]      0.3 mg/dL       Normal          0.0 - 1.2       [facility]  
   
                                        Comment on above:   Order Comment: [place]  
[facility]   
   
                                                            Performed By: #### C  
REAT ####  
[address] [address]   
   
                                                    Bilirubin.indirec  
t [Mass/Vol]    0.0 mg/dL       Normal          0.0 - 0.3       [facility]  
   
                                        Comment on above:   Order Comment: [place]  
[facility]   
   
                                                            Performed By: #### C  
REAT ####  
[address] [address]   
   
                                                    Protein   
[Mass/Vol]      6.2 g/dL        Low             6.4 - 8.2       [facility]  
   
                                        Comment on above:   Order Comment: [place]  
[facility]   
   
                                                            Performed By: #### C  
REAT ####  
[address] [address]   
   
                                                    SEDIMENTATION RATE, ERYTHROC  
YTEon 2022   
   
                                                    SEDIMENTATION   
RATE, ERYTHROCYTE 11 mm/h         Normal          0 - 30          [facility]  
   
                                        Comment on above:   Order Comment: [place]  
[facility]   
   
                                                            Performed By: #### E  
SRWS ####  
[address] [address]   
   
                                                    CNOVon 04-   
   
                                        CNOV                Office Visit (AGHWG1  
)  
----------------------------  
----------------------------  
------------------------  
JAYDEN SOTO ([id number])   
1960 F T  
Date Time Provider   
Department  
4/15/22 9:30 AM [facility]IL MITCHELL   
[facility]  
During your visit today, we   
recorded the following   
information about you:  
Respiration Weight Height  
16/minute 102.1 kg 1.702 m  
Radha Novak MA   
4/15/2022 10:29 AM Signed  
REVIEW OF SYSTEMS:  
GENERAL: Well developed,   
well nourished. No acute   
distress  
PAIN: Pain left knee  
CARDIOVASCULAR: HTN  
MSK: Positive for joint   
swelling  
SKIN: Negative for lesions,   
rash, itching, metal   
sensitivity  
NEURO: Negative for seizure,   
trauma, numbness/tingling of   
extremities.  
ENDOCRINE: Positive for   
diabetic associated symptoms  
HEMATOLOGY: Negative for   
excessive bleeding, clots,   
bleeding disorders.  
Basil Mitchell MD 4/15/2022   
10:29 AM Signed  
Follow-up Patient Visit  
Jayden Soto is a 61 year   
old female who presents to   
follow up for Acquired  
absence of knee joint   
following explantation of   
joint prosthesis with   
presence  
of antibiotic-impregnated   
cement spacer (primary   
encounter diagnosis)  
Current or previous   
treatment regimens: NSAIDS  
Medications:  
Current Outpatient   
Medications  
Medication Sig  
- aspirin 81 mg cap Take 1   
capsule by mouth once daily.   
Resume once BID dosing  
is completed  
- cyclobenzaprine (FLEXERIL)   
10 mg tablet Take 1 tablet   
by mouth three times  
daily.  
- aspirin, enteric coated   
(ASPIRIN, ENTERIC COATED) 81   
mg EC tablet Take 1  
tablet by mouth twice daily   
for 21 days. Once BID dosing   
is completed in 21  
days, resume home daily dose  
- insulin glargine (LANTUS   
U-100 INSULIN) 100 unit/mL   
injection Inject 36 Units  
subcutaneously daily at   
bedtime.  
- insulin aspart U-100   
(NOVOLOG) 100 unit/mL 14   
units with breakfast  
8 units with lunch  
10 units with dinner  
Admin Instructions:   
ADMINISTER CORRECTIONAL   
INSULIN REGARDLESS OF MEAL   
OR  
NUTRITION INTAKE  
Custom Scale  
If Blood Glucose (mg/dL) is  
Less than 100 ? 0 units  
101-150 ? 0 units  
151-175 ? 2 units  
176-200 ? 3 units  
201-225 ? 4 units  
226-250 ? 5 units  
251-275 ? 6 units  
276-300 ? 7 units  
301-325 ? 8 units  
326-350 ? 9 units  
>350 ? 10 units and Notify   
Provider  
Notify provider if 2   
consecutive blood glucose   
values in the previous 24   
hours  
are greater than 250 mg/mL   
and there have been no   
changes to the insulin  
regimen in the previous 24   
hours.  
- cefTRIAXone sodium   
(ROCEPHIN) 1 gram solr   
Inject 2 g intramuscularly   
every 24  
hours.  
- doxepin capsule 10 mg Take   
20 mg by mouth daily at   
bedtime.  
- BISACODYL ORAL Take 10 mg   
by mouth once daily.  
- melatonin 10 mg cap Take 1   
capsule by mouth daily at   
bedtime.  
- alirocumab (PRALUENT) 75   
mg/mL pen Inject 75 mg   
subcutaneously every other  
week.  
- DULoxetine (CYMBALTA) 60   
mg capsule Take 60 mg by   
mouth twice daily.  
- ranolazine SR (RANEXA)   
1,000 mg tab ER 12 hr Take 1   
tablet by mouth twice  
daily.  
- dilTIAZem CD (CARDIZEM CD)   
180 mg 24 hr capsule Take   
180 mg by mouth once  
daily.  
- empagliflozin (JARDIANCE)   
25 mg tablet Take 25 mg by   
mouth daily with  
breakfast.  
- GABAPENTIN ORAL Take 200   
mg by mouth three times   
daily.  
- METOPROLOL TARTRATE ORAL   
Take 25 mg by mouth twice   
daily.  
- isosorbide mononitrate ER   
(IMDUR) 30 mg 24 hr tablet   
Take 90 mg by mouth once  
daily.  
- clopidogrel (PLAVIX) 75 mg   
tablet Take 75 mg by mouth   
once daily.  
No current   
facility-administered   
medications for this visit.  
Allergies:  
ALLERGIES  
Allergen Reactions  
- Morphine GI Upset  
- Statins-Hmg-Coa Red*   
Unknown  
Physical Examination:  
Resp 16   Ht 5' 7  (1.70m)     
Wt 225 lb (102.1kg)   BMI   
35.23 kg/(m2).  
Ortho Exam  
Ambulating with a walker  
Incision CDI  
ROM 5-100  
NVI  
Images: na  
Procedures  
Oxford Score: see report  
Assessment and Plan:  
1. Acquired absence of knee   
joint following explantation   
of joint prosthesis  
with presence of   
antibiotic-impregnated   
cement spacer - ICD9:   
V88.22, ICD10:  
Z89.529  
Continue antibiotics per ID  
Overall she is doing well,   
much better than prior to   
surgery  
As long as she is doing well   
clinically we can ride out   
the spacer  
FU in 6 weeks  
No follow-ups on file.  
Basil Mitchell MD  
Referring Provider: BASIL MITCHELL [[id number]]  
Allergies As of Date:   
04/15/2022 Noted Allergy   
Reaction  
MORPHINE 2021 8 - GI   
Upset  
STATINS-HMG-COA REDUCTASE   
INHIBIT*2021 16 -   
Unknown  
Date Reviewed: 04/15/2022  
Reviewed by: Basil Mitchell MD   
- Fully Assessed  
Reason for Visit:  
Post Op [174]  
Primary Visit   
Diagnosis:Acquired absence   
of knee joint following   
explantation  
of joint prosthesis with   
presence of  
antibiotic-impregnated   
cement spacer [Z89.529]  
Prescriptions as of   
04/15/2022  
- aspirin 81 mg cap  
Take 1 capsule by mouth once   
daily. Resume once BID   
dosing is completed  
- cyclobenzaprine (FLEXERIL)   
10 mg tablet  
Take 1 tablet by mouth three   
times daily.  
- aspirin, enteric coated   
(ASPIRIN, ENTERIC COATED) 81   
mg EC tablet  
Take 1 tablet by mouth twice   
meka (more content not   
included)...        Normal                                  [facility]  
   
                                                    C-REACTIVE PROTEINon   
022   
   
                                                    C-REACTIVE   
PROTEIN         0.23 mg/dL      Normal                          [facility]  
   
                                        Comment on above:   Order Comment: [facility]   
   
                                                            Result Comment: REF   
VALUE  
< 1.00   
   
                                                            Performed By: #### E  
SRWS ####  
[address] [address]   
   
                                                    CBC AND DIFFERENTIALon    
   
                                                    Basophils (Bld)   
[#/Vol]         0.00 10*3/uL    Normal          0.00 - 0.10     [facility]  
   
                                        Comment on above:   Order Comment: dropp  
ed off @ [facility]   
   
                                                            Performed By: #### C  
BCDF ####  
[address] [address]   
   
                                                    Basophils/100 WBC   
(Bld)           0.6 %           Normal          0.0 - 2.0       [facility]  
   
                                        Comment on above:   Order Comment: dropp  
ed off @ [facility]   
   
                                                            Performed By: #### C  
BCDF ####  
[address] [address]   
   
                                                    Eosinophils (Bld)   
[#/Vol]         0.40 10*3/uL    Normal          0.00 - 0.70     [facility]  
   
                                        Comment on above:   Order Comment: dropp  
ed off @ [facility]   
   
                                                            Performed By: #### C  
BCDF ####  
[address] [address]   
   
                                                    Eosinophils/100   
WBC (Bld)       4.8 %           Normal          0.0 - 6.0       [facility]  
   
                                        Comment on above:   Order Comment: dropp  
ed off @ [facility]   
   
                                                            Performed By: #### C  
BCDF ####  
[address] [address]   
   
                                                    Erythrocyte   
distribution   
width (RBC)   
[Ratio]         17.2 %          High            11.5 - 14.5     [facility]  
   
                                        Comment on above:   Order Comment: dropp  
ed off @ [facility]   
   
                                                            Performed By: #### C  
BCDF ####  
[address] [address]   
   
                                                    Hematocrit (Bld)   
[Volume fraction] 32.8 %          Low             36.0 - 46.0     [facility]  
   
                                        Comment on above:   Order Comment: dropp  
ed off @ [facility]   
   
                                                            Performed By: #### C  
BCDF ####  
[address] [address]   
   
                                                    Hemoglobin (Bld)   
[Mass/Vol]      10.6 g/dL       Low             12.0 - 16.0     [facility]  
   
                                        Comment on above:   Order Comment: dropp  
ed off @ [facility]   
   
                                                            Performed By: #### C  
BCDF ####  
[address] [address]   
   
                                                    Lymphocytes (Bld)   
[#/Vol]         2.40 10*3/uL    Normal          1.20 - 4.80     [facility]  
   
                                        Comment on above:   Order Comment: dropp  
ed off @ [facility]   
   
                                                            Performed By: #### C  
BCDF ####  
[address] [address]   
   
                                                    Lymphocytes/100   
WBC (Bld)       32.6 %          Normal          13.0 - 44.0     [facility]  
   
                                        Comment on above:   Order Comment: dropp  
ed off @ [facility]   
   
                                                            Performed By: #### C  
BCDF ####  
[address] [address]   
   
                                                    MCHC (RBC)   
[Mass/Vol]      32.3 g/dL       Normal          32.0 - 36.0     [facility]  
   
                                        Comment on above:   Order Comment: dropp  
ed off @ [facility]   
   
                                                            Performed By: #### C  
BCDF ####  
[address] [address]   
   
                                                    MCV (RBC)   
[Entitic vol]   85 fL           Normal          80 - 100        [facility]  
   
                                        Comment on above:   Order Comment: dropp  
ed off @ [facility]   
   
                                                            Performed By: #### C  
BCDF ####  
[address] [address]   
   
                                                    Monocytes (Bld)   
[#/Vol]         0.60 10*3/uL    Normal          0.10 - 1.00     [facility]  
   
                                        Comment on above:   Order Comment: dropp  
ed off @ [facility]   
   
                                                            Performed By: #### C  
BCDF ####  
[address] [address]   
   
                                                    Monocytes/100 WBC   
(Bld)           7.6 %           Normal          2.0 - 10.0      [facility]  
   
                                        Comment on above:   Order Comment: dropp  
ed off @ [facility]   
   
                                                            Performed By: #### C  
BCDF ####  
[address] [address]   
   
                                                    Neutrophils (Bld)   
[#/Vol]         4.00 10*3/uL    Normal          1.20 - 7.70     [facility]  
   
                                        Comment on above:   Order Comment: dropp  
ed off @ [facility]   
   
                                                            Result Comment: Perc  
ent differential counts (%) should be   
interpreted in the  
context of the absolute cell counts (cells/L).   
   
                                                            Performed By: #### C  
BCDF ####  
[address] [address]   
   
                                                    Neutrophils/100   
WBC (Bld)       54.4 %          Normal          40.0 - 80.0     [facility]  
   
                                        Comment on above:   Order Comment: dropp  
ed off @ [facility]   
   
                                                            Performed By: #### C  
BCDF ####  
[address] [address]   
   
                      NUCLEATED RBC 0.2 /100 WBC Normal                [facility]  
   
                                        Comment on above:   Order Comment: dropp  
ed off @ [facility]   
   
                                                            Performed By: #### C  
BCDF ####  
[address] [address]   
   
                                                    Platelets (Bld)   
[#/Vol]         262 10*3/uL     Normal          150 - 450       [facility]  
   
                                        Comment on above:   Order Comment: dropp  
ed off @ [facility]   
   
                                                            Performed By: #### C  
BCDF ####  
[address] [address]   
   
                      RBC        3.86 x10E12/L Low        4.00 - 5.20 [facility]  
   
                                        Comment on above:   Order Comment: dropp  
ed off @ [facility]   
   
                                                            Performed By: #### C  
BCDF ####  
[address] [address]   
   
                      WBC (Bld) [#/Vol] 7.4 10*3/uL Normal     4.4 - 11.3 [facility]  
   
                                        Comment on above:   Order Comment: dropp  
ed off @ [facility]   
   
                                                            Performed By: #### C  
BCDF ####  
[address] [address]   
   
                                                    CREATININEon 2022   
   
                                                    Creatinine   
[Mass/Vol]      0.87 mg/dL      Normal          0.50 - 1.05     [facility]  
   
                                        Comment on above:   Order Comment: [facility]   
   
                                                            Performed By: #### E  
SRWS ####  
[address] [address]   
   
                                                    GFR/1.73 sq   
M.predicted among   
non-blacks MDRD   
(S/P/Bld) [Vol   
rate/Area]      76 mL/min/{1.73_m2} Normal          >90             [facility]  
   
                                        Comment on above:   Order Comment: [facility]  
[facility]   
   
                                                            Result Comment: CALC  
ULATIONS OF ESTIMATED GFR ARE PERFORMED  
USING THE  CKD-EPI STUDY REFIT EQUATION  
WITHOUT THE RACE VARIABLE FOR THE IDMS-TRACEABLE  
CREATININE METHODS.  
https://jasn.asnjournals.org/content/early/ASN.7564339734   
   
                                                            Performed By: #### E  
SRWS ####  
[address] [address]   
   
                                                    HEPATIC FUNCTION PANELon    
   
                                                    Albumin   
[Mass/Vol]      3.6 g/dL        Normal          3.4 - 5.0       [facility]  
   
                                        Comment on above:   Order Comment: [facility]  
[facility]   
   
                                                            Performed By: #### E  
SRWS ####  
[address] [address]   
   
                                                    ALP [Catalytic   
activity/Vol]   126 U/L         Normal          33 - 136        [facility]  
   
                                        Comment on above:   Order Comment: [place]  
[facility]   
   
                                                            Performed By: #### E  
SRWS ####  
[address] [address]   
   
                                                    ALT [Catalytic   
activity/Vol]   15 U/L          Normal          7 - 45          [facility]  
   
                                        Comment on above:   Order Comment: [facility]  
[facility]   
   
                                                            Result Comment: Kayla  
ents treated with Sulfasalazine may generate  
falsely decreased results for ALT.   
   
                                                            Performed By: #### E  
SRWS ####  
[address] [address]   
   
                                                    AST [Catalytic   
activity/Vol]   14 U/L          Normal          9 - 39          [facility]  
   
                                        Comment on above:   Order Comment: [facility]  
[facility]   
   
                                                            Performed By: #### E  
SRWS ####  
[address] [address]   
   
                                                    Bilirubin   
[Mass/Vol]      0.2 mg/dL       Normal          0.0 - 1.2       [facility]  
   
                                        Comment on above:   Order Comment: [place]  
[facility]   
   
                                                            Performed By: #### E  
SRWS ####  
[address] [address]   
   
                                                    Bilirubin.indirec  
t [Mass/Vol]    0.0 mg/dL       Normal          0.0 - 0.3       [facility]  
   
                                        Comment on above:   Order Comment: [facility]  
[facility]   
   
                                                            Performed By: #### E  
SRWS ####  
[address] [address]   
   
                                                    Protein   
[Mass/Vol]      6.7 g/dL        Normal          6.4 - 8.2       [facility]  
   
                                        Comment on above:   Order Comment: [place]   
   
                                                            Performed By: #### E  
SRWS ####  
[address] [address]   
   
                                                    SEDIMENTATION RATE, ERYTHROC  
YTEon 2022   
   
                                                    SEDIMENTATION   
RATE, ERYTHROCYTE 39 mm/h         High            0 - 30          [facility]  
   
                                        Comment on above:   Order Comment: [place]  
[facility]   
   
                                                            Performed By: #### E  
SRWS ####  
[address] [address]   
   
                                                    BRIEF OP NOTon 2022   
   
                                        BRIEF OP NOT        HNO ID: [id number]  
Author: Damon Eckert MD  
Service: Orthopaedic Surgery  
Author Type: Physician  
Type: Brief Op Note  
Filed: 2022 11:17 AM  
Note Text:  
BRIEF OPERATIVE / PROCEDURE   
NOTE  
LOG ID: [id number]  
Surgery/Procedure Date:   
2022  
Incision/Procedure Start   
Time: 10:45 AM  
Incision Close/Procedure End   
Time: 10:57 AM  
Surgeon(s)/Proceduralist(s)   
and Assistant(s):  
Surgeon(s) and Role:  
* Damon Eckert MD -   
Primary  
No Additional Staff  
Procedure(s): A1 pulley   
release right middle finger   
[id number]  
Anesthesia: Local  
Findings: Thickening and   
stenosis right middle   
finger.  
Estimated Blood Loss:   
minimal  
Specimens: None  
Complications: None  
Pre-Op/Pre-Procedure   
Diagnosis: Right middle   
trigger finger  
Post-Op/Post-Procedure   
Diagnosis: Right middle   
trigger finger  
SIGNATURE: Damon Eckert MD PATIENT NAME:   
Jayden Soto  
DATE: 2022 MRN:   
[id number]  
TIME: 11:13 AM PAGER/CONTACT   
#:                  Normal                                  [facility]  
   
                                                    HISTORY PHYSICALon    
   
                                        HISTORY PHYSICAL    HNO ID: [id number]  
Author: Isabella Thomas APRN.CNP  
Service: Anesthesiology  
Author Type: Nurse   
Practitioner  
Type: HANDP  
Filed: 2022 9:33 AM  
Note Text:  
HISTORY AND PHYSICAL   
EXAMINATION  
SERVICE DATE: 2022  
SERVICE TIME: 8:54 AM  
PRIMARY CARE PHYSICIAN:   
Becky Corrales MD, MD  
REASON FOR VISIT:  
Jayden Soto is a 61 year   
old female who is scheduled   
for Procedure(s):  
A1 PULLEY RELEASE RIGHT   
MIDDLE FINGER (Right) at the   
request of Dr. Eckert for routine HANDP.  
The patient has the   
following:  
ACTIVE PROBLEM LIST  
Septic Joint of Left Knee   
Joint (Hcc)  
Obesity, Class II, Bmi   
35-39.9  
Controlled Type 2 Diabetes   
Mellitus Without   
Complication, With Long-Term  
Current Use of Insulin (Hcc)  
Primary Hypertension  
Trigger Finger, Right Middle   
Finger  
Subjective  
CHIEF COMPLAINT: Right   
middle finger  
HPI:  
Patient is a 61 year old   
female who presents to the   
outpatient surgery  
center for the above   
procedure. Patient complains   
of right middle finger  
pain for a year. Today, she   
rates the right middle   
finger pain as 6 out  
of 10 on the numeric pain   
scale. She describes the   
pain as a throbbing  
and aching pain that   
radiates up right arm. She   
reports that she  
experiences  locking  of   
right middle finger daily.   
Patient has history  
of septic left knee. Patient   
agrees to proceed with   
procedure.  
PAST MEDICAL HISTORY  
Diagnosis Date  
- Chronic pain of left knee  
- Coronary artery disease  
1 cardiac stent  
- Diabetes (HCC)  
- Hypercholesteremia  
- Hypertension  
- MRSA (methicillin   
resistant staph aureus)   
culture positive  
multiple joints/bones  
- Pain of right hand  
PAST SURGICAL HISTORY  
Procedure Laterality Date  
- BACK SURGERY HX  
- ELBOW SURGERY HX Bilateral  
for bone spurs  
- IR VASCULAR ACCESS TEAM   
PICC INSERTION RADIO   
2022  
- KNEE SURGERY HX  
- PAST SURGICAL HISTORY OF   
Left 2022  
left knee  
- PAST SURGICAL HISTORY OF   
Left  
heel surgery  
- SHOULDER SURGERY HX   
Bilateral  
FAMILY HISTORY  
Problem Relation Age of   
Onset  
- Heart Mother  
- Stroke Father  
SOCIAL HISTORY:  
Social History  
Tobacco Use  
- Smoking status: Never   
Smoker  
- Smokeless tobacco: Never   
Used  
Vaping Use  
- Vaping Use: Never used  
Substance Use Topics  
- Alcohol use: Never  
- Drug use: Never  
Prior to Admission   
medications as of 22   
0924  
Medication Sig Last Dose   
Taking  
aspirin 81 mg cap Take 1   
capsule by mouth once daily.   
Resume once BID  
dosing is completed 2022   
at Unknown time Yes  
cyclobenzaprine (FLEXERIL)   
10 mg tablet Take 1 tablet   
by mouth three times  
daily. 2022 at Unknown   
time Yes  
insulin glargine (LANTUS   
U-100 INSULIN) 100 unit/mL   
injection Inject 36  
Units subcutaneously daily   
at bedtime. 2022 at 1800   
Yes  
insulin aspart U-100   
(NOVOLOG) 100 unit/mL 14   
units with breakfast  
8 units with lunch  
10 units with dinner  
Admin Instructions:   
ADMINISTER CORRECTIONAL   
INSULIN REGARDLESS OF MEAL   
OR  
NUTRITION INTAKE  
Custom Scale  
If Blood Glucose (mg/dL) is  
Less than 100 ? 0 units  
101-150 ? 0 units  
151-175 ? 2 units  
176-200 ? 3 units  
201-225 ? 4 units  
226-250 ? 5 units  
251-275 ? 6 units  
276-300 ? 7 units  
301-325 ? 8 units  
326-350 ? 9 units  
>350 ? 10 units and Notify   
Provider  
Notify provider if 2   
consecutive blood glucose   
values in the previous 24  
hours are greater than 250   
mg/mL and there have been no   
changes to the  
insulin regimen in the   
previous 24 hours. 2022   
at 1800 Yes  
cefTRIAXone sodium   
(ROCEPHIN) 1 gram solr   
Inject 2 g intramuscularly   
every  
24 hours. 2022 at   
Unknown time Yes  
doxepin capsule 10 mg Take   
20 mg by mouth daily at   
bedtime. 2022 at  
2100 Yes  
melatonin 10 mg cap Take 1   
capsule by mouth daily at   
bedtime. 2022 at  
2100 Yes  
alirocumab (PRALUENT) 75   
mg/mL pen Inject 75 mg   
subcutaneously every other  
week. Past Week at Unknown   
time Yes  
DULoxetine (CYMBALTA) 60 mg   
capsule Take 60 mg by mouth   
twice daily.  
2022 at 0630 Yes  
ranolazine SR (RANEXA) 1,000   
mg tab ER 12 hr Take 1   
tablet by mouth twice  
daily.  
2022 at 0630 Yes  
dilTIAZem CD (CARDIZEM CD)   
180 mg 24 hr capsule Take   
180 mg by mouth once  
daily. 2022 at 0630 Yes  
empagliflozin (JARDIANCE) 25   
mg tablet Take 25 mg by   
mouth daily with  
breakfast. 2022 at 0630   
Yes  
GABAPENTIN ORAL Take 200 mg   
by mouth three times daily.  
2022 at 0630 Yes  
METOPROLOL TARTRATE ORAL   
Take 25 mg by mouth twice   
daily.  
2022 at 0630 Yes  
isosorbide mononitrate ER   
(IMDUR) 30 mg 24 hr tablet   
Take 90 mg by mouth  
once daily.  
2022 at Unknown time Yes  
clopidogrel (PLAVIX) 75 mg   
tablet Take 75 mg by mouth   
once daily. 2022  
at 0630 Yes  
aspirin, enteric coated   
(ASPIRIN, ENTERIC COATED) 81   
mg EC tablet Take 1  
tablet by mouth twice daily   
for 21 days. Once BID dosing   
is completed in  
21 days, resume home daily   
dose  
BISACODYL ORAL Take 10 mg by   
mouth once daily.  
No medication comments   
found.  
ALLERGIES  
Allergen Reactions  
- Morphine GI Upset  
- Statins-Hmg-Coa Red*   
Unknown  
R (more content not   
included)...        [facility]  
   
                                                    NURSING PROGon 2022   
   
                                        NURSING PROG        HNO ID: [id number]  
Author: Tunde Ward RN  
Service: General Surgery  
Author Type: Registered   
Nurse  
Type: Nursing Progress Note  
Filed: 2022 11:30 AM  
Note Text:  
Nursing Progress Note  
Patient Name: Jayden Soto  
MRN: [id number]  
Patient Location:   
[facility] [place] [place]  
____________________________  
____________________________  
____________  
Daily Note:Dr. Eckert   
seeing pt  
This note was completed by:   
Tunde Ward    [facility]  
   
                                                    OPERATIVE NOon 2022   
   
                                        OPERATIVE NO        HNO ID: [id number]  
Author: Damon Eckert MD  
Service: Orthopaedic Surgery  
Author Type: Physician  
Type: Operative Report  
Filed: 2022 11:21 AM  
Note Text:  
ORTHO OPERATIVE REPORT  
LOG ID: [id number]  
Surgery/Procedure Date:   
2022  
Incision/Procedure Start   
Time: 10:45 AM  
Incision Close/Procedure End   
Time: 10:57 AM  
Surgeon(s)/Proceduralist(s)   
and Assistant(s):  
Surgeon(s) and Role:  
* Damon Eckert MD -   
Primary  
No Additional Staff  
Procedure(s): A1 pulley   
release, right middle finger  
Anesthesia: Local  
Operative Indications: Ms. Soto is a 61 year old   
female who presents  
with a progressively   
worsening right middle   
trigger finger. The patient  
failed nonoperative measures   
which had only given   
temporary relief. We  
discussed the option of   
surgery and the risks,   
benefits, alternatives, and  
limitations of this. She   
considered these carefully   
and elected to  
proceed. No guarantees were   
stated nor implied.  
Procedure Details: The   
patient was identified in   
the preoperative holding  
area having a valid and   
signed consent sheet. All   
final questions were  
answered to satisfaction,   
and I personally marked the   
operative site at  
the right hand overlying the   
A1 pulley of the middle   
finger. A  
preoperative huddle was   
performed. Using sterile   
technique, local  
anesthetic was infiltrated   
at the level of the   
incision. This set up  
nicely and provided dense   
analgesia. The patient was   
taken back to the  
operating room. She   
transferred to the operative   
table. Anesthesia  
controlled the head, neck,   
and airway from this point   
forward. All bony  
prominences were well   
padded. A hand table was   
brought into the right.  
Time-out was performed. All   
aspects of the surgical   
safety checklist  
were reviewed and agreed   
upon by all members of the   
surgical team. I  
began first with a   
preliminary cleanse of the   
entire right upper  
extremity with isopropyl   
alcohol. Next, a formal   
ChloraPrep pre-surgical  
preparation was performed   
and 3-minute dry time was   
observed. The right  
upper extremity was then   
draped out in usual sterile   
fashion. I planned  
the incision overlying the   
A1 pulley along the distal   
palmar crease. Total  
incision length was less   
than 1 cm. The skin was   
incised sharply with  
scalpel and careful   
dissection was carried   
through the subcutaneous  
tissues. Any small crossing   
venous structures were   
cauterized with  
bipolar electrocautery. The   
dissection was carried down   
to the level of  
the A1 pulley and retractors   
were then utilized to   
protect the  
neurovascular bundles,   
radial and ulnar. The pulley   
was then incised down  
its midline and was divided   
completely proximal to   
distal. I also  
dissected and divided the   
palmar aponeurotic fascia.   
At this point, there  
was no longer any catching   
with active flexion. There   
was noted to be  
some stenosis at the level   
of the pulley, but no   
high-grade fraying or  
other abnormalities. At this   
point, the wound was   
thoroughly irrigated  
with normal saline.   
Meticulous hemostasis was   
achievedand the wound was  
closed with 4-0 nylon   
sutures placed in simple   
fashion. Next, a Xeroform  
dressing was placed over the   
incision followed by fluffs   
and a soft  
dressing to the hand. The   
patient was then transferred   
to the recovery  
chair, and taken to the PACU   
in stable condition having   
tolerated the  
procedure well.  
Total Tourniquet Time: N/A  
Pre-Op/Pre-Procedure   
Diagnosis: Right middle   
trigger finger  
Post-Op/Post-Procedure   
Diagnosis: Right middle   
trigger finger  
Estimated Blood Loss:   
minimal  
Specimens: None  
Drains: Bonilla None  
Complications: None  
Participation in Procedure:   
The primary   
surgeon/proceduralist   
performed  
the entire procedure.  
SIGNATURE: Damon Eckert MD PATIENT NAME:   
Jayden Soto  
DATE: 2022 MRN:   
[id number]  
TIME: 11:17 AM PAGER/CONTACT   
#:  
? ?                 Normal                                  [facility]  
   
                                                    C REACTIVE PROTEINon   
022   
   
                      CRP [Mass/Vol] 15.6 mg/L  High       0-10.0     [facility]  
   
                                        Comment on above:   Performed By: #### C  
REAT, ESR, CREACT, LIVR, ACBC ####  
Testing performed at [address] [address]   
   
                                                    CBCon 2022   
   
                      ABSOLUTE BAS 0.1 10*3/uL Normal     0.0-0.2    [facility]  
   
                                        Comment on above:   Performed By: #### C  
REAT, ESR, CREACT, LIVR, ACBC ####  
Testing performed at [address] [address]   
   
                      ABSOLUTE EOS 0.40 10*3/uL Normal     0.0-0.7    [facility]  
   
                                        Comment on above:   Performed By: #### C  
REAT, ESR, CREACT, LIVR, ACBC ####  
Testing performed at [address] [address]   
   
                                                    ABSOLUTE   
NEUTROPHIL COUNT 5.5 10*3/uL     Normal          1.4-6.5         [facility]  
   
                                        Comment on above:   Performed By: #### C  
REAT, ESR, CREACT, LIVR, ACBC ####  
Testing performed at [address] [address]   
   
                                                    Basophils/100 WBC   
(Bld)           1.0 %           Normal          0.0-2.0         [facility]  
   
                                        Comment on above:   Performed By: #### C  
REAT, ESR, CREACT, LIVR, ACBC ####  
Testing performed at [address] [address]   
   
                      DTYPE      AUTO DIFF  Normal                [facility]  
   
                                        Comment on above:   Performed By: #### C  
REAT, ESR, CREACT, LIVR, ACBC ####  
Testing performed at [address] [address]   
   
                                                    Eosinophils/100   
WBC (Bld)       5.3 %           Normal          0.0-11.0        [facility]  
   
                                        Comment on above:   Performed By: #### C  
REAT, ESR, CREACT, LIVR, ACBC ####  
Testing performed at [address] [address]   
   
                                                    Erythrocyte   
distribution   
width (RBC)   
[Ratio]         17.1 %          High            11.5-14.5       [facility]  
   
                                        Comment on above:   Performed By: #### C  
REAT, ESR, CREACT, LIVR, ACBC ####  
Testing performed at [address] [address]   
   
                                                    Hematocrit (Bld)   
[Volume fraction] 27.9 %          Low             36.0-48.0       [facility]  
   
                                        Comment on above:   Performed By: #### C  
REAT, ESR, CREACT, LIVR, ACBC ####  
Testing performed at [address] [address]   
   
                                                    Hemoglobin (Bld)   
[Mass/Vol]      9.2 g/dL        Low             12.0-16.0       [facility]  
   
                                        Comment on above:   Performed By: #### C  
REAT, ESR, CREACT, LIVR, ACBC ####  
Testing performed at [address] [address]   
   
                                                    Lymphocytes (Bld)   
[#/Vol]         1.20 10*3/uL    Normal          1.2-3.4         [facility]  
   
                                        Comment on above:   Performed By: #### C  
REAT, ESR, CREACT, LIVR, ACBC ####  
Testing performed at [address] [address]   
   
                                                    Lymphocytes/100   
WBC (Bld)       15.4 %          Low             20.0-55.0       [facility]  
   
                                        Comment on above:   Performed By: #### C  
REAT, ESR, CREACT, LIVR, ACBC ####  
Testing performed at [address] [address]   
   
                                                    MCH (RBC)   
[Entitic mass]  28.1 pg         Normal          26.0-35.0       [facility]  
   
                                        Comment on above:   Performed By: #### C  
REAT, ESR, CREACT, LIVR, ACBC ####  
Testing performed at [address] [address]   
   
                                                    MCHC (RBC)   
[Mass/Vol]      32.9 g/dL       Normal          27.0-37.0       [facility]  
   
                                        Comment on above:   Performed By: #### C  
REAT, ESR, CREACT, LIVR, ACBC ####  
Testing performed at [address] [address]   
   
                                                    MCV (RBC)   
[Entitic vol]   85.2 fL         Normal          80.0-100.0      [facility]  
   
                                        Comment on above:   Performed By: #### C  
REAT, ESR, CREACT, LIVR, ACBC ####  
Testing performed at [address] [address]   
   
                                                    Monocytes (Bld)   
[#/Vol]         0.8 10*3/uL     High            0.0-0.7         [facility]  
   
                                        Comment on above:   Performed By: #### C  
REAT, ESR, CREACT, LIVR, ACBC ####  
Testing performed at [address] [address]   
   
                                                    Monocytes/100 WBC   
(Bld)           9.4 %           Normal          0.0-10.0        [facility]  
   
                                        Comment on above:   Performed By: #### C  
REAT, ESR, CREACT, LIVR, ACBC ####  
Testing performed at [address] [address]   
   
                                                    Neutrophils/100   
WBC (Bld)       68.9 %          Normal          37.0-75.0       [facility]  
   
                                        Comment on above:   Performed By: #### C  
REAT, ESR, CREACT, LIVR, ACBC ####  
Testing performed at [address] [address]   
   
                                                    Platelet mean   
volume (Bld)   
[Entitic vol]   7.7 fL          Normal          7.4-11.0        [facility]  
   
                                        Comment on above:   Performed By: #### C  
REAT, ESR, CREACT, LIVR, ACBC ####  
Testing performed at [address] [place] [address]   
   
                                                    Platelets (Bld)   
[#/Vol]         257 10*3/uL     Normal          130.0-400.0     [facility]  
   
                                        Comment on above:   Performed By: #### C  
REAT, ESR, CREACT, LIVR, ACBC ####  
Testing performed at [address] [place] [address]   
   
                      RBC (Bld) [#/Vol] 3.27 10*6/uL Low        4.0-5.4    [facility]  
   
                                        Comment on above:   Performed By: #### C  
REAT, ESR, CREACT, LIVR, ACBC ####  
Testing performed at [address] [place] [address]   
   
                      WBC (Bld) [#/Vol] 8.0 10*3/uL Normal     3.6-11.0   [facility]  
   
                                        Comment on above:   Performed By: #### C  
REAT, ESR, CREACT, LIVR, ACBC ####  
Testing performed at [address] [place] [address]   
   
                                                    CNOVon 2022   
   
                                        CNOV                Office Visit ([id number]  
)  
----------------------------  
----------------------------  
------------------------  
JAYDEN SOTO ([id number])   
1960 F  
Date Time Provider   
Department  
22 9:30 AM [facility]  
During your visit today, we   
recorded the following   
information about you:  
Respiration Weight Height  
20/minute 102.1 kg 1.702 m  
Damon Eckert MD   
2022 9:52 AM Signed  
Patient presents with:  
Right Hand - New, Stiffness,   
Pain  
HISTORY OF PRESENT ILLNESS  
Jayden Soto is a 61 year   
old female right hand   
dominant who presents for  
evaluation of a right middle   
trigger finger. The patient   
reports this  
worsening over the last year   
or more. It frequently   
becomes locked and is  
rather painful. Of note she   
is recovering from a left   
septic knee arthroplasty  
which underwent explantation   
and placement of a cement   
spacer. She is being  
treated by Dr. Mitchell.  
Location: Right hand, middle   
finger  
Severity: 5 on a scale of   
0-10  
Duration of symptoms: Over 1   
year  
Date of injury n/a  
Symptoms have Worsened  
Previous treatment:   
Observation  
Context worse with  
Activity/Motion and Gripping  
Occupation: Retired bus   
  
Smoking status:  
Tobacco Use: Never  
REVIEW OF SYSTEMS  
Cardiovascular ROS:No   
history of chest pain,   
palpitation, orthopnea,   
cyanosis,  
pedal edema  
Neurologic ROS: Numbness and   
Tingling:No  
PAST MEDICAL HISTORY  
Past medical, surgical,   
family, and social histories   
have been reviewed and  
updated with the patient   
today and are located   
elsewhere in the medical   
record.  
Diabetes:Yes  
ALLERGIES  
ALLERGIES  
Allergen Reactions  
- Morphine GI Upset  
- Statins-Hmg-Coa Red*   
Unknown  
PHYSICAL EXAMINATION  
Resp 20   Ht 170.2 cm (5'   
7 )   Wt 102.1 kg (225 lb)     
BMI 35.24 kg/m?  
Body mass index is 35.24   
kg/m?.  
General Appearance Well   
appearing, alert, in no   
acute distress,   
well-hydrated,  
well nourished.  
Alert and oriented times: 3  
Normal affect times: 3  
Appears stated age and well   
nourished  
Gait and station:normal  
Right Upper Extremity Exam:  
Inspection shows a resting   
flexed posture at the middle   
finger  
No wounds or lacerations. No   
surgical incisions noted  
Skin: WNL  
Tenderness to palpation:   
Tender at middle finger A1   
pulley  
ROM: Deficit at middle   
finger, 1 cm tip to palm  
Instability: none  
Sensation:Normal sensation  
Atrophy: Intact  
Brisk capillary refill  
Special tests: Pain,   
mechanical locking with   
terminal passive flexion of   
the  
middle finger  
REVIEW OF STUDIES  
No new imaging obtained  
ASSESSMENT AND PLAN  
ASSESSMENT/PLAN:  
1. Trigger finger, right   
middle finger - ICD9:   
727.03, ICD10: M65.331  
I discussed the diagnosis   
with the patient and   
reviewed some diagrams to  
explain the relevant   
anatomy. We reviewed   
treatment options including  
injections and surgery. She   
has a history of fragile   
diabetes and has  
experienced some significant   
blood sugar fluctuations   
following previous  
injections years ago. I   
think that the safest and   
most predictable measure in  
her case would be surgical   
release of the A1 pulley.   
This could be done under  
local anesthesia as a short   
outpatient procedure. Risks,   
benefits,  
alternatives were reviewed   
with her and she consider   
these carefully and elects  
to proceed. Consent was   
obtained. We will plan on   
outpatient surgery at her  
convenience. All of her   
questions were answered to   
her satisfaction.  
Damon Eckert  
Patient educated on expected   
postoperative course and   
recovery.  
Patient instructed to call   
the office with questions or   
concerns.  
Referring Provider: SELF   
[[id number]]  
Allergies As of Date:   
2022 Noted Allergy   
Reaction  
MORPHINE 2021 8 - GI   
Upset  
STATINS-HMG-COA REDUCTASE   
INHIBIT*2021 16 -   
Unknown  
Date Reviewed: 2022  
Reviewed by: Damon Eckert MD - Fully Assessed  
Reason for Visit:  
New [563084]  
Stiffness [1661]  
Pain [78]  
Primary Visit   
Diagnosis:Trigger finger,   
right middle finger   
[M65.331]  
Prescriptions as of   
2022  
- aspirin 81 mg cap  
Take 1 capsule by mouth once   
daily. Resume once BID   
dosing is completed  
- cyclobenzaprine (FLEXERIL)   
10 mg tablet  
Take 1 tablet by mouth three   
times daily.  
- aspirin, enteric coated   
(ASPIRIN, ENTERIC COATED) 81   
mg EC tablet  
Take 1 tablet by mouth twice   
daily for 21 days. Once BID   
dosing is completed  
in 21 days, resume home   
daily dose  
- insulin glargine (LANTUS   
U-100 INSULIN) 100 unit/mL   
injection  
Inject 36 Units   
subcutaneously daily at   
bedtime.  
- insulin aspart U-100   
(NOVOLOG) 100 unit/mL  
14 units with breakfast 8   
units with lunch 10 units   
with dinner Admin  
Instructions: ADMINISTER   
CORRECTIONAL INSULIN   
REGARDLESS OF MEAL OR   
NUTRITION  
INTAKE Custom Scale If Blood   
Glucose (mg/dL) is Less than   
100 ? 0 units  
101-150 ? 0 units 151-175 ?   
2 units 176-200 ? 3 units   
201-225 ? 4 units  
226-250 ? 5 units 251-275 ?   
6 units 276-300 ? 7 units   
301-325 ? 8 units  
326-350 ? 9 units >350 ? 10   
units and Notify Provider   
Notify provider  
if 2 consecutive blood   
glucose values i (more   
content not included)... Normal                                  [facility]  
   
                                                    CREATININE,SERUMon   
2   
   
                                                    Creatinine   
[Mass/Vol]      0.88 mg/dL      Normal          0.52-1.04       [facility]  
   
                                        Comment on above:   Performed By: #### C  
REAT, ESR, CREACT, LIVR, ACBC ####  
Testing performed at [place] [id number]   
   
                                                    EST. GFR,   
American        84 ml/min/1.73sq.m [facility]  
   
                                        Comment on above:   Performed By: #### C  
REAT, ESR, CREACT, LIVR, ACBC ####  
Testing performed at [place] [id number]   
   
                                                    EST. GFR,Non   
African American 70 ml/min/1.73sq.m [facility]  
   
                                        Comment on above:   Performed By: #### C  
REAT, ESR, CREACT, LIVR, ACBC ####  
[place] [address]   
   
                                        GFR Information     Average GFR for 60-6  
9 years   
old = 85.           Normal                                  [facility]  
   
                                        Comment on above:   Result Comment:   
jarek Kidney disease, GFR = <60.  
Kidney failure, GFR = <15.  
The GFR estimate is not adjusted for extreme body surface area or   
acute process, nor has it been validated for pregnant women or   
ethnic groups other than  and .   
   
                                                            Performed By: #### C  
REAT, ESR, CREACT, LIVR, ACBC ####  
[place] [address]   
   
                                                    ESRon 2022   
   
                                                    ESR (Bld)   
[Velocity]      25 mm/h         Normal          0-30            [facility]  
   
                                        Comment on above:   Performed By: #### C  
REAT, ESR, CREACT, LIVR, ACBC ####  
Testing performed at [place] [id number]   
   
                                                    LIVER PANELon 2022   
   
                                                    Albumin   
[Mass/Vol]      3.0 g/dL        Low             3.5-5.0         [facility]  
   
                                        Comment on above:   Performed By: #### C  
REAT, ESR, CREACT, LIVR, ACBC ####  
[place] [address]   
   
                                                    ALP [Catalytic   
activity/Vol]   107 U/L         Normal                    [facility]  
   
                                        Comment on above:   Performed By: #### C  
REAT, ESR, CREACT, LIVR, ACBC ####  
Testing performed at [address] [address]   
   
                                                    ALT [Catalytic   
activity/Vol]   15 U/L          Normal          14-54           [facility]  
   
                                        Comment on above:   Performed By: #### C  
REAT, ESR, CREACT, LIVR, ACBC ####  
Testing performed at [address] [address]   
   
                                                    AST [Catalytic   
activity/Vol]   16 U/L          Normal          15-41           [facility]  
   
                                        Comment on above:   Performed By: #### C  
REAT, ESR, CREACT, LIVR, ACBC ####  
Testing performed at [address] [address]   
   
                                                    Bilirubin   
[Mass/Vol]      0.4 mg/dL       Normal          0.2-1.2         [facility]  
   
                                        Comment on above:   Performed By: #### C  
REAT, ESR, CREACT, LIVR, ACBC ####  
Testing performed at [address] [address]   
   
                                                    Bilirubin.indirec  
t [Mass/Vol]    mg/dL           Normal          0.0-0.2         [facility]  
   
                                        Comment on above:   Performed By: #### C  
REAT, ESR, CREACT, LIVR, ACBC ####  
Testing performed at [address] [address]   
   
                                                    Protein   
[Mass/Vol]      6.4 g/dL        Normal          6.3-8.2         [facility]  
   
                                        Comment on above:   Performed By: #### C  
REAT, ESR, CREACT, LIVR, ACBC ####  
Testing performed at [address] [address]   
   
                                                    [place]alo 2022   
   
                                        CNPN                Telephone ([id number])  
----------------------------  
----------------------------  
------------------------  
JAYDEN SOTO ([id number])   
1960 F  
Date Time Provider   
Department  
3/30/22 BASIL MITCHELL  
During your visit today, we   
recorded the following   
information about you:  
Maco Cardona    
Ppg 3/30/2022 1:53 PM Signed  
----- Message from Amita Crooks sent at 3/30/2022   
1:31 PM EDT -----  
Regarding: Orthopedics /   
Paula Knee: Pain / Post Op   
Within 90 Day Period  
Subject Line Format:   
Orthopedics / [Provider Name   
or  Open AND Body Part ] /  
[Issue]  
Patient has been identified   
by name and Date of birth   
(Y/N): Y  
Patient: Jayden Soto  
YOB: 1960  
MRN: [id number]  
Previous Provider Seen: DR. HANNAH MITCHELL  
Body Part(s) Identified:   
KNEE  
Diagnosis/Reason For Visit:   
POST OP  
Reason for the   
call/escalation: POST OP  
If reason for   
call/escalation is discharge   
from ED/ER or Hospital,   
which  
facility was the patient   
seen at: NA  
Was an appointment scheduled   
(Y/N): N  
Person calling if other than   
patient: DAUGHTER IN [facility] -   
[place]  
Return call to if other than   
patient:     
Best contact number:   
[phone]  
Thank you,  
Amita Crooks  
2022 1:31 PM  
Maco Cardona Dickinson   
Ppg 3/30/2022 1:54 PM Signed  
Appointment scheduled with   
 4/15/22 @ 9:30 am.  
Maco Cardona    
Ppg  
Allergies As of Date:   
2022 Noted Allergy   
Reaction  
MORPHINE 2021 8 - GI   
Upset  
STATINS-HMG-COA REDUCTASE   
INHIBIT*2021 16 -   
Unknown  
Date Reviewed: 2022  
Reviewed by: Marjorie Francis RN - Fully Assessed  
Reason for Visit:  
Appointment [186]  
Prescriptions as of   
2022  
- aspirin 81 mg cap  
Take 1 capsule by mouth once   
daily. Resume once BID   
dosing is completed  
- cyclobenzaprine (FLEXERIL)   
10 mg tablet  
Take 1 tablet by mouth three   
times daily.  
- aspirin, enteric coated   
(ASPIRIN, ENTERIC COATED) 81   
mg EC tablet  
Take 1 tablet by mouth twice   
daily for 21 days. Once BID   
dosing is completed  
in 21 days, resume home   
daily dose  
- insulin glargine (LANTUS   
U-100 INSULIN) 100 unit/mL   
injection  
Inject 36 Units   
subcutaneously daily at   
bedtime.  
- insulin aspart U-100   
(NOVOLOG) 100 unit/mL  
14 units with breakfast 8   
units with lunch 10 units   
with dinner Admin  
Instructions: ADMINISTER   
CORRECTIONAL INSULIN   
REGARDLESS OF MEAL OR   
NUTRITION  
INTAKE Custom Scale If Blood   
Glucose (mg/dL) is Less than   
100 ? 0 units  
101-150 ? 0 units 151-175 ?   
2 units 176-200 ? 3 units   
201-225 ? 4 units  
226-250 ? 5 units 251-275 ?   
6 units 276-300 ? 7 units   
301-325 ? 8 units  
326-350 ? 9 units >350 ? 10   
units and Notify Provider   
Notify provider  
if 2 consecutive blood   
glucose values in the   
previous 24 hours are   
greater  
than 250 mg/mL and there   
have been no changes to the   
insulin regimen in the  
previous 24 hours.  
- cefTRIAXone sodium   
(ROCEPHIN) 1 gram solr  
Inject 2 g intramuscularly   
every 24 hours.  
- doxepin capsule 10 mg  
Take 20 mg by mouth daily at   
bedtime.  
- BISACODYL ORAL  
Take 10 mg by mouth once   
daily.  
- melatonin 10 mg cap  
Take 1 capsule by mouth   
daily at bedtime.  
- alirocumab (PRALUENT) 75   
mg/mL pen  
Inject 75 mg subcutaneously   
every other week.  
- DULoxetine (CYMBALTA) 60   
mg capsule  
Take 60 mg by mouth twice   
daily.  
- ranolazine SR (RANEXA)   
1,000 mg tab ER 12 hr  
Take 1 tablet by mouth twice   
daily.  
- dilTIAZem CD (CARDIZEM CD)   
180 mg 24 hr capsule  
Take 180 mg by mouth once   
daily.  
- empagliflozin (JARDIANCE)   
25 mg tablet  
Take 25 mg by mouth daily   
with breakfast.  
- GABAPENTIN ORAL  
Take 200 mg by mouth three   
times daily.  
- METOPROLOL TARTRATE ORAL  
Take 25 mg by mouth twice   
daily.  
- isosorbide mononitrate ER   
(IMDUR) 30 mg 24 hr tablet  
Take 90 mg by mouth once   
daily.  
- clopidogrel (PLAVIX) 75 mg   
tablet  
Take 75 mg by mouth once   
daily.  
Problem List As Of Date   
2022 Noted Resolved  
Septic joint of left knee   
joint (HCC) [M00.9]   
2022  
Obesity, Class II, BMI   
35-39.9 [E66.9] 2022  
Controlled type 2 diabetes   
mellitus without   
com*2022  
Primary hypertension [I10]   
2022  
Encounter Number: [id number]  
Encounter Status:Closed by   
SUZAN  MACO BROWNE on 3/30/22        Normal                                  [facility]  
   
                                                    Basic metabolic 2000 panelon  
 2022   
   
                                                    Anion gap   
[Moles/Vol]     5 mmol/L        Low             9-18            [facility]  
   
                                        Comment on above:   Order Comment: Speci  
men Type: BLOOD SPECIMEN  
Ordering Facility: [facility]  
Address: [address] [address]   
   
                                                            Performed By: #### 2  
[id number] ####  
[facility]  
CLIA [id number]  
[address] [place] [address] [place]   
   
                                                    Calcium   
[Mass/Vol]      9.0 mg/dL       Normal          8.5-10.2        [facility]  
   
                                        Comment on above:   Order Comment: Speci  
men Type: BLOOD SPECIMEN  
Ordering Facility: [facility]  
Address: [address] [place] [address]   
   
                                                            Performed By: #### 2  
[id number] ####  
[facility] LAB[facility]  
CLIA [id number]  
[address] [place] [address] [place]   
   
                                                    Chloride   
[Moles/Vol]     103 mmol/L      Normal                    [facility]  
   
                                        Comment on above:   Order Comment: Speci  
men Type: BLOOD SPECIMEN  
Ordering Facility: [facility]  
Address: [address] [place] [address]   
   
                                                            Performed By: #### 2  
4321-2 ####  
[facility]  
CLIA [id number]  
1 [place] [address] [place]   
   
                      CO2 [Moles/Vol] 30 mmol/L  Normal     22-30      [facility]  
   
                                        Comment on above:   Order Comment: Speci  
men Type: BLOOD SPECIMEN  
Ordering Facility: [facility]  
Address: [address] [address]   
   
                                                            Performed By: #### 2  
4321-2 ####  
[facility]  
CLIA [id number]  
[address] [place]   
   
                                                    Creatinine   
[Mass/Vol]      1.12 mg/dL      High            0.58-0.96       [facility]  
   
                                        Comment on above:   Order Comment: Speci  
men Type: BLOOD SPECIMEN  
Ordering Facility: [facility]  
Address: [address] [address]   
   
                                                            Performed By: #### 2  
4321-2 ####  
[facility]  
CLIA [id number]  
1 [place] [address] [place]   
   
                                                    ESTIMATED   
GLOMERULAR   
FILTRATION RATE 56 mL/min/1.73m??? Low             >=60            [facility]  
   
                                        Comment on above:   Order Comment: Speci  
men Type: BLOOD SPECIMEN  
Ordering Facility: [facility]  
Address: [address] [place] [address]   
   
                                                            Result Comment: Sydnee  
mated Glomerular Filtration Rate (eGFR) is   
calculated using the  CKD-EPI creatinine equation. This equation   
utilizes serum creatinine, sex, and age as parameters. The   
creatinine assay has traceable calibration to isotope dilution-mass   
spectrometry. Refer to KDIGO guidelines for clinical interpretation.   
In patients with unstable renal function, e.g. those with acute   
kidney injury, the eGFR may not accurately reflect actual GFR.   
   
                                                            Performed By: #### 2  
4321-2 ####  
[facility]  
CLIA [id number]  
1 [place] [address] [place]   
   
                                                    Glucose   
[Mass/Vol]      74 mg/dL        Normal          74-99           [facility]  
   
                                        Comment on above:   Order Comment: Speci  
men Type: BLOOD SPECIMEN  
Ordering Facility: [facility]  
Address: [address] [place] [address]   
   
                                                            Result Comment: The   
American Diabetes Association (ADA) provides   
guidance for cutoff values for fasting glucose and random glucose.   
The ADA defines fasting as no caloric intake for at least 8 hours.   
Fasting plasma glucose results between 100 to 125 mg/dL indicate   
increased risk for diabetes (prediabetes).  
Fasting plasma glucose results greater than or equal to 126 mg/dL   
meet the criteria for diagnosis of diabetes. In the absence of   
unequivocal hyperglycemia, results should be confirmed by repeat   
testing. In a patient with classic symptoms of hyperglycemia or   
hyperglycemic crisis, random plasma glucose results greater than or   
equal to 200 mg/dL meet the criteria for diagnosis of diabetes.  
Reference: Standards of Medical Care in Diabetes 2016, American   
Diabetes Association. Diabetes Care. 2016.39(Suppl 1).   
   
                                                            Performed By: #### 2  
4321-2 ####  
[facility]  
CLIA [id number]  
1 [place] [address] [place]   
   
                                                    Potassium   
[Moles/Vol]     4.3 mmol/L      Normal          3.7-5.1         [facility]  
   
                                        Comment on above:   Order Comment: Speci  
men Type: BLOOD SPECIMEN  
Ordering Facility: [facility]  
Address: [address] [place] [address]   
   
                                                            Performed By: #### 2  
4321-2 ####  
[facility]  
CLIA [id number]  
[address]   
   
                                                    Sodium   
[Moles/Vol]     138 mmol/L      Normal          136-144         [facility]  
   
                                        Comment on above:   Order Comment: Speci  
men Type: BLOOD SPECIMEN  
Ordering Facility: [facility]  
Address: [address] [place] [address]   
   
                                                            Performed By: #### 2  
4321-2 ####  
[facility]  
CLIA [id number]  
[address]   
   
                                                    Urea nitrogen   
[Mass/Vol]      17 mg/dL        Normal          7-21            [facility]  
   
                                        Comment on above:   Order Comment: Speci  
men Type: BLOOD SPECIMEN  
Ordering Facility: [facility]  
Address: [address] [place] [address]   
   
                                                            Performed By: #### 2  
4321-2 ####  
[facility]  
CLIA [id number]  
[address]   
   
                                                    CASE MANAGEMon 2022   
   
                                        CASE MANAGEM        HNO ID: [id number]  
Author: Bev Steele RN  
Service: Nursing  
Author Type: Registered   
Nurse  
Type: Care Mgt Progress Note  
Filed: 3/29/2022 4:02 PM  
Note Text:  
CARE MANAGEMENT DISCHARGE   
NOTE  
SERVICE DATE: 3/29/2022  
SERVICE TIME: 3:17 PM  
LOS: 8 days  
Admission Date: 3/21/2022  
DISCHARGE ARRANGEMENT (list   
agency and phone number)  
Discharge Arrangement: Home   
with Home Health  
Provider Name: [facility] Phone:   
[phone]  
CAREGIVER ASSESSMENT:  
Caregiver is ready, willing   
and able to meet the   
patient's needs as  
recommended by the   
inter-professional team:: No   
Caregiver needed  
Does the patient have an   
acute stroke diagnosis, or   
has the patient had a  
stroke during this   
admission?: No  
Patient's transition needs   
and plan for meeting these   
needs: home care and  
home pharmacy  
TRANSPORTATION ARRANGEMENTS:  
Transportation Arrangements:   
Car  
Caregiver is ready, willing   
and able to meet the   
patient's needs as  
recommended by the   
inter-professional team:: No   
Caregiver needed  
Does the patient have an   
acute stroke diagnosis, or   
has the patient had a  
stroke during this   
admission?: No  
Needs Prior to Discharge:   
Ready for Discharge  
Discharge orders in , pt   
going home with IV   
antibiotics with assistance  
from [facility]   
and [facility]   
Infusions ; SOC set up  
through [facility] set up with   
daugher-in-law at 9:00 am  
tomorrow morning; supplies   
through [facility] being   
delivered tonight;  
pt's son is able to provide   
transportation tonight; home   
care order and  
discharge summary sent to   
[facility] and   
[facility]; pt  
ready for dc from care   
management standpoint  
SIGNATURE: Bev Steele RN   
PATIENT NAME: Jayden Soto  
DATE: 2022 MRN:   
[id number]  
TIME: 3:16 PM PAGER/CONTACT   
#: [id number]       Normal                                  [facility]  
   
                                                    CBC panel Auto (Bld)on    
   
                                                    Erythrocyte   
distribution   
width (RBC)   
[Ratio]         15.3 %          High            11.5-15.0       [facility]  
   
                                        Comment on above:   Order Comment: Speci  
men Type: BLOOD SPECIMENOrdering Facility:  
   
[facility] Address: [address] [place] [address]   
   
                                                            Performed By: #### 5  
8410-2 ####[facility] LABORATORYCLIA   
[id number] [place] [address] [place]   
   
                                                    Hematocrit (Bld)   
[Volume fraction] 27.7 %          Low             36.0-46.0       [facility]  
   
                                        Comment on above:   Order Comment: Speci  
men Type: BLOOD SPECIMENOrdering Facility:  
  
[facility] Address: [address] [address]   
   
                                                            Performed By: #### 5  
8410-2 ####[facility] LABORATORYCLIA   
[id number] [place] [address] [place]   
   
                                                    Hemoglobin (Bld)   
[Mass/Vol]      8.3 g/dL        Low             11.5-15.5       [facility]  
   
                                        Comment on above:   Order Comment: Speci  
men Type: BLOOD SPECIMENOrdering Facility:  
   
[facility] Address: [address] [address]   
   
                                                            Performed By: #### 5  
8410-2 ####[facility] LABORATORYCLIA   
[id number] [address]   
   
                                                    MCH (RBC)   
[Entitic mass]  27.9 pg         Normal          26.0-34.0       [facility]  
   
                                        Comment on above:   Order Comment: Speci  
men Type: BLOOD SPECIMENOrdering Facility:  
  
[facility] Address: [address] [place] [address]   
   
                                                            Performed By: #### 5  
8410-2 ####[facility] LABORATORYCLIA   
[id number] [address]   
   
                                                    MCHC (RBC)   
[Mass/Vol]      30.0 g/dL       Low             30.5-36.0       [facility]  
   
                                        Comment on above:   Order Comment: Speci  
men Type: BLOOD SPECIMENOrdering Facility:  
   
[facility] Address: [address] [place] [address]   
   
                                                            Performed By: #### 5  
8410-2 ####[facility] LABORATORYCLIA   
[id number] [address]   
   
                                                    MCV (RBC)   
[Entitic vol]   93.0 fL         Normal          80.0-100.0      [facility]  
   
                                        Comment on above:   Order Comment: Speci  
men Type: BLOOD SPECIMENOrdering Facility:  
  
[facility] Address: [address] [place] [address]   
   
                                                            Performed By: #### 5  
8410-2 ####[facility] LABORATORYCLIA   
[id number] [address] [place] [facility]   
   
                                                    Nucleated RBC   
(Bld) [#/Vol]   0.02 10*3/uL    High            <0.01           [facility]  
   
                                        Comment on above:   Order Comment: Speci  
men Type: BLOOD SPECIMENOrdering Facility:  
   
[facility] Address: [address] [place] [address]   
   
                                                            Performed By: #### 5  
8410-2 ####[facility] LABORATORYCLIA   
[id number] [address]   
   
                                                    Platelet mean   
volume (Bld)   
[Entitic vol]   9.5 fL          Normal          9.0-12.7        [facility]  
   
                                        Comment on above:   Order Comment: Speci  
men Type: BLOOD SPECIMENOrdering Facility:  
  
[facility] Address: [address] [place] [address]   
   
                                                            Performed By: #### 5  
8410-2 ####[facility] LABORATORYCLIA   
[id number] [address]   
   
                                                    Platelets (Bld)   
[#/Vol]         381 10*3/uL     Normal          150-400         [facility]  
   
                                        Comment on above:   Order Comment: Speci  
men Type: BLOOD SPECIMENOrdering Facility:  
   
[facility] Address: [address] [place] [address]   
   
                                                            Performed By: #### 5  
8410-2 ####[facility] LABORATORYCLIA   
[id number] [address]   
   
                      RBC (Bld) [#/Vol] 2.98 10*6/uL Low        3.90-5.20  [facility]  
   
                                        Comment on above:   Order Comment: Speci  
men Type: BLOOD SPECIMENOrdering Facility:  
   
[facility] Address: [address] [place] [address]   
   
                                                            Performed By: #### 5  
8410-2 ####[facility] LABORATORYCLIA   
[id number] [address]   
   
                      WBC (Bld) [#/Vol] 9.93 10*3/uL Normal     3.70-11.00 [facility]  
   
                                        Comment on above:   Order Comment: Speci  
men Type: BLOOD SPECIMENOrdering Facility:  
   
[facility] Address: [address] [place] [address]   
   
                                                            Performed By: #### 5  
8410-2 ####[facility] LABORATORYCLIA   
[id number] [address]   
   
                                                    CNDSon 2022   
   
                                        CNDS                HNO ID: [id number]  
Author: JASON RolandCNP  
Service: Orthopaedic Surgery  
Author Type: Nurse   
Practitioner  
Type: Discharge Summary  
Filed: 3/29/2022 12:42 PM  
Note Text:  
----------------------------  
----------------------------  
------------------------  
Attestation signed by Basil Mitchell MD at 4/3/2022 6:55   
PM  
agree  
----------------------------  
----------------------------  
------------------------  
DISCHARGE SUMMARY  
PATIENT NAME: Jayden Soto   
Code Status: Not on file  
MRN: [id number]  
Highest Readmission Risk   
Score: 19  
The 30 day readmissions risk   
score is derived from an   
internally  
validated risk model which   
evaluates patient level   
characteristics,  
utilization history,   
medication orders and lab   
results up until the day of  
discharge. Patients with a   
score of 40 or above are   
considered highest  
risk for readmission.   
Specific patient level   
drivers will be listed at   
the  
bottom of the summary.  
Admission Information  
Admission Information  
ADMIT DATE: 3/21/2022  
DISCHARGE DATE: 3/29/2022  
MY DOCTORS AND MEDICAL TEAM:  
My Main Hospital Doctor:   
Basil Mitchell MD  
Primary Care Provider:   
Becky Corrales MD, MD  
My Medical Team Members:   
Treatment Team:  
Attending Provider: Basil Mitchell MD  
Consulting: Alvin So MD  
Consulting: Jerry Fink  
Consulting: Jerry Simms Infectious   
Disease  
MY CONDITION AT DISCHARGE:   
Stable  
REASON I WAS IN THE   
HOSPITAL: Left prosthetic   
knee infection  
SUMMARY OF WHAT HAPPENED   
WHILE I WAS IN THE HOSPITAL:   
Underwent irrigation  
and debridement of knee with   
spacer placement. On IV   
antibiotics.  
Discharged in stable   
condition.  
PICC line placed 3/25/2022  
OTHER PROBLEMS/DIAGNOSIS:  
Active Problems:  
Septic joint of left knee   
joint (HCC)  
Obesity, Class II, BMI   
35-39.9  
Controlled type 2 diabetes   
mellitus without   
complication, with long-term  
current use of insulin (HCC)  
Primary hypertension  
Resolved Problems:  
* No resolved hospital   
problems. *  
OPERATIONS PERFORMED WHILE   
IN THE HOSPITAL: None  
IMPORTANT TEST/PROCEDURES:  
No procedures performed  
TEST RESULTS NOT AVAILABLE   
AT THIS TIME:  
No pending results  
Discharge Disposition  
Discharge Disposition: Home   
With Home health care  
Activity When You Leave the   
Hospital  
No baths or showers for:  
Until first postoperative   
visit.  
Diet Instructions  
Resume your pre-hospital   
diet  
For Pain When You Leave the   
Hospital  
Use acetaminophen (Tylenol)   
as recommended on the bottle  
Use ibuprofen (Motrin,   
Advil) as recommended on the   
bottle  
Use the dispensed medication   
(see prescription)  
Wound/Surgical Site Care  
Do not change or remove your   
dressing  
Keep your dressing clean and   
dry  
Call Your Doctor If  
There is an unusual odor   
from the wound area  
There is severe pain at the   
operative site  
You have a severe headache  
You have lightheadedness,   
fainting, or confusion  
You have persistent   
nausea/vomiting over 24   
hours  
You have persistent or heavy   
bleeding  
You have redness, swelling,   
pus or drainage from the   
wound  
You have swollen glands or   
cold and clammy skin  
Your temperature is greater   
than 101F  
Follow Up Appointments  
Follow-Up Appointment  
When: In 1 week  
Basil Mitchell MD  
[phone]  
[address]  
[facility] [address]  
PCP Requested Referral  
Follow up with Dr. Con Lovett, infectious disease   
in 3 weeks. Call to  
schedule Appointment  
Follow up with your PCP or   
endocrinologist for improved   
glycemic control.  
FOLLOW-UP APPOINTMENTS   
ALREADY SCHEDULED WITH A   
[facility] PROVIDER:  
No future appointments.  
ALLERGIES  
Allergen Reactions  
- Morphine GI Upset  
- Statins-Hmg-Coa Red*   
Unknown  
DISCHARGE MEDICATION:   
Current Discharge Medication   
List  
Medication List  
START taking these   
medications  
insulin glargine 100 unit/mL   
injection  
Commonly known as: LANTUS   
U-100 INSULIN  
Inject 36 Units   
subcutaneously daily at   
bedtime.  
Replaces: LANTUS   
SUBCUTANEOUS  
oxyCODONE IR 5 mg immediate   
release tablet  
Commonly known as:   
ROXICODONE  
Take 1 tablet by mouth every   
6 hours as needed for pain   
for up to 5 days.  
CHANGE how you take these   
medications  
* aspirin, enteric coated 81   
mg EC tablet  
Commonly known as: ASPIRIN,   
ENTERIC COATED  
Take 1 tablet by mouth twice   
daily for 21 days.  
Once BID dosing is completed   
in 21 days, resume home   
daily dose  
What changed: You were   
already taking a medication   
with the same name, and  
this prescription was added.   
Make sure you understand how   
and when to take  
each.  
* aspirin 81 mg Cap  
Take 1 capsule by mouth once   
daily. Resume once BID   
dosing is completed  
What changed: additional   
instructions  
insulin aspart U-100 100   
unit/mL  
Commonly known as: NovoLOG  
14 units with breakfast  
8 units with lunch  
10 units with dinner  
Admin Instructions:   
ADMINISTER CORRECTIONAL   
INSULIN REGARDLESS OF MEAL   
OR  
NUTRITION INTAKE  
Custom Scale  
If Blood Glucose (mg/dL) is  
Less than 100 ? 0 units  
101-150 ? 0 units  
151-175 ? 2 units  
176-200 ? 3 units  
201-225 ? 4 units  
226-2 (more content not   
included)...        Normal                                  [facility]  
   
                                                    CONSULT PROGon 2022   
   
                                        CONSULT PROG        HNO ID: [id number]  
Author: Floridalma Mckeon PA-C  
Service: Infectious Disease  
Author Type: Physician   
Assistant  
Type: Consult Progress Note  
Filed: 3/29/2022 3:30 PM  
Note Text:  
----------------------------  
----------------------------  
------------------------  
Attestation signed by Con Lovett III, MD at   
3/29/2022 3:42 PM  
Discussed with Floridalma Yu PA-C. Ceftriaxone   
through 22 for MSSA left  
TKA infection s/p resection.  
----------------------------  
----------------------------  
------------------------  
----------------------------  
----------------------------  
------------------------  
Summary: ID signing off  
----------------------------  
----------------------------  
------------------------  
INFECTIOUS DISEASE CONSULT   
PROGRESS NOTE  
SERVICE DATE: 2022  
SERVICE TIME: 10:25 AM  
BRIEF SUMMARY: 61 year old   
female history of MSSA left   
TKA PJI s/p DAIR in  
 at outside hospital on   
suppressive therapy with   
doxycycline  
presented to [facility] 3/21 for   
breakthrough of PJI through   
suppression.  
ASSESSMENT:  
1. Left TKA infection due to   
MSSA s/p resection   
arthroplasty and  
articulating spacer   
placement with vancomycin   
and tobramycin  
3/24/2022--possible   
destination spacer  
2. Left total knee   
arthroplasty prosthetic   
joint infection with MSSA  
status post irrigation   
debridement with joint   
retention at outside  
hospital in   
3. Disseminated MSSA   
infection in  from foot   
wound complicated by  
lumbar spine infection   
status post surgical   
intervention but no  
instrumentation per patient  
4. Comorbidities including   
obesity and type 2 DM on   
insulin which  
complicate mgmt and recovery  
RECOMMENDATIONS:  
-Stop doxycycline.  
-Continue ceftriaxone 2 g IV   
every 24 hours for 6 weeks   
from resection  
arthroplasty through   
2022. CoPAT complete.  
-Follow-up in ID clinic has   
been arranged for 2022.  
ID team will sign off.   
Please Epic chat/call with   
questions.  
Subjective  
INTERVAL HISTORY:  
3/29- No complaints today.   
Some discomfort in her back.   
Left knee is  
painful. Dismissal planning   
is underway.  
PERTINENT ROS: No nausea,   
vomiting, diarrhea, skin   
rash.  
Active Antimicrobials (From   
admission, onward)  
Start Stop  
22 1900 doxycycline   
hyclate 100 mg cap(s)   
(VIBRAMYCIN) 100 mg,  
ORAL, EVERY 12 HRS AT   
7AM/7PM  
--  
22 1500 cefTRIAXone iv   
piggyback 2 g in dextrose   
(iso-osmotic) 50  
mL (ROCEPHIN) 2 g,   
INTRAVENOUS, EVERY 24 HOURS  
--  
22 1300 miconazole 2 %   
1 application topical powder   
(LOTRIMIN AF,  
DESENEX) 1 application,   
TOPICAL, 2 TIMES DAILY  
--  
Objective  
PHYSICAL EXAM:  
Physical Exam  
Constitutional:  
General: She is not in acute   
distress.  
Appearance: Normal   
appearance. She is not   
ill-appearing,  
toxic-appearing or   
diaphoretic.  
Musculoskeletal:  
Comments: She has some   
tenderness with palpation in   
the lumbar spine  
and paraspinal muscles.   
Prior incisions are healed.   
No evidence of  
dehiscence, erythema, or   
swelling.  
Left knee wrapped in Ace   
dressing.  
Skin:  
General: Skin is warm and   
dry.  
Findings: No rash.  
Neurological:  
Mental Status: She is alert   
and oriented to person,   
place, and time.  
Psychiatric:  
Mood and Affect: Mood   
normal.  
Behavior: Behavior normal.  
/61   Pulse 63   Temp   
(Src) 97.7 (Temporal)   Resp   
16   Ht 5' 7   
(1.70m)   Wt 248 lb 10.9 oz   
(112.8kg)   SpO2 96%   BMI   
38.94 kg/(m2).  
O2 Therapy: Room Air  
Lines, Drains, and Airways  
Line  
Central Line Single Lumen   
22 1319 Peripherally   
Inserted (PICC)  
Right Arm Through Introducer   
4.0 French 3 days  
DATA:  
Labs  
Hemoglobin 8.3, WBC 9.93,   
platelets 381  
Estimated Creatinine   
Clearance: 69.2 mL/min (A)   
(based on SCr of 1.12  
mg/dL (H)).  
Microbiology  
3/7/21 Left knee synovial   
fluid-  
3/23/2022 left knee   
arthroplasty?  
SIGNATURE: Floridalma Mckeon PA-C PATIENT NAME: Jayden Soto  
DATE: 2022 MRN:   
[id number]  
TIME: 10:25 AM CONTACT #:   
[phone]  
Discussed with Dr. Lovett. [facility]  
   
                                        CONSULT PROG        HNO ID: [id number]  
Author: April Dc MD  
Service: Endocrinology  
Author Type: Physician  
Type: Consult Progress Note  
Filed: 3/29/2022 9:10 AM  
Note Text:  
ENDOCRINOLOGY CONSULT   
PROGRESS NOTE  
SERVICE DATE: 3/29/2022  
SERVICE TIME: 7:07 AM  
Subjective  
INTERVAL HPI: Following for   
DM type 2.  
The patient is a 61-year-old   
female, who was admitted on   
, with  
left knee pain and swelling.   
She was found to have an   
infected left knee  
arthroplasty. She had   
surgery on , removal   
of knee arthroplasty,  
left knee with irrigation   
and debridement of left knee   
and insertion of  
antibiotic spacer.  
?Patient has history of type   
2 diabetes for 32 years,   
diagnosed when she  
was pregnant. She was on   
oral agents for several   
years. She has been on  
insulin for about 15 years.   
She is on Lantus 40 units at   
bedtime and  
NovoLog 13 units with each   
meal; current dose for at   
least 1 year. She is  
also on Jardiance 25 mg a   
day. She checks blood sugar   
3 times a day. They  
used to be less than 190,   
but since she had infection   
last year in her  
knee, her blood sugars have   
been high over 200s and   
300s; sometimes, it  
has gone as high as 500 and   
600. Her blood sugars have   
been fluctuating  
here. She was initially   
given Lantus 25 units at   
bedtime and then it was  
increased to 40 units. She   
was n.p.o. and NovoLog was   
being held off and  
on. She has lost about 80   
pounds weight over the last   
year. She has  
history of retinopathy and   
gets injections. She also   
had cataract  
surgery. She has history of   
neuropathy and numbness in   
both feet. She  
did not have any kidney   
problems.  
Notes and orders reviewed.   
D/C plan in progress.  
Diet: DIET CARBOHYDRATE   
CONTROLLED p.o intake good,   
following diet.  
Activity:Up with Assistance,   
has PT; ambulating  
Review of Systems: c/o left   
knee pain, no SOB, no   
nausea, no emesis; rest  
negative  
Current   
Facility-Administered   
Medications  
Medication Dose Route   
Frequency  
- ondansetron (PF) 4 mg   
injection (ZOFRAN) 4 mg   
INTRAVENOUS q 6 H PRN  
- NaCl 0.9% iv flush bag 20   
mL INTRAVENOUS PRN  
- sodium chloride 0.9 %   
(flush) 3-5 mL (BD   
POSIFLUSH) 3-5 mL   
INTRAVENOUS  
q 12 H  
- sodium chloride 0.9 %   
(flush) 2-10 mL (BD   
POSIFLUSH) 2-10 mL  
INTRAVENOUS q 12 H  
- DULoxetine 60 mg cap(s)   
(CYMBALTA) 60 mg ORAL BID  
- ranolazine ER 1,000 mg   
tab(s) (RANEXA) 1,000 mg   
ORAL BID  
- dilTIAZem  mg cap(s)   
(CARDIZEM CD, CARTIA XT) 180   
mg ORAL DAILY  
- gabapentin 200 mg cap(s)   
(NEURONTIN) 200 mg ORAL TID  
- isosorbide mononitrate ER   
90 mg tab(s) (IMDUR) 90 mg   
ORAL DAILY  
- clopidogrel 75 mg tab(s)   
(PLAVIX) 75 mg ORAL DAILY  
- metoprolol tartrate (short   
acting) 25 mg tab(s)   
(LOPRESSOR) 25 mg ORAL  
q 12 H  
- dextrose 40 % 15 g 15 g   
ORAL PRN  
Or  
- glucagon 1 mg injection 1   
mg INTRAMUSCULAR PRN  
Or  
- dextrose 50% in water 25   
mL syringe 12.5 g   
INTRAVENOUS PRN  
- empagliflozin 25 mg tab(s)   
(JARDIANCE) 25 mg ORAL DAILY  
- docusate sodium 100 mg   
cap(s) (COLACE) 100 mg ORAL   
BID  
- aspirin, enteric coated 81   
mg tab(s) 81 mg ORAL BID  
- sodium chloride 0.9 %   
(flush) 3-5 mL (BD   
POSIFLUSH) 3-5 mL   
INTRAVENOUS  
q 12 H  
- miconazole 2 % 1   
application topical powder   
(LOTRIMIN AF, DESENEX) 1  
application TOPICAL BID  
- acetaminophen 1,000 mg   
tab(s) (TYLENOL) 1,000 mg   
ORAL q 6 H  
- oxyCODONE IR 5-10 mg   
tab(s) (ROXICODONE) 5-10 mg   
ORAL q 4 H PRN  
- traMADol 50 mg tab(s)   
(ULTRAM) 50 mg ORAL q 6 H  
- morphine 2 mg injection 2   
mg INTRAVENOUS q 3 H PRN  
- melatonin 6 mg tab(s) 6 mg   
ORAL DAILY (8 PM)  
- insulin lispro pen (rapid   
acting) (HumaLOG KWIKPEN)   
SUBCUTANEOUS w  
MEALS  
- sodium chloride 0.9 %   
(flush) 10 mL (BD POSIFLUSH)   
10 mL INTRAVENOUS q  
12 H  
- sodium chloride 0.9 %   
(flush) 20 mL (BD POSIFLUSH)   
20 mL INTRAVENOUS  
PRN  
- senna-docusate 8.6-50 mg 1   
tablet (SENNA-S) 1 tablet   
ORAL BID  
- calcium carbonate 500 mg   
chewable tab(s) (TUMS) 500   
mg ORAL TID PRN  
- insulin glargine 36 Units   
pen (long acting) (LANTUS   
SOLOSTAR, BASAGLAR  
KWIKPEN) 36 Units   
SUBCUTANEOUS AT BEDTIME  
- insulin lispro 8 Units pen   
(rapid acting) (HumaLOG   
KWIKPEN) 8 Units  
SUBCUTANEOUS DAILY wLUNCH  
- insulin lispro 10 Units   
pen (rapid acting) (HumaLOG   
KWIKPEN) 10 Units  
SUBCUTANEOUS DAILY wDINNER  
- insulin lispro 14 Units   
pen (rapid acting) (HumaLOG   
KWIKPEN) 14 Units  
SUBCUTANEOUS DAILY WITH   
BREAKFAST  
- cefTRIAXone iv piggyback 2   
g in dextrose (iso-osmotic)   
50 mL (ROCEPHIN)  
2 g INTRAVENOUS q 24 H  
- doxycycline hyclate 100 mg   
cap(s) (VIBRAMYCIN) 100 mg   
ORAL q 12 h  
7am/7pm  
- diphenhydrAMINE 50 mg   
injection (BENADRYL) 50 mg   
INTRAVENOUS q 6 H PRN  
- cyclobenzaprine 5 mg   
tab(s) (FLEXERIL) 5 mg ORAL   
TID PRN  
Objective  
PHYSICAL EXAM:  
GENERAL: lying in bed, in no   
acute distress, mildly   
obese.  
SKIN: Warm, dry, has heel   
protector dressings over   
both heels. No rash.  
No ulcers or calluses   
noticed. Left knee bandaged.  
HEENT: Head is normocephalic   
and atraumatic. Eyes, pupils   
round, EOMI.  
Buccal mucosa is dry. No   
thrush.  
NECK: Supple. No goiter   
palpable on thyroid (more   
content not included)... Normal                                  [facility]  
   
                                                    NUTRITIONon 2022   
   
                                        NUTRITION           HNO ID: [id number]  
Author: Karina Viveros DTR  
Service: Nutrition Therapy  
Author Type: Dietetic   
Technician  
Type: Nutrition  
Filed: 3/29/2022 1:20 PM  
Note Text:  
NUTRITION THERAPY  
DIETETIC TECHNICIAN NOTE  
SERVICE DATE: 3/29/2022  
SERVICE TIME: 1028  
Visit Type: Length of Stay  
Patient reports   
unintentional weight loss of   
80# over the past year. Per  
Epic patient gained weight   
since 2021. Weight loss   
is not significant  
for malnutrition.  
Plan of Care:  
[facility]  
Nursing Admission Assessment   
Malnutrition Score: 0  
Nutrition Intake:  
Diet Orders (From admission,   
onward)  
Start Ordered  
22 0645 DIET   
CARBOHYDRATE CONTROLLED   
START NOW  
Question: Carbohydrate   
Control Answer: CONSISTENT   
CARBOHYDRATE  
22 0632  
Average intake over: Unable   
to determine  
Appetite: Good (Po intake   
%, eating well and   
enough)  
GI Symptoms:   
Constipation;Gaseous (acid   
reflux)  
Anthropometrics:  
Body mass index is 38.95   
kg/m?.  
Usual Weight: 123.4 kg (272   
lb) (a year ago per patient)  
Weight Change: Increased  
MNT Billing:  
$ Routine Care : 1-15   
minutes  
SIGNATURE: aKrina Viveros DTR PATIENT NAME: Jayden Soto  
DATE: 2022 MRN:   
[id number]  
TIME: 1:18 PM       Normal                                  [facility]  
   
                                                    Basic metabolic 2000 panelon  
 2022   
   
                                                    Anion gap   
[Moles/Vol]     7 mmol/L        Low             9-18            [facility]  
   
                                        Comment on above:   Order Comment: Speci  
men Type: BLOOD SPECIMEN  
Ordering Facility: [facility]  
Address: [address] [place] [id number]   
   
                                                            Performed By: #### 2  
4321-2 ####  
[facility]  
CLIA [id number]  
[address] [place]   
   
                                                    Calcium   
[Mass/Vol]      8.7 mg/dL       Normal          8.5-10.2        [facility]  
   
                                        Comment on above:   Order Comment: Speci  
men Type: BLOOD SPECIMEN  
Ordering Facility: [facility]  
Address: [address] [place] [address]   
   
                                                            Performed By: #### 2  
4321-2 ####  
[facility]  
CLIA [id number]  
1 [place] [address] [place]   
   
                                                    Chloride   
[Moles/Vol]     102 mmol/L      Normal                    [facility]  
   
                                        Comment on above:   Order Comment: Speci  
men Type: BLOOD SPECIMEN  
Ordering Facility: [facility]  
Address: [address] [place] [address]   
   
                                                            Performed By: #### 2  
4321-2 ####  
[facility]  
CLIA [id number]  
[address] [place]   
   
                      CO2 [Moles/Vol] 29 mmol/L  Normal     22-30      [facility]  
   
                                        Comment on above:   Order Comment: Speci  
men Type: BLOOD SPECIMEN  
Ordering Facility: [facility]  
Address: [address] [place] [address]   
   
                                                            Performed By: #### 2  
4321-2 ####  
[facility]  
CLIA [id number]  
[address] [place]   
   
                                                    Creatinine   
[Mass/Vol]      1.28 mg/dL      High            0.58-0.96       [facility]  
   
                                        Comment on above:   Order Comment: Speci  
men Type: BLOOD SPECIMEN  
Ordering Facility: [facility]  
Address: [address] [place] [address]   
   
                                                            Performed By: #### 2  
4321-2 ####  
[facility]  
CLIA [id number]  
[address]   
   
                                                    ESTIMATED   
GLOMERULAR   
FILTRATION RATE 48 mL/min/1.73m??? Low             >=60            [facility]  
   
                                        Comment on above:   Order Comment: Speci  
men Type: BLOOD SPECIMEN  
Ordering Facility: [facility]  
Address: [address] [place] [address]   
   
                                                            Result Comment: Sydnee  
mated Glomerular Filtration Rate (eGFR) is   
calculated using the  CKD-EPI creatinine equation. This equation   
utilizes serum creatinine, sex, and age as parameters. The   
creatinine assay has traceable calibration to isotope dilution-mass   
spectrometry. Refer to KDIGO guidelines for clinical interpretation.   
In patients with unstable renal function, e.g. those with acute   
kidney injury, the eGFR may not accurately reflect actual GFR.   
   
                                                            Performed By: #### 2  
4321-2 ####  
[facility]  
CLIA [id number]  
1 [place] [address] [place]   
   
                                                    Glucose   
[Mass/Vol]      159 mg/dL       High            74-99           [facility]  
   
                                        Comment on above:   Order Comment: Speci  
men Type: BLOOD SPECIMEN  
Ordering Facility: [facility]  
Address: [address] [place] [address]   
   
                                                            Result Comment: The   
American Diabetes Association (ADA) provides   
guidance for cutoff values for fasting glucose and random glucose.   
The ADA defines fasting as no caloric intake for at least 8 hours.   
Fasting plasma glucose results between 100 to 125 mg/dL indicate   
increased risk for diabetes (prediabetes).  
Fasting plasma glucose results greater than or equal to 126 mg/dL   
meet the criteria for diagnosis of diabetes. In the absence of   
unequivocal hyperglycemia, results should be confirmed by repeat   
testing. In a patient with classic symptoms of hyperglycemia or   
hyperglycemic crisis, random plasma glucose results greater than or   
equal to 200 mg/dL meet the criteria for diagnosis of diabetes.  
Reference: Standards of Medical Care in Diabetes 2016, American   
Diabetes Association. Diabetes Care. 2016.39(Suppl 1).   
   
                                                            Performed By: #### 2  
4321-2 ####  
[facility]  
CLIA [id number]  
1 [place] [address] [place]   
   
                                                    Potassium   
[Moles/Vol]     4.3 mmol/L      Normal          3.7-5.1         [facility]  
   
                                        Comment on above:   Order Comment: Speci  
men Type: BLOOD SPECIMEN  
Ordering Facility: [facility]  
Address: [address] [place] [address]   
   
                                                            Performed By: #### 2  
4321-2 ####  
[facility]  
CLIA [id number]  
1 [place] [address] [place]   
   
                                                    Sodium   
[Moles/Vol]     138 mmol/L      Normal          136-144         [facility]  
   
                                        Comment on above:   Order Comment: Speci  
men Type: BLOOD SPECIMEN  
Ordering Facility: [facility]  
Address[facility] [place] [address]   
   
                                                            Performed By: #### 2  
4321-2 ####  
[facility]  
CLIA [id number]  
[address]   
   
                                                    Urea nitrogen   
[Mass/Vol]      16 mg/dL        Normal          7-21            [facility]  
   
                                        Comment on above:   Order Comment: Speci  
men Type: BLOOD SPECIMEN  
Ordering Facility: [facility]  
Address: [address] [place] [address]   
   
                                                            Performed By: #### 2  
4321-2 ####  
[facility]  
CLIA [id number]  
[address]   
   
                                                    CASE MANAGEMon 2022   
   
                                        CASE MANAGEM        HNO ID: [id number]  
Author: Bev Steele RN  
Service: Nursing  
Author Type: Registered   
Nurse  
Type: Care Mgt Progress Note  
Filed: 3/28/2022 4:38 PM  
Note Text:  
CARE MANAGEMENT PROGRESS   
NOTE  
SERVICE DATE: 3/28/2022  
SERVICE TIME: 4:33 PM  
LOS: 7 days  
DC Plan: home with HHC   
through [facility]   
[facility]  
through [facility]; spoke   
with ID, copat in place,   
asked ID if they were  
ok with pt being discharged   
tomorrow and stated that pt   
should be ok to dc  
tomorrow, will follow up   
with ID tomorrow again;   
spoke with pt about  
possibility of dc tomorrow,   
pt states that her pain is   
managed enough to  
be dc tomorrow; [facility]   
and [facility] healthcare   
are able to do SOC  
visit Wednesday 3/30 if pt   
is medically ready for dc   
tomorrow after dose  
of IV antibiotics  
Anticipated dc date:   
3/29-3/30  
Transportation: pt family   
able to provide   
transportation at dc  
SIGNATURE: Bev Steele RN   
PATIENT NAME: Jayden Soto  
DATE: 2022 MRN:   
[id number]  
TIME: 4:33 PM PAGER/CONTACT   
#: [id number]       Normal                                  [facility]  
   
                                                    CBC panel Auto (Bld)on    
   
                                                    Erythrocyte   
distribution   
width (RBC)   
[Ratio]         14.7 %          Normal          11.5-15.0       [facility]  
   
                                        Comment on above:   Order Comment: Speci  
men Type: BLOOD SPECIMENOrdering Facility:  
   
[facility] Address: [address] [place] [address]   
   
                                                            Performed By: #### 5  
8410-2 ####[facility]ORYCL[facility]   
[id number] [address]   
   
                                                    Hematocrit (Bld)   
[Volume fraction] 25.9 %          Low             36.0-46.0       [facility]  
   
                                        Comment on above:   Order Comment: Speci  
men Type: BLOOD SPECIMENOrdering Facility:  
  
[facility] Address: [address] [place] [address]   
   
                                                            Performed By: #### 5  
8410-2 ####[facility] LAB[facility]ATORYCLIA   
[id number] [address] [place]   
[facility]   
   
                                                    Hemoglobin (Bld)   
[Mass/Vol]      7.8 g/dL        Low             11.5-15.5       [facility]  
   
                                        Comment on above:   Order Comment: Speci  
men Type: BLOOD SPECIMENOrdering Facility:  
   
[facility] Address: [address] [place] [address]   
   
                                                            Performed By: #### 5  
8410-2 ####[facility] LAB[facility]ORYCLIA   
[id number] [address] [place]   
[place]   
   
                                                    MCH (RBC)   
[Entitic mass]  28.1 pg         Normal          26.0-34.0       [facility]  
   
                                        Comment on above:   Order Comment: Speci  
men Type: BLOOD SPECIMENOrdering Facility:  
  
[facility] Address: [address] [place] [address]   
   
                                                            Performed By: #### 5  
8410-2 ####[facility] LAB[facility]ATORYCLIA   
[id number] [address] [place]   
[place]   
   
                                                    MCHC (RBC)   
[Mass/Vol]      30.1 g/dL       Low             30.5-36.0       [facility]  
   
                                        Comment on above:   Order Comment: Speci  
men Type: BLOOD SPECIMENOrdering Facility:  
   
[facility] Address: [address] [place] [address]   
   
                                                            Performed By: #### 5  
8410-2 ####[facility] LAB[facility]ATORYCLIA   
[id number] [address]   
   
                                                    MCV (RBC)   
[Entitic vol]   93.2 fL         Normal          80.0-100.0      [facility]  
   
                                        Comment on above:   Order Comment: Speci  
men Type: BLOOD SPECIMENOrdering Facility:  
  
[facility] Address: [address] [place] [address]   
   
                                                            Performed By: #### 5  
8410-2 ####[facility] LAB[facility]ATORYCLIA   
[id number] [address]   
   
                                                    Nucleated RBC   
(Bld) [#/Vol]   10*3/uL         Normal          <0.01           [facility]  
   
                                        Comment on above:   Order Comment: Speci  
men Type: BLOOD SPECIMENOrdering Facility:  
   
[facility] Address: [address] [place] [address]   
   
                                                            Performed By: #### 5  
8410-2 ####[facility] LABORATORYCLIA   
[id number] [address]   
   
                                                    Platelet mean   
volume (Bld)   
[Entitic vol]   9.7 fL          Normal          9.0-12.7        [facility]  
   
                                        Comment on above:   Order Comment: Speci  
men Type: BLOOD SPECIMENOrdering Facility:  
  
[facility] Address: [address] [place] [address]   
   
                                                            Performed By: #### 5  
8410-2 ####[facility] LABORATORYCLIA   
[id number] [address]   
   
                                                    Platelets (Bld)   
[#/Vol]         372 10*3/uL     Normal          150-400         [facility]  
   
                                        Comment on above:   Order Comment: Speci  
men Type: BLOOD SPECIMENOrdering Facility:  
   
[facility] Address: [address] [place] [address]   
   
                                                            Performed By: #### 5  
8410-2 ####[facility] LABORATORYCLIA   
[id number] [address] [place]   
[place]   
   
                      RBC (Bld) [#/Vol] 2.78 10*6/uL Low        3.90-5.20  [facility]  
   
                                        Comment on above:   Order Comment: Speci  
men Type: BLOOD SPECIMENOrdering Facility:  
   
[facility] Address: [address] [place] [address]   
   
                                                            Performed By: #### 5  
8410-2 ####[facility] LABORATORYCLIA   
[id number] [address] [place]   
[facility]   
   
                      WBC (Bld) [#/Vol] 9.68 10*3/uL Normal     3.70-11.00 [facility]  
   
                                        Comment on above:   Order Comment: Speci  
men Type: BLOOD SPECIMENOrdering Facility:  
   
[facility] Address: [address] [place] [address]   
   
                                                            Performed By: #### 5  
8410-2 ####[facility] LABORATORYCLIA   
[id number] [address]   
   
                                                    CONSULT PROGon 2022   
   
                                        CONSULT PROG        HNO ID: [id number]  
Author: Con Lovett III, MD  
Service: Infectious Disease  
Author Type: Physician  
Type: Consult Progress Note  
Filed: 3/28/2022 3:05 PM  
Note Text:  
PROGRESS NOTE INFECTIOUS   
DISEASE  
ASSESSMENT:  
61 year old female history   
of S. Aureus infection of   
the left knee in  
2021 who was on   
suppressive therapy with   
doxycycline and came in  
with left knee MSSA   
infection. Was reported in   
others notes that patient  
had MRSA last year but I   
wonder if this is recurrence   
of the prior  
infection? Need to get   
records of prior micro.  
1. Left TKA infection due to   
MSSA s/p extraction and   
antibiotic spacer  
placement- occurred despite   
doxycycline suppression  
2. Prior Left TKA infection   
with ?MRSA?-  
3. Prior spine infection-   
per care everywhere was in   
2020 due to  
MSSA so would really be   
surprised if #2 is MRSA  
4. Obesity, type 2 DM on   
insulin- complicate mgmt and   
recovery  
Active Antimicrobials (From   
admission, onward)  
Start Stop  
22 1500 cefTRIAXone iv   
piggyback 2 g in dextrose   
(iso-osmotic) 50  
mL (ROCEPHIN) 2 g,   
INTRAVENOUS, EVERY 24 HOURS  
--  
22 1300 miconazole 2 %   
1 application topical powder   
(LOTRIMIN AF,  
DESENEX) 1 application,   
TOPICAL, 2 TIMES DAILY  
--  
RECOMMENDATIONS:  
1. Change to ceftriaxone-   
stop date=22  
2. Resume doxycycline- need   
to get records from [facility] to   
answer the  
following->did she really   
have MRSA- if no then can   
hold the doxycycline;  
is there a reason to   
suppress staph other than   
the TKA infection?-if no  
then I don't think would   
need doxy going forward as   
the 2 stage exchange  
should eradicate  
DATA:  
Labs:  
Recent Labs  
22  
0209 22  
0401 22  
0135  
WBC 9.68 11.01* 10.82  
HB 7.8* 8.6* 8.4*  
HCT 25.9* 28.2* 26.9*  
 396 329  
 139 140  
K 4.3 4.2 4.2  
CHLOR 102 101 103  
CO2 29 29 30  
CREAT 1.28* 1.11* 0.93  
BUN 16 16 17  
GLUC 159* 146* 107*  
CA 8.7 9.1 8.8  
Microbiology:  
Positive Micro-30 Days  
Procedure Component Value   
Units Date/Time  
WOUND CULTURE AND GRAM STAIN   
[[id number]] (Abnormal)   
(Susceptibility)  
Collected: 22 1557  
Order Status: Completed   
Specimen: Tissue from KNEE   
ARTHROPLASTY LEFT  
Updated: 22 0953  
Culture, Wound Rare   
Staphylococcus aureus  
Gram Stain No organisms seen  
Many Polymorphonuclear   
leukocytes  
Many Red Blood Cells  
Susceptibility  
Staphylococcus aureus  
Not Specified  
Clindamycin Susceptible [1]  
Erythromycin Resistant  
Gentamicin Susceptible  
Oxacillin Susceptible [2]  
Rifampin Susceptible [3]  
Tetracycline Susceptible  
Trimeth sulfameth   
Susceptible  
Vancomycin Susceptible  
[1] This isolate does not   
demonstrate inducible   
Clindamycin resistance  
in vitro.  
[2] Oxacillin-susceptible   
staphylococci are   
susceptible to other  
penicilllinase-stable   
penicillins,   
beta-lactam/beta-lactamase   
inhibitor  
combinations,   
anti-staphylococcal cephems,   
and carbapenems.  
[3] Rifampin should not be   
used alone for antimicrobial   
therapy.  
[facility]  
Imaging/Other:  
No new imaging  
Subjective  
Feeling ok. Says she had   
trouble walking the stairs   
today with PT. She is  
worried about being   
discharged too early.  
Review of Systems  
No fevers or chills  
No nausea or diarrhea  
No pain at the right arm   
PICC  
Mild left knee pain  
Current medications:   
Reviewed  
Objective  
BP 99/55   Pulse 64   Temp   
(Src) 98.2 (Oral)   Resp 16   
  Ht 5' 7  (1.70m)  
  Wt 248 lb 10.9 oz   
(112.8kg)   SpO2 97%   BMI   
38.94 kg/(m2).  
O2 Therapy: Room Air  
Physical Exam  
Obese. No distress.  
oropharynx clear and moist  
Heart RRR without murmur  
Lungs CTAB  
Left knee bandaged, no   
surrounding redness.  
Right arm PICC without   
redness  
Lines, Drains, and Airways  
Line  
Central Line Single Lumen   
22 1319 Peripherally   
Inserted (PICC)  
Right Arm Through Introducer   
4.0 French 3 days  
SIGNATURE: Con Lovett III, MD PATIENT NAME: Jayden Soto  
DATE: 2022  
MRN: [id number]  
TIME: 2:47 PM  
PAGER/CONTACT #:   
[phone]        [facility]  
   
                                        CONSULT PROG        HNO ID: [id number]  
Author: Leonela Antony RPh  
Service: Pharmacy  
Author Type: Pharmacist  
Type: Consult Progress Note  
Filed: 3/28/2022 2:33 PM  
Note Text:  
PHARMACY VANCOMYCIN DOSING   
NOTE  
Patient Name: Jayden Soto   
MRN: [id number] Admission Date:   
3/21/2022  
Date of Consult: 3/28/2022   
Time of Consult: 2:31 PM  
Vancomycin therapy has been   
discontinued. Vancomycin   
level(s) have been  
discontinued: Yes. Pharmacy   
vancomycin dosing service   
will sign off.  
Thank you for allowing us to   
participate in this   
patient's care. Please  
contact pharmacy if there   
are questions.  
Leonela Antony RPh  [facility]  
   
                                        CONSULT PROG        HNO ID: [id number]  
Author: April Dc MD  
Service: Endocrinology  
Author Type: Physician  
Type: Consult Progress Note  
Filed: 3/28/2022 8:19 AM  
Note Text:  
ENDOCRINOLOGY CONSULT   
PROGRESS NOTE  
SERVICE DATE: 3/28/2022  
SERVICE TIME: 6:49 AM  
Subjective  
INTERVAL HPI: Following for   
DM type 2.  
The patient is a 61-year-old   
female, who was admitted on   
, with  
left knee pain and swelling.   
She was found to have an   
infected left knee  
arthroplasty. She had   
surgery on , removal   
of knee arthroplasty,  
left knee with irrigation   
and debridement of left knee   
and insertion of  
antibiotic spacer.  
?Patient has history of type   
2 diabetes for 32 years,   
diagnosed when she  
was pregnant. She was on   
oral agents for several   
years. She has been on  
insulin for about 15 years.   
She is on Lantus 40 units at   
bedtime and  
NovoLog 13 units with each   
meal; current dose for at   
least 1 year. She is  
also on Jardiance 25 mg a   
day. She checks blood sugar   
3 times a day. They  
used to be less than 190,   
but since she had infection   
last year in her  
knee, her blood sugars have   
been high over 200s and   
300s; sometimes, it  
has gone as high as 500 and   
600. Her blood sugars have   
been fluctuating  
here. She was initially   
given Lantus 25 units at   
bedtime and then it was  
increased to 40 units. She   
was n.p.o. and NovoLog was   
being held off and  
on. She has lost about 80   
pounds weight over the last   
year. She has  
history of retinopathy and   
gets injections. She also   
had cataract  
surgery. She has history of   
neuropathy and numbness in   
both feet. She  
did not have any kidney   
problems.  
Notes and orders reviewed.   
BS low last pm.  
Diet: DIET CARBOHYDRATE   
CONTROLLED p.o intake good,   
following diet.  
Activity:Up with Assistance,   
has PT; ambulating a little   
in room  
Review of Systems: c/o left   
knee pain, severe at times   
no SOB, no nausea,  
no emesis; rest negative  
Current   
Facility-Administered   
Medications  
Medication Dose Route   
Frequency  
- ondansetron (PF) 4 mg   
injection (ZOFRAN) 4 mg   
INTRAVENOUS q 6 H PRN  
- NaCl 0.9% iv flush bag 20   
mL INTRAVENOUS PRN  
- sodium chloride 0.9 %   
(flush) 3-5 mL (BD   
POSIFLUSH) 3-5 mL   
INTRAVENOUS  
q 12 H  
- sodium chloride 0.9 %   
(flush) 2-10 mL (BD   
POSIFLUSH) 2-10 mL  
INTRAVENOUS q 12 H  
- DULoxetine 60 mg cap(s)   
(CYMBALTA) 60 mg ORAL BID  
- ranolazine ER 1,000 mg   
tab(s) (RANEXA) 1,000 mg   
ORAL BID  
- dilTIAZem  mg cap(s)   
(CARDIZEM CD, CARTIA XT) 180   
mg ORAL DAILY  
- gabapentin 200 mg cap(s)   
(NEURONTIN) 200 mg ORAL TID  
- isosorbide mononitrate ER   
90 mg tab(s) (IMDUR) 90 mg   
ORAL DAILY  
- clopidogrel 75 mg tab(s)   
(PLAVIX) 75 mg ORAL DAILY  
- metoprolol tartrate (short   
acting) 25 mg tab(s)   
(LOPRESSOR) 25 mg ORAL  
q 12 H  
- dextrose 40 % 15 g 15 g   
ORAL PRN  
Or  
- glucagon 1 mg injection 1   
mg INTRAMUSCULAR PRN  
Or  
- dextrose 50% in water 25   
mL syringe 12.5 g   
INTRAVENOUS PRN  
- empagliflozin 25 mg tab(s)   
(JARDIANCE) 25 mg ORAL DAILY  
- docusate sodium 100 mg   
cap(s) (COLACE) 100 mg ORAL   
BID  
- aspirin, enteric coated 81   
mg tab(s) 81 mg ORAL BID  
- sodium chloride 0.9 %   
(flush) 3-5 mL (BD   
POSIFLUSH) 3-5 mL   
INTRAVENOUS  
q 12 H  
- vancomycin dosing and   
monitoring per pharmacy   
OTHER As Directed  
- miconazole 2 % 1   
application topical powder   
(LOTRIMIN AF, DESENEX) 1  
application TOPICAL BID  
- acetaminophen 1,000 mg   
tab(s) (TYLENOL) 1,000 mg   
ORAL q 6 H  
- oxyCODONE IR 5-10 mg   
tab(s) (ROXICODONE) 5-10 mg   
ORAL q 4 H PRN  
- traMADol 50 mg tab(s)   
(ULTRAM) 50 mg ORAL q 6 H  
- morphine 2 mg injection 2   
mg INTRAVENOUS q 3 H PRN  
- melatonin 6 mg tab(s) 6 mg   
ORAL DAILY (8 PM)  
- insulin lispro pen (rapid   
acting) (HumaLOG KWIKPEN)   
SUBCUTANEOUS w  
MEALS  
- sodium chloride 0.9 %   
(flush) 10 mL (BD POSIFLUSH)   
10 mL INTRAVENOUS q  
12 H  
- sodium chloride 0.9 %   
(flush) 20 mL (BD POSIFLUSH)   
20 mL INTRAVENOUS  
PRN  
- senna-docusate 8.6-50 mg 1   
tablet (SENNA-S) 1 tablet   
ORAL BID  
- calcium carbonate 500 mg   
chewable tab(s) (TUMS) 500   
mg ORAL TID PRN  
- insulin glargine 36 Units   
pen (long acting) (LANTUS   
SOLOSTAR, BASAGLAR  
KWIKPEN) 36 Units   
SUBCUTANEOUS AT BEDTIME  
- insulin lispro 8 Units pen   
(rapid acting) (HumaLOG   
KWIKPEN) 8 Units  
SUBCUTANEOUS DAILY wLUNCH  
- insulin lispro 10 Units   
pen (rapid acting) (HumaLOG   
KWIKPEN) 10 Units  
SUBCUTANEOUS DAILY wDINNER  
- insulin lispro 14 Units   
pen (rapid acting) (HumaLOG   
KWIKPEN) 14 Units  
SUBCUTANEOUS DAILY WITH   
BREAKFAST  
- NaCl 0.9% 1,000 mL iv   
bolus 1,000 mL INTRAVENOUS   
ONCE  
Objective  
PHYSICAL EXAM:  
GENERAL: lying in bed, in no   
acute distress, mildly   
obese.  
SKIN: Warm, dry, has heel   
protector dressings over   
both heels. No rash.  
No ulcers or calluses   
noticed. Left knee bandaged.  
HEENT: Head is normocephalic   
and atraumatic. Eyes, pupils   
round, EOMI.  
Buccal mucosa is dry. No   
thrush.  
NECK: Supple. No goiter   
palpable on thyroid exam.  
LUNGS: Fair air entry. Clear   
to auscultation.  
CVS: Regular rate and   
rhythm. No murmur or gallop.  
ABDOMEN: Soft and nontender.   
No masses.  
EXTREMITIES: (more content   
not included)...    Normal                                  [facility]  
   
                                                    THERAPY NTon 2022   
   
                                        THERAPY NT          HNO ID: [id number]  
Author: Eze Foley PTA  
Service: Physical Therapy  
Author Type: Physical   
Therapy Assistant  
Type: Therapy   
(PT/OT/Speech/Resp)  
Filed: 3/28/2022 2:37 PM  
Note Text:  
----------------------------  
----------------------------  
------------------------  
Attestation signed by   
Nabeel Monterroso PT at   
3/28/2022 4:14 PM  
I reviewed and agree with   
the assessment as documented   
above.  
SIGNATURE: Nabeel Monterroso PT  
DATE: 2022  
TIME: 4:14 PM  
----------------------------  
----------------------------  
------------------------  
Physical Therapy Treatment  
SERVICE DATE: 3/28/2022  
SERVICE TIME: 1402 to 1427  
ROOM: [place] [address]  
Recommended Discharge   
Disposition: Home PT  
Recommended Discharge   
Disposition Comments: home   
with family assist and  
home PT, use of wheeled   
walker  
Anticipated Discharge Needs:   
Physical Assist at   
Home;Supervision at Home  
Physical Assist at Home for:  
Cleaning;Laundry;Meals;Safet  
y;Shopping;Transportation  
Supervision at Home due to:   
(change in medical status)  
Recommended Discharge   
Equipment: No equipment   
needs anticipated  
PT 6 Clicks Score: 20  
Patient continues to require   
decreased levels of assist   
with all mobility  
tasks, able to negotiate 4   
steps this session with   
bilateral  
rail--progressed from   
moderate assist on 1st step   
to minimal assist.  
continue to recommend home   
PT to improve strength, ROM,   
balance,endurance,  
normalize gait pattern and   
improved safety and   
performance in the home  
back to prior level of   
function.  
Additional personnel present   
during visit: Ginny Morel  
Precautions/Activity   
Restrictions: Fall Risk  
Extremity With Weight   
Bearing Restricted: Left   
Lower Extremity  
Left Lower Extremity Weight   
Bearing Status: WBAT  
Current Hospital Course:   
Direct admit from Dr. Evans's office due to L TKA  
sepsis. s/p IANDD, component   
removal, antibiotic spacer   
insertion 3/23/33.  
WBAT post op. Waiting   
PICC/antibiotic   
recommendations.  
Reason for Hospital   
Admission: TKA infection  
Relevant Past Medical   
History: L TKA, joint   
infection with washout, HTN,  
DMII, obesity  
Response to Therapy   
Interventions: Good   
participation in activities,  
Improved tolerance for   
activity, Notable   
progression with functional  
activities/skills, On-track   
to achieve discharge goals  
Continue skilled needs due   
to: Functional   
mobility/skill impairments,  
Safety concerns  
Physical Therapy Problem   
List: Education   
Deficit;Safety   
Deficits;Decreased  
Activity Tolerance;Decreased   
Strength;Functional Mobility  
Impairment;Balance Impaired  
Treatment Interventions:   
Education;Strengthening;Func  
tional Mobility  
Training;Balance   
Training;Neuromuscular   
Re-education;Pain Management  
Plan for next visit: Chair   
transfer training, Gait   
training, Exercise  
instruction/handout, Sit to   
Stand Transfers, Stairs   
training, Standing  
Balance, Standing Tolerance,   
Bed mobility  
Home Environment  
Patient Lives With: Family   
(son, DIL, grandkids)  
Assistance Available: 24   
Hour  
Entry To Home: Ramp  
Number Of Stairs To   
Bed/Bath: 0  
Equipment Owned: Wheeled   
Walker;Cane;Shower   
Chair;Elevated Toilet Seat  
Prior Functional Level:   
Within Functional Limits  
Prior Functional Level   
Comments: patient reports   
typically independent  
without device, completes   
own ADLS  
Patient Report: agreeable to   
PT session  
CURRENT FUNCTIONAL STATUS:   
Most recent performance   
mobility performed  
during session in bold,   
other mobility completed   
during prior session and  
may no longer be correct or   
appropriate to complete.  
Current Functional Mobility   
Assist Level Additional   
Information  
Rolling  
Supine to Sit Modified   
Independent  
Sit to Supine Modified   
Independent  
Scooting  
Sit to Stand Contact Guard   
Assistance;Stand By   
Assistance cuing to slide  
LLE out, proper UE support,   
powering up with RLE  
Stand to Sit Contact Guard   
Assistance;Stand By   
Assistance cuing to slide  
LLE out, proper UE support,   
eccentric control  
Bed to Chair Contact Guard   
Assistance Bed To Chair   
Transfer Type: Stepping  
Bed To Chair Transfer   
Equipment: Gait Belt;Wheeled   
Walker  
Toilet/Commode Minimal   
Assistance;Additional   
Information  
Gait  
Contact Guard Assistance   
Gait Device: Wheeled Walker  
Gait Distance (feet): 100'  
cuing for proper posture,   
heel strike, reciprocal   
stepping  
Stairs Moderate   
Assistance;Minimal   
Assistance Stairs Device:   
Rail  
(bilateral rails)  
Number of Stairs: 4  
cuing to lead up with non   
surgical LE, down with   
surgical LE  
Curb Step  
Car Transfer  
Blank fields indicate   
activity not attempted  
General   
Deviations/Observations:   
Antalgic gait;Lynn  
decreased;Non-functional   
gait speed;Step length   
decreased  
Balance: Static   
Sitting;Dynamic   
Sitting;Static   
Standing;Dynamic Standing  
Static Sitting Balance:   
Normal Patient able to   
maintain steady balance  
without handhold support  
Dynamic Sitting Balance:   
Normal Patient accepts   
maximal challenge and  
can shift weight easily   
within full range in all   
directions  
Static Standing Balance:   
Fair Patient able to (more   
content not included)... Normal                                  [facility]  
   
                                                    Basic metabolic 2000 panelon  
 2022   
   
                                                    Anion gap   
[Moles/Vol]     9 mmol/L        Normal          9-18            [facility]  
   
                                        Comment on above:   Order Comment: Speci  
men Type: BLOOD SPECIMENOrdering Facility:  
   
[facility] Address: [address] [place] [address]   
   
                                                            Performed By: #### 2  
4321-2 ####[facility] LABORATORYCLIA   
[id number] [place] [address] [place]   
   
                                                    Calcium   
[Mass/Vol]      9.1 mg/dL       Normal          8.5-10.2        [facility]  
   
                                        Comment on above:   Order Comment: Speci  
men Type: BLOOD SPECIMENOrdering Facility:  
   
[facility] Address: [address] [place] [address]   
   
                                                            Performed By: #### 2  
4321-2 ####[facility] LABORATORYCLIA   
[id number] [place] [address] [place]   
   
                                                    Chloride   
[Moles/Vol]     101 mmol/L      Normal                    [facility]  
   
                                        Comment on above:   Order Comment: Speci  
men Type: BLOOD SPECIMENOrdering Facility:  
   
[facility] Address: [address] [place] [address]   
   
                                                            Performed By: #### 2  
4321-2 ####[facility] LABORATORYCLIA   
[id number] [address] [place]   
[place]   
   
                      CO2 [Moles/Vol] 29 mmol/L  Normal     22-30      [facility]  
   
                                        Comment on above:   Order Comment: Speci  
men Type: BLOOD SPECIMENOrdering Facility:  
   
[facility] Address: [address] [place] [address]   
   
                                                            Performed By: #### 2  
4321-2 ####[facility] LABORATORYCLIA   
[id number] [address] [place]   
[place]   
   
                                                    Creatinine   
[Mass/Vol]      1.11 mg/dL      High            0.58-0.96       [facility]  
   
                                        Comment on above:   Order Comment: Speci  
men Type: BLOOD SPECIMENOrdering Facility:  
   
[facility] Address: [address] [place] [address]   
   
                                                            Performed By: #### 2  
4321-2 ####[facility] LABORATORYCLIA   
[id number] [address]   
   
                                                    ESTIMATED   
GLOMERULAR   
FILTRATION RATE 57 mL/min/1.73m??? Low             >=60            [facility]  
   
                                        Comment on above:   Order Comment: Speci  
men Type: BLOOD SPECIMENOrdering Facility:  
   
[facility] Address: [address] [place] [address]   
   
                                                            Result Comment: Sydnee  
mated Glomerular Filtration Rate (eGFR) is   
calculated using the  CKD-EPI creatinine equation. This equation   
utilizes serum creatinine, sex, and age as parameters. The   
creatinine assay has traceable calibration to isotope dilution-mass   
spectrometry. Refer to KDIGO guidelines for clinical interpretation.   
In patients with unstable renal function, e.g. those with acute   
kidney injury, the eGFR may not accurately reflect actual GFR.   
   
                                                            Performed By: #### 2  
4321-2 ####[facility] LABORATORYCLIA   
[id number] [address] [place]   
[place]   
   
                                                    Glucose   
[Mass/Vol]      146 mg/dL       High            74-99           [facility]  
   
                                        Comment on above:   Order Comment: Speci  
men Type: BLOOD SPECIMENOrdering Facility:  
   
[facility] Address: [address] [place] [address]   
   
                                                            Result Comment: The   
American Diabetes Association (ADA) provides   
guidance for cutoff values for fasting glucose and random glucose.   
The ADA defines fasting as no caloric intake for at least 8 hours.   
Fasting plasma glucose results between 100 to 125 mg/dL indicate   
increased risk for diabetes (prediabetes).  
Fasting plasma glucose results greater than or equal to 126 mg/dL   
meet the criteria for diagnosis of diabetes. In the absence of   
unequivocal hyperglycemia, results should be confirmed by repeat   
testing. In a patient with classic symptoms of hyperglycemia or   
hyperglycemic crisis, random plasma glucose results greater than or   
equal to 200 mg/dL meet the criteria for diagnosis of diabetes.  
Reference: Standards of Medical Care in Diabetes 2016, American   
Diabetes Association. Diabetes Care. 2016.39(Suppl 1).   
   
                                                            Performed By: #### 2  
4321-2 ####[facility] LABORATORYCLIA   
[id number] [place] [address] [place]   
   
                                                    Potassium   
[Moles/Vol]     4.2 mmol/L      Normal          3.7-5.1         [facility]  
   
                                        Comment on above:   Order Comment: Jerry  
men Type: BLOOD SPECIMENOrdering Facility:  
   
[facility] Address: [address] [place] [address]   
   
                                                            Performed By: #### 2  
4321-2 ####[facility] LABORATORYCLIA   
[id number] [place] [address] [place]   
   
                                                    Sodium   
[Moles/Vol]     139 mmol/L      Normal          136-144         [facility]  
   
                                        Comment on above:   Order Comment: Speci  
men Type: BLOOD SPECIMENOrdering Facility:  
   
[facility] Address: [address] [address]   
   
                                                            Performed By: #### 2  
4321-2 ####[facility] LABORATORYCLIA   
[id number] [place] [address] [place]   
   
                                                    Urea nitrogen   
[Mass/Vol]      16 mg/dL        Normal          7-21            [facility]  
   
                                        Comment on above:   Order Comment: Speci  
men Type: BLOOD SPECIMENOrdering Facility:  
   
[facility] Address: [address] [address]   
   
                                                            Performed By: #### 2  
4321-2 ####[facility] LABORATORYCLIA   
[id number] [address]   
   
                                                    CBC panel Auto (Bld)on    
   
                                                    Erythrocyte   
distribution   
width (RBC)   
[Ratio]         14.6 %          Normal          11.5-15.0       [facility]  
   
                                        Comment on above:   Order Comment: Speci  
men Type: BLOOD SPECIMENOrdering Facility:  
   
[facility] Address: [address] [place] [address]   
   
                                                            Performed By: #### 5  
8410-2 ####[facility] LABORATORYCLIA   
[id number] [address]   
   
                                                    Hematocrit (Bld)   
[Volume fraction] 28.2 %          Low             36.0-46.0       [facility]  
   
                                        Comment on above:   Order Comment: Speci  
men Type: BLOOD SPECIMENOrdering Facility:  
  
[facility] Address: [address] [place] [address]   
   
                                                            Performed By: #### 5  
8410-2 ####[facility] LABORATORYCLIA   
[id number] [address]   
   
                                                    Hemoglobin (Bld)   
[Mass/Vol]      8.6 g/dL        Low             11.5-15.5       [facility]  
   
                                        Comment on above:   Order Comment: Speci  
men Type: BLOOD SPECIMENOrdering Facility:  
   
[facility] Address: [address] [place] [address]   
   
                                                            Performed By: #### 5  
8410-2 ####[facility] LABORATORYCLIA   
[id number] [address]   
   
                                                    MCH (RBC)   
[Entitic mass]  28.1 pg         Normal          26.0-34.0       [facility]  
   
                                        Comment on above:   Order Comment: Speci  
men Type: BLOOD SPECIMENOrdering Facility:  
  
[facility] Address: [address] [place] [address]   
   
                                                            Performed By: #### 5  
8410-2 ####[facility] LABORATORYCLIA   
[id number] [address]   
   
                                                    MCHC (RBC)   
[Mass/Vol]      30.5 g/dL       Normal          30.5-36.0       [facility]  
   
                                        Comment on above:   Order Comment: Speci  
men Type: BLOOD SPECIMENOrdering Facility:  
   
[facility] Address: [address] [place] [address]   
   
                                                            Performed By: #### 5  
8410-2 ####[facility] LABORATORYCLIA   
[id number] [address] [place]   
   
                                                    MCV (RBC)   
[Entitic vol]   92.2 fL         Normal          80.0-100.0      [facility]  
   
                                        Comment on above:   Order Comment: Speci  
men Type: BLOOD SPECIMENOrdering Facility:  
  
[facility] Address: [address] [place] [address]   
   
                                                            Performed By: #### 5  
8410-2 ####[facility] LABORATORYCLIA   
[id number] [address] [place] OF   
[facility]   
   
                                                    Nucleated RBC   
(Bld) [#/Vol]   10*3/uL         Normal          <0.01           [facility]  
   
                                        Comment on above:   Order Comment: Speci  
men Type: BLOOD SPECIMENOrdering Facility:  
   
[facility] Address: [address] [place] [address]   
   
                                                            Performed By: #### 5  
8410-2 ####[facility] LABORATORYCLIA   
[id number] [address] [place]   
[place]   
   
                                                    Platelet mean   
volume (Bld)   
[Entitic vol]   9.5 fL          Normal          9.0-12.7        [facility]  
   
                                        Comment on above:   Order Comment: Speci  
men Type: BLOOD SPECIMENOrdering Facility:  
  
[facility] Address: [address] [place] [address]   
   
                                                            Performed By: #### 5  
8410-2 ####[facility] LABORATORYCLIA   
[id number] [address] [place]   
[place]   
   
                                                    Platelets (Bld)   
[#/Vol]         396 10*3/uL     Normal          150-400         [facility]  
   
                                        Comment on above:   Order Comment: Speci  
men Type: BLOOD SPECIMENOrdering Facility:  
   
[facility] Address: [address] [place] [address]   
   
                                                            Performed By: #### 5  
8410-2 ####[facility] LABORATORYCLIA   
[id number] [address]   
   
                      RBC (Bld) [#/Vol] 3.06 10*6/uL Low        3.90-5.20  [facility]  
   
                                        Comment on above:   Order Comment: Speci  
men Type: BLOOD SPECIMENOrdering Facility:  
   
[facility] Address: [address] [place] [address]   
   
                                                            Performed By: #### 5  
8410-2 ####[facility] LABORATORYCLIA   
[id number] [address]   
   
                      WBC (Bld) [#/Vol] 11.01 10*3/uL High       3.70-11.00 [facility]  
   
                                        Comment on above:   Order Comment: Speci  
men Type: BLOOD SPECIMENOrdering Facility:  
   
[facility] Address: [facility] [place] [address]   
   
                                                            Performed By: #### 5  
8410-2 ####[facility] LABORATORYCLIA   
[id number] [address]   
   
                                                    CONSULT PROGon 2022   
   
                                        CONSULT PROG        HNO ID: [id number]  
Author: Isamar Bhat RPh  
Service: Pharmacy  
Author Type: Pharmacist  
Type: Consult Progress Note  
Filed: 3/27/2022 12:23 PM  
Note Text:  
PHARMACY VANCOMYCIN DOSING   
NOTE  
Patient Name: Jayden Soto   
MRN: [id number] Admission Date:   
3/21/2022  
Date of Consult: 3/27/2022   
Time of Consult: 11:31 AM  
Indication: Bone AND joint   
infection  
Goal Range: 15-25 mcg/mL  
RECOMMENDATIONS/PLAN:  
Pharmacy consulted for   
vancomycin dosing for Jayden Soto, a 61 year old,  
female who is being treated   
with vancomycin for knee   
arthroplasty  
prosthetic joint infection.  
1. Patient is currently   
ordered Vancomycin 1.5 g IV   
q12h. Today is day 5  
of therapy.  
2. The most recent   
vancomycin level was 23.1   
mcg/mL drawn at 1108 on  
3/27/22. This is a ~11.5   
hour level on the 5th day of   
therapy. This  
level is reflective of 2   
doses of 1.5g q12h   
(pre-steady state for this  
dose).  
3. Serum creatinine and/or   
urine output have changed   
markedly in the  
previous days, therefore   
will discontinue vancomycin   
at this time and dose  
by level. Baseline Scr ~0.7,   
increased to 1.11 and   
appeared to be  
down-trending 3/26/22. Given   
Scr increasing again today   
(1.11), will dose  
by level at this time. Will   
give vancomycin weight based   
dose- 1.75g x 1  
today @ 1700 to allow time   
for vancomycin to clear.  
4. The next vancomycin level   
has been ordered for 3/28/22   
@ 1500  
(Completed)  
We will follow patient renal   
function, vancomycin levels   
and doses with  
you during the course of   
therapy. Additional   
recommendations will appear  
in follow up notes. If you   
have any questions, please   
contact Isamar Bhat  
at [phone] or [place]   
[place] [phone]  
Age: 61 year old  
Allergies:  
ALLERGIES  
Allergen Reactions  
- Morphine GI Upset  
- Statins-Hmg-Coa Red*   
Unknown  
Last 3 Encounter Wt   
Readings:  
Date: Wt:  
2022 113.5 kg (250 lb   
3.6 oz)  
2022 95.3 kg (210 lb)  
2022 95.3 kg (210 lb)  
Last 1 Encounter Ht   
Readings:  
Date: Ht:  
2022 170.2 cm (5' 7 )  
Estimated Creatinine   
Clearance: 70.1 mL/min (A)   
(based on SCr of 1.11  
mg/dL (H)).  
Temp (24hrs), Av.7 ?C   
(98.1 ?F), Min:36.4 ?C (97.5   
?F), Max:37 ?C  
(98.6 ?F)  
- Current Temp: 36.5 ?C   
(97.7 ?F)  
Labs  
BUN (mg/dL)  
Date Value  
2022 16  
2022 17  
2022 17  
Creatinine (mg/dL)  
Date Value  
2022 1.11 (H)  
2022 0.93  
2022 1.11 (H)  
WBC (k/uL)  
Date Value  
2022 11.01 (H)  
2022 10.82  
2022 15.83 (H)  
Vancomycin Levels:  
Vancomycin (ug/mL)  
Date/Time Value  
2022 1108 23.1 (H)  
2022 0913 12.6  
Isamar Bhat [facility]      Normal                                  [facility]  
   
                                        CONSULT PROG        HNO ID: [id number]  
Author: April Dc MD  
Service: Endocrinology  
Author Type: Physician  
Type: Consult Progress Note  
Filed: 3/27/2022 9:31 AM  
Note Text:  
ENDOCRINOLOGY CONSULT   
PROGRESS NOTE  
SERVICE DATE: 3/27/2022  
SERVICE TIME: 7:22 AM  
Subjective  
INTERVAL HPI: Following for   
DM type 2.  
The patient is a 61-year-old   
female, who was admitted on   
, with  
left knee pain and swelling.   
She was found to have an   
infected left knee  
arthroplasty. She had   
surgery on , removal   
of knee arthroplasty,  
left knee with irrigation   
and debridement of left knee   
and insertion of  
antibiotic spacer.  
?Patient has history of type   
2 diabetes for 32 years,   
diagnosed when she  
was pregnant. She was on   
oral agents for several   
years. She has been on  
insulin for about 15 years.   
She is on Lantus 40 units at   
bedtime and  
NovoLog 13 units with each   
meal; current dose for at   
least 1 year. She is  
also on Jardiance 25 mg a   
day. She checks blood sugar   
3 times a day. They  
used to be less than 190,   
but since she had infection   
last year in her  
knee, her blood sugars have   
been high over 200s and   
300s; sometimes, it  
has gone as high as 500 and   
600. Her blood sugars have   
been fluctuating  
here. She was initially   
given Lantus 25 units at   
bedtime and then it was  
increased to 40 units. She   
was n.p.o. and NovoLog was   
being held off and  
on. She has lost about 80   
pounds weight over the last   
year. She has  
history of retinopathy and   
gets injections. She also   
had cataract  
surgery. She has history of   
neuropathy and numbness in   
both feet. She  
did not have any kidney   
problems.  
Notes and orders reviewed.  
Diet: DIET CARBOHYDRATE   
CONTROLLED p.o intake good,   
following diet.  
Activity:Up with Assistance,   
has PT; ambulating a little   
in room  
Review of Systems: c/o left   
knee pain, severe at times   
no SOB, c/o mild  
occ nausea, no emesis; rest   
negative  
Current   
Facility-Administered   
Medications  
Medication Dose Route   
Frequency  
- ondansetron (PF) 4 mg   
injection (ZOFRAN) 4 mg   
INTRAVENOUS q 6 H PRN  
- NaCl 0.9% iv flush bag 20   
mL INTRAVENOUS PRN  
- sodium chloride 0.9 %   
(flush) 3-5 mL (BD   
POSIFLUSH) 3-5 mL   
INTRAVENOUS  
q 12 H  
- sodium chloride 0.9 %   
(flush) 2-10 mL (BD   
POSIFLUSH) 2-10 mL  
INTRAVENOUS q 12 H  
- DULoxetine 60 mg cap(s)   
(CYMBALTA) 60 mg ORAL BID  
- ranolazine ER 1,000 mg   
tab(s) (RANEXA) 1,000 mg   
ORAL BID  
- dilTIAZem  mg cap(s)   
(CARDIZEM CD, CARTIA XT) 180   
mg ORAL DAILY  
- gabapentin 200 mg cap(s)   
(NEURONTIN) 200 mg ORAL TID  
- isosorbide mononitrate ER   
90 mg tab(s) (IMDUR) 90 mg   
ORAL DAILY  
- clopidogrel 75 mg tab(s)   
(PLAVIX) 75 mg ORAL DAILY  
- metoprolol tartrate (short   
acting) 25 mg tab(s)   
(LOPRESSOR) 25 mg ORAL  
q 12 H  
- dextrose 40 % 15 g 15 g   
ORAL PRN  
Or  
- glucagon 1 mg injection 1   
mg INTRAMUSCULAR PRN  
Or  
- dextrose 50% in water 25   
mL syringe 12.5 g   
INTRAVENOUS PRN  
- empagliflozin 25 mg tab(s)   
(JARDIANCE) 25 mg ORAL DAILY  
- docusate sodium 100 mg   
cap(s) (COLACE) 100 mg ORAL   
BID  
- aspirin, enteric coated 81   
mg tab(s) 81 mg ORAL BID  
- sodium chloride 0.9 %   
(flush) 3-5 mL (BD   
POSIFLUSH) 3-5 mL   
INTRAVENOUS  
q 12 H  
- vancomycin dosing and   
monitoring per pharmacy   
OTHER As Directed  
- insulin glargine 40 Units   
pen (long acting) (LANTUS   
SOLOSTAR, BASAGLAR  
KWIKPEN) 40 Units   
SUBCUTANEOUS AT BEDTIME  
- miconazole 2 % 1   
application topical powder   
(LOTRIMIN AF, DESENEX) 1  
application TOPICAL BID  
- acetaminophen 1,000 mg   
tab(s) (TYLENOL) 1,000 mg   
ORAL q 6 H  
- oxyCODONE IR 5-10 mg   
tab(s) (ROXICODONE) 5-10 mg   
ORAL q 4 H PRN  
- traMADol 50 mg tab(s)   
(ULTRAM) 50 mg ORAL q 6 H  
- morphine 2 mg injection 2   
mg INTRAVENOUS q 3 H PRN  
- melatonin 6 mg tab(s) 6 mg   
ORAL DAILY (8 PM)  
- insulin lispro 16 Units   
pen (rapid acting) (HumaLOG   
KWIKPEN) 16 Units  
SUBCUTANEOUS DAILY WITH   
BREAKFAST  
- insulin lispro 10 Units   
pen (rapid acting) (HumaLOG   
KWIKPEN) 10 Units  
SUBCUTANEOUS DAILY wLUNCH  
- insulin lispro 12 Units   
pen (rapid acting) (HumaLOG   
KWIKPEN) 12 Units  
SUBCUTANEOUS DAILY wDINNER  
- insulin lispro pen (rapid   
acting) (HumaLOG KWIKPEN)   
SUBCUTANEOUS w  
MEALS  
- sodium chloride 0.9 %   
(flush) 10 mL (BD POSIFLUSH)   
10 mL INTRAVENOUS q  
12 H  
- sodium chloride 0.9 %   
(flush) 20 mL (BD POSIFLUSH)   
20 mL INTRAVENOUS  
PRN  
- senna-docusate 8.6-50 mg 1   
tablet (SENNA-S) 1 tablet   
ORAL BID  
- vancomycin 1.5 g in D5W   
250 mL (VANCOCIN) 1.5 g   
INTRAVENOUS q 12 HR  
- NaCl 0.9% 500 mL iv bolus   
500 mL INTRAVENOUS ONCE  
Objective  
PHYSICAL EXAM:  
GENERAL: lying in bed, in no   
acute distress, mildly   
obese.  
SKIN: Warm, dry, has heel   
protector dressings over   
both heels. No rash.  
No ulcers or calluses   
noticed. Left knee bandaged.  
HEENT: Head is normocephalic   
and atraumatic. Eyes, pupils   
round, EOMI.  
Buccal mucosa is dry. No   
thrush.  
NECK: Supple. No goiter   
palpable on thyroid exam.  
LUNGS: Fair air entry. Clear   
to auscultation.  
CVS: Regular rate and   
rhythm. No murmur or gallop.  
ABDOMEN: Soft and nontender.   
No masses.  
EXTREMITIES: has com (more   
content not included)... Normal                                  [facility]  
   
                                                    THERAPY NTon 2022   
   
                                        THERAPY NT          HNO ID: [id number]  
Author: Eze Foley PTA  
Service: Physical Therapy  
Author Type: Physical   
Therapy Assistant  
Type: Therapy   
(PT/OT/Speech/Resp)  
Filed: 3/27/2022 12:06 PM  
Note Text:  
----------------------------  
----------------------------  
------------------------  
Attestation signed by Parvin Tomlin PT at 3/27/2022 2:41   
PM  
I reviewed and agree with   
the documentation   
corresponding to this   
therapy  
visit.  
SIGNATURE: Parvin Tomlin PT  
DATE: 2022  
TIME: 2:41 PM  
----------------------------  
----------------------------  
------------------------  
Physical Therapy Treatment  
SERVICE DATE: 3/27/2022  
SERVICE TIME: 1116 to 1143  
ROOM: OY--01  
Recommended Discharge   
Disposition: Home PT  
Recommended Discharge   
Disposition Comments: home   
with family assist and  
home PT, use of wheeled   
walker  
Anticipated Discharge Needs:   
Physical Assist at   
Home;Supervision at Home  
Physical Assist at Home for:  
Cleaning;Laundry;Meals;Safet  
y;Shopping;Transportation  
Supervision at Home due to:   
(change in medical status)  
Recommended Discharge   
Equipment: No equipment   
needs anticipated  
PT 6 Clicks Score: 19  
Patient with improved gait   
distances this session,   
attempted  
stairs--unable to complete,   
secondary to pain with WB on   
LLE attempting to  
step up with RLE--patient   
does not have stairs at   
home. continue to  
recommend home PT to improve   
strength, ROM,   
balance,endurance, normalize  
gait pattern and improved   
safety and performance in   
the home back to prior  
level of function.  
Additional personal during   
visit : Luis Choudhary  
Precautions/Activity   
Restrictions: Fall Risk  
Extremity With Weight   
Bearing Restricted: Left   
Lower Extremity  
Left Lower Extremity Weight   
Bearing Status: WBAT  
Current Hospital Course:   
Direct admit from Dr. Evans's office due to L TKA  
sepsis. s/p IANDD, component   
removal, antibiotic spacer   
insertion 3/23/33.  
WBAT post op. Waiting   
PICC/antibiotic   
recommendations.  
Reason for Hospital   
Admission: TKA infection  
Relevant Past Medical   
History: L TKA, joint   
infection with washout, HTN,  
DMII, obesity  
Response to Therapy   
Interventions: Good   
participation in activities,  
Improved tolerance for   
activity, Notable   
progression with functional  
activities/skills, Pain  
Continue skilled needs due   
to: Functional   
mobility/skill impairments,  
Safety concerns  
Physical Therapy Problem   
List: Education   
Deficit;Safety   
Deficits;Decreased  
Activity Tolerance;Decreased   
Strength;Functional Mobility  
Impairment;Balance Impaired  
Treatment Interventions:   
Education;Strengthening;Func  
tional Mobility  
Training;Balance   
Training;Neuromuscular   
Re-education;Pain Management  
Plan for next visit: Chair   
transfer training, Gait   
training, Exercise  
instruction/handout, Sit to   
Stand Transfers, Stairs   
training, Standing  
Balance, Standing Tolerance,   
Bed mobility  
Home Environment  
Patient Lives With: Family   
(son, DIL, grandkids)  
Assistance Available: 24   
Hour  
Entry To Home: Ramp  
Number Of Stairs To   
Bed/Bath: 0  
Equipment Owned: Wheeled   
Walker;Cane;Shower   
Chair;Elevated Toilet Seat  
Prior Functional Level:   
Within Functional Limits  
Prior Functional Level   
Comments: patient reports   
typically independent  
without device, completes   
own ADLS  
Patient Report: agreeable to   
PT session  
CURRENT FUNCTIONAL STATUS:   
Most recent performance   
mobility performed  
during session in bold,   
other mobility completed   
during prior session and  
may no longer be correct or   
appropriate to complete.  
Current Functional Mobility   
Assist Level Additional   
Information  
Rolling  
Supine to Sit Stand By   
Assistance  
Sit to Supine Stand By   
Assistance  
Scooting  
Sit to Stand Contact Guard   
Assistance cuing to slide   
LLE out, proper UE  
support, powering up with   
RLE  
Stand to Sit Contact Guard   
Assistance cuing to slide   
LLE out, proper UE  
support, eccentric control  
Bed to Chair Contact Guard   
Assistance Bed To Chair   
Transfer Type: Stepping  
Bed To Chair Transfer   
Equipment: Gait Belt;Wheeled   
Walker  
Toilet/Commode Minimal   
Assistance;Additional   
Information  
Gait  
Contact Guard Assistance   
Gait Device: Wheeled Walker  
Gait Distance (feet): 100'  
cuing for proper posture,   
heel strike, reciprocal   
stepping  
Stairs Additional   
Information  
attempted, too painful to   
place all weight on LLE to   
step up with right  
using both rails  
Curb Step  
Car Transfer  
Blank fields indicate   
activity not attempted  
General   
Deviations/Observations:   
Antalgic gait;Lynn  
decreased;Non-functional   
gait speed;Step length   
decreased  
Balance: Static   
Sitting;Dynamic   
Sitting;Static   
Standing;Dynamic Standing  
Static Sitting Balance:   
Normal Patient able to   
maintain steady balance  
without handhold support  
Dynamic Sitting Balance:   
Normal Patient accepts   
maximal challenge and  
can shift weight easily   
within full range in all   
directions  
Static Standing Balance:   
Fair Patient able to   
maintain balance with  
handhold support, may   
require occasional minimal   
assistance  
Dynamic Standing Balance:   
Fair Patient (more content   
not included)...    Normal                                  [facility]  
   
                                                    Vancomycin random [Mass/Vol]  
on 2022   
   
                                                    Vancomycin   
[Mass/Vol]      23.1 ug/mL      High            10.0-20.0       [facility]  
   
                                        Comment on above:   Order Comment: Speci  
men Type: BLOOD SPECIMEN  
Ordering Facility: [facility]  
Address: [address] [place] [address]   
   
                                                            Result Comment: Refe  
rence ranges and high/low indicator flags are   
provided as general guidelines only. The treating physician must   
determine appropriate target levels/dosing based on the specific   
clinical situation.   
   
                                                            Performed By: #### 2  
4321-2 ####  
[facility] LABORATORY  
CLIA [id number]  
[address] [place]   
   
                                                    Basic metabolic 2000 panelon  
 2022   
   
                                                    Anion gap   
[Moles/Vol]     7 mmol/L        Low             9-18            [facility]  
   
                                        Comment on above:   Order Comment: Speci  
men Type: BLOOD SPECIMENOrdering Facility:  
   
[facility] Address: [address] [place] [address]   
   
                                                            Performed By: #### 2  
4321-2 ####[facility] LABORATORYCLIA   
[id number] [place] [address] [place]   
[place]   
   
                                                    Calcium   
[Mass/Vol]      8.8 mg/dL       Normal          8.5-10.2        [facility]  
   
                                        Comment on above:   Order Comment: Speci  
men Type: BLOOD SPECIMENOrdering Facility:  
   
[facility] Address: [address] [place] [address]   
   
                                                            Performed By: #### 2  
4321-2 ####[facility] LABORATORYCLIA   
[id number] [place] [address] [place]   
[place]   
   
                                                    Chloride   
[Moles/Vol]     103 mmol/L      Normal                    [facility]  
   
                                        Comment on above:   Order Comment: Speci  
men Type: BLOOD SPECIMENOrdering Facility:  
   
[facility] Address: [address] [place] [address]   
   
                                                            Performed By: #### 2  
4321-2 ####[facility] LABORATORYCLIA   
[id number] [place] [address] [place]   
[place]   
   
                      CO2 [Moles/Vol] 30 mmol/L  Normal     22-30      [facility]  
   
                                        Comment on above:   Order Comment: Speci  
men Type: BLOOD SPECIMENOrdering Facility:  
   
[facility] Address: [address] [address]   
   
                                                            Performed By: #### 2  
4321-2 ####[facility]   
[id number] [address]   
   
                                                    Creatinine   
[Mass/Vol]      0.93 mg/dL      Normal          0.58-0.96       [facility]  
   
                                        Comment on above:   Order Comment: Speci  
men Type: BLOOD SPECIMENOrdering Facility:  
   
[facility] Address: [address] [address]   
   
                                                            Performed By: #### 2  
4321-2 ####[facility] HospitalIA   
[id number] [address]   
   
                                                    ESTIMATED   
GLOMERULAR   
FILTRATION RATE 71 mL/min/1.73m??? Normal          >=60            [facility]  
   
                                        Comment on above:   Order Comment: Speci  
men Type: BLOOD SPECIMENOrdering Facility:  
   
[facility] Address: [address] [place] [address]   
   
                                                            Result Comment: Sydnee  
mated Glomerular Filtration Rate (eGFR) is   
calculated using the  CKD-EPI creatinine equation. This equation   
utilizes serum creatinine, sex, and age as parameters. The   
creatinine assay has traceable calibration to isotope dilution-mass   
spectrometry. Refer to KDIGO guidelines for clinical interpretation.   
In patients with unstable renal function, e.g. those with acute   
kidney injury, the eGFR may not accurately reflect actual GFR.   
   
                                                            Performed By: #### 2  
4321-2 ####[facility] LABORATORYCLIA   
[id number] [address] OF   
[facility]   
   
                                                    Glucose   
[Mass/Vol]      107 mg/dL       High            74-99           [facility]  
   
                                        Comment on above:   Order Comment: Speci  
men Type: BLOOD SPECIMENOrdering Facility:  
   
[facility] Address: [address] [address]   
   
                                                            Result Comment: The   
American Diabetes Association (ADA) provides   
guidance for cutoff values for fasting glucose and random glucose.   
The ADA defines fasting as no caloric intake for at least 8 hours.   
Fasting plasma glucose results between 100 to 125 mg/dL indicate   
increased risk for diabetes (prediabetes).  
Fasting plasma glucose results greater than or equal to 126 mg/dL   
meet the criteria for diagnosis of diabetes. In the absence of   
unequivocal hyperglycemia, results should be confirmed by repeat   
testing. In a patient with classic symptoms of hyperglycemia or   
hyperglycemic crisis, random plasma glucose results greater than or   
equal to 200 mg/dL meet the criteria for diagnosis of diabetes.  
Reference: Standards of Medical Care in Diabetes 2016, American   
Diabetes Association. Diabetes Care. 2016.39(Suppl 1).   
   
                                                            Performed By: #### 2  
4321-2 ####[facility] LABORATORYCLIA   
[id number] [address] [place]   
[facility]   
   
                                                    Potassium   
[Moles/Vol]     4.2 mmol/L      Normal          3.7-5.1         [facility]  
   
                                        Comment on above:   Order Comment: Speci  
men Type: BLOOD SPECIMENOrdering Facility:  
   
[facility] Address: [address] [place] [address]   
   
                                                            Performed By: #### 2  
4321-2 ####[facility] LABORATORYCLIA   
[id number] [address] [place] [facility]   
   
                                                    Sodium   
[Moles/Vol]     140 mmol/L      Normal          136-144         [facility]  
   
                                        Comment on above:   Order Comment: Speci  
men Type: BLOOD SPECIMENOrdering Facility:  
   
[facility] Address: [address] [place] [address]   
   
                                                            Performed By: #### 2  
4321-2 ####[facility] LABORATORYCLIA   
[id number] [address] [place]   
[place]   
   
                                                    Urea nitrogen   
[Mass/Vol]      17 mg/dL        Normal          7-21            [facility]  
   
                                        Comment on above:   Order Comment: Speci  
men Type: BLOOD SPECIMENOrdering Facility:  
   
[facility] Address: [address] [place] [address]   
   
                                                            Performed By: #### 2  
4321-2 ####[facility] LABORATORYCLIA   
[id number] [address] [place]   
[place]   
   
                                                    CBC panel Auto (Bld)on    
   
                                                    Erythrocyte   
distribution   
width (RBC)   
[Ratio]         14.5 %          Normal          11.5-15.0       [facility]  
   
                                        Comment on above:   Order Comment: Speci  
men Type: BLOOD SPECIMENOrdering Facility:  
   
[facility] Address: [address] [place] [address]   
   
                                                            Performed By: #### 6  
4626, 845-3 ####  
[facility]  
CLIA [id number]  
[address]   
   
                                                    Hematocrit (Bld)   
[Volume fraction] 26.9 %          Low             36.0-46.0       [facility]  
   
                                        Comment on above:   Order Comment: Speci  
men Type: BLOOD SPECIMENOrdering Facility:  
  
[facility] Address: [address] [place] [address]   
   
                                                            Performed By: #### 6  
462-9, 295-3 ####  
[facility]  
CLIA [id number]  
[address] OF [facility]   
   
                                                    Hemoglobin (Bld)   
[Mass/Vol]      8.4 g/dL        Low             11.5-15.5       [facility]  
   
                                        Comment on above:   Order Comment: Speci  
men Type: BLOOD SPECIMENOrdering Facility:  
   
[facility] Address: [address] [place] [address]   
   
                                                            Performed By: #### 6  
4626, 885-3 ####  
[facility]  
CLIA [id number]  
[address]   
   
                                                    MCH (RBC)   
[Entitic mass]  28.2 pg         Normal          26.0-34.0       [facility]  
   
                                        Comment on above:   Order Comment: Speci  
men Type: BLOOD SPECIMENOrdering Facility:  
  
[facility] Address: [address] [place] [address]   
   
                                                            Performed By: #### 6  
4626, 215-3 ####  
[facility]  
CLIA [id number]  
[address]   
   
                                                    MCHC (RBC)   
[Mass/Vol]      31.2 g/dL       Normal          30.5-36.0       [facility]  
   
                                        Comment on above:   Order Comment: Speci  
men Type: BLOOD SPECIMENOrdering Facility:  
   
[facility] Address: [address] [place] [address]   
   
                                                            Performed By: #### 6  
462-6, 925-3 ####  
[facility]  
CLIA [id number]  
[address]   
   
                                                    MCV (RBC)   
[Entitic vol]   90.3 fL         Normal          80.0-100.0      [facility]  
   
                                        Comment on above:   Order Comment: Speci  
men Type: BLOOD SPECIMENOrdering Facility:  
  
[facility] Address: [address] [place] [address]   
   
                                                            Performed By: #### 6  
462-6, 635-3 ####  
[facility] LABORATORY  
CLIA [id number]  
[address]   
   
                                                    Nucleated RBC   
(Bld) [#/Vol]   10*3/uL         Normal          <0.01           [facility]  
   
                                        Comment on above:   Order Comment: Speci  
men Type: BLOOD SPECIMENOrdering Facility:  
   
[facility] Address: [address] [place] [address]   
   
                                                            Performed By: #### 6  
462-6, 635-3 ####  
[facility] LABORATORY  
CLIA [id number]  
[address]   
   
                                                    Platelet mean   
volume (Bld)   
[Entitic vol]   9.6 fL          Normal          9.0-12.7        [facility]  
   
                                        Comment on above:   Order Comment: Speci  
men Type: BLOOD SPECIMENOrdering Facility:  
  
[facility] Address: [address] [place] [address]   
   
                                                            Performed By: #### 6  
462-6, 635-3 ####  
[facility] LABORATORY  
CLIA [id number]  
[address]   
   
                                                    Platelets (Bld)   
[#/Vol]         329 10*3/uL     Normal          150-400         [facility]  
   
                                        Comment on above:   Order Comment: Speci  
men Type: BLOOD SPECIMENOrdering Facility:  
   
[facility] Address: [address] [place] [address]   
   
                                                            Performed By: #### 6  
462-6, 635-3 ####  
[facility] LABORATORY  
CLIA [id number]  
[address]   
   
                      RBC (Bld) [#/Vol] 2.98 10*6/uL Low        3.90-5.20  [facility]  
   
                                        Comment on above:   Order Comment: Speci  
men Type: BLOOD SPECIMENOrdering Facility:  
   
[facility] Address: [address] [place], [place] [id number]   
   
                                                            Performed By: #### 6  
462-6, 635-3 ####  
[facility] LABORATORY  
CLIA [id number]  
[address] OF [facility]   
   
                      WBC (Bld) [#/Vol] 10.82 10*3/uL Normal     3.70-11.00 [facility]  
   
                                        Comment on above:   Order Comment: Speci  
men Type: BLOOD SPECIMENOrdering Facility:  
   
[facility] Address: [address] [place] [address]   
   
                                                            Performed By: #### 6  
462-6, 635-3 ####  
[facility] LABORATORY  
CLIA [id number]  
[address]   
   
                                                    CONSULT PROGon 2022   
   
                                        CONSULT PROG        HNO ID: [id number]  
Author: Isamar Bhat venancio  
Service: Pharmacy  
Author Type: Pharmacist  
Type: Consult Progress Note  
Filed: 3/26/2022 11:41 AM  
Note Text:  
PHARMACY VANCOMYCIN DOSING   
NOTE  
Patient Name: Jayden Soto   
MRN: [id number] Admission Date:   
3/21/2022  
Date of Consult: 3/26/2022   
Time of Consult: 11:15 AM  
Indication: Bone AND joint   
infection  
Goal Range: 15-25 mcg/mL  
RECOMMENDATIONS/PLAN:  
Pharmacy consulted for   
vancomycin dosing for Jayden Soto, a 61 year old,  
female who is being treated   
with vancomycin for bone AND   
joint infection  
1. Patient is currently   
ordered Vancomycin dosed by   
level. Today is day 4  
of therapy.  
2. The most recent   
vancomycin level was 12.6   
mcg/mL drawn at 0913 on  
3/26/22. This is a ~21 hour   
level on the 4th day of   
therapy. This is a  
subtherapeutic level based   
on goal range, expect that   
~24 hour trough  
would be further   
subtherapeutic.  
3. Will schedule vancomycin   
to 1.5 g with a dosing   
interval of q12h. Scr  
previously increased to 1.11   
on 3/25, appears to be   
down-trending with  
most recent 0.93 today,   
estimated CrCl 80mL/min.   
Will change back to Q12H  
as previously ordered and   
monitor renal function   
closely.  
4. The next vancomycin level   
has been ordered for 3/27/22   
@ 1130  
(Completed). Will check   
level prior to 3rd dose of   
1.5g Q12H based on  
recent changing renal   
function.  
We will follow patient renal   
function, vancomycin levels   
and doses with  
you during the course of   
therapy. Additional   
recommendations will appear  
in follow up notes. If you   
have any questions, please   
contact Isamar Bhat  
at [phone] or [place]   
[place] [phone].  
Age: 61 year old  
Allergies:  
ALLERGIES  
Allergen Reactions  
- Morphine GI Upset  
- Statins-Hmg-Coa Red*   
Unknown  
Last 3 Encounter Wt   
Readings:  
Date: Wt:  
2022 104.7 kg (230 lb   
13.2 oz)  
2022 95.3 kg (210 lb)  
2022 95.3 kg (210 lb)  
Last 1 Encounter Ht   
Readings:  
Date: Ht:  
2022 170.2 cm (5' 7 )  
Estimated Creatinine   
Clearance: 80 mL/min (based   
on SCr of 0.93 mg/dL).  
Temp (24hrs), Av.8 ?C   
(98.2 ?F), Min:36.2 ?C (97.2   
?F), Max:36.9 ?C  
(98.4 ?F)  
- Current Temp: 36.2 ?C   
(97.2 ?F)  
Labs  
BUN (mg/dL)  
Date Value  
2022 17  
2022 17  
2022 15  
Creatinine (mg/dL)  
Date Value  
2022 0.93  
2022 1.11 (H)  
2022 0.73  
WBC (k/uL)  
Date Value  
2022 10.82  
2022 15.83 (H)  
2022 14.24 (H)  
Vancomycin Levels:  
Vancomycin (ug/mL)  
Date/Time Value  
2022 0913 12.6  
2022 0859 12.6  
Isamar Bhat [facility]  
   
                                        CONSULT PROG        HNO ID: [id number]  
Author: April Dc MD  
Service: Endocrinology  
Author Type: Physician  
Type: Consult Progress Note  
Filed: 3/26/2022 9:29 AM  
Note Text:  
ENDOCRINOLOGY CONSULT   
PROGRESS NOTE  
SERVICE DATE: 3/26/2022  
SERVICE TIME: 7:23 AM  
Subjective  
INTERVAL HPI: Following for   
DM type 2.  
The patient is a 61-year-old   
female, who was admitted on   
, with  
left knee pain and swelling.   
She was found to have an   
infected left knee  
arthroplasty. She had   
surgery on , removal   
of knee arthroplasty,  
left knee with irrigation   
and debridement of left knee   
and insertion of  
antibiotic spacer.  
?Patient has history of type   
2 diabetes for 32 years,   
diagnosed when she  
was pregnant. She was on   
oral agents for several   
years. She has been on  
insulin for about 15 years.   
She is on Lantus 40 units at   
bedtime and  
NovoLog 13 units with each   
meal; current dose for at   
least 1 year. She is  
also on Jardiance 25 mg a   
day. She checks blood sugar   
3 times a day. They  
used to be less than 190,   
but since she had infection   
last year in her  
knee, her blood sugars have   
been high over 200s and   
300s; sometimes, it  
has gone as high as 500 and   
600. Her blood sugars have   
been fluctuating  
here. She was initially   
given Lantus 25 units at   
bedtime and then it was  
increased to 40 units. She   
was n.p.o. and NovoLog was   
being held off and  
on. She has lost about 80   
pounds weight over the last   
year. She has  
history of retinopathy and   
gets injections. She also   
had cataract  
surgery. She has history of   
neuropathy and numbness in   
both feet. She  
did not have any kidney   
problems.  
Notes and orders reviewed.  
Diet: DIET CARBOHYDRATE   
CONTROLLED p.o intake good,   
following diet.  
Activity:Up with Assistance,   
has PT; ambulating a little  
Review of Systems: c/o left   
knee pain, no SOB, no   
nausea, rest negative  
Current   
Facility-Administered   
Medications  
Medication Dose Route   
Frequency  
- ondansetron (PF) 4 mg   
injection (ZOFRAN) 4 mg   
INTRAVENOUS q 6 H PRN  
- NaCl 0.9% iv flush bag 20   
mL INTRAVENOUS PRN  
- sodium chloride 0.9 %   
(flush) 3-5 mL (BD   
POSIFLUSH) 3-5 mL   
INTRAVENOUS  
q 12 H  
- sodium chloride 0.9 %   
(flush) 2-10 mL (BD   
POSIFLUSH) 2-10 mL  
INTRAVENOUS q 12 H  
- DULoxetine 60 mg cap(s)   
(CYMBALTA) 60 mg ORAL BID  
- ranolazine ER 1,000 mg   
tab(s) (RANEXA) 1,000 mg   
ORAL BID  
- dilTIAZem  mg cap(s)   
(CARDIZEM CD, CARTIA XT) 180   
mg ORAL DAILY  
- gabapentin 200 mg cap(s)   
(NEURONTIN) 200 mg ORAL TID  
- isosorbide mononitrate ER   
90 mg tab(s) (IMDUR) 90 mg   
ORAL DAILY  
- clopidogrel 75 mg tab(s)   
(PLAVIX) 75 mg ORAL DAILY  
- metoprolol tartrate (short   
acting) 25 mg tab(s)   
(LOPRESSOR) 25 mg ORAL  
q 12 H  
- dextrose 40 % 15 g 15 g   
ORAL PRN  
Or  
- glucagon 1 mg injection 1   
mg INTRAMUSCULAR PRN  
Or  
- dextrose 50% in water 25   
mL syringe 12.5 g   
INTRAVENOUS PRN  
- empagliflozin 25 mg tab(s)   
(JARDIANCE) 25 mg ORAL DAILY  
- docusate sodium 100 mg   
cap(s) (COLACE) 100 mg ORAL   
BID  
- aspirin, enteric coated 81   
mg tab(s) 81 mg ORAL BID  
- sodium chloride 0.9 %   
(flush) 3-5 mL (BD   
POSIFLUSH) 3-5 mL   
INTRAVENOUS  
q 12 H  
- vancomycin dosing and   
monitoring per pharmacy   
OTHER As Directed  
- cefTRIAXone iv piggyback 2   
g in dextrose (iso-osmotic)   
50 mL (ROCEPHIN)  
2 g INTRAVENOUS q 24 H  
- insulin glargine 40 Units   
pen (long acting) (LANTUS   
SOLOSTAR, BASAGLAR  
KWIKPEN) 40 Units   
SUBCUTANEOUS AT BEDTIME  
- miconazole 2 % 1   
application topical powder   
(LOTRIMIN AF, DESENEX) 1  
application TOPICAL BID  
- acetaminophen 1,000 mg   
tab(s) (TYLENOL) 1,000 mg   
ORAL q 6 H  
- oxyCODONE IR 5-10 mg   
tab(s) (ROXICODONE) 5-10 mg   
ORAL q 4 H PRN  
- traMADol 50 mg tab(s)   
(ULTRAM) 50 mg ORAL q 6 H  
- morphine 2 mg injection 2   
mg INTRAVENOUS q 3 H PRN  
- melatonin 6 mg tab(s) 6 mg   
ORAL DAILY (8 PM)  
- insulin lispro 16 Units   
pen (rapid acting) (HumaLOG   
KWIKPEN) 16 Units  
SUBCUTANEOUS DAILY WITH   
BREAKFAST  
- insulin lispro 10 Units   
pen (rapid acting) (HumaLOG   
KWIKPEN) 10 Units  
SUBCUTANEOUS DAILY wLUNCH  
- insulin lispro 12 Units   
pen (rapid acting) (HumaLOG   
KWIKPEN) 12 Units  
SUBCUTANEOUS DAILY wDINNER  
- insulin lispro pen (rapid   
acting) (HumaLOG KWIKPEN)   
SUBCUTANEOUS w  
MEALS  
- sodium chloride 0.9 %   
(flush) 10 mL (BD POSIFLUSH)   
10 mL INTRAVENOUS q  
12 H  
- sodium chloride 0.9 %   
(flush) 20 mL (BD POSIFLUSH)   
20 mL INTRAVENOUS  
PRN  
- lactulose 20 g CUP   
(DUPHALAC, CONSTULOSE) 20 g   
ORAL BID  
- senna-docusate 8.6-50 mg 1   
tablet (SENNA-S) 1 tablet   
ORAL BID  
Objective  
PHYSICAL EXAM:  
GENERAL: lying in bed, in no   
acute distress, mildly   
obese.  
SKIN: Warm, dry, has heel   
protector dressings over   
both heels. No rash.  
No ulcers or calluses   
noticed. Left knee bandaged.  
HEENT: Head is normocephalic   
and atraumatic. Eyes, pupils   
round, EOMI.  
Buccal mucosa is dry. No   
thrush.  
NECK: Supple. No goiter   
palpable on thyroid exam.  
LUNGS: Fair air entry. Clear   
to auscultation.  
CVS: Regular rate and   
rhythm. No murmur or gallop.  
ABDOMEN: Soft and nontender.   
No masses.  
EXTREMITIES: has compression   
ban (more content not   
included)...        Normal                                  [facility]  
   
                                                    THERAPY NTon 2022   
   
                                        THERAPY NT          HNO ID: [id number]  
Author: Yasmeen Dominguez PTA  
Service: Physical Therapy  
Author Type: Physical   
Therapy Assistant  
Type: Therapy   
(PT/OT/Speech/Resp)  
Filed: 3/26/2022 12:11 PM  
Note Text:  
----------------------------  
----------------------------  
------------------------  
Attestation signed by Maribell Hopkins PT at 3/26/2022   
4:52 PM  
I reviewed and agree with   
the documentation   
corresponding to this   
therapy  
visit.  
SIGNATURE: Maribell Hopkins PT  
DATE: 2022  
TIME: 4:52 PM  
----------------------------  
----------------------------  
------------------------  
Physical Therapy Treatment  
SERVICE DATE: 3/26/2022  
SERVICE TIME: 1130 to 1153  
ROOM: FR--  
Recommended Discharge   
Disposition: Home PT  
Recommended Discharge   
Disposition Comments: home   
with family assist and  
home PT, use of wheeled   
walker  
Anticipated Discharge Needs:   
Physical Assist at   
Home;Supervision at Home  
Physical Assist at Home for:  
Cleaning;Laundry;Meals;Safet  
y;Shopping;Transportation  
Supervision at Home due to:   
(change in medical status)  
Recommended Discharge   
Equipment: No equipment   
needs anticipated  
PT 6 Clicks Score: 17  
Additional personal during   
visit : Luis Choudhary  
Patient reports not feeling   
well due to low blood sugar,   
nursing notified.  
Patient did well tolerating   
her exercises and transfers   
but unable to  
attempt further ambulation   
due to feeling shaky and   
lightheaded, patient  
provided with some crackers   
and peanut butter and   
repositioned in bed with  
cold pack. Goals ongoing.  
Precautions/Activity   
Restrictions: Fall Risk  
Extremity With Weight   
Bearing Restricted: Left   
Lower Extremity  
Left Lower Extremity Weight   
Bearing Status: WBAT  
Current Hospital Course:   
Direct admit from Dr. Evans's office due to L TKA  
sepsis. s/p IANDD, component   
removal, antibiotic spacer   
insertion 3/23/33.  
WBAT post op. Waiting   
PICC/antibiotic   
recommendations.  
Reason for Hospital   
Admission: TKA infection  
Relevant Past Medical   
History: L TKA, joint   
infection with washout, HTN,  
DMII, obesity  
Response to Therapy   
Interventions: Good   
participation in activities,   
Low  
activity tolerance, Requires   
additional time to complete   
activities  
Continue skilled needs due   
to: Functional   
mobility/skill impairments,  
Safety concerns  
Physical Therapy Problem   
List: Education   
Deficit;Safety   
Deficits;Decreased  
Activity Tolerance;Decreased   
Strength;Functional Mobility  
Impairment;Balance Impaired  
Treatment Interventions:   
Education;Strengthening;Func  
tional Mobility  
Training;Balance   
Training;Neuromuscular   
Re-education;Pain Management  
Home Environment  
Patient Lives With: Family   
(son, DIL, grandkids)  
Assistance Available: 24   
Hour  
Entry To Home: Ramp  
Number Of Stairs To   
Bed/Bath: 0  
Equipment Owned: Wheeled   
Walker;Cane;Shower   
Chair;Elevated Toilet Seat  
Prior Functional Level:   
Within Functional Limits  
Prior Functional Level   
Comments: patient reports   
typically independent  
without device, completes   
own ADLS  
Patient Report: feeling   
shaky but agreed to try   
therapy  
CURRENT FUNCTIONAL STATUS:   
Most recent performance   
mobility performed  
during session in bold,   
other mobility completed   
during prior session and  
may no longer be correct or   
appropriate to complete.  
Current Functional Mobility   
Assist Level Additional   
Information  
Rolling  
Supine to Sit Stand By   
Assistance;Additional   
Information reliant on use   
of  
rail and uses RLE to help   
move LLE  
Sit to Supine Stand By   
Assistance;Additional   
Information use of RLE to  
help return LLE into bed  
Scooting  
Sit to Stand Contact Guard   
Assistance;Additional   
Information cues for hand  
placement, get her nose over   
her toes and power up   
through her legs  
Stand to Sit Contact Guard   
Assistance;Additional   
Information cues to slide  
LLE forward  
Bed to Chair Contact Guard   
Assistance;Additional   
Information Bed To Chair  
Transfer Type: Stepping  
Bed To Chair Transfer   
Equipment: Wheeled Walker  
3-4 small side steps  
Toilet/Commode Minimal   
Assistance;Additional   
Information assist required  
for hygiene while standing   
with wheeled walker for   
support  
Gait  
Contact Guard   
Assistance;Additional   
Information Gait Device:   
Wheeled  
Walker  
Gait Distance (feet): 20ftx2  
Stairs  
Curb Step  
Car Transfer  
Blank fields indicate   
activity not attempted  
General   
Deviations/Observations:   
Antalgic gait;Lynn  
decreased;Difficulty   
changing   
direction/turning;Flexed   
trunk  
posture;Non-functional gait   
speed;Step length decreased  
JH-HLM: 5: Standing (1 or   
more minutes)  
Learning/Educational Needs:   
Discharge   
Plan;Equipment;Functional  
Activities/Mobility;Plan of   
Care;Pain   
Management;Rehabilitation   
Techniques  
and Procedures;Safety  
Goals for Plan of Care:  
Patient /Caregiver Goals: Go   
Home  
Able to perform HEP with:   
Verbal Cues Only (L TKA   
protocol)  
Transfer supine to/from sit   
with: Independent  
Transfer sit to/from stand   
with: Independent  
Ambulate with: Independent  
Distance: 50ft intervals  
Device: Wheeled Walker  
Ambulate up and (more   
content not included)... Normal                                  [facility]  
   
                                                    Vancomycin random [Mass/Vol]  
on 2022   
   
                                                    Vancomycin   
[Mass/Vol]      12.6 ug/mL      Normal          10.0-20.0       [facility]  
   
                                        Comment on above:   Order Comment: Speci  
men Type: BLOOD SPECIMENOrdering Facility:  
   
[facility] Address: [address], [place] [address]   
   
                                                            Result Comment: Refe  
rence ranges and high/low indicator flags are   
provided as general guidelines only. The treating physician must   
determine appropriate target levels/dosing based on the specific   
clinical situation.   
   
                                                            Performed By: #### 4  
091-5 ####[facility] LAB[facility]   
[id number] [address] [place]   
[place]   
   
                                                    Basic metabolic 2000 panelon  
 2022   
   
                                                    Anion gap   
[Moles/Vol]     12 mmol/L       Normal          9-18            [facility]  
   
                                        Comment on above:   Order Comment: Speci  
men Type: BLOOD SPECIMEN  
Ordering Facility: [facility]  
Address: [address] [place] [address]   
   
                                                            Performed By: #### 2  
4321-2 ####  
[facility]  
CLIA [id number]  
[address] STATES [facility]   
   
                                                    Calcium   
[Mass/Vol]      9.1 mg/dL       Normal          8.5-10.2        [facility]  
   
                                        Comment on above:   Order Comment: Speci  
men Type: BLOOD SPECIMEN  
Ordering Facility: [facility]  
Address: [address] [place] [address]   
   
                                                            Performed By: #### 2  
4321-2 ####  
[place] [facility]  
CLIA [id number]  
[address] [place] [facility]   
   
                                                    Chloride   
[Moles/Vol]     101 mmol/L      Normal                    [facility]  
   
                                        Comment on above:   Order Comment: Speci  
men Type: BLOOD SPECIMEN  
Ordering Facility: [facility]  
Address: [address] [address]   
   
                                                            Performed By: #### 2  
4321-2 ####  
[facility]  
CLIA [id number]  
[address] [place]   
   
                      CO2 [Moles/Vol] 28 mmol/L  Normal     22-30      [facility]  
   
                                        Comment on above:   Order Comment: Speci  
men Type: BLOOD SPECIMEN  
Ordering Facility: [facility]  
Address: [address] [address]   
   
                                                            Performed By: #### 2  
4321-2 ####  
[facility]  
CLIA [id number]  
[address] [address] [place]   
   
                                                    Creatinine   
[Mass/Vol]      1.11 mg/dL      High            0.58-0.96       [facility]  
   
                                        Comment on above:   Order Comment: Speci  
men Type: BLOOD SPECIMEN  
Ordering Facility: [facility]  
Address: [address] [address]   
   
                                                            Performed By: #### 2  
4321-2 ####  
[facility] LABORATORY  
CLIA [id number]  
[address] OF [facility]   
   
                                                    ESTIMATED   
GLOMERULAR   
FILTRATION RATE 57 mL/min/1.73m??? Low             >=60            [facility]  
   
                                        Comment on above:   Order Comment: Speci  
men Type: BLOOD SPECIMEN  
Ordering Facility: [facility]  
Address: [address] [place] [address]   
   
                                                            Result Comment: Sydnee  
mated Glomerular Filtration Rate (eGFR) is   
calculated using the  CKD-EPI creatinine equation. This equation   
utilizes serum creatinine, sex, and age as parameters. The   
creatinine assay has traceable calibration to isotope dilution-mass   
spectrometry. Refer to KDIGO guidelines for clinical interpretation.   
In patients with unstable renal function, e.g. those with acute   
kidney injury, the eGFR may not accurately reflect actual GFR.   
   
                                                            Performed By: #### 2  
4321-2 ####  
[facility] LABORATORY  
CLIA [id number]  
[address] [place]   
   
                                                    Glucose   
[Mass/Vol]      132 mg/dL       High            74-99           [facility]  
   
                                        Comment on above:   Order Comment: Speci  
men Type: BLOOD SPECIMEN  
Ordering Facility: [facility]  
Address: [address] [place] [address]   
   
                                                            Result Comment: The   
American Diabetes Association (ADA) provides   
guidance for cutoff values for fasting glucose and random glucose.   
The ADA defines fasting as no caloric intake for at least 8 hours.   
Fasting plasma glucose results between 100 to 125 mg/dL indicate   
increased risk for diabetes (prediabetes).  
Fasting plasma glucose results greater than or equal to 126 mg/dL   
meet the criteria for diagnosis of diabetes. In the absence of   
unequivocal hyperglycemia, results should be confirmed by repeat   
testing. In a patient with classic symptoms of hyperglycemia or   
hyperglycemic crisis, random plasma glucose results greater than or   
equal to 200 mg/dL meet the criteria for diagnosis of diabetes.  
Reference: Standards of Medical Care in Diabetes 2016, American   
Diabetes Association. Diabetes Care. 2016.39(Suppl 1).   
   
                                                            Performed By: #### 2  
4321-2 ####  
[facility]  
CLIA [id number]  
[address] STATES OF [facility]   
   
                                                    Potassium   
[Moles/Vol]     4.3 mmol/L      Normal          3.7-5.1         [facility]  
   
                                        Comment on above:   Order Comment: Speci  
men Type: BLOOD SPECIMEN  
Ordering Facility: [facility]  
Address: [address] [place] [address]   
   
                                                            Performed By: #### 2  
4321-2 ####  
[facility]  
CLIA [id number]  
[address]   
   
                                                    Sodium   
[Moles/Vol]     141 mmol/L      Normal          136-144         [facility]  
   
                                        Comment on above:   Order Comment: Speci  
men Type: BLOOD SPECIMEN  
Ordering Facility: [facility]  
Address: [address] [place] [address]   
   
                                                            Performed By: #### 2  
4321-2 ####  
[facility]  
CLIA [id number]  
[address] STATES [facility]   
   
                                                    Urea nitrogen   
[Mass/Vol]      17 mg/dL        Normal          7-21            [facility]  
   
                                        Comment on above:   Order Comment: Speci  
men Type: BLOOD SPECIMEN  
Ordering Facility: [facility]  
Address: [address] [place] [address]   
   
                                                            Performed By: #### 2  
4321-2 ####  
[facility]  
CLIA [id number]  
[address] [place]   
   
                                                    CASE MANAGEMon 2022   
   
                                        CASE MANAGEM        HNO ID: [id number]  
Author: Bev Steele RN  
Service: Nursing  
Author Type: Registered   
Nurse  
Type: Care Mgt Progress Note  
Filed: 3/25/2022 2:26 PM  
Note Text:  
CARE MANAGEMENT PROGRESS   
NOTE  
SERVICE DATE: 3/25/2022  
SERVICE TIME: 2:24 PM  
LOS: 4 days  
DC Plan: home with HHC and   
home pharmacy; pt agreeable   
to [facility] for  
home pharmacy and only   
accepting home health care   
[facility];  
pt's daughter-in-law Nneka   
able to be teachable   
caregiver for IV  
antibiotics; PICC line in   
place  
Barriers to dc: need final   
recs for IV antibiotics;   
waiting on culture  
sensitivies and will need   
copat  
Anticipated dc date: TBD  
Transportation: pt's family   
able to provide   
transportation at dc  
SIGNATURE: Bev Steele RN   
PATIENT NAME: Jayden Soto  
DATE: 2022 MRN:   
[id number]  
TIME: 2:23 PM PAGER/CONTACT   
#: [id number]       Normal                                  [facility]  
   
                                                    CBC panel Auto (Bld)on    
   
                                                    Erythrocyte   
distribution   
width (RBC)   
[Ratio]         14.3 %          Normal          11.5-15.0       [facility]  
   
                                        Comment on above:   Order Comment: Speci  
men Type: BLOOD SPECIMENOrdering Facility:  
   
[facility] Address: [address] [place] [address]   
   
                                                            Performed By: #### 5  
8410-2 ####[facility] LABORATORYCLIA   
[id number] [address] [place]   
[place]   
   
                                                    Hematocrit (Bld)   
[Volume fraction] 30.3 %          Low             36.0-46.0       [facility]  
   
                                        Comment on above:   Order Comment: Speci  
men Type: BLOOD SPECIMENOrdering Facility:  
  
[facility] Address: [address] [place] [address]   
   
                                                            Performed By: #### 5  
8410-2 ####[facility] LABORATORYCLIA   
[id number] [place] [address] [place]   
   
                                                    Hemoglobin (Bld)   
[Mass/Vol]      9.2 g/dL        Low             11.5-15.5       [facility]  
   
                                        Comment on above:   Order Comment: Speci  
men Type: BLOOD SPECIMENOrdering Facility:  
   
[facility] Address: [address] [place] [address]   
   
                                                            Performed By: #### 5  
8410-2 ####[facility] LABORATORYCLIA   
[id number] [place] [address] [place]   
   
                                                    MCH (RBC)   
[Entitic mass]  26.7 pg         Normal          26.0-34.0       [facility]  
   
                                        Comment on above:   Order Comment: Speci  
men Type: BLOOD SPECIMENOrdering Facility:  
  
[facility] Address: [address] [place] [address]   
   
                                                            Performed By: #### 5  
8410-2 ####[facility] LABORATORYCLIA   
[id number] [address]   
   
                                                    MCHC (RBC)   
[Mass/Vol]      30.4 g/dL       Low             30.5-36.0       [facility]  
   
                                        Comment on above:   Order Comment: Speci  
men Type: BLOOD SPECIMENOrdering Facility:  
   
[facility] Address: [address] [place] [address]   
   
                                                            Performed By: #### 5  
8410-2 ####[facility] LABORATORYCLIA   
[id number] [address] STATES [facility]   
   
                                                    MCV (RBC)   
[Entitic vol]   88.1 fL         Normal          80.0-100.0      [facility]  
   
                                        Comment on above:   Order Comment: Speci  
men Type: BLOOD SPECIMENOrdering Facility:  
  
[facility] Address: [address] [place] [address]   
   
                                                            Performed By: #### 5  
8410-2 ####[facility] LABORATORYCLIA   
[id number] [address]   
   
                                                    Nucleated RBC   
(Bld) [#/Vol]   10*3/uL         Normal          <0.01           [facility]  
   
                                        Comment on above:   Order Comment: Speci  
men Type: BLOOD SPECIMENOrdering Facility:  
   
[facility] Address: [address] [place] [address]   
   
                                                            Performed By: #### 5  
8410-2 ####[facility] LABORATORYCLIA   
[id number] [address] [place] [facility]   
   
                                                    Platelet mean   
volume (Bld)   
[Entitic vol]   9.5 fL          Normal          9.0-12.7        [facility]  
   
                                        Comment on above:   Order Comment: Speci  
men Type: BLOOD SPECIMENOrdering Facility:  
  
[facility] Address: [address] [place] [address]   
   
                                                            Performed By: #### 5  
8410-2 ####[facility] LABORATORYCLIA   
[id number] [address] [place]   
[place]   
   
                                                    Platelets (Bld)   
[#/Vol]         437 10*3/uL     High            150-400         [facility]  
   
                                        Comment on above:   Order Comment: Speci  
men Type: BLOOD SPECIMENOrdering Facility:  
   
[facility] Address: [address] [place] [address]   
   
                                                            Performed By: #### 5  
8410-2 ####[facility] LABORATORYCLIA   
[id number] [place] [address] [place]   
   
                      RBC (Bld) [#/Vol] 3.44 10*6/uL Low        3.90-5.20  [facility]  
   
                                        Comment on above:   Order Comment: Speci  
men Type: BLOOD SPECIMENOrdering Facility:  
   
[facility] Address: [address] [place] [address]   
   
                                                            Performed By: #### 5  
8410-2 ####[facility] LABORATORYCLIA   
[id number] [address] [place]   
[place]   
   
                      WBC (Bld) [#/Vol] 15.83 10*3/uL High       3.70-11.00 [facility]  
   
                                        Comment on above:   Order Comment: Speci  
men Type: BLOOD SPECIMENOrdering Facility:  
   
[facility] Address: [address] [place] [address]   
   
                                                            Performed By: #### 5  
8410-2 ####[facility] LABORATORYCLIA   
[id number] [address] OF   
[facility]   
   
                                                    CONSULTon 2022   
   
                                        CONSULT             HNO ID: [id number]  
Author: April Dc MD  
Service: Endocrinology  
Author Type: Physician  
Type: Consults  
Filed: 3/27/2022 10:04 AM  
Note Text:  
[facility] -   
Consultation  
PATIENT NAME: JAYDEN SOTO  
MRN: [id number] CSN: [id number]  
YOB: 1960   
SEX/AGE: F/61  
PATIENT TYPE: I HOSP SVC:   
OROR LOCATION: [id number]  
DATE OF SERVICE: 2022  
TIME OF SERVICE: 07:46 AM  
REFERRING PHYSICIAN: BASIL MITCHELL  
REASON FOR CONSULTATION:   
Uncontrolled diabetes.  
HISTORY: The patient is a   
61-year-old female, who was   
admitted on , with left knee pain and   
swelling. The patient was   
found to have an  
infected left knee   
arthroplasty. She had   
surgery on , removal   
of  
knee arthroplasty, left knee   
with irrigation and   
debridement of left knee  
and insertion of antibiotic   
spacer.  
Regarding diabetes, the   
patient has history of type   
2 diabetes for 32  
years, started when she was   
pregnant. She was on oral   
agents for several  
years. She has been on   
insulin for about 15 years.   
She is on Lantus 40  
units at bedtime and NovoLog   
13 units with each meal.   
This current dose,  
she has been on for at least   
1 year. She is also on   
Jardiance 25 mg a  
day. The patient states she   
checks her blood sugar 3   
times a day. They  
used to be less than 190,   
but since she had infection   
last year in her  
knee, her blood sugars have   
been high over 200s and   
300s. Sometimes, it  
has gone as high as 500 and   
600 also. Her blood sugars   
have been  
fluctuating in hospital. She   
was initially given Lantus   
25 units at  
bedtime and then it was   
increased to 40 units last   
night. Her fasting  
blood sugar is better today,   
but she had several high   
blood sugars during  
admission. She was n.p.o.   
and NovoLog was being held   
off and on. The  
patient states that she has   
lost about 80 pounds weight   
over the last  
year. She has a history of   
retinopathy and gets   
injections. She also  
had cataract surgery. She   
has history of neuropathy   
and numbness in both  
feet. She did not have any   
kidney problems prior to   
admission.  
PAST MEDICAL HISTORY:   
History of type 2   
insulin-requiring diabetes,  
history of coronary artery   
disease and CABG, history of   
hypertension,  
history of COVID  
infection in 2021,   
history of left knee   
replacement in .  
Then, patient had infection   
in 2020 when she   
was septic with  
foot infection which spread   
to her spine and knee. She   
also had a  
washout of the left knee in   
2021.  
FAMILY HISTORY: Positive for   
diabetes in father and   
grandmother.  
SOCIAL HISTORY: No history   
of smoking or alcohol use.  
REVIEW OF SYSTEMS:  
All 10 systems reviewed with   
the patient in detail. They   
are as per  
History of Present Illness,   
otherwise, negative.  
PHYSICAL EXAMINATION:  
GENERAL: The patient is   
lying in bed, in no acute   
distress. She is a  
mildly obese. Height is 5   
feet 7 inches, weight is 230   
pounds, BMI is  
36.15.  
VITAL SIGNS: BP 93/44, heart   
rate 61 and regular,   
temperature 36.6,  
respirations 16 and regular.  
SKIN: Warm and dry. The   
patient has heel protector   
dressings over both  
heels. No rash. No ulcers or   
calluses noticed. Left knee   
is bandaged.  
HEENT: Head is normocephalic   
and atraumatic. Eyes, pupils   
appear round.  
Extraocular muscles are   
intact. Buccal mucosa is   
dry. No thrush.  
NECK: Supple. No goiter   
palpable on thyroid exam.  
LUNGS: Fair air entry. Clear   
to auscultation.  
CVS: Regular rate and   
rhythm. No murmur or gallop.  
ABDOMEN: Soft and nontender.   
No masses.  
EXTREMITIES: The patient has   
compression bandage over   
left knee. There  
is no edema. She has the   
left foot deformity. Right   
foot does not have  
any deformity. Pedal pulses   
are 2+.  
NEUROLOGICAL: Grossly intact   
strength and sensations and   
grossly intact  
cranial nerve exam.  
PSYCH: The patient does not   
appear anxious or depressed.  
LABORATORY DATA: Sodium 141,   
potassium 4.3, chloride 101,   
CO2 of 28, BUN  
17, creatinine 1.11. Blood   
sugars yesterday 303, 190,   
85, 179 and this  
morning 134.  
CLINICAL IMPRESSION:  
1. Diabetes mellitus type 2,   
32 years duration,   
insulin-requiring for 15  
years; at  
home on Lantus 40 units at   
bedtime and NovoLog 13 units   
with each meal  
and  
Jardiance 25 mg a day. Her   
HbA1c was 9.4 on admission.   
Her blood  
sugars have  
been high frequently at home   
since she has left knee   
infection and  
pain.  
2. Status post irrigation   
and debridement of left   
knee, removal of  
unicompartmental left knee   
arthroplasty and insertion   
of antibiotic  
spacer and  
the left septic knee on   
2022.  
3. Acute kidney injury.   
Creatinine is improved with   
hydration.  
4. History of coronary   
artery disease and coronary   
artery bypass graft.  
5. Diabetic neuropathy and   
retinopathy.  
RECOMMENDATIONS:  
1. Discussed with the   
patient the goal of glycemic   
control and the  
treatment plan  
in detail.  
2. We will continue   
carb-controlled diet and   
avoid any juices. Asked the  
patient  
to have consistent   
carbohydrates with her meals   
to avoid fluctuations  
of blood  
breaux (more content not   
included)...        Normal                                  [facility]  
   
                                        CONSULT             HNO ID: [id number]  
Author: April Dc MD  
Service: Endocrinology  
Author Type: Physician  
Type: Consults  
Filed: 3/25/2022 7:45 AM  
Note Text:  
I have reviewed the   
patient's medical record in   
detail. Consult note  
dictated. See new insulin   
orders.  
April Dc MD    [facility]  
   
                                                    CONSULT PROGon 2022   
   
                                        CONSULT PROG        HNO ID: [id number]  
Author: Swetha Hart RPh  
Service: Pharmacy  
Author Type: Pharmacist  
Type: Consult Progress Note  
Filed: 3/25/2022 10:13 AM  
Note Text:  
PHARMACY VANCOMYCIN DOSING   
NOTE  
Patient Name: Jayden Soto   
MRN: [id number] Admission Date:   
3/21/2022  
Date of Consult: 3/25/2022   
Time of Consult: 9:41 AM  
Indication: Bone AND joint   
infection  
Goal Range: 15-25 mcg/mL  
RECOMMENDATIONS/PLAN:  
Pharmacy consulted for   
vancomycin dosing for Jayden Soto, a 61 year old,  
female who is being treated   
with vancomycin for bone AND   
joint infection.  
1. Patient is currently   
ordered Vancomycin 1.5 g IV   
q12h. Today is day 3  
of therapy.  
2. The most recent   
vancomycin level was 12.6   
mcg/mL drawn at 0900 on  
3/25/22. This is a 11 hour   
level on the 3rd day of   
therapy.  
3. Serum creatinine and/or   
urine output have changed in   
the previous days,  
therefore will empirically   
change dose to 1.5 g x ONCE.   
Patient's  
creatinine has increased to   
1.1 from ~0.7 mg/dL, patient   
now has ROMEO.  
Therefore, will change to   
DBL. If the level is low   
tomorrow and scr stable  
may consider scheduling   
doses with close follow up.  
4. The next vancomycin level   
has been ordered for 3/26/22   
@ 0930  
(Completed)  
We will follow patient renal   
function, vancomycin levels   
and doses with  
you during the course of   
therapy. Additional   
recommendations will appear  
in follow up notes. If you   
have any questions, please   
[place] Sw[place] RPh [place] [phone].  
Age: 61 year old  
Allergies:  
ALLERGIES  
Allergen Reactions  
- Morphine GI Upset  
- Statins-Hmg-Coa Red*   
Unknown  
Last 3 Encounter Wt   
Readings:  
Date: Wt:  
2022 104.7 kg (230 lb   
13.2 oz)  
2022 95.3 kg (210 lb)  
2022 95.3 kg (210 lb)  
Last 1 Encounter Ht   
Readings:  
Date: Ht:  
2022 170.2 cm (5' 7 )  
Estimated Creatinine   
Clearance: 67 mL/min (A)   
(based on SCr of 1.11 mg/dL  
(H)).  
Temp (24hrs), Av.8 ?C   
(98.2 ?F), Min:36.4 ?C (97.5   
?F), Max:37.3 ?C  
(99.1 ?F)  
- Current Temp: 36.6 ?C   
(97.9 ?F)  
Labs  
BUN (mg/dL)  
Date Value  
2022 17  
2022 15  
2022 15  
Creatinine (mg/dL)  
Date Value  
2022 1.11 (H)  
2022 0.73  
2022 0.79  
WBC (k/uL)  
Date Value  
2022 15.83 (H)  
2022 14.24 (H)  
2022 10.63  
Vancomycin Levels:  
Vancomycin (ug/mL)  
Date/Time Value  
2022 0859 12.6  
Swetha Hart [facility]  
   
                                                    PT EDon 2022   
   
                                        PT ED               HNO ID: [id number]  
Author: Angeles Preston RN  
Service: PICC Team  
Author Type: Registered   
Nurse  
Type: Patient Education  
Filed: 3/25/2022 12:58 PM  
Note Text:  
AMBULATORY PATIENT EDUCATION   
VASCULAR ACCESS TEAM  
TOPIC: Peripherally Inserted   
Central Catheter  
READINESS TO LEARN  
COGNITIVE ABILITY: Alert and   
oriented  
MOTIVATION TO LEARN: Eager  
FAMILY SUPPORT: Unable to   
assess - Family not present  
INSTRUCTION PROVIDED TO:   
Patient  
PATIENT LEARNS BEST BY:   
Individual Instruction  
Written Instruction -   
Hand-outs  
Verbal Instruction  
FACTORS AFFECTING   
LEARNING:None  
PHYSICAL LIMITATIONS   
AFFECTING LEARNING: None  
LEARNING RESPONSE  
METHOD OF INSTRUCTION:   
Individual instruction  
Written instruction -   
handouts  
Verbal instruction  
PATIENT / FAMILY RESPONSE:   
Verbalizes understanding of   
pre-procedure  
instructions  
Verbalizes understanding of   
post-procedure instructions  
FOLLOW-UP PLAN: Follow-up   
with Primary Care  
SUPPLEMENTAL MATERIAL: PICC   
Line brochure       [facility]  
   
                                        PT ED               HNO ID: [id number]  
Author: Cherry Mckee RD  
Service: Nutrition Therapy  
Author Type: Registered   
Dietitian  
Type: Patient Education  
Filed: 3/25/2022 12:55 PM  
Note Text:  
NUTRITION THERAPY PATIENT   
EDUCATION  
SERVICE DATE: 3/25/2022  
SERVICE TIME: 1310  
TOPIC: diabetic education  
LEARNING ASSESSMENT  
Individuals Assessed:   
Patient  
Preferred Learning Method: :   
Individual Instruction,   
Verbal Instruction  
and Written Instruction  
Barriers to Learning: : None   
Evident  
LEARNING RESPONSE  
Instruction Provided to:   
Patient  
Patient / Family Response:   
Recommend Cont. Instruction  
Method of Instruction: Teach   
Back able to state cardiac   
diet restrictions  
and ways to lower BS  
Individual instruction  
Written instruction -   
handouts  
Verbal instruction  
Material(s) Provided to   
Patient:CC portion   
placement.  
Follow-Up Plan: Reinforce -   
Repeat previous content  
Referral (Recommendation):   
Diabetes Self Management   
Education Program  
Pt blames JOSSY for elevated   
BS due to inability to cook   
own meals,  
emphasized that once knee   
healed, she will be   
responsible for cooking. Pt  
accepting of information.  
MNT Billing:  
$ Initial Assessment: 16-30   
minutes  
SIGNATURE: Cherry Mckee RD PATIENT NAME: Jayden Soto  
DATE: 2022 MRN:   
[id number]  
TIME: 12:48 PM PAGER: Normal                                  [facility]  
   
                                                    THERAPY NTon 2022   
   
                                        THERAPY NT          HNO ID: [id number]  
Author: Madison Byrne, PT  
Service: Physical Therapy  
Author Type: Physical   
Therapist  
Type: Therapy   
(PT/OT/Speech/Resp)  
Filed: 3/25/2022 12:26 PM  
Note Text:  
Physical Therapy Treatment  
SERVICE DATE: 3/25/2022  
SERVICE TIME: 1031 to 1056  
ROOM: [address] [place] [address]  
Recommended Discharge   
Disposition: Home PT  
Recommended Discharge   
Disposition Comments: home   
with family assist and  
home PT, use of wheeled   
walker  
Anticipated Discharge Needs:   
Physical Assist at   
Home;Supervision at Home  
Physical Assist at Home for:  
Cleaning;Laundry;Meals;Safet  
y;Shopping;Transportation  
Supervision at Home due to:   
(change in medical status)  
Recommended Discharge   
Equipment: No equipment   
needs anticipated  
PT 6 Clicks Score: 17  
Patient progressing towards   
goals. Able to increase   
ambulation distance  
and complete total knee   
arthroplasty protocol   
therapeutic exercise despite  
increased pain this session.   
Patient would benefit from   
additional  
physical therapy to address   
deficits, improve   
strength/balance/mobility.  
Recommend home PT at   
discharge.  
Precautions/Activity   
Restrictions: Fall Risk  
Extremity With Weight   
Bearing Restricted: Left   
Lower Extremity  
Left Lower Extremity Weight   
Bearing Status: WBAT  
Current Hospital Course:   
Direct admit from Dr. Evans's office due to L TKA  
sepsis. s/p IANDD, component   
removal, antibiotic spacer   
insertion 3/23/33.  
WBAT post op. Waiting   
PICC/antibiotic   
recommendations.  
Reason for Hospital   
Admission: TKA infection  
Relevant Past Medical   
History: L TKA, joint   
infection with washout, HTN,  
DMII, obesity  
Response to Therapy   
Interventions: Good   
participation in activities,   
Pain  
Continue skilled needs due   
to: Functional   
mobility/skill impairments,  
Safety concerns  
Physical Therapy Problem   
List: Education   
Deficit;Safety   
Deficits;Decreased  
Activity Tolerance;Decreased   
Strength;Functional Mobility  
Impairment;Balance Impaired  
Treatment Interventions:   
Education;Strengthening;Func  
tional Mobility  
Training;Balance   
Training;Neuromuscular   
Re-education;Pain Management  
Home Environment  
Patient Lives With: Family   
(son, DIL, grandkids)  
Assistance Available: 24   
Hour  
Entry To Home: Ramp  
Number Of Stairs To   
Bed/Bath: 0  
Equipment Owned: Wheeled   
Walker;Cane;Shower   
Chair;Elevated Toilet Seat  
Prior Functional Level:   
Within Functional Limits  
Prior Functional Level   
Comments: patient reports   
typically independent  
without device, completes   
own ADLS  
Patient Report: Agreeable to   
PT, reports increased pain   
and fatigue this  
date.  
CURRENT FUNCTIONAL STATUS:   
mobility performed during   
session in bold,  
other mobility completed   
during prior session and may   
no longer be correct  
or appropriate to complete.  
Current Functional Mobility   
Assist Level Additional   
Information  
Rolling  
Supine to Sit Contact Guard   
Assistance  
Sit to Supine Stand By   
Assistance  
Scooting  
Sit to Stand Contact Guard   
Assistance;Additional   
Information cues for hand  
placement at walker, LLE   
extension to prevent   
excessive flexion, power  
through RLE  
Stand to Sit Contact Guard   
Assistance;Additional   
Information cuing to  
slide LLE out, reaching back   
with UEs, eccentric control  
Bed to Chair  
Toilet/Commode  
Gait  
Contact Guard   
Assistance;Additional   
Information Gait Device:   
Wheeled  
Walker  
Gait Distance (feet): 20ftx2  
increased pain and antalgic   
gait this session, cues for   
upright posture in  
walker, cues to increase WB   
BUE to improve clearance RLE  
Stairs  
Curb Step  
Car Transfer  
Blank fields indicate   
activity not attempted  
General   
Deviations/Observations:   
Antalgic gait;Lynn  
decreased;Difficulty   
changing   
direction/turning;Flexed   
trunk  
posture;Non-functional gait   
speed;Step length decreased  
Balance: Static   
Sitting;Dynamic   
Sitting;Static   
Standing;Dynamic Standing  
Static Sitting Balance: Good   
Patient able to maintain   
balance without  
handhold support, limited   
postural sway  
Dynamic Sitting Balance:   
Good Patient accepts   
moderate challenge, able  
to maintain balance while   
picking up object off floor  
Static Standing Balance:   
Fair Patient able to   
maintain balance with  
handhold support, may   
require occasional minimal   
assistance  
Dynamic Standing Balance:   
Fair Patient accepts minimal   
challenge, able  
to maintain balance while   
turning head/trunk  
-M: 7: Walk 25 feet or   
more  
Learning/Educational Needs:   
Discharge   
Plan;Equipment;Functional  
Activities/Mobility;Plan of   
Care;Pain   
Management;Rehabilitation   
Techniques  
and Procedures;Safety  
Goals for Plan of Care:  
Patient /Caregiver Goals: Go   
Home  
Able to perform HEP with:   
Verbal Cues Only (L TKA   
protocol)  
Transfer supine to/from sit   
with: Independent  
Transfer sit to/from stand   
with: Independent  
Ambulate with: Independent  
Distance: 50ft intervals  
Device: Wheeled Walker  
Ambulate up and down steps   
with: Stand By Assistance  
Number of steps: 4  
Device: Rail;Cane  
Progress Toward Goals:   
Progressing as expected  
Rehab Potential: Good  
Patient will be discontinued   
(more content not   
included)...        Normal                                  [facility]  
   
                                                    Vancomycin random [Mass/Vol]  
on 2022   
   
                                                    Vancomycin   
[Mass/Vol]      12.6 ug/mL      Normal          10.0-20.0       [facility]  
   
                                        Comment on above:   Order Comment: Speci  
men Type: BLOOD SPECIMENOrdering Facility:  
   
[facility] Address: [address] [place], [place] [address]   
   
                                                            Result Comment: Refe  
rence ranges and high/low indicator flags are   
provided as general guidelines only. The treating physician must   
determine appropriate target levels/dosing based on the specific   
clinical situation.   
   
                                                            Performed By: #### 4  
091-5 ####[facility] LABORATORYCLIA   
[id number] [place] [address] [place]   
[place]   
   
                                                    ANES POSTPROC EVALon   
022   
   
                                                    ANES POSTPROC   
EVAL                                    HNO ID: [id number]  
Author: Damon Kwan MD  
Service: Anesthesiology  
Author Type: Physician  
Type: Anesthesia   
Postprocedure Evaluation  
Filed: 3/28/2022 11:05 AM  
Note Text:  
POST ANESTHESIA EVALUATION   
NOTE  
: 1960  
Procedure Summary  
Date: 22 Room /   
Location: [facility] OR 08 / [facility] OR  
Anesthesia Start:    
Anesthesia Stop:   
Procedure: REMOVAL   
PROSTHESIS JOINT KNEE (Left   
Knee) Diagnosis: Infection  
of total knee replacement,   
subsequent encounter  
Surgeons: Basil Mitchell MD   
Responsible Provider: Damon Kwan MD  
Anesthesia Type: general ASA   
Status: 3  
Anesthesia Type: general  
Airway Type: ETT  
Last Vitals  
Vitals Value Taken Time  
/61 22 1915  
Temp 36.4 ?C (97.5 ?F)   
22 1900  
HR SpO2 63 03/23/22 1940  
Resp 12 22  
SpO2 100 % 22  
Post Anesthesia Patient   
Status  
Anticipated Disposition:   
inpatient floor planned   
admission.  
Neurological Status: aware   
and responsive.  
Pulmonary Status: breathing   
comfortably on room air  
Airway Control: returned to   
baseline unsupported.  
Cardiovascular Status:   
stable.  
Pain Management: clinically   
adequate  
Postoperative Hydration:   
acceptable.  
Intraoperative Events:  
no significant anesthesia   
events  
Post Operative   
Nausea/Vomiting Status: no   
significant post operative  
nausea or vomiting  
Anesthetic Observations:  
Recommendation: further care   
per PACU/ICU/floor team.  
Anesthesia Observations  
No Documentation  
SIGNATURE: Damon Kwan MD PATIENT NAME: Jayden Soto  
DATE: 2022 MRN:   
[id number]  
TIME: 11:04 AM CSN:   
[id number]           Normal                                  [facility]  
   
                                                    Basic metabolic 2000 panelon  
 2022   
   
                                                    Anion gap   
[Moles/Vol]     10 mmol/L       Normal          9-18            [facility]  
   
                                        Comment on above:   Order Comment: Speci  
men Type: BLOOD SPECIMENOrdering Facility:  
   
[facility] Address: [address] [place] [address]   
   
                                                            Performed By: #### 2  
4321-2 ####[facility] LABORATORYCLIA   
[id number] [place] [address] [place]   
[place]   
   
                                                    Calcium   
[Mass/Vol]      8.6 mg/dL       Normal          8.5-10.2        [facility]  
   
                                        Comment on above:   Order Comment: Speci  
men Type: BLOOD SPECIMENOrdering Facility:  
   
[facility] Address: [address] [place] [address]   
   
                                                            Performed By: #### 2  
4321-2 ####[facility] LABORATORYCLIA   
[id number] [place] [address] [place]   
[place]   
   
                                                    Chloride   
[Moles/Vol]     99 mmol/L       Normal                    [facility]  
   
                                        Comment on above:   Order Comment: Speci  
men Type: BLOOD SPECIMENOrdering Facility:  
   
[facility] Address: [address] [place] [address]   
   
                                                            Performed By: #### 2  
4321-2 ####[facility] LABORATORYCLIA   
[id number] [place] [address] [place]   
[place]   
   
                      CO2 [Moles/Vol] 26 mmol/L  Normal     22-30      [facility]  
   
                                        Comment on above:   Order Comment: Speci  
men Type: BLOOD SPECIMENOrdering Facility:  
   
[facility] Address: [address] [address]   
   
                                                            Performed By: #### 2  
4321-2 ####[facility] LABORATORYCLIA   
[id number] [address] [place]   
[facility]   
   
                                                    Creatinine   
[Mass/Vol]      0.73 mg/dL      Normal          0.58-0.96       [facility]  
   
                                        Comment on above:   Order Comment: Speci  
men Type: BLOOD SPECIMENOrdering Facility:  
   
[facility] Address: [address] [place] [address]   
   
                                                            Performed By: #### 2  
4321-2 ####[facility] Hospital[facility]   
[id number] [address] OF   
[facility]   
   
                                                    ESTIMATED   
GLOMERULAR   
FILTRATION RATE 94 mL/min/1.73m??? Normal          >=60            [facility]  
   
                                        Comment on above:   Order Comment: Speci  
men Type: BLOOD SPECIMENOrdering Facility:  
   
[facility] Address: [address] [place] [address]   
   
                                                            Result Comment: Sydnee  
mated Glomerular Filtration Rate (eGFR) is   
calculated using the  CKD-EPI creatinine equation. This equation   
utilizes serum creatinine, sex, and age as parameters. The   
creatinine assay has traceable calibration to isotope dilution-mass   
spectrometry. Refer to KDIGO guidelines for clinical interpretation.   
In patients with unstable renal function, e.g. those with acute   
kidney injury, the eGFR may not accurately reflect actual GFR.   
   
                                                            Performed By: #### 2  
4321-2 ####[facility] LABORATORYCLIA   
[id number] [address] [place]   
[place]   
   
                                                    Glucose   
[Mass/Vol]      330 mg/dL       High            74-99           [facility]  
   
                                        Comment on above:   Order Comment: Speci  
men Type: BLOOD SPECIMENOrdering Facility:  
   
[facility] Address: [address] [address]   
   
                                                            Result Comment: The   
American Diabetes Association (ADA) provides   
guidance for cutoff values for fasting glucose and random glucose.   
The ADA defines fasting as no caloric intake for at least 8 hours.   
Fasting plasma glucose results between 100 to 125 mg/dL indicate   
increased risk for diabetes (prediabetes).  
Fasting plasma glucose results greater than or equal to 126 mg/dL   
meet the criteria for diagnosis of diabetes. In the absence of   
unequivocal hyperglycemia, results should be confirmed by repeat   
testing. In a patient with classic symptoms of hyperglycemia or   
hyperglycemic crisis, random plasma glucose results greater than or   
equal to 200 mg/dL meet the criteria for diagnosis of diabetes.  
Reference: Standards of Medical Care in Diabetes 2016, American   
Diabetes Association. Diabetes Care. 2016.39(Suppl 1).   
   
                                                            Performed By: #### 2  
4321-2 ####[facility] LABORATORYCLIA   
[id number] [place] [address] [place]   
[place]   
   
                                                    Potassium   
[Moles/Vol]     5.3 mmol/L      High            3.7-5.1         [facility]  
   
                                        Comment on above:   Order Comment: Speci  
men Type: BLOOD SPECIMENOrdering Facility:  
   
[facility] Address: [address] [place] [address]   
   
                                                            Performed By: #### 2  
1-2 ####[facility] LABORATORYCLIA   
[id number] [address] [place]   
[place]   
   
                                                    Sodium   
[Moles/Vol]     135 mmol/L      Low             136-144         [facility]  
   
                                        Comment on above:   Order Comment: Speci  
men Type: BLOOD SPECIMENOrdering Facility:  
   
[facility] Address: [address] [place] [address]   
   
                                                            Performed By: #### 2  
1-2 ####[facility] LABORATORYCLIA   
[id number] [address] [place]   
[place]   
   
                                                    Urea nitrogen   
[Mass/Vol]      15 mg/dL        Normal          7-21            [facility]  
   
                                        Comment on above:   Order Comment: Speci  
men Type: BLOOD SPECIMENOrdering Facility:  
   
[facility] Address: [address] [place] [address]   
   
                                                            Performed By: #### 2  
1-2 ####[facility] LABORATORYCLIA   
[id number] [address] OF   
[place]   
   
                                                    CASE MANAGEMon 2022   
   
                                        CASE MANAGEM        HNO ID: [id number]  
Author: Bev Steele RN  
Service: Nursing  
Author Type: Registered   
Nurse  
Type: Care Mgt Progress Note  
Filed: 3/24/2022 3:17 PM  
Note Text:  
CARE MANAGEMENT PROGRESS   
NOTE  
SERVICE DATE: 3/24/2022  
SERVICE TIME: 3:15 PM  
LOS: 3 days  
Attempted to call patient   
about previous home care   
company used, pt's  
daughter -in -law answered   
instead and was able to give   
home care  
information ; stated that pt   
has used [facility] in the past   
but had  
different insurance when   
used company; referral   
created for [facility]  
care  
SIGNATURE: Bev Steele RN   
PATIENT NAME: Jayden Soto  
DATE: 2022 MRN:   
[id number]  
TIME: 3:15 PM PAGER/CONTACT   
#: [id number]       Normal                                  [facility]  
   
                                                    CBC panel Auto (Bld)on    
   
                                                    Erythrocyte   
distribution   
width (RBC)   
[Ratio]         13.9 %          Normal          11.5-15.0       [facility]  
   
                                        Comment on above:   Order Comment: Speci  
men Type: BLOOD SPECIMEN  
Ordering Facility: [facility]  
Address: [address] [place] [address]   
   
                                                            Performed By: #### 2  
4321-2 ####  
[facility] LABORATORY  
CLIA [id number]  
[address] [place] [address] [place]   
   
                                                    Hematocrit (Bld)   
[Volume fraction] 29.8 %          Low             36.0-46.0       [facility]  
   
                                        Comment on above:   Order Comment: Speci  
men Type: BLOOD SPECIMEN  
Ordering Facility: [facility]  
Address: [address] [place] [address]   
   
                                                            Performed By: #### 2  
4321-2 ####  
[facility] LABORATORY  
CLIA [id number]  
[address] [place] [address] [place]   
   
                                                    Hemoglobin (Bld)   
[Mass/Vol]      9.2 g/dL        Low             11.5-15.5       [facility]  
   
                                        Comment on above:   Order Comment: Speci  
men Type: BLOOD SPECIMEN  
Ordering Facility: [facility]  
Address: [address] [place] [address]   
   
                                                            Performed By: #### 2  
4321-2 ####  
[facility] LABORATORY  
CLIA [id number]  
[address] [place] [address] [place]   
   
                                                    MCH (RBC)   
[Entitic mass]  26.7 pg         Normal          26.0-34.0       [facility]  
   
                                        Comment on above:   Order Comment: Speci  
men Type: BLOOD SPECIMEN  
Ordering Facility: [facility]  
Address: [address] [place] [address]   
   
                                                            Performed By: #### 2  
4321-2 ####  
[facility]  
CLIA [id number]  
[address]   
   
                                                    MCHC (RBC)   
[Mass/Vol]      30.9 g/dL       Normal          30.5-36.0       [facility]  
   
                                        Comment on above:   Order Comment: Speci  
men Type: BLOOD SPECIMEN  
Ordering Facility: [facility]  
Address: [address] [place] [address]   
   
                                                            Performed By: #### 2  
4321-2 ####  
[facility]  
CLIA [id number]  
[address] [place] [facility]   
   
                                                    MCV (RBC)   
[Entitic vol]   86.4 fL         Normal          80.0-100.0      [facility]  
   
                                        Comment on above:   Order Comment: Speci  
men Type: BLOOD SPECIMEN  
Ordering Facility: [facility]  
Address: [address] [place] [address]   
   
                                                            Performed By: #### 2  
1-2 ####  
[facility] LABORATORY  
CLIA [id number]  
[address]   
   
                                                    Nucleated RBC   
(Bld) [#/Vol]   10*3/uL         Normal          <0.01           [facility]  
   
                                        Comment on above:   Order Comment: Speci  
men Type: BLOOD SPECIMEN  
Ordering Facility: [facility]  
Address: [address] [place] [address]   
   
                                                            Performed By: #### 2  
4321-2 ####  
[facility]  
CLIA [id number]  
[address]   
   
                                                    Platelet mean   
volume (Bld)   
[Entitic vol]   9.5 fL          Normal          9.0-12.7        [facility]  
   
                                        Comment on above:   Order Comment: Speci  
men Type: BLOOD SPECIMEN  
Ordering Facility: [facility]  
Address: [address] [place] [address]   
   
                                                            Performed By: #### 2  
1-2 ####  
[facility] LABORATORY  
CLIA [id number]  
[address]   
   
                                                    Platelets (Bld)   
[#/Vol]         381 10*3/uL     Normal          150-400         [facility]  
   
                                        Comment on above:   Order Comment: Speci  
men Type: BLOOD SPECIMEN  
Ordering Facility: [facility]  
Address: [address] [place] [address]   
   
                                                            Performed By: #### 2  
4321-2 ####  
[facility] LABORATORY  
CLIA [id number]  
[address] OF [facility]   
   
                      RBC (Bld) [#/Vol] 3.45 10*6/uL Low        3.90-5.20  [facility]  
   
                                        Comment on above:   Order Comment: Speci  
men Type: BLOOD SPECIMEN  
Ordering Facility: [facility]  
Address: [address] [place] [address]   
   
                                                            Performed By: #### 2  
4321-2 ####  
[facility] LABORATORY  
CLIA [id number]  
[address]   
   
                      WBC (Bld) [#/Vol] 14.24 10*3/uL High       3.70-11.00 [facility]  
   
                                        Comment on above:   Order Comment: Speci  
men Type: BLOOD SPECIMEN  
Ordering Facility: [facility]  
Address: [address] [place] [address]   
   
                                                            Performed By: #### 2  
4321-2 ####  
[facility] LABORATORY  
CLIA [id number]  
[address] [place] [address]   
   
                                                    CONSULT PROGon 2022   
   
                                        CONSULT PROG        HNO ID: [id number]  
Author: Swetha Hart RP  
Service: Pharmacy  
Author Type: Pharmacist  
Type: Consult Progress Note  
Filed: 3/24/2022 8:54 AM  
Note Text:  
PHARMACY VANCOMYCIN DOSING   
NOTE  
Patient Name: Jayden Soto   
MRN: [id number] Admission Date:   
3/21/2022  
Date of Consult: 3/24/2022   
Time of Consult: 8:24 AM  
Indication: Bone AND joint   
infection  
Goal Range: 15-25 mcg/mL  
RECOMMENDATIONS/PLAN:  
Pharmacy consulted for   
vancomycin dosing for Jayden Soto, a 61 year old,  
female who is being treated   
with vancomycin for bone AND   
joint infection.  
1. Patient is currently   
ordered Vancomycin 1.5 g IV   
q24h. Today is day 2  
of therapy.  
2. No vancomycin level has   
been drawn for this dosing   
regimen.  
3. Will adjust vancomycin to   
1.5 g with a dosing interval   
of q12h.  
Although patient has spacer   
with vancomycin, this does   
not have systemic  
absorption; it is for local   
microbial control. Patient's   
renal function  
qualifies her for q12h   
dosing to likely reach   
trough goal of 15-25 mcg/mL.  
Therefore, will increase   
dose to 1.5 g q12h.  
4. The next vancomycin level   
has been ordered for 3/25/22   
@   
(Completed) will schedule   
trough prior to the 5th dose   
to monitor for  
accumulation.  
We will follow patient renal   
function, vancomycin levels   
and doses with  
you during the course of   
therapy. Additional   
recommendations will appear  
in follow up notes. If you   
have any questions, please   
[place] [phone].  
Age: 61 year old  
Allergies:  
ALLERGIES  
Allergen Reactions  
- Morphine GI Upset  
- Statins-Hmg-Coa Red*   
Unknown  
Last 3 Encounter Wt   
Readings:  
Date: Wt:  
2022 104.7 kg (230 lb   
13.2 oz)  
2022 95.3 kg (210 lb)  
2022 95.3 kg (210 lb)  
Last 1 Encounter Ht   
Readings:  
Date: Ht:  
2022 170.2 cm (5' 7 )  
Estimated Creatinine   
Clearance: 101.9 mL/min   
(based on SCr of 0.73   
mg/dL).  
Temp (24hrs), Av.4 ?C   
(97.6 ?F), Min:36 ?C (96.8   
?F), Max:36.8 ?C  
(98.2 ?F)  
- Current Temp: 36.4 ?C   
(97.5 ?F)  
Labs  
BUN (mg/dL)  
Date Value  
2022 15  
2022 15  
2022 14  
Creatinine (mg/dL)  
Date Value  
2022 0.73  
2022 0.79  
2022 0.67  
WBC (k/uL)  
Date Value  
2022 14.24 (H)  
2022 10.63  
2022 12.01 (H)  
Vancomycin Levels: No   
results found for: FABIÁN Posada Hailey, [facility]  Normal                                  [facility]  
   
                                                    THERAPY NTon 2022   
   
                                        THERAPY NT          HNO ID: [id number]  
Author: Eze Foley PTA  
Service: Physical Therapy  
Author Type: Physical   
Therapy Assistant  
Type: Therapy   
(PT/OT/Speech/Resp)  
Filed: 3/24/2022 3:07 PM  
Note Text:  
----------------------------  
----------------------------  
------------------------  
Attestation signed by   
Mio Jeter PT at   
3/24/2022 3:29 PM  
I reviewed and agree with   
the documentation   
corresponding to this   
therapy  
visit.  
SIGNATURE: Mio Jeter PT  
DATE: 2022  
TIME: 3:29 PM  
----------------------------  
----------------------------  
------------------------  
Physical Therapy Treatment  
SERVICE DATE: 3/24/2022  
SERVICE TIME: 1430 to 1454  
ROOM: [place] [address]  
Recommended Discharge   
Disposition: Home PT  
Recommended Discharge   
Disposition Comments: home   
with family assist and  
home PT, use of wheeled   
walker  
Anticipated Discharge Needs:   
Physical Assist at   
Home;Supervision at Home  
Physical Assist at Home for:  
Cleaning;Laundry;Meals;Safet  
y;Shopping;Transportation  
Supervision at Home due to:   
(change in medical status)  
Recommended Discharge   
Equipment: No equipment   
needs anticipated  
PT 6 Clicks Score: 17  
Patient with improved gait   
distances, decreased levels   
of assist with all  
mobility tasks. On pace for   
home going at d/c will   
assist from family.  
continue to recommend home   
PT to improve strength, ROM,   
balance,endurance,  
normalize gait pattern and   
improved safety and   
performance in the home  
back to prior level of   
function.  
Additional personnel present   
during visit: Ginny Morel  
Precautions/Activity   
Restrictions: Fall Risk  
Extremity With Weight   
Bearing Restricted: Left   
Lower Extremity  
Left Lower Extremity Weight   
Bearing Status: WBAT  
Current Hospital Course:   
Direct admit from Dr. Evans's office due to L TKA  
sepsis. s/p IANDD, component   
removal, antibiotic spacer   
insertion 3/23/33.  
WBAT post op  
Reason for Hospital   
Admission: TKA infection  
Relevant Past Medical   
History: L TKA, joint   
infection with washout, HTN,  
DMII, obesity  
Response to Therapy   
Interventions: Good   
participation in activities  
Continue skilled needs due   
to: Functional   
mobility/skill impairments,  
Safety concerns  
Physical Therapy Problem   
List: Education   
Deficit;Safety   
Deficits;Decreased  
Activity Tolerance;Decreased   
Strength;Functional Mobility  
Impairment;Balance Impaired  
Treatment Interventions:   
Education;Strengthening;Func  
tional Mobility  
Training;Balance   
Training;Neuromuscular   
Re-education;Pain Management  
Plan for next visit: Bed   
mobility, Chair transfer   
training, Gait training,  
Exercise   
instruction/handout, Sit to   
Stand Transfers, Stairs   
training,  
Standing Balance, Standing   
Tolerance  
Home Environment  
Patient Lives With: Family   
(son, DIL, grandkids)  
Assistance Available: 24   
Hour  
Entry To Home: Ramp  
Number Of Stairs To   
Bed/Bath: 0  
Equipment Owned: Wheeled   
Walker;Cane;Shower   
Chair;Elevated Toilet Seat  
Prior Functional Level:   
Within Functional Limits  
Prior Functional Level   
Comments: patient reports   
typically independent  
without device, completes   
own ADLS  
Patient Report: Pleasant and   
agreeable to PT.  
CURRENT FUNCTIONAL STATUS:   
Most recent performance   
mobility performed  
during session in bold,   
other mobility completed   
during prior session and  
may no longer be correct or   
appropriate to complete.  
Current Functional Mobility   
Assist Level Additional   
Information  
Rolling  
Supine to Sit Contact Guard   
Assistance  
Sit to Supine Contact Guard   
Assistance  
Scooting  
Sit to Stand Contact Guard   
Assistance;Additional   
Information cuing to  
slide LLE out to prevent   
excessive knee flexion,   
proper UE support,  
powering up with RLE  
Stand to Sit Contact Guard   
Assistance;Additional   
Information cuing to  
slide LLE out, reaching back   
with UEs, eccentric control  
Bed to Chair  
Toilet/Commode  
Gait  
Contact Guard Assistance   
Gait Device: Wheeled Walker  
Gait Distance (feet): 15'x2  
cuing for proper heel   
strike, step length, walker   
management, picking up  
feet with turning  
Stairs  
Curb Step  
Car Transfer  
Blank fields indicate   
activity not attempted  
General   
Deviations/Observations:   
Antalgic gait;Lynn  
decreased;Difficulty   
changing   
direction/turning;Flexed   
trunk  
posture;Non-functional gait   
speed;Step length decreased  
Balance: Static   
Sitting;Dynamic   
Sitting;Static   
Standing;Dynamic Standing  
Static Sitting Balance: Good   
Patient able to maintain   
balance without  
handhold support, limited   
postural sway  
Dynamic Sitting Balance:   
Good Patient accepts   
moderate challenge, able  
to maintain balance while   
picking up object off floor  
Static Standing Balance:   
Fair Patient able to   
maintain balance with  
handhold support, may   
require occasional minimal   
assistance  
Dynamic Standing Balance:   
Fair Patient accepts minimal   
challenge, able  
to maintain balance while   
turning head/trunk  
-HLM: 7: Walk 25 feet or   
more  
Learning/Educational Needs:   
Discharge   
Plan;Equipment;Functional  
Activities/Mobility;Plan of   
Care;Pain   
Management;Rehabilitation   
Techniqu (more content not   
included)...        Normal                                  [facility]  
   
                                        THERAPY NT          HNO ID: [id number]  
Author: DAYNE Macario/L  
Service: Occupational   
Therapy  
Author Type: Occupational   
Therapist  
Type: Therapy   
(PT/OT/Speech/Resp)  
Filed: 3/24/2022 11:49 AM  
Note Text:  
Occupational Therapy   
Evaluation  
SERVICE DATE: 3/24/2022  
SERVICE TIME: 1005 to 1020  
ROOM: [place] [address]  
Recommended Discharge   
Disposition: Home  
Recommended Discharge   
Disposition Comments:   
Anticipate return home with  
family assist PRn  
Anticipated Discharge Needs:   
Physical Assist at   
Home;Supervision at Home  
Physical Assist at Home for:  
Cleaning;Laundry;Meals;Safet  
y;Shopping;Transportation  
Supervision at Home due to:   
(change in medical status)  
OT 6 Clicks Score: 18  
Precautions/Activity   
Restrictions: Fall Risk  
Extremity With Weight   
Bearing Restricted: Left   
Lower Extremity  
Left Lower Extremity Weight   
Bearing Status: WBAT  
Current Hospital Course:   
Direct admit from Dr. Evans's office due to L TKA  
sepsis. s/p IANDD, component   
removal, antibiotic spacer   
insertion 3/23/33.  
WBAT post op  
Reason for Hospital   
Admission: TKA infection  
Relevant Past Medical   
History: L TKA, joint   
infection with washout, HTN,  
DMII, obesity  
Response to Therapy   
Interventions: Good   
participation in activities,   
Pain  
Continue skilled needs due   
to: Functional impairment,   
Safety concerns  
Occupational Therapy Problem   
List: Impaired Self   
Care;Decreased Activity  
Tolerance;Functional   
Mobility Impairment  
Cognition/Communication   
Deficits  
Responsiveness: Alert  
Follows Commands: 3-step   
Commands  
Attention Deficits:   
Distractible, Divided  
Memory Deficits: Short Term,   
Long Term  
Executive Function Deficits:   
Safety Awareness, Insight to   
Deficits,  
Problem Solving  
Insight to Deficits: Minimal   
impairment  
Problem Solving Deficit:   
Minimal impairment  
Safety Awareness Deficit:   
Minimal impairment  
Treatment Interventions:   
Education;Self Care / Home   
Management;Functional  
Mobility Training  
Home Environment  
Patient Lives With: Family   
(son, DIL, grandkids)  
Assistance Available: 24   
Hour  
Entry To Home: Ramp  
Number Of Stairs To   
Bed/Bath: 0  
Equipment Owned: Wheeled   
Walker;Cane;Shower   
Chair;Elevated Toilet Seat  
Prior Functional Level:   
Within Functional Limits  
Prior Functional Level   
Comments: patient reports   
typically independent  
without device, completes   
own ADLS  
Patient Report: Awake,   
pleasant, agreeable to OT   
session  
CURRENT FUNCTIONAL STATUS:   
Most recent performance  
Current Activities of Daily   
Living Assist Level   
Additional Information  
Feeding Independent  
Grooming Independent  
Bathing Upper Body Set Up  
Bathing Lower Body Moderate   
Assistance  
Dressing Upper Body Set Up  
Dressing Lower Body Moderate   
Assistance  
Toileting Contact Guard   
Assistance  
Instrumental Activities of   
Daily Living Assist Level   
Additional  
Information  
Meal/Beverage Prep  
Cleaning  
Laundry  
Medication Management with   
Strategies  
Functional Mobility Assist   
Level Additional Information  
Rolling  
Supine to Sit  
Sit to Supine Contact Guard   
Assistance  
Scooting Stand By Assistance  
Sit to Stand Contact Guard   
Assistance  
Stand to Sit Contact Guard   
Assistance  
Bed to Chair  
Toilet/Commode Contact Guard   
Assistance  
Shower  
Functional Mobility Contact   
Guard Assistance Wheeled   
Walker  
Blank real indicate   
activity not attempted  
Range of Motion: WFL  
Strength: WFL  
Balance: Dynamic Standing  
Dynamic Standing Balance:   
Fair Patient accepts minimal   
challenge, able  
to maintain balance while   
turning head/trunk  
Learning/Educational Needs:   
Safety;Self Care;Functional  
Activities/Mobility  
Goals for Plan of Care:  
Patient /Caregiver Goals: Go   
Home  
Lower Body Bathing with:   
Modified Independent  
Lower Body Dressing with:   
Modified Independent  
Toilet Hygiene with:   
Modified Independent  
Toilet Transfer with:   
Modified Independent  
Tolerate (minutes of   
functional activity): 20  
Functional Activity with:   
Modified Independent  
Demonstrate Competence With   
Education with: Verbal Cues   
Only (safety wiht  
ADLs, functional transfers)  
Rehab Potential: Good  
Patient will be discontinued   
from Occupational Therapy   
when no further  
skilled needs are identified   
in this setting.  
PLAN:  
OT Frequency: 1 time per   
week  
Plan of Care developed with:   
Patient  
TREATMENT INTERVENTIONS:  
Therapy Diagnosis: Reduced   
mobility-other;Decreased   
activities of daily  
living (ADL)  
Interventions Provided:   
Evaluation  
$ Evaluation-Moderate   
(24510) Billed Units: 1 unit  
Training AND education   
provided in: Role of   
Occupational Therapy, Bed  
mobility, Transfer -   
Toilet/commode, Toileting?,   
Lower extremity dressing,  
Transfer - Sit to stand  
The following therapeutic   
skills were used: Activity   
dosing, Assessment of  
tolerance including vitals   
response to activity,   
Physical assist,  
Therapeutic use of self,   
Cues for sequencing/proper   
technique for activity  
Skilled Treatment Time   
(minutes): 15  
Please see discipline   
specific clinical   
documentation flowsheet for  
complete details for this   
therapy evaluation/gillian   
(more content not   
included)...        Normal                                  [facility]  
   
                                        THERAPY NT          HNO ID: [id number]  
Author: Madison Byrne, PT  
Service: Physical Therapy  
Author Type: Physical   
Therapist  
Type: Therapy   
(PT/OT/Speech/Resp)  
Filed: 3/24/2022 11:47 AM  
Note Text:  
Physical Therapy Evaluation  
SERVICE DATE: 3/24/2022  
SERVICE TIME: 838 to 920  
ROOM: [place] [address]  
Recommended Discharge   
Disposition: Home PT  
Recommended Discharge   
Disposition Comments: home   
with family assist and  
home PT, use of wheeled   
walker  
Anticipated Discharge Needs:   
Physical Assist at   
Home;Supervision at Home  
Physical Assist at Home for:  
Cleaning;Laundry;Meals;Safet  
y;Shopping;Transportation  
Supervision at Home due to:   
(change in medical status)  
Recommended Discharge   
Equipment: No equipment   
needs anticipated  
PT 6 Clicks Score: 17  
Precautions/Activity   
Restrictions: Fall Risk  
Extremity With Weight   
Bearing Restricted: Left   
Lower Extremity  
Left Lower Extremity Weight   
Bearing Status: WBAT  
Current Hospital Course:   
Direct admit from Dr. Evans's office due to L TKA  
sepsis. s/p IANDD, component   
removal, antibiotic spacer   
insertion 3/23/33.  
WBAT post op  
Reason for Hospital   
Admission: TKA infection  
Relevant Past Medical   
History: L TKA, joint   
infection with washout, HTN,  
DMII, obesity  
Response to Therapy   
Interventions: Good   
participation in activities,  
On-track to achieve   
discharge goals  
Continue skilled needs due   
to: Functional   
mobility/skill impairments,  
Safety concerns  
Physical Therapy Problem   
List: Education   
Deficit;Safety   
Deficits;Decreased  
Activity Tolerance;Decreased   
Strength;Functional Mobility  
Impairment;Balance Impaired  
Treatment Interventions:   
Education;Strengthening;Func  
tional Mobility  
Training;Balance   
Training;Neuromuscular   
Re-education;Pain Management  
Home Environment  
Patient Lives With: Family   
(son, DIL, grandkids)  
Assistance Available: 24   
Hour  
Entry To Home: Ramp  
Number Of Stairs To   
Bed/Bath: 0  
Equipment Owned: Wheeled   
Walker;Cane;Shower   
Chair;Elevated Toilet Seat  
Prior Functional Level:   
Within Functional Limits  
Prior Functional Level   
Comments: patient reports   
typically independent  
without device, completes   
own ADLS  
Patient Report: Pleasant and   
agreeable to PT.  
CURRENT FUNCTIONAL STATUS:   
Most recent performance  
Current Functional Mobility   
Assist Level Additional   
Information  
Rolling  
Supine to Sit Minimal   
Assistance;Additional   
Information assist to guide  
LLE to EOB  
Sit to Supine  
Scooting  
Sit to Stand Contact Guard   
Assistance;Additional   
Information instruction  
in WB status, cues for hand   
placement at walker, x   
multiple trials from  
various surfaces  
Stand to Sit Contact Guard   
Assistance;Additional   
Information cues for  
positioning at surface,   
reach hands back and sit   
slowly, step forward with  
LLE to prevent excessive   
knee flexion  
Bed to Chair  
Toilet/Commode  
Gait  
Contact Guard   
Assistance;Additional   
Information Gait Device:   
Wheeled  
Walker  
Gait Distance (feet): 3ft,   
15ft  
instruction in step to   
pattern for pain management,   
cues for posture and  
positioning in walker, cues   
walker management and   
sequencing, cues to use  
BUE to offset weight in LLE   
single leg stance as needed  
Stairs  
Curb Step  
Car Transfer  
Blank fields indicate   
activity not attempted  
General   
Deviations/Observations:   
Antalgic gait;Lynn   
decreased;Step  
length   
decreased;Non-functional   
gait speed;UE weight bearing   
on assistive  
device excessive  
Range of Motion: Lower   
Extremity Comments  
Right Lower Extremity ROM   
Comments: WFL  
Left Lower Extremity ROM   
Comments: knee approx 10-50   
degrees  
Strength: Lower Extremity   
Comments  
Right Lower Extremity   
Strength Comments: WFL  
Left Lower Extremity   
Strength Comments: (+) quad   
set, min assist LAQ  
Balance: Static   
Sitting;Dynamic   
Sitting;Static   
Standing;Dynamic Standing  
Static Sitting Balance: Good   
Patient able to maintain   
balance without  
handhold support, limited   
postural sway  
Dynamic Sitting Balance:   
Good Patient accepts   
moderate challenge, able  
to maintain balance while   
picking up object off floor  
Static Standing Balance:   
Fair Patient able to   
maintain balance with  
handhold support, may   
require occasional minimal   
assistance  
Dynamic Standing Balance:   
Fair Patient accepts minimal   
challenge, able  
to maintain balance while   
turning head/trunk  
JH-HLM: 6: Walk 10 steps or   
more  
Learning/Educational Needs:   
Discharge   
Plan;Equipment;Functional  
Activities/Mobility;Plan of   
Care;Pain   
Management;Rehabilitation   
Techniques  
and Procedures;Safety  
Goals for Plan of Care:  
Patient /Caregiver Goals: Go   
Home  
Able to perform HEP with:   
Verbal Cues Only (L TKA   
protocol)  
Transfer supine to/from sit   
with: Independent  
Transfer sit to/from stand   
with: Independent  
Ambulate with: Independent  
Distance: 50ft intervals  
Device: Wheeled Walker  
Ambulate up and down steps   
with: Stand By Assistance  
Number of steps: 4  
Device: Rail;Cane  
Rehab Potential: Good  
Patient will be discontinued   
from Physical Therapy when   
no further skilled  
needs are identified in this   
setting.  
(more content not   
included)...        Normal                                  [facility]  
   
                                                    ALLIED HEALTHon 2022   
   
                                        ALLIED HEALTH       HNO ID: [id number]  
Author: RT Kacey(R)  
Service: ?  
Author Type: Technician  
Type: Allied Health  
Filed: 3/23/2022 6:51 PM  
Note Text:  
Radiology Service Progress   
Note  
PATIENT NAME: Jayden Soto  
MRN: [id number]  
DATE OF SERVICE: 2022  
TIME: 6:50 PM  
PATIENT IDENTITY   
VERIFICATION COMPLETED USING   
TWO (2) IDENTIFIERS: Name  
and Date of Birth confirmed   
by identification band.  
FALL SCREENING: Has the   
patient had 2 falls in the   
last year or 1 fall  
with injury or currently   
using an Ambulatory   
Assistive Device (Walker,  
Cane, Wheelchair, Crutches,   
etc.)? Inpatient: Screened   
on floor  
PATIENT GENDER DATA: Female.   
Pregnancy status: Pregnant:   
No  
Breastfeeding status: NO.  
PATIENT RELEVANT IMPLANT   
DATA REVIEWED: Not   
Applicable  
RADIOLOGY DEPARTMENT:   
General X-ray: Exam(s)   
Completed: Lower Extremity  
X-Ray(s): Knee, AP / LAT   
Left  
PERIPHERAL IV DATA: Not   
applicable  
SIGNED BY: RT Kacey(R)  
2022 6:50 PM Normal                                  [facility]  
   
                                                    ANES PRE-OPon 2022   
   
                                        ANES PRE-OP         HNO ID: [id number]  
Author: León De La Cruz MD  
Service: Anesthesiology  
Author Type: Physician  
Type: Anesthesia   
Preprocedure Evaluation  
Filed: 3/23/2022 2:19 PM  
Note Text:  
ANESTHESIOLOGY DAY OF   
SURGERY NOTE  
: 1960  
Procedure Information  
Date/Time: 22 1517  
Procedure: REMOVAL   
PROSTHESIS JOINT KNEE (Left   
Knee)  
[facility]: [facility]  [facility]  
Surgeons: Basil Mitchell MD  
Estimated body mass index is   
36.15 kg/m? as calculated   
from the following:  
Height as of this encounter:   
170.2 cm (5' 7 ).  
Weight as of this encounter:   
104.7 kg (230 lb 13.2 oz).  
Most recent hematocrit and   
potassium results:  
Hematocrit 30.6 3/23/2022  
Potassium 4.2 3/23/2022  
Relevant Problems  
CARDIO  
(+) Primary hypertension  
ENDO  
(+) Controlled type 2   
diabetes mellitus without   
complication, with  
long-term current use of   
insulin (HCC)  
Other  
(+) Septic joint of left   
knee joint (HCC)  
I - PHYSICAL EVALUATION  
AIRWAY  
Patient intubated: No.  
Tracheostomy tube not   
present  
Mallampati: II.  
TM distance: >3 FB.  
Neck ROM: full ROM without   
neurological symptoms.  
Mouth opening: adequate.  
Short neck: no.  
Thick neck: no  
DENTAL  
Dental findings: teeth   
intact and poor dentition.  
II - ANESTHESIA PLAN  
ASA Score: 3  
Anesthetic Plan: general  
NPO Status: adequate  
Monitoring plan: standard   
ASA.  
Postoperative analgesic   
plan: parenteral or oral   
opioids.  
Patient / Surrogate agrees   
to blood products: Yes  
Potential Anesthesia issues   
that may suggest increased   
risk of  
complications or   
contraindication to planned   
procedure: none.  
No vitals data found for the   
desired time range.  
Facility-Administered   
Medications as of 3/23/2022  
Medication Dose Route   
Frequency  
- [MAR Hold due to Transfer]   
insulin lispro 13 Units pen   
(rapid acting)  
(HumaLOG KWIKPEN) 13 Units   
SUBCUTANEOUS w MEALS  
- [MAR Hold due to Transfer]   
insulin lispro pen (rapid   
acting) (HumaLOG  
KWIKPEN) SUBCUTANEOUS q 6 H  
- [MAR Hold due to Transfer]   
dextrose 5% in LR infusion   
(D5-LR) 75 mL/hr  
INTRAVENOUS CONTINUOUS  
- [MAR Hold due to Transfer]   
insulin glargine 25 Units   
pen (long acting)  
(LANTUS SOLOSTAR, BASAGLAR   
KWIKPEN) 25 Units   
SUBCUTANEOUS AT BEDTIME  
- [MAR Hold due to Transfer]   
dextrose 40 % 15 g 15 g ORAL   
PRN  
Or  
- [MAR Hold due to Transfer]   
glucagon 1 mg injection 1 mg   
INTRAMUSCULAR  
PRN  
Or  
- [MAR Hold due to Transfer]   
dextrose 50% in water 25 mL   
syringe 12.5 g  
INTRAVENOUS PRN  
- [MAR Hold due to Transfer]   
empagliflozin 25 mg tab(s)   
(JARDIANCE) 25 mg  
ORAL DAILY  
- [MAR Hold due to Transfer]   
ondansetron (PF) 4 mg   
injection (ZOFRAN) 4  
mg INTRAVENOUS q 6 H PRN  
- [MAR Hold due to Transfer]   
NaCl 0.9% iv flush bag 20 mL   
INTRAVENOUS PRN  
- [MAR Hold due to Transfer]   
sodium chloride 0.9 %   
(flush) 3-5 mL (BD  
POSIFLUSH) 3-5 mL   
INTRAVENOUS q 12 H  
- [MAR Hold due to Transfer]   
sodium chloride 0.9 %   
(flush) 2-10 mL (BD  
POSIFLUSH) 2-10 mL   
INTRAVENOUS q 12 H  
- [MAR Hold due to Transfer]   
DULoxetine 60 mg cap(s)   
(CYMBALTA) 60 mg  
ORAL BID  
- [MAR Hold due to Transfer]   
ranolazine ER 1,000 mg   
tab(s) (RANEXA) 1,000  
mg ORAL BID  
- [MAR Hold due to Transfer]   
dilTIAZem  mg cap(s)   
(CARDIZEM CD,  
CARTIA XT) 180 mg ORAL DAILY  
- [MAR Hold due to Transfer]   
gabapentin 200 mg cap(s)   
(NEURONTIN) 200 mg  
ORAL TID  
- [MAR Hold due to Transfer]   
isosorbide mononitrate ER 90   
mg tab(s)  
(IMDUR) 90 mg ORAL DAILY  
- [MAR Hold due to Transfer]   
clopidogrel 75 mg tab(s)   
(PLAVIX) 75 mg ORAL  
DAILY  
- [MAR Hold due to Transfer]   
metoprolol tartrate (short   
acting) 25 mg  
tab(s) (LOPRESSOR) 25 mg   
ORAL q 12 H  
- [MAR Hold due to Transfer]   
traMADol 50 mg tab(s)   
(ULTRAM) 50 mg ORAL q  
6 H PRN  
- [MAR Hold due to Transfer]   
acetaminophen 1,000 mg   
tab(s) (TYLENOL)  
1,000 mg ORAL q 6 H PRN  
- [MAR Hold due to Transfer]   
oxyCODONE IR 5-10 mg tab(s)   
(ROXICODONE)  
5-10 mg ORAL q 6 H PRN  
Outpatient Medications as of   
3/23/2022  
Medication Sig  
- DULoxetine (CYMBALTA) 60   
mg capsule Take 60 mg by   
mouth twice daily.  
- ranolazine SR (RANEXA)   
1,000 mg tab ER 12 hr Take 1   
tablet by mouth  
twice daily.  
- dilTIAZem CD (CARDIZEM CD)   
180 mg 24 hr capsule Take   
180 mg by mouth  
once daily.  
- doxycycline monohydrate   
(MONODOX) 100 mg capsule   
Take 100 mg by mouth  
twice daily.  
- GABAPENTIN ORAL Take 200   
mg by mouth three times   
daily.  
- METOPROLOL TARTRATE ORAL   
Take 25 mg by mouth twice   
daily.  
- isosorbide mononitrate ER   
(IMDUR) 30 mg 24 hr tablet   
Take 90 mg by mouth  
once daily.  
- insulin   
glargine,hum.rec.anlog   
(LANTUS SUBCUTANEOUS) Inject   
40 Units  
subcutaneously daily at   
bedtime.  
- clopidogrel (PLAVIX) 75 mg   
tablet Take 75 mg by mouth   
once daily.  
- empagliflozin (JARDIANCE)   
25 mg tablet Take 25 mg by   
mouth daily with  
breakfast.  
I have interviewed and   
examined the patient. I have   
reviewed the medical  
record and/or the   
pre-anesthesia evaluation,   
pertinent labs, and test  
results.  
This contains updated   
information obtained within   
48 hours of  
Surgery/Procedure.  
SIGNATURE: León De La Cruz MD P   
(more content not   
included)...        Normal                                  [facility]  
   
                                                    BRIEF OP NOTon 2022   
   
                                        BRIEF OP NOT        HNO ID: [id number]  
Author: Amadou Camp MD  
Service: Orthopaedic Surgery  
Author Type: Resident  
Type: Brief Op Note  
Filed: 3/23/2022 5:51 PM  
Note Text:  
BRIEF OPERATIVE / PROCEDURE   
NOTE  
REMOVAL UKA IMPLANT LEFT   
KNEE, INSERTION ANTIBIOTIC   
SPACER LEFT KNEE  
LOG ID: [id number]  
Surgery/Procedure Date:   
3/23/2022  
Incision/Procedure Start   
Time: 3:43 PM  
Incision Close/Procedure End   
Time: 5:20 PM  
Surgeon(s)/Proceduralist(s)   
and Assistant(s):  
Surgeon(s) and Role:  
* Basil Mitchell MD - Primary  
* Amadou Camp MD - Resident   
- Assisting  
Surgical Assistant: Vladimir Lafleur SA  
Procedure(s):  
Irrigation and Debridement   
Left Knee, Removal   
Unicompartmental Knee  
Arthroplasty Left Knee,   
Insertion Articulating   
Antibiotic Spacer Left Knee  
Anesthesia: General  
Approach: Medial   
parapatellar  
Findings: Left Knee   
Periprosthetic Joint   
Infection  
Estimated Blood Loss: 200cc  
Specimens: Left Knee Fluid   
Cultures  
Complications: None  
Implant:  
Implant Name Type Inv. Item   
Serial No.  Lot   
No. LRB No. Used  
Action  
INSERT 4-11 E-F 12MM   
ULTRACONGRUENT KNEE   
-601-12 Implant   
[employer]  
INC [id number] Left 1   
Implanted  
PERSONA IMP KNEE FEM STEM LT   
0 [id number] -[id number] Implant   
[employer]  
[id number] Left 1 Implanted  
CEMENT SIMPLEX GENTAMICIN   
BONE HIGH VISCOSITY 20ML   
STERILE 40GM -  
[id number] Cement / Putty   
CEMENT SIMPLEX GENTAMICIN   
BONE HIGH VISCOSITY  
20ML STERILE 40GM [facility]   
ORTHOPEDICS [id number] Left   
1 Implanted  
CEMENT SIMPLEX GENTAMICIN   
BONE HIGH VISCOSITY 20ML   
STERILE 40GM -  
[id number] Cement / Putty   
CEMENT SIMPLEX GENTAMICIN   
BONE HIGH VISCOSITY  
20ML STERILE [facility]   
ORTHOPED[facility] [id number] Left   
1 Wasted  
CEMENT SIMPLEX GENTAMICIN   
BONE HIGH VISCOSITY 20ML   
STERILE 40GM -  
[id number] Cement / Putty   
CEMENT SIMPLEX GENTAMICIN   
BONE HIGH VISCOSITY  
20ML STERILE 40GM [facility]   
ORTHOPEDICS [id number] Left   
1 Implanted  
CEMENT SIMPLEX GENTAMICIN   
BONE HIGH VISCOSITY 20ML   
STERILE 40GM -  
[id number] Cement / Putty   
CEMENT SIMPLEX GENTAMICIN   
BONE HIGH VISCOSITY  
20ML STERILE 40GM [facility]   
ORTHOPEDICS [id number] Left   
1 Implanted  
Pre-Op/Pre-Procedure   
Diagnosis:Left Knee   
Periprosthetic Joint   
Infection  
Post-Op/Post-Procedure   
Diagnosis:Left Knee   
Periprosthetic Joint   
Infection  
A/P: 60 YO F POD0 s/p IANDD,   
Removal Left Knee UKA,   
Insertion Articulating  
Antibiotic Spacer Left Knee  
Pain control  
WBAT LLE  
PT/OT  
Medical management per IM  
Antibiotics per ID  
Currently on IV Vancomycin  
F/U intra-op culture results  
Dressing: Aquacel + Drain  
Monitor drain output: pull   
when less than 30cc per   
shift  
DVT ppx: Continue home   
plavix POD1, start Aspirin   
81mg BID x 21 days POD1  
F/U XR L Knee  
Discharge planning: pending   
PT recs/ when final abx recs   
obtained  
SIGNATURE: Amadou Camp MD   
PATIENT NAME: Jayden Soto  
DATE: 2022 MRN:   
[id number]  
TIME: 5:45 PM       Normal                                  [facility]  
   
                                                    Bacteria Spec Anaerobe Culto  
n 2022   
   
                                                    Bacteria   
identified Anaer   
cx Nom (Unsp   
spec)           Negative        Normal                          [facility]  
   
                                        Comment on above:   Performed By: #### 6  
462-6, 635-3 ####  
[facility] LABORATORY  
CLIA [id number]  
[address] [place] [address] [place]   
   
                                                    Bacteria   
identified Anaer   
cx Nom (Unsp   
spec)                                   CULTURE, ANAEROBE:  
Negative for anaerobes.  
ORGANISM ID: 1  
Diphtheroids  
Cultured in broth only Normal                                  [facility]  
   
                                        Comment on above:   Performed By: #### 6  
462-6, 635-3 ####[facility]   
LABORATORYCLIA   
[id number] [place] [address] [place]   
   
                                                    Bacteria Wnd Culton 20   
   
                                                    Bacteria   
identified Cx Nom   
(Wound)                                 ORGANISM ID: 1  
Rare Staphylococcus aureus  
GRAM STAIN:  
No organisms seen  
Many Polymorphonuclear   
leukocytes  
Many Red Blood Cells  
ORGANISM ID: 1   
(STAPHYLOCOCCUS AUREUS)  
----------------------------  
------  
ANTIBIOTIC INTERPRETATION   
JUSTINE STATUS REFERENCE RANGE  
----------------------------  
------  
Clindamycin S <=0.5 F  
Susceptible <=0.5 ,   
Intermediate >.5 , Resistant   
>2  
This isolate does not   
demonstrate inducible   
Clindamycin resistance in   
vitro.  
Erythromycin R >4 F  
Susceptible <=0.5 ,   
Intermediate >.5 , Resistant   
>4  
Gentamicin S <=2 F  
Susceptible <=4 ,   
Intermediate >4 , Resistant   
>8  
Oxacillin S <=0.25 F  
Susceptible <=2 ,   
Intermediate >2 , Resistant   
>2  
Oxacillin-susceptible   
staphylococci are   
susceptible to other   
penicilllinase-stable   
penicillins,   
beta-lactam/beta-lactamase   
inhibitor combinations,   
anti-staphylococcal cephems,   
and carbapenems.  
Rifampin S <=0.5 F  
Susceptible <=1 ,   
Intermediate >1 , Resistant   
>2  
Rifampin should not be used   
alone for antimicrobial   
therapy.  
Trimeth sulfameth S <=1 F  
  
Tetracycline S <=0.5 F  
Susceptible <=4 ,   
Intermediate >4 , Resistant   
>8  
Vancomycin S 1 F  
Susceptible <=2 ,   
Intermediate >2 , Resistant   
>=16                Abnormal                                [facility]  
   
                                        Comment on above:   Performed By: #### 6  
462-6 635-3 ####  
[facility] LABORATORY  
CLIA [id number]  
[address] [place] [id number] [place]   
   
                                                    Bacteria   
identified Cx Nom   
(Wound)                                 CULTURE, WOUND:  
No growth 5 days  
  
GRAM STAIN:  
No organisms seen  
Few Polymorphonuclear   
leukocytes  
Many Red Blood Cells Normal                                  [facility]  
   
                                        Comment on above:   Performed By: #### 6  
462-6, 685-3 ####[facility]   
LABORATORYCLIA   
[id number] [place] [address] [place]   
   
                                                    Basic metabolic 2000 panelon  
 2022   
   
                                                    Anion gap   
[Moles/Vol]     8 mmol/L        Low             9-18            [facility]  
   
                                        Comment on above:   Order Comment: Speci  
men Type: BLOOD SPECIMEN  
Ordering Facility: [facility]  
Address: [address] [place] [address]   
   
                                                            Performed By: #### 2  
4321-2 ####  
[facility]  
CLIA [id number]  
[address] [place] [address] [place]   
   
                                                    Calcium   
[Mass/Vol]      8.8 mg/dL       Normal          8.5-10.2        [facility]  
   
                                        Comment on above:   Order Comment: Speci  
men Type: BLOOD SPECIMEN  
Ordering Facility: [facility]  
Address: [address] [place] [address]   
   
                                                            Performed By: #### 2  
4321-2 ####  
[facility]  
CLIA [id number]  
[address] [place]   
   
                                                    Chloride   
[Moles/Vol]     100 mmol/L      Normal                    [facility]  
   
                                        Comment on above:   Order Comment: Speci  
men Type: BLOOD SPECIMEN  
Ordering Facility: [facility]  
Address: [address] [place] [address]   
   
                                                            Performed By: #### 2  
4321-2 ####  
[facility]  
CLIA [id number]  
[address] [place]   
   
                      CO2 [Moles/Vol] 27 mmol/L  Normal     22-30      [facility]  
   
                                        Comment on above:   Order Comment: Speci  
men Type: BLOOD SPECIMEN  
Ordering Facility: [facility]  
Address: [address] [place] [address]   
   
                                                            Performed By: #### 2  
4321-2 ####  
[facility]  
CLIA [id number]  
[address] [place]   
   
                                                    Creatinine   
[Mass/Vol]      0.79 mg/dL      Normal          0.58-0.96       [facility]  
   
                                        Comment on above:   Order Comment: Speci  
men Type: BLOOD SPECIMEN  
Ordering Facility: [facility]  
Address: [address] [address]   
   
                                                            Performed By: #### 2  
4321-2 ####  
[facility]  
CLIA [id number]  
[address] [place]   
   
                                                    ESTIMATED   
GLOMERULAR   
FILTRATION RATE 86 mL/min/1.73m??? Normal          >=60            [facility]  
   
                                        Comment on above:   Order Comment: Speci  
men Type: BLOOD SPECIMEN  
Ordering Facility: [facility]  
Address: [address] [place] [address]   
   
                                                            Result Comment: Sydnee  
mated Glomerular Filtration Rate (eGFR) is   
calculated using the  CKD-EPI creatinine equation. This equation   
utilizes serum creatinine, sex, and age as parameters. The   
creatinine assay has traceable calibration to isotope dilution-mass   
spectrometry. Refer to KDIGO guidelines for clinical interpretation.   
In patients with unstable renal function, e.g. those with acute   
kidney injury, the eGFR may not accurately reflect actual GFR.   
   
                                                            Performed By: #### 2  
4321-2 ####  
[facility]  
CLIA [id number]  
[address] [place] [address] [place]   
   
                                                    Glucose   
[Mass/Vol]      285 mg/dL       High            74-99           [facility]  
   
                                        Comment on above:   Order Comment: Speci  
men Type: BLOOD SPECIMEN  
Ordering Facility: [facility]  
Address: [address] [place] [address]   
   
                                                            Result Comment: The   
American Diabetes Association (ADA) provides   
guidance for cutoff values for fasting glucose and random glucose.   
The ADA defines fasting as no caloric intake for at least 8 hours.   
Fasting plasma glucose results between 100 to 125 mg/dL indicate   
increased risk for diabetes (prediabetes).  
Fasting plasma glucose results greater than or equal to 126 mg/dL   
meet the criteria for diagnosis of diabetes. In the absence of   
unequivocal hyperglycemia, results should be confirmed by repeat   
testing. In a patient with classic symptoms of hyperglycemia or   
hyperglycemic crisis, random plasma glucose results greater than or   
equal to 200 mg/dL meet the criteria for diagnosis of diabetes.  
Reference: Standards of Medical Care in Diabetes 2016, American   
Diabetes Association. Diabetes Care. 2016.39(Suppl 1).   
   
                                                            Performed By: #### 2  
4321-2 ####  
[facility]  
CLIA [id number]  
[address] [place]   
   
                                                    Potassium   
[Moles/Vol]     4.2 mmol/L      Normal          3.7-5.1         [facility]  
   
                                        Comment on above:   Order Comment: Speci  
men Type: BLOOD SPECIMEN  
Ordering Facility: [facility]  
Address: [address] [place] [address]   
   
                                                            Performed By: #### 2  
4321-2 ####  
[facility]  
CLIA [id number]  
[address] [place]   
   
                                                    Sodium   
[Moles/Vol]     135 mmol/L      Low             136-144         [facility]  
   
                                        Comment on above:   Order Comment: Speci  
men Type: BLOOD SPECIMEN  
Ordering Facility: [facility]  
Address: [address] [place] [address]   
   
                                                            Performed By: #### 2  
4321-2 ####  
[facility]  
CLIA [id number]  
[address] [place]   
   
                                                    Urea nitrogen   
[Mass/Vol]      15 mg/dL        Normal          7-21            [facility]  
   
                                        Comment on above:   Order Comment: Speci  
men Type: BLOOD SPECIMEN  
Ordering Facility: [facility]  
Address: [address] [place] [address]   
   
                                                            Performed By: #### 2  
4321-2 ####  
[facility]  
CLIA [id number]  
[address] [place]   
   
                                                    CASE MGT INIT ASSESon 2022   
   
                                                    CASE MGT INIT   
ASSES                                   HNO ID: [id number]  
Author: Bev Steele RN  
Service: Nursing  
Author Type: Registered   
Nurse  
Type: Care Mgt Initial   
Assessment  
Filed: 3/23/2022 11:46 AM  
Note Text:  
CARE MANAGEMENT: ASSESSMENT   
AND DISCHARGE PLAN  
SERVICE DATE: 2022  
SERVICE TIME: 11:40 AM  
PRIMARY CARE PHYSICIAN:  
Becky Corrales MD, MD  
Phone: [phone]  
ADMISSION STATUS: Inpatient  
Needs Prior to Discharge: To   
Be Determined  
MEDICAL:  
[facility] MEDICAID  
Patient/Representative   
Stated Goals: To have   
reduction in symptoms;To  
improve my functional   
status;To return home to   
life as it was  
Health Insurance: [facility] Issues Impacting   
Discharge Plan: Newly   
diagnosed  
Newly Diagnosed: left septic   
knee  
Last Discharge Date:  
N/A  
Is this Within the Past 30   
days?  
Last discharge within 30   
days: No  
Advance Directive:  
Current Advance Directive:   
Health Care Power of   
  
In Chart: No  
Health LiteracyHow often do   
you need to have someone   
help you when you  
read instructions,   
pamphlets, or other written   
material from your doctor  
or pharmacy? : 1 - Never  
How confident are you   
filling out medical forms by   
yourself?: 1 -  
Extremely  
If Patient scores > 3 on   
either question, the   
following interventions were  
put into place:: Patient did   
not score > 3 on either   
question.  
Baseline Mental Status  
Prior to this Illness what   
was the patient's Baseline   
Mental Status?:  
Alert AND Oriented  
Prior to this illness, has   
anyone described the patient   
having any of the  
following behaviors?: Not   
Applicable  
Relationship of the   
informant to the patient::   
Self  
Functional Status:   
Independent  
Does Patient Currently   
Receive Any Community   
Services or Home Care?: None  
Equipment Prior to   
Admission:   
Walker;Cane;Wheelchair;Tub   
bench/chair  
Has the Patient Been in a   
Skilled Nursing Facility in   
the Past 30 days?:  
No  
SOCIAL:  
Living Arrangements: Home  
Lives With: Son  
Financial Resources: Retired  
Primary Contact: Extended   
Emergency Contact   
Information  
Primary Emergency Contact:   
NNEKA SOTO  
Mobile Phone: [phone]  
Relation: Relative  
Secondary Emergency Contact:   
jacinto soto  
Mobile Phone: [phone]  
Relation: Son  
Supportive Patient Contact::   
Yes  
Contact Resources: Family  
Family Name/Phone: Nneka   
Michel ([facility]) [phone] (H)  
Caregiver   
AssessmentCaregiver is   
ready, willing and able to   
meet the  
patient's needs as   
recommended by the   
inter-professional team:: No  
Caregiver needed  
Does the patient have an   
acute stroke diagnosis, or   
has the patient had a  
stroke during this   
admission?: No  
Patient's transition needs   
and plan for meeting these   
needs: [facility]  
Patient's perception of need   
for this admission: septic   
knee  
Medication Adherance  
I am convinced of the   
importance of my   
prescription medication: 0 -   
Agree  
Completely  
I worry that my prescription   
medication will do more harm   
than good to me  
: 0 - Disagree Completely  
I feel financially burdened   
by my out-of-pocket expenses   
for my  
prescription medication:: 0   
- Disagree Completely  
Risk Score: 0  
Patient is categorized as:   
Low risk < 2  
Are you interested in   
bedside delivery of your   
medications? Yes  
Is Patient Psychosocially   
Complex?: No  
ASSESSMENT AND PLAN:  
Medical Needs:  
Medical Needs: None  
Psychosocial Needs:  
Psychosocial Needs: None  
FREEDOM OF CHOICE EXPLAINED:  
Freedom of Choice Given: Yes  
Level of Care Discussed:   
Home Care  
Financial Disclosure   
Provided: No  
Provider List: Home Care  
Provider list within the   
patient's requested   
geographic area shared with  
the patient/family: Yes  
within: 20 miles  
of zip code: [address]  
Quality and resource use   
metrics shared with the   
patient that are relevant  
to the patient's goals of   
care and treatment   
preferences:: No  
POTENTIAL TRANSITION PLANS  
Home OT/PT  
Chart reviewed, pt from home   
with son and grandkids,   
independent pta,  
Retired, does not drive,   
pt's son is able to provide   
transportation and  
assistance at home; pt not   
active with home care   
company, she states that  
she used a home care company   
last year for surgery but   
unaware of  
company's name; in the   
meantime, pt agreeable to   
sending multiple home  
care referrals in her area   
+PCP Becky Corrales +DME   
walker, cane,  
wheelchair, shower bench +Rx   
[facility]  
SIGNATURE: Bev Steele RN   
PATIENT NAME: Jayden Soto  
DATE: 2022 MRN:   
[id number]  
TIME: 11:40 AM PAGER/CONTACT   
#: [id number]       Normal                                  [facility]  
   
                                                    CBC W Auto Differential pane  
l (Bld)on 2022   
   
                                                    Basophils (Bld)   
[#/Vol]         0.04 10*3/uL    Normal          <0.11           [facility]  
   
                                        Comment on above:   Order Comment: Speci  
men Type: BLOOD SPECIMENOrdering Facility:  
   
[facility] Address: [address] [place] [address]   
   
                                                            Performed By: #### 5  
7021-8 ####[facility] LABORATORYCLIA   
[id number] [place] [address] [place]   
   
                                                    Basophils/100 WBC   
(Bld)           0.4 %           Normal                          [facility]  
   
                                        Comment on above:   Order Comment: Speci  
men Type: BLOOD SPECIMENOrdering Facility:  
   
[facility] Address: [address] [place] [address]   
   
                                                            Performed By: #### 5  
7021-8 ####[facility] LABORATORYCLIA   
[id number] [place] [address] [place]   
   
                                                    Differential cell   
count method Nom   
(Bld)           Auto            Normal                          [facility]  
   
                                        Comment on above:   Order Comment: Speci  
men Type: BLOOD SPECIMENOrdering Facility:  
   
[facility] Address: [address] [place] [address]   
   
                                                            Performed By: #### 5  
7021-8 ####[facility] LABORATORYCLIA   
[id number] [address] [place]   
[place]   
   
                                                    Eosinophils (Bld)   
[#/Vol]         0.25 10*3/uL    Normal          <0.46           [facility]  
   
                                        Comment on above:   Order Comment: Speci  
men Type: BLOOD SPECIMENOrdering Facility:  
   
[facility] Address: [address] [place] [address]   
   
                                                            Performed By: #### 5  
7021-8 ####[facility] LABORATORYCLIA   
[id number] [address] [place]   
[place]   
   
                                                    Eosinophils/100   
WBC (Bld)       2.4 %           Normal                          [facility]  
   
                                        Comment on above:   Order Comment: Speci  
men Type: BLOOD SPECIMENOrdering Facility:  
   
[facility] Address: [address] [place] [address]   
   
                                                            Performed By: #### 5  
7021-8 ####[facility] LABORATORYCLIA   
[id number] [address] [place]   
[place]   
   
                                                    Erythrocyte   
distribution   
width (RBC)   
[Ratio]         14.1 %          Normal          11.5-15.0       [facility]  
   
                                        Comment on above:   Order Comment: Speci  
men Type: BLOOD SPECIMENOrdering Facility:  
   
[facility] Address: [address] [place] [address]   
   
                                                            Performed By: #### 5  
7021-8 ####[facility] LABORATORYCLIA   
[id number] [address] [place]   
[place]   
   
                                                    Hematocrit (Bld)   
[Volume fraction] 30.6 %          Low             36.0-46.0       [facility]  
   
                                        Comment on above:   Order Comment: Speci  
men Type: BLOOD SPECIMENOrdering Facility:  
  
[facility] Address: [address] [place] [address]   
   
                                                            Performed By: #### 5  
7021-8 ####[facility] LABORATORYCLIA   
[id number] [address] [place]   
[place]   
   
                                                    Hemoglobin (Bld)   
[Mass/Vol]      9.7 g/dL        Low             11.5-15.5       [facility]  
   
                                        Comment on above:   Order Comment: Speci  
men Type: BLOOD SPECIMENOrdering Facility:  
   
[facility] Address: [address] [place] [address]   
   
                                                            Performed By: #### 5  
7021-8 ####[facility] LABORATORYCLIA   
[id number] [address] [place]   
[place]   
   
                      IMMATURE GRAN % 1.1 %      Normal                [facility]  
   
                                        Comment on above:   Order Comment: Speci  
men Type: BLOOD SPECIMENOrdering Facility:  
   
[facility] Address: [address] [place] [address]   
   
                                                            Performed By: #### 5  
7021-8 ####[facility] LABORATORYCLIA   
[id number] [address]   
   
                      IMMATURE GRAN ABS 0.12 k/uL  High       <0.10      [facility]  
   
                                        Comment on above:   Order Comment: Speci  
men Type: BLOOD SPECIMENOrdering Facility:  
   
[facility] Address: [address] [place] [address]   
   
                                                            Performed By: #### 5  
7021-8 ####[facility] LABORATORYCLIA   
[id number] [address] [place]   
[place]   
   
                                                    Lymphocytes (Bld)   
[#/Vol]         2.82 10*3/uL    Normal          1.00-4.00       [facility]  
   
                                        Comment on above:   Order Comment: Speci  
men Type: BLOOD SPECIMENOrdering Facility:  
   
[facility] Address: [address] [place] [address]   
   
                                                            Performed By: #### 5  
7021-8 ####[facility] LABORATORYCLIA   
[id number] [address]   
   
                                                    Lymphocytes/100   
WBC (Bld)       26.5 %          Normal                          [facility]  
   
                                        Comment on above:   Order Comment: Speci  
men Type: BLOOD SPECIMENOrdering Facility:  
   
[facility] Address: [address] [place] [address]   
   
                                                            Performed By: #### 5  
7021-8 ####[facility] LABORATORYCLIA   
[id number] [address]   
   
                                                    MCH (RBC)   
[Entitic mass]  27.9 pg         Normal          26.0-34.0       [facility]  
   
                                        Comment on above:   Order Comment: Speci  
men Type: BLOOD SPECIMENOrdering Facility:  
  
[facility] Address: [address] [place] [address]   
   
                                                            Performed By: #### 5  
7021-8 ####[facility] LABORATORYCLIA   
[id number] [address]   
   
                                                    MCHC (RBC)   
[Mass/Vol]      31.7 g/dL       Normal          30.5-36.0       [facility]  
   
                                        Comment on above:   Order Comment: Speci  
men Type: BLOOD SPECIMENOrdering Facility:  
   
[facility] Address: [address] [place] [address]   
   
                                                            Performed By: #### 5  
7021-8 ####[facility] LABORATORYCLIA   
[id number] [address]   
   
                                                    MCV (RBC)   
[Entitic vol]   87.9 fL         Normal          80.0-100.0      [facility]  
   
                                        Comment on above:   Order Comment: Speci  
men Type: BLOOD SPECIMENOrdering Facility:  
  
[facility] Address: [address] [place] [address]   
   
                                                            Performed By: #### 5  
7021-8 ####[facility] LABORATORYCLIA   
[id number] [address]   
   
                                                    Monocytes (Bld)   
[#/Vol]         0.85 10*3/uL    Normal          <0.87           [facility]  
   
                                        Comment on above:   Order Comment: Speci  
men Type: BLOOD SPECIMENOrdering Facility:  
   
[facility] Address: [address] [place] [address]   
   
                                                            Performed By: #### 5  
7021-8 ####[facility] LABORATORYCLIA   
[id number] [address]   
   
                                                    Monocytes/100 WBC   
(Bld)           8.0 %           Normal                          [facility]  
   
                                        Comment on above:   Order Comment: Speci  
men Type: BLOOD SPECIMENOrdering Facility:  
   
[facility] Address: [address] [address]   
   
                                                            Performed By: #### 5  
7021-8 ####[facility] LABORATORYCLIA   
[id number] [address] [place]   
[place]   
   
                                                    Neutrophils (Bld)   
[#/Vol]         6.55 10*3/uL    Normal          1.45-7.50       [facility]  
   
                                        Comment on above:   Order Comment: Speci  
men Type: BLOOD SPECIMENOrdering Facility:  
   
[facility] Address: [address] [place] [address]   
   
                                                            Performed By: #### 5  
7021-8 ####[facility] LABORATORYCLIA   
[id number] [address]   
   
                                                    Neutrophils/100   
WBC (Bld)       61.6 %          Normal                          [facility]  
   
                                        Comment on above:   Order Comment: Speci  
men Type: BLOOD SPECIMENOrdering Facility:  
   
[facility] Address: [address] [place] [address]   
   
                                                            Performed By: #### 5  
7021-8 ####[facility] LABORATORYCLIA   
[id number] [address] [place]    
[place]   
   
                                                    Nucleated RBC   
(Bld) [#/Vol]   10*3/uL         Normal          <0.01           [facility]  
   
                                        Comment on above:   Order Comment: Speci  
men Type: BLOOD SPECIMENOrdering Facility:  
   
[facility] Address: [address] [place] [address]   
   
                                                            Performed By: #### 5  
7021-8 ####[facility] LABORATORYCLIA   
[id number] [address] [place]   
[place]   
   
                                                    Nucleated RBC/100   
WBC (Bld) [Ratio] 0.0 /100 WBC    Normal                          [facility]  
   
                                        Comment on above:   Order Comment: Speci  
men Type: BLOOD SPECIMENOrdering Facility:  
   
[facility] Address: [address] [place] [address]   
   
                                                            Performed By: #### 5  
7021-8 ####[facility] LABORATORYCLIA   
[id number] [place] [address] [place]   
[place]   
   
                                                    Platelet mean   
volume (Bld)   
[Entitic vol]   9.3 fL          Normal          9.0-12.7        [facility]  
   
                                        Comment on above:   Order Comment: Speci  
men Type: BLOOD SPECIMENOrdering Facility:  
  
[facility] Address: [address] [place] [address]   
   
                                                            Performed By: #### 5  
7021-8 ####[facility] LABORATORYCLIA   
[id number] [address] OF   
[place]   
   
                                                    Platelets (Bld)   
[#/Vol]         364 10*3/uL     Normal          150-400         [facility]  
   
                                        Comment on above:   Order Comment: Speci  
men Type: BLOOD SPECIMENOrdering Facility:  
   
[facility] Address: [address] [place] [address]   
   
                                                            Performed By: #### 5  
7021-8 ####[facility] LABORATORYCLIA   
[id number] [address]   
   
                      RBC (Bld) [#/Vol] 3.48 10*6/uL Low        3.90-5.20  [facility]  
   
                                        Comment on above:   Order Comment: Speci  
men Type: BLOOD SPECIMENOrdering Facility:  
   
[facility] Address: [address] [place] [address]   
   
                                                            Performed By: #### 5  
7021-8 ####[facility] LABORATORYCLIA   
[id number] [address] OF   
[facility]   
   
                      WBC (Bld) [#/Vol] 10.63 10*3/uL Normal     3.70-11.00 [facility]  
   
                                        Comment on above:   Order Comment: Speci  
men Type: BLOOD SPECIMENOrdering Facility:  
   
[facility] Address: [address] [place] [address]   
   
                                                            Performed By: #### 5  
7021-8 ####[facility] LABORATORYCLIA   
[id number] [address]   
   
                                                    CONSULT PROGon 2022   
   
                                        CONSULT PROG        HNO ID: [id number]  
Author: Leo Hearn RPh  
Service: Pharmacy  
Author Type: Pharmacist  
Type: Consult Progress Note  
Filed: 3/23/2022 8:53 PM  
Note Text:  
PHARMACY VANCOMYCIN DOSING   
NOTE  
Patient Name: Jayden Soto   
MRN: [id number] Admission Date:   
3/21/2022  
Date of Consult: 3/23/2022   
Time of Consult: 8:40 PM  
Indication: Bone AND joint   
infection  
Goal Range: 15-25 mcg/mL  
RECOMMENDATIONS/PLAN:  
Pharmacy consulted for   
vancomycin dosing for Jayden Soto, a 61 year old,  
female who is being treated   
with vancomycin for Bone and   
Joint infection  
1. Patient is currently   
ordered Vancomycin for   
pharmacy to dose. Today is  
day 1 of therapy.  
- Of note- pt did received a   
Vanco 1 g x 1 dose at OR   
(3/28 at 1548)- also  
confirmed by RN  
- Pt also has a spacer for   
his wound in with Vanco -   
given 3/28  
- Pt also had an ordered   
Vanco 1.5 q 12 hr regimen   
post-op orders until  
anesthesia end time---I   
d/c'ed this order as we are   
currently being  
consulted for Vancomycin   
consult  
2. No vancomycin level has   
been drawn for this dosing   
regimen.  
- Renal fn is currently   
un-concerning (caveat: Obese   
with BMI of 36.15)  
- WBC wnl (previously   
elevated); afebrile ; MAP >   
65  
- Will use Actual BW of   
104.7 kg for Vanco dose of   
~1500 mg and will keep  
q 24 hr frequency -   
accumulation risk w/ obesity   
and current infectious  
disease s/s  
- Wound Cx in process -   
monitor  
- There is already a BMP in   
AM- Monitor  
- Will initiate a regimen of   
Vanco 1.5 q 24 hr starting   
tomorrow 3/24 @  
1600  
3. Will schedule vancomycin   
to 1.5 g with a dosing   
interval of q24h.  
4. The next vancomycin level   
has been ordered for 3/27 @   
1500 (Completed)  
- prior to 4th dose of the   
regimen ordered  
We will follow patient renal   
function, vancomycin levels   
and doses with  
you during the course of   
therapy. Additional   
recommendations will appear  
in follow up notes. If you   
have any questions, please   
contact Leo Hearn (Pharmacist)  
at Ext # [phone].  
Age: 61 year old  
Allergies:  
ALLERGIES  
Allergen Reactions  
- Morphine GI Upset  
- Statins-Hmg-Coa Red*   
Unknown  
Last 3 Encounter Wt   
Readings:  
Date: Wt:  
2022 104.7 kg (230 lb   
13.2 oz)  
2022 95.3 kg (210 lb)  
2022 95.3 kg (210 lb)  
Last 1 Encounter Ht   
Readings:  
Date: Ht:  
2022 170.2 cm (5' 7 )  
Estimated Creatinine   
Clearance: 94.2 mL/min   
(based on SCr of 0.79   
mg/dL).  
Temp (24hrs), Av.4 ?C   
(97.6 ?F), Min:36 ?C (96.8   
?F), Max:36.8 ?C  
(98.2 ?F)  
- Current Temp: 36.8 ?C   
(98.2 ?F)  
Labs  
BUN (mg/dL)  
Date Value  
2022 15  
2022 14  
Creatinine (mg/dL)  
Date Value  
2022 0.79  
2022 0.67  
WBC (k/uL)  
Date Value  
2022 10.63  
2022 12.01 (H)  
Vancomycin Levels: No   
results found for: FABIÁN Hearn, [facility] Normal                                  [facility]  
   
                                                    HGB A1Con 2022   
   
                                                    Average glucose   
Estimated from   
glycated   
hemoglobin (Bld)   
[Mass/Vol]      223 mg/dL       Normal                          [facility]  
   
                                        Comment on above:   Order Comment: Speci  
men Type: BLOOD SPECIMENOrdering Facility:  
   
[facility] Address: [address] [address]   
   
                                                            Result Comment: eAG:  
 (Estimated average glucose) is a calculated   
value from HgbA1c and is representative of the average blood glucose   
level in the last 2-3 month period.   
   
                                                            Performed By: #### H  
BA1C ####[facility] LABORATORYCLIA   
[id number] [place] [address] [place]   
[place]   
   
                                                    HbA1c (Bld) [Mass   
fraction]       9.4 %           High            4.3-5.6         [facility]  
   
                                        Comment on above:   Order Comment: Speci  
men Type: BLOOD SPECIMENOrdering Facility:  
  
[facility] Address: [address] [address]   
   
                                                            Result Comment: Amer  
ican Diabetes Association guidelines indicate   
that patients with HgbA1c in the range 5.7-6.4% are at increased   
risk for development of diabetes, and intervention by lifestyle   
modification may be beneficial. HgbA1c greater or equal to 6.5% is   
considered diagnostic of diabetes.   
   
                                                            Performed By: #### H  
BA1C ####[facility] LABORATORYCLIA   
[id number] [id number] [place]   
[place]   
   
                                                    NURSING PROGon 2022   
   
                                        NURSING PROG        HNO ID: [id number]  
Author: Marjorie Francis RN  
Service: Nursing  
Author Type: Registered   
Nurse  
Type: Nursing Progress Note  
Filed: 3/23/2022 6:24 PM  
Note Text:  
Nursing Progress Note  
Patient Name: Jayden Soto  
MRN: [id number]  
Patient Location:   
[facility]  
____________________________  
____________________________  
____________  
Daily Note:xrays done  
This note was completed by:   
Marjorie Francis       [facility]  
   
                                                    OPERATIVE NOon 2022   
   
                                        OPERATIVE NO        HNO ID: [id number]  
Author: Basil Mitchell MD  
Service: Orthopaedic Surgery  
Author Type: Physician  
Type: Operative Report  
Filed: 3/24/2022 8:07 AM  
Note Text:  
[facility] - Operative   
Report  
JAYDEN SOTO  
: 1960  
AGE: 61.  
SEX: F  
MRN: [id number]  
PATIENT TYPE: I  
HOSP SVC: OROR  
LOCATION: [facility]  
ATTENDING PHYSICIAN: Basil Mitchell MD  
CSN NUMBER: [id number]  
DATE OF SURGERY/PROCEDURE:   
2022  
INCISION/PROCEDURE START   
TIME: 3:43 PM  
INCISION CLOSE/PROCEDURE END   
TIME: 5:20 PM  
PREOPERATIVE DIAGNOSIS:   
Infected left knee   
arthroplasty.  
POSTOPERATIVE DIAGNOSIS:   
Infected left knee   
arthroplasty.  
SURGEON: Basil Mitchell MD  
ASSISTANT: Amadou Camp MD.  
SURGERY/PROCEDURE: Explant   
of left knee arthroplasty   
both femoral and  
tibial components and   
insertion of articulating   
antibiotic spacer.  
ANESTHESIA: General  
SPECIAL MEDICATIONS: Patient   
received 2 g of Ancef, 1 g   
of vancomycin  
intravenously after cultures   
were obtained   
intraoperatively. The   
patient  
received 1 g of TXA   
intravenously at incision, 1   
g of TXA intravenously  
at closure. The patient   
received 50 mL of ERNST as a   
local anesthetic.  
HISTORY OF PRESENT ILLNESS:   
Patient is a 61-year-old   
female with a  
history of a left medial   
compartment partial knee   
arthroplasty done in  
 in [place].  
Approximately 1 year ago,   
she developed prosthetic   
joint infection, was  
treated at an outside   
institution. According to   
the patient, she had a  
prosthetic joint infection,   
which may have came from her   
foot, which also  
spread to an epidural   
infection. This was all   
treated at outside  
institution. According to   
the patient, she spent   
several months in both  
hospital as well as rehab   
facility. She was seen in my   
office initially  
in December complaining of   
left knee pain. At that   
time, her serologies  
were essentially normal. She   
did have lateral compartment   
arthritis,  
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   
pyogenic arthritis. At the   
time, I did not  
recommend surgical   
intervention. She was again   
seen several months later  
my office with worsening   
pain, swelling as well as   
fevers. I did  
aspirate her left knee at   
this time, which the cell   
count was suspicious  
for a chronic infection of   
the left knee. I recommended   
explant of this  
left knee arthroplasty and   
insertion of the antibiotic   
articulating  
spacer. The patient elected   
to proceed.  
DESCRIPTION OF PROCEDURE:   
Patient was identified in   
the preoperative  
holding area. All final   
questions were answered.   
Left lower extremity  
was marked. A preoperative   
huddle was performed. The   
patient's family  
present. She was taken back   
to the operating room, moved   
over onto the  
OR table. She was sedated   
today and intubated by   
Anesthesia, and they  
were in charge of her head,   
neck, and airway throughout   
the remainder of  
the case. A tourniquet was   
placed on left upper thigh.   
The left lower  
extremity was pre-prepped   
with alcohol as well as   
Hibiclens. Left lower  
extremity than prepped and   
draped in sterile orthopedic   
fashion. Prior  
to proceeding, time- out was   
performed according to [place]   
[facility]  
[facility] safety standards.   
Following time-out, the   
tourniquet was  
inflated to 300 mmHg .   
Incision was made down   
through previous midline  
incision. Previous medial   
parapatellar arthrotomy was   
used for exposure.  
There was noted to be   
purulent drainage upon entry   
into the knee joint.  
Multiple cultures of this   
were taken and sent. We then   
performed a  
complete synovectomy for   
exposure purposes. The knee   
was then flexed to  
90 degrees. Femoral canal   
was entered through the   
distal femur. Distal  
femoral cutting guide was   
performed. Once this was   
performed, the  
femoral component was then   
removed with minimal bone   
loss. The knee was  
then sized to a size 7 and   
the 4-in-1 cutting guide was   
placed at 3  
degrees of external rotation   
according to the posterior   
condylar axis.  
The 4 cuts were then made in   
sequential fashion.   
Attention was then  
turned to the proximal   
tibia. Extramedullary tibial   
cutting guide was  
placed. We  
referenced a neutral cut   
just below the level of the   
implant for clean  
removal of the implant. Cut   
was performed. The osteotomy   
was removed as  
was the medial compartment   
tibial implant with minimal   
bone loss. The  
tibia was sized to size E.   
We did place trial   
components. This was a  
size 12 polyethylene without   
the tibial tray and a size 7   
femur. I was  
pleased with the overall   
range of motion as well as   
the balance of the  
knee to track well. The   
intramedullary canal of the   
tibia was then  
opened. Our final components   
were open. The stem was   
fashioned onto the  
tibia using antibiotic   
cement. 2 batches of simplex   
cement were used  
with 4 grams of vancomycin   
and 4.8 grams of tobramycin.   
The antibiotic  
articulating spacer was then   
cemented into the knee.   
Tourniquet was let  
down. All bleeders were   
cauterized. We did place a   
medium Hemovac  
drain. 50 mL of ERNST was   
used as local anesthetic.   
The capsule was  
closed with #2 Ethibond,   
followed (more content not   
included)...        Normal                                  [facility]  
   
                                                    XR KNEE 2V AP/LAT LTon    
   
                                                    XR KNEE 2V AP/LAT   
LT                                      * * *Final Report* * *  
DATE OF EXAM: Mar 23 2022   
6:49PM  
AKX 5206 - XR KNEE 2V AP/LAT   
LT / ACCESSION # [id number]  
PROCEDURE REASON:   
Post-operative /   
post-procedure assessment,   
asymptomatic  
  
* * * * Physician   
Interpretation * * * *  
EXAM: LEFT KNEE: 2 VIEWS  
HISTORY: 61-year-old female   
status total post knee   
replacement  
TECHNIQUE: AP, lateral  
COMPARISON: 2022  
RESULT: Status post left   
total knee arthroplasty   
embedded in  
methacrylate with patella   
resurfacing in near anatomic   
alignment. No  
evidence of loosening,   
fracture or dislocation.   
Surgical drain is  
present. . Patellofemoral   
replacement is new compared   
to the previous  
exam with replacing the   
medial unicompartmental   
arthroplasty. .  
IMPRESSION: NEW TOTAL KNEE   
REPLACEMENT WITHOUT   
COMPLICATION  
: MONSE  
Transcribe Date/Time: Mar 23   
2022 8:04P  
Dictated by : MAGNOLIA ALVAREZ MD  
This examination was   
interpreted and the report   
reviewed and  
electronically signed by:  
MAGNOLIA ALVAREZ MD on Mar   
23 2022 8:07PM EST  
[id number] Normal                                  [facility]  
   
                                                    Basic metabolic 2000 panelon  
 2022   
   
                                                    Anion gap   
[Moles/Vol]     12 mmol/L       Normal          9-18            [facility]  
   
                                        Comment on above:   Order Comment: Speci  
men Type: BLOOD SPECIMEN  
Ordering Facility: [facility]  
Address: [address] [place] [address]   
   
                                                            Performed By: #### 2  
4321-2 ####  
[facility]  
CLIA [id number]  
[address] [place] [address] [place]   
   
                                                    Calcium   
[Mass/Vol]      8.7 mg/dL       Normal          8.5-10.2        [facility]  
   
                                        Comment on above:   Order Comment: Speci  
men Type: BLOOD SPECIMEN  
Ordering Facility: [facility]  
Address: [address] [place] [address]   
   
                                                            Performed By: #### 2  
4321-2 ####  
[facility]  
CLIA [id number]  
[address] [place]   
   
                                                    Chloride   
[Moles/Vol]     101 mmol/L      Normal                    [facility]  
   
                                        Comment on above:   Order Comment: Speci  
men Type: BLOOD SPECIMEN  
Ordering Facility: [facility]  
Address: [address] [place] [address]   
   
                                                            Performed By: #### 2  
4321-2 ####  
[facility]  
CLIA [id number]  
[address] [place]   
   
                      CO2 [Moles/Vol] 25 mmol/L  Normal     22-30      [facility]  
   
                                        Comment on above:   Order Comment: Speci  
men Type: BLOOD SPECIMEN  
Ordering Facility: [facility]  
Address: [address] [place] [address]   
   
                                                            Performed By: #### 2  
4321-2 ####  
[facility]  
CLIA [id number]  
[address] [place] [address] [place]   
   
                                                    Creatinine   
[Mass/Vol]      0.67 mg/dL      Normal          0.58-0.96       [facility]  
   
                                        Comment on above:   Order Comment: Speci  
men Type: BLOOD SPECIMEN  
Ordering Facility: [facility]  
Address: [address] [id number]   
   
                                                            Performed By: #### 2  
4321-2 ####  
[facility] LABORATORY  
CLIA [id number]  
[address] [place]   
   
                                                    ESTIMATED   
GLOMERULAR   
FILTRATION RATE 100 mL/min/1.73m??? Normal          >=60            [facility]  
   
                                        Comment on above:   Order Comment: Speci  
men Type: BLOOD SPECIMEN  
Ordering Facility: [facility]  
Address: [address] [place] [address]   
   
                                                            Result Comment: Sydnee  
mated Glomerular Filtration Rate (eGFR) is   
calculated using the  CKD-EPI creatinine equation. This equation   
utilizes serum creatinine, sex, and age as parameters. The   
creatinine assay has traceable calibration to isotope dilution-mass   
spectrometry. Refer to KDIGO guidelines for clinical interpretation.   
In patients with unstable renal function, e.g. those with acute   
kidney injury, the eGFR may not accurately reflect actual GFR.   
   
                                                            Performed By: #### 2  
4321-2 ####  
[facility]  
CLIA [id number]  
[address] [place] [address] [place]   
   
                                                    Glucose   
[Mass/Vol]      201 mg/dL       High            74-99           [facility]  
   
                                        Comment on above:   Order Comment: Speci  
men Type: BLOOD SPECIMEN  
Ordering Facility: [facility]  
Address: [address] [place] [address]   
   
                                                            Result Comment: The   
American Diabetes Association (ADA) provides   
guidance for cutoff values for fasting glucose and random glucose.   
The ADA defines fasting as no caloric intake for at least 8 hours.   
Fasting plasma glucose results between 100 to 125 mg/dL indicate   
increased risk for diabetes (prediabetes).  
Fasting plasma glucose results greater than or equal to 126 mg/dL   
meet the criteria for diagnosis of diabetes. In the absence of   
unequivocal hyperglycemia, results should be confirmed by repeat   
testing. In a patient with classic symptoms of hyperglycemia or   
hyperglycemic crisis, random plasma glucose results greater than or   
equal to 200 mg/dL meet the criteria for diagnosis of diabetes.  
Reference: Standards of Medical Care in Diabetes 2016, American   
Diabetes Association. Diabetes Care. 2016.39(Suppl 1).   
   
                                                            Performed By: #### 2  
4321-2 ####  
[facility] LABORATORY  
CLIA [id number]  
[address] [place] [address] [place]   
   
                                                    Potassium   
[Moles/Vol]     4.0 mmol/L      Normal          3.7-5.1         [facility]  
   
                                        Comment on above:   Order Comment: Speci  
men Type: BLOOD SPECIMEN  
Ordering Facility: [facility]  
Address: [address] [place] [address]   
   
                                                            Performed By: #### 2  
4321-2 ####  
[facility]  
CLIA [id number]  
[address] STATES OF [facility]   
   
                                                    Sodium   
[Moles/Vol]     138 mmol/L      Normal          136-144         [facility]  
   
                                        Comment on above:   Order Comment: Speci  
men Type: BLOOD SPECIMEN  
Ordering Facility: [facility]  
Address: [address] [place] [address]   
   
                                                            Performed By: #### 2  
4321-2 ####  
[facility] LABORATORY  
CLIA [id number]  
[address] STATES [facility]   
   
                                                    Urea nitrogen   
[Mass/Vol]      14 mg/dL        Normal          7-21            [facility]  
   
                                        Comment on above:   Order Comment: Speci  
men Type: BLOOD SPECIMEN  
Ordering Facility: [facility]  
Address: [address] [place] [address]   
   
                                                            Performed By: #### 2  
4321-2 ####  
[facility]  
CLIA [id number]  
[address] [place] [address] OF [facility]   
   
                                                    CASE MANAGEMon 2022   
   
                                        CASE MANAGEM        HNO ID: [id number]  
Author: Bev Steele RN  
Service: Nursing  
Author Type: Registered   
Nurse  
Type: Care Mgt Progress Note  
Filed: 3/22/2022 2:48 PM  
Note Text:  
CARE MANAGEMENT PROGRESS   
NOTE  
SERVICE DATE: 3/22/2022  
SERVICE TIME: 2:47 PM  
LOS: 1 day  
Made multiple attempts to   
complete initial assessment,   
patient on the  
phone each time; gave   
patient my contact   
information to reach out to   
me  
when available to talk  
SIGNATURE: Bev Steele RN   
PATIENT NAME: Jayden Soto  
DATE: 2022 MRN:   
[id number]  
TIME: 2:47 PM PAGER/CONTACT   
#: [id number]       Normal                                  [facility]  
   
                                                    CBC W Auto Differential pane  
l (Bld)on 2022   
   
                                                    Basophils (Bld)   
[#/Vol]         0.07 10*3/uL    Normal          <0.11           [facility]  
   
                                        Comment on above:   Order Comment: Speci  
men Type: BLOOD SPECIMENOrdering Facility:  
   
[facility] Address: [address] [place] [address]   
   
                                                            Performed By: #### 5  
7021-8 ####[facility]   
[id number] [address] [place]   
[place]   
   
                                                    Basophils/100 WBC   
(Bld)           0.6 %           Normal                          [facility]  
   
                                        Comment on above:   Order Comment: Speci  
men Type: BLOOD SPECIMENOrdering Facility:  
   
[facility] Address: [address] [place] [address]   
   
                                                            Performed By: #### 5  
7021-8 ####[facility]   
[id number] [address] [place]   
[place]   
   
                                                    Differential cell   
count method Nom   
(Bld)           Auto            Normal                          [facility]  
   
                                        Comment on above:   Order Comment: Speci  
men Type: BLOOD SPECIMENOrdering Facility:  
   
[facility] Address: [address] [place] [address]   
   
                                                            Performed By: #### 5  
7021-8 ####[facility]ORYCL[facility]   
[id number] [place] [address] [place]   
   
                                                    Eosinophils (Bld)   
[#/Vol]         0.31 10*3/uL    Normal          <0.46           [facility]  
   
                                        Comment on above:   Order Comment: Speci  
men Type: BLOOD SPECIMENOrdering Facility:  
   
[facility] Address: [address] [place] [address]   
   
                                                            Performed By: #### 5  
7021-8 ####[facility]   
[id number] [address] [place]   
[place]   
   
                                                    Eosinophils/100   
WBC (Bld)       2.6 %           Normal                          [facility]  
   
                                        Comment on above:   Order Comment: Speci  
men Type: BLOOD SPECIMENOrdering Facility:  
   
[facility] Address: [address] [place] [address]   
   
                                                            Performed By: #### 5  
7021-8 ####[facility]   
[id number] [address]   
   
                                                    Erythrocyte   
distribution   
width (RBC)   
[Ratio]         14.1 %          Normal          11.5-15.0       [facility]  
   
                                        Comment on above:   Order Comment: Speci  
men Type: BLOOD SPECIMENOrdering Facility:  
   
[facility] Address: [address] [place] [address]   
   
                                                            Performed By: #### 5  
7021-8 ####[facility] LABORATORYCLIA   
[id number] [address] OF   
[facility]   
   
                                                    Hematocrit (Bld)   
[Volume fraction] 36.3 %          Normal          36.0-46.0       [facility]  
   
                                        Comment on above:   Order Comment: Speci  
men Type: BLOOD SPECIMENOrdering Facility:  
  
[facility] Address: [address] [place] [address]   
   
                                                            Performed By: #### 5  
7021-8 ####[facility] LABORATORYCLIA   
[id number] [address] OF   
[facility]   
   
                                                    Hemoglobin (Bld)   
[Mass/Vol]      11.3 g/dL       Low             11.5-15.5       [facility]  
   
                                        Comment on above:   Order Comment: Speci  
men Type: BLOOD SPECIMENOrdering Facility:  
   
[facility] Address: [address] [place] [address]   
   
                                                            Performed By: #### 5  
7021-8 ####[facility] LABORATORYCLIA   
[id number] [address]   
   
                      IMMATURE GRAN % 1.3 %      Normal                [facility]  
   
                                        Comment on above:   Order Comment: Speci  
men Type: BLOOD SPECIMENOrdering Facility:  
   
[facility] Address: [address] [place] [address]   
   
                                                            Performed By: #### 5  
7021-8 ####[facility] LABORATORYCLIA   
[id number] [address]   
   
                      IMMATURE GRAN ABS 0.16 k/uL  High       <0.10      [facility]  
   
                                        Comment on above:   Order Comment: Speci  
men Type: BLOOD SPECIMENOrdering Facility:  
   
[facility] Address: [address] [place] [address]   
   
                                                            Performed By: #### 5  
7021-8 ####[facility] LABORATORYCLIA   
[id number] [address]   
   
                                                    Lymphocytes (Bld)   
[#/Vol]         3.27 10*3/uL    Normal          1.00-4.00       [facility]  
   
                                        Comment on above:   Order Comment: Speci  
men Type: BLOOD SPECIMENOrdering Facility:  
   
[facility] Address: [address] [place] [address]   
   
                                                            Performed By: #### 5  
7021-8 ####[facility] LABORATORYCLIA   
[id number] [address]   
   
                                                    Lymphocytes/100   
WBC (Bld)       27.2 %          Normal                          [facility]  
   
                                        Comment on above:   Order Comment: Speci  
men Type: BLOOD SPECIMENOrdering Facility:  
   
[facility] Address: [address] [place] [address]   
   
                                                            Performed By: #### 5  
7021-8 ####[facility] LABORATORYCLIA   
[id number] [address]   
   
                                                    MCH (RBC)   
[Entitic mass]  27.6 pg         Normal          26.0-34.0       [facility]  
   
                                        Comment on above:   Order Comment: Speci  
men Type: BLOOD SPECIMENOrdering Facility:  
  
[facility] Address: [address] [place] [address]   
   
                                                            Performed By: #### 5  
7021-8 ####[facility] LABORATORYCLIA   
[id number] [address]   
   
                                                    MCHC (RBC)   
[Mass/Vol]      31.1 g/dL       Normal          30.5-36.0       [facility]  
   
                                        Comment on above:   Order Comment: Speci  
men Type: BLOOD SPECIMENOrdering Facility:  
   
[facility] Address: [address] [place] [address]   
   
                                                            Performed By: #### 5  
7021-8 ####[facility] LABORATORYCLIA   
[id number] [address]   
   
                                                    MCV (RBC)   
[Entitic vol]   88.8 fL         Normal          80.0-100.0      [facility]  
   
                                        Comment on above:   Order Comment: Speci  
men Type: BLOOD SPECIMENOrdering Facility:  
  
[facility] Address: [address] [place] [address]   
   
                                                            Performed By: #### 5  
7021-8 ####[facility]   
[id number] [address] [place]   
[place]   
   
                                                    Monocytes (Bld)   
[#/Vol]         0.82 10*3/uL    Normal          <0.87           [facility]  
   
                                        Comment on above:   Order Comment: Speci  
men Type: BLOOD SPECIMENOrdering Facility:  
   
[facility] Address: [address] [place] [address]   
   
                                                            Performed By: #### 5  
7021-8 ####[facility] LABORATORYCLIA   
[id number] [address]   
[place]   
   
                                                    Monocytes/100 WBC   
(Bld)           6.8 %           Normal                          [facility]  
   
                                        Comment on above:   Order Comment: Speci  
men Type: BLOOD SPECIMENOrdering Facility:  
   
[facility] Address: [address] [place] [address]   
   
                                                            Performed By: #### 5  
7021-8 ####[facility] LABORATORYCLIA   
[id number] [place] [address] [place]   
   
                                                    Neutrophils (Bld)   
[#/Vol]         7.38 10*3/uL    Normal          1.45-7.50       [facility]  
   
                                        Comment on above:   Order Comment: Speci  
men Type: BLOOD SPECIMENOrdering Facility:  
   
[facility] Address: [address] [place] [address]   
   
                                                            Performed By: #### 5  
7021-8 ####[facility]ATORYCL[facility]   
[id number] [address] [place]   
[place]   
   
                                                    Neutrophils/100   
WBC (Bld)       61.5 %          Normal                          [facility]  
   
                                        Comment on above:   Order Comment: Speci  
men Type: BLOOD SPECIMENOrdering Facility:  
   
[facility] Address: [address] [place] [address]   
   
                                                            Performed By: #### 5  
7021-8 ####[facility]ORYCL[facility]   
[id number] [place] [address] [place]   
   
                                                    Nucleated RBC   
(Bld) [#/Vol]   10*3/uL         Normal          <0.01           [facility]  
   
                                        Comment on above:   Order Comment: Speci  
men Type: BLOOD SPECIMENOrdering Facility:  
   
[facility] Address: [address] [address]   
   
                                                            Performed By: #### 5  
7021-8 ####[facility] LABORATORYCLIA   
[id number] [place] [address] [place]   
   
                                                    Nucleated RBC/100   
WBC (Bld) [Ratio] 0.0 /100 WBC    Normal                          [facility]  
   
                                        Comment on above:   Order Comment: Speci  
men Type: BLOOD SPECIMENOrdering Facility:  
   
[facility] Address: [address] [place] [address]   
   
                                                            Performed By: #### 5  
7021-8 ####[facility] LABORATORYCLIA   
[id number] [place] [address] [place]   
   
                                                    Platelet mean   
volume (Bld)   
[Entitic vol]   9.5 fL          Normal          9.0-12.7        [facility]  
   
                                        Comment on above:   Order Comment: Speci  
men Type: BLOOD SPECIMENOrdering Facility:  
  
[facility] Address: [address] [place], [place] [address]   
   
                                                            Performed By: #### 5  
7021-8 ####[facility] LABORATORYCLIA   
[id number] [place] [address] [place]   
   
                                                    Platelets (Bld)   
[#/Vol]         478 10*3/uL     High            150-400         [facility]  
   
                                        Comment on above:   Order Comment: Speci  
men Type: BLOOD SPECIMENOrdering Facility:  
   
[facility] Address: [address]   
   
                                                            Performed By: #### 5  
7021-8 ####[facility] LABORATORYCLIA   
[id number] [place] [address] [place]   
   
                      RBC (Bld) [#/Vol] 4.09 10*6/uL Normal     3.90-5.20  [facility]  
   
                                        Comment on above:   Order Comment: Speci  
men Type: BLOOD SPECIMENOrdering Facility:  
   
[facility] Address: [address] [place], [place] [address]   
   
                                                            Performed By: #### 5  
7021-8 ####[facility] LABORATORYCLIA   
[id number] [address]   
   
                      WBC (Bld) [#/Vol] 12.01 10*3/uL High       3.70-11.00 [facility]  
   
                                        Comment on above:   Order Comment: Speci  
men Type: BLOOD SPECIMENOrdering Facility:  
   
[facility] Address: [address] [place] [address]   
   
                                                            Performed By: #### 5  
7021-8 ####[facility] LABORATORYCLIA   
[id number] [address]   
   
                                                    CONFIRM BLOOD TYPEon   
022   
   
                      ABO        A          Normal                [facility]  
   
                                        Comment on above:   Order Comment: Speci  
men Type: BLOOD SPECIMEN  
Ordering Facility: [facility]  
Address: [address] [place] [address]   
   
                                                            Performed By: #### C  
ONABO ####  
[facility] BLOOD BANK  
CLIA [id number]  
[address]   
   
                      Rh Nom (Bld) Positive   Normal                [facility]  
   
                                        Comment on above:   Order Comment: Speci  
men Type: BLOOD SPECIMEN  
Ordering Facility: [facility]  
Address: [address] [place] [address]   
   
                                                            Performed By: #### C  
ONABO ####  
[facility] BLOOD BANK  
CLIA [id number]  
[address]   
   
                                                    CONSULTon 2022   
   
                                        CONSULT             HNO ID: [id number]  
Author: Americo Parisi MD  
Service: Infectious Disease  
Author Type: Resident  
Type: Consults  
Filed: 3/22/2022 12:34 PM  
Note Text:  
----------------------------  
----------------------------  
------------------------  
Attestation signed by   
Alvin So MD at   
3/22/2022 6:51 PM  
I personally saw and   
evaluated the patient. I   
reviewed the resident's   
note. I  
agree with the resident's   
assessment and plan unless   
otherwise noted.  
61 year old female with   
diabetes mellitus, a history   
of left knee replacement,  
disseminated rt foot wound   
infection leading to lumbar   
spinal infection and  
left knee seeding in Dec   
2020, left prosthetic knee   
washout in 2021, s/p  
iv abx followed by oral doxy   
and rifampin for 6 months,   
currently on Doxy for  
chr suppression, presented   
to [facility] 3/21/2022 for   
further treatment of  
prosthetic joint infection.   
Symptomatic with worsening   
left knee pain along  
with swelling, started in   
2021 after twisting   
the knee while walking.  
Also symptomatic with chills   
and hyperglycemia. Was seen   
in the Ortho clinic  
on 3/17, a diagnostic   
arthrocentesis of left knee   
revealed 15 cc of cloudy and  
bloody synovial fluid. Fluid   
,000, nucleated cells   
17,472, 97%  
neutrophils. CRP 1.9. WBC   
count 12.0.  
Exam: Left knee slightly   
swollen, incision wound   
well-healed, no erythema,  
painful on flexion. No   
murmur audible.  
Cultures: No culture   
available from left knee   
arthrocentesis (misplaced).  
Assessment and plan:  
1. Left prosthetic knee pain   
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   
PJI  
2. Chills, no recorded   
fevers, no significant   
leukocytosis  
3. Hyperglycemia  
4. 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   
staphylococcal infection in   
2020, history of  
lumbar spinal infection,   
history of left knee   
prosthetic joint   
infection-s/p  
joint washout in 2021  
Given significantly elevated   
nucleated cell count from   
left knee  
arthrocentesis, the clinical   
picture is concerning for   
recurrence of prosthetic  
joint infection.  
Recommendations:  
-To hold off on empiric   
antimicrobial therapy until   
cultures are collected in  
the OR  
-To obtain medical records   
from [facility]  
-To check hemoglobin A1c  
Alvin So MD  
Infectious Disease  
Respiratory [facility]  
Pager: [phone]  
----------------------------  
----------------------------  
------------------------  
INITIAL CONSULT INFECTIOUS   
DISEASE  
SERVICE DATE: 3/22/2022  
SERVICE TIME: 10:11 AM  
We were asked to evaluate   
Ms. Jayden Soto, a 61 year   
old yo female by  
Dr. Mitchell for prosthetic   
joint infection. Our   
findings and  
recommendations will be   
communicated through the   
shared medical record.  
ASSESSMENT:  
Patient is a 60 yo female   
with with medical history of   
left total knee  
replacement with a   
prosthetic knee joint who is   
coming in for concern for  
septic knee joint.  
Estimated Creatinine   
Clearance: 111.1 mL/min   
(based on SCr of 0.67   
mg/dL).  
RECOMMENDATIONS:  
Left prosthetic knee joint   
infection  
-Patient will have removal   
of prosthetic tomorrow and   
will have another  
aspiration of left knee.   
Will follow-up with results   
of left knee  
aspiration.  
-Will start patient on   
antibiotics post procedure   
to cover staph and MRSA.  
-Patient has been on   
Doxycycline since septic   
washout in 2021. She  
was on rifampin for MRSA   
coverage until 2022. Will request  
outside chart from [facility] for more   
information.  
-Previous aspiration   
completed on  showed   
WBC greater than 22353  
which likely looks   
consistent with a septic   
knee joint.  
Type 2 Diabetes Mellitus  
-Hemoglobin A1c unknown.  
-Patient is on Lantus and   
Jardiance for diabetes.  
-Affecting wound healing and   
increasing risk for wound   
infection.  
-History of diabetic foot   
ulcers.  
-Encourage blood sugar   
control.  
Peripheral Vascular Disease  
-Currently on Aspirin and   
Plavix.  
-PVD affecting wound   
healing.  
Subjective  
SUBJECTIVE:  
HPI:  
61 year old female with   
medical history of left   
total knee replacement  
with a left periprosthetic   
joint presented to [facility]   
3/21/2022 for left knee  
joint infection. Patient saw   
Dr. Mitchell on  and was   
complaining of  
left knee pain and left knee   
swelling. She was also   
complaining of feeling  
feverish. Tmax was 100.0 F.   
Patient had been taking   
tylenol for pain.  
Patient had a septic washout   
in 2021. In the office   
knee aspiration  
was completed and results   
showed [id number] cells   
consistent with a septic   
left  
knee infection. Patient says   
she has been on Doxycycline   
100 mg BID and  
she was off Rifampin but it   
was discontinued in   
September.  
Active Antimicrobials (From   
admission, onward)  
None  
Immunosuppressants:  
None  
Current other medications   
reviewed.  
Current   
Facility-Administered   
Medications  
Medication Dose Route   
Frequency  
- lactated ringers iv   
infusion 125 mL/hr   
INTRAVENOUS CONTINUOUS  
- ondansetron (PF) 4 mg   
injection (ZOFRAN) 4 mg   
INTRAVENOUS q 6 H PRN  
- NaCl 0.9% iv flush bag 20   
mL INTRAVENOUS PRN  
- sodium chloride 0.9 %   
(flush) 3-5 m (more content   
not included)...    Normal                                  [facility]  
   
                                        CONSULT             HNO ID: [id number]  
Author: Muna Hughes MD  
Service: Hospital Medicine  
Author Type: Physician  
Type: Consults  
Filed: 3/22/2022 1:29 PM  
Note Text:  
Hospital Medicine  
Consult  
Patient Name: Jayden Soto  
MRN: [id number]  
Admission Date: 3/21/2022  
Reason For Admission:   
Medical Management, DM2  
IMPRESSION AND PLAN:  
Active Problems:  
Septic joint of left knee   
joint (HCC)  
Obesity, Class II, BMI   
35-39.9  
Controlled type 2 diabetes   
mellitus without   
complication, with long-term  
current use of insulin (HCC)  
Primary hypertension  
Resolved Problems:  
* No resolved hospital   
problems. *  
Active Hospital Problems  
Diagnosis  
- Septic joint of left knee   
joint (HCC)  
1. Insulin-dependent   
diabetes mellitus type 2   
with mild hyperglycemia  
since patient has not been   
resumed on her home   
medication, will resume  
Lantus today at 25 units   
since patient is going to be   
n.p.o. after  
midnight, her home dose is   
40 unit and that will be   
resumed after her  
surgery, regarding her   
Jardiance will hold the dose   
today and tomorrow and  
resume it on Thursday,   
meanwhile we will continue   
with insulin coverage as  
needed for hyperglycemia.  
2. Hypertension resumed on   
her home medication.  
3. Coronary artery disease   
status post stent placement   
status post  
cardiac arrest a year ago in   
2021 was on Plavix and   
aspirin, also  
has open heart surgery,   
recommend cardiology   
evaluation before her  
surgical intervention due to   
her extensive heart disease.  
4. Septic joint of left knee   
plan by primary service.  
============================  
============================  
===========  
HPI:  
This is a 61-year-old female   
with known insulin-dependent   
diabetes  
mellitus type 2,   
hypertension, coronary   
artery disease status post   
CABG  
and stent placement,   
COVID-19 infection in   
2021, who is being  
admitted to the hospital   
secondary to having left   
knee pain patient had  
left knee total replacement   
and prosthetic knee joint   
but had a fall in  
2021 after the knee   
gave out on her and since   
then she has been  
having increased pain in it   
and was found recently to   
have septic knee  
joint was admitted to the   
hospital for surgical   
intervention, asked to  
evaluate the patient for   
medical management, patient   
is known to have  
diabetes mellitus type 2 on   
insulin Lantus 40 units at   
bedtime with  
Jardiance at home and said   
her glucose has been okay,   
known to have  
coronary artery disease   
status post CABG x3 vessels   
years ago and stent  
placement in 2021   
after having a cardiac   
arrest, patient has been  
doing okay at home denies   
any fever, chills, chest   
pain no nausea no  
vomiting, her ability to   
ambulate has decreased over   
time due to the left  
knee issues. Patient   
previous work-up was done at   
[facility] in  
[place]. Patient denied any   
active alcohol tobacco use,   
she lives at home,  
positive for heart disease   
in the family.  
============================  
============================  
===========  
PERTINENT ROS: No fever,   
chills , night sweats, no   
chest pain, off oxygen,  
no ankle edema, no abd pain,   
no dysuria  
All other systems were   
reviewed and negative except   
for HPI  
============================  
============================  
===========  
Temp Av.7 ?C (98.1 ?F)   
Min: 36.6 ?C (97.9 ?F) Max:   
36.8 ?C (98.2  
?F)  
Pulse Av.7 Min: 66 Max:   
72  
No data recorded  
Cuff BP Min: 135/65 Max:   
147/65  
Pain Level: 5  
============================  
============================  
===========  
No past medical history on   
file.  
No past surgical history on   
file.  
No family history on file.  
Social History  
Tobacco Use  
- Smoking status: Not on   
file  
- Smokeless tobacco: Not on   
file  
Substance Use Topics  
- Alcohol use: Not on file  
- Drug use: Not on file  
ALLERGIES  
Allergen Reactions  
- Morphine GI Upset  
- Statins-Hmg-Coa Red*   
Unknown  
============================  
============================  
===========  
MEDICATIONS:  
doxepin capsule 10 mg Take   
20 mg by mouth daily at   
bedtime.  
BISACODYL ORAL Take 10 mg by   
mouth once daily.  
melatonin 10 mg cap Take 1   
capsule by mouth daily at   
bedtime.  
aspirin 81 mg cap Take 1   
capsule by mouth once daily.  
insulin aspart U-100   
(NOVOLOG) 100 unit/mL Inject   
13 Units subcutaneously  
three times daily before   
meals.  
alirocumab (PRALUENT) 75   
mg/mL pen Inject 75 mg   
subcutaneously every other  
week.  
DULoxetine (CYMBALTA) 60 mg   
capsule Take 60 mg by mouth   
twice daily.  
ranolazine SR (RANEXA) 1,000   
mg tab ER 12 hr Take 1   
tablet by mouth twice  
daily.  
dilTIAZem CD (CARDIZEM CD)   
180 mg 24 hr capsule Take   
180 mg by mouth once  
daily.  
doxycycline monohydrate   
(MONODOX) 100 mg capsule   
Take 100 mg by mouth  
twice daily.  
GABAPENTIN ORAL Take 200 mg   
by mouth three times daily.  
METOPROLOL TARTRATE ORAL   
Take 25 mg by mouth twice   
daily.  
isosorbide mononitrate ER   
(IMDUR) 30 mg 24 hr tablet   
Take 90 mg by mouth  
once daily.  
insulin   
glargine,hum.rec.anlog   
(LANTUS SUBCUTANEOUS) Inject   
40 Units  
subcutaneously daily at   
bedtime.  
clopidogrel (more content   
not included)...    Normal                                  [facility]  
   
                                                    CRP SerPl-mCncon 2022   
   
                      CRP [Mass/Vol] 1.9 mg/dL  High       <0.9       [facility]  
   
                                        Comment on above:   Order Comment: Speci  
men Type: BLOOD SPECIMENOrdering Facility:  
   
[facility] Address: [address] [place]   
[address]   
   
                                                            Performed By: #### 1  
[id number] ####[facility]   
[id number] [place] [address] [place]   
   
                                                    HISTORY PHYSICALon    
   
                                        HISTORY PHYSICAL    HNO ID: [id number]  
Author: Washington Skinner MD  
Service: Orthopaedic Surgery  
Author Type: Resident  
Type: HANDP  
Filed: 3/21/2022 10:53 PM  
Note Text:  
----------------------------  
----------------------------  
------------------------  
Attestation signed by Basil Mitchell MD at 3/22/2022 9:47   
AM  
Plan for explant infected   
partial knee and insertion   
antibiotic spacer on 3/22.  
----------------------------  
----------------------------  
------------------------  
Orthopaedic Surgery History   
and Physical  
Chief Complaint: Left septic   
Knee  
Admitting Physician: Dr. Mitchell  
Date: 2022  
Time: 10:43 PM  
History of Present Illness  
Jayden Soto is a 61 year   
old female who presents as a   
direct admit from  
Dr. Evans's office. Patient   
has history of left total   
knee replacement  
along with a left   
periprosthetic joint   
infection with previous   
washout.  
She presented to Dr. Evans's   
office on 3/17 complaining   
of pain and  
swelling in the left knee.   
Patient also complaining of   
low-grade fevers.  
At that time a knee   
aspiration occurred results   
demonstrating a likely  
septic left total knee with   
17,000 cells. Patient was   
instructed to come  
into the hospital for a   
further work-up and plan for   
surgery. She denies  
any new complaints answer   
office visit on .   
Denies any new falls.  
Review of Systems  
A 10-point review of systems   
was completed and is   
otherwise  
non-contributory to the   
patient's presenting   
condition.  
History  
No past medical history on   
file.  
No past surgical history on   
file.  
COVID-19 VACCINE(1) Never   
done  
DEPRESSION SCREENING Never   
done  
HEPATITIS C SCREENING Never   
done  
HIV SCREENING Never done  
DTAP,TDAP,TD(1 - Tdap) Never   
done  
PAP TESTING Never done  
HPV TESTING Never done  
MAMMOGRAM Never done  
LIPID SCREEN Never done  
DIABETES SCREEN Never done  
COLORECTAL CANCER SCREENING   
Never done  
SHINGRIX VACCINE(1 of 2)   
Never done  
INFLUENZA(1) Never done  
MENINGOCOCCAL CONJUGATE Aged   
Out  
A review of the patient's   
history was completed and is   
otherwise  
non-contributory to the   
patient's presenting   
condition.  
Medications  
doxepin capsule 10 mg Take   
20 mg by mouth daily at   
bedtime.  
BISACODYL ORAL Take 10 mg by   
mouth once daily.  
melatonin 10 mg cap Take 1   
capsule by mouth daily at   
bedtime.  
aspirin 81 mg cap Take 1   
capsule by mouth once daily.  
insulin aspart U-100   
(NOVOLOG) 100 unit/mL Inject   
13 Units subcutaneously  
three times daily before   
meals.  
alirocumab (PRALUENT) 75   
mg/mL pen Inject 75 mg   
subcutaneously every other  
week.  
DULoxetine (CYMBALTA) 60 mg   
capsule Take 60 mg by mouth   
twice daily.  
ranolazine SR (RANEXA) 1,000   
mg tab ER 12 hr Take 1   
tablet by mouth twice  
daily.  
dilTIAZem CD (CARDIZEM CD)   
180 mg 24 hr capsule Take   
180 mg by mouth once  
daily.  
doxycycline monohydrate   
(MONODOX) 100 mg capsule   
Take 100 mg by mouth  
twice daily.  
GABAPENTIN ORAL Take 200 mg   
by mouth three times daily.  
METOPROLOL TARTRATE ORAL   
Take 25 mg by mouth twice   
daily.  
isosorbide mononitrate ER   
(IMDUR) 30 mg 24 hr tablet   
Take 90 mg by mouth  
once daily.  
insulin   
glargine,hum.rec.anlog   
(LANTUS SUBCUTANEOUS) Inject   
40 Units  
subcutaneously daily at   
bedtime.  
clopidogrel (PLAVIX) 75 mg   
tablet Take 75 mg by mouth   
once daily.  
empagliflozin (JARDIANCE) 25   
mg tablet Take 25 mg by   
mouth daily with  
breakfast.  
Allergies  
Morphine and Statins-Hmg-Coa   
Reductase Inhibitors  
Family History  
No family history on file.  
Social History  
Employer And Job Title: None   
on file  
Years Of Education   
Completed: Not specified  
Marital Status: Single  
Social History  
Tobacco Use  
- Smoking status: Not on   
file  
- Smokeless tobacco: Not on   
file  
Substance Use Topics  
- Alcohol use: Not on file  
- Drug use: Not on file  
Physical Examination  
Vitals  
/65   Pulse 66   Temp   
36.8 ?C (98.2 ?F) (Oral)     
Resp 18   Ht  
170.2 cm (5' 7 )   Wt 104.7   
kg (230 lb 13.2 oz)   SpO2   
100%   BMI 36.15  
kg/m?  
General  
Alert and oriented. NAD.  
Skin  
No rashes or lesions.   
Appropriate skin turgor.  
Cardiovascular  
RRR. Peripheral pulses   
symmetric.  
Pulmonary  
Non-labored breathing on RA.   
Symmetric chest expansion.  
GI/Abdomen  
Abdomen soft, non-tender,   
and non-distended.  
Neuro  
CN II-XII grossly intact.  
Psych  
Appropriate mood and affect.  
Left Lower Extremity  
Obvious swelling of the left   
knee. With associated   
erythema. No open  
wounds.  
Patient is tender to   
palpation over the anterior   
knee joint.  
Pain with knee motion  
Previous knee incision   
appears well-healed no   
evidence of wound  
dehiscence.  
Sensation diminished at   
baseline due to peripheral   
neuropathy. No changes  
from baseline  
Motor intact: EHL/DF/PF  
BCR all digits  
DP pulse palpable  
Components of the patient's   
physical examination not   
noted above were not  
contributory to the present   
assessment.  
Labs  
No results for input(s): NA,   
K, CHLOR, CO2, BUN, CREAT,   
GLUC, ANION, CA,  
MG, P, ALB, AST, ALT,   
ALKPHOS, TBILI, DBILI,   
PHOSINTL, WBC, HB, HCT, PLT,  
LACT, INR, PH, PCO2, PO   
(more content not   
included)...        Normal                                  [facility]  
   
                                                    NURSING PROGon 2022   
   
                                        NURSING PROG        HNO ID: [id number]  
Author: Doris Rodriguez RN  
Service: ?  
Author Type: Registered   
Nurse  
Type: Nursing Progress Note  
Filed: 3/22/2022 12:10 PM  
Note Text:  
Patient family at bed side ,   
wanting to speak with doctor   
about plan after  
surgery.Contacted [phone] .   
Informed patient and family   
someone will be in .  
Patient having pain in left   
knee, repositioned knee. Bed   
in low position  
call light with in reach Normal                                  [facility]  
   
                                                    PT panel Coag (PPP)on 2022   
   
                                                    INR Coag (PPP)   
[Relative time] 1.0 {INR}       Normal          0.9-1.3         [facility]  
   
                                        Comment on above:   Order Comment: Speci  
men Type: BLOOD SPECIMEN  
Ordering Facility: [facility]  
Address: [address] [place] [address]   
   
                                                            Result Comment: Kyleigh  
min K Antagonist (VKA) Therapeutic Range: INR 2   
to 3 (Target INR of 2.5)  
Note: For patients treated with VKA drugs, such as warfarin, the   
American College of Chest Physicians 2012 Guideline recommends a   
therapeutic INR range of 2 to 3 (target INR of 2.5). This   
recommendation includes high-risk patients with antiphospholipid   
syndrome with previous arterial or venous thromboembolism,   
current-generation mechanical or bioprosthetic aortic heart valve   
replacement.  
Note: Patients with mechanical aortic valve replacement and   
additional risk factors for thromboembolic events (atrial   
fibrillation, previous thromboembolism, LV dysfunction,   
hypercoagulable conditions) or an older generation mechanical AVR   
(i.e., ball in-Cage) or any mechanical MVR should have a INR   
therapeutic range of 2.5 to 3.5 (target INR of 3).  
Werner CALZADA, et al. Chest 2012, 141:7S-47S  
Balwinder RA, et al. [facility] 2017, 70: 252-289   
   
                                                            Performed By: #### 2  
[id number] ####  
[facility]  
CLIA [id number]  
[address] [place] [address] [place]   
   
                                                    PT Coag (PPP)   
[Time]          10.6 s          Normal          9.7-13.0        [facility]  
   
                                        Comment on above:   Order Comment: Speci  
men Type: BLOOD SPECIMEN  
Ordering Facility: [facility]  
Address: [address] [place] [address]   
   
                                                            Performed By: #### 2  
4321-2 ####  
[facility]  
CLIA [id number]  
[address] [place] [address] [place]   
   
                                                    SARS-CoV-2 RNA Resp Ql RIDDHI+p  
robeon 2022   
   
                                                    SARS-CoV-2   
(COVID-19) RNA   
RIDDHI+probe Ql   
(Resp)                                  COVID 19 RESULT:  
SARS-CoV-2 (Agent of   
COVID-19) Not Detected by   
RT-PCR or equivalent method.  
This test has been   
authorized by FDA under an   
Emergency Use Authorization   
(EUA).              Normal                                  [facility]  
   
                                        Comment on above:   Performed By: #### 9  
4500-6 ####[facility] LAB[facility]ATORYCL[facility]   
[id number] [address]   
   
                                                    TYPE AND SCREENon 2022  
   
   
                      ABO        A          Normal                [facility]  
   
                                        Comment on above:   Order Comment: Speci  
men Type: BLOOD SPECIMEN  
Ordering Facility: [facility]  
Address: [address] [place] [address]   
   
                                                            Performed By: #### T  
SCR ####  
[facility] B[facility]  
CLIA [id number]  
[address]   
   
                                                    HISTORICAL AB SCR   
STATUS          Negative        Normal                          [facility]  
   
                                        Comment on above:   Order Comment: Speci  
men Type: BLOOD SPECIMEN  
Ordering Facility: [facility]  
Address: [address] [place] [address]   
   
                                                            Performed By: #### T  
SCR ####  
[facility]  
CLIA [id number]  
[address]   
   
                      Rh Nom (Bld) Positive   Normal                [facility]  
   
                                        Comment on above:   Order Comment: Speci  
men Type: BLOOD SPECIMEN  
Ordering Facility: [facility]  
Address: [address] [place] [address]   
   
                                                            Performed By: #### T  
SCR ####  
[facility]  
CLIA [id number]  
[address]   
   
                                                    TYPE AND SCREEN   
EXPIRATION      2022 23:59 Normal                          [facility]  
   
                                        Comment on above:   Order Comment: Speci  
men Type: BLOOD SPECIMEN  
Ordering Facility: [facility]  
Address: [address] [place] [address]   
   
                                                            Performed By: #### T  
SCR ####  
[facility]  
CLIA [id number]  
[address] [place]   
   
                                                    CNOVon 2022   
   
                                        CNOV                Office Visit (AGHWW1  
)  
----------------------------  
----------------------------  
------------------------  
JAYDEN SOTO ([id number])   
1960 F  
Date Time Provider   
Department  
3/17/22 9:45 AM BASIL MITCHELL   
AGHWW1  
During your visit today, we   
recorded the following   
information about you:  
Respiration Weight Height  
16/minute 95.3 kg 1.702 m  
Basil Mitchell MD 3/17/2022   
10:12 AM Signed  
Follow-up Patient Visit  
Jayden Soto is a 61 year   
old female who presents to   
follow up for Infection  
of total knee replacement,   
subsequent encounter   
(primary encounter   
diagnosis)  
Status post left partial   
knee replacement  
Pain in prosthetic joint,   
initial encounter ([facility])  
Worsening pain and swelling   
in the left knee joint. She   
missed a dose of her  
Doxy last week. Reports low   
grade fever not taken but   
feels warm, blood sugars  
have been high.  
Current or previous   
treatment regimens: NSAIDS  
Medications:  
Current Outpatient   
Medications  
Medication Sig  
- DULoxetine (CYMBALTA) 60   
mg capsule Take 60 mg by   
mouth once daily.  
- ranolazine SR (RANEXA)   
1,000 mg tab ER 12 hr Take   
by mouth.  
- dilTIAZem CD (CARDIZEM CD)   
180 mg 24 hr capsule Take   
180 mg by mouth once  
daily.  
- empagliflozin (JARDIANCE)   
25 mg tablet Take 25 mg by   
mouth daily with  
breakfast.  
- doxycycline monohydrate   
(MONODOX) 100 mg capsule   
Take 100 mg by mouth twice  
daily.  
- GABAPENTIN ORAL Take by   
mouth.  
- METOPROLOL TARTRATE ORAL   
Take by mouth.  
- isosorbide mononitrate ER   
(IMDUR) 30 mg 24 hr tablet   
Take 30 mg by mouth once  
daily.  
- insulin   
glargine,hum.rec.anlog   
(LANTUS SUBCUTANEOUS) Inject   
subcutaneously.  
- clopidogrel (PLAVIX) 75 mg   
tablet Take 75 mg by mouth   
once daily.  
No current   
facility-administered   
medications for this visit.  
Allergies:  
ALLERGIES  
Allergen Reactions  
- Morphine GI Upset  
- Statins-Hmg-Coa Red*   
Unknown  
Physical Examination:  
Resp 16   Ht 5' 7  (1.70m)     
Wt 210 lb (95.3kg)   BMI   
32.88 kg/(m2).  
Ortho Exam  
Seen today in wheelchair  
Moderate swelling and   
effusion  
Painful ROM  
Images: 3 views left knee   
taken today and reviewed by   
myself shows No obvious  
loosening of the prosthesis,   
interval worsening of joint   
space narrowing,  
possible Air in the   
suprapatellar space and   
effusion as well.  
Large Joint Arthro/Inj: L   
knee joint  
Informed Consent  
Consent Obtained: Verbal  
Universal Protocol  
A moment to CARE was   
completed.  
SIGN IN  
Personnel directly involved   
with the procedure wore the   
appropriate PPE.  
Patient/Surrogate   
Stated/Verified: Patient   
name, Date of birth,   
Relevant  
allergies and Intended   
procedure  
TIME OUT  
3/17/2022 10:11 AM  
The procedure site was   
prepped in the usual sterile   
fashion.  
Site: L knee joint  
Aspirate: 15 mL  
cloudy and bloody  
Outcome: Tolerated well, no   
immediate complications  
Post-injection instructions   
were reviewed with the   
patient and the patient  
voiced understanding of   
these instructions.  
SIGN OUT  
All specimen containers   
correctly labeled.  
Oxford Score: see report  
Assessment and Plan:  
1. Infection of total knee   
replacement, subsequent   
encounter - ICD9: V58.89,  
ICD10: T84.59XD, Z96.659   
(primary diagnosis)  
2. Status post left partial   
knee replacement - ICD9:   
V43.65, ICD10: Z96.652  
3. Pain in prosthetic joint,   
initial encounter ([facility]) -   
ICD9: 996.77, 338.18,  
ICD10: T84.84XA  
Suspect possible recurrent   
infection in the joint. Her   
serology labs were  
normal 2 months ago. I   
aspirated the knee today and   
sent for analysis. Will  
call the patient and   
daughter with results.   
Briefly discussed possible   
explant  
and spacer in the future.   
Any signs symptoms of sepsis   
they should go to the  
ED.  
No follow-ups on file.  
Basil Mitchell MD  
Referring Provider: SELF   
[[id number]]  
Allergies As of Date:   
2022 Noted Allergy   
Reaction  
MORPHINE 2021 8 - GI   
Upset  
STATINS-HMG-COA REDUCTASE   
INHIBIT*2021 16 -   
Unknown  
Date Reviewed: 2022  
Reviewed by: Radha Novak MA - Fully   
Assessed  
Reason for Visit:  
Follow Up [171]  
Primary Visit   
Diagnosis:Infection of total   
knee replacement, subsequent  
encounter [T84.59XD,   
Z96.659]  
Other Visit Diagnoses:Status   
post left partial knee   
replacement [Z96.652]  
Pain in prosthetic joint,   
initial encounter ([facility])  
[T84.84XA]  
Order(s):XR KNEE POST OP 3V   
AP/LAT/MERCHANT LEFT   
[0555100] Order #:   
[id number]  
SYNOVIAL FLUID, ROUTINE   
[SQRTSYN] Order #:   
[id number].  
#:[id number]  
Large Joint Arthro/Inj: L   
knee joint [YKM751] Order #:   
[id number]  
Prescriptions as of   
2022  
- DULoxetine (CYMBALTA) 60   
mg capsule  
Take 60 mg by mouth once   
daily.  
- ranolazine SR (RANEXA)   
1,000 mg tab ER 12 hr  
Take by mouth.  
- dilTIAZem CD (CARDIZEM CD)   
180 mg 24 hr capsule  
Take 180 mg by mouth once   
daily.  
- empagliflozin (JARDIANCE)   
25 mg tablet  
Take 25 mg by mouth daily   
with breakfast.  
- doxycycline monohydrate   
(MONODOX) 100 mg capsule  
Take 100 mg by mouth twice   
daily.  
- GABAPENTIN ORAL  
Take by mouth.  
- METOPROLOL TARTRATE ORAL  
Take by mouth.  
- isosor (more content not   
included)...        Normal                                  [facility]  
   
                                                    SYNOVIAL FLUID MANUAL DIFFon  
 2022   
   
                                                    DIF TTL, SYNOVIAL   
FLUID           100 cells counted Normal                          [facility]  
   
                                        Comment on above:   Order Comment: Speci  
men Type: BLOOD SPECIMEN  
Ordering Facility: [facility]  
Address: [address] [place] [address]   
   
                                                            Performed By: #### 2  
4321-2 ####  
[facility] LABORATORY  
CLIA [id number]  
[address]   
   
                      LYMPH%, SF 3          Normal                [facility]  
   
                                        Comment on above:   Order Comment: Speci  
men Type: BLOOD SPECIMEN  
Ordering Facility: [facility]  
Address: [address] [place] [address]   
   
                                                            Performed By: #### 2  
4321-2 ####  
[facility] LABORATORY  
CLIA [id number]  
[address] [place]   
   
                      NEUT%      97         High       0-<25      [facility]  
   
                                        Comment on above:   Order Comment: Speci  
men Type: BLOOD SPECIMEN  
Ordering Facility: [facility]  
Address: [address] [place] [address]   
   
                                                            Performed By: #### 2  
4321-2 ####  
[facility] LABORATORY  
CLIA [id number]  
[address]   
   
                                                    SYNOVIAL FLUID, ROUTINEon    
   
                                                    Clarity (Unsp   
spec)           Clear           Normal          Clear           [facility]  
   
                                        Comment on above:   Order Comment: Speci  
men Type: BLOOD SPECIMEN  
Ordering Facility: [facility]  
Address: [address] [place] [address]   
   
                                                            Performed By: #### 2  
4321-2 ####  
[facility]  
CLIA [id number]  
[address]   
   
                      Color (Syn fld) Yellow     Normal     Yellow     [facility]  
   
                                        Comment on above:   Order Comment: Speci  
men Type: BLOOD SPECIMEN  
Ordering Facility: [facility]  
Address: [address] [place] [address]   
   
                                                            Performed By: #### 2  
4321-2 ####  
[facility] LABORATORY  
CLIA [id number]  
[address]   
   
                                                    RBC Manual cnt   
(Syn fld) [#/Vol] [id number] /uL      High            <2,000          [facility]  
   
                                        Comment on above:   Order Comment: Speci  
men Type: BLOOD SPECIMEN  
Ordering Facility: [facility]  
Address: [address] [place] [address]   
   
                                                            Performed By: #### 2  
4321-2 ####  
[facility] LABORATORY  
CLIA [id number]  
[address]   
   
                                                    Specimen source   
Nom (Unsp spec) KNEE LEFT, SYNOVIAL FLUID Normal                          [facility]  
   
                                        Comment on above:   Order Comment: Speci  
men Type: BLOOD SPECIMEN  
Ordering Facility: [facility]  
Address: [address] [place] [address]   
   
                                                            Performed By: #### 2  
4321-2 ####  
[facility] LAB[facility]  
CLIA [id number]  
[address]   
   
                                                    WBC Manual cnt   
(Syn fld) [#/Vol] 04025 /uL       High            0-200           [facility]  
   
                                        Comment on above:   Order Comment: Speci  
men Type: BLOOD SPECIMEN  
Ordering Facility: [facility]  
Address: [address] [place] [address]   
   
                                                            Performed By: #### 2  
4321-2 ####  
[facility] LAB[facility]  
CLIA [id number]  
[address]   
   
                                                    CNPNon 03-   
   
                                        CNPN                Telephone (POB1)  
----------------------------  
----------------------------  
------------------------  
JAYEDN SOTO ([id number])   
1960 F  
Date Time Provider   
Department  
3/15/22 BASIL MITCHELL [facility]  
During your visit today, we   
recorded the following   
information about you:  
Brendan Altman  Ppg   
3/15/2022 1:46 PM Signed  
Nneka called for patient.   
Nneka sates that Jayden's   
left knee swelling has  
gotten significantly more   
swollen over the last 36   
hours. She said it is warm  
to the touch but not red but   
it's the size of a   
cantaloupe. Daughter in law   
is  
concerned and asking if she   
should be doing anything for   
her. Patient is  
currently elevating, resting   
and using Tylenol. Nneka   
also said she is not  
complaining of fever or   
chills but a great deal of   
left knee pain. I told  
Nneka that Dr. Mitchell is   
currently in surgery and he   
may not be able to get  
back to me before I leave   
the office at 4:30 pm today.  
Brendan Altman  [facility]  
March 15, 2022 1:45 PM  
Brendan Altman Dickinson [facility]   
3/15/2022 4:25 PM Signed  
Patient scheduled to see Dr. Mitchell on 3/17/22.  
Brendan Altman Dickinson [facility]  
March 15, 2022 4:25 PM  
Allergies As of Date:   
03/15/2022 Noted Allergy   
Reaction  
MORPHINE 2021 8 - GI   
Upset  
STATINS-HMG-COA REDUCTASE   
INHIBIT*2021 16 -   
Unknown  
Date Reviewed: 2022  
Reviewed by: Radha Novak MA - Fully   
Assessed  
Reason for Visit:  
Patient Question [8648] Cmt:   
Daughter in law Nneka   
called for patient  
Prescriptions as of   
03/15/2022  
- DULoxetine (CYMBALTA) 60   
mg capsule  
Take 60 mg by mouth once   
daily.  
- ranolazine SR (RANEXA)   
1,000 mg tab ER 12 hr  
Take by mouth.  
- dilTIAZem CD (CARDIZEM CD)   
180 mg 24 hr capsule  
Take 180 mg by mouth once   
daily.  
- empagliflozin (JARDIANCE)   
25 mg tablet  
Take 25 mg by mouth daily   
with breakfast.  
- doxycycline monohydrate   
(MONODOX) 100 mg capsule  
Take 100 mg by mouth twice   
daily.  
- GABAPENTIN ORAL  
Take by mouth.  
- METOPROLOL TARTRATE ORAL  
Take by mouth.  
- isosorbide mononitrate ER   
(IMDUR) 30 mg 24 hr tablet  
Take 30 mg by mouth once   
daily.  
- insulin   
glargine,hum.rec.anlog   
(LANTUS SUBCUTANEOUS)  
Inject subcutaneously.  
- clopidogrel (PLAVIX) 75 mg   
tablet  
Take 75 mg by mouth once   
daily.  
Problem List As Of Date:   
03/15/2022  
(None)  
Encounter Number: [id number]  
Encounter Status:Closed by   
BRENDAN BAXTER on 3/15/22        [facility] 2022   
   
                                        CNPN                Telephone (AGPOB1)  
----------------------------  
----------------------------  
------------------------  
JAYDEN SOTO ([id number])   
1960 F  
Date Time Provider   
Department  
22 [facility]  
During your visit today, we   
recorded the following   
information about you:  
Maco Cardona Dickinson   
Ppg 2022 10:59 AM   
Signed  
Patient's daughter in law   
called, she would like to   
know if you have decided on  
the plan for patient's knee.  
She said someone was suppose   
to call them and they have   
not heard anything.  
Maco Cardona    
Ppg  
Maco Cardona Dickinson   
Ppg 3/1/2022 9:48 AM   
Addendum  
Patient's daughter in law   
called again, she said that   
she has not heard from  
you and the patient and the   
family have questions for   
you.  
Patient's pain is getting   
worse also they just wanted   
you to be aware.  
Maco Cardona Dickinson   
Ppg  
Allergies As of Date:   
2022 Noted Allergy   
Reaction  
MORPHINE 2021 8 - GI   
Upset  
STATINS-HMG-COA REDUCTASE   
INHIBIT*2021 16 -   
Unknown  
Date Reviewed: 2022  
Reviewed by: Radha Novak MA - Fully   
Assessed  
Reason for Visit:  
Question [1327]  
Prescriptions as of   
2022  
- DULoxetine (CYMBALTA) 60   
mg capsule  
Take 60 mg by mouth once   
daily.  
- ranolazine SR (RANEXA)   
1,000 mg tab ER 12 hr  
Take by mouth.  
- dilTIAZem CD (CARDIZEM CD)   
180 mg 24 hr capsule  
Take 180 mg by mouth once   
daily.  
- empagliflozin (JARDIANCE)   
25 mg tablet  
Take 25 mg by mouth daily   
with breakfast.  
- doxycycline monohydrate   
(MONODOX) 100 mg capsule  
Take 100 mg by mouth twice   
daily.  
- GABAPENTIN ORAL  
Take by mouth.  
- METOPROLOL TARTRATE ORAL  
Take by mouth.  
- isosorbide mononitrate ER   
(IMDUR) 30 mg 24 hr tablet  
Take 30 mg by mouth once   
daily.  
- insulin   
glargine,hum.rec.anlog   
(LANTUS SUBCUTANEOUS)  
Inject subcutaneously.  
- clopidogrel (PLAVIX) 75 mg   
tablet  
Take 75 mg by mouth once   
daily.  
Problem List As Of Date:   
2022  
(None)  
Encounter Number: [id number]  
Encounter Status:Closed by   
SUZAN  MACO BROWNE [facility]2/22         [facility]   
   
                                        [facility]                Office Visit (AGHWG1  
)  
----------------------------  
----------------------------  
------------------------  
JAYDEN SOTO ([id number])   
1960 F  
Date Time Provider   
Department  
22 3:00 PM [facility] MITCHELL   
[facility]  
During your visit today, we   
recorded the following   
information about you:  
Respiration Weight Height  
16/minute 95.3 kg 1.702 m  
Radha Novak MA   
2022 3:07 PM Signed  
REVIEW OF SYSTEMS:  
GENERAL: Well developed,   
well nourished. No acute   
distress  
PAIN: Pain left knee  
CARDIOVASCULAR: HTN  
MSK: Positive for joint   
swelling  
SKIN: Negative for lesions,   
rash, itching, metal   
sensitivity  
NEURO: Negative for seizure,   
trauma, numbness/tingling of   
extremities.  
ENDOCRINE: Positive for   
diabetic associated symptoms  
HEMATOLOGY: Negative for   
excessive bleeding, clots,   
bleeding disorders.  
Basil Mitchell MD 2022   
3:07 PM Signed  
Follow-up Patient Visit  
Jayden Soto is a 61 year   
old female who presents to   
follow up for Pain in  
prosthetic joint, initial   
encounter (hcc) (primary   
encounter diagnosis)  
Status post left partial   
knee replacement  
Ongoing pain in the left   
knee with swelling that   
worsens throughout the day.  
She is on chronic   
doxycycline for previous   
infection in the knee. CRP   
and ESR  
normal.  
Current or previous   
treatment regimens: NSAIDS  
Medications:  
Current Outpatient   
Medications  
Medication Sig  
- DULoxetine (CYMBALTA) 60   
mg capsule Take 60 mg by   
mouth once daily.  
- ranolazine SR (RANEXA)   
1,000 mg tab ER 12 hr Take   
by mouth.  
- dilTIAZem CD (CARDIZEM CD)   
180 mg 24 hr capsule Take   
180 mg by mouth once  
daily.  
- empagliflozin (JARDIANCE)   
25 mg tablet Take 25 mg by   
mouth daily with  
breakfast.  
- doxycycline monohydrate   
(MONODOX) 100 mg capsule   
Take 100 mg by mouth twice  
daily.  
- GABAPENTIN ORAL Take by   
mouth.  
- METOPROLOL TARTRATE ORAL   
Take by mouth.  
- isosorbide mononitrate ER   
(IMDUR) 30 mg 24 hr tablet   
Take 30 mg by mouth once  
daily.  
- insulin   
glargine,hum.rec.anlog   
(LANTUS SUBCUTANEOUS) Inject   
subcutaneously.  
- clopidogrel (PLAVIX) 75 mg   
tablet Take 75 mg by mouth   
once daily.  
No current   
facility-administered   
medications for this visit.  
Allergies:  
ALLERGIES  
Allergen Reactions  
- Morphine GI Upset  
- Statins-Hmg-Coa Red*   
Unknown  
Physical Examination:  
Resp 16   Ht 5' 7  (1.70m)     
Wt 210 lb (95.3kg)   BMI   
32.88 kg/(m2).  
Ortho Exam  
Ambulates with a walker  
Brace in place left lower   
extremity  
Incision left knee well   
healed  
Swelling noted to the left   
knee joint  
ROM 5-110  
Images: no new images today  
Procedures  
Oxford Score: see report  
Assessment and Plan:  
1. Pain in prosthetic joint,   
initial encounter (HCC) -   
ICD9: 996.77, 338.18,  
ICD10: T84.84XA (primary   
diagnosis)  
2. Status post left partial   
knee replacement - ICD9:   
V43.65, ICD10: Z96.652  
Discussed with the patient   
and her son today the   
possible plans moving   
forward  
if going to be surgical.   
Would include conversion to   
TKA versus placement of  
an articulating spacer. I   
will come up with a surgical   
plan and contact them.  
Ideally she would be off her   
Doxy so that we could obtain   
cultures  
intraoperatively.  
No follow-ups on file.  
Basil Mitchell MD  
Referring Provider: SELF   
[200]  
Allergies As of Date:   
2022 Noted Allergy   
Reaction  
MORPHINE 2021 8 - GI   
Upset  
STATINS-HMG-COA REDUCTASE   
INHIBIT*2021 16 -   
Unknown  
Date Reviewed: 2022  
Reviewed by: Radha Novak MA - Fully   
Assessed  
Reason for Visit:  
Follow Up [171]  
Primary Visit Diagnosis:Pain   
in prosthetic joint, initial   
encounter ([facility])  
[T84.84XA]  
Other Visit Diagnosis:Status   
post left partial knee   
replacement [Z96.652]  
Prescriptions as of   
2022  
- DULoxetine (CYMBALTA) 60   
mg capsule  
Take 60 mg by mouth once   
daily.  
- ranolazine SR (RANEXA)   
1,000 mg tab ER 12 hr  
Take by mouth.  
- dilTIAZem CD (CARDIZEM CD)   
180 mg 24 hr capsule  
Take 180 mg by mouth once   
daily.  
- empagliflozin (JARDIANCE)   
25 mg tablet  
Take 25 mg by mouth daily   
with breakfast.  
- doxycycline monohydrate   
(MONODOX) 100 mg capsule  
Take 100 mg by mouth twice   
daily.  
- GABAPENTIN ORAL  
Take by mouth.  
- METOPROLOL TARTRATE ORAL  
Take by mouth.  
- isosorbide mononitrate ER   
(IMDUR) 30 mg 24 hr tablet  
Take 30 mg by mouth once   
daily.  
- insulin   
glargine,hum.rec.anlog   
(LANTUS SUBCUTANEOUS)  
Inject subcutaneously.  
- clopidogrel (PLAVIX) 75 mg   
tablet  
Take 75 mg by mouth once   
daily.  
Problem List As Of Date:   
2022  
(None)  
Encounter Number: [id number]  
Encounter Status:Closed by   
BASIL MITCHELL on 22 [facility]  
   
                                                    NM BONE 3 PHASEon 2021  
   
   
                                        NM BONE 3 PHASE     * * *Final Report* *  
 *  
DATE OF EXAM: Dec 29 2021   
1:09PM  
AKN 0009 - NM BONE 3 PHASE /   
ACCESSION # [id number]  
PROCEDURE REASON: Pain in   
prosthetic joint, initial   
encounter (HCC)  
  
* * * * Physician   
Interpretation * * * *  
THREE-PHASE BONE SCAN OF   
knees (2021):  
HISTORY: Status post medial   
hemiarthroplasty 3/21. Pain   
left knee 4  
weeks..  
TECHNIQUE: 20.6 mCi 99m-Tc   
MDP IV.  
CORRELATION: No prior bone   
scan. Plain film radiographs   
of the left knee  
2021 .  
RESULT:  
There is abnormal perfusion   
seen. Left knee is   
hyperemic.  
There is abnormal blood   
pooling of the tracer seen.   
Abnormal blood pool  
tracer noted at the level of   
the left knee seen on   
anterior and posterior  
views  
Delayed images demonstrate   
increased activity noted in   
the femoral  
metaphysis distally and the   
tibial metaphysis   
proximally. Focally  
intense increased MDP uptake   
is noted involving the   
lateral aspect of the  
left knee joint posteriorly   
primarily involving the   
region of the lateral  
tibial plateau articular   
surface and the adjacent   
posterior aspect of the  
lateral femoral condyle most   
consistent with arthritic   
change.  
There is no focally   
increased activity within   
the medial femoral condyle  
or medial tibial plateau   
adjacent to the prosthesis.  
==========  
IMPRESSION:  
Abnormal three-phase bone   
scan with findings   
consistent with inflammatory  
or infectious arthropathy on   
the left primarily involving   
the lateral  
compartment femorotibial   
joint. .  
: MONSE  
Transcribe Date/Time: Dec 29   
2021 2:00P  
Dictated by : GROVER MADISON MD  
This examination was   
interpreted and the report   
reviewed and  
electronically signed by:  
GROVER MADISON MD on Dec 29   
2021 2:08PM EST  
[id number] Normal                                  [facility]  
   
                                                    No Panel Informationon    
   
                                                                  [facility]  
   
                                                    CNOVon 2021   
   
                                        CNOV                Office Visit (AGHWG1  
)  
----------------------------  
----------------------------  
------------------------  
JAYDEN SOTO ([id number])   
1960 F  
Date Time Provider   
Department  
21 2:45 PM BASIL MITCHELL [facility]  
During your visit today, we   
recorded the following   
information about you:  
Respiration Weight Height  
16/minute 95.3 kg 1.702 m  
Radha Riveraguzman MA   
2021 4:06 PM Signed  
REVIEW OF SYSTEMS:  
GENERAL: Well developed,   
well nourished. No acute   
distress  
PAIN: Pain left knee  
CARDIOVASCULAR: HTN, CAD  
MSK: Positive for joint   
swelling  
SKIN: Negative for lesions,   
rash, itching, metal   
sensitivity  
NEURO: Negative for seizure,   
trauma, numbness/tingling of   
extremities.  
ENDOCRINE: Positive for   
diabetic associated symptoms  
HEMATOLOGY: Negative for   
excessive bleeding, clots,   
bleeding disorders.  
Basil Mitchell MD 2021   
4:06 PM Signed  
Patient ID: Jayden Soto is   
a 61 year old female.  
CC: Consultation requested   
by Annalee Eckert DO for   
evaluation of: Patient  
presents with:  
Left Knee - New  
HPI: Jayden Soto is a 61   
year old female who presents   
with a long history of  
activity-related Left knee   
pain. The patient states she   
twisted the knee and  
has had pain medially. The   
pain is described as dull   
aching and sharp shooting  
pain and is present in   
themedial regions of the   
knee. The patient does  
require ambulatory aids. The   
patient does have difficulty   
ascending and  
descending stairs as well as   
getting out of a chair. The   
pain is to the point  
where it is adversely   
affecting activities of   
daily living.  
Previous treatment has   
consisted of left partial   
knee replacement in    
with  
a washout in March with PICC   
line. This started from a   
foot ulcer that ended  
up in her spine. This was   
washed out in   
The patient is referred for   
orthopedic evaluation and   
management.  
Pain Assessment  
The following portions of   
the patient's history were   
reviewed and updated as  
appropriate: allergies,   
current medications, past   
family history, past medical  
history, past social   
history, past surgical   
history and problem list.  
No past medical history on   
file.  
No past surgical history on   
file.  
ROS  
No family history on file.  
Social History  
Tobacco Use  
- Smoking status: Not on   
file  
- Smokeless tobacco: Not on   
file  
Substance Use Topics  
- Alcohol use: Not on file  
- Drug use: Not on file  
Current Outpatient   
Medications  
Medication Sig Dispense   
Refill  
- DULoxetine (CYMBALTA) 60   
mg capsule Take 60 mg by   
mouth once daily.  
- ranolazine SR (RANEXA)   
1,000 mg tab ER 12 hr Take   
by mouth.  
- dilTIAZem CD (CARDIZEM CD)   
180 mg 24 hr capsule Take   
180 mg by mouth once  
daily.  
- empagliflozin (JARDIANCE)   
25 mg tablet Take 25 mg by   
mouth daily with  
breakfast.  
- doxycycline monohydrate   
(MONODOX) 100 mg capsule   
Take 100 mg by mouth twice  
daily.  
- GABAPENTIN ORAL Take by   
mouth.  
- METOPROLOL TARTRATE ORAL   
Take by mouth.  
- isosorbide mononitrate ER   
(IMDUR) 30 mg 24 hr tablet   
Take 30 mg by mouth once  
daily.  
- insulin   
glargine,hum.rec.anlog   
(LANTUS SUBCUTANEOUS) Inject   
subcutaneously.  
- clopidogrel (PLAVIX) 75 mg   
tablet Take 75 mg by mouth   
once daily.  
No current   
facility-administered   
medications for this visit.  
ALLERGIES  
Allergen Reactions  
- Morphine GI Upset  
- Statins-Hmg-Coa Red*   
Unknown  
There is no problem list on   
file for this patient.  
Objective:  
On physical examination, the   
patient is a well appearing   
female in no  
respiratory distress. The   
patient is awake, alert and   
oriented to person,  
place and time. Body mass   
index is 32.89 kg/m?.  
Inspection of the bilateral   
lower extremities does not   
demonstrate color,  
temperature or trophic   
changes. There is   
satisfactory alignment and   
no  
asymmetry.  
Examination of theLeft knee   
finds mild intra-articular   
effusion. There is no  
varus deformity. The skin is   
noted to be intact. There is   
no lymphadenopathy.  
There is tenderness to   
palpation in the medial   
portion of the knee Range of  
motion is 5-100 degrees.   
There is satisfactory   
varus/valgus stability.   
There  
is no midflexion   
instability. Reynaldo test   
is negative. There is no   
calf  
tenderness. There is no   
evidence of distal   
neurovascular compromise.  
Examination of the Right   
knee finds no   
intra-articular effusion.   
There is no  
varus deformity. The skin is   
noted to be intact. There is   
no lymphadenopathy.  
There is no tenderness to   
palpation in the medial and   
lateral portion of the  
knee Range of motion is   
0-120 degrees. There is   
satisfactory varus/valgus  
stability. There is no   
midflexion instability.   
Reynaldo test is negative.  
There is no calf tenderness.   
There is no evidence of   
distal neurovascular  
compromise.  
Examination of the bilateral   
hips exhibits physiologic   
range of motion without  
difficulty, equal leg   
lengths, no pain with   
resisted hip flexion and no  
tenderness to palpation over   
greater trochanters.  
Further examination of the   
bilateral lower extremities   
finds satisfactory ankle  
dorsiflexion and plantar fle   
(more content not   
included)...        Normal                                  [facility]  
   
                                                    CNPNon 12-   
   
                                        CNPN                Telephone (AGPOB1)  
----------------------------  
----------------------------  
------------------------  
JAYDEN SOTO ([id number])   
1960 F  
Date Time Provider   
Department  
12/15/21 AG ORTH AGPOB1  
During your visit today, we   
recorded the following   
information about you:  
Roma Reagan  Ppg   
12/15/2021 10:18 AM Signed  
----- Message from Amita Crooks sent at   
12/15/2021 9:42 AM EST -----  
Regarding: Orthopedics /   
Open Knee: Pain / Previous   
Surgery By A Non-AG Provider  
Subject Line Format:   
Orthopedics / [Provider Name   
or  Open AND Body Part ] /  
[Issue]  
Patient has been identified   
by name and Date of birth   
(Y/N): Y  
Patient: Jayden Soto  
YOB: 1960  
MRN: [id number]  
Previous Provider Seen: NA  
Body Part(s) Identified: L   
KNEE  
Diagnosis/Reason For Visit:   
PAIN - INFECTED IMPLANT IN   
KNEE  
Reason for the   
call/escalation: PREVIOUS   
SURGERIES ON KNEE  
If reason for   
call/escalation is discharge   
from ED/ER or Hospital,   
which  
facility was the patient   
seen at: NA  
Was an appointment scheduled   
(Y/N): DR. PEPPER FROM [facility]   
REFERRED PATIENT  
Person calling if other than   
patient: DAUGHTER IN LAW Mamta MAGUIRE  
Return call to if other than   
patient: DAUGHTER IN LAW Mamta CRENSHAW  
Best contact number:   
[phone]  
Thank you,  
Amita Crooks  
December 15, 2021 9:42 AM  
Roma Reagan Dickinson Ppg   
12/15/2021 10:21 AM Signed  
Spoke to the patient's   
daughter-in-law, Nneka, to   
obtain a little more  
information. She states Dr. Pepper would like her to see   
a joint replacement  
specialist. In  she had   
a partial knee replacement   
and this year she had  
had to have two IANDD's to   
clean it out. Will Paula   
see for this?  
Roma Reagan  Ppg  
December 15, 2021 10:20 AM  
Allergies As of Date:   
12/15/2021  
(Not on File)  
Date Reviewed: Never   
Reviewed  
Reason for Visit:  
Future Appointment [256]  
Problem List As Of Date:   
12/15/2021  
(None)  
Encounter Number: [id number]  
Encounter Status:Closed by   
TEZ  HOLLIE, ROMA TONY on 12/15/21       Normal                                  [facility]  
   
                                                    BLOOD CULTUREon 2021   
   
                                                    Bacteria   
identified Cx Nom   
(Bld)           NO GROWTH AFTER 5 DAYS Normal                          [facility]  
   
                                        Comment on above:   Order Comment: Ruddy  
s: M   
   
                                                            Performed By: #### M  
050.65645 ####[facility]   
[id number] [place], [place] [address]Qy# [phone]   
   
                                                    Ray 2021   
   
                                                    EMERGENCY   
PHYSICIAN REPORT                        This is a preliminary report   
only, as the practitioner   
review and authentication   
has not occurred.   Normal                                  [facility]  
   
                      ER                    Normal                [facility]  
   
                                                    [id number] 2021   
   
                                                    SARS-CoV-2   
(COVID-19) RNA   
RIDDHI+probe Ql   
(Unsp spec)               Negative                  Invalid   
Interpretation   
Code                      Negative                  [facility]  
   
                                        Comment on above:   Order Comment: Ruddy  
s: M   
   
                                                            Result Comment: RESU  
LTS CALLED TO SOLIS DE LA GARZA RN/EDAT 5077 21   
BY DERRICK GILLISNegative results do not preclude SARS-CoV-2   
infection andshould not be used as the sole basis for treatment or   
otherpatient management decisions. Negative results must becombined   
with clinical observation, patient history, andepidemiological   
information.This test was performed by PCR.   
   
                                                            Performed By: #### L  
770.92506 ####[facility]   
[id number] [place], [place] [address]Qe# [phone]   
   
                                                    BMPon 2021   
   
                                                    Anion gap   
[Moles/Vol]     8 mmol/L        Normal          5-16            [facility]  
   
                                        Comment on above:   Order Comment: Ruddy  
s: M   
   
                                                            Performed By: #### L  
550.46454, L500.53246, L500.31669, L550.12221   
####[facility] [id number] [place], [place]   
[address]Nl# [phone]   
   
                                                    Calcium   
[Mass/Vol]      9.3 mg/dL       Normal          8.5-10.5        [facility]  
   
                                        Comment on above:   Order Comment: Campu  
s: M   
   
                                                            Result Comment: NOTE  
 NEW NORMAL RANGE DUE TO REAGENT CHANGE   
   
                                                            Performed By: #### L  
550.45740, L500.85569, L500.01854, L550.52971   
####[facility] [id number] [place], [place]   
[address]Pe# [phone]   
   
                                                    Chloride   
[Moles/Vol]     104 mmol/L      Normal                    [facility]  
   
                                        Comment on above:   Order Comment: Campu  
s: M   
   
                                                            Performed By: #### L  
550.20174, L500.16906, L500.92000, L550.54007   
####[facility] [id number] [place], [place]   
[address]Cr# [phone]   
   
                      CO2 [Moles/Vol] 27.0 mmol/L Normal     21-32      [facility]  
   
                                        Comment on above:   Order Comment: Campu  
s: M   
   
                                                            Performed By: #### L  
550.13992, L500.68726, L500.95898, L550.34062   
####[facility] [id number] [place], [place]   
[address]Oz# [phone]   
   
                                                    Creatinine   
[Mass/Vol]      0.71 mg/dL      Normal          0.510-0.950     [facility]  
   
                                        Comment on above:   Order Comment: Campu  
s: M   
   
                                                            Result Comment: Kayla  
ents receiving either N-Acetylcysteine (NAC)   
orMetamizole prior to venipuncture, may have falsely   
depressedresults.   
   
                                                            Performed By: #### L  
550.92925, L500.56862, L500.97617, L550.57929   
####[facility] [id number] [place], [place]   
[address]We# [phone]   
   
                                                    Glucose   
[Mass/Vol]      247 mg/dL       High                      [facility]  
   
                                        Comment on above:   Order Comment: Campu  
s: M   
   
                                                            Result Comment: 70-1  
00- Normal Fasting; 100-125 Impaired Fasting;   
greaterthan 126 on more than one result- Diabetes. ADA   
guidelines.Results may be falsely elevated after the administration   
ofSulfapyridine.Results may be falsely depressed after the   
administration ofSulfasalazine.   
   
                                                            Performed By: #### L  
550.67255, L500.49807, L500.86065, L550.17346   
####[facility] [id number] [place], [place]   
[address]Yr# [phone]   
   
                                                    Potassium   
[Moles/Vol]     5.0 mmol/L      Normal          3.5-5.1         [facility]  
   
                                        Comment on above:   Order Comment: Campu  
s: M   
   
                                                            Result Comment: Slig  
ht Hemolysis, Result may be affected.   
   
                                                            Performed By: #### L  
550.65544, L500.92540, L500.05508, L550.85629   
####[facility] [id number] [place], [place]   
[address]Ad# [phone]   
   
                                                    Sodium   
[Moles/Vol]     139 mmol/L      Normal          136-145         [facility]  
   
                                        Comment on above:   Order Comment: Campu  
s: M   
   
                                                            Performed By: #### L  
550.51513, L500.84356, L500.15886, L550.64649   
####[facility] [id number] [place], [place]   
[address]Oo# [phone]   
   
                                                    Urea nitrogen   
[Mass/Vol]      16 mg/dL        Normal          7-26            [facility]  
   
                                        Comment on above:   Order Comment: Campu  
s: M   
   
                                                            Performed By: #### L  
550.66024, L500.62322, L500.40079, L550.60067   
####[facility] [id number] [place], [place]   
[address]Iz# [phone]   
   
                                                    Urea   
nitrogen/Creatini  
ne [Mass ratio] 22 mg/mg        Normal          15-24           [facility]  
   
                                        Comment on above:   Order Comment: Campu  
s: M   
   
                                                            Performed By: #### L  
550.72596, L500.40439, L500.72423, L550.62991   
####[facility] [id number] [place] [address]   
[address]Hy# [phone]   
   
                                                    CBC W/DIFFon 2021   
   
                      BASO ABS   0.00 K/CU MM Normal     0-0.2      [facility]   
[place]  
   
                                        Comment on above:   Order Comment: Campu  
s: M   
   
                                                            Performed By: #### L  
200.27546, L200.66383 ####[address] [address]Kr# [phone]   
   
                                                    Basophils/100 WBC   
(Bld)           0.4 %           Normal          0-2             [facility]   
[place]  
   
                                        Comment on above:   Order Comment: Campu  
s: M   
   
                                                            Performed By: #### L  
200.86937, L200.68545 ####[address] [address]Uh# [phone]   
   
                      EOS ABS    0.20 K/CU MM Normal     0-0.5      [facility]   
[place]  
   
                                        Comment on above:   Order Comment: Campu  
s: M   
   
                                                            Performed By: #### L  
200.77630, L200.77523 ####[address] [address]Nz# [phone]   
   
                                                    Eosinophils/100   
WBC (Bld)       2.3 %           Normal          0-5             [facility]   
[place]  
   
                                        Comment on above:   Order Comment: Campu  
s: M   
   
                                                            Performed By: #### L  
200.04447, L200.99561 ####[address] [address]Zz# [phone]   
   
                                                    Erythrocyte   
distribution   
width (RBC)   
[Ratio]         13.4 %          Normal          11-14.5         [facility]   
[place]  
   
                                        Comment on above:   Order Comment: Campu  
s: M   
   
                                                            Performed By: #### L  
200.33231, L200.69246 ####[facility]   
[id number] [place] [address]Fg# [phone]   
   
                                                    Hematocrit (Bld)   
[Volume fraction] 34.8 %          Low             35.0-47.0       [facility]   
[place]  
   
                                        Comment on above:   Order Comment: Campu  
s: M   
   
                                                            Performed By: #### L  
200.14816, L200.01312 ####[facility]   
[id number] [place], [place] [address]Xe# [phone]   
   
                                                    Hemoglobin (Bld)   
[Mass/Vol]      11.0 g/dL       Low             11.5-15.5       [facility]   
[place]  
   
                                        Comment on above:   Order Comment: Campu  
s: M   
   
                                                            Performed By: #### L  
200.10677, L200.90907 ####[place] [address]Ph# [phone]   
   
                      IMMATR GRAN ABS 0.30 K/CU MM Normal     Less than 2 [facility]   
[place]  
   
                                        Comment on above:   Order Comment: Campu  
s: M   
   
                                                            Performed By: #### L  
200.35807, L200.26878 ####[address] [address]Wc# [phone]   
   
                      IMMATURE GRAN % 3.0 %      Normal     Less than 2 [facility]   
[place]  
   
                                        Comment on above:   Order Comment: Campu  
s: M   
   
                                                            Performed By: #### L  
200.51920, L200.81783 ####[address] [address]Kl# [phone]   
   
                      LYMPH ABS  2.30 K/CU MM Normal     0.9-4.4    [facility]   
[place]  
   
                                        Comment on above:   Order Comment: Campu  
s: M   
   
                                                            Performed By: #### L  
200.72506, L200.66697 ####[place] [address]Ph# [phone]   
   
                                                    Lymphocytes/100   
WBC (Bld)       22.1 %          Normal          20-40           [facility]   
[place]  
   
                                        Comment on above:   Order Comment: Campu  
s: M   
   
                                                            Performed By: #### L  
200.45045, L200.71983 ####[facility]   
[id number] [place] [address]Cu# [phone]   
   
                                                    MCHC (RBC)   
[Mass/Vol]      31.6 g/dL       Low             32.0-36.0       [facility]   
[place]  
   
                                        Comment on above:   Order Comment: Campu  
s: M   
   
                                                            Performed By: #### L  
200.70337, L200.54729 ####[facility]   
[id number] [place], [place] [address]Ij# [phone]   
   
                                                    MCV (RBC)   
[Entitic vol]   88.3 fL         Normal          80.0-99.0       [facility]  
   
                                        Comment on above:   Order Comment: Campu  
s: M   
   
                                                            Performed By: #### L  
200.66877, L2.11107 ####[address] [address]Sj# [phone]   
   
                      MONO ABS   0.80 K/CU MM Normal     0.1-1.1    [facility]   
[place]  
   
                                        Comment on above:   Order Comment: Campu  
s: M   
   
                                                            Performed By: #### L  
200.20320,  ####[place] [address]Ph# [phone]   
   
                                                    Monocytes/100 WBC   
(Bld)           7.2 %           Normal          2-10            [facility]  
   
                                        Comment on above:   Order Comment: Campu  
s: M   
   
                                                            Performed By: #### L  
200.97032,  ####[facility]   
[id number] [place] [address]Nc# [phone]   
   
                      NEUTROPHIL ABS 6.70 K/CU MM Normal     2.0-8.3    [facility]  
   
                                        Comment on above:   Order Comment: Campu  
s: M   
   
                                                            Performed By: #### L  
200.03850,  ####[address] [address]No# [phone]   
   
                                                    Neutrophils/100   
WBC (Bld)       65.0 %          Normal          45-75           [facility]  
   
                                        Comment on above:   Order Comment: Campu  
s: M   
   
                                                            Performed By: #### L  
200.48699, L266234 ####[facility]   
[id number] [place] [address]Xt# [phone]   
   
                                                    Nucleated RBC/100   
WBC (Bld) [Ratio] 0.0 %           Normal          Less than 1     [facility]  
   
                                        Comment on above:   Order Comment: Campu  
s: M   
   
                                                            Performed By: #### L  
200.58915, L220705 ####[facility] [place], [place] [address]Er# [phone]   
   
                                                    Platelet mean   
volume (Bld)   
[Entitic vol]   9.4 fL          Normal          9.4-12.4        [facility]  
   
                                        Comment on above:   Order Comment: Campu  
s: M   
   
                                                            Performed By: #### L  
200.68196, L200.86771 ####[facility]   
[id number] [place], [place] [address]Kc# [phone]   
   
                      PLT        546 K/CU MM High       150-450    [facility]  
   
                                        Comment on above:   Order Comment: Campu  
s: M   
   
                                                            Performed By: #### L  
200.00105, L200.32545 ####[facility]   
[id number] [place], [place] [address]Wa# [phone]   
   
                      RBC        3.94 M/CU MM Normal     3.90-5.30  [facility]  
   
                                        Comment on above:   Order Comment: Campu  
s: M   
   
                                                            Performed By: #### L  
200.63884, L200.75146 ####[facility]   
[id number] [place], [place] [address]Mc# [phone]   
   
                      WBC        10.4 K/CUMM Normal     4.5-11.0   [facility]  
   
                                        Comment on above:   Order Comment: Campu  
s: M   
   
                                                            Performed By: #### L  
200.27389, L200.09909 ####[facility]   
[id number] [place], [place] [address]Cy# [phone]   
   
                                                    CRPon 2021   
   
                      CRP        5.76 MG/DL Normal     LESS THAN 1 [facility]  
   
                                        Comment on above:   Order Comment: Campu  
s: M   
   
                                                            Performed By: #### L  
550.54280, L500.59071, L500.15977, L550.45259   
####[facility] [id number] [place], [place]   
[address]Md# [phone]   
   
                                                    GFR ESTon 2021   
   
                      IF  AMER Greater than 60 Normal                [facility]  
   
                                        Comment on above:   Order Comment: Campu  
s: M   
   
                                                            Performed By: #### L  
550.40511, L500.89403, L500.78204, L550.17149   
####[facility] [id number] [place], [place]   
[address]Qv# [phone]   
   
                      IF non-AFR AMER Greater than 60 Normal                [facility]   
[place]  
   
                                        Comment on above:   Order Comment: Ruddy  
s: M   
   
                                                            Performed By: #### L  
550.30829, L500.72030, L500.02419, L550.86339   
####[facility] [id number] [place], [place]   
[address]Qs# [phone]   
   
                                                    KNEE COMP 4 OR MORE VWS LTon  
 2021   
   
                                                    KNEE COMP 4 OR   
MORE VWS LT                     Normal                          [facility]   
[place]  
   
                                                    LACTATE BLOODon 2021   
   
                      LACTATE BLOOD 1.62 MMOL/L Normal     0.40-2.00  [facility]  
   
                                        Comment on above:   Order Comment: Ruddy  
s: M   
   
                                                            Performed By: #### L  
550.98957 ####[facility]   
[id number] [place], [place] [address]Ms# [phone]   
   
                                                    PROCAL Aon 2021   
   
                      PROCAL A   0.04 NG/ML Normal     0-0.50     [facility]  
   
                                        Comment on above:   Order Comment: Ruddy  
s: TU   
   
                                                            Result Comment: PCT   
Concentration InterpretationPCT <=0.1   
NG/ML:Normal range for healthy adultsPCT >0.1 NG/ML and <0.5   
NG/ML:Systemic infection (sepsis) is possible and may   
requireantibiotic treatment, but other conditions are known   
toelevate PCT as well.PCT >0.5 NG/ML:Should ne considered at risk   
for developing severe sepsisor septic shock.PCT >2.0 NG/ML:Important   
systemic inflammatory response. Almost exclusivelyindicates episode   
of severe bacterial sepsis or septicshock.This assy uses differnt   
methodologies and produces resultssignificantly lower than the   
[employer]. It is recommended toevaluate patients for sepsis based on   
results obtained fromthe [employer] platform vs the [employer]   
platform(previous).NOTE NEW NORMAL RANGE DUE TO REAGENT CHANGE   
   
                                                            Performed By: #### L  
550.82815, L500.61779, L500.77783, L550.38339   
####[facility] [id number] [place] [address]   
[address]Cb# [phone]   
   
                                                    WSR/MODon 2021   
   
                      WSR/MOD    1 MM/HR    Normal     0-30       [facility]   
[place]  
   
                                        Comment on above:   Order Comment: Campu  
s: M   
   
                                                            Performed By: #### L  
200.85538, L200.10757 ####[place] [address]Ph# [phone]   
   
                                                    CBC W/DIFFon 2021   
   
                      BASO ABS   0.00 K/CU MM Normal     0-0.2      [facility]   
[place]  
   
                                        Comment on above:   Order Comment: Campu  
s: M   
   
                                                            Performed By: #### L  
200.50738, L200.24888 ####[address] [address]Dz# [phone]   
   
                                                    Basophils/100 WBC   
(Bld)           0.2 %           Normal          0-2             [facility]   
[place]  
   
                                        Comment on above:   Order Comment: Campu  
s: M   
   
                                                            Performed By: #### L  
200.81054, L200.83966 ####[address] [address]Bx# [phone]   
   
                      EOS ABS    0.10 K/CU MM Normal     0-0.5      [facility]   
[place]  
   
                                        Comment on above:   Order Comment: Campu  
s: M   
   
                                                            Performed By: #### L  
200.22983, L200.15081 ####[place] [address]Ph# [phone]   
   
                                                    Eosinophils/100   
WBC (Bld)       0.5 %           Normal          0-5             [facility]   
[place]  
   
                                        Comment on above:   Order Comment: Campu  
s: M   
   
                                                            Performed By: #### L  
200.75639, L200.96692 ####[facility]   
[id number] [place] [address]Eb# [phone]   
   
                                                    Erythrocyte   
distribution   
width (RBC)   
[Ratio]         14.1 %          Normal          11-14.5         [facility]   
[place]  
   
                                        Comment on above:   Order Comment: Campu  
s: M   
   
                                                            Performed By: #### L  
200.04764, L200.55985 ####[facility]   
[id number] [place], [place] [address]Sl# [phone]   
   
                                                    Hematocrit (Bld)   
[Volume fraction] 40.4 %          Normal          35.0-47.0       [facility]   
[place]  
   
                                        Comment on above:   Order Comment: Campu  
s: M   
   
                                                            Performed By: #### L  
200.04510,  ####[address] [address]Jz# [phone]   
   
                                                    Hemoglobin (Bld)   
[Mass/Vol]      13.0 g/dL       Normal          11.5-15.5       [facility]   
[place]  
   
                                        Comment on above:   Order Comment: Campu  
s: M   
   
                                                            Performed By: #### L  
200.24753,  ####[place] [address] [place] [id number]# [phone]   
   
                      IMMATR GRAN ABS 0.10 K/CU MM Normal     Less than 2 [facility]   
[place]  
   
                                        Comment on above:   Order Comment: Campu  
s: M   
   
                                                            Performed By: #### L  
200.31103,  ####[place] [address]Ph# [phone]   
   
                      IMMATURE GRAN % 0.6 %      Normal     Less than 2 [facility]   
[place]  
   
                                        Comment on above:   Order Comment: Campu  
s: M   
   
                                                            Performed By: #### L  
200.39752,  ####[facility]   
[id number] [place], [place] [address]Ph# [phone]   
   
                      LYMPH ABS  2.30 K/CU MM Normal     0.9-4.4    [facility]   
[place]  
   
                                        Comment on above:   Order Comment: Campu  
s: M   
   
                                                            Performed By: #### L  
200.71032, L222283 ####[place] [address]Ph# [phone]   
   
                                                    Lymphocytes/100   
WBC (Bld)       17.7 %          Low             20-40           [facility]  
   
                                        Comment on above:   Order Comment: Campu  
s: M   
   
                                                            Performed By: #### L  
200.79896, L200.56070 ####[facility]   
[id number] [place], [place] [address]Hf# [phone]   
   
                                                    MCHC (RBC)   
[Mass/Vol]      32.2 g/dL       Normal          32.0-36.0       [facility]  
   
                                        Comment on above:   Order Comment: Campu  
s: M   
   
                                                            Performed By: #### L  
200.11941, [id number] ####[address] [address]Fl# [phone]   
   
                                                    MCV (RBC)   
[Entitic vol]   87.6 fL         Normal          80.0-99.0       [facility]  
   
                                        Comment on above:   Order Comment: Campu  
s: M   
   
                                                            Performed By: #### L  
200.41922,  ####[address] [address]Iy# [phone]   
   
                      MONO ABS   1.20 K/CU MM High       0.1-1.1    [facility]  
   
                                        Comment on above:   Order Comment: Campu  
s: M   
   
                                                            Performed By: #### L  
200.83079, [id number] ####[address] [address]Uy# [phone]   
   
                                                    Monocytes/100 WBC   
(Bld)           9.0 %           Normal          2-10            [facility]  
   
                                        Comment on above:   Order Comment: Campu  
s: M   
   
                                                            Performed By: #### L  
200.21413, [id number] ####[facility]   
[id number] [place] [address]Pi# [phone]   
   
                      NEUTROPHIL ABS 9.40 K/CU MM High       2.0-8.3    [facility]  
   
                                        Comment on above:   Order Comment: Campu  
s: M   
   
                                                            Performed By: #### L  
200.56153, [id number] ####[address] [address]Ed# [phone]   
   
                                                    Neutrophils/100   
WBC (Bld)       72.0 %          Normal          45-75           [facility]  
   
                                        Comment on above:   Order Comment: Campu  
s: M   
   
                                                            Performed By: #### L  
200.62106, L283144 ####[facility]   
[id number] [place], [place] [address]Wt# [phone]   
   
                                                    Nucleated RBC/100   
WBC (Bld) [Ratio] 0.0 %           Normal          Less than 1     [facility]   
[place]  
   
                                        Comment on above:   Order Comment: Campu  
s: M   
   
                                                            Performed By: #### L  
200.68714, L200.02143 ####[facility]   
[id number] [place], [place] [address]Tw# [phone]   
   
                                                    Platelet mean   
volume (Bld)   
[Entitic vol]   10.7 fL         Normal          9.4-12.4        [facility]   
[place]  
   
                                        Comment on above:   Order Comment: Campu  
s: M   
   
                                                            Performed By: #### L  
200.07683, L2.67949 ####[facility]   
[id number] [place], [place] [address]Wk# [phone]   
   
                      PLT        188 K/CU MM Normal     150-450    [facility]  
   
                                        Comment on above:   Order Comment: Campu  
s: M   
   
                                                            Performed By: #### L  
200.22977, L2.54957 ####[facility]   
[id number] [place], [place] [address]Lj# [phone]   
   
                      RBC        4.61 M/CU MM Normal     3.90-5.30  [facility]  
   
                                        Comment on above:   Order Comment: Campu  
s: M   
   
                                                            Performed By: #### L  
200.19043, L200.06665 ####[facility]   
[id number] [place], [place] [address]Tb# [phone]   
   
                      WBC        13.0 K/CUMM High       4.5-11.0   [facility]   
[place]  
   
                                        Comment on above:   Order Comment: Campu  
s: M   
   
                                                            Performed By: #### L  
200.00124, L200.15891 ####[facility]   
[id number] [place], [place] [address]Vh# [phone]   
   
                                                    CRPon 2021   
   
                      CRP        28.44 MG/DL Normal     LESS THAN 1 [facility]   
[place]  
   
                                        Comment on above:   Order Comment: Campu  
s: M   
   
                                                            Performed By: #### L  
550.66159, L550.54164 ####[facility]   
[id number] [place], [place] [address]Sa# [phone]   
   
                                                    Ray 2021   
   
                                                    EMERGENCY   
PHYSICIAN REPORT                        This is a preliminary report   
only, as the practitioner   
review and authentication   
has not occurred.   Normal                                  [facility]   
[place]  
   
                      ER                    Normal                [facility]  
   
                                                    KNEE COMP 4 OR MORE VWS LTon  
 2021   
   
                                                    KNEE COMP 4 OR   
MORE VWS LT                     Normal                          [facility]  
   
                                                    PROCAL Aon 2021   
   
                      PROCAL A   0.11 NG/ML Normal     0-0.50     [facility]  
   
                                        Comment on above:   Order Comment: Campu  
s: M   
   
                                                            Result Comment: PCT   
Concentration InterpretationPCT <=0.1   
NG/ML:Normal range for healthy adultsPCT >0.1 NG/ML and <0.5   
NG/ML:Systemic infection (sepsis) is possible and may   
requireantibiotic treatment, but other conditions are known   
toelevate PCT as well.PCT >0.5 NG/ML:Should ne considered at risk   
for developing severe sepsisor septic shock.PCT >2.0 NG/ML:Important   
systemic inflammatory response. Almost exclusivelyindicates episode   
of severe bacterial sepsis or septicshock.This assy uses differnt   
methodologies and produces resultssignificantly lower than the   
[employer]. It is recommended toevaluate patients for sepsis based on   
results obtained fromthe [employer] platform vs the [employer]   
platform(previous).NOTE NEW NORMAL RANGE DUE TO REAGENT CHANGE   
   
                                                            Performed By: #### L  
550.61113, L550.42299 ####[facility]   
[id number] [place], [place] [address]Me# [phone]   
   
                                                    WSR/MODon 2021   
   
                      WSR/MOD    1 MM/HR    Normal     0-30       [facility]  
   
                                        Comment on above:   Order Comment: Campu  
s: M   
   
                                                            Performed By: #### L  
200.81167, L200.09936 ####[facility]   
[id number] [place], [place] [address]Kr# [phone]   
   
                                                    BMPon 2021   
   
                                                    Anion gap   
[Moles/Vol]     8 mmol/L        Normal          5-16            [facility]  
   
                                        Comment on above:   Order Comment: Campu  
s: M   
   
                                                            Performed By: #### L  
500.93760, L500.91583 ####[facility]   
[id number] [place], [place] [address]Ge# [phone]   
   
                                                    Calcium   
[Mass/Vol]      9.3 mg/dL       Normal          8.5-10.5        [facility]   
[place]  
   
                                        Comment on above:   Order Comment: Campu  
s: M   
   
                                                            Result Comment: NOTE  
 NEW NORMAL RANGE DUE TO REAGENT CHANGE   
   
                                                            Performed By: #### L  
500.25740, L500.69374 ####[facility]   
[id number] [place], [place] [address]Ab# [phone]   
   
                                                    Chloride   
[Moles/Vol]     107 mmol/L      Normal                    [facility]  
   
                                        Comment on above:   Order Comment: Campu  
s: M   
   
                                                            Performed By: #### L  
500.25897, L500.00196 ####[facility]   
[id number] [place], [place] [address]Tl# [phone]   
   
                      CO2 [Moles/Vol] 27.0 mmol/L Normal     21-32      [facility]  
   
                                        Comment on above:   Order Comment: Campu  
s: M   
   
                                                            Performed By: #### L  
500.62186, L500.24519 ####[facility]   
[id number] [place], [place] [address]Tl# [phone]   
   
                                                    Creatinine   
[Mass/Vol]      0.72 mg/dL      Normal          0.510-0.950     [facility]  
   
                                        Comment on above:   Order Comment: Campu  
s: M   
   
                                                            Result Comment: Kayla  
ents receiving either N-Acetylcysteine (NAC)   
orMetamizole prior to venipuncture, may have falsely   
depressedresults.   
   
                                                            Performed By: #### L  
500.53483, L500.85899 ####[facility]   
[id number] [place], [place] [address]Ff# [phone]   
   
                                                    Glucose   
[Mass/Vol]      189 mg/dL       High                      [facility]   
[place]  
   
                                        Comment on above:   Order Comment: Campu  
s: M   
   
                                                            Result Comment: 70-1  
00- Normal Fasting; 100-125 Impaired Fasting;   
greaterthan 126 on more than one result- Diabetes. ADA   
guidelines.Results may be falsely elevated after the administration   
ofSulfapyridine.Results may be falsely depressed after the   
administration ofSulfasalazine.   
   
                                                            Performed By: #### L  
500.20706, L500.30252 ####[facility]   
[id number] [place], [place] [address]Jm# [phone]   
   
                                                    Potassium   
[Moles/Vol]     4.7 mmol/L      Normal          3.5-5.1         [facility]  
   
                                        Comment on above:   Order Comment: Campu  
s: M   
   
                                                            Performed By: #### L  
500.35610, L500.45343 ####[facility]   
[id number] [place] [address]Kb# [phone]   
   
                                                    Sodium   
[Moles/Vol]     142 mmol/L      Normal          136-145         [facility]  
   
                                        Comment on above:   Order Comment: Campu  
s: M   
   
                                                            Performed By: #### L  
500.43343, L500.29395 ####[facility]   
[id number] [place] [address]Qv# [phone]   
   
                                                    Urea nitrogen   
[Mass/Vol]      24 mg/dL        Normal          7-26            [facility]  
   
                                        Comment on above:   Order Comment: Campu  
s: M   
   
                                                            Performed By: #### L  
500.92461, L500.12356 ####[facility]   
[id number] [place], [place] [address]Pu# [phone]   
   
                                                    Urea   
nitrogen/Creatini  
ne [Mass ratio] 33 mg/mg        High            15-24           [facility]  
   
                                        Comment on above:   Order Comment: Campu  
s: M   
   
                                                            Performed By: #### L  
500.47361, L500.66678 ####[facility]   
[id number] [place] [address]Nl# [phone]   
   
                                                    CBCon 2021   
   
                                                    Erythrocyte   
distribution   
width (RBC)   
[Ratio]         14.6 %          High            11-14.5         [facility]  
   
                                        Comment on above:   Order Comment: Campu  
s: M   
   
                                                            Performed By: #### L  
200.38340 ####[facility]   
[id number] [place], [place] [address]Se# [phone]   
   
                                                    Hematocrit (Bld)   
[Volume fraction] 41.4 %          Normal          35.0-47.0       [facility]  
   
                                        Comment on above:   Order Comment: Campu  
s: M   
   
                                                            Performed By: #### L  
200.53029 ####[facility]   
[id number] [place] [address]Fa# [phone]   
   
                                                    Hemoglobin (Bld)   
[Mass/Vol]      13.2 g/dL       Normal          11.5-15.5       [facility]  
   
                                        Comment on above:   Order Comment: Campu  
s: M   
   
                                                            Performed By: #### L  
200.50711 ####[facility]   
[id number] [place] [address]Ea# [phone]   
   
                                                    MCHC (RBC)   
[Mass/Vol]      31.9 g/dL       Low             32.0-36.0       [facility]  
   
                                        Comment on above:   Order Comment: Campu  
s: M   
   
                                                            Performed By: #### L  
200.64675 ####[facility]   
[id number] [place] [address]Ga# [phone]   
   
                                                    MCV (RBC)   
[Entitic vol]   88.5 fL         Normal          80.0-99.0       [facility]  
   
                                        Comment on above:   Order Comment: Campu  
s: M   
   
                                                            Performed By: #### L  
200.14210 ####[facility]   
[id number] [place] [address]Zc# [phone]   
   
                                                    Nucleated RBC/100   
WBC (Bld) [Ratio] 0.0 %           Normal          Less than 1     [facility]  
   
                                        Comment on above:   Order Comment: Campu  
s: M   
   
                                                            Performed By: #### L  
200.51393 ####[facility]   
[id number] [place] [address]Lw# [phone]   
   
                                                    Platelet mean   
volume (Bld)   
[Entitic vol]   10.2 fL         Normal          9.4-12.4        [facility]  
   
                                        Comment on above:   Order Comment: Campu  
s: M   
   
                                                            Performed By: #### L  
200.69624 ####[facility]   
[id number] [place] [address]Ee# [phone]   
   
                      PLT        276 K/CU MM Normal     150-450    [facility]  
   
                                        Comment on above:   Order Comment: Campu  
s: M   
   
                                                            Performed By: #### L  
200.84370 ####[facility]   
[id number] [place], [place] [address]Pw# [phone]   
   
                      RBC        4.68 M/CU MM Normal     3.90-5.30  [facility]  
   
                                        Comment on above:   Order Comment: Campu  
s: M   
   
                                                            Performed By: #### L  
200.16781 ####[facility]   
[id number] [place], [place] [address]Qy# [phone]   
   
                      WBC        10.7 K/CUMM Normal     4.5-11.0   [facility]  
   
                                        Comment on above:   Order Comment: Campu  
s: M   
   
                                                            Performed By: #### L  
200.72188 ####[facility]   
[id number] [place], [place] [address]Dc# [phone]   
   
                                                    CCon 2021   
   
                      CARDIAC CATH            Normal                [facility]  
   
                      CC                    Normal                [facility]   
[place]  
   
                                                    GFR ESTon 2021   
   
                      IF  AMER Greater than 60 Normal                [facility]  
   
                                        Comment on above:   Order Comment: Campu  
s: M   
   
                                                            Performed By: #### L  
500.39576, L500.78717 ####[facility]   
[id number] [place], [place] [address]Ng# [phone]   
   
                      IF non-AFR AMER Greater than 60 Normal                [facility]  
   
                                        Comment on above:   Order Comment: Campu  
s: M   
   
                                                            Performed By: #### L  
500.18337, L500.93211 ####[facility]   
[id number] [place], [place] [address]Rg# [phone]   
   
                                                    PTon 2021   
   
                                                    INR Coag (PPP)   
[Relative time] 1.02 {INR}      Normal          0.9-1.1         [facility]  
   
                                        Comment on above:   Order Comment: Campu  
s: M   
   
                                                            Result Comment: Khari  
mmended PT INR therapeutic range for long term   
andprophylactic therapy is 2.0 - 3.0. For heart valve andshunt   
patients the range is 2.5 - 3.5.   
   
                                                            Performed By: #### L  
300.78712 ####[facility]   
[id number] [place], [place] [address]Hd# [phone]   
   
                      PTS        10.9 SECONDS Normal     9.5-12.0   [facility]   
[place]  
   
                                        Comment on above:   Order Comment: Ruddy  
s: M   
   
                                                            Performed By: #### L  
[id number] ####[facility]   
[id number] [place], [place] [address]Dq# [phone]   
   
                                                    BMP with eGFRon 2021   
   
                      Age - Reported 60 years   Normal                [facility]  
   
                                        Comment on above:   Performed By: #### 2  
27813 ####  
[facility],[address] [place] [address]   
[address]   
   
                                                    Anion gap   
[Moles/Vol]     12 mmol/L       Normal          10 - 20         [facility]  
   
                                        Comment on above:   Performed By: #### 2  
97031 ####  
[facility],[address] [place] [address]   
[address]   
   
                                                    Calcium   
[Mass/Vol]      9.1 mg/dL       Normal          8.5 - 10.1      [facility]  
   
                                        Comment on above:   Performed By: #### 2  
52057 ####  
[facility],[address] [place] [address]   
[address]   
   
                                                    Chloride   
[Moles/Vol]     105 mmol/L      Normal          98 - 107        [facility]  
   
                                        Comment on above:   Performed By: #### 2  
67851 ####  
[facility],[address] [place] [address]   
[address]   
   
                      CO2 [Moles/Vol] 28.5 mmol/L Normal     21.0 - 32.0 [facility]  
   
                                        Comment on above:   Performed By: #### 2  
71843 ####  
[facility],[address] [place] [address]   
[address]67   
   
                                                    Creatinine   
[Mass/Vol]      0.8 mg/dL       Normal          0.5 - 1.0       [facility]  
   
                                        Comment on above:   Performed By: #### 2  
56945 ####  
[facility],[address] [place] [address]   
[address]   
   
                                                    GFR/1.73 sq M   
predicted among   
non-blacks MDRD   
(S/P/Bld) [Vol   
rate/Area]      mL/min/{1.73_m2} Normal          60 - 999        [facility]  
   
                                        Comment on above:   Performed By: #### 2  
55255 ####  
[facility],[address] [place] [address]   
[address]   
   
                                                            Result Comment: ACCO  
RDING TO THE NATIONAL KIDNEY DISEASE EDUCATION   
PROGRAM(NKDE), A NORMAL eGFR  
IS A VALUE GREATER THAN OR EQUAL TO 60 ML/MIN/1.73 SQ METERS.  
CHRONIC KIDNEY DISEASE: <60mL/MIN/1.73 SQ METERS  
KIDNEY FAILURE: <15mL/MIN/1.73 SQ METERS  
THIS TEST SHOULD ONLY BE USED FOR PATIENTS 18 YEARS OF AGE AND   
OLDER.   
   
                                                    GFR/1.73 sq M   
predicted among   
non-blacks MDRD   
(S/P/Bld) [Vol   
rate/Area]                      Normal                          [facility]  
   
                                        Comment on above:   Result Comment: BASI  
C METABOLIC PANEL   
   
                                                            Performed By: #### 2  
64578 ####  
[address]   
[address]   
   
                                                    Glucose   
[Mass/Vol]      251 mg/dL       High            74 - 106        [facility]  
   
                                        Comment on above:   Performed By: #### 2  
42805 ####  
[address]   
[address]   
   
                                                    Potassium   
[Moles/Vol]     4.4 mmol/L      Normal          3.5 - 5.1       [facility]  
   
                                        Comment on above:   Performed By: #### 2  
77572 ####  
[facility],[address] [place] [address]   
[address]396   
   
                                                    Sodium   
[Moles/Vol]     141 mmol/L      Normal          136 - 145       [facility]  
   
                                        Comment on above:   Performed By: #### 2  
18075 ####  
[facility],[address] [place] [address]   
[address]   
   
                                                    Urea nitrogen   
[Mass/Vol]      27 mg/dL        High            7 - 18          [facility]  
   
                                        Comment on above:   Performed By: #### 2  
72788 ####  
[address]   
[address]   
   
                                                    CBC + DIFFon 2021   
   
                                                    Basophils (Bld)   
[#/Vol]         0.10 x10EE3/UL  Normal          0.00 - 0.10     [facility]  
   
                                        Comment on above:   Performed By: #### 2  
30774 ####  
[facility],[address] [place] [address]   
[address]   
   
                                                    Basophils/100 WBC   
(Bld)           1.0 %           Normal          0.0 - 2.0       [facility]  
   
                                        Comment on above:   Performed By: #### 2  
48419 ####  
[facility],[address] [place] [address]   
   
                      CBC + DIFF            Normal                [facility]  
   
                                        Comment on above:   Result Comment: CBC-  
COMPLETE BLOOD COUNT   
   
                                                            Performed By: #### 2  
03675 ####  
[facility],[address] [place] [address]   
   
                                                    Eosinophils (Bld)   
[#/Vol]         0.30 x10EE3/UL  Normal          0.00 - 0.50     [facility]  
   
                                        Comment on above:   Performed By: #### 2  
62336 ####  
[facility],[address] [place], [place] [id number]   
   
                                                    Eosinophils/100   
WBC (Bld)       3.3 %           Normal          0.0 - 7.0       [facility]  
   
                                        Comment on above:   Performed By: #### 2  
84439 ####  
[facility],[address] [place] [address]   
   
                                                    Erythrocyte   
distribution   
width (RBC)   
[Ratio]         19.8 %          High            12.0 - 15.6     [facility]  
   
                                        Comment on above:   Performed By: #### 2  
19243 ####  
[facility],[address] [place], [place] [id number]   
   
                                                    Hematocrit (Bld)   
[Volume fraction] 32.0 %          Low             34.0 - 46.0     [facility]  
   
                                        Comment on above:   Performed By: #### 2  
98475 ####  
[facility],[address] [place] [address]   
[address]   
   
                                                    Hemoglobin (Bld)   
[Mass/Vol]      10.4 g/dL       Low             12.0 - 16.0     [facility]  
   
                                        Comment on above:   Performed By: #### 2  
24237 ####  
[facility],[address] [place] [address]   
[address]   
   
                                                    Lymphocytes (Bld)   
[#/Vol]         1.90 x10EE3/UL  Normal          0.80 - 2.80     [facility]  
   
                                        Comment on above:   Performed By: #### 2  
84958 ####  
[facility],[address] [place] [address]   
[address]9902   
   
                                                    Lymphocytes/100   
WBC (Bld)       19.2 %          Low             20.0 - 45.0     [facility]  
   
                                        Comment on above:   Performed By: #### 2  
11721 ####  
[facility],[address] [place] [address]   
[address]   
   
                      MANUAL DIFF N/A        Normal                [facility]  
   
                                        Comment on above:   Performed By: #### 2  
89623 ####  
[facility],[address] [place] [address]   
[address]   
   
                                                    MCH (RBC)   
[Entitic mass]  27 pg           Normal          27 - 33         [facility]  
   
                                        Comment on above:   Performed By: #### 2  
97006 ####  
[facility],[address] [place] [address]   
[address]   
   
                                                    MCHC (RBC)   
[Mass/Vol]      32 X10 3        Normal          32 - 36         [facility]  
   
                                        Comment on above:   Performed By: #### 2  
54497 ####  
[facility],[address] [place] [address]   
[address]   
   
                                                    MCV (RBC)   
[Entitic vol]   84 fL           Normal          80 - 99         [facility]  
   
                                        Comment on above:   Performed By: #### 2  
94300 ####  
[facility],[address] [place] [address]   
[address]605   
   
                                                    Monocytes (Bld)   
[#/Vol]         0.50 x10EE3/UL  Normal          0.20 - 1.00     [facility]  
   
                                        Comment on above:   Performed By: #### 2  
44200 ####  
[facility],[address] [place] [address]   
7[address]   
   
                      MONOS %    4.9 %      Normal     0.0 - 10.0 [facility]  
   
                                        Comment on above:   Performed By: #### 2  
58408 ####  
[facility],[address] [place] [address]   
[address]   
   
                                                    Morphology Mustapha   
(Bld) [Interp]  N/A             Normal                          [facility]  
   
                                        Comment on above:   Result Comment: {CD]  
   
   
                                                            Performed By: #### 2  
81397 ####  
[facility],[address] [place] [address]   
[address]   
   
                                                    Neutrophils (Bld)   
[#/Vol]         7.10 x10EE3/UL  Normal          1.50 - 7.10     [facility]  
   
                                        Comment on above:   Performed By: #### 2  
04435 ####  
[facility],[address] [place] [address]   
[address]50   
   
                                                    Neutrophils/100   
WBC (Bld)       71.6 %          Normal          46.0 - 76.0     [facility]  
   
                                        Comment on above:   Performed By: #### 2  
99888 ####  
[facility],[address] [place] [address]   
[address]16[address]   
   
                                                    Platelet mean   
volume (Bld)   
[Entitic vol]   8.7 fL          Normal          6.6 - 10.5      [facility]  
   
                                        Comment on above:   Result Comment: AUTO  
MATED DIFFERENTIAL   
   
                                                            Performed By: #### 2  
99270 ####  
[facility],[address] [place] [address]   
[address]   
   
                                                    Platelets (Bld)   
[#/Vol]         328 x10EE3/UL   Normal          150 - 450       [facility]  
   
                                        Comment on above:   Performed By: #### 2  
49503 ####  
[facility],[address] [place] [address]   
70365   
   
                      RBC (Bld) [#/Vol] 3.82 x 10EE6/UL Low        4.10 - 5.30 [facility]  
   
                                        Comment on above:   Performed By: #### 2  
40760 ####  
[facility],[address] [place] [address]   
65993   
   
                      WBC (Bld) [#/Vol] 9.9 x 10EE3/UL Normal     4.5 - 10.8 [facility]  
   
                                        Comment on above:   Performed By: #### 2  
04183 ####  
[facility],[address] [place] [address]   
28645   
   
                                                    HEPATIC FUNCTION PANELon    
   
                                                    Albumin   
[Mass/Vol]      2.7 g/dL        Low             3.4 - 5.0       [facility]  
   
                                        Comment on above:   Performed By: #### 2  
33853 ####  
[facility],[address] [place] [address]   
34786   
   
                      ALK PHOS   93 U/L     Normal     46 - 116   [facility]  
   
                                        Comment on above:   Performed By: #### 2  
48325 ####  
[facility],[address] [place] [address]   
96079   
   
                      ALT/SGPT   15 U/L     Normal     14 - 59    [facility]  
   
                                        Comment on above:   Performed By: #### 2  
43543 ####  
[facility],[address] [place] [address]   
57467   
   
                      AST/SGOT   18 U/L     Normal     13 - 39    [facility]  
   
                                        Comment on above:   Performed By: #### 2  
89299 ####  
[facility],[address] [place] [address]   
[address]180   
   
                                                    Bilirubin   
[Mass/Vol]      0.2 mg/dL       Normal          0.2 - 1.0       [facility]  
   
                                        Comment on above:   Performed By: #### 2  
91427 ####  
[facility],[address] [place] [address]   
51731   
   
                                                    Bilirubin.direct   
[Mass/Vol]      0.1 mg/dL       Normal          0.0 - 0.2       [facility]  
   
                                        Comment on above:   Performed By: #### 2  
95100 ####  
[facility],[address] [place] [address]   
[address]   
   
                                                    HEPATIC FUNCTION   
PANEL                           Normal                          [facility]  
   
                                        Comment on above:   Result Comment: HEPA  
TIC FUNCTION PROFILE   
   
                                                            Performed By: #### 2  
93798 ####  
[facility],[address] [place] [address]   
99480   
   
                                                    Protein   
[Mass/Vol]      6.7 g/dL        Normal          6.4 - 8.2       [facility]  
   
                                        Comment on above:   Performed By: #### 2  
70080 ####  
[facility],[address] [place] [address]   
42133   
   
                                                    SEDRATEon 2021   
   
                      SEDRATE    30 mm/hr   Normal     0 - 30     [facility]  
   
                                        Comment on above:   Performed By: #### 2  
68707 ####  
[facility],[address] [place] [address]   
97[address]   
   
                                                    BMPon 2021   
   
                                                    Anion gap   
[Moles/Vol]     3 mmol/L        Low             5-16            [facility]  
   
                                        Comment on above:   Order Comment: Campu  
s: M   
   
                                                            Performed By: #### L  
500.96095, L500.95427 ####[facility]   
[id number] [place], [place] [address]Jd# [phone]   
   
                                                    Calcium   
[Mass/Vol]      9.6 mg/dL       Normal          8.5-10.5        [facility]  
   
                                        Comment on above:   Order Comment: Campu  
s: M   
   
                                                            Result Comment: NOTE  
 NEW NORMAL RANGE DUE TO REAGENT CHANGE   
   
                                                            Performed By: #### L  
500.43421, L500.44107 ####[facility]   
[id number] [place], [place] [address]Oa# [phone]   
   
                                                    Chloride   
[Moles/Vol]     110 mmol/L      High                      [facility]  
   
                                        Comment on above:   Order Comment: Campu  
s: M   
   
                                                            Performed By: #### L  
500.71571, L500.35403 ####[facility]   
[id number] [place], [place] [address]Hw# [phone]   
   
                      CO2 [Moles/Vol] 30.0 mmol/L Normal     21-32      [facility]  
   
                                        Comment on above:   Order Comment: Campu  
s: M   
   
                                                            Performed By: #### L  
500.00477, L500.45214 ####[facility]   
[id number] [place], [place] [address]Gt# [phone]   
   
                                                    Creatinine   
[Mass/Vol]      0.47 mg/dL      Low             0.510-0.950     [facility]  
   
                                        Comment on above:   Order Comment: Campu  
s: M   
   
                                                            Result Comment: Kayla  
ents receiving either N-Acetylcysteine (NAC)   
orMetamizole prior to venipuncture, may have falsely   
depressedresults.   
   
                                                            Performed By: #### L  
500.67534, L500.29430 ####[facility]   
[id number] [place], [place] [address]Vi# [phone]   
   
                                                    Glucose   
[Mass/Vol]      130 mg/dL       High                      [facility]   
[place]  
   
                                        Comment on above:   Order Comment: Campu  
s: M   
   
                                                            Result Comment: 70-1  
00- Normal Fasting; 100-125 Impaired Fasting;   
greaterthan 126 on more than one result- Diabetes. ADA   
guidelines.Results may be falsely elevated after the administration   
ofSulfapyridine.Results may be falsely depressed after the   
administration ofSulfasalazine.   
   
                                                            Performed By: #### L  
500.60533, L5.27327 ####[facility]   
[id number] [place], [place] [address]Xo# [phone]   
   
                                                    Potassium   
[Moles/Vol]     3.9 mmol/L      Normal          3.5-5.1         [facility]  
   
                                        Comment on above:   Order Comment: Campu  
s: M   
   
                                                            Result Comment: Slig  
ht Hemolysis, Result may be affected.   
   
                                                            Performed By: #### L  
500.62930, L5.06174 ####[facility]   
[id number] [place], [place] [address]Et# [phone]   
   
                                                    Sodium   
[Moles/Vol]     143 mmol/L      Normal          136-145         [facility]  
   
                                        Comment on above:   Order Comment: Campu  
s: M   
   
                                                            Performed By: #### L  
500.92759, L5.29852 ####[facility]   
[id number] [place], [place] [address]Fh# [phone]   
   
                                                    Urea nitrogen   
[Mass/Vol]      20 mg/dL        Normal          7-26            [facility]  
   
                                        Comment on above:   Order Comment: Campu  
s: M   
   
                                                            Performed By: #### L  
500.47354, L5.05031 ####[facility]   
[id number] [place], [place] [address]Zq# [phone]   
   
                                                    Urea   
nitrogen/Creatini  
ne [Mass ratio] 42 mg/mg        High            15-24           [facility]  
   
                                        Comment on above:   Order Comment: Campu  
s: M   
   
                                                            Performed By: #### L  
500.13753, L500.69526 ####[facility]   
[id number] [place], [place] [address]Hi# [phone]   
   
                                                    CBCon 2021   
   
                                                    Erythrocyte   
distribution   
width (RBC)   
[Ratio]         17.3 %          High            11-14.5         [facility]  
   
                                        Comment on above:   Order Comment: Campu  
s: M   
   
                                                            Performed By: #### L  
200.95807 ####[facility]   
[id number] [place] [address]My# [phone]   
   
                                                    Hematocrit (Bld)   
[Volume fraction] 35.6 %          Normal          35.0-47.0       [facility]  
   
                                        Comment on above:   Order Comment: Campu  
s: M   
   
                                                            Performed By: #### L  
200.58703 ####[address] [address]He# [phone]   
   
                                                    Hemoglobin (Bld)   
[Mass/Vol]      10.8 g/dL       Low             11.5-15.5       [facility]  
   
                                        Comment on above:   Order Comment: Campu  
s: M   
   
                                                            Performed By: #### L  
200.14126 ####[facility]   
[id number] [place] [address]Tc# [phone]   
   
                                                    MCHC (RBC)   
[Mass/Vol]      30.3 g/dL       Low             32.0-36.0       [facility]  
   
                                        Comment on above:   Order Comment: Campu  
s: M   
   
                                                            Performed By: #### L  
200.58580 ####[facility]   
[id number] [place] [address]Ts# [phone]   
   
                                                    MCV (RBC)   
[Entitic vol]   87.0 fL         Normal          80.0-99.0       [facility]  
   
                                        Comment on above:   Order Comment: Campu  
s: M   
   
                                                            Performed By: #### L  
200.13610 ####[facility]   
[id number] [place] [address]Sk# [phone]   
   
                                                    Nucleated RBC/100   
WBC (Bld) [Ratio] 0.0 %           Normal          Less than 1     [facility]   
[place]  
   
                                        Comment on above:   Order Comment: Campu  
s: M   
   
                                                            Performed By: #### L  
200.70577 ####[facility]   
[id number] [place], [place] [address]Oq# [phone]   
   
                                                    Platelet mean   
volume (Bld)   
[Entitic vol]   9.7 fL          Normal          9.4-12.4        [facility]  
   
                                        Comment on above:   Order Comment: Campu  
s: M   
   
                                                            Performed By: #### L  
200.90648 ####[facility]   
[id number] [place] [address]Xk# [phone]   
   
                      PLT        285 K/CU MM Normal     150-450    [facility]  
   
                                        Comment on above:   Order Comment: Campu  
s: M   
   
                                                            Performed By: #### L  
200.28468 ####[address] [address]Yr# [phone]   
   
                      RBC        4.09 M/CU MM Normal     3.90-5.30  [facility]  
   
                                        Comment on above:   Order Comment: Campu  
s: M   
   
                                                            Performed By: #### L  
200.28617 ####[facility]   
[id number] [place] [address]Tk# [phone]   
   
                      WBC        10.0 K/CUMM Normal     4.5-11.0   [facility]  
   
                                        Comment on above:   Order Comment: Campu  
s: M   
   
                                                            Performed By: #### L  
200.72882 ####[facility]   
[id number] [place] [address]Vg# [phone]   
   
                                                    GFR ESTon 2021   
   
                      IF  AMER Greater than 60 Normal                [facility]  
   
                                        Comment on above:   Order Comment: Campu  
s: M   
   
                                                            Performed By: #### L  
500.64763, L500.34644 ####[facility]   
[id number] [place] [address]Ib# [phone]   
   
                      IF non-AFR AMER Greater than 60 Normal                [facility]  
   
                                        Comment on above:   Order Comment: Campu  
s: M   
   
                                                            Performed By: #### L  
500.55324, L500.90031 ####[facility]   
[id number] [place] [address]Fx# [phone]   
   
                                                    PROG.Mikayla 2021   
   
                      PROG.CARD             Normal                [facility]  
   
                                                    Progress   
Note-Cardiology                 Normal                          [facility]  
   
                                                    PROG.Mikayla 2021   
   
                      PROG.CARD             Normal                [facility]  
   
                                                    Progress   
Note-Cardiology                 Normal                          [facility]  
   
                                                    CBC W/DIFFon 2021   
   
                      BASO ABS   0.10 K/CU MM Normal     0-0.2      [facility]  
   
                                        Comment on above:   Order Comment: Campu  
s: M   
   
                                                            Performed By: #### L  
200.09652 ####[address] [address]Nf# [phone]   
   
                                                    Basophils/100 WBC   
(Bld)           0.6 %           Normal          0-2             [facility]  
   
                                        Comment on above:   Order Comment: Campu  
s: M   
   
                                                            Performed By: #### L  
200.29867 ####[address] [address]In# [phone]   
   
                      EOS ABS    0.60 K/CU MM High       0-0.5      [facility]  
   
                                        Comment on above:   Order Comment: Campu  
s: M   
   
                                                            Performed By: #### L  
200.27121 ####[facility]   
[id number] [place] [address]Xs# [phone]   
   
                                                    Eosinophils/100   
WBC (Bld)       7.3 %           High            0-5             [facility]  
   
                                        Comment on above:   Order Comment: Campu  
s: M   
   
                                                            Performed By: #### L  
200.34545 ####[facility]   
[id number] [place] [address]Ik# [phone]   
   
                                                    Erythrocyte   
distribution   
width (RBC)   
[Ratio]         17.4 %          High            11-14.5         [facility]  
   
                                        Comment on above:   Order Comment: Campu  
s: M   
   
                                                            Performed By: #### L  
200.79510 ####[facility]   
[id number] [place] [address]Hf# [phone]   
   
                                                    Hematocrit (Bld)   
[Volume fraction] 38.1 %          Normal          35.0-47.0       [facility]  
   
                                        Comment on above:   Order Comment: Campu  
s: M   
   
                                                            Performed By: #### L  
200.52431 ####[facility]   
[id number] [place], [place] [address]Ph# [phone]   
   
                                                    Hemoglobin (Bld)   
[Mass/Vol]      11.5 g/dL       Normal          11.5-15.5       [facility]  
   
                                        Comment on above:   Order Comment: Campu  
s: M   
   
                                                            Performed By: #### L  
200.16434 ####[facility]   
[id number] [place], [place] [address]Ph# [phone]   
   
                      IMMATR GRAN ABS 0.00 K/CU MM Normal     Less than 2 [facility]   
[place]  
   
                                        Comment on above:   Order Comment: Campu  
s: M   
   
                                                            Performed By: #### L  
200.31090 ####[facility]   
[id number] [place], [place] [address]Ph# [phone]   
   
                      IMMATURE GRAN % 0.3 %      Normal     Less than 2 [facility]   
[place]  
   
                                        Comment on above:   Order Comment: Campu  
s: M   
   
                                                            Performed By: #### L  
200.94757 ####[facility]   
[id number] [place], [place] [address]Ph# [phone]   
   
                      LYMPH ABS  3.10 K/CU MM Normal     0.9-4.4    [facility]  
   
                                        Comment on above:   Order Comment: Campu  
s: M   
   
                                                            Performed By: #### L  
200.37081 ####[facility]   
[id number] [place], [place] [address]Ph# [phone]   
   
                                                    Lymphocytes/100   
WBC (Bld)       36.0 %          Normal          20-40           [facility]  
   
                                        Comment on above:   Order Comment: Campu  
s: M   
   
                                                            Performed By: #### L  
200.24761 ####[facility]   
[id number] [place], [place] [address]Ph# [phone]   
   
                                                    MCHC (RBC)   
[Mass/Vol]      30.2 g/dL       Low             32.0-36.0       [facility]  
   
                                        Comment on above:   Order Comment: Campu  
s: M   
   
                                                            Performed By: #### L  
200.77943 ####[facility]   
[id number] [place], [place] [address]Ph# [phone]   
   
                                                    MCV (RBC)   
[Entitic vol]   87.0 fL         Normal          80.0-99.0       [facility]  
   
                                        Comment on above:   Order Comment: Campu  
s: M   
   
                                                            Performed By: #### L  
200.11201 ####[facility]   
[id number] [place] [address]Vi# [phone]   
   
                      MONO ABS   0.70 K/CU MM Normal     0.1-1.1    [facility]  
   
                                        Comment on above:   Order Comment: Campu  
s: M   
   
                                                            Performed By: #### L  
200.23312 ####[place] [id number]# [phone]   
   
                                                    Monocytes/100 WBC   
(Bld)           7.9 %           Normal          2-10            [facility]  
   
                                        Comment on above:   Order Comment: Campu  
s: M   
   
                                                            Performed By: #### L  
200.24437 ####[place] [address]Ph# [phone]   
   
                      NEUTROPHIL ABS 4.10 K/CU MM Normal     2.0-8.3    [facility]  
   
                                        Comment on above:   Order Comment: Campu  
s: M   
   
                                                            Performed By: #### L  
200.02457 ####[facility]   
[id number] [place], [place] [address]Ph# [phone]   
   
                                                    Neutrophils/100   
WBC (Bld)       47.9 %          Normal          45-75           [facility]  
   
                                        Comment on above:   Order Comment: Campu  
s: M   
   
                                                            Performed By: #### L  
200.07241 ####[facility]   
[id number] [place], [place] [address]Ph# [phone]   
   
                                                    Nucleated RBC/100   
WBC (Bld) [Ratio] 0.0 %           Normal          Less than 1     [facility]  
   
                                        Comment on above:   Order Comment: Campu  
s: M   
   
                                                            Performed By: #### L  
200.35539 ####[facility]   
[id number] [place] [address]Xp# [phone]   
   
                                                    Platelet mean   
volume (Bld)   
[Entitic vol]   9.7 fL          Normal          9.4-12.4        [facility]  
   
                                        Comment on above:   Order Comment: Campu  
s: M   
   
                                                            Performed By: #### L  
200.95677 ####[facility]   
[id number] [place], [place] [address]Zy# [phone]   
   
                      PLT        303 K/CU MM Normal     150-450    [facility]  
   
                                        Comment on above:   Order Comment: Campu  
s: M   
   
                                                            Performed By: #### L  
200.69560 ####[facility]   
[id number] [place], [place] [address]Uu# [phone]   
   
                      RBC        4.38 M/CU MM Normal     3.90-5.30  [facility]  
   
                                        Comment on above:   Order Comment: Campu  
s: M   
   
                                                            Performed By: #### L  
200.48985 ####[facility]   
[id number] [place], [place] [address]Ks# [phone]   
   
                      WBC        8.6 K/CUMM Normal     4.5-11.0   [facility]  
   
                                        Comment on above:   Order Comment: Campu  
s: M   
   
                                                            Performed By: #### L  
200.11644 ####[facility]   
[id number] [place], [place] [address]Am# [phone]   
   
                                                    CMPon 2021   
   
                                                    Albumin   
[Mass/Vol]      2.8 g/dL        Low             3.2-5.0         [facility]  
   
                                        Comment on above:   Order Comment: Campu  
s: M   
   
                                                            Performed By: #### L  
500.92456, L500.73317, L500.25089, L500.78261   
####[facility] [id number] [place], [place]   
[address]Zx# [phone]   
   
                                                    Albumin/Globulin   
[Mass ratio]    0.8 {ratio}     Normal          0.8-2.0         [facility]  
   
                                        Comment on above:   Order Comment: Campu  
s: M   
   
                                                            Performed By: #### L  
500.39296, L500.24790, L500.97609, L500.35544   
####[facility] [id number] [place], [place]   
[address]Ao# [phone]   
   
                      ALK PHOS   122 U/L    High            [facility]  
   
                                        Comment on above:   Order Comment: Campu  
s: M   
   
                                                            Performed By: #### L  
500.78590, L500.06681, L500.10691, L500.97410   
####[facility] [id number] [place], [place]   
[address]Hw# [phone]   
   
                                                    ALT [Catalytic   
activity/Vol]   7 U/L           Low             13-61           [facility]  
   
                                        Comment on above:   Order Comment: Campu  
s: M   
   
                                                            Result Comment: RESU  
LTS MAY BE FALSELY DEPRESSED AFTER THE   
ADMINISTRATION OFSULFASALAZINE AND/OR SULFAPYRIDINE.   
   
                                                            Performed By: #### L  
500.80650, L500.41700, L500.98374, L500.06647   
####[facility] [id number] [place], [place]   
[address]Hq# [phone]   
   
                                                    Anion gap   
[Moles/Vol]     7 mmol/L        Normal          5-16            [facility]  
   
                                        Comment on above:   Order Comment: Campu  
s: M   
   
                                                            Performed By: #### L  
500.74221, L500.74170, L500.83637, L500.39975   
####[facility] [id number] [place], [place]   
[address]Ol# [phone]   
   
                                                    AST [Catalytic   
activity/Vol]   13 U/L          Normal          8-34            [facility]  
   
                                        Comment on above:   Order Comment: Campu  
s: M   
   
                                                            Result Comment: RESU  
LTS MAY BE FALSELY DEPRESSED AFTER THE   
ADMINISTRATION OFSULFASALAZINE AND/OR SULFAPYRIDINE.   
   
                                                            Performed By: #### L  
500.43397, L500.26446, L500.73196, L500.52162   
####[facility] [id number] [place], [place]   
[address]Nj# [phone]   
   
                      BILI TOTAL 0.20 MG/DL Normal     0.2-1.0    [facility]  
   
                                        Comment on above:   Order Comment: Campu  
s: M   
   
                                                            Performed By: #### L  
500.29664, L500.11735, L500.42197, L500.84617   
####[facility] [id number] [place], [place]   
[address]Sm# [phone]   
   
                                                    Calcium   
[Mass/Vol]      9.7 mg/dL       Normal          8.5-10.5        [facility]  
   
                                        Comment on above:   Order Comment: Campu  
s: M   
   
                                                            Result Comment: NOTE  
 NEW NORMAL RANGE DUE TO REAGENT CHANGE   
   
                                                            Performed By: #### L  
500.68724, L500.47577, L500.88835, L500.62109   
####[facility] [id number] [place], [place]   
[address]Jx# [phone]   
   
                                                    Chloride   
[Moles/Vol]     107 mmol/L      Normal                    [facility]  
   
                                        Comment on above:   Order Comment: Campu  
s: M   
   
                                                            Performed By: #### L  
500.30989, L500.40081, L500.65743, L500.65750   
####[facility] [id number] [place], [place]   
[address]Hw# [phone]   
   
                      CO2 [Moles/Vol] 27.0 mmol/L Normal     21-32      [facility]  
   
                                        Comment on above:   Order Comment: Campu  
s: M   
   
                                                            Performed By: #### L  
500.36749, L500.42666, L500.71046, L500.30186   
####[facility] [id number] [place] [address]   
[address]Md# [phone]   
   
                                                    Creatinine   
[Mass/Vol]      0.48 mg/dL      Low             0.510-0.950     [facility]  
   
                                        Comment on above:   Order Comment: Campu  
s: M   
   
                                                            Result Comment: Kayla  
ents receiving either N-Acetylcysteine (NAC)   
orMetamizole prior to venipuncture, may have falsely   
depressedresults.   
   
                                                            Performed By: #### L  
500.50930, L500.29947, L500.56556, L500.15296   
####[facility] [id number] [place], [place]   
[address]Oz# [phone]   
   
                                                    Globulin (S)   
[Mass/Vol]      3.6 g/dL        Normal          2.2-4.2         [facility]  
   
                                        Comment on above:   Order Comment: Campu  
s: M   
   
                                                            Performed By: #### L  
500.96255, L500.03603, L500.68059, L500.37899   
####[facility] [id number] [place], [place]   
[address]Nf# [phone]   
   
                                                    Glucose   
[Mass/Vol]      181 mg/dL       High                      [facility]   
[place]  
   
                                        Comment on above:   Order Comment: Campu  
s: M   
   
                                                            Result Comment: 70-1  
00- Normal Fasting; 100-125 Impaired Fasting;   
greaterthan 126 on more than one result- Diabetes. ADA   
guidelines.Results may be falsely elevated after the administration   
ofSulfapyridine.Results may be falsely depressed after the   
administration ofSulfasalazine.   
   
                                                            Performed By: #### L  
500.30735, L500.46054, L500.47281, L500.16033   
####[facility] [id number] [place], [place]   
[address]Zr# [phone]   
   
                                                    Potassium   
[Moles/Vol]     4.1 mmol/L      Normal          3.5-5.1         [facility]  
   
                                        Comment on above:   Order Comment: Campu  
s: M   
   
                                                            Result Comment: Slig  
ht Hemolysis, Result may be affected.   
   
                                                            Performed By: #### L  
500.57525, L500.93546, L500.51077, L500.55669   
####[facility] [id number] [place], [place]   
[address]Aa# [phone]   
   
                                                    Protein   
[Mass/Vol]      6.4 g/dL        Normal          6.0-8.5         [facility]  
   
                                        Comment on above:   Order Comment: Campu  
s: M   
   
                                                            Performed By: #### L  
500.27780, L500.55817, L500.06101, L500.71248   
####[facility] [id number] [place], [place]   
[address]Nn# [phone]   
   
                                                    Sodium   
[Moles/Vol]     141 mmol/L      Normal          136-145         [facility]  
   
                                        Comment on above:   Order Comment: Campu  
s: M   
   
                                                            Performed By: #### L  
500.77988, L500.19077, L500.31392, L500.63337   
####[facility] [id number] [place], [place]   
[address]Kn# [phone]   
   
                                                    Urea nitrogen   
[Mass/Vol]      19 mg/dL        Normal          7-26            [facility]   
[place]  
   
                                        Comment on above:   Order Comment: Campu  
s: M   
   
                                                            Performed By: #### L  
500.76850, L500.57422, L500.07056, L500.43344   
####[facility] [id number] [place], [place]   
[address]Tv# [phone]   
   
                                                    Urea   
nitrogen/Creatini  
ne [Mass ratio] 40 mg/mg        High            15-24           [facility]   
[place]  
   
                                        Comment on above:   Order Comment: Campu  
s: M   
   
                                                            Performed By: #### L  
500.03546, L500.06905, L500.78074, L500.94712   
####[facility] [id number] [place] [address]   
[address]Di# [phone]   
   
                                                    GFR ESTon 2021   
   
                      IF  AMER Greater than 60 Normal                [facility]  
   
                                        Comment on above:   Order Comment: Campu  
s: M   
   
                                                            Performed By: #### L  
500.15225, L500.74953, L500.09689, L500.61636   
####[facility] [id number] [place] [address]   
[address]Fu# [phone]   
   
                      IF non-AFR AMER Greater than 60 Normal                [facility]  
   
                                        Comment on above:   Order Comment: Campu  
s: M   
   
                                                            Performed By: #### L  
500.92522, L500.21065, L500.26246, L500.86583   
####[facility] [id number] [place], [place]   
[address]Ss# [phone]   
   
                                                    MAGNESIUMon 2021   
   
                      MAGNESIUM  1.9 MG/CL  Normal     1.6-2.6    [facility]  
   
                                        Comment on above:   Order Comment: Campu  
s: M   
   
                                                            Performed By: #### L  
500.77344, L500.65440, L500.41167, L500.62847   
####[facility] [id number] [place] [address]   
[address]Sw# [phone]   
   
                                                    PHOSon 2021   
   
                                                    Phosphate   
[Mass/Vol]      5.00 mg/dL      High            2.5-4.9         [facility]  
   
                                        Comment on above:   Order Comment: Campu  
s: M   
   
                                                            Result Comment: Elev  
ated m-protein (paraprotein) levels in the serum   
may beexhibited in patients with monoclonal gammopathies,   
causingfalsely elevated inorganic phosphorus results.   
   
                                                            Performed By: #### L  
500.34712, L500.59210, L500.00844, L500.82018   
####[facility] [id number] [place], [place]   
[address]Jm# [phone]   
   
                                                    Pablo 2021   
   
                      POC ACT    294 SECONDS High       100-150    [facility]   
[place]  
   
                                                    BMPon 2021   
   
                                                    Anion gap   
[Moles/Vol]     6 mmol/L        Normal          5-16            [facility]  
   
                                        Comment on above:   Order Comment: Campu  
s: M   
   
                                                            Performed By: #### L  
500.88053, L500.07831 ####[facility]   
[id number] [place], [place] [address]Dp# [phone]   
   
                                                    Calcium   
[Mass/Vol]      9.6 mg/dL       Normal          8.5-10.5        [facility]  
   
                                        Comment on above:   Order Comment: Campu  
s: M   
   
                                                            Result Comment: NOTE  
 NEW NORMAL RANGE DUE TO REAGENT CHANGE   
   
                                                            Performed By: #### L  
500.16154, L500.63508 ####[facility]   
[id number] [place], [place] [address]Uq# [phone]   
   
                                                    Chloride   
[Moles/Vol]     109 mmol/L      High                      [facility]  
   
                                        Comment on above:   Order Comment: Campu  
s: M   
   
                                                            Performed By: #### L  
500.85305, L500.24670 ####[facility]   
[id number] [place], [place] [address]Yw# [phone]   
   
                      CO2 [Moles/Vol] 27.0 mmol/L Normal     21-32      [facility]  
   
                                        Comment on above:   Order Comment: Campu  
s: M   
   
                                                            Performed By: #### L  
500.05767, L500.79653 ####[facility]   
[id number] [place], [place] [address]Dm# [phone]   
   
                                                    Creatinine   
[Mass/Vol]      0.46 mg/dL      Low             0.510-0.950     [facility]  
   
                                        Comment on above:   Order Comment: Campu  
s: M   
   
                                                            Result Comment: Kayla  
ents receiving either N-Acetylcysteine (NAC)   
orMetamizole prior to venipuncture, may have falsely   
depressedresults.   
   
                                                            Performed By: #### L  
500.63773, L500.67907 ####[facility]   
[id number] [place], [place] [address]Fm# [phone]   
   
                                                    Glucose   
[Mass/Vol]      120 mg/dL       High                      [facility]  
   
                                        Comment on above:   Order Comment: Campu  
s: M   
   
                                                            Result Comment: 70-1  
00- Normal Fasting; 100-125 Impaired Fasting;   
greaterthan 126 on more than one result- Diabetes. ADA   
guidelines.Results may be falsely elevated after the administration   
ofSulfapyridine.Results may be falsely depressed after the   
administration ofSulfasalazine.   
   
                                                            Performed By: #### L  
500.73744, L500.84085 ####[id number] [place], [place] [address]Eq# [phone]   
   
                                                    Potassium   
[Moles/Vol]     4.1 mmol/L      Normal          3.5-5.1         [facility]  
   
                                        Comment on above:   Order Comment: Campu  
s: M   
   
                                                            Performed By: #### L  
500.91234, L500.61552 ####[facility]   
[id number] [place] [address]Hn# [phone]   
   
                                                    Sodium   
[Moles/Vol]     142 mmol/L      Normal          136-145         [facility]  
   
                                        Comment on above:   Order Comment: Campu  
s: M   
   
                                                            Performed By: #### L  
500.79807, L500.37844 ####[facility]   
[id number] [place] [address]Nv# [phone]   
   
                                                    Urea nitrogen   
[Mass/Vol]      18 mg/dL        Normal          7-26            [facility]  
   
                                        Comment on above:   Order Comment: Campu  
s: M   
   
                                                            Performed By: #### L  
500.01301, L500.58919 ####[facility]   
[id number] [place] [address]Lc# [phone]   
   
                                                    Urea   
nitrogen/Creatini  
ne [Mass ratio] 39 mg/mg        High            15-24           [facility]  
   
                                        Comment on above:   Order Comment: Campu  
s: M   
   
                                                            Performed By: #### L  
500.88407, L500.94861 ####[facility]   
[id number] [place], [place] [address]Oo# [phone]   
   
                                                    CBCon 2021   
   
                                                    Erythrocyte   
distribution   
width (RBC)   
[Ratio]         17.6 %          High            11-14.5         [facility]  
   
                                        Comment on above:   Order Comment: Campu  
s: M   
   
                                                            Performed By: #### L  
200.38966 ####[facility]   
[id number] [place] [address]Fe# [phone]   
   
                                                    Hematocrit (Bld)   
[Volume fraction] 36.5 %          Normal          35.0-47.0       [facility]  
   
                                        Comment on above:   Order Comment: Campu  
s: M   
   
                                                            Performed By: #### L  
200.11103 ####[facility]   
[id number] [place] [address]Tc# [phone]   
   
                                                    Hemoglobin (Bld)   
[Mass/Vol]      11.0 g/dL       Low             11.5-15.5       [facility]  
   
                                        Comment on above:   Order Comment: Campu  
s: M   
   
                                                            Performed By: #### L  
200.29463 ####[facility]   
[id number] [place] [address]La# [phone]   
   
                                                    MCHC (RBC)   
[Mass/Vol]      30.1 g/dL       Low             32.0-36.0       [facility]  
   
                                        Comment on above:   Order Comment: Campu  
s: M   
   
                                                            Performed By: #### L  
200.39431 ####[facility]   
[id number] [place] [address]Fj# [phone]   
   
                                                    MCV (RBC)   
[Entitic vol]   88.0 fL         Normal          80.0-99.0       [facility]  
   
                                        Comment on above:   Order Comment: Campu  
s: M   
   
                                                            Performed By: #### L  
200.75251 ####[facility]   
[id number] [place] [address]Mw# [phone]   
   
                                                    Nucleated RBC/100   
WBC (Bld) [Ratio] 0.0 %           Normal          Less than 1     [facility]  
   
                                        Comment on above:   Order Comment: Campu  
s: M   
   
                                                            Performed By: #### L  
200.76976 ####[facility]   
[id number] [place], [place] [address]Zm# [phone]   
   
                                                    Platelet mean   
volume (Bld)   
[Entitic vol]   9.2 fL          Low             9.4-12.4        [facility]  
   
                                        Comment on above:   Order Comment: Campu  
s: M   
   
                                                            Performed By: #### L  
200.06697 ####[facility]   
[id number] [place] [address]Uk# [phone]   
   
                      PLT        298 K/CU MM Normal     150-450    [facility]  
   
                                        Comment on above:   Order Comment: Campu  
s: M   
   
                                                            Performed By: #### L  
200.41027 ####[address] [address]Zp# [phone]   
   
                      RBC        4.15 M/CU MM Normal     3.90-5.30  [facility]  
   
                                        Comment on above:   Order Comment: Campu  
s: M   
   
                                                            Performed By: #### L  
200.05862 ####[facility]   
[id number] [place] [address]Vv# [phone]   
   
                      WBC        9.0 K/CUMM Normal     4.5-11.0   [facility]  
   
                                        Comment on above:   Order Comment: Campu  
s: M   
   
                                                            Performed By: #### L  
200.11163 ####[facility]   
[id number] [place] [address]Jo# [phone]   
   
                                                    GFR ESTon 2021   
   
                      IF  AMER Greater than 60 Normal                [facility]   
[place]  
   
                                        Comment on above:   Order Comment: Campu  
s: M   
   
                                                            Performed By: #### L  
500.16558, L500.41272 ####[facility]   
[id number] [place] [address]Zr# [phone]   
   
                      IF non-AFR AMER Greater than 60 Normal                [facility]  
   
                                        Comment on above:   Order Comment: Campu  
s: M   
   
                                                            Performed By: #### L  
500.07798, L500.23660 ####[facility]   
[id number] [place] [address]De# [phone]   
   
                                                    PROG.NOTEon 2021   
   
                      PROG.NOTE             Normal                [facility]  
   
                                                    Progress   
Note-Physician                  Normal                          [facility]  
   
                                                    CBC W/DIFFon 2021   
   
                      BASO ABS   0.10 K/CU MM Normal     0-0.2      [facility]  
   
                                        Comment on above:   Order Comment: Campu  
s: M   
   
                                                            Performed By: #### L  
200.86590 ####[facility]   
[id number] [place], [place] [address]Sb# [phone]   
   
                                                    Basophils/100 WBC   
(Bld)           0.9 %           Normal          0-2             [facility]  
   
                                        Comment on above:   Order Comment: Campu  
s: M   
   
                                                            Performed By: #### L  
200.10355 ####[facility]   
[id number] [place] [address]Gn# [phone]   
   
                      EOS ABS    0.50 K/CU MM Normal     0-0.5      [facility]  
   
                                        Comment on above:   Order Comment: Campu  
s: M   
   
                                                            Performed By: #### L  
200.32684 ####[facility]   
[id number] [place] [address]Th# [phone]   
   
                                                    Eosinophils/100   
WBC (Bld)       5.9 %           High            0-5             [facility]  
   
                                        Comment on above:   Order Comment: Campu  
s: M   
   
                                                            Performed By: #### L  
200.45778 ####[facility]   
[id number] [place] [address]Sb# [phone]   
   
                                                    Erythrocyte   
distribution   
width (RBC)   
[Ratio]         17.7 %          High            11-14.5         [facility]  
   
                                        Comment on above:   Order Comment: Campu  
s: M   
   
                                                            Performed By: #### L  
200.22449 ####[facility]   
[id number] [place] [address]Tf# [phone]   
   
                                                    Hematocrit (Bld)   
[Volume fraction] 39.3 %          Normal          35.0-47.0       [facility]  
   
                                        Comment on above:   Order Comment: Campu  
s: M   
   
                                                            Performed By: #### L  
200.59426 ####[facility]   
[id number] [place] [address]Dq# [phone]   
   
                                                    Hemoglobin (Bld)   
[Mass/Vol]      11.8 g/dL       Normal          11.5-15.5       [facility]   
[place]  
   
                                        Comment on above:   Order Comment: Campu  
s: M   
   
                                                            Performed By: #### L  
200.90899 ####[facility]   
[id number] [place] [address]269[address]Ph# [phone]   
   
                      IMMATR GRAN ABS 0.10 K/CU MM Normal     Less than 2 [facility]   
[place]  
   
                                        Comment on above:   Order Comment: Campu  
s: M   
   
                                                            Performed By: #### L  
200.32930 ####[facility]   
[id number] [place], [place] [address]Ph# [phone]   
   
                      IMMATURE GRAN % 0.6 %      Normal     Less than 2 [facility]   
[place]  
   
                                        Comment on above:   Order Comment: Campu  
s: M   
   
                                                            Performed By: #### L  
200.91830 ####[place] [address]908Ph# [phone]   
   
                      LYMPH ABS  3.30 K/CU MM Normal     0.9-4.4    [facility]   
[place]  
   
                                        Comment on above:   Order Comment: Campu  
s: M   
   
                                                            Performed By: #### L  
200.51446 ####[facility]   
[id number] [place], [place] [address]8Ph# [phone]   
   
                                                    Lymphocytes/100   
WBC (Bld)       38.3 %          Normal          20-40           [facility]  
   
                                        Comment on above:   Order Comment: Campu  
s: M   
   
                                                            Performed By: #### L  
200.81419 ####[facility]   
[id number] [place], [place] [address]8Ph# [phone]   
   
                                                    MCHC (RBC)   
[Mass/Vol]      30.0 g/dL       Low             32.0-36.0       [facility]   
[place]  
   
                                        Comment on above:   Order Comment: Campu  
s: M   
   
                                                            Performed By: #### L  
200.69748 ####[facility]   
[id number] [place], [place] [address]Ph# [phone]   
   
                                                    MCV (RBC)   
[Entitic vol]   88.7 fL         Normal          80.0-99.0       [facility]  
   
                                        Comment on above:   Order Comment: Campu  
s: M   
   
                                                            Performed By: #### L  
200.98722 ####[facility]   
[id number] [place], [place] [address]Aq# [phone]   
   
                      MONO ABS   0.60 K/CU MM Normal     0.1-1.1    [facility]  
   
                                        Comment on above:   Order Comment: Campu  
s: M   
   
                                                            Performed By: #### L  
200.25264 ####[facility]   
[id number] [place], [place] [address]Ph# [phone]   
   
                                                    Monocytes/100 WBC   
(Bld)           7.4 %           Normal          2-10            [facility]  
   
                                        Comment on above:   Order Comment: Campu  
s: M   
   
                                                            Performed By: #### L  
200.46710 ####[place] [address]908Ph# [phone]   
   
                      NEUTROPHIL ABS 4.00 K/CU MM Normal     2.0-8.3    [facility]  
   
                                        Comment on above:   Order Comment: Campu  
s: M   
   
                                                            Performed By: #### L  
200.53093 ####[facility]   
[id number] [place], [place] [address]Ph# [phone]   
   
                                                    Neutrophils/100   
WBC (Bld)       46.9 %          Normal          45-75           [facility]  
   
                                        Comment on above:   Order Comment: Campu  
s: M   
   
                                                            Performed By: #### L  
200.75748 ####[address] [address]Qh# [phone]   
   
                                                    Nucleated RBC/100   
WBC (Bld) [Ratio] 0.0 %           Normal          Less than 1     [facility]  
   
                                        Comment on above:   Order Comment: Campu  
s: M   
   
                                                            Performed By: #### L  
200.37710 ####[facility]   
[id number] [place], [place] [address]Ph# [phone]   
   
                                                    Platelet mean   
volume (Bld)   
[Entitic vol]   9.3 fL          Low             9.4-12.4        [facility]  
   
                                        Comment on above:   Order Comment: Campu  
s: M   
   
                                                            Performed By: #### L  
200.24526 ####[facility]   
[id number] [place], [place] [address]Ph# [phone]   
   
                      PLT        356 K/CU MM Normal     150-450    [facility]   
[place]  
   
                                        Comment on above:   Order Comment: Campu  
s: M   
   
                                                            Performed By: #### L  
200.85524 ####[facility]   
[id number] [place], [place] [address]Jj# [phone]   
   
                      RBC        4.43 M/CU MM Normal     3.90-5.30  [facility]  
   
                                        Comment on above:   Order Comment: Campu  
s: M   
   
                                                            Performed By: #### L  
200.07805 ####[facility]   
[id number] [place] [address]Xz# [phone]   
   
                      WBC        8.5 K/CUMM Normal     4.5-11.0   [facility]  
   
                                        Comment on above:   Order Comment: Campu  
s: M   
   
                                                            Performed By: #### L  
200.99179 ####[address] [address]Sg# [phone]   
   
                                                    CMPon 2021   
   
                                                    Albumin   
[Mass/Vol]      3.0 g/dL        Low             3.2-5.0         [facility]  
   
                                        Comment on above:   Order Comment: Campu  
s: M   
   
                                                            Performed By: #### L  
500.03960, L500.28861, L500.63740, L500.79388   
####[facility] [id number] [place], [place]   
[address]Jr# [phone]   
   
                                                    Albumin/Globulin   
[Mass ratio]    0.7 {ratio}     Low             0.8-2.0         [facility]  
   
                                        Comment on above:   Order Comment: Campu  
s: M   
   
                                                            Performed By: #### L  
500.37541, L500.17351, L500.88403, L500.34825   
####[facility] [id number] [place], [place]   
[address]Mm# [phone]   
   
                      ALK PHOS   139 U/L    High            [facility]  
   
                                        Comment on above:   Order Comment: Campu  
s: M   
   
                                                            Performed By: #### L  
500.80919, L500.86699, L500.83607, L500.19303   
####[facility] [id number] [place], [place]   
[address]Sg# [phone]   
   
                                                    ALT [Catalytic   
activity/Vol]   7 U/L           Low             13-61           [facility]  
   
                                        Comment on above:   Order Comment: Campu  
s: M   
   
                                                            Result Comment: RESU  
LTS MAY BE FALSELY DEPRESSED AFTER THE   
ADMINISTRATION OFSULFASALAZINE AND/OR SULFAPYRIDINE.   
   
                                                            Performed By: #### L  
500.39276, L500.72077, L500.21716, L500.75137   
####[facility] [id number] [place], [place]   
[address]Zo# [phone]   
   
                                                    Anion gap   
[Moles/Vol]     8 mmol/L        Normal          5-16            [facility]  
   
                                        Comment on above:   Order Comment: Campu  
s: M   
   
                                                            Performed By: #### L  
500.02997, L500.79603, L500.18783, L500.64706   
####[facility] [id number] [place], [place]   
[address]Oz# [phone]   
   
                                                    AST [Catalytic   
activity/Vol]   15 U/L          Normal          8-34            [facility]  
   
                                        Comment on above:   Order Comment: Campu  
s: M   
   
                                                            Result Comment: RESU  
LTS MAY BE FALSELY DEPRESSED AFTER THE   
ADMINISTRATION OFSULFASALAZINE AND/OR SULFAPYRIDINE.   
   
                                                            Performed By: #### L  
500.07378, L500.66470, L500.09539, L500.77941   
####[facility] [id number] [place], [place]   
[address]Ji# [phone]   
   
                      BILI TOTAL 0.30 MG/DL Normal     0.2-1.0    [facility]  
   
                                        Comment on above:   Order Comment: Campu  
s: M   
   
                                                            Performed By: #### L  
500.24167, L500.63437, L500.68366, L500.82548   
####[facility] [id number] [place], [place]   
[address]Cd# [phone]   
   
                                                    Calcium   
[Mass/Vol]      10.1 mg/dL      Normal          8.5-10.5        [facility]  
   
                                        Comment on above:   Order Comment: Campu  
s: M   
   
                                                            Result Comment: NOTE  
 NEW NORMAL RANGE DUE TO REAGENT CHANGE   
   
                                                            Performed By: #### L  
500.74122, L500.47664, L500.45270, L500.61477   
####[facility] [id number] [place], [place]   
[address]Oh# [phone]   
   
                                                    Chloride   
[Moles/Vol]     107 mmol/L      Normal                    [facility]   
[place]  
   
                                        Comment on above:   Order Comment: Campu  
s: M   
   
                                                            Performed By: #### L  
500.36674, L500.00051, L500.88775, L500.93200   
####[facility] [id number] [place], [place]   
[address]Fy# [phone]   
   
                      CO2 [Moles/Vol] 29.0 mmol/L Normal     21-32      [facility]   
[place]  
   
                                        Comment on above:   Order Comment: Campu  
s: M   
   
                                                            Performed By: #### L  
500.59006, L500.82568, L500.29544, L500.27792   
####[facility] [id number] [place], [place]   
[address]Wv# [phone]   
   
                                                    Creatinine   
[Mass/Vol]      0.50 mg/dL      Low             0.510-0.950     [facility]   
[place]  
   
                                        Comment on above:   Order Comment: Campu  
s: M   
   
                                                            Result Comment: Kayla  
ents receiving either N-Acetylcysteine (NAC)   
orMetamizole prior to venipuncture, may have falsely   
depressedresults.   
   
                                                            Performed By: #### L  
500.79265, L500.97295, L500.00274, L500.47511   
####[facility] [id number] [place], [place]   
[address]Uh# [phone]   
   
                                                    Globulin (S)   
[Mass/Vol]      4.3 g/dL        High            2.2-4.2         [facility]  
   
                                        Comment on above:   Order Comment: Campu  
s: M   
   
                                                            Performed By: #### L  
500.36667, L500.34095, L500.04855, L500.26251   
####[facility] [id number] [place], [place]   
[address]Mx# [phone]   
   
                                                    Glucose   
[Mass/Vol]      109 mg/dL       High                      [facility]  
   
                                        Comment on above:   Order Comment: Campu  
s: M   
   
                                                            Result Comment: 70-1  
00- Normal Fasting; 100-125 Impaired Fasting;   
greaterthan 126 on more than one result- Diabetes. ADA   
guidelines.Results may be falsely elevated after the administration   
ofSulfapyridine.Results may be falsely depressed after the   
administration ofSulfasalazine.   
   
                                                            Performed By: #### L  
500.07544, L500.42205, L500.05338, L500.24903   
####[facility] [id number] [place], [place]   
[address]Rj# [phone]   
   
                                                    Potassium   
[Moles/Vol]     4.1 mmol/L      Normal          3.5-5.1         [facility]   
[place]  
   
                                        Comment on above:   Order Comment: Campu  
s: M   
   
                                                            Result Comment: Slig  
ht Hemolysis, Result may be affected.   
   
                                                            Performed By: #### L  
500.09799, L500.19160, L500.87170, L500.95717   
####[facility] [id number] [place], [place]   
[address]Ow# [phone]   
   
                                                    Protein   
[Mass/Vol]      7.3 g/dL        Normal          6.0-8.5         [facility]  
   
                                        Comment on above:   Order Comment: Campu  
s: M   
   
                                                            Performed By: #### L  
500.12701, L500.21818, L500.51027, L500.90957   
####[facility] [id number] [place], [place]   
[address]Eh# [phone]   
   
                                                    Sodium   
[Moles/Vol]     144 mmol/L      Normal          136-145         [facility]  
   
                                        Comment on above:   Order Comment: Campu  
s: M   
   
                                                            Performed By: #### L  
500.60514, L500.80959, L500.81451, L500.74343   
####[facility] [id number] [place], [place]   
[address]Ux# [phone]   
   
                                                    Urea nitrogen   
[Mass/Vol]      17 mg/dL        Normal          7-26            [facility]  
   
                                        Comment on above:   Order Comment: Campu  
s: M   
   
                                                            Performed By: #### L  
500.58805, L500.07800, L500.81740, L500.40449   
####[facility] [id number] [place], [place]   
[address]Du# [phone]   
   
                                                    Urea   
nitrogen/Creatini  
ne [Mass ratio] 34 mg/mg        High            15-24           [facility]   
[place]  
   
                                        Comment on above:   Order Comment: Campu  
s: M   
   
                                                            Performed By: #### L  
500.06627, L500.63582, L500.74232, L500.59228   
####[facility] [id number] [place], [place]   
[address]Pf# [phone]   
   
                                                    GFR ESTon 2021   
   
                      IF  AMER Greater than 60 Normal                [facility]   
[place]  
   
                                        Comment on above:   Order Comment: Campu  
s: M   
   
                                                            Performed By: #### L  
500.80710, L500.76935, L500.76025, L500.32583   
####[facility] [id number] [place], [place]   
[address]Nd# [phone]   
   
                      IF non-AFR AMER Greater than 60 Normal                [facility]  
   
                                        Comment on above:   Order Comment: Campu  
s: M   
   
                                                            Performed By: #### L  
500.38808, L500.61867, L500.73166, L500.07592   
####[facility] [id number] [place], [place]   
[address]Mm# [phone]   
   
                                                    MAGNESIUMon 2021   
   
                      MAGNESIUM  1.8 MG/CL  Normal     1.6-2.6    [facility]  
   
                                        Comment on above:   Order Comment: Campu  
s: M   
   
                                                            Performed By: #### L  
500.75708, L500.17375, L500.64651, L500.89630   
####[facility] [id number] [place], [place]   
[address]Rj# [phone]   
   
                                                    PHOSon 2021   
   
                                                    Phosphate   
[Mass/Vol]      4.30 mg/dL      Normal          2.5-4.9         [facility]   
[place]  
   
                                        Comment on above:   Order Comment: Campu  
s: M   
   
                                                            Result Comment: Elev  
ated m-protein (paraprotein) levels in the serum   
may beexhibited in patients with monoclonal gammopathies,   
causingfalsely elevated inorganic phosphorus results.   
   
                                                            Performed By: #### L  
500.76380, L500.05814, L500.52694, L500.80161   
####[facility] [id number] [place], [place]   
[address]Gh# [phone]   
   
                                                    PROG.Mikayla 2021   
   
                      PROG.CARD             Normal                [facility]  
   
                                                    Progress   
Note-Cardiology                 Normal                          [facility]  
   
                                                    PROG.Mikayla 2021   
   
                      PROG.CARD             Normal                [facility]  
   
                                                    Progress   
Note-Cardiology                 Normal                          [facility]  
   
                                                    PROG.ORTHOon 2021   
   
                      PROG.ORTHO            Normal                [facility]  
   
                                                    Progress   
Note-Ortho                      Normal                          [facility]  
   
                                                    BMPon 2021   
   
                                                    Anion gap   
[Moles/Vol]     4 mmol/L        Low             5-16            [facility]  
   
                                        Comment on above:   Order Comment: Campu  
s: M   
   
                                                            Performed By: #### L  
500.39020, L500.29465 ####[facility]   
[id number] [place], [place] [address]Hx# [phone]   
   
                                                    Calcium   
[Mass/Vol]      9.7 mg/dL       Normal          8.5-10.5        [facility]  
   
                                        Comment on above:   Order Comment: Campu  
s: M   
   
                                                            Result Comment: NOTE  
 NEW NORMAL RANGE DUE TO REAGENT CHANGE   
   
                                                            Performed By: #### L  
500.63691, L500.15620 ####[facility]   
[id number] [place], [place] [address]Gd# [phone]   
   
                                                    Chloride   
[Moles/Vol]     108 mmol/L      High                      [facility]  
   
                                        Comment on above:   Order Comment: Campu  
s: M   
   
                                                            Performed By: #### L  
500.13536, L500.75851 ####[facility]   
[id number] [place], [place] [address]Xv# [phone]   
   
                      CO2 [Moles/Vol] 29.0 mmol/L Normal     21-32      [facility]  
   
                                        Comment on above:   Order Comment: Campu  
s: M   
   
                                                            Performed By: #### L  
500.10267, L500.78944 ####[facility]   
[id number] [place], [place] [address]Ba# [phone]   
   
                                                    Creatinine   
[Mass/Vol]      0.57 mg/dL      Normal          0.510-0.950     [facility]   
[place]  
   
                                        Comment on above:   Order Comment: Campu  
s: M   
   
                                                            Result Comment: Kayla  
ents receiving either N-Acetylcysteine (NAC)   
orMetamizole prior to venipuncture, may have falsely   
depressedresults.   
   
                                                            Performed By: #### L  
500.71161, L500.22605 ####[facility]   
[id number] [place], [place] [address]Mw# [phone]   
   
                                                    Glucose   
[Mass/Vol]      116 mg/dL       High                      [facility]  
   
                                        Comment on above:   Order Comment: Campu  
s: M   
   
                                                            Result Comment: 70-1  
00- Normal Fasting; 100-125 Impaired Fasting;   
greaterthan 126 on more than one result- Diabetes. ADA   
guidelines.Results may be falsely elevated after the administration   
ofSulfapyridine.Results may be falsely depressed after the   
administration ofSulfasalazine.   
   
                                                            Performed By: #### L  
500.56225, L500.91778 ####[facility]   
[id number] [place] [address]Wc# [phone]   
   
                                                    Potassium   
[Moles/Vol]     4.3 mmol/L      Normal          3.5-5.1         [facility]  
   
                                        Comment on above:   Order Comment: Campu  
s: M   
   
                                                            Performed By: #### L  
500.55652, L5.74241 ####[facility]   
[id number] [place] [address]Pb# [phone]   
   
                                                    Sodium   
[Moles/Vol]     141 mmol/L      Normal          136-145         [facility]  
   
                                        Comment on above:   Order Comment: Campu  
s: M   
   
                                                            Performed By: #### L  
500.70170, L500.56930 ####[facility]   
[id number] [place] [address]Vy# [phone]   
   
                                                    Urea nitrogen   
[Mass/Vol]      20 mg/dL        Normal          7-26            [facility]  
   
                                        Comment on above:   Order Comment: Campu  
s: M   
   
                                                            Performed By: #### L  
500.92356, L500.53407 ####[facility]   
[id number] [place], [place] [address]Rc# [phone]   
   
                                                    Urea   
nitrogen/Creatini  
ne [Mass ratio] 35 mg/mg        High            15-24           [facility]  
   
                                        Comment on above:   Order Comment: Campu  
s: M   
   
                                                            Performed By: #### L  
500.58355, L500.80140 ####[facility]   
[id number] [place], [place] [address]Va# [phone]   
   
                                                    CBCon 2021   
   
                                                    Erythrocyte   
distribution   
width (RBC)   
[Ratio]         17.5 %          High            11-14.5         [facility]  
   
                                        Comment on above:   Order Comment: Campu  
s: M   
   
                                                            Performed By: #### L  
200.21026 ####[facility]   
[id number] [place] [address]Od# [phone]   
   
                                                    Hematocrit (Bld)   
[Volume fraction] 36.7 %          Normal          35.0-47.0       [facility]  
   
                                        Comment on above:   Order Comment: Campu  
s: M   
   
                                                            Performed By: #### L  
200.90069 ####[facility]   
[id number] [place], [place] [address]Wj# [phone]   
   
                                                    Hemoglobin (Bld)   
[Mass/Vol]      10.9 g/dL       Low             11.5-15.5       [facility]  
   
                                        Comment on above:   Order Comment: Campu  
s: M   
   
                                                            Performed By: #### L  
200.25858 ####[facility]   
[id number] [place] [address]Rr# [phone]   
   
                                                    MCHC (RBC)   
[Mass/Vol]      29.7 g/dL       Low             32.0-36.0       [facility]  
   
                                        Comment on above:   Order Comment: Campu  
s: M   
   
                                                            Performed By: #### L  
200.77423 ####[facility]   
[id number] [place], [place] [address]Ki# [phone]   
   
                                                    MCV (RBC)   
[Entitic vol]   87.6 fL         Normal          80.0-99.0       [facility]  
   
                                        Comment on above:   Order Comment: Campu  
s: M   
   
                                                            Performed By: #### L  
200.93694 ####[facility]   
[id number] [place], [place] [address]Us# [phone]   
   
                                                    Nucleated RBC/100   
WBC (Bld) [Ratio] 0.0 %           Normal          Less than 1     [facility]   
[place]  
   
                                        Comment on above:   Order Comment: Campu  
s: M   
   
                                                            Performed By: #### L  
200.71613 ####[facility]   
[id number] [place], [place] [address]Zu# [phone]   
   
                                                    Platelet mean   
volume (Bld)   
[Entitic vol]   9.2 fL          Low             9.4-12.4        [facility]  
   
                                        Comment on above:   Order Comment: Campu  
s: M   
   
                                                            Performed By: #### L  
200.34235 ####[facility]   
[id number] [place] [address]Eh# [phone]   
   
                      PLT        427 K/CU MM Normal     150-450    [facility]  
   
                                        Comment on above:   Order Comment: Campu  
s: M   
   
                                                            Performed By: #### L  
200.00746 ####[facility]   
[id number] [place] [address]Qq# [phone]   
   
                      RBC        4.19 M/CU MM Normal     3.90-5.30  [facility]  
   
                                        Comment on above:   Order Comment: Campu  
s: M   
   
                                                            Performed By: #### L  
200.00894 ####[facility]   
[id number] [place] [address]Ga# [phone]   
   
                      WBC        10.0 K/CUMM Normal     4.5-11.0   [facility]  
   
                                        Comment on above:   Order Comment: Campu  
s: M   
   
                                                            Performed By: #### L  
200.55733 ####[facility]   
[id number] [place] [address]Il# [phone]   
   
                                                    GFR ESTon 2021   
   
                      IF  AMER Greater than 60 Normal                [facility]  
   
                                        Comment on above:   Order Comment: Campu  
s: M   
   
                                                            Performed By: #### L  
500.61107, L500.58694 ####[facility]   
[id number] [place] [address]Lj# [phone]   
   
                      IF non-AFR AMER Greater than 60 Normal                [facility]  
   
                                        Comment on above:   Order Comment: Campu  
s: M   
   
                                                            Performed By: #### L  
500.16944, L500.78268 ####[facility]   
[id number] [place] [address]Ii# [phone]   
   
                                                    PROG.Mikayla 2021   
   
                      PROG.CARD             Normal                [facility]  
   
                                                    Progress   
Note-Cardiology                 Normal                          [facility]  
   
                                                    5-HIAA, PLASMAon 2021   
   
                      5-HIAA,PLASMA 9 ng/mL    Normal     ()         [facility]  
   
                                        Comment on above:   Order Comment: Campu  
s: M   
   
                                                            Result Comment: This  
 5-HIAA result was determined using   
liquidchromatography-tandem mass spectrometry (LC-MS/MS).Values   
obtained cannot be evaluated interchangeably withdifferent assay   
methods or kits.This 5-HIAA result alone cannot be interpreted as   
absoluteevidence of the presence or absence of disease.The   
performance characteristics of this assay have beendetermined by   
[facility]. The result should not be used as adiagnostic procedure   
without confirmation of the diagnosisby another medically   
established diagnostic product orprocedure.Reference   
Range:<22Performed At: [address] [place], [place] [id number] Brian F [id number]   
   
                                                            Performed By: #### L  
550.86715 ####[facility] CenterIJZUWPL[address] [place] [address]Jx# [phone]   
   
                                                    PROG.Mikayla 2021   
   
                      PROG.CARD             Normal                [facility]  
   
                                                    Progress   
Note-Cardiology                 Normal                          [facility]  
   
                                                    PROG.iMkayla 2021   
   
                      PROG.CARD             Normal                [facility]  
   
                                                    Progress   
Note-Cardiology                 Normal                          [facility]  
   
                                                    CBC W/DIFFon 2021   
   
                      BASO ABS   0.00 K/CU MM Normal     0-0.2      [facility]  
   
                                        Comment on above:   Order Comment: Campu  
s: M   
   
                                                            Performed By: #### L  
200.87654 ####[facility]   
[id number] [place], [place] [address]Tj# [phone]   
   
                                                    Basophils/100 WBC   
(Bld)           0.5 %           Normal          0-2             [facility]  
   
                                        Comment on above:   Order Comment: Campu  
s: M   
   
                                                            Performed By: #### L  
200.86389 ####[facility]   
[id number] [place], [place] [address]Go# [phone]   
   
                      EOS ABS    0.40 K/CU MM Normal     0-0.5      [facility]   
[place]  
   
                                        Comment on above:   Order Comment: Campu  
s: M   
   
                                                            Performed By: #### L  
200.72528 ####[facility]   
[id number] [place], [place] [address]Ph# [phone]   
   
                                                    Eosinophils/100   
WBC (Bld)       4.7 %           Normal          0-5             [facility]   
[place]  
   
                                        Comment on above:   Order Comment: Campu  
s: M   
   
                                                            Performed By: #### L  
200.51071 ####[place] [id number]# [phone]   
   
                                                    Erythrocyte   
distribution   
width (RBC)   
[Ratio]         17.4 %          High            11-14.5         [facility]   
[place]  
   
                                        Comment on above:   Order Comment: Campu  
s: M   
   
                                                            Performed By: #### L  
200.95964 ####[place] [id number]# [phone]   
   
                                                    Hematocrit (Bld)   
[Volume fraction] 34.0 %          Low             35.0-47.0       [facility]   
[place]  
   
                                        Comment on above:   Order Comment: Campu  
s: M   
   
                                                            Performed By: #### L  
200.12647 ####[place] [address]Ph# [phone]   
   
                                                    Hemoglobin (Bld)   
[Mass/Vol]      10.0 g/dL       Low             11.5-15.5       [facility]   
[place]  
   
                                        Comment on above:   Order Comment: Campu  
s: M   
   
                                                            Performed By: #### L  
200.51045 ####[facility]   
[id number] [place], [place] [address]Ph# [phone]   
   
                      IMMATR GRAN ABS 0.00 K/CU MM Normal     Less than 2 [facility]   
[place]  
   
                                        Comment on above:   Order Comment: Campu  
s: M   
   
                                                            Performed By: #### L  
200.43330 ####[address] [address]Uc# [phone]   
   
                      IMMATURE GRAN % 0.5 %      Normal     Less than 2 [facility]   
[place]  
   
                                        Comment on above:   Order Comment: Campu  
s: M   
   
                                                            Performed By: #### L  
200.90571 ####[facility]   
[id number] [place], [place] [address]Jb# [phone]   
   
                      LYMPH ABS  2.90 K/CU MM Normal     0.9-4.4    [facility]   
[place]  
   
                                        Comment on above:   Order Comment: Campu  
s: M   
   
                                                            Performed By: #### L  
200.96884 ####[place] [address]Ph# [phone]   
   
                                                    Lymphocytes/100   
WBC (Bld)       33.7 %          Normal          20-40           [facility]  
   
                                        Comment on above:   Order Comment: Campu  
s: M   
   
                                                            Performed By: #### L  
200.74224 ####[place] [address]Ph# [phone]   
   
                                                    MCHC (RBC)   
[Mass/Vol]      29.4 g/dL       Low             32.0-36.0       [facility]  
   
                                        Comment on above:   Order Comment: Campu  
s: M   
   
                                                            Performed By: #### L  
200.45497 ####[address] [address]Ih# [phone]   
   
                                                    MCV (RBC)   
[Entitic vol]   88.5 fL         Normal          80.0-99.0       [facility]  
   
                                        Comment on above:   Order Comment: Campu  
s: M   
   
                                                            Performed By: #### L  
200.15245 ####[address] [address]Ht# [phone]   
   
                      MONO ABS   0.80 K/CU MM Normal     0.1-1.1    [facility]   
[place]  
   
                                        Comment on above:   Order Comment: Campu  
s: M   
   
                                                            Performed By: #### L  
200.82265 ####[facility]   
[id number] [place] [address]Hf# [phone]   
   
                                                    Monocytes/100 WBC   
(Bld)           9.0 %           Normal          2-10            [facility]  
   
                                        Comment on above:   Order Comment: Campu  
s: M   
   
                                                            Performed By: #### L  
200.74872 ####[facility]   
[id number] [place], [place] [address]Ph# [phone]   
   
                      NEUTROPHIL ABS 4.40 K/CU MM Normal     2.0-8.3    [facility]   
[place]  
   
                                        Comment on above:   Order Comment: Campu  
s: M   
   
                                                            Performed By: #### L  
200.98179 ####[facility]   
[id number] [place], [place] [address]Kv# [phone]   
   
                                                    Neutrophils/100   
WBC (Bld)       51.6 %          Normal          45-75           [facility]  
   
                                        Comment on above:   Order Comment: Campu  
s: M   
   
                                                            Performed By: #### L  
200.02463 ####[address] [address]Nt# [phone]   
   
                                                    Nucleated RBC/100   
WBC (Bld) [Ratio] 0.0 %           Normal          Less than 1     [facility]  
   
                                        Comment on above:   Order Comment: Campu  
s: M   
   
                                                            Performed By: #### L  
200.59347 ####[address] [address]Cb# [phone]   
   
                                                    Platelet mean   
volume (Bld)   
[Entitic vol]   9.0 fL          Low             9.4-12.4        [facility]  
   
                                        Comment on above:   Order Comment: Campu  
s: M   
   
                                                            Performed By: #### L  
200.93911 ####[facility]   
[id number] [place], [place] [address]Xx# [phone]   
   
                      PLT        449 K/CU MM Normal     150-450    [facility]  
   
                                        Comment on above:   Order Comment: Campu  
s: M   
   
                                                            Performed By: #### L  
200.86142 ####[facility]   
[id number] [place] [address]Ro# [phone]   
   
                      RBC        3.84 M/CU MM Low        3.90-5.30  [facility]  
   
                                        Comment on above:   Order Comment: Campu  
s: M   
   
                                                            Performed By: #### L  
200.62684 ####[facility]   
[id number] [place], [place] [address]Kt# [phone]   
   
                      WBC        8.6 K/CUMM Normal     4.5-11.0   [facility]  
   
                                        Comment on above:   Order Comment: Campu  
s: M   
   
                                                            Performed By: #### L  
200.49005 ####[facility]   
[id number] [place], [place] [address]Rw# [phone]   
   
                                                    CMPon 2021   
   
                                                    Albumin   
[Mass/Vol]      2.4 g/dL        Low             3.2-5.0         [facility]  
   
                                        Comment on above:   Order Comment: Campu  
s: M   
   
                                                            Performed By: #### L  
500.51726, L500.47694, L500.15644, L500.20261   
####[facility] [id number] [place], [place]   
[address]Qr# [phone]   
   
                                                    Albumin/Globulin   
[Mass ratio]    0.6 {ratio}     Low             0.8-2.0         [facility]  
   
                                        Comment on above:   Order Comment: Campu  
s: M   
   
                                                            Performed By: #### L  
500.56905, L500.44596, L500.54720, L500.01742   
####[facility] [id number] [place], [place]   
[address]Rw# [phone]   
   
                      ALK PHOS   118 U/L    High            [facility]  
   
                                        Comment on above:   Order Comment: Campu  
s: M   
   
                                                            Performed By: #### L  
500.20655, L500.02253, L500.76962, L500.74447   
####[facility] [id number] [place], [place]   
[address]Qn# [phone]   
   
                                                    ALT [Catalytic   
activity/Vol]   7 U/L           Low             13-61           [facility]  
   
                                        Comment on above:   Order Comment: Campu  
s: M   
   
                                                            Result Comment: RESU  
LTS MAY BE FALSELY DEPRESSED AFTER THE   
ADMINISTRATION OFSULFASALAZINE AND/OR SULFAPYRIDINE.   
   
                                                            Performed By: #### L  
500.21841, L500.99103, L500.91029, L500.36216   
####[facility] [id number] [place], [place]   
[address]Mu# [phone]   
   
                                                    Anion gap   
[Moles/Vol]     5 mmol/L        Normal          5-16            [facility]  
   
                                        Comment on above:   Order Comment: Campu  
s: M   
   
                                                            Performed By: #### L  
500.29195, L500.98946, L500.85019, L500.79712   
####[facility] [id number] [place], [place]   
[address]Df# [phone]   
   
                                                    AST [Catalytic   
activity/Vol]   14 U/L          Normal          8-34            [facility]  
   
                                        Comment on above:   Order Comment: Campu  
s: M   
   
                                                            Result Comment: RESU  
LTS MAY BE FALSELY DEPRESSED AFTER THE   
ADMINISTRATION OFSULFASALAZINE AND/OR SULFAPYRIDINE.   
   
                                                            Performed By: #### L  
500.19646, L500.55226, L500.92380, L500.82760   
####[facility] [id number] [place], [place]   
[address]Pz# [phone]   
   
                      BILI TOTAL 0.30 MG/DL Normal     0.2-1.0    [facility]  
   
                                        Comment on above:   Order Comment: Campu  
s: M   
   
                                                            Performed By: #### L  
500.06596, L500.71365, L500.22516, L500.14959   
####[facility] [id number] [place], [place]   
[address]Zl# [phone]   
   
                                                    Calcium   
[Mass/Vol]      9.3 mg/dL       Normal          8.5-10.5        [facility]  
   
                                        Comment on above:   Order Comment: Campu  
s: M   
   
                                                            Result Comment: NOTE  
 NEW NORMAL RANGE DUE TO REAGENT CHANGE   
   
                                                            Performed By: #### L  
500.82508, L500.12232, L500.33118, L500.59443   
####[facility] [id number] [place], [place]   
[address]To# [phone]   
   
                                                    Chloride   
[Moles/Vol]     109 mmol/L      High                      [facility]  
   
                                        Comment on above:   Order Comment: Campu  
s: M   
   
                                                            Performed By: #### L  
500.49759, L500.48758, L500.43746, L500.56822   
####[facility] [id number] [place], [place]   
[address]Ij# [phone]   
   
                      CO2 [Moles/Vol] 30.0 mmol/L Normal     21-32      [facility]  
   
                                        Comment on above:   Order Comment: Campu  
s: M   
   
                                                            Performed By: #### L  
500.22496, L500.19472, L500.74990, L500.83678   
####[facility] [id number] [place], [place]   
[address]Xd# [phone]   
   
                                                    Creatinine   
[Mass/Vol]      0.60 mg/dL      Normal          0.510-0.950     [facility]  
   
                                        Comment on above:   Order Comment: Campu  
s: M   
   
                                                            Result Comment: Kayla  
ents receiving either N-Acetylcysteine (NAC)   
orMetamizole prior to venipuncture, may have falsely   
depressedresults.   
   
                                                            Performed By: #### L  
500.11530, L500.71442, L500.51591, L500.08607   
####[facility] [id number] [place], [place]   
[address]Nx# [phone]   
   
                                                    Globulin (S)   
[Mass/Vol]      3.8 g/dL        Normal          2.2-4.2         [facility]  
   
                                        Comment on above:   Order Comment: Campu  
s: M   
   
                                                            Performed By: #### L  
500.52004, L500.63548, L500.13604, L500.51005   
####[facility] [id number] [place], [place]   
[address]Ez# [phone]   
   
                                                    Glucose   
[Mass/Vol]      87 mg/dL        Normal                    [facility]  
   
                                        Comment on above:   Order Comment: Darinu  
s: M   
   
                                                            Result Comment: 70-1  
00- Normal Fasting; 100-125 Impaired Fasting;   
greaterthan 126 on more than one result- Diabetes. ADA   
guidelines.Results may be falsely elevated after the administration   
ofSulfapyridine.Results may be falsely depressed after the   
administration ofSulfasalazine.   
   
                                                            Performed By: #### L  
500.68145, L500.02026, L500.46886, L500.54186   
####[facility] [id number] [place], [place]   
[address]Xb# [phone]   
   
                                                    Potassium   
[Moles/Vol]     4.1 mmol/L      Normal          3.5-5.1         [facility]  
   
                                        Comment on above:   Order Comment: Campu  
s: M   
   
                                                            Performed By: #### L  
500.51212, L500.14705, L500.11147, L500.08673   
####[facility] [id number] [place], [place]   
[address]Yu# [phone]   
   
                                                    Protein   
[Mass/Vol]      6.2 g/dL        Normal          6.0-8.5         [facility]  
   
                                        Comment on above:   Order Comment: Campu  
s: M   
   
                                                            Performed By: #### L  
500.82174, L500.06226, L500.02383, L500.04684   
####[facility] [id number] [place], [place]   
[address]Jj# [phone]   
   
                                                    Sodium   
[Moles/Vol]     144 mmol/L      Normal          136-145         [facility]  
   
                                        Comment on above:   Order Comment: Campu  
s: M   
   
                                                            Performed By: #### L  
500.84270, L500.51239, L500.60987, L500.20663   
####[facility] [id number] [place], [place]   
[address]Gj# [phone]   
   
                                                    Urea nitrogen   
[Mass/Vol]      18 mg/dL        Normal          7-26            [facility]  
   
                                        Comment on above:   Order Comment: Campu  
s: M   
   
                                                            Performed By: #### L  
500.41097, L500.18499, L500.77261, L500.50669   
####[facility] [id number] [place], [place]   
[address]Dc# [phone]   
   
                                                    Urea   
nitrogen/Creatini  
ne [Mass ratio] 30 mg/mg        High            15-24           [facility]  
   
                                        Comment on above:   Order Comment: Campu  
s: M   
   
                                                            Performed By: #### L  
500.43514, L500.70678, L500.75225, L500.29706   
####[facility] [id number] [place], [place]   
[address]Ai# [phone]   
   
                                                    CONS.Mikayla 2021   
   
                      CONS.CARD             Normal                [facility]  
   
                                                    Consultation-Card  
iology                          Normal                          [facility]   
[place]  
   
                                                    GFR ESTon 2021   
   
                      IF  AMER Greater than 60 Normal                [facility]  
   
                                        Comment on above:   Order Comment: Campu  
s: M   
   
                                                            Performed By: #### L  
500.99191, L500.86354, L500.52964, L500.01266   
####[facility] [id number] [place], [place]   
[address]Xz# [phone]   
   
                      IF non-AFR AMER Greater than 60 Normal                [facility]  
   
                                        Comment on above:   Order Comment: Campu  
s: M   
   
                                                            Performed By: #### L  
500.47337, L500.99410, L500.53694, L500.17886   
####[facility] [id number] [place], [place]   
[address]Yk# [phone]   
   
                                                    MAGNESIUMon 2021   
   
                      MAGNESIUM  2.1 MG/CL  Normal     1.6-2.6    [facility]  
   
                                        Comment on above:   Order Comment: Campu  
s: M   
   
                                                            Performed By: #### L  
500.46218, L500.40206, L500.50750, L500.93660   
####[facility] [id number] [place], [place]   
[address]Mk# [phone]   
   
                                                    PHOSon 2021   
   
                                                    Phosphate   
[Mass/Vol]      4.50 mg/dL      Normal          2.5-4.9         [facility]  
   
                                        Comment on above:   Order Comment: Campu  
s: M   
   
                                                            Result Comment: Elev  
ated m-protein (paraprotein) levels in the serum   
may beexhibited in patients with monoclonal gammopathies,   
causingfalsely elevated inorganic phosphorus results.   
   
                                                            Performed By: #### L  
500.92594, L500.80996, L500.05076, L500.25008   
####[facility] [id number] [place], [place]   
[address]Rj# [phone]   
   
                                                    CBC W/DIFFon 2021   
   
                      BASO ABS   0.10 K/CU MM Normal     0-0.2      [facility]  
   
                                        Comment on above:   Order Comment: Campu  
s: M   
   
                                                            Performed By: #### L  
200.48001 ####[facility]   
[id number] [place], [place] [address]Pr# [phone]   
   
                                                    Basophils/100 WBC   
(Bld)           0.6 %           Normal          0-2             [facility]  
   
                                        Comment on above:   Order Comment: Campu  
s: M   
   
                                                            Performed By: #### L  
200.32873 ####[facility]   
[id number] [place], [place] [address]Ey# [phone]   
   
                      EOS ABS    0.40 K/CU MM Normal     0-0.5      [facility]   
[place]  
   
                                        Comment on above:   Order Comment: Campu  
s: M   
   
                                                            Performed By: #### L  
200.60184 ####[facility]   
[id number] [place] [address]Ot# [phone]   
   
                                                    Eosinophils/100   
WBC (Bld)       4.4 %           Normal          0-5             [facility]   
[place]  
   
                                        Comment on above:   Order Comment: Campu  
s: M   
   
                                                            Performed By: #### L  
200.88626 ####[place] [id number]# [phone]   
   
                                                    Erythrocyte   
distribution   
width (RBC)   
[Ratio]         18.1 %          High            11-14.5         [facility]   
[place]  
   
                                        Comment on above:   Order Comment: Campu  
s: M   
   
                                                            Performed By: #### L  
200.65678 ####[facility]   
[id number] [place], [place] [address]Ph# [phone]   
   
                                                    Hematocrit (Bld)   
[Volume fraction] 31.9 %          Low             35.0-47.0       [facility]   
[place]  
   
                                        Comment on above:   Order Comment: Campu  
s: M   
   
                                                            Performed By: #### L  
200.15284 ####[facility]   
[id number] [place] [address]Mh# [phone]   
   
                                                    Hemoglobin (Bld)   
[Mass/Vol]      9.7 g/dL        Low             11.5-15.5       [facility]  
   
                                        Comment on above:   Order Comment: Campu  
s: M   
   
                                                            Performed By: #### L  
200.07356 ####[facility]   
[id number] [place] [address]Hg# [phone]   
   
                      IMMATR GRAN ABS 0.10 K/CU MM Normal     Less than 2 [facility]   
[place]  
   
                                        Comment on above:   Order Comment: Campu  
s: M   
   
                                                            Performed By: #### L  
200.92641 ####[facility]   
[id number] [place], [place] [address]Ph# [phone]   
   
                      IMMATURE GRAN % 0.6 %      Normal     Less than 2 [facility]   
[place]  
   
                                        Comment on above:   Order Comment: Campu  
s: M   
   
                                                            Performed By: #### L  
200.28510 ####[facility]   
[id number] [place] [address]Ot# [phone]   
   
                      LYMPH ABS  2.90 K/CU MM Normal     0.9-4.4    [facility]   
[place]  
   
                                        Comment on above:   Order Comment: Campu  
s: M   
   
                                                            Performed By: #### L  
200.60562 ####[facility]   
[id number] [place] [address]Qc# [phone]   
   
                                                    Lymphocytes/100   
WBC (Bld)       29.6 %          Normal          20-40           [facility]  
   
                                        Comment on above:   Order Comment: Campu  
s: M   
   
                                                            Performed By: #### L  
200.15165 ####[address] [address]Xb# [phone]   
   
                                                    MCHC (RBC)   
[Mass/Vol]      30.4 g/dL       Low             32.0-36.0       [facility]   
[place]  
   
                                        Comment on above:   Order Comment: Campu  
s: M   
   
                                                            Performed By: #### L  
200.96509 ####[address] [address]Hc# [phone]   
   
                                                    MCV (RBC)   
[Entitic vol]   86.0 fL         Normal          80.0-99.0       [facility]  
   
                                        Comment on above:   Order Comment: Campu  
s: M   
   
                                                            Performed By: #### L  
200.21012 ####[facility]   
[id number] [place] [address]Sa# [phone]   
   
                      MONO ABS   1.00 K/CU MM Normal     0.1-1.1    [facility]   
[place]  
   
                                        Comment on above:   Order Comment: Campu  
s: M   
   
                                                            Performed By: #### L  
200.37066 ####[facility]   
[id number] [place] [address]Rz# [phone]   
   
                                                    Monocytes/100 WBC   
(Bld)           10.3 %          High            2-10            [facility]   
[place]  
   
                                        Comment on above:   Order Comment: Campu  
s: M   
   
                                                            Performed By: #### L  
200.81139 ####[facility]   
[id number] [place], [place] [address]To# [phone]   
   
                      NEUTROPHIL ABS 5.40 K/CU MM Normal     2.0-8.3    [facility]  
   
                                        Comment on above:   Order Comment: Campu  
s: M   
   
                                                            Performed By: #### L  
200.44313 ####[facility]   
[id number] [place], [place] [address]Kc# [phone]   
   
                                                    Neutrophils/100   
WBC (Bld)       54.5 %          Normal          45-75           [facility]  
   
                                        Comment on above:   Order Comment: Campu  
s: M   
   
                                                            Performed By: #### L  
200.31439 ####[place] [address]Ph# [phone]   
   
                                                    Nucleated RBC/100   
WBC (Bld) [Ratio] 0.0 %           Normal          Less than 1     [facility]  
   
                                        Comment on above:   Order Comment: Campu  
s: M   
   
                                                            Performed By: #### L  
200.81659 ####[facility]   
[id number] [place], [place] [address]Ph# [phone]   
   
                                                    Platelet mean   
volume (Bld)   
[Entitic vol]   9.1 fL          Low             9.4-12.4        [facility]  
   
                                        Comment on above:   Order Comment: Campu  
s: M   
   
                                                            Performed By: #### L  
200.95677 ####[facility]   
[id number] [place] [address]Ov# [phone]   
   
                      PLT        390 K/CU MM Normal     150-450    [facility]  
   
                                        Comment on above:   Order Comment: Campu  
s: M   
   
                                                            Performed By: #### L  
200.23533 ####[facility]   
[id number] [place], [place] [address]At# [phone]   
   
                      RBC        3.71 M/CU MM Low        3.90-5.30  [facility]  
   
                                        Comment on above:   Order Comment: Campu  
s: M   
   
                                                            Performed By: #### L  
200.88989 ####[facility]   
[id number] [place] [address]Bn# [phone]   
   
                      WBC        9.9 K/CUMM Normal     4.5-11.0   [facility]  
   
                                        Comment on above:   Order Comment: Campu  
s: M   
   
                                                            Performed By: #### L  
200.91392 ####[facility]   
[id number] [place], [place] [address]Io# [phone]   
   
                                                    CMPon 2021   
   
                                                    Albumin   
[Mass/Vol]      2.0 g/dL        Low             3.2-5.0         [facility]  
   
                                        Comment on above:   Order Comment: Campu  
s: M   
   
                                                            Performed By: #### L  
500.79659, L500.47621, L500.32679, L500.80804   
####[facility] [id number] [place], [place]   
[address]Sf# [phone]   
   
                                                    Albumin/Globulin   
[Mass ratio]    0.5 {ratio}     Low             0.8-2.0         [facility]  
   
                                        Comment on above:   Order Comment: Campu  
s: M   
   
                                                            Performed By: #### L  
500.58355, L500.64152, L500.30495, L500.91640   
####[facility] [id number] [place], [place]   
[address]Ni# [phone]   
   
                      ALK PHOS   112 U/L    Normal          [facility]  
   
                                        Comment on above:   Order Comment: Campu  
s: M   
   
                                                            Performed By: #### L  
500.81483, L500.91627, L500.93358, L500.19668   
####[facility] [id number] [place], [place]   
[address]Lf# [phone]   
   
                                                    ALT [Catalytic   
activity/Vol]   7 U/L           Low             13-61           [facility]  
   
                                        Comment on above:   Order Comment: Campu  
s: M   
   
                                                            Result Comment: RESU  
LTS MAY BE FALSELY DEPRESSED AFTER THE   
ADMINISTRATION OFSULFASALAZINE AND/OR SULFAPYRIDINE.   
   
                                                            Performed By: #### L  
500.91451, L500.12538, L500.10355, L500.08656   
####[facility] [id number] [place], [place]   
[address]Ci# [phone]   
   
                                                    Anion gap   
[Moles/Vol]     6 mmol/L        Normal          5-16            [facility]  
   
                                        Comment on above:   Order Comment: Campu  
s: M   
   
                                                            Performed By: #### L  
500.34143, L500.97955, L500.06675, L500.14368   
####[facility] [id number] [place], [place]   
[address]Mw# [phone]   
   
                                                    AST [Catalytic   
activity/Vol]   20 U/L          Normal          8-34            [facility]  
   
                                        Comment on above:   Order Comment: Campu  
s: M   
   
                                                            Result Comment: RESU  
LTS MAY BE FALSELY DEPRESSED AFTER THE   
ADMINISTRATION OFSULFASALAZINE AND/OR SULFAPYRIDINE.   
   
                                                            Performed By: #### L  
500.97820, L500.13542, L500.25418, L500.18161   
####[facility] [id number] [place], [place]   
[address]Jx# [phone]   
   
                      BILI TOTAL 0.40 MG/DL Normal     0.2-1.0    [facility]  
   
                                        Comment on above:   Order Comment: Campu  
s: M   
   
                                                            Performed By: #### L  
500.35808, L500.63179, L500.56512, L500.96280   
####[facility] [id number] [place], [place]   
[address]Xc# [phone]   
   
                                                    Calcium   
[Mass/Vol]      8.8 mg/dL       Normal          8.5-10.5        [facility]  
   
                                        Comment on above:   Order Comment: Campu  
s: M   
   
                                                            Result Comment: NOTE  
 NEW NORMAL RANGE DUE TO REAGENT CHANGE   
   
                                                            Performed By: #### L  
500.05696, L500.72138, L500.74637, L500.98193   
####[facility] [id number] [place], [place]   
[address]Kv# [phone]   
   
                                                    Chloride   
[Moles/Vol]     110 mmol/L      High                      [facility]  
   
                                        Comment on above:   Order Comment: Campu  
s: M   
   
                                                            Performed By: #### L  
500.00769, L500.14905, L500.97311, L500.62304   
####[facility] [id number] [place], [place]   
[address]Sk# [phone]   
   
                      CO2 [Moles/Vol] 27.0 mmol/L Normal     21-32      [facility]  
   
                                        Comment on above:   Order Comment: Campu  
s: M   
   
                                                            Performed By: #### L  
500.85010, L500.03690, L500.09084, L500.42394   
####[facility] [id number] [place], [place]   
[address]Zm# [phone]   
   
                                                    Creatinine   
[Mass/Vol]      0.45 mg/dL      Low             0.510-0.950     [facility]  
   
                                        Comment on above:   Order Comment: Campu  
s: M   
   
                                                            Result Comment: Kayla  
ents receiving either N-Acetylcysteine (NAC)   
orMetamizole prior to venipuncture, may have falsely   
depressedresults.   
   
                                                            Performed By: #### L  
500.11024, L500.66501, L500.76538, L500.33146   
####[facility] [id number] [place], [place]   
[address]Ct# [phone]   
   
                                                    Globulin (S)   
[Mass/Vol]      3.7 g/dL        Normal          2.2-4.2         [facility]  
   
                                        Comment on above:   Order Comment: Campu  
s: M   
   
                                                            Performed By: #### L  
500.33018, L500.60702, L500.53742, L500.50101   
####[facility] [id number] [place], [place]   
[address]Xp# [phone]   
   
                                                    Glucose   
[Mass/Vol]      194 mg/dL       High                      [facility]  
   
                                        Comment on above:   Order Comment: Campu  
s: M   
   
                                                            Result Comment: 70-1  
00- Normal Fasting; 100-125 Impaired Fasting;   
greaterthan 126 on more than one result- Diabetes. ADA   
guidelines.Results may be falsely elevated after the administration   
ofSulfapyridine.Results may be falsely depressed after the   
administration ofSulfasalazine.   
   
                                                            Performed By: #### L  
500.02607, L500.96984, L500.02324, L500.48994   
####[facility] [id number] [place], [place]   
[address]Sr# [phone]   
   
                                                    Potassium   
[Moles/Vol]     4.0 mmol/L      Normal          3.5-5.1         [facility]  
   
                                        Comment on above:   Order Comment: Campu  
s: M   
   
                                                            Result Comment: Slig  
ht Hemolysis, Result may be affected.   
   
                                                            Performed By: #### L  
500.29532, L500.09209, L500.29352, L500.20629   
####[facility] [id number] [place], [place]   
[address]Ef# [phone]   
   
                                                    Protein   
[Mass/Vol]      5.7 g/dL        Low             6.0-8.5         [facility]   
[place]  
   
                                        Comment on above:   Order Comment: Campu  
s: M   
   
                                                            Performed By: #### L  
500.80197, L500.98768, L500.70018, L500.36488   
####[facility] [id number] [place], [place]   
[address]Om# [phone]   
   
                                                    Sodium   
[Moles/Vol]     143 mmol/L      Normal          136-145         [facility]  
   
                                        Comment on above:   Order Comment: Campu  
s: M   
   
                                                            Performed By: #### L  
500.22397, L500.58700, L500.34468, L500.69341   
####[facility] [id number] [place] [address]   
[address]An# [phone]   
   
                                                    Urea nitrogen   
[Mass/Vol]      7 mg/dL         Normal          7-26            [facility]  
   
                                        Comment on above:   Order Comment: Campu  
s: M   
   
                                                            Performed By: #### L  
500.60460, L500.89468, L500.95968, L500.68630   
####[facility] [id number] [place], [place]   
[address]Rr# [phone]   
   
                                                    Urea   
nitrogen/Creatini  
ne [Mass ratio] 15 mg/mg        Normal          15-24           [facility]  
   
                                        Comment on above:   Order Comment: Campu  
s: M   
   
                                                            Performed By: #### L  
500.51337, L500.87994, L500.07107, L500.15953   
####[facility] [id number] [place], [place]   
[address]Mw# [phone]   
   
                                                    GFR ESTon 2021   
   
                      IF  AMER Greater than 60 Normal                [facility]  
   
                                        Comment on above:   Order Comment: Campu  
s: M   
   
                                                            Performed By: #### L  
500.36102, L500.31112, L500.25164, L500.28394   
####[facility] [id number] [place], [place]   
[address]Nk# [phone]   
   
                      IF non-AFR AMER Greater than 60 Normal                [facility]  
   
                                        Comment on above:   Order Comment: Campu  
s: M   
   
                                                            Performed By: #### L  
500.05988, L500.18498, L500.17166, L500.79362   
####[facility] [id number] [place], [place]   
[address]So# [phone]   
   
                                                    MAGNESIUMon 2021   
   
                      MAGNESIUM  1.9 MG/CL  Normal     1.6-2.6    [facility]  
   
                                        Comment on above:   Order Comment: Campu  
s: M   
   
                                                            Performed By: #### L  
500.79252, L500.64262, L500.86542, L500.93197   
####[facility] [id number] [place], [place]   
[address]Wn# [phone]   
   
                                                    PHOSon 2021   
   
                                                    Phosphate   
[Mass/Vol]      4.10 mg/dL      Normal          2.5-4.9         [facility]  
   
                                        Comment on above:   Order Comment: Campu  
s: M   
   
                                                            Result Comment: Elev  
ated m-protein (paraprotein) levels in the serum   
may beexhibited in patients with monoclonal gammopathies,   
causingfalsely elevated inorganic phosphorus results.   
   
                                                            Performed By: #### L  
500.15702, L500.45597, L500.63144, L500.95319   
####[facility] [id number] [place], [place]   
[address]Uf# [phone]   
   
                                                    PROG.NOTEon 2021   
   
                      PROG.NOTE             Normal                [facility]  
   
                                                    Progress   
Note-Physician                  Normal                          [facility]  
   
                                                    BMPon 2021   
   
                                                    Anion gap   
[Moles/Vol]     4 mmol/L        Low             5-16            [facility]  
   
                                        Comment on above:   Order Comment: Campu  
s: M   
   
                                                            Performed By: #### L  
500.24178, L500.49006 ####[facility]   
[id number] [place], [place] [address]Ms# [phone]   
   
                                                    Calcium   
[Mass/Vol]      9.1 mg/dL       Normal          8.5-10.5        [facility]  
   
                                        Comment on above:   Order Comment: Campu  
s: M   
   
                                                            Result Comment: NOTE  
 NEW NORMAL RANGE DUE TO REAGENT CHANGE   
   
                                                            Performed By: #### L  
500.84605, L500.63846 ####[facility]   
[id number] [place], [place] [address]Qa# [phone]   
   
                                                    Chloride   
[Moles/Vol]     110 mmol/L      High                      [facility]  
   
                                        Comment on above:   Order Comment: Campu  
s: M   
   
                                                            Performed By: #### L  
500.43469, L500.18018 ####[facility]   
[id number] [place], [place] [address]Xn# [phone]   
   
                      CO2 [Moles/Vol] 29.0 mmol/L Normal     21-32      [facility]  
   
                                        Comment on above:   Order Comment: Campu  
s: M   
   
                                                            Performed By: #### L  
500.99933, L500.94300 ####[facility]   
[id number] [place], [place] [address]Qj# [phone]   
   
                                                    Creatinine   
[Mass/Vol]      0.45 mg/dL      Low             0.510-0.950     [facility]  
   
                                        Comment on above:   Order Comment: Campu  
s: M   
   
                                                            Result Comment: Kayla  
ents receiving either N-Acetylcysteine (NAC)   
orMetamizole prior to venipuncture, may have falsely   
depressedresults.   
   
                                                            Performed By: #### L  
500.89561, L500.92919 ####[facility]   
[id number] [place], [place] [address]Aw# [phone]   
   
                                                    Glucose   
[Mass/Vol]      151 mg/dL       High                      [facility]  
   
                                        Comment on above:   Order Comment: Campu  
s: M   
   
                                                            Result Comment: 70-1  
00- Normal Fasting; 100-125 Impaired Fasting;   
greaterthan 126 on more than one result- Diabetes. ADA   
guidelines.Results may be falsely elevated after the administration   
ofSulfapyridine.Results may be falsely depressed after the   
administration ofSulfasalazine.   
   
                                                            Performed By: #### L  
500.32849, L500.30792 ####[facility]   
[id number] [place], [place] [address]Sz# [phone]   
   
                                                    Potassium   
[Moles/Vol]     3.8 mmol/L      Normal          3.5-5.1         [facility]   
[place]  
   
                                        Comment on above:   Order Comment: Campu  
s: M   
   
                                                            Performed By: #### L  
500.44274, L500.78613 ####[facility]   
[id number] [place], [place] [address]To# [phone]   
   
                                                    Sodium   
[Moles/Vol]     143 mmol/L      Normal          136-145         [facility]   
[place]  
   
                                        Comment on above:   Order Comment: Campu  
s: M   
   
                                                            Performed By: #### L  
500.77265, L500.46005 ####[facility]   
[id number] [place] [address]Ie# [phone]   
   
                                                    Urea nitrogen   
[Mass/Vol]      7 mg/dL         Normal          7-26            [facility]   
[place]  
   
                                        Comment on above:   Order Comment: Campu  
s: M   
   
                                                            Performed By: #### L  
500.77643, L500.32228 ####[facility]   
[id number] [place] [address]In# [phone]   
   
                                                    Urea   
nitrogen/Creatini  
ne [Mass ratio] 15 mg/mg        Normal          15-24           [facility]   
[place]  
   
                                        Comment on above:   Order Comment: Campu  
s: M   
   
                                                            Performed By: #### L  
500.78832, L500.90444 ####[facility]   
[id number] [place], [place] [address]Jd# [phone]   
   
                                                    CBCon 2021   
   
                                                    Erythrocyte   
distribution   
width (RBC)   
[Ratio]         17.9 %          High            11-14.5         [facility]  
   
                                        Comment on above:   Order Comment: Campu  
s: M   
   
                                                            Performed By: #### L  
200.28732 ####[facility]   
[id number] [place], [place] [address]Lh# [phone]   
   
                                                    Hematocrit (Bld)   
[Volume fraction] 31.1 %          Low             35.0-47.0       [facility]  
   
                                        Comment on above:   Order Comment: Campu  
s: M   
   
                                                            Performed By: #### L  
200.25181 ####[facility]   
[id number] [place], [place] [address]Ty# [phone]   
   
                                                    Hemoglobin (Bld)   
[Mass/Vol]      9.3 g/dL        Low             11.5-15.5       [facility]  
   
                                        Comment on above:   Order Comment: Campu  
s: M   
   
                                                            Performed By: #### L  
200.55780 ####[facility]   
[id number] [place], [place] [address]Xp# [phone]   
   
                                                    MCHC (RBC)   
[Mass/Vol]      29.9 g/dL       Low             32.0-36.0       [facility]  
   
                                        Comment on above:   Order Comment: Campu  
s: M   
   
                                                            Performed By: #### L  
200.22388 ####[facility]   
[id number] [place] [address]Xl# [phone]   
   
                                                    MCV (RBC)   
[Entitic vol]   86.4 fL         Normal          80.0-99.0       [facility]  
   
                                        Comment on above:   Order Comment: Campu  
s: M   
   
                                                            Performed By: #### L  
200.60370 ####[facility]   
[id number] [place], [place] [address]Ph# [phone]   
   
                                                    Nucleated RBC/100   
WBC (Bld) [Ratio] 0.0 %           Normal          Less than 1     [facility]  
   
                                        Comment on above:   Order Comment: Campu  
s: M   
   
                                                            Performed By: #### L  
200.04275 ####[facility]   
[id number] [place] [address]Ux# [phone]   
   
                                                    Platelet mean   
volume (Bld)   
[Entitic vol]   9.0 fL          Low             9.4-12.4        [facility]  
   
                                        Comment on above:   Order Comment: Campu  
s: M   
   
                                                            Performed By: #### L  
200.92394 ####[facility]   
[id number] [place] [address]Sa# [phone]   
   
                      PLT        392 K/CU MM Normal     150-450    [facility]  
   
                                        Comment on above:   Order Comment: Campu  
s: M   
   
                                                            Performed By: #### L  
200.65758 ####[facility]   
[id number] [place] [address]Uv# [phone]   
   
                      RBC        3.60 M/CU MM Low        3.90-5.30  [facility]  
   
                                        Comment on above:   Order Comment: Campu  
s: M   
   
                                                            Performed By: #### L  
200.76166 ####[facility]   
[id number] [place], [place] [address]Ph# [phone]   
   
                      WBC        10.5 K/CUMM Normal     4.5-11.0   [facility]  
   
                                        Comment on above:   Order Comment: Campu  
s: M   
   
                                                            Performed By: #### L  
200.09243 ####[facility]   
[id number] [place], [place] [address]Sa# [phone]   
   
                                                    GFR ESTon 2021   
   
                      IF  AMER Greater than 60 Normal                [facility]  
   
                                        Comment on above:   Order Comment: Campu  
s: M   
   
                                                            Performed By: #### L  
500.18614, L500.26335 ####[facility]   
[id number] [place], [place] [address]Ts# [phone]   
   
                      IF non-AFR AMER Greater than 60 Normal                [facility]  
   
                                        Comment on above:   Order Comment: Campu  
s: M   
   
                                                            Performed By: #### L  
500.01389, L500.85826 ####[facility]   
[id number] [place], [place] [address]Ld# [phone]   
   
                                                    GLUCOSE METERon 2021   
   
                                                    Glucose   
[Mass/Vol]      200 mg/dL       High                      [facility]  
   
                                                    Glucose   
[Mass/Vol]      138 mg/dL       High                      [facility]  
   
                                                    LTACH HPon 2021   
   
                      LTACH HP              Normal                [facility]  
   
                                                    LTACHDSon 2021   
   
                      LTACHDS               Normal                [facility]  
   
                                                    LTACHPNon 2021   
   
                      LTACH PNY             Normal                [facility]  
   
                      LTACHPN               Normal                [facility]  
   
                                                    OTPNon 2021   
   
                      OT Progress Note            Normal                [facility]  
   
                      OTPN                  [facility]  
   
                                                    PBNP TESTon 2021   
   
                                                    Natriuretic   
peptide B (Bld)   
[Mass/Vol]      5141 pg/mL      High            0-125           [facility]  
   
                                        Comment on above:   Order Comment: Campu  
s: M   
   
                                                            Result Comment: NT-p  
roBNP results of less than 300 pg/ml likely   
rules outacute congestive heart failure with 99% predictive   
value.NOTE: These cuttoff points are suggested for ACUTE   
CHFDIAGNOSIS onlyLess than 50 years Greater than 450 pg/ml50-75   
years Greater than 900 pg/mlGreater than 75 years Greater than 1800   
pg/mlNOTE NEW NORMAL RANGE   
   
                                                            Performed By: #### L  
500.05006 ####[facility]   
[id number] [place], [place] [address]Iz# [phone]   
   
                                                    PROG IMSon 2021   
   
                      PROG IMS              Normal                [facility]  
   
                                                    Progress   
Note-Hospitalist                 Normal                          [facility]  
   
                                                    PROG.Mikayla 2021   
   
                      PROG.CARD             Normal                [facility]  
   
                                                    Progress   
Note-Cardiology                 Normal                          [facility]  
   
                                                    BMPon 2021   
   
                                                    Anion gap   
[Moles/Vol]     5 mmol/L        Normal          5-16            [facility]  
   
                                        Comment on above:   Order Comment: Campu  
s: M   
   
                                                            Performed By: #### L  
500.01821, L500.78056 ####[facility]   
[id number] [place], [place] [address]Yf# [phone]   
   
                                                    Calcium   
[Mass/Vol]      8.8 mg/dL       Normal          8.5-10.5        [facility]  
   
                                        Comment on above:   Order Comment: Campu  
s: M   
   
                                                            Result Comment: NOTE  
 NEW NORMAL RANGE DUE TO REAGENT CHANGE   
   
                                                            Performed By: #### L  
500.64154, L500.61230 ####[facility]   
[id number] [place], [place] [address]Ym# [phone]   
   
                                                    Chloride   
[Moles/Vol]     108 mmol/L      High                      [facility]  
   
                                        Comment on above:   Order Comment: Campu  
s: M   
   
                                                            Performed By: #### L  
500.78787, L500.03643 ####[facility]   
[id number] [place], [place] [address]Ot# [phone]   
   
                      CO2 [Moles/Vol] 30.0 mmol/L Normal     21-32      [facility]  
   
                                        Comment on above:   Order Comment: Campu  
s: M   
   
                                                            Performed By: #### L  
500.21204, L500.39044 ####[facility]   
[id number] [place], [place] [address]Ij# [phone]   
   
                                                    Creatinine   
[Mass/Vol]      0.42 mg/dL      Low             0.510-0.950     [facility]  
   
                                        Comment on above:   Order Comment: Campu  
s: M   
   
                                                            Result Comment: Kayla  
ents receiving either N-Acetylcysteine (NAC)   
orMetamizole prior to venipuncture, may have falsely   
depressedresults.   
   
                                                            Performed By: #### L  
500.41827, L500.53406 ####[facility]   
[id number] [place], [place] [address]Qh# [phone]   
   
                                                    Glucose   
[Mass/Vol]      175 mg/dL       High                      [facility]  
   
                                        Comment on above:   Order Comment: Campu  
s: M   
   
                                                            Result Comment: 70-1  
00- Normal Fasting; 100-125 Impaired Fasting;   
greaterthan 126 on more than one result- Diabetes. ADA   
guidelines.Results may be falsely elevated after the administration   
ofSulfapyridine.Results may be falsely depressed after the   
administration ofSulfasalazine.   
   
                                                            Performed By: #### L  
500.86485, L5.00779 ####[facility]   
[id number] [place], [place] [address]Ys# [phone]   
   
                                                    Potassium   
[Moles/Vol]     3.2 mmol/L      Low             3.5-5.1         [facility]  
   
                                        Comment on above:   Order Comment: Campu  
s: M   
   
                                                            Result Comment: Slig  
ht Hemolysis, Result may be affected.   
   
                                                            Performed By: #### L  
500.91665, L5.13378 ####[facility]   
[id number] [place], [place] [address]Qw# [phone]   
   
                                                    Sodium   
[Moles/Vol]     143 mmol/L      Normal          136-145         [facility]   
[place]  
   
                                        Comment on above:   Order Comment: Campu  
s: M   
   
                                                            Performed By: #### L  
500.50013, L5.38287 ####[facility]   
[id number] [place], [place] [address]Ue# [phone]   
   
                                                    Urea nitrogen   
[Mass/Vol]      7 mg/dL         Normal          7-26            [facility]  
   
                                        Comment on above:   Order Comment: Campu  
s: M   
   
                                                            Performed By: #### L  
500.61224, L5.43507 ####[facility]   
[id number] [place], [place] [address]Uv# [phone]   
   
                                                    Urea   
nitrogen/Creatini  
ne [Mass ratio] 17 mg/mg        Normal          15-24           [facility]  
   
                                        Comment on above:   Order Comment: Campu  
s: M   
   
                                                            Performed By: #### L  
500.43375, L500.54340 ####[facility]   
[id number] [place], [place] [address]Ae# [phone]   
   
                                                    CARD.STENTon 2021   
   
                      CARD.STENT            Normal                [facility]  
   
                                                    CBC W/DIFFon 2021   
   
                      BASO ABS   0.00 K/CU MM Normal     0-0.2      [facility]  
   
                                        Comment on above:   Order Comment: Campu  
s: M   
   
                                                            Performed By: #### L  
200.02379 ####[address] [address]Xj# [phone]   
   
                                                    Basophils/100 WBC   
(Bld)           0.3 %           Normal          0-2             [facility]  
   
                                        Comment on above:   Order Comment: Campu  
s: M   
   
                                                            Performed By: #### L  
200.46612 ####[facility]   
[id number] [place] [address]Xb# [phone]   
   
                      EOS ABS    0.40 K/CU MM Normal     0-0.5      [facility]  
   
                                        Comment on above:   Order Comment: Campu  
s: M   
   
                                                            Performed By: #### L  
200.99926 ####[facility]   
[id number] [place], [place] [address]Oz# [phone]   
   
                                                    Eosinophils/100   
WBC (Bld)       4.0 %           Normal          0-5             [facility]  
   
                                        Comment on above:   Order Comment: Campu  
s: M   
   
                                                            Performed By: #### L  
200.08422 ####[facility]   
[id number] [place], [place] [address]Te# [phone]   
   
                                                    Erythrocyte   
distribution   
width (RBC)   
[Ratio]         17.9 %          High            11-14.5         [facility]  
   
                                        Comment on above:   Order Comment: Campu  
s: M   
   
                                                            Performed By: #### L  
200.43373 ####[address] [place], [place] [address]Tw# [phone]   
   
                                                    Hematocrit (Bld)   
[Volume fraction] 30.9 %          Low             35.0-47.0       [facility]   
[place]  
   
                                        Comment on above:   Order Comment: Campu  
s: M   
   
                                                            Performed By: #### L  
200.95382 ####[facility]   
[id number] [place] [address]Qr# [phone]   
   
                                                    Hemoglobin (Bld)   
[Mass/Vol]      9.4 g/dL        Low             11.5-15.5       [facility]   
[place]  
   
                                        Comment on above:   Order Comment: Campu  
s: M   
   
                                                            Performed By: #### L  
200.23373 ####[place] [id number]# [phone]   
   
                      IMMATR GRAN ABS 0.10 K/CU MM Normal     Less than 2 [facility]   
[place]  
   
                                        Comment on above:   Order Comment: Campu  
s: M   
   
                                                            Performed By: #### L  
200.56370 ####[facility]   
[id number] [place], [place] [address]Ph# [phone]   
   
                      IMMATURE GRAN % 1.0 %      Normal     Less than 2 [facility]   
[place]  
   
                                        Comment on above:   Order Comment: Campu  
s: M   
   
                                                            Performed By: #### L  
200.73480 ####[facility]   
[id number] [place] [address]Lt# [phone]   
   
                      LYMPH ABS  2.70 K/CU MM Normal     0.9-4.4    [facility]   
[place]  
   
                                        Comment on above:   Order Comment: Campu  
s: M   
   
                                                            Performed By: #### L  
200.76407 ####[facility]   
[id number] [place], [place] [address]Ph# [phone]   
   
                                                    Lymphocytes/100   
WBC (Bld)       25.5 %          Normal          20-40           [facility]   
[place]  
   
                                        Comment on above:   Order Comment: Campu  
s: M   
   
                                                            Performed By: #### L  
200.79542 ####[facility]   
[id number] [place], [place] [address]Ph# [phone]   
   
                                                    MCHC (RBC)   
[Mass/Vol]      30.4 g/dL       Low             32.0-36.0       [facility]  
   
                                        Comment on above:   Order Comment: Campu  
s: M   
   
                                                            Performed By: #### L  
200.36438 ####[facility]   
[id number] [place], [place] [address]Mr# [phone]   
   
                                                    MCV (RBC)   
[Entitic vol]   86.6 fL         Normal          80.0-99.0       [facility]  
   
                                        Comment on above:   Order Comment: Campu  
s: M   
   
                                                            Performed By: #### L  
200.10543 ####[facility]   
[id number] [place], [place] [address]Ph# [phone]   
   
                      MONO ABS   1.00 K/CU MM Normal     0.1-1.1    [facility]  
   
                                        Comment on above:   Order Comment: Campu  
s: M   
   
                                                            Performed By: #### L  
200.42984 ####[address] [address]Ik# [phone]   
   
                                                    Monocytes/100 WBC   
(Bld)           9.1 %           Normal          2-10            [facility]  
   
                                        Comment on above:   Order Comment: Campu  
s: M   
   
                                                            Performed By: #### L  
200.95731 ####[facility]   
[id number] [place] [address]Xp# [phone]   
   
                      NEUTROPHIL ABS 6.40 K/CU MM Normal     2.0-8.3    [facility]  
   
                                        Comment on above:   Order Comment: Campu  
s: M   
   
                                                            Performed By: #### L  
200.49847 ####[facility]   
[id number] [place] [address]Go# [phone]   
   
                                                    Neutrophils/100   
WBC (Bld)       60.1 %          Normal          45-75           [facility]  
   
                                        Comment on above:   Order Comment: Campu  
s: M   
   
                                                            Performed By: #### L  
200.93188 ####[facility]   
[id number] [place] [address]Xp# [phone]   
   
                                                    Nucleated RBC/100   
WBC (Bld) [Ratio] 0.0 %           Normal          Less than 1     [facility]  
   
                                        Comment on above:   Order Comment: Campu  
s: M   
   
                                                            Performed By: #### L  
200.78934 ####[facility]   
[id number] [place], [place] [address]Sf# [phone]   
   
                                                    Platelet mean   
volume (Bld)   
[Entitic vol]   8.9 fL          Low             9.4-12.4        [facility]   
[place]  
   
                                        Comment on above:   Order Comment: Campu  
s: M   
   
                                                            Performed By: #### L  
200.63284 ####[facility]   
[id number] [place], [place] [address]Us# [phone]   
   
                      PLT        418 K/CU MM Normal     150-450    [facility]   
[place]  
   
                                        Comment on above:   Order Comment: Campu  
s: M   
   
                                                            Performed By: #### L  
200.50914 ####[facility]   
[id number] [place] [address]Zr# [phone]   
   
                      RBC        3.57 M/CU MM Low        3.90-5.30  [facility]   
[place]  
   
                                        Comment on above:   Order Comment: Campu  
s: M   
   
                                                            Performed By: #### L  
200.89280 ####[facility]   
[id number] [place] [address]Sb# [phone]   
   
                      WBC        10.7 K/CUMM Normal     4.5-11.0   [facility]   
[place]  
   
                                        Comment on above:   Order Comment: Campu  
s: M   
   
                                                            Performed By: #### L  
200.34077 ####[facility]   
[id number] [place] [address]Vt# [phone]   
   
                                                    GFR ESTon 2021   
   
                      IF  AMER Greater than 60 Normal                [facility]   
[place]  
   
                                        Comment on above:   Order Comment: Campu  
s: M   
   
                                                            Performed By: #### L  
500.92945, L500.86058 ####[facility]   
[id number] [place], [place] [address]Dk# [phone]   
   
                      IF non-AFR AMER Greater than 60 Normal                [facility]   
[place]  
   
                                        Comment on above:   Order Comment: Campu  
s: M   
   
                                                            Performed By: #### L  
500.17827, L500.31679 ####[facility]   
[id number] [place], [place] [address]Lh# [phone]   
   
                                                    GLUCOSE METERon 2021   
   
                                                    Glucose   
[Mass/Vol]      150 mg/dL       High                      [facility]  
   
                                                    Glucose   
[Mass/Vol]      278 mg/dL       High                      [facility]  
   
                                                    Glucose   
[Mass/Vol]      200 mg/dL       High                      [facility]  
   
                                                    Glucose   
[Mass/Vol]      221 mg/dL       High                      [facility]  
   
                                                    Glucose   
[Mass/Vol]      161 mg/dL       High                      [facility]  
   
                                                    Glucose   
[Mass/Vol]      285 mg/dL       High                      [facility]  
   
                                                    OTPNon 2021   
   
                      OT Progress Note            Normal                [facility]  
   
                      OTPN                  Normal                [facility]  
   
                                                    PROG IMSon 2021   
   
                      PROG IMS              Normal                [facility]  
   
                                                    Progress   
Note-Hospitalist                 Normal                          [facility]  
   
                                                    PROG.Mikayla 2021   
   
                      PROG.CARD             Normal                [facility]  
   
                                                    Progress   
Note-Cardiology                 Normal                          [facility]  
   
                                                    PTPNon 2021   
   
                      PT Progress Note            Normal                [facility]  
   
                      PTPN                  Normal                [facility]  
   
                                                    BMPon 2021   
   
                                                    Anion gap   
[Moles/Vol]     4 mmol/L        Low             5-16            [facility]  
   
                                        Comment on above:   Order Comment: Campu  
s: M   
   
                                                            Performed By: #### L  
500.53427, L500.86019 ####[facility]   
[id number] [place], [place] [address]Ex# [phone]   
   
                                                    Calcium   
[Mass/Vol]      8.8 mg/dL       Normal          8.5-10.5        [facility]  
   
                                        Comment on above:   Order Comment: Campu  
s: M   
   
                                                            Result Comment: NOTE  
 NEW NORMAL RANGE DUE TO REAGENT CHANGE   
   
                                                            Performed By: #### L  
500.25081, L500.85976 ####[facility]   
[id number] [place], [place] [address]Fq# [phone]   
   
                                                    Chloride   
[Moles/Vol]     108 mmol/L      High                      [facility]  
   
                                        Comment on above:   Order Comment: Campu  
s: M   
   
                                                            Performed By: #### L  
500.00324, L500.14031 ####[facility]   
[id number] [place], [place] [address]Gv# [phone]   
   
                      CO2 [Moles/Vol] 31.0 mmol/L Normal     21-32      [facility]   
[place]  
   
                                        Comment on above:   Order Comment: Campu  
s: M   
   
                                                            Performed By: #### L  
500.75708, L500.02671 ####[facility]   
[id number] [place], [place] [address]Ik# [phone]   
   
                                                    Creatinine   
[Mass/Vol]      0.43 mg/dL      Low             0.510-0.950     [facility]  
   
                                        Comment on above:   Order Comment: Campu  
s: M   
   
                                                            Result Comment: Kayla  
ents receiving either N-Acetylcysteine (NAC)   
orMetamizole prior to venipuncture, may have falsely   
depressedresults.   
   
                                                            Performed By: #### L  
500.44074, L5.17162 ####[facility]   
[id number] [place], [place] [address]Ez# [phone]   
   
                                                    Glucose   
[Mass/Vol]      173 mg/dL       High                      [facility]  
   
                                        Comment on above:   Order Comment: Campu  
s: M   
   
                                                            Result Comment: Delt  
a check [id number]- Normal Fasting; 100-125   
Impaired Fasting; greaterthan 126 on more than one result- Diabetes.   
ADA guidelines.Results may be falsely elevated after the   
administration ofSulfapyridine.Results may be falsely depressed   
after the administration ofSulfasalazine.   
   
                                                            Performed By: #### L  
500.81952, L5.64939 ####[facility]   
[id number] [place], [place] [address]Ni# [phone]   
   
                                                    Potassium   
[Moles/Vol]     3.9 mmol/L      Normal          3.5-5.1         [facility]  
   
                                        Comment on above:   Order Comment: Campu  
s: M   
   
                                                            Performed By: #### L  
500.59441, L500.16213 ####[facility]   
[id number] [place], [place] [address]Ba# [phone]   
   
                                                    Sodium   
[Moles/Vol]     143 mmol/L      Normal          136-145         [facility]  
   
                                        Comment on above:   Order Comment: Campu  
s: M   
   
                                                            Performed By: #### L  
500.90493, L5.33737 ####[facility]   
[id number] [place], [place] [address]Ph# [phone]   
   
                                                    Urea nitrogen   
[Mass/Vol]      9 mg/dL         Normal          7-26            [facility]  
   
                                        Comment on above:   Order Comment: Campu  
s: M   
   
                                                            Performed By: #### L  
500.98459, L500.23829 ####[facility]   
[id number] [place], [place] [address]Sc# [phone]   
   
                                                    Urea   
nitrogen/Creatini  
ne [Mass ratio] 21 mg/mg        Normal          15-24           [facility]  
   
                                        Comment on above:   Order Comment: Campu  
s: M   
   
                                                            Performed By: #### L  
500.35401, L500.60889 ####[facility]   
[id number] [place], [place] [address]Fy# [phone]   
   
                                                    GFR ESTon 2021   
   
                      IF  AMER Greater than 60 [facility]  
   
                                        Comment on above:   Order Comment: Campu  
s: M   
   
                                                            Performed By: #### L  
500.92768, L500.58407 ####[facility]   
[id number] [place] [address]Yj# [phone]   
   
                      IF non-AFR AMER Greater than 60 Normal                [facility]  
   
                                        Comment on above:   Order Comment: Campu  
s: M   
   
                                                            Performed By: #### L  
500.78948, L500.09091 ####[facility]   
[id number] [place], [place] [address]Cq# [phone]   
   
                                                    GLUCOSE METERon 2021   
   
                                                    Glucose   
[Mass/Vol]      176 mg/dL       High                      [facility]  
   
                                                    Glucose   
[Mass/Vol]      311 mg/dL       High                      [facility]  
   
                                                    Glucose   
[Mass/Vol]      181 mg/dL       High                      [facility]  
   
                                                    OTPNon 2021   
   
                      OT Progress Note            Normal                [facility]  
   
                      OTPN                  Normal                [facility]  
   
                                                    PROG IMSon 2021   
   
                      PROG IMS              Normal                [facility]  
   
                                                    Progress   
Note-Hospitalist                 Normal                          [facility]  
   
                                                    PROG.Mikayla 2021   
   
                      PROG.CARD             Normal                [facility]  
   
                                                    Progress   
Note-Cardiology                 Normal                          [facility]  
   
                                                    PTPNon 2021   
   
                      PT Progress Note            Normal                [facility]   
[place]  
   
                      PTPN                  Normal                [facility]   
[place]  
   
                                                    CARD.CATHon 03-   
   
                      CARD.CATH             Normal                [facility]  
   
                                                    CBC W/DIFFon 03-   
   
                      BASO ABS   0.00 K/CU MM Normal     0-0.2      [facility]  
   
                                        Comment on above:   Order Comment: Campu  
s: M   
   
                                                            Performed By: #### L  
200.56086 ####[facility]   
[id number] [place] [address]Hm# [phone]   
   
                                                    Basophils/100 WBC   
(Bld)           0.2 %           Normal          0-2             [facility]  
   
                                        Comment on above:   Order Comment: Campu  
s: M   
   
                                                            Performed By: #### L  
200.38397 ####[facility]   
[id number] [place] [address]Lv# [phone]   
   
                      EOS ABS    0.40 K/CU MM Normal     0-0.5      [facility]  
   
                                        Comment on above:   Order Comment: Campu  
s: M   
   
                                                            Performed By: #### L  
200.21613 ####[facility]   
[id number] [place] [address]Ww# [phone]   
   
                                                    Eosinophils/100   
WBC (Bld)       3.4 %           Normal          0-5             [facility]  
   
                                        Comment on above:   Order Comment: Campu  
s: M   
   
                                                            Performed By: #### L  
200.54025 ####[facility]   
[id number] [place] [address]Wr# [phone]   
   
                                                    Erythrocyte   
distribution   
width (RBC)   
[Ratio]         16.9 %          High            11-14.5         [facility]  
   
                                        Comment on above:   Order Comment: Campu  
s: M   
   
                                                            Performed By: #### L  
200.08695 ####[facility]   
[id number] [place] [address]Ha# [phone]   
   
                                                    Hematocrit (Bld)   
[Volume fraction] 27.5 %          Low             35.0-47.0       [facility]  
   
                                        Comment on above:   Order Comment: Campu  
s: M   
   
                                                            Performed By: #### L  
200.21048 ####[facility]   
[id number] [place], [place] [address]Ph# [phone]   
   
                                                    Hemoglobin (Bld)   
[Mass/Vol]      8.4 g/dL        Low             11.5-15.5       [facility]   
[place]  
   
                                        Comment on above:   Order Comment: Campu  
s: M   
   
                                                            Performed By: #### L  
200.93604 ####[facility]   
[id number] [place] [address]Sl# [phone]   
   
                      IMMATR GRAN ABS 0.20 K/CU MM Normal     Less than 2 [facility]   
[place]  
   
                                        Comment on above:   Order Comment: Campu  
s: M   
   
                                                            Performed By: #### L  
200.35166 ####[address] [address]Eq# [phone]   
   
                      IMMATURE GRAN % 1.5 %      Normal     Less than 2 [facility]   
[place]  
   
                                        Comment on above:   Order Comment: Campu  
s: M   
   
                                                            Performed By: #### L  
200.05264 ####[place] [address]Ph# [phone]   
   
                      LYMPH ABS  3.30 K/CU MM Normal     0.9-4.4    [facility]  
   
                                        Comment on above:   Order Comment: Campu  
s: M   
   
                                                            Performed By: #### L  
200.75058 ####[place] [address]Ph# [phone]   
   
                                                    Lymphocytes/100   
WBC (Bld)       27.1 %          Normal          20-40           [facility]  
   
                                        Comment on above:   Order Comment: Campu  
s: M   
   
                                                            Performed By: #### L  
200.23108 ####[facility]   
[id number] [place], [place] [address]Ph# [phone]   
   
                                                    MCHC (RBC)   
[Mass/Vol]      30.5 g/dL       Low             32.0-36.0       [facility]   
[place]  
   
                                        Comment on above:   Order Comment: Campu  
s: M   
   
                                                            Performed By: #### L  
200.68199 ####[facility]   
[id number] [place] [address]Yu# [phone]   
   
                                                    MCV (RBC)   
[Entitic vol]   84.4 fL         Normal          80.0-99.0       [facility]  
   
                                        Comment on above:   Order Comment: Campu  
s: M   
   
                                                            Performed By: #### L  
200.32112 ####[facility]   
[id number] [place], [place] [address]Qz# [phone]   
   
                      MONO ABS   0.90 K/CU MM Normal     0.1-1.1    [facility]   
[place]  
   
                                        Comment on above:   Order Comment: Campu  
s: M   
   
                                                            Performed By: #### L  
200.22608 ####[facility]   
[id number] [place] [address]Bn# [phone]   
   
                                                    Monocytes/100 WBC   
(Bld)           7.3 %           Normal          2-10            [facility]  
   
                                        Comment on above:   Order Comment: Campu  
s: M   
   
                                                            Performed By: #### L  
200.35669 ####[address] [address]Pg# [phone]   
   
                      NEUTROPHIL ABS 7.40 K/CU MM Normal     2.0-8.3    [facility]  
   
                                        Comment on above:   Order Comment: Campu  
s: M   
   
                                                            Performed By: #### L  
200.68906 ####[facility]   
[id number] [place] [address]Ut# [phone]   
   
                                                    Neutrophils/100   
WBC (Bld)       60.5 %          Normal          45-75           [facility]  
   
                                        Comment on above:   Order Comment: Campu  
s: M   
   
                                                            Performed By: #### L  
200.54054 ####[facility]   
[id number] [place] [address]Jq# [phone]   
   
                                                    Nucleated RBC/100   
WBC (Bld) [Ratio] 0.2 %           Normal          Less than 1     [facility]  
   
                                        Comment on above:   Order Comment: Campu  
s: M   
   
                                                            Performed By: #### L  
200.16025 ####[facility]   
[id number] [place] [address]Vm# [phone]   
   
                                                    Platelet mean   
volume (Bld)   
[Entitic vol]   9.2 fL          Low             9.4-12.4        [facility]  
   
                                        Comment on above:   Order Comment: Campu  
s: M   
   
                                                            Performed By: #### L  
200.30258 ####[facility]   
[id number] [place], [place] [address]Ph# [phone]   
   
                      PLT        409 K/CU MM Normal     150-450    [facility]  
   
                                        Comment on above:   Order Comment: Campu  
s: M   
   
                                                            Performed By: #### L  
200.68354 ####[facility]   
[id number] [place], [place] [address]Yz# [phone]   
   
                      RBC        3.26 M/CU MM Low        3.90-5.30  [facility]  
   
                                        Comment on above:   Order Comment: Campu  
s: M   
   
                                                            Performed By: #### L  
200.89202 ####[facility]   
[id number] [place] [address]Cb# [phone]   
   
                      WBC        12.3 K/CUMM High       4.5-11.0   [facility]  
   
                                        Comment on above:   Order Comment: Campu  
s: M   
   
                                                            Performed By: #### L  
200.32866 ####[facility]   
[id number] [place] [address]Sa# [phone]   
   
                                                    CMPon 03-   
   
                                                    Albumin   
[Mass/Vol]      1.8 g/dL        Low             3.2-5.0         [facility]  
   
                                        Comment on above:   Order Comment: Campu  
s: M   
   
                                                            Performed By: #### L  
500.66502, L500.79988 ####[facility]   
[id number] [place], [place] [address]Ak# [phone]   
   
                                                    Albumin/Globulin   
[Mass ratio]    0.5 {ratio}     Low             0.8-2.0         [facility]  
   
                                        Comment on above:   Order Comment: Campu  
s: M   
   
                                                            Performed By: #### L  
500.02968, L500.70969 ####[facility]   
[id number] [place], [place] [address]Gc# [phone]   
   
                      ALK PHOS   88 U/L     Normal          [facility]  
   
                                        Comment on above:   Order Comment: Campu  
s: M   
   
                                                            Performed By: #### L  
500.60754, L500.24649 ####[facility]   
[id number] [place], [place] [address]Mo# [phone]   
   
                                                    ALT [Catalytic   
activity/Vol]   7 U/L           Low             13-61           [facility]  
   
                                        Comment on above:   Order Comment: Campu  
s: M   
   
                                                            Result Comment: RESU  
LTS MAY BE FALSELY DEPRESSED AFTER THE   
ADMINISTRATION OFSULFASALAZINE AND/OR SULFAPYRIDINE.   
   
                                                            Performed By: #### L  
500.46669, L500.72538 ####[facility]   
[id number] [place], [place] [address]Cm# [phone]   
   
                                                    Anion gap   
[Moles/Vol]     3 mmol/L        Low             5-16            [facility]  
   
                                        Comment on above:   Order Comment: Campu  
s: M   
   
                                                            Performed By: #### L  
500.87122, L500.79933 ####[facility]   
[id number] [place], [place] [address]In# [phone]   
   
                                                    AST [Catalytic   
activity/Vol]   17 U/L          Normal          8-34            [facility]  
   
                                        Comment on above:   Order Comment: Campu  
s: M   
   
                                                            Result Comment: RESU  
LTS MAY BE FALSELY DEPRESSED AFTER THE   
ADMINISTRATION OFSULFASALAZINE AND/OR SULFAPYRIDINE.   
   
                                                            Performed By: #### L  
500.67908, L500.52250 ####[facility]   
[id number] [place], [place] [address]Mz# [phone]   
   
                      BILI TOTAL 0.60 MG/DL Normal     0.2-1.0    [facility]  
   
                                        Comment on above:   Order Comment: Campu  
s: M   
   
                                                            Performed By: #### L  
500.37894, L500.87350 ####[facility]   
[id number] [place], [place] [address]Wq# [phone]   
   
                                                    Calcium   
[Mass/Vol]      8.6 mg/dL       Normal          8.5-10.5        [facility]  
   
                                        Comment on above:   Order Comment: Campu  
s: M   
   
                                                            Result Comment: NOTE  
 NEW NORMAL RANGE DUE TO REAGENT CHANGE   
   
                                                            Performed By: #### L  
500.29501, L500.83085 ####[facility]   
[id number] [place], [place] [address]Oz# [phone]   
   
                                                    Chloride   
[Moles/Vol]     108 mmol/L      High                      [facility]  
   
                                        Comment on above:   Order Comment: Campu  
s: M   
   
                                                            Performed By: #### L  
500.88530, L500.79375 ####[facility]   
[id number] [place], [place] [address]Zt# [phone]   
   
                      CO2 [Moles/Vol] 31.0 mmol/L Normal     21-32      [facility]  
   
                                        Comment on above:   Order Comment: Darinu  
s: M   
   
                                                            Performed By: #### L  
500.86462, L500.02533 ####[facility]   
[id number] [place], [place] [address]Kl# [phone]   
   
                                                    Creatinine   
[Mass/Vol]      0.44 mg/dL      Low             0.510-0.950     [facility]  
   
                                        Comment on above:   Order Comment: Darinu  
s: M   
   
                                                            Result Comment: Kayla  
ents receiving either N-Acetylcysteine (NAC)   
orMetamizole prior to venipuncture, may have falsely   
depressedresults.   
   
                                                            Performed By: #### L  
500.06398, L500.93897 ####[facility]   
[id number] [place], [place] [address]Ox# [phone]   
   
                                                    Globulin (S)   
[Mass/Vol]      3.6 g/dL        Normal          2.2-4.2         [facility]  
   
                                        Comment on above:   Order Comment: Darinu  
s: M   
   
                                                            Performed By: #### L  
500.71865, L500.62299 ####[facility]   
[id number] [place], [place] [address]Hb# [phone]   
   
                                                    Glucose   
[Mass/Vol]      109 mg/dL       High                      [facility]  
   
                                        Comment on above:   Order Comment: Campu  
s: M   
   
                                                            Result Comment: 70-1  
00- Normal Fasting; 100-125 Impaired Fasting;   
greaterthan 126 on more than one result- Diabetes. ADA   
guidelines.Results may be falsely elevated after the administration   
ofSulfapyridine.Results may be falsely depressed after the   
administration ofSulfasalazine.   
   
                                                            Performed By: #### L  
500.75802, L500.46022 ####[facility]   
[id number] [place], [place] [address]Ed# [phone]   
   
                                                    Potassium   
[Moles/Vol]     3.4 mmol/L      Low             3.5-5.1         [facility]   
[place]  
   
                                        Comment on above:   Order Comment: Campu  
s: M   
   
                                                            Result Comment: Slig  
ht Hemolysis, Result may be affected.   
   
                                                            Performed By: #### L  
500.74930, L500.72922 ####[facility]   
[id number] [place], [place] [address]Xn# [phone]   
   
                                                    Protein   
[Mass/Vol]      5.4 g/dL        Low             6.0-8.5         [facility]  
   
                                        Comment on above:   Order Comment: Campu  
s: M   
   
                                                            Performed By: #### L  
500.84630, L500.88244 ####[facility]   
[id number] [place], [place] [address]Jf# [phone]   
   
                                                    Sodium   
[Moles/Vol]     142 mmol/L      Normal          136-145         [facility]  
   
                                        Comment on above:   Order Comment: Campu  
s: M   
   
                                                            Performed By: #### L  
500.13765, L500.73754 ####[facility]   
[id number] [place], [place] [address]Un# [phone]   
   
                                                    Urea nitrogen   
[Mass/Vol]      8 mg/dL         Normal          7-26            [facility]  
   
                                        Comment on above:   Order Comment: Campu  
s: M   
   
                                                            Performed By: #### L  
500.13189, L500.66811 ####[facility]   
[id number] [place], [place] [address]Wh# [phone]   
   
                                                    Urea   
nitrogen/Creatini  
ne [Mass ratio] 18 mg/mg        Normal          15-24           [facility]  
   
                                        Comment on above:   Order Comment: Campu  
s: M   
   
                                                            Performed By: #### L  
500.63955, L500.63011 ####[facility]   
[id number] [place], [place] [address]Gr# [phone]   
   
                                                    DIET.ASSMTon 03-   
   
                      DIET.ASSMT            Normal                [facility]   
[place]  
   
                                                    Nutrition   
Assessments                     Normal                          [facility]   
[place]  
   
                                                    GFR ESTon 03-   
   
                      IF  AMER Greater than 60 Normal                [facility]  
   
                                        Comment on above:   Order Comment: Campu  
s: M   
   
                                                            Performed By: #### L  
500.15234, L500.25149 ####[facility]   
[id number] [place], [place] [address]Uf# [phone]   
   
                      IF non-AFR AMER Greater than 60 Normal                [facility]  
   
                                        Comment on above:   Order Comment: Ruddy MAE   
   
                                                            Performed By: #### L  
500.31710, L500.96657 ####[facility]   
[id number] [place], [place] [address]Uu# [phone]   
   
                                                    GLUCOSE METERon 03-   
   
                                                    Glucose   
[Mass/Vol]      147 mg/dL       High                      [facility]   
[place]  
   
                                                    Glucose   
[Mass/Vol]      132 mg/dL       High                      [facility]   
[place]  
   
                                                    Glucose   
[Mass/Vol]      146 mg/dL       High                      [facility]  
   
                                                    Glucose   
[Mass/Vol]      210 mg/dL       High                      [facility]  
   
                                                    OTPNon 03-   
   
                      OT Progress Note            Normal                [facility]  
   
                      OTPN                  Normal                [facility]  
   
                                                    PBNP TESTon 03-   
   
                                                    Natriuretic   
peptide B (Bld)   
[Mass/Vol]      4916 pg/mL      High            0-125           [facility]  
   
                                        Comment on above:   Order Comment: Ruddy esparza: TU   
   
                                                            Result Comment: NT-p  
roBNP results of less than 300 pg/ml likely   
rules outacute congestive heart failure with 99% predictive   
value.NOTE: These cuttoff points are suggested for ACUTE   
CHFDIAGNOSIS onlyLess than 50 years Greater than 450 pg/ml50-75   
years Greater than 900 pg/mlGreater than 75 years Greater than 1800   
pg/mlNOTE NEW NORMAL RANGE   
   
                                                            Performed By: #### L  
500.81505 ####[facility]   
[id number] [place], [place] [address]Oh# [phone]   
   
                                                    PROG IMSon 03-   
   
                      PROG IMS              Normal                [facility]  
   
                                                    PROG.Mikayla 03-   
   
                      PROG.CARD             Normal                [facility]  
   
                                                    Progress   
Note-Cardiology                 Normal                          [facility]  
   
                                                    PROG.NOTEon 03-   
   
                      PROG.NOTE             Normal                [facility]  
   
                                                    Progress   
Note-Physician                  Normal                          [facility]  
   
                                                    PTPNon 03-   
   
                      PT Progress Note            Normal                [facility]  
   
                      PTPN                  Normal                [facility]  
   
                                                    BMPon 2021   
   
                                                    Anion gap   
[Moles/Vol]     3 mmol/L        Low             5-16            [facility]   
[place]  
   
                                        Comment on above:   Order Comment: Campu  
s: M   
   
                                                            Performed By: #### L  
500.54937, L500.96019 ####[facility]   
[id number] [place], [place] [address]Wk# [phone]   
   
                                                    Calcium   
[Mass/Vol]      8.7 mg/dL       Normal          8.5-10.5        [facility]  
   
                                        Comment on above:   Order Comment: Campu  
s: M   
   
                                                            Result Comment: NOTE  
 NEW NORMAL RANGE DUE TO REAGENT CHANGE   
   
                                                            Performed By: #### L  
500.88090, L500.17455 ####[facility]   
[id number] [place], [place] [address]Gb# [phone]   
   
                                                    Chloride   
[Moles/Vol]     112 mmol/L      High                      [facility]  
   
                                        Comment on above:   Order Comment: Campu  
s: M   
   
                                                            Performed By: #### L  
500.11635, L500.81918 ####[facility]   
[id number] [place], [place] [address]Jx# [phone]   
   
                      CO2 [Moles/Vol] 31.0 mmol/L Normal     21-32      [facility]  
   
                                        Comment on above:   Order Comment: Campu  
s: M   
   
                                                            Performed By: #### L  
500.69971, L500.57783 ####[facility]   
[id number] [place], [place] [address]Sf# [phone]   
   
                                                    Creatinine   
[Mass/Vol]      0.43 mg/dL      Low             0.510-0.950     [facility]  
   
                                        Comment on above:   Order Comment: Campu  
s: M   
   
                                                            Result Comment: Kayla  
ents receiving either N-Acetylcysteine (NAC)   
orMetamizole prior to venipuncture, may have falsely   
depressedresults.   
   
                                                            Performed By: #### L  
500.51659, L500.89425 ####[facility]   
[id number] [place], [place] [address]We# [phone]   
   
                                                    Glucose   
[Mass/Vol]      118 mg/dL       High                      [facility]  
   
                                        Comment on above:   Order Comment: Campu  
s: M   
   
                                                            Result Comment: 70-1  
00- Normal Fasting; 100-125 Impaired Fasting;   
greaterthan 126 on more than one result- Diabetes. ADA   
guidelines.Results may be falsely elevated after the administration   
ofSulfapyridine.Results may be falsely depressed after the   
administration ofSulfasalazine.   
   
                                                            Performed By: #### L  
500.36137, L500.33583 ####[facility]   
[id number] [place], [place] [address]Qd# [phone]   
   
                                                    Potassium   
[Moles/Vol]     3.1 mmol/L      Low             3.5-5.1         [facility]  
   
                                        Comment on above:   Order Comment: Campu  
s: M   
   
                                                            Performed By: #### L  
500.71130, L500.66295 ####[facility]   
[id number] [place] [address]Bt# [phone]   
   
                                                    Sodium   
[Moles/Vol]     143 mmol/L      Normal          136-145         [facility]  
   
                                        Comment on above:   Order Comment: Campu  
s: M   
   
                                                            Performed By: #### L  
500.81850, L500.05798 ####[facility]   
[id number] [place] [address]At# [phone]   
   
                                                    Urea nitrogen   
[Mass/Vol]      9 mg/dL         Normal          7-26            [facility]  
   
                                        Comment on above:   Order Comment: Campu  
s: M   
   
                                                            Performed By: #### L  
500.32043, L500.89194 ####[facility]   
[id number] [place], [place] [address]Ak# [phone]   
   
                                                    Urea   
nitrogen/Creatini  
ne [Mass ratio] 21 mg/mg        Normal          15-24           [facility]  
   
                                        Comment on above:   Order Comment: Campu  
s: M   
   
                                                            Performed By: #### L  
500.15157, L500.42263 ####[facility]   
[id number] [place] [address]Lm# [phone]   
   
                                                    CBCon 2021   
   
                                                    Erythrocyte   
distribution   
width (RBC)   
[Ratio]         16.5 %          High            11-14.5         [facility]  
   
                                        Comment on above:   Order Comment: Campu  
s: M   
   
                                                            Performed By: #### L  
200.18684 ####[facility]   
[id number] [place] [address]Bm# [phone]   
   
                                                    Hematocrit (Bld)   
[Volume fraction] 24.7 %          Low             35.0-47.0       [facility]  
   
                                        Comment on above:   Order Comment: Campu  
s: M   
   
                                                            Performed By: #### L  
200.57361 ####[facility]   
[id number] [place], [place] [address]Ol# [phone]   
   
                                                    Hemoglobin (Bld)   
[Mass/Vol]      7.4 g/dL        Low             11.5-15.5       [facility]  
   
                                        Comment on above:   Order Comment: Campu  
s: M   
   
                                                            Performed By: #### L  
200.70109 ####[facility]   
[id number] [place] [address]Lp# [phone]   
   
                                                    MCHC (RBC)   
[Mass/Vol]      30.0 g/dL       Low             32.0-36.0       [facility]  
   
                                        Comment on above:   Order Comment: Campu  
s: M   
   
                                                            Performed By: #### L  
200.74515 ####[address] [address]Wy# [phone]   
   
                                                    MCV (RBC)   
[Entitic vol]   84.6 fL         Normal          80.0-99.0       [facility]  
   
                                        Comment on above:   Order Comment: Campu  
s: M   
   
                                                            Performed By: #### L  
200.58626 ####[facility]   
[id number] [place] [address]Ag# [phone]   
   
                                                    Nucleated RBC/100   
WBC (Bld) [Ratio] 0.2 %           Normal          Less than 1     [facility]  
   
                                        Comment on above:   Order Comment: Campu  
s: M   
   
                                                            Performed By: #### L  
200.19842 ####[facility]   
[id number] [place] [address]Yi# [phone]   
   
                                                    Platelet mean   
volume (Bld)   
[Entitic vol]   9.3 fL          Low             9.4-12.4        [facility]  
   
                                        Comment on above:   Order Comment: Campu  
s: M   
   
                                                            Performed By: #### L  
200.87930 ####[facility]   
[id number] [place] [address]Eh# [phone]   
   
                      PLT        429 K/CU MM Normal     150-450    [facility]  
   
                                        Comment on above:   Order Comment: Campu  
s: M   
   
                                                            Performed By: #### L  
200.33946 ####[facility]   
[id number] [place], [place] [address]Iu# [phone]   
   
                      RBC        2.92 M/CU MM Low        3.90-5.30  [facility]  
   
                                        Comment on above:   Order Comment: Campu  
s: M   
   
                                                            Performed By: #### L  
[id number] ####[facility]   
[id number] [place] [address]Cy# [phone]   
   
                      WBC        12.1 K/CUMM High       4.5-11.0   [facility]  
   
                                        Comment on above:   Order Comment: Campu  
s: M   
   
                                                            Performed By: #### L  
200.47218 ####[facility]   
[id number] [place], [place] [address]Wg# [phone]   
   
                                                    GFR ESTon 2021   
   
                      IF  AMER Greater than 60 Normal                [facility]  
   
                                        Comment on above:   Order Comment: Campu  
s: M   
   
                                                            Performed By: #### L  
500.50166, L500.19614 ####[facility]   
[id number] [place] [address]Lc# [phone]   
   
                      IF non-AFR AMER Greater than 60 Normal                [facility]  
   
                                        Comment on above:   Order Comment: Campu  
s: M   
   
                                                            Performed By: #### L  
500.12108, L500.23323 ####[facility]   
[id number] [place] [address]Tp# [phone]   
   
                                                    GLUCOSE METERon 2021   
   
                                                    Glucose   
[Mass/Vol]      149 mg/dL       High                      [facility]   
[place]  
   
                                                    Glucose   
[Mass/Vol]      171 mg/dL       High                      [facility]   
[place]  
   
                                                    Glucose   
[Mass/Vol]      124 mg/dL       Normal                    [facility]   
[place]  
   
                                                    Glucose   
[Mass/Vol]      217 mg/dL       High                      [facility]   
[place]  
   
                                                    Eloy 2021   
   
                                                    Potassium   
[Moles/Vol]     3.7 mmol/L      Normal          3.5-5.1         [facility]  
   
                                        Comment on above:   Order Comment: Campu  
s: M   
   
                                                            Performed By: #### L  
500.22392 ####[facility]   
[id number] [place], [place] [address]Pw# [phone]   
   
                                                    PROG IMSon 2021   
   
                      PROG IMS              Normal                [facility]  
   
                                                    Progress   
Note-Hospitalist                 Normal                          [facility]  
   
                                                    PROG.Mikayla 2021   
   
                      PROG.CARD             Normal                [facility]  
   
                                                    PTPNon 2021   
   
                      PT Progress Note            Normal                [facility]  
   
                      PTPN                  Normal                [facility]  
   
                                                    RBC NO Pablo 2021   
   
                                        RBC NO ACT          TRANSFUSED PRODUCT:   
RBC NO   
ACTIVE BLEED COUNT: 1 Normal                                  [facility]  
   
                                                    STL OCCULT BLDon 2021   
   
                      STL OCCULT BLD Negative   Normal     NEGATIVE   [facility]  
   
                                        Comment on above:   Order Comment: Darinu  
s: M   
   
                                                            Performed By: #### L  
680.24859 ####[facility]   
[id number] [place] [address]Mg# [phone]   
   
                                                    TSon 2021   
   
                                                    ABO and Rh group   
Nom (Bld)       Blood group A Rh(D) positive Normal                          [facility]  
   
                                        Comment on above:   Order Comment: Ruddy  
s: MTranfuse Now? YPatient transfused or   
pregnant in the past 3 months: YESNon - Acute Hemorrhage Indication:   
Hgb<8 w ACS/EKG chg/CPIrradiated: NWashed: NTransfuse slowly @   
60mL/hr x15min, then increase to infuse:Over 3 Hours   
   
                                                    CBC W/DIFFon 2021   
   
                      BASO ABS   0.00 K/CU MM Normal     0-0.2      [facility]  
   
                                        Comment on above:   Order Comment: Campu  
s: M   
   
                                                            Performed By: #### L  
200.10952 ####[facility]   
[id number] [place], [place] [address]Fv# [phone]   
   
                                                    Basophils/100 WBC   
(Bld)           0.2 %           Normal          0-2             [facility]  
   
                                        Comment on above:   Order Comment: Darinu  
s: M   
   
                                                            Performed By: #### L  
200.86549 ####[facility]   
[id number] [place], [place] [address]Wh# [phone]   
   
                      EOS ABS    0.40 K/CU MM Normal     0-0.5      [facility]   
[place]  
   
                                        Comment on above:   Order Comment: Campu  
s: M   
   
                                                            Performed By: #### L  
200.03762 ####[facility]   
[id number] [place], [place] [address]Ph# [phone]   
   
                                                    Eosinophils/100   
WBC (Bld)       1.9 %           Normal          0-5             [facility]   
[place]  
   
                                        Comment on above:   Order Comment: Campu  
s: M   
   
                                                            Performed By: #### L  
200.12848 ####[place] [id number]# [phone]   
   
                                                    Erythrocyte   
distribution   
width (RBC)   
[Ratio]         16.0 %          High            11-14.5         [facility]   
[place]  
   
                                        Comment on above:   Order Comment: Campu  
s: M   
   
                                                            Performed By: #### L  
200.17504 ####[place] [address]Ph# [phone]   
   
                                                    Hematocrit (Bld)   
[Volume fraction] 25.0 %          Low             35.0-47.0       [facility]   
[place]  
   
                                        Comment on above:   Order Comment: Campu  
s: M   
   
                                                            Performed By: #### L  
200.01842 ####[place] [address]Ph# [phone]   
   
                                                    Hemoglobin (Bld)   
[Mass/Vol]      7.8 g/dL        Low             11.5-15.5       [facility]   
[place]  
   
                                        Comment on above:   Order Comment: Campu  
s: M   
   
                                                            Performed By: #### L  
200.58579 ####[facility]   
[id number] [place], [place] [address]Ph# [phone]   
   
                      IMMATR GRAN ABS 0.20 K/CU MM Normal     Less than 2 [facility]   
[place]  
   
                                        Comment on above:   Order Comment: Campu  
s: M   
   
                                                            Performed By: #### L  
200.33062 ####[address] [address]Ys# [phone]   
   
                      IMMATURE GRAN % 0.9 %      Normal     Less than 2 [facility]   
[place]  
   
                                        Comment on above:   Order Comment: Campu  
s: M   
   
                                                            Performed By: #### L  
200.61847 ####[facility]   
[id number] [place], [place] [address]Dt# [phone]   
   
                      LYMPH ABS  1.90 K/CU MM Normal     0.9-4.4    [facility]   
[place]  
   
                                        Comment on above:   Order Comment: Campu  
s: M   
   
                                                            Performed By: #### L  
200.73531 ####[place] [address]Ph# [phone]   
   
                                                    Lymphocytes/100   
WBC (Bld)       9.6 %           Low             20-40           [facility]  
   
                                        Comment on above:   Order Comment: Campu  
s: M   
   
                                                            Performed By: #### L  
200.23890 ####[address] [address]Xu# [phone]   
   
                                                    MCHC (RBC)   
[Mass/Vol]      31.2 g/dL       Low             32.0-36.0       [facility]  
   
                                        Comment on above:   Order Comment: Campu  
s: M   
   
                                                            Performed By: #### L  
200.83896 ####[address] [address]Wv# [phone]   
   
                                                    MCV (RBC)   
[Entitic vol]   84.2 fL         Normal          80.0-99.0       [facility]  
   
                                        Comment on above:   Order Comment: Campu  
s: M   
   
                                                            Performed By: #### L  
200.65532 ####[address] [address]Zz# [phone]   
   
                      MONO ABS   1.20 K/CU MM High       0.1-1.1    [facility]   
[place]  
   
                                        Comment on above:   Order Comment: Campu  
s: M   
   
                                                            Performed By: #### L  
200.93214 ####[facility]   
[id number] [place] [address]Kp# [phone]   
   
                                                    Monocytes/100 WBC   
(Bld)           6.0 %           Normal          2-10            [facility]  
   
                                        Comment on above:   Order Comment: Campu  
s: M   
   
                                                            Performed By: #### L  
200.99204 ####[facility]   
[id number] [place], [place] [address]Ph# [phone]   
   
                      NEUTROPHIL ABS 16.30 K/CU MM High       2.0-8.3    [facility]   
[place]  
   
                                        Comment on above:   Order Comment: Campu  
s: M   
   
                                                            Performed By: #### L  
200.28956 ####[facility]   
[id number] [place], [place] [address]Vu# [phone]   
   
                                                    Neutrophils/100   
WBC (Bld)       81.4 %          High            45-75           [facility]  
   
                                        Comment on above:   Order Comment: Campu  
s: M   
   
                                                            Performed By: #### L  
200.09917 ####[facility]   
[id number] [place] [address]Uw# [phone]   
   
                                                    Nucleated RBC/100   
WBC (Bld) [Ratio] 0.2 %           Normal          Less than 1     [facility]  
   
                                        Comment on above:   Order Comment: Campu  
s: M   
   
                                                            Performed By: #### L  
200.95680 ####[address] [address]Sb# [phone]   
   
                                                    Platelet mean   
volume (Bld)   
[Entitic vol]   9.3 fL          Low             9.4-12.4        [facility]   
[place]  
   
                                        Comment on above:   Order Comment: Campu  
s: M   
   
                                                            Performed By: #### L  
200.62243 ####[facility]   
[id number] [place], [place] [address]Ru# [phone]   
   
                      PLT        459 K/CU MM High       150-450    [facility]  
   
                                        Comment on above:   Order Comment: Campu  
s: M   
   
                                                            Performed By: #### L  
200.66338 ####[facility]   
[id number] [place] [address]Nr# [phone]   
   
                      RBC        2.97 M/CU MM Low        3.90-5.30  [facility]  
   
                                        Comment on above:   Order Comment: Campu  
s: M   
   
                                                            Performed By: #### L  
200.27749 ####[facility]   
[id number] [place] [address]Zo# [phone]   
   
                      WBC        20.0 K/CUMM High       4.5-11.0   [facility]   
[place]  
   
                                        Comment on above:   Order Comment: Campu  
s: M   
   
                                                            Performed By: #### L  
200.33579 ####[facility]   
[id number] [place], [place] [address]Xq# [phone]   
   
                                                    CMPon 2021   
   
                                                    Albumin   
[Mass/Vol]      1.8 g/dL        Low             3.2-5.0         [facility]  
   
                                        Comment on above:   Order Comment: Campu  
s: M   
   
                                                            Performed By: #### L  
500.42219, L500.21537 ####[facility]   
[id number] [place], [place] [address]Vm# [phone]   
   
                                                    Albumin/Globulin   
[Mass ratio]    0.5 {ratio}     Low             0.8-2.0         [facility]  
   
                                        Comment on above:   Order Comment: Campu  
s: M   
   
                                                            Performed By: #### L  
500.61025, L500.73374 ####[facility]   
[id number] [place], [place] [address]Ho# [phone]   
   
                      ALK PHOS   87 U/L     Normal          [facility]  
   
                                        Comment on above:   Order Comment: Campu  
s: M   
   
                                                            Performed By: #### L  
500.02590, L500.23718 ####[facility]   
[id number] [place], [place] [address]Jb# [phone]   
   
                                                    ALT [Catalytic   
activity/Vol]   11 U/L          Low             13-61           [facility]  
   
                                        Comment on above:   Order Comment: Campu  
s: M   
   
                                                            Result Comment: RESU  
LTS MAY BE FALSELY DEPRESSED AFTER THE   
ADMINISTRATION OFSULFASALAZINE AND/OR SULFAPYRIDINE.   
   
                                                            Performed By: #### L  
500.54814, L500.73317 ####[facility]   
[id number] [place], [place] [address]Sf# [phone]   
   
                                                    Anion gap   
[Moles/Vol]     3 mmol/L        Low             5-16            [facility]  
   
                                        Comment on above:   Order Comment: Campu  
s: M   
   
                                                            Performed By: #### L  
500.03199, L500.61124 ####[facility]   
[id number] [place], [place] [address]Tr# [phone]   
   
                                                    AST [Catalytic   
activity/Vol]   49 U/L          High            8-34            [facility]  
   
                                        Comment on above:   Order Comment: Campu  
s: M   
   
                                                            Result Comment: RESU  
LTS MAY BE FALSELY DEPRESSED AFTER THE   
ADMINISTRATION OFSULFASALAZINE AND/OR SULFAPYRIDINE.   
   
                                                            Performed By: #### L  
500.77854, L500.06680 ####[facility]   
[id number] [place], [place] [address]Oz# [phone]   
   
                      BILI TOTAL 0.80 MG/DL Normal     0.2-1.0    [facility]  
   
                                        Comment on above:   Order Comment: Campu  
s: M   
   
                                                            Performed By: #### L  
500.30144, L500.39130 ####[facility]   
[id number] [place], [place] [address]Dl# [phone]   
   
                                                    Calcium   
[Mass/Vol]      8.2 mg/dL       Low             8.5-10.5        [facility]  
   
                                        Comment on above:   Order Comment: Campu  
s: M   
   
                                                            Result Comment: NOTE  
 NEW NORMAL RANGE DUE TO REAGENT CHANGE   
   
                                                            Performed By: #### L  
500.78843, L500.68940 ####[facility]   
[id number] [place], [place] [address]Yl# [phone]   
   
                                                    Chloride   
[Moles/Vol]     107 mmol/L      Normal                    [facility]  
   
                                        Comment on above:   Order Comment: Campu  
s: M   
   
                                                            Performed By: #### L  
500.65495, L500.31673 ####[facility]   
[id number] [place], [place] [address]Rd# [phone]   
   
                      CO2 [Moles/Vol] 30.0 mmol/L Normal     21-32      [facility]  
   
                                        Comment on above:   Order Comment: Campu  
s: M   
   
                                                            Performed By: #### L  
500.98225, L500.67645 ####[facility]   
[id number] [place], [place] [address]Qm# [phone]   
   
                                                    Creatinine   
[Mass/Vol]      0.53 mg/dL      Normal          0.510-0.950     [facility]  
   
                                        Comment on above:   Order Comment: Campu  
s: M   
   
                                                            Result Comment: Kayla  
ents receiving either N-Acetylcysteine (NAC)   
orMetamizole prior to venipuncture, may have falsely   
depressedresults.   
   
                                                            Performed By: #### L  
500.76033, L500.39656 ####[facility]   
[id number] [place], [place] [address]Yy# [phone]   
   
                                                    Globulin (S)   
[Mass/Vol]      3.6 g/dL        Normal          2.2-4.2         [facility]  
   
                                        Comment on above:   Order Comment: Darinu  
s: M   
   
                                                            Performed By: #### L  
500.97254, L500.94031 ####[facility]   
[id number] [place], [place] [address]Xt# [phone]   
   
                                                    Glucose   
[Mass/Vol]      167 mg/dL       High                      [facility]  
   
                                        Comment on above:   Order Comment: Darinu  
s: M   
   
                                                            Result Comment: Delt  
a check [id number]- Normal Fasting; 100-125   
Impaired Fasting; greaterthan 126 on more than one result- Diabetes.   
ADA guidelines.Results may be falsely elevated after the   
administration ofSulfapyridine.Results may be falsely depressed   
after the administration ofSulfasalazine.   
   
                                                            Performed By: #### L  
500.57680, L500.20319 ####[facility]   
[id number] [place], [place] [address]Ip# [phone]   
   
                                                    Potassium   
[Moles/Vol]     2.9 mmol/L      Critically low  3.5-5.1         [facility]  
   
                                        Comment on above:   Order Comment: Campu  
s: M   
   
                                                            Result Comment: Slig  
ht Hemolysis, Result may be affected.Critical   
Result(s)Called at: 06:46:18 on 2021 by maria victoria. Called to nelida   
back by: KENYA PHAN   
   
                                                            Performed By: #### L  
500.88290, L5.68907 ####[facility]   
[id number] [place], [place] [address]Xx# [phone]   
   
                                                    Protein   
[Mass/Vol]      5.4 g/dL        Low             6.0-8.5         [facility]  
   
                                        Comment on above:   Order Comment: Darinu  
s: M   
   
                                                            Performed By: #### L  
500.72722, L500.04334 ####[facility]   
[id number] [place], [place] [address]Ly# [phone]   
   
                                                    Sodium   
[Moles/Vol]     140 mmol/L      Normal          136-145         [facility]  
   
                                        Comment on above:   Order Comment: Campu  
s: M   
   
                                                            Performed By: #### L  
500.56390, L500.83756 ####[facility]   
[id number] [place], [place] [address]Ki# [phone]   
   
                                                    Urea nitrogen   
[Mass/Vol]      7 mg/dL         Normal          7-26            [facility]  
   
                                        Comment on above:   Order Comment: Campu  
s: M   
   
                                                            Performed By: #### L  
500.94217, L500.57079 ####[facility]   
[id number] [place], [place] [address]Ad# [phone]   
   
                                                    Urea   
nitrogen/Creatini  
ne [Mass ratio] 13 mg/mg        Low             15-24           [facility] [facility]  
   
                                        Comment on above:   Order Comment: Campu  
s: M   
   
                                                            Performed By: #### L  
500.08267, L500.03903 ####[facility]   
[id number] [place] [address]Rm# [phone]   
   
                                                    GFR ESTon 2021   
   
                      IF  AMER Greater than 60 Normal                [facility]  
   
                                        Comment on above:   Order Comment: Campu  
s: M   
   
                                                            Performed By: #### L  
[id number], L500.39521 ####[facility]   
[id number] [place], [place] [address]Ig# [phone]   
   
                      IF non-AFR AMER Greater than 60 Normal                [facility]  
   
                                        Comment on above:   Order Comment: Campu  
s: M   
   
                                                            Performed By: #### L  
500.38380, L500.97091 ####[facility]   
[id number] [place] [address]Wh# [phone]   
   
                                                    GLUCOSE METERon 2021   
   
                                                    Glucose   
[Mass/Vol]      257 mg/dL       High                      [facility]  
   
                                                    Glucose   
[Mass/Vol]      241 mg/dL       High                      [facility]  
   
                                                    Glucose   
[Mass/Vol]      159 mg/dL       High                      [facility]  
   
                                                    HHon 2021   
   
                                                    Hematocrit (Bld)   
[Volume fraction] 25.0 %          Low             35.0-47.0       [facility]  
   
                                        Comment on above:   Order Comment: Campu  
s: M   
   
                                                            Performed By: #### L  
200.80230 ####[facility]   
[id number] [place], [place] [address]Vd# [phone]   
   
                                                    Hemoglobin (Bld)   
[Mass/Vol]      7.7 g/dL        Low             11.5-15.5       [facility]  
   
                                        Comment on above:   Order Comment: Campu  
s: M   
   
                                                            Performed By: #### L  
200.73950 ####[facility]   
[id number] [place], [place] [address]Nm# [phone]   
   
                                                    Eloy 2021   
   
                                                    Potassium   
[Moles/Vol]     3.8 mmol/L      Normal          3.5-5.1         [facility]  
   
                                        Comment on above:   Order Comment: Campu  
s: M   
   
                                                            Result Comment: Slig  
ht Hemolysis, Result may be affected.   
   
                                                            Performed By: #### L  
500.15464 ####[facility]   
[id number] [place], [place] [address]Ls# [phone]   
   
                                                    OTPNon 2021   
   
                      OT Progress Note            Normal                [facility]  
   
                      OTPN                  Normal                [facility]  
   
                                                    PROG IMSon 2021   
   
                      PROG IMS              Normal                [facility]  
   
                                                    Progress   
Note-Hospitalist                 Normal                          [facility]  
   
                                                    PROG.Mikayla 2021   
   
                      PROG.CARD             Normal                [facility]  
   
                                                    Progress   
Note-Cardiology                 Normal                          [facility]  
   
                                                    PTPNon 2021   
   
                      PT Progress Note            Normal                [facility]  
   
                      PTPN                  Normal                [facility]  
   
                                                    ABO/RHon 2021   
   
                                                    ABO and Rh group   
Nom (Bld)       Blood group A Rh(D) positive Normal                          [facility]  
   
                                        Comment on above:   Order Comment: Ruddy  
s: MPatient transfused or pregnant in the   
past 3   
months: YESComments: NO RECORD PT STATES RECIEVED BLOOD [place]   
This Patient Going To Surgery? N   
   
                                                    CBCon 2021   
   
                                                    Erythrocyte   
distribution   
width (RBC)   
[Ratio]         15.6 %          High            11-14.5         [facility]  
   
                                        Comment on above:   Order Comment: Ruddy  
s: MMinimal Draw: Y   
   
                                                            Performed By: #### L  
200.47641, L200.84196 ####[facility]   
[id number] [place], [place] [address]Mu# [phone]   
   
                                                    Hematocrit (Bld)   
[Volume fraction] 19.7 %          Critically low  35.0-47.0       [facility]  
   
                                        Comment on above:   Order Comment: Campu  
s: MMinimal Draw: Y   
   
                                                            Result Comment: Repe  
ated and verified.. Critical result verified and   
hasbeen called to STACEY Edmond on 21 at   
03:22, and has been readback.   
   
                                                            Performed By: #### L  
200.87524, L200.75233 ####[id number] [place], [place] [address]Vv# [phone]   
   
                                                    Hemoglobin (Bld)   
[Mass/Vol]      5.8 g/dL        Critically low  11.5-15.5       [facility]  
   
                                        Comment on above:   Order Comment: Campu  
s: MMinimal Draw: Y   
   
                                                            Result Comment: Repe  
ated and verified.. Critical result verified and   
hasbeen called to STACEY Edmond on 21 at   
03:22, and has been readback.   
   
                                                            Performed By: #### L  
200.90801, L200.14076 ####[facility]   
[id number] [place], [place] [address]Ch# [phone]   
   
                                                    MCHC (RBC)   
[Mass/Vol]      29.4 g/dL       Low             32.0-36.0       [facility]  
   
                                        Comment on above:   Order Comment: Campu  
s: MMinimal Draw: Y   
   
                                                            Performed By: #### L  
200.53661, L200.09659 ####[facility]   
[id number] [place], [place] [address]Qn# [phone]   
   
                                                    MCV (RBC)   
[Entitic vol]   80.7 fL         Normal          80.0-99.0       [facility]  
   
                                        Comment on above:   Order Comment: Campu  
s: MMinimal Draw: Y   
   
                                                            Performed By: #### L  
200.22221, L200.33495 ####[facility]   
[id number] [place], [place] [address]Nf# [phone]   
   
                                                    Nucleated RBC/100   
WBC (Bld) [Ratio] 0.2 %           Normal          Less than 1     [facility]  
   
                                        Comment on above:   Order Comment: Campu  
s: MMinimal Draw: Y   
   
                                                            Performed By: #### L  
200.68863, L200.84914 ####[facility]   
[id number] [place], [place] [address]Ej# [phone]   
   
                                                    Platelet mean   
volume (Bld)   
[Entitic vol]   9.4 fL          Normal          9.4-12.4        [facility]  
   
                                        Comment on above:   Order Comment: Campu  
s: MMinimal Draw: Y   
   
                                                            Performed By: #### L  
200.68538, L200.79773 ####[facility]   
[id number] [place], [place] [address]Ua# [phone]   
   
                      PLT        472 K/CU MM High       150-450    [facility]  
   
                                        Comment on above:   Order Comment: Campu  
s: MMinimal Draw: Y   
   
                                                            Performed By: #### L  
200.51513, L200.66423 ####[facility]   
[id number] [place] [address]Hd# [phone]   
   
                      RBC        2.44 M/CU MM Low        3.90-5.30  [facility]  
   
                                        Comment on above:   Order Comment: Campu  
s: MMinimal Draw: Y   
   
                                                            Performed By: #### L  
200.73222, L200.06831 ####[facility]   
[id number] [place] [address]Jx# [phone]   
   
                      WBC        17.7 K/CUMM High       4.5-11.0   [facility]  
   
                                        Comment on above:   Order Comment: Campu  
s: MMinimal Draw: Y   
   
                                                            Performed By: #### L  
200.77987, L200.13524 ####[facility]   
[id number] [place] [address]Oa# [phone]   
   
                                                    CONS.Mikayla 2021   
   
                      CONS.CARD             Normal                [facility]  
   
                                                    Consultation-Card  
iology                          Normal                          [facility]  
   
                                                    GLUCOSE METERon 2021   
   
                                                    Glucose   
[Mass/Vol]      184 mg/dL       High                      [facility]  
   
                                                    Glucose   
[Mass/Vol]      180 mg/dL       High                      [facility]  
   
                                                    Glucose   
[Mass/Vol]      189 mg/dL       High                      [facility]  
   
                                                    Glucose   
[Mass/Vol]      168 mg/dL       High                      [facility]  
   
                                                    HHon 2021   
   
                                                    Hematocrit (Bld)   
[Volume fraction] 27.2 %          Low             35.0-47.0       [facility]  
   
                                        Comment on above:   Order Comment: Campu  
s: M   
   
                                                            Performed By: #### L  
200.38853 ####[facility]   
[id number] [place], [place] [address]Ql# [phone]   
   
                                                    Hemoglobin (Bld)   
[Mass/Vol]      8.6 g/dL        Low             11.5-15.5       [facility]  
   
                                        Comment on above:   Order Comment: Campu  
s: M   
   
                                                            Performed By: #### L  
200.63918 ####[facility]   
[id number] [place], [place] [address]Ze# [phone]   
   
                                                    OTPNon 2021   
   
                      OT Progress Note            Normal                [facility]  
   
                      OTPN                  Normal                [facility]  
   
                                                    PATIENT RETYPEon 2021   
   
                      RETYPE INTERP Positive   Normal                [facility]  
   
                                                    PROG.NOTEon 2021   
   
                      PROG.NOTE             Normal                [facility]  
   
                                                    Progress   
Note-Physician                  Normal                          [facility]  
   
                                                    PROG.ORTHOon 2021   
   
                      PROG.ORTHO            Normal                [facility]  
   
                                                    Progress   
Note-Ortho                      Normal                          [facility]  
   
                                                    PTon 2021   
   
                                                    INR Coag (PPP)   
[Relative time] 1.12 {INR}      High            0.9-1.1         [facility]  
   
                                        Comment on above:   Order Comment: Campu  
s: MMinimal Draw: Y   
   
                                                            Result Comment: Khari  
mmended PT INR therapeutic range for long term   
andprophylactic therapy is 2.0 - 3.0. For heart valve andshunt   
patients the range is 2.5 - 3.5.   
   
                                                            Performed By: #### L  
300.55394, L300.28755 ####[facility]   
[id number] [place], [place] [address]Xl# [phone]   
   
                      PTS        11.9 SECONDS Normal     9.5-12.0   [facility]  
   
                                        Comment on above:   Order Comment: Ruddy  
s: MMinimal Draw: Y   
   
                                                            Performed By: #### L  
300.27181, L300.11385 ####[facility]   
[id number] [place], [place] [address]Yx# [phone]   
   
                                                    PTPNon 2021   
   
                      PT Progress Note            Normal                [facility]  
   
                      PTPN                  Normal                [facility]  
   
                                                    PTTon 2021   
   
                                                    aPTT Coag (Bld)   
[Time]          24.0 s          Normal          22.0-31.5       [facility]  
   
                                        Comment on above:   Order Comment: Darinu  
s: MMinimal Draw: Y   
   
                                                            Result Comment: Ther  
apeutic Heparin Reference Range: High Dose:   
46-75 seconds (DVT/PE) Low Dose: 39-60 seconds (Acute Coronary   
Syndrome)For low molecular weight heparin or danaparoid,   
monitoringis often NOT necessary, but the heparin assay, Xa   
inhibitionassay (send-out) may be used in certain circumstances,   
asthe PTT is generally insensitive to the effect of theseagents.   
Direct thrombin inhibitors are becoming more widelyutilized and   
these drugs are often monitored using the PTT.   
   
                                                            Performed By: #### L  
300.12035, L300.87296 ####[facility]   
[id number] [place], [place] [address]Dg# [phone]   
   
                                                    RBC NO Pablo 2021   
   
                                        RBC NO ACT          TRANSFUSED PRODUCT:   
RBC NO   
ACTIVE BLEED COUNT: 2 Normal                                  [facility]  
   
                                                    SMEAR REVIEWon 2021   
   
                      SMEAR REVIEW            Normal                [facility]  
   
                                        Comment on above:   Order Comment: Ruddy  
s: MMinimal Draw: Y   
   
                                                            Result Comment: Darrick  
ed hypochromic, normocytic anemia with   
increasedpolychromasia, consistent with recent blood loss   
orhemolysis. Few nucleated red blood cells and spherocytesnoted.   
Suggest iron profile.Reviewed by Gracy Donovan M.D., Pathologist.   
3/12/[id number]   
   
                                                            Performed By: #### L  
200.82382, L200.76178 ####[facility]   
[id number] [place], [place] [address]Pj# [phone]   
   
                                                    TROPONIN Ion 2021   
   
                      TROPONIN I 93124.6 pg/mL Critically high 0-34       [facility]  
   
                                        Comment on above:   Order Comment: Ruddy esparza: TU   
   
                                                            Result Comment: Prev  
ious Critical within one week. Critical   
Result(s)verified at: 12:35:03 on 2021 by: Carole Broderick   
NEW NORMAL RANGE DUE TO REAGENT CHANGEThis assay uses different   
antibodies than our current assay,and assays, even by the same   
 may recognizedifferent regions of the antibody and   
cannot be usedinterchangeably. Expect results of this assay to run   
higherthan the previous assay.   
   
                                                            Performed By: #### L  
550.62781 ####[address] [address]Nl# [phone]   
   
                      TROPONIN I 1038.8 pg/mL Critically high 0-34       [facility]  
   
                                        Comment on above:   Order Comment: Ruddy esparza: TU   
   
                                                            Result Comment: Crit  
ical Result(s) Called at: 23:56:40 on 2021   
bypa. Called to and read back by:STANISLAW THAKKAR NEW NORMAL RANGE   
DUE TO REAGENT CHANGEThis assay uses different antibodies than our   
current assay,and assays, even by the same  may   
recognizedifferent regions of the antibody and cannot be   
usedinterchangeably. Expect results of this assay to run higherthan   
the previous assay.   
   
                                                            Performed By: #### L  
550.12295 ####[address] [address]Kw# [phone]   
   
                      TROPONIN I 96039.0 pg/mL Critically high 0-34       [facility]  
   
                                        Comment on above:   Order Comment: Ruddy esparza: TU   
   
                                                            Result Comment: Prev  
ious Critical within one week. Critical   
Result(s)verified at: 04:29:52 on 2021 by: Katty Garcia NEW NORMAL RANGE DUE TO REAGENT CHANGEThis assay   
uses different antibodies than our current assay,and assays, even by   
the same  may recognizedifferent regions of the antibody   
and cannot be usedinterchangeably. Expect results of this assay to   
run higherthan the previous assay.   
   
                                                            Performed By: #### L  
550.69894 ####[address], [place] [address]Gu# [phone]   
   
                                                    UA COMPLETEon 2021   
   
                      Color (U)  Jaye      Normal                [facility]  
   
                                        Comment on above:   Order Comment: Campu  
s: M   
   
                                                            Performed By: #### L  
600.75834 ####[facility]   
[id number] [place], [place] [address]Vw# [phone]   
   
                                                    Glucose (U)   
[Mass/Vol]      500 mg/dL       Normal          NORMAL          [facility]  
   
                                        Comment on above:   Order Comment: Campu  
s: M   
   
                                                            Performed By: #### L  
600.34819 ####[facility]   
[id number] [place] [address]Zz# [phone]   
   
                      UA APPEARANCE Clear      Normal     CLEAR      [facility]  
   
                                        Comment on above:   Order Comment: Campu  
s: M   
   
                                                            Performed By: #### L  
600.81512 ####[facility]   
[id number] [place] [address]Io# [phone]   
   
                      UA BILIRUBIN Negative   Normal     NEGATIVE   [facility]  
   
                                        Comment on above:   Order Comment: Campu  
s: M   
   
                                                            Performed By: #### L  
600.62507 ####[facility]   
[id number] [place] [address]Rc# [phone]   
   
                      UA BLOOD   Negative   Normal     NEGATIVE   [facility]  
   
                                        Comment on above:   Order Comment: Campu  
s: M   
   
                                                            Performed By: #### L  
600.29675 ####[facility]   
[id number] [place] [address]Tb# [phone]   
   
                      UA KETONE  Negative   Normal     NEGATIVE   [facility]  
   
                                        Comment on above:   Order Comment: Campu  
s: M   
   
                                                            Performed By: #### L  
600.71843 ####[facility]   
[id number] [place] [address]Rc# [phone]   
   
                      UA LK ESTERASE Negative   Normal     NEGATIVE   [facility]  
   
                                        Comment on above:   Order Comment: Campu  
s: M   
   
                                                            Performed By: #### L  
600.45866 ####[facility]   
[id number] [place] [address]Tw# [phone]   
   
                      UA NITRITE Negative   Normal     NEGATIVE   [facility]  
   
                                        Comment on above:   Order Comment: Campu  
s: M   
   
                                                            Performed By: #### L  
600.57672 ####[facility]   
[id number] [place], [place] [address]Gk# [phone]   
   
                      UA PH      5.0        Normal     5-6        [facility]   
[place]  
   
                                        Comment on above:   Order Comment: Campu  
s: M   
   
                                                            Performed By: #### L  
600.22983 ####[facility]   
[id number] [place], [place] [address]Al# [phone]   
   
                      UA PROTEIN Negative   Normal     NEGATIVE   [facility]  
   
                                        Comment on above:   Order Comment: Campu  
s: M   
   
                                                            Performed By: #### L  
600.04396 ####[facility]   
[id number] [place], [place] [address]Tz# [phone]   
   
                      UA SPEC GRAV 1.011      Normal     1.005-1.030 [facility]  
   
                                        Comment on above:   Order Comment: Campu  
s: M   
   
                                                            Performed By: #### L  
600.59208 ####[facility]   
[id number] [place] [address]Cb# [phone]   
   
                      UA UROBILINOGEN Negative   Normal     NORMAL     [facility]  
   
                                        Comment on above:   Order Comment: Campu  
s: M   
   
                                                            Performed By: #### L  
600.62508 ####[facility]   
[id number] [place] [address]Eq# [phone]   
   
                                                    BLOOD CULTUREon 2021   
   
                                                    Bacteria   
identified Cx Nom   
(Bld)           NO GROWTH AFTER 5 DAYS Normal                          [facility]  
   
                                        Comment on above:   Order Comment: Campu  
s: M   
   
                                                            Performed By: #### M  
050.47235 ####[facility]   
[id number] [place] [address]Vv# [phone]   
   
                                                    BMPon 2021   
   
                                                    Anion gap   
[Moles/Vol]     5 mmol/L        Normal          5-16            [facility]  
   
                                        Comment on above:   Order Comment: Campu  
s: M   
   
                                                            Performed By: #### L  
500.35574, L500.44874 ####[facility]   
[id number] [place], [place] [address]Je# [phone]   
   
                                                    Calcium   
[Mass/Vol]      8.1 mg/dL       Low             8.5-10.5        [facility]  
   
                                        Comment on above:   Order Comment: Campu  
s: M   
   
                                                            Result Comment: NOTE  
 NEW NORMAL RANGE DUE TO REAGENT CHANGE   
   
                                                            Performed By: #### L  
500.95008, L500.74824 ####[facility]   
[id number] [place], [place] [address]Um# [phone]   
   
                                                    Chloride   
[Moles/Vol]     110 mmol/L      High                      [facility]  
   
                                        Comment on above:   Order Comment: Campu  
s: M   
   
                                                            Performed By: #### L  
500.36352, L500.36690 ####[facility]   
[id number] [place], [place] [address]Sv# [phone]   
   
                      CO2 [Moles/Vol] 26.0 mmol/L Normal     21-32      [facility]  
   
                                        Comment on above:   Order Comment: Campu  
s: M   
   
                                                            Performed By: #### L  
500.19731, L500.84087 ####[facility]   
[id number] [place], [place] [address]Xe# [phone]   
   
                                                    Creatinine   
[Mass/Vol]      0.57 mg/dL      Normal          0.510-0.950     [facility]  
   
                                        Comment on above:   Order Comment: Campu  
s: M   
   
                                                            Result Comment: Kayla  
ents receiving either N-Acetylcysteine (NAC)   
orMetamizole prior to venipuncture, may have falsely   
depressedresults.   
   
                                                            Performed By: #### L  
500.41498, L500.95180 ####[facility]   
[id number] [place], [place] [address]Hw# [phone]   
   
                                                    Glucose   
[Mass/Vol]      128 mg/dL       High                      [facility]  
   
                                        Comment on above:   Order Comment: Campu  
s: M   
   
                                                            Result Comment: 70-1  
00- Normal Fasting; 100-125 Impaired Fasting;   
greaterthan 126 on more than one result- Diabetes. ADA   
guidelines.Results may be falsely elevated after the administration   
ofSulfapyridine.Results may be falsely depressed after the   
administration ofSulfasalazine.   
   
                                                            Performed By: #### L  
500.77524, L500.18410 ####[facility]   
[id number] [place], [place] [address]He# [phone]   
   
                                                    Potassium   
[Moles/Vol]     3.7 mmol/L      Normal          3.5-5.1         [facility]  
   
                                        Comment on above:   Order Comment: Campu  
s: M   
   
                                                            Performed By: #### L  
500.83427, L500.06221 ####[facility]   
[id number] [place], [place] [address]Px# [phone]   
   
                                                    Sodium   
[Moles/Vol]     141 mmol/L      Normal          136-145         [facility]  
   
                                        Comment on above:   Order Comment: Campu  
s: M   
   
                                                            Performed By: #### L  
500.99872, L500.49405 ####[facility]   
[id number] [place], [place] [address]Hy# [phone]   
   
                                                    Urea nitrogen   
[Mass/Vol]      8 mg/dL         Normal          7-26            [facility]  
   
                                        Comment on above:   Order Comment: Campu  
s: M   
   
                                                            Performed By: #### L  
500.59862, L500.07696 ####[facility]   
[id number] [place], [place] [address]Ze# [phone]   
   
                                                    Urea   
nitrogen/Creatini  
ne [Mass ratio] 14 mg/mg        Low             15-24           [facility]  
   
                                        Comment on above:   Order Comment: Campu  
s: M   
   
                                                            Performed By: #### L  
500.94830, L500.88868 ####[facility]   
[id number] [place], [place] [address]Oj# [phone]   
   
                                                    FL LACTIC ACIDon 2021   
   
                      FL LACTIC ACID 7.9 mmol/L Normal     ()         [facility]  
   
                                        Comment on above:   Order Comment: Campu  
s: M   
   
                                                            Result Comment: INTE  
RPRETIVE INFORMATION: Lactic Acid, Body   
FluidReference ranges for this assay have not been established for   
bodyfluid. Results should be interpreted in comparison to the   
lacticacid concentration in blood and in conjunction with the   
clinicalcontextThis test was developed and its performance   
characteristicsdetermined by [facility]. It has not been   
cleared orapproved by the [place] Food and Drug Administration. This test   
wasperformed in a CLIA certified laboratory and is intended   
forclinical purposes.Performed At: [address] [id number] RAYGOZA [id number]   
   
                                                            Performed By: #### L  
400.17701, L400.43208 ####[facility]   
[id number] [place], [place] [address]Jj# [phone]####   
L400.98216 ####[facility] [id number] [place]   
[address]Bn# [phone]   
   
                                                    GFR ESTon 2021   
   
                      IF  AMER Greater than [address] [facility]  
   
                                        Comment on above:   Order Comment: Campu  
s: M   
   
                                                            Performed By: #### L  
500.20418, L500.26933 ####[facility]   
[id number] [place], [place] [address]Fi# [phone]   
   
                      IF non-AFR AMER Greater than 60 [facility]  
   
                                        Comment on above:   Order Comment: Campu  
s: M   
   
                                                            Performed By: #### L  
500.32821, L500.37351 ####[facility]   
[id number] [place], [place] [address]Nn# [phone]   
   
                                                    GLUCOSE METERon 2021   
   
                                                    Glucose   
[Mass/Vol]      245 mg/dL       High                      [facility]  
   
                                                    Glucose   
[Mass/Vol]      108 mg/dL       Normal                    [facility]  
   
                                                    Glucose   
[Mass/Vol]      231 mg/dL       High                      [facility]  
   
                                                    Glucose   
[Mass/Vol]      134 mg/dL       High                      [facility]  
   
                                                    OTARon 2021   
   
                                                    OT Assessment   
Report                          Normal                          [facility]  
   
                      OTAR                  Normal                [facility]   
[place]  
   
                                                    PROG IMSon 2021   
   
                      PROG IMS              Normal                [facility]  
   
                                                    Progress   
Note-Hospitalist                 Normal                          [facility]  
   
                                                    PROG.NOTEon 2021   
   
                      PROG.NOTE             Normal                [facility]  
   
                                                    Progress   
[facility]                  [facility]  
   
                                                    PROG.ORTHOon 2021   
   
                      PROG.ORTHO            Normal                [facility]  
   
                                                    Progress   
Note-[facility]tho                      Normal                          [facility]  
   
                                                    PTPNon 2021   
   
                      PT Progress Note            Normal                [facility]  
   
                      PTPN                  Normal                [facility]   
[place]  
   
                                                    ANAER CULTUREon 03-   
   
                                        ANAER CULTURE       RESULT NO GROWTH OF   
ANAEROBES           Normal                                  [facility]  
   
                                        Comment on above:   Order Comment: Ruddy  
s: M: #1 LEFT KNEE SYNOVIAL FLUID   
ANAEROBIC: #2   
LEFT KNEE SYNOVIAL FLUID AEROBIC   
   
                                                            Performed By: #### M  
100.66400, M1.20172 ####[facility]   
[id number] [place], [place] [address]Cb# [phone]   
   
                                                            Order Comment: Ruddy  
s: M: #4 LEFT KNEE SYNOVIAL TISSUE FOR AEROBIC   
AND ANAEROBIC   
   
                                                    GLUCOSE METERon 03-   
   
                                                    Glucose   
[Mass/Vol]      155 mg/dL       High                      [facility]   
[place]  
   
                                                    Glucose   
[Mass/Vol]      188 mg/dL       High                      [facility]  
   
                                                    Glucose   
[Mass/Vol]      185 mg/dL       High                      [facility]   
[place]  
   
                                                    Glucose   
[Mass/Vol]      137 mg/dL       High                      [facility]  
   
                                                    Glucose   
[Mass/Vol]      147 mg/dL       High                      [facility]  
   
                                                    Glucose   
[Mass/Vol]      77 mg/dL        Low                       [facility]  
   
                                                    OR.OPRPTon 03-   
   
                      Operative Report            Normal                [facility]  
   
                      OR.OPRPT              Normal                [facility]  
   
                                                    PHA.NOTEon 03-   
   
                      PHA.NOTE              Normal                [facility]  
   
                      Pharmacy Note            Normal                [facility]  
   
                                                    PROG.IDon 03-   
   
                      PROG.ID               Normal                [facility]  
   
                                                    Progress   
Note-Infectious   
Dis                             Normal                          [facility]  
   
                                                    PROG.ORTHOon 03-   
   
                      PROG.ORTHO            Normal                [facility]  
   
                                                    Progress   
Note-Ortho                      Normal                          [facility]  
   
                                                    PTARon 03-   
   
                                                    PT Assessment   
Report                          Normal                          [facility]  
   
                      PTAR                  Normal                [facility]   
[place]  
   
                                                    SURGon 03-   
   
                      SURG                  Normal                [facility]  
   
                                                    SURG TISSUEon 03-   
   
                      SURG TISSUE            Normal                [facility]  
   
                                        Comment on above:   Order Comment: Ruddy  
s: M: #4 LEFT KNEE SYNOVIAL TISSUE FOR   
AEROBIC   
AND ANAEROBIC   
   
                                                            Performed By: #### M  
100.71449, M1.96124 ####[facility]   
[id number] [place], [place] [address]Oz# [phone]   
   
                      SURG TISSUE            Normal                [facility]  
   
                                        Comment on above:   Order Comment: Campu  
s: M: #1 LEFT KNEE SYNOVIAL FLUID   
ANAEROBIC: #2   
LEFT KNEE SYNOVIAL FLUID AEROBIC   
   
                                                            Performed By: #### M  
100.27798, M100.22341 ####[facility]   
[id number] [place], [place] [address]Fr# [phone]   
   
                                                    BMPon 2021   
   
                                                    Anion gap   
[Moles/Vol]     3 mmol/L        Low             5-16            [facility]  
   
                                        Comment on above:   Order Comment: Campu  
s: M   
   
                                                            Performed By: #### L  
500.76442, L500.74341 ####[facility]   
[id number] [place], [place] [address]Dj# [phone]   
   
                                                    Calcium   
[Mass/Vol]      8.6 mg/dL       Normal          8.5-10.5        [facility]  
   
                                        Comment on above:   Order Comment: Campu  
s: M   
   
                                                            Result Comment: NOTE  
 NEW NORMAL RANGE DUE TO REAGENT CHANGE   
   
                                                            Performed By: #### L  
500.54117, L500.41487 ####[facility]   
[id number] [place], [place] [address]Om# [phone]   
   
                                                    Chloride   
[Moles/Vol]     110 mmol/L      High                      [facility]  
   
                                        Comment on above:   Order Comment: Campu  
s: M   
   
                                                            Performed By: #### L  
500.98302, L500.18862 ####[facility]   
[id number] [place], [place] [address]Gk# [phone]   
   
                      CO2 [Moles/Vol] 27.0 mmol/L Normal     21-32      [facility]  
   
                                        Comment on above:   Order Comment: Campu  
s: M   
   
                                                            Performed By: #### L  
500.47832, L500.37356 ####[facility]   
[id number] [place], [place] [address]Se# [phone]   
   
                                                    Creatinine   
[Mass/Vol]      0.54 mg/dL      Normal          0.510-0.950     [facility]  
   
                                        Comment on above:   Order Comment: Campu  
s: M   
   
                                                            Result Comment: Kayla  
ents receiving either N-Acetylcysteine (NAC)   
orMetamizole prior to venipuncture, may have falsely   
depressedresults.   
   
                                                            Performed By: #### L  
500.65080, L500.83226 ####[facility]   
[id number] [place], [place] [address]Qi# [phone]   
   
                                                    Glucose   
[Mass/Vol]      165 mg/dL       High                      [facility]  
   
                                        Comment on above:   Order Comment: Campu  
s: M   
   
                                                            Result Comment: 70-1  
00- Normal Fasting; 100-125 Impaired Fasting;   
greaterthan 126 on more than one result- Diabetes. ADA   
guidelines.Results may be falsely elevated after the administration   
ofSulfapyridine.Results may be falsely depressed after the   
administration ofSulfasalazine.   
   
                                                            Performed By: #### L  
500.57790, L500.41803 ####[facility]   
[id number] [place] [address]Jh# [phone]   
   
                                                    Potassium   
[Moles/Vol]     4.1 mmol/L      Normal          3.5-5.1         [facility]  
   
                                        Comment on above:   Order Comment: Campu  
s: M   
   
                                                            Performed By: #### L  
500.17670, L5.47165 ####[facility]   
[id number] [place] [address]Ne# [phone]   
   
                                                    Sodium   
[Moles/Vol]     140 mmol/L      Normal          136-145         [facility]  
   
                                        Comment on above:   Order Comment: Campu  
s: M   
   
                                                            Performed By: #### L  
500.13075, L500.59459 ####[facility]   
[id number] [place] [address]Xv# [phone]   
   
                                                    Urea nitrogen   
[Mass/Vol]      9 mg/dL         Normal          7-26            [facility]  
   
                                        Comment on above:   Order Comment: Campu  
s: M   
   
                                                            Result Comment: Delt  
a check reviewed   
   
                                                            Performed By: #### L  
500.67416, L500.62976 ####[facility]   
[id number] [place] [address]Yl# [phone]   
   
                                                    Urea   
nitrogen/Creatini  
ne [Mass ratio] 17 mg/mg        Normal          15-24           [facility]  
   
                                        Comment on above:   Order Comment: Campu  
s: M   
   
                                                            Performed By: #### L  
500.48983, L500.74850 ####[facility]   
[id number] [place], [place] [address]Hd# [phone]   
   
                                                    BODY FLDon 2021   
   
                      BODY FLD              Normal                [facility]  
   
                                        Comment on above:   Order Comment: Darinu  
s: M   
   
                                                            Performed By: #### M  
100.82257 ####[facility]   
[id number] [place], [place] [address]Yp# [phone]   
   
                                                    GFR ESTon 2021   
   
                      IF  AMER Greater than 60 [facility]  
   
                                        Comment on above:   Order Comment: Campu  
s: M   
   
                                                            Performed By: #### L  
500.77221, L500.34389 ####[facility]   
[id number] [place], [place] [address]Wr# [phone]   
   
                      IF non-AFR AMER Greater than 60 [facility]  
   
                                        Comment on above:   Order Comment: Darinu  
s: M   
   
                                                            Performed By: #### L  
500.78452, L500.55294 ####[facility]   
[id number] [place], [place] [address]Yc# [phone]   
   
                                                    GLUCOSE METERon 2021   
   
                                                    Glucose   
[Mass/Vol]      87 mg/dL        Normal                    [facility]  
   
                                                    Glucose   
[Mass/Vol]      151 mg/dL       High                      [facility]  
   
                                                    Glucose   
[Mass/Vol]      155 mg/dL       High                      [facility]  
   
                                                    Glucose   
[Mass/Vol]      144 mg/dL       High                      [facility]  
   
                                                    PROG IMSon 2021   
   
                      PROG IMS              Normal                [facility]  
   
                                                    Progress   
Note-Hospitalist                 Normal                          [facility]  
   
                                                    PROG.ORTHOon 2021   
   
                      PROG.ORTHO            Normal                [facility]  
   
                                                    Progress   
Note-Ortho                      Normal                          [facility]  
   
                                                    FLC CELL W/DIFFon 2021  
   
   
                      PATH COMMENTS            [facility]  
   
                                        Comment on above:   Order Comment: Campu  
s: M   
   
                                                            Result Comment: CYTO  
PREP INTERPRETATION: Marked acute   
inflammation.Reviewed by Gracy Donovan M.D., Pathologist.   
[id number].   
   
                                                            Performed By: #### L  
400.60775, L400.69738 ####[facility]   
[id number] [place], [place] [address]Ip# [phone]   
L400.86635 ####[facility] [id number] [place]   
[address]Yj# [phone]   
   
                                                    GLUCOSE METERon 2021   
   
                                                    Glucose   
[Mass/Vol]      115 mg/dL       Normal                    [facility]  
   
                                                    Glucose   
[Mass/Vol]      225 mg/dL       High                      [facility]  
   
                                                    Glucose   
[Mass/Vol]      137 mg/dL       High                      [facility]  
   
                                                    Glucose   
[Mass/Vol]      222 mg/dL       High                      [facility]  
   
                                                    PHA.NOTEon 2021   
   
                      PHA.NOTE              Normal                [facility]  
   
                      Pharmacy Note            Normal                [facility]  
   
                                                    PROG IMSon 2021   
   
                      PROG IMS              Normal                [facility]  
   
                                                    Progress   
Note-Hospitalist                 Normal                          [facility]  
   
                                                    PROG.IDon 2021   
   
                      PROG.ID               Normal                [facility]  
   
                                                    Progress   
Note-Infectious   
Dis                             Normal                          [facility]  
   
                                                    PROG.ORTHOon 2021   
   
                      PROG.ORTHO            Normal                [facility]  
   
                                                    Progress   
Note-Ortho                      Normal                          [facility]  
   
                                                    VANC TROUGHon 2021   
   
                      VANC TROUGH 15.0 MCG/ML Normal     15.0-20.0  [facility]  
   
                                        Comment on above:   Order Comment: Campu  
s: M   
   
                                                            Performed By: #### L  
520.70372 ####[facility]   
[id number] [place], [place] [address]Yz# [phone]   
   
                                                    BMPon 2021   
   
                                                    Anion gap   
[Moles/Vol]     8 mmol/L        Normal          5-16            [facility]  
   
                                        Comment on above:   Order Comment: Campu  
s: M   
   
                                                            Performed By: #### L  
500.93718, L500.86705 ####[facility]   
[id number] [place], [place] [address]Xp# [phone]   
   
                                                    Calcium   
[Mass/Vol]      9.4 mg/dL       Normal          8.5-10.5        [facility]  
   
                                        Comment on above:   Order Comment: Campu  
s: M   
   
                                                            Result Comment: NOTE  
 NEW NORMAL RANGE DUE TO REAGENT CHANGE   
   
                                                            Performed By: #### L  
500.00924, L500.97183 ####[facility]   
[id number] [place], [place] [address]Vx# [phone]   
   
                                                    Chloride   
[Moles/Vol]     106 mmol/L      Normal                    [facility]  
   
                                        Comment on above:   Order Comment: Campu  
s: M   
   
                                                            Performed By: #### L  
500.83046, L500.10799 ####[facility]   
[id number] [place], [place] [address]Qy# [phone]   
   
                      CO2 [Moles/Vol] 25.0 mmol/L Normal     21-32      [facility]  
   
                                        Comment on above:   Order Comment: Campu  
s: M   
   
                                                            Performed By: #### L  
500.15257, L500.63199 ####[facility]   
[id number] [place], [place] [address]Qe# [phone]   
   
                                                    Creatinine   
[Mass/Vol]      0.59 mg/dL      Normal          0.510-0.950     [facility]  
   
                                        Comment on above:   Order Comment: Campu  
s: M   
   
                                                            Result Comment: Kayla  
ents receiving either N-Acetylcysteine (NAC)   
orMetamizole prior to venipuncture, may have falsely   
depressedresults.   
   
                                                            Performed By: #### L  
500.81377, L500.57544 ####[facility]   
[id number] [place], [place] [address]Iu# [phone]   
   
                                                    Glucose   
[Mass/Vol]      105 mg/dL       High                      [facility]  
   
                                        Comment on above:   Order Comment: Campu  
s: M   
   
                                                            Result Comment: 70-1  
00- Normal Fasting; 100-125 Impaired Fasting;   
greaterthan 126 on more than one result- Diabetes. ADA   
guidelines.Results may be falsely elevated after the administration   
ofSulfapyridine.Results may be falsely depressed after the   
administration ofSulfasalazine.   
   
                                                            Performed By: #### L  
500.16662, L500.56395 ####[facility]   
[id number] [place], [place] [address]Ka# [phone]   
   
                                                    Potassium   
[Moles/Vol]     4.6 mmol/L      Normal          3.5-5.1         [facility]  
   
                                        Comment on above:   Order Comment: Campu  
s: M   
   
                                                            Performed By: #### L  
500.11915, L500.24836 ####[facility]   
[id number] [place] [address]Ff# [phone]   
   
                                                    Sodium   
[Moles/Vol]     139 mmol/L      Normal          136-145         [facility]  
   
                                        Comment on above:   Order Comment: Campu  
s: M   
   
                                                            Performed By: #### L  
500.51443, L500.35159 ####[facility]   
[id number] [place] [address]Yx# [phone]   
   
                                                    Urea nitrogen   
[Mass/Vol]      20 mg/dL        Normal          7-26            [facility]  
   
                                        Comment on above:   Order Comment: Campu  
s: M   
   
                                                            Performed By: #### L  
500.23850, L500.51020 ####[address] [address]Ae# [phone]   
   
                                                    Urea   
nitrogen/Creatini  
ne [Mass ratio] 34 mg/mg        High            15-24           [facility]  
   
                                        Comment on above:   Order Comment: Campu  
s: M   
   
                                                            Performed By: #### L  
500.63812, L500.49064 ####[address] [address]To# [phone]   
   
                                                    CBC W/DIFFon 2021   
   
                      BASO ABS   0.00 K/CU MM Normal     0-0.2      [facility]  
   
                                        Comment on above:   Order Comment: Campu  
s: M   
   
                                                            Performed By: #### L  
200.74418 ####[facility]   
[id number] [place] [address]Ws# [phone]   
   
                                                    Basophils/100 WBC   
(Bld)           0.5 %           Normal          0-2             [facility]   
[place]  
   
                                        Comment on above:   Order Comment: Campu  
s: M   
   
                                                            Performed By: #### L  
200.08398 ####[facility]   
[id number] [place] [address]Bp# [phone]   
   
                      EOS ABS    0.10 K/CU MM Normal     0-0.5      [facility]   
[place]  
   
                                        Comment on above:   Order Comment: Campu  
s: M   
   
                                                            Performed By: #### L  
200.01718 ####[facility]   
[id number] [place] [address]Kq# [phone]   
   
                                                    Eosinophils/100   
WBC (Bld)       0.9 %           Normal          0-5             [facility]   
[place]  
   
                                        Comment on above:   Order Comment: Campu  
s: M   
   
                                                            Performed By: #### L  
200.20278 ####[place] [address]Ph# [phone]   
   
                                                    Erythrocyte   
distribution   
width (RBC)   
[Ratio]         14.9 %          High            11-14.5         [facility]   
[place]  
   
                                        Comment on above:   Order Comment: Campu  
s: M   
   
                                                            Performed By: #### L  
200.56434 ####[place] [address]Ph# [phone]   
   
                                                    Hematocrit (Bld)   
[Volume fraction] 34.0 %          Low             35.0-47.0       [facility]  
   
                                        Comment on above:   Order Comment: Campu  
s: M   
   
                                                            Performed By: #### L  
200.28057 ####[place] [address]Ph# [phone]   
   
                                                    Hemoglobin (Bld)   
[Mass/Vol]      9.9 g/dL        Low             11.5-15.5       [facility]   
[place]  
   
                                        Comment on above:   Order Comment: Campu  
s: M   
   
                                                            Performed By: #### L  
200.07785 ####[facility]   
[id number] [place], [place] [address]Ph# [phone]   
   
                      IMMATR GRAN ABS 0.00 K/CU MM Normal     Less than 2 [facility]   
[place]  
   
                                        Comment on above:   Order Comment: Campu  
s: M   
   
                                                            Performed By: #### L  
200.04229 ####[facility]   
[id number] [place], [place] [address]Ph# [phone]   
   
                      IMMATURE GRAN % 0.4 %      Normal     Less than 2 [facility]   
[place]  
   
                                        Comment on above:   Order Comment: Campu  
s: M   
   
                                                            Performed By: #### L  
200.05397 ####[facility]   
[id number] [place], [place] [address]Ph# [phone]   
   
                      LYMPH ABS  1.70 K/CU MM Normal     0.9-4.4    [facility]   
[place]  
   
                                        Comment on above:   Order Comment: Campu  
s: M   
   
                                                            Performed By: #### L  
200.70481 ####[facility]   
[id number] [place], [place] [address]Ro# [phone]   
   
                                                    Lymphocytes/100   
WBC (Bld)       21.0 %          Normal          20-40           [facility]   
[place]  
   
                                        Comment on above:   Order Comment: Campu  
s: M   
   
                                                            Performed By: #### L  
200.76091 ####[address] [address]Kw# [phone]   
   
                                                    MCHC (RBC)   
[Mass/Vol]      29.1 g/dL       Low             32.0-36.0       [facility]   
[place]  
   
                                        Comment on above:   Order Comment: Campu  
s: M   
   
                                                            Performed By: #### L  
200.64810 ####[place] [address]Ph# [phone]   
   
                                                    MCV (RBC)   
[Entitic vol]   84.0 fL         Normal          80.0-99.0       [facility]   
[place]  
   
                                        Comment on above:   Order Comment: Campu  
s: M   
   
                                                            Performed By: #### L  
200.90837 ####[facility]   
[id number] [place] [address]Up# [phone]   
   
                      MONO ABS   0.80 K/CU MM Normal     0.1-1.1    [facility]   
[place]  
   
                                        Comment on above:   Order Comment: Campu  
s: M   
   
                                                            Performed By: #### L  
200.44744 ####[facility]   
[id number] [place] [address]Ue# [phone]   
   
                                                    Monocytes/100 WBC   
(Bld)           10.3 %          High            2-10            [facility]   
[place]  
   
                                        Comment on above:   Order Comment: Campu  
s: M   
   
                                                            Performed By: #### L  
200.98857 ####[facility]   
[id number] [place] [address]Pq# [phone]   
   
                      NEUTROPHIL ABS 5.20 K/CU MM Normal     2.0-8.3    [facility]   
[place]  
   
                                        Comment on above:   Order Comment: Campu  
s: M   
   
                                                            Performed By: #### L  
200.34263 ####[facility]   
[id number] [place], [place] [address]Kk# [phone]   
   
                                                    Neutrophils/100   
WBC (Bld)       66.9 %          Normal          45-75           [facility]  
   
                                        Comment on above:   Order Comment: Campu  
s: M   
   
                                                            Performed By: #### L  
200.64555 ####[facility]   
[id number] [place] [address]Th# [phone]   
   
                                                    Nucleated RBC/100   
WBC (Bld) [Ratio] 0.0 %           Normal          Less than 1     [facility]  
   
                                        Comment on above:   Order Comment: Campu  
s: M   
   
                                                            Performed By: #### L  
200.82813 ####[facility]   
[id number] [place] [address]Sl# [phone]   
   
                                                    Platelet mean   
volume (Bld)   
[Entitic vol]   9.0 fL          Low             9.4-12.4        [facility]  
   
                                        Comment on above:   Order Comment: Campu  
s: M   
   
                                                            Performed By: #### L  
200.24339 ####[facility]   
[id number] [place] [address]Pv# [phone]   
   
                      PLT        423 K/CU MM Normal     150-450    [facility]  
   
                                        Comment on above:   Order Comment: Campu  
s: M   
   
                                                            Performed By: #### L  
200.50326 ####[facility]   
[id number] [place] [address]Tc# [phone]   
   
                      RBC        4.05 M/CU MM Normal     3.90-5.30  [facility]  
   
                                        Comment on above:   Order Comment: Campu  
s: M   
   
                                                            Performed By: #### L  
200.66252 ####[facility]   
[id number] [place] [address]Gl# [phone]   
   
                      WBC        7.8 K/CUMM Normal     4.5-11.0   [facility]  
   
                                        Comment on above:   Order Comment: Campu  
s: M   
   
                                                            Performed By: #### L  
200.18823 ####[facility]   
[id number] [place], [place] [address]Ph# [phone]   
   
                                                    CONS.ORTHOon 2021   
   
                      CONS.ORTHO            Normal                [facility]  
   
                                                    CONSULTATION-ORTH  
O                               Normal                          [facility]   
[place]  
   
                                                    GFR ESTon 2021   
   
                      IF  AMER Greater than 60 [facility]  
   
                                        Comment on above:   Order Comment: Campu  
s: M   
   
                                                            Performed By: #### L  
500.12549, L500.24413 ####[facility]   
[id number] [place], [place] [address]Lq# [phone]   
   
                      IF non-AFR AMER Greater than 60 [facility]  
   
                                        Comment on above:   Order Comment: Campu  
s: M   
   
                                                            Performed By: #### L  
500.45349, L500.09688 ####[facility]   
[id number] [place], [place] [address]Jh# [phone]   
   
                                                    GLUCOSE METERon 2021   
   
                                                    Glucose   
[Mass/Vol]      185 mg/dL       High                      [facility]  
   
                                                    Glucose   
[Mass/Vol]      184 mg/dL       High                      [facility]  
   
                                                    Glucose   
[Mass/Vol]      150 mg/dL       High                      [facility]  
   
                                                    Glucose   
[Mass/Vol]      142 mg/dL       High                      [facility]  
   
                                                    Glucose   
[Mass/Vol]      156 mg/dL       High                      [facility]  
   
                                                    HP.IMS.CONon 2021   
   
                      CONSULTATION-H&P            [facility]  
   
                      HP.IMS.CON            Normal                [facility]  
   
                                                    OR.OPRPTon 2021   
   
                      Operative Report            Normal                [facility]  
   
                      OR.OPRPT              Normal                [facility]  
   
                                                    PHA.NOTEon 2021   
   
                      PHA.NOTE              Normal                [facility]  
   
                      Pharmacy Note            Normal                [facility]  
   
                                                    PROG IMSon 2021   
   
                      PROG IMS              [facility]  
   
                                                    Progress   
Note-Hospitalist                 Normal                          [facility]  
   
                                                    BMPon 2021   
   
                                                    Anion gap   
[Moles/Vol]     6 mmol/L        Normal          5-16            [facility]  
   
                                        Comment on above:   Order Comment: Campu  
s: M   
   
                                                            Performed By: #### L  
550.18720, L550.48228, L500.18035, L500.10155   
####[facility] [id number] [place], [place]   
[address]Mg# [phone]   
   
                                                    Calcium   
[Mass/Vol]      10.0 mg/dL      Normal          8.5-10.5        [facility]   
[place]  
   
                                        Comment on above:   Order Comment: Campu  
s: M   
   
                                                            Result Comment: NOTE  
 NEW NORMAL RANGE DUE TO REAGENT CHANGE   
   
                                                            Performed By: #### L  
550.23060, L550.22938, L500.94597, L500.98548   
####[facility] [id number] [place], [place]   
[address]Qs# [phone]   
   
                                                    Chloride   
[Moles/Vol]     104 mmol/L      Normal                    [facility]  
   
                                        Comment on above:   Order Comment: Campu  
s: M   
   
                                                            Performed By: #### L  
550.40831, L550.09000, L500.87674, L500.45751   
####[facility] [id number] [place], [place]   
[address]Wv# [phone]   
   
                      CO2 [Moles/Vol] 28.0 mmol/L Normal     21-32      [facility]   
[place]  
   
                                        Comment on above:   Order Comment: Campu  
s: M   
   
                                                            Performed By: #### L  
550.50274, L550.86981, L500.52178, L500.85103   
####[facility] [id number] [place], [place]   
[address]Nb# [phone]   
   
                                                    Creatinine   
[Mass/Vol]      0.61 mg/dL      Normal          0.510-0.950     [facility]   
[place]  
   
                                        Comment on above:   Order Comment: Campu  
s: M   
   
                                                            Result Comment: Kayla  
ents receiving either N-Acetylcysteine (NAC)   
orMetamizole prior to venipuncture, may have falsely   
depressedresults.   
   
                                                            Performed By: #### L  
550.76858, L550.04925, L500.46847, L500.06316   
####[facility] [id number] [place], [place]   
[address]Ti# [phone]   
   
                                                    Glucose   
[Mass/Vol]      189 mg/dL       High                      [facility]   
[place]  
   
                                        Comment on above:   Order Comment: Campu  
s: M   
   
                                                            Result Comment: 70-1  
00- Normal Fasting; 100-125 Impaired Fasting;   
greaterthan 126 on more than one result- Diabetes. ADA   
guidelines.Results may be falsely elevated after the administration   
ofSulfapyridine.Results may be falsely depressed after the   
administration ofSulfasalazine.   
   
                                                            Performed By: #### L  
550.94526, L550.54723, L500.08755, L500.00985   
####[facility] [id number] [place], [place]   
[address]Lo# [phone]   
   
                                                    Potassium   
[Moles/Vol]     4.5 mmol/L      Normal          3.5-5.1         [facility]  
   
                                        Comment on above:   Order Comment: Campu  
s: M   
   
                                                            Performed By: #### L  
550.90288, L550.72081, L500.05677, L500.35232   
####[facility] [id number] [place], [place]   
[address]Yf# [phone]   
   
                                                    Sodium   
[Moles/Vol]     138 mmol/L      Normal          136-145         [facility]  
   
                                        Comment on above:   Order Comment: Campu  
s: M   
   
                                                            Performed By: #### L  
550.79038, L550.12085, L500.28909, L500.64975   
####[facility] [id number] [place], [place]   
[address]Dq# [phone]   
   
                                                    Urea nitrogen   
[Mass/Vol]      25 mg/dL        Normal          7-26            [facility]  
   
                                        Comment on above:   Order Comment: Campu  
s: M   
   
                                                            Performed By: #### L  
550.00595, L550.01456, L500.95618, L500.35297   
####[facility] [id number] [place], [place]   
[address]Kf# [phone]   
   
                                                    Urea   
nitrogen/Creatini  
ne [Mass ratio] 41 mg/mg        High            15-24           [facility]  
   
                                        Comment on above:   Order Comment: Campu  
s: M   
   
                                                            Performed By: #### L  
550.55465, L550.97082, L500.35795, L500.06955   
####[facility] [id number] [place], [place]   
[address]Ry# [phone]   
   
                                                    CBC W/DIFFon 2021   
   
                      BASO ABS   0.10 K/CU MM Normal     0-0.2      [facility]   
[place]  
   
                                        Comment on above:   Order Comment: Campu  
s: M   
   
                                                            Performed By: #### L  
200.11138, L200.12108 ####[facility]   
[id number] [place], [place] [address]Qa# [phone]   
   
                                                    Basophils/100 WBC   
(Bld)           0.5 %           Normal          0-2             [facility]   
[place]  
   
                                        Comment on above:   Order Comment: Campu  
s: M   
   
                                                            Performed By: #### L  
200.71422, L200.96066 ####[facility]   
[id number] [place], [place] [address]Jb# [phone]   
   
                      EOS ABS    0.10 K/CU MM Normal     0-0.5      [facility]   
[place]  
   
                                        Comment on above:   Order Comment: Campu  
s: M   
   
                                                            Performed By: #### L  
200.50839, L200.33446 ####[facility]   
[id number] [place] [address]Yo# [phone]   
   
                                                    Eosinophils/100   
WBC (Bld)       1.2 %           Normal          0-5             [facility]   
[place]  
   
                                        Comment on above:   Order Comment: Campu  
s: M   
   
                                                            Performed By: #### L  
200.36671, L200.57852 ####[facility]   
[id number] [place], [place] [address]Gl# [phone]   
   
                                                    Erythrocyte   
distribution   
width (RBC)   
[Ratio]         15.0 %          High            11-14.5         [facility]  
   
                                        Comment on above:   Order Comment: Campu  
s: M   
   
                                                            Performed By: #### L  
200.53138, L200.50539 ####[facility]   
[id number] [place], [place] [address]Rf# [phone]   
   
                                                    Hematocrit (Bld)   
[Volume fraction] 34.1 %          Low             35.0-47.0       [facility]  
   
                                        Comment on above:   Order Comment: Campu  
s: M   
   
                                                            Performed By: #### L  
200.58926, L200.77712 ####[facility]   
[id number] [place], [place] [address]Kv# [phone]   
   
                                                    Hemoglobin (Bld)   
[Mass/Vol]      9.9 g/dL        Low             11.5-15.5       [facility]   
[place]  
   
                                        Comment on above:   Order Comment: Campu  
s: M   
   
                                                            Performed By: #### L  
200.91701, L200.10818 ####[facility]   
[id number] [place] [address]Ph# [phone]   
   
                      IMMATR GRAN ABS 0.10 K/CU MM Normal     Less than 2 [facility]   
[place]  
   
                                        Comment on above:   Order Comment: Campu  
s: M   
   
                                                            Performed By: #### L  
200.01470, L200.09106 ####[facility]   
[id number] [place], [place] [address]Ph# [phone]   
   
                      IMMATURE GRAN % 0.7 %      Normal     Less than 2 [facility]   
[place]  
   
                                        Comment on above:   Order Comment: Campu  
s: M   
   
                                                            Performed By: #### L  
200.78672, L200.05262 ####[facility]   
[id number] [place], [place] [address]Ph# [phone]   
   
                      LYMPH ABS  1.40 K/CU MM Normal     0.9-4.4    [facility]   
[place]  
   
                                        Comment on above:   Order Comment: Campu  
s: M   
   
                                                            Performed By: #### L  
200.47009, L200.73537 ####[facility]   
[id number] [place] [address]Qw# [phone]   
   
                                                    Lymphocytes/100   
WBC (Bld)       14.3 %          Low             20-40           [facility]  
   
                                        Comment on above:   Order Comment: Campu  
s: M   
   
                                                            Performed By: #### L  
200.30950, L200.65191 ####[facility]   
[id number] [place] [address]Ri# [phone]   
   
                                                    MCHC (RBC)   
[Mass/Vol]      29.0 g/dL       Low             32.0-36.0       [facility]   
[place]  
   
                                        Comment on above:   Order Comment: Campu  
s: M   
   
                                                            Performed By: #### L  
200.29628, L200.31030 ####[facility]   
[id number] [place], [place] [address]Ph# [phone]   
   
                                                    MCV (RBC)   
[Entitic vol]   83.4 fL         Normal          80.0-99.0       [facility]   
[place]  
   
                                        Comment on above:   Order Comment: Campu  
s: M   
   
                                                            Performed By: #### L  
200.28736, L200.99880 ####[facility]   
[id number] [place], [place] [address]Wi# [phone]   
   
                      MONO ABS   0.70 K/CU MM Normal     0.1-1.1    [facility]  
   
                                        Comment on above:   Order Comment: Campu  
s: M   
   
                                                            Performed By: #### L  
200.33034, L200.17552 ####[facility]   
[id number] [place], [place] [address]Jg# [phone]   
   
                                                    Monocytes/100 WBC   
(Bld)           7.4 %           Normal          2-10            [facility]  
   
                                        Comment on above:   Order Comment: Campu  
s: M   
   
                                                            Performed By: #### L  
200.79431, L200.57423 ####[facility]   
[id number] [place], [place] [address]Ph# [phone]   
   
                      NEUTROPHIL ABS 7.50 K/CU MM Normal     2.0-8.3    [facility]  
   
                                        Comment on above:   Order Comment: Campu  
s: M   
   
                                                            Performed By: #### L  
200.36459, L200.67014 ####[facility]   
[id number] [place] [address]Za# [phone]   
   
                                                    Neutrophils/100   
WBC (Bld)       75.9 %          High            45-75           [facility]  
   
                                        Comment on above:   Order Comment: Campu  
s: M   
   
                                                            Performed By: #### L  
200.80419, L200.87203 ####[facility]   
[id number] [place] [address]Fl# [phone]   
   
                                                    Nucleated RBC/100   
WBC (Bld) [Ratio] 0.0 %           Normal          Less than 1     [facility]  
   
                                        Comment on above:   Order Comment: Campu  
s: M   
   
                                                            Performed By: #### L  
200.37652, L200.71127 ####[facility]   
[id number] [place], [place] [address]Cs# [phone]   
   
                                                    Platelet mean   
volume (Bld)   
[Entitic vol]   9.4 fL          Normal          9.4-12.4        [facility]  
   
                                        Comment on above:   Order Comment: Campu  
s: M   
   
                                                            Performed By: #### L  
200.48186, L200.92985 ####[facility]   
[id number] [place], [place] [address]Rn# [phone]   
   
                      PLT        541 K/CU MM High       150-450    [facility]  
   
                                        Comment on above:   Order Comment: Campu  
s: M   
   
                                                            Performed By: #### L  
200.40831, L200.20414 ####[facility]   
[id number] [place], [place] [address]Xi# [phone]   
   
                      RBC        4.09 M/CU MM Normal     3.90-5.30  [facility]  
   
                                        Comment on above:   Order Comment: Campu  
s: M   
   
                                                            Performed By: #### L  
200.85354, L200.71884 ####[facility]   
[id number] [place], [place] [address]Fa# [phone]   
   
                      WBC        9.9 K/CUMM Normal     4.5-11.0   [facility]  
   
                                        Comment on above:   Order Comment: Campu  
s: M   
   
                                                            Performed By: #### L  
200.91539, L200.31358 ####[facility]   
[id number] [place], [place] [address]Ms# [phone]   
   
                                                    CDLECHOon 2021   
   
                      CDLECHO               Normal                [facility]  
   
                                                    ECHOCARDIOGRAM   
REPORT                          Normal                          [facility]  
   
                                                    CRPon 2021   
   
                      CRP        18.42 MG/DL Normal     LESS THAN 1 [facility]  
   
                                        Comment on above:   Order Comment: Campu  
s: M   
   
                                                            Performed By: #### L  
550.49402, L550.83031, L500.93457, L500.55696   
####[facility] [id number] [place], [place]   
[address]Ds# [phone]   
   
                                                    DSon 2021   
   
                      DS                    Normal                [facility]  
   
                                                    Ray 2021   
   
                                                    EMERGENCY   
PHYSICIAN REPORT                        This is a preliminary report   
only, as the practitioner   
review and authentication   
has not occurred.   Normal                                  [facility]   
[place]  
   
                      ER                    Normal                [facility]   
[place]  
   
                                                    FLC CELL W/DIFFon 2021  
   
   
                      FLD APPEAR TURBID     Normal                [facility]  
   
                                        Comment on above:   Order Comment: Campu  
s: M   
   
                                                            Performed By: #### L  
400.13068, L400.61050 ####[facility]   
[id number] [place] [address]Yx# [phone]####   
L400.20131 ####[facility] [id number] [place]   
[address]Vo# [phone]   
   
                      FLD BASO   0 %        Normal                [facility]  
   
                                        Comment on above:   Order Comment: Campu  
s: M   
   
                                                            Performed By: #### L  
400.85551, L400.31494 ####[address] [address]Cj# [phone]####   
L400.72861 ####[facility] [id number] [place]   
[address]Hz# [phone]   
   
                      FLD BLAST  0 %        Normal                [facility]  
   
                                        Comment on above:   Order Comment: Campu  
s: M   
   
                                                            Performed By: #### L  
400.50609, L400.15767 ####[address] [address]Fd# [phone]####   
L400.94130 ####[facility] JLARAIP[address] [place]   
[address]Vi# [phone]   
   
                      FLD EOS    0 %        Normal                [facility]  
   
                                        Comment on above:   Order Comment: Campu  
s: M   
   
                                                            Performed By: #### L  
400.32747, L400.06521 ####[facility]   
[id number] [place], [place] [address]Mk# [phone]####   
L400.13100 ####[facility] [id number] [place]   
[address]Th# [phone]   
   
                      FLD LYMPH  1 %        Normal     NONE       [facility]  
   
                                        Comment on above:   Order Comment: Campu  
s: M   
   
                                                            Performed By: #### L  
400.22315, L400.99816 ####[facility]   
[id number] [place], [place] [address]Se# [phone]####   
L400.07792 ####[facility] HMTBIVO[address] [place]   
[address]Aa# [phone]   
   
                      FLD MONO/HISTIO 1 %        Normal     NONE       [facility]   
[place]  
   
                                        Comment on above:   Order Comment: Campu  
s: M   
   
                                                            Performed By: #### L  
400.13364, L400.26914 ####[place] [address]Ph# [phone]####   
L400.62251 ####[facility] NYYXEWU9628 [place]   
[address]Pa# [phone]   
   
                      FLD NEUTS  98 %       Normal     NONE       [facility]  
   
                                        Comment on above:   Order Comment: Campu  
s: M   
   
                                                            Performed By: #### L  
400.78968, L400.99985 ####[place] [address]Ph# [phone]####   
L400.14104 ####[facility] ATFSXDJ8740 [place]   
[address]Ut# [phone]   
   
                      FLD OTHER CELLS 0          Normal                [facility]  
   
                                        Comment on above:   Order Comment: Campu  
s: M   
   
                                                            Performed By: #### L  
400.01178, L400.19440 ####[facility]   
FURMVJHOUR[address] [place] [address]Fr# [phone]####   
L400.10327 ####[facility] SEGIZYL9390 [place]   
[address]Ck# [phone]   
   
                      FLD PMN%   98.1 %     Normal                [facility]  
   
                                        Comment on above:   Order Comment: Campu  
s: M   
   
                                                            Result Comment: 2 pa  
rt automated diff (polymorphonuclear cells)   
   
                                                            Performed By: #### L  
400.28217, L400.79536 ####[facility]   
[id number] [place], [place] [address]Ph# [phone]####   
L400.85349 ####[facility] VAIXNNZ8130 [place]   
[address]Gj# [phone]   
   
                      FLD RBC    [address] /CU MM Normal                [facility]   
[place]  
   
                                        Comment on above:   Order Comment: Campu  
s: M   
   
                                                            Performed By: #### L  
400.17179, L400.93851 ####[facility]   
[id number] [place], [place] [address]Dm# [phone]####   
L400.61495 ####[facility] EMAFDLQ2715 [place]   
[address]Cm# [phone]   
   
                      FLD WBC    [address] /CU MM High       0-5        [facility]   
[place]  
   
                                        Comment on above:   Order Comment: Campu  
s: M   
   
                                                            Performed By: #### L  
400.67693, L400.58110 ####[address] [address]Qk# [phone]####   
L400.76287 ####[facility] EAVFSIS[address]461 [place]   
[address]Uv# [phone]   
   
                      FLDMN%     1.9 %      Normal                [facility]   
[place]  
   
                                        Comment on above:   Order Comment: Campu  
s: M   
   
                                                            Result Comment: 2 pa  
rt automated diff (mononuclear cells)   
   
                                                            Performed By: #### L  
400.71203, L400.69937 ####[facility]   
[id number] [place], [place] [address]Rg# [phone]####   
L400.42823 ####[facility] JONELWJ[address]75 [place]   
[address]Hu# [phone]   
   
                      FLUID COLOR RED        Normal                [facility]   
[place]  
   
                                        Comment on above:   Order Comment: Campu  
s: M   
   
                                                            Performed By: #### L  
400.80170, L400.93923 ####[facility]   
VGGPXBMVZY3914 [place], [place] [address]Ne# [phone]####   
L400.08351 ####[facility] LZCXLGH3736 [place]   
[address]Zu# [phone]   
   
                      SOURCE     SYNOVIAL FLD Normal                [facility]  
   
                                        Comment on above:   Order Comment: Darinu  
s: M   
   
                                                            Performed By: #### L  
400.36581, L400.91604 ####[facility]   
[id number] [place], [place] [address]Wu# [phone]####   
L400.90614 ####[facility] IRYLKTP5172 [place], [place]   
[address]Yu# [phone]   
   
                                                    FLUID CRYSTALSon 2021   
   
                      FLUID CRYSTALS NC         Normal                [facility]  
   
                                        Comment on above:   Order Comment: Darinu  
s: M   
   
                                                            Result Comment: NO C  
RYSTALS SEEN   
   
                                                            Performed By: #### L  
400.05640, L400.75604 ####[facility]   
[id number] [place], [place] [address]Ix# [phone]####   
L400.82687 ####[facility] WMUURDS7380 [place]   
[address]Nl# [phone]   
   
                                                    GFR ESTon 2021   
   
                      IF  AMER Greater than 60 Normal                [facility]  
   
                                        Comment on above:   Order Comment: Campu  
s: M   
   
                                                            Performed By: #### L  
550.76919, L550.15044, L500.67877, L500.46032   
####[facility] [id number] [place], [place]   
[address]Cp# [phone]   
   
                      IF non-AFR AMER Greater than 60 [facility]  
   
                                        Comment on above:   Order Comment: Darinu  
s: M   
   
                                                            Performed By: #### L  
550.97282, L550.17696, L500.56795, L500.62361   
####[facility] [id number] [place], [place]   
[address]Mf# [phone]   
   
                                                    GLUCOSE METERon 2021   
   
                                                    Glucose   
[Mass/Vol]      117 mg/dL       Normal                    [facility]  
   
                                                    HP.IMS.ADMon 2021   
   
                      Admission-H&P            Normal                [facility]  
   
                      HP.IMS.ADM            Normal                [facility]  
   
                                                    KNEE COMP 4 OR MORE VWS LTon  
 2021   
   
                                                    KNEE COMP 4 OR   
MORE VWS LT                     Normal                          [facility]  
   
                                                    LACTATE BLOODon 2021   
   
                      LACTATE BLOOD 2.96 MMOL/L High       0.40-2.00  [facility]  
   
                                        Comment on above:   Order Comment: Ruddy MAE   
   
                                                            Performed By: #### L  
550.79690 ####[facility]   
[id number] [place], [place] [address]Lx# [phone]   
   
                                                    LACTIC ACIDon 2021   
   
                                                    Lactate   
[Moles/Vol]     1.98 mmol/L     Normal          0.40-2.00       [facility]  
   
                                        Comment on above:   Performed By: #### L  
550.15290 ####[facility]   
[id number] [place], [place] [address]As# [phone]   
   
                                                    PNon 2021   
   
                      PN                    Normal                [facility]  
   
                      PROGRESS NOTE            Normal                [facility]  
   
                                                    PORTABLE CHESTon 2021   
   
                      PORTABLE CHEST            Normal                [facility]  
   
                                                    PROCAL Aon 2021   
   
                      PROCAL A   0.26 NG/ML Normal     0-0.50     [facility]  
   
                                        Comment on above:   Order Comment: Ruddy MAE   
   
                                                            Result Comment: PCT   
Concentration InterpretationPCT <=0.1   
NG/ML:Normal range for healthy adultsPCT >0.1 NG/ML and <0.5   
NG/ML:Systemic infection (sepsis) is possible and may   
requireantibiotic treatment, but other conditions are known   
toelevate PCT as well.PCT >0.5 NG/ML:Should ne considered at risk   
for developing severe sepsisor septic shock.PCT >2.0 NG/ML:Important   
systemic inflammatory response. Almost exclusivelyindicates episode   
of severe bacterial sepsis or septicshock.This assy uses differnt   
methodologies and produces resultssignificantly lower than the   
Vidas. It is recommended toevaluate patients for sepsis based on   
results obtained fromthe [employer] platform vs the [employer] V[employer]   
platform(previous).NOTE NEW NORMAL RANGE DUE TO REAGENT CHANGE   
   
                                                            Performed By: #### L  
550.84398, L550.79975, L500.19973, L500.74947   
####[facility] [id number] [place], [place]   
[address]Gm# [phone]   
   
                                                    [id number] 2021   
   
                                                    SARS-CoV-2   
(COVID-19) RNA   
RIDDHI+probe Ql   
(Unsp spec)               Negative                  Invalid   
Interpretation   
Code                      Negative                  [facility]  
   
                                        Comment on above:   Order Comment: Campu  
s: M   
   
                                                            Result Comment: RESU  
LTS CALLED TO A. HOSLER UAAT 1658 21 BY   
MILLIE PICHARDONegative results do not preclude SARS-CoV-2   
infection andshould not be used as the sole basis for treatment or   
otherpatient management decisions. Negative results must becombined   
with clinical observation, patient history, andepidemiological   
information. This test has been authorizedby the FDA under the   
Emergency Use Authorization (EUA) foruse by authorized   
laboratories.This test was performed by PCR.   
   
                                                            Performed By: #### L  
770.42456 ####[facility]   
[id number] [place], [place] [address]Ph# [phone]   
   
                                                    TROPONIN Ion 2021   
   
                      TROPONIN I 11.6 pg/mL Normal     0-34       [facility]  
   
                                        Comment on above:   Order Comment: Campu  
s: MPatient care unavailable   
   
                                                            Result Comment: NOTE  
 NEW NORMAL RANGE DUE TO REAGENT CHANGEThis   
assay uses different antibodies than our current assay,and assays,   
even by the same  may recognizedifferent regions of the   
antibody and cannot be usedinterchangeably. Expect results of this   
assay to run higherthan the previous assay.   
   
                                                            Performed By: #### L  
550.18344 ####[facility]   
[id number] [place], [place] [address]Zt# [phone]   
   
                                                    UA COMPLETEon 2021   
   
                      Color (U)  Yellow     Normal                [facility]  
   
                                        Comment on above:   Order Comment: Campu  
s: M   
   
                                                            Performed By: #### L  
600.13710 ####[facility]   
[id number] [place], [place] [address]Oz# [phone]   
   
                                                    Glucose (U)   
[Mass/Vol]      500 mg/dL       Normal          NORMAL          [facility]  
   
                                        Comment on above:   Order Comment: Campu  
s: M   
   
                                                            Performed By: #### L  
600.17357 ####[facility]   
[id number] [place], [place] [address]Sn# [phone]   
   
                      UA APPEARANCE Clear      Normal     CLEAR      [facility]   
[place]  
   
                                        Comment on above:   Order Comment: Campu  
s: M   
   
                                                            Performed By: #### L  
600.53513 ####[facility]   
[id number] [place], [place] [address]Zl# [phone]   
   
                      UA BILIRUBIN Negative   Normal     NEGATIVE   [facility]   
[place]  
   
                                        Comment on above:   Order Comment: Campu  
s: M   
   
                                                            Performed By: #### L  
600.43871 ####[facility]   
[id number] [place] [address]Su# [phone]   
   
                      UA BLOOD   Negative   Normal     NEGATIVE   [facility]   
[place]  
   
                                        Comment on above:   Order Comment: Campu  
s: M   
   
                                                            Performed By: #### L  
600.57869 ####[facility]   
[id number] [place] [address]Aa# [phone]   
   
                      UA KETONE  Negative   Normal     NEGATIVE   [facility]   
[place]  
   
                                        Comment on above:   Order Comment: Campu  
s: M   
   
                                                            Performed By: #### L  
600.00597 ####[facility]   
[id number] [place] [address]Hi# [phone]   
   
                      UA LK ESTERASE Negative   Normal     NEGATIVE   [facility]   
[place]  
   
                                        Comment on above:   Order Comment: Campu  
s: M   
   
                                                            Performed By: #### L  
600.32504 ####[facility]   
[id number] [place] [address]Ye# [phone]   
   
                      UA NITRITE Negative   Normal     NEGATIVE   [facility]   
[place]  
   
                                        Comment on above:   Order Comment: Campu  
s: M   
   
                                                            Performed By: #### L  
600.75396 ####[facility]   
[id number] [place] [address]Xz# [phone]   
   
                      UA PH      5.0        Normal     5-6        [facility]   
[place]  
   
                                        Comment on above:   Order Comment: Campu  
s: M   
   
                                                            Performed By: #### L  
600.05660 ####[facility]   
[id number] [place] [address]Dn# [phone]   
   
                      UA PROTEIN Negative   Normal     NEGATIVE   [facility]   
[place]  
   
                                        Comment on above:   Order Comment: Campu  
s: M   
   
                                                            Performed By: #### L  
600.93917 ####[facility]   
[id number] [place] [address]Pr# [phone]   
   
                      UA SPEC GRAV 1.028      Normal     1.005-1.030 [facility]  
   
                                        Comment on above:   Order Comment: Campu  
s: M   
   
                                                            Performed By: #### L  
600.32701 ####[facility]   
[id number] [place] [address]Bq# [phone]   
   
                      UA UROBILINOGEN Negative   Normal     NORMAL     [facility]  
   
                                        Comment on above:   Order Comment: Campu  
s: M   
   
                                                            Performed By: #### L  
600.63014 ####[address] [address]Pt# [phone]   
   
                                                    WSR/MODon 2021   
   
                      WSR/MOD    83 MM/HR   High       0-30       [facility]  
   
                                        Comment on above:   Order Comment: Campu  
s: M   
   
                                                            Performed By: #### L  
200.61092, L200.22275 ####[facility]   
[id number] [place], [place] [address]Ph# [phone]   
   
                                                    CBC W/DIFFon 2021   
   
                      BASO ABS   0.10 K/CU MM Normal     0-0.2      [facility]  
   
                                        Comment on above:   Order Comment: Campu  
s: M   
   
                                                            Performed By: #### L  
200.48224 ####[facility]   
[id number] [place] [address]Zl# [phone]   
   
                                                    Basophils/100 WBC   
(Bld)           0.5 %           Normal          0-2             [facility]  
   
                                        Comment on above:   Order Comment: Campu  
s: M   
   
                                                            Performed By: #### L  
200.96973 ####[facility]   
[id number] [place] [address]Tw# [phone]   
   
                      EOS ABS    0.70 K/CU MM High       0-0.5      [facility]  
   
                                        Comment on above:   Order Comment: Campu  
s: M   
   
                                                            Performed By: #### L  
200.61355 ####[facility]   
[id number] [place], [place] [address]Ph# [phone]   
   
                                                    Eosinophils/100   
WBC (Bld)       5.9 %           High            0-5             [facility]   
[place]  
   
                                        Comment on above:   Order Comment: Campu  
s: M   
   
                                                            Performed By: #### L  
200.39283 ####[facility]   
[id number] [place], [place] [address]Tq# [phone]   
   
                                                    Erythrocyte   
distribution   
width (RBC)   
[Ratio]         14.5 %          Normal          11-14.5         [facility]   
[place]  
   
                                        Comment on above:   Order Comment: Campu  
s: M   
   
                                                            Performed By: #### L  
200.05927 ####[facility]   
[id number] [place], [place] [address]Ph# [phone]   
   
                                                    Hematocrit (Bld)   
[Volume fraction] 33.2 %          Low             35.0-47.0       [facility]  
   
                                        Comment on above:   Order Comment: Campu  
s: M   
   
                                                            Performed By: #### L  
200.69059 ####[address] [address]Jz# [phone]   
   
                                                    Hemoglobin (Bld)   
[Mass/Vol]      9.9 g/dL        Low             11.5-15.5       [facility]   
[place]  
   
                                        Comment on above:   Order Comment: Campu  
s: M   
   
                                                            Performed By: #### L  
200.35120 ####[facility]   
[id number] [place], [place] [address]Ph# [phone]   
   
                      IMMATR GRAN ABS 0.10 K/CU MM Normal     Less than 2 [facility]   
[place]  
   
                                        Comment on above:   Order Comment: Campu  
s: M   
   
                                                            Performed By: #### L  
200.86014 ####[facility]   
[id number] [place] [address]Pi# [phone]   
   
                      IMMATURE GRAN % 0.5 %      Normal     Less than 2 [facility]   
[place]  
   
                                        Comment on above:   Order Comment: Campu  
s: M   
   
                                                            Performed By: #### L  
200.08070 ####[facility]   
[id number] [place] [address]Lc# [phone]   
   
                      LYMPH ABS  3.80 K/CU MM Normal     0.9-4.4    [facility]   
[place]  
   
                                        Comment on above:   Order Comment: Campu  
s: M   
   
                                                            Performed By: #### L  
200.53752 ####[facility]   
[id number] [place], [place] [address]Ov# [phone]   
   
                                                    Lymphocytes/100   
WBC (Bld)       30.6 %          Normal          20-40           [facility]  
   
                                        Comment on above:   Order Comment: Campu  
s: M   
   
                                                            Performed By: #### L  
200.33900 ####[facility]   
[id number] [place], [place] [address]Th# [phone]   
   
                                                    MCHC (RBC)   
[Mass/Vol]      29.8 g/dL       Low             32.0-36.0       [facility]  
   
                                        Comment on above:   Order Comment: Campu  
s: M   
   
                                                            Performed By: #### L  
200.96644 ####[place] [address]Ph# [phone]   
   
                                                    MCV (RBC)   
[Entitic vol]   83.2 fL         Normal          80.0-99.0       [facility]  
   
                                        Comment on above:   Order Comment: Campu  
s: M   
   
                                                            Performed By: #### L  
200.87232 ####[facility]   
[id number] [place] [address]Kt# [phone]   
   
                      MONO ABS   1.00 K/CU MM Normal     0.1-1.1    [facility]  
   
                                        Comment on above:   Order Comment: Campu  
s: M   
   
                                                            Performed By: #### L  
200.77559 ####[address] [address]Le# [phone]   
   
                                                    Monocytes/100 WBC   
(Bld)           8.1 %           Normal          2-10            [facility]  
   
                                        Comment on above:   Order Comment: Campu  
s: M   
   
                                                            Performed By: #### L  
200.99993 ####[facility]   
[id number] [place] [address]Ug# [phone]   
   
                      NEUTROPHIL ABS 6.70 K/CU MM Normal     2.0-8.3    [facility]  
   
                                        Comment on above:   Order Comment: Campu  
s: M   
   
                                                            Performed By: #### L  
200.16300 ####[facility]   
[id number] [place] [address]Dx# [phone]   
   
                                                    Neutrophils/100   
WBC (Bld)       54.4 %          Normal          45-75           [facility]  
   
                                        Comment on above:   Order Comment: Campu  
s: M   
   
                                                            Performed By: #### L  
200.76052 ####[facility]   
[id number] [place], [place] [address]Fl# [phone]   
   
                                                    Nucleated RBC/100   
WBC (Bld) [Ratio] 0.0 %           Normal          Less than 1     [facility]  
   
                                        Comment on above:   Order Comment: Campu  
s: M   
   
                                                            Performed By: #### L  
200.48479 ####[facility]   
[id number] [place] [address]Dx# [phone]   
   
                                                    Platelet mean   
volume (Bld)   
[Entitic vol]   9.7 fL          Normal          9.4-12.4        [facility]  
   
                                        Comment on above:   Order Comment: Campu  
s: M   
   
                                                            Performed By: #### L  
200.50556 ####[facility]   
[id number] [place] [address]Wb# [phone]   
   
                      PLT        396 K/CU MM Normal     150-450    [facility]   
[place]  
   
                                        Comment on above:   Order Comment: Campu  
s: M   
   
                                                            Performed By: #### L  
200.63343 ####[facility]   
[id number] [place] [address]Os# [phone]   
   
                      RBC        3.99 M/CU MM Normal     3.90-5.30  [facility]  
   
                                        Comment on above:   Order Comment: Campu  
s: M   
   
                                                            Performed By: #### L  
200.24846 ####[facility]   
[id number] [place] [address]Oa# [phone]   
   
                      WBC        12.3 K/CUMM High       4.5-11.0   [facility]  
   
                                        Comment on above:   Order Comment: Campu  
s: M   
   
                                                            Performed By: #### L  
200.69283 ####[facility]   
[id number] [place] [address]Be# [phone]   
   
                                                    CMPon 2021   
   
                                                    Albumin   
[Mass/Vol]      2.8 g/dL        Low             3.2-5.0         [facility]  
   
                                        Comment on above:   Order Comment: Campu  
s: M   
   
                                                            Performed By: #### L  
500.46826, L500.81756, L500.80302, L500.12409   
####[facility] [id number] [place], [place]   
[address]Yu# [phone]   
   
                                                    Albumin/Globulin   
[Mass ratio]    0.8 {ratio}     Normal          0.8-2.0         [facility]  
   
                                        Comment on above:   Order Comment: Campu  
s: M   
   
                                                            Performed By: #### L  
500.10409, L500.90516, L500.37091, L500.44414   
####[facility] [id number] [place], [place]   
[address]Gj# [phone]   
   
                      ALK PHOS   86 U/L     Normal          [facility]  
   
                                        Comment on above:   Order Comment: Campu  
s: M   
   
                                                            Performed By: #### L  
500.05316, L500.81580, L500.61434, L500.70800   
####[facility] [id number] [place], [place]   
[address]Vb# [phone]   
   
                                                    ALT [Catalytic   
activity/Vol]   20 U/L          Normal          13-61           [facility]  
   
                                        Comment on above:   Order Comment: Campu  
s: M   
   
                                                            Result Comment: RESU  
LTS MAY BE FALSELY DEPRESSED AFTER THE   
ADMINISTRATION OFSULFASALAZINE AND/OR SULFAPYRIDINE.   
   
                                                            Performed By: #### L  
500.88958, L500.16893, L500.22472, L500.43851   
####[facility] [id number] [place], [place]   
[address]Cx# [phone]   
   
                                                    Anion gap   
[Moles/Vol]     5 mmol/L        Normal          5-16            [facility]  
   
                                        Comment on above:   Order Comment: Campu  
s: M   
   
                                                            Performed By: #### L  
500.00887, L500.20904, L500.94093, L500.18445   
####[facility] [id number] [place], [place]   
[address]Rq# [phone]   
   
                                                    AST [Catalytic   
activity/Vol]   20 U/L          Normal          8-34            [facility]  
   
                                        Comment on above:   Order Comment: Campu  
s: M   
   
                                                            Result Comment: RESU  
LTS MAY BE FALSELY DEPRESSED AFTER THE   
ADMINISTRATION OFSULFASALAZINE AND/OR SULFAPYRIDINE.   
   
                                                            Performed By: #### L  
500.14286, L500.30408, L500.79911, L500.02630   
####[facility] [id number] [place], [place]   
[address]Pc# [phone]   
   
                      BILI TOTAL 0.20 MG/DL Normal     0.2-1.0    [facility]  
   
                                        Comment on above:   Order Comment: Campu  
s: M   
   
                                                            Performed By: #### L  
500.01603, L500.38659, L500.14292, L500.27854   
####[facility] [id number] [place], [place]   
[address]Vq# [phone]   
   
                                                    Calcium   
[Mass/Vol]      9.6 mg/dL       Normal          8.5-10.5        [facility]  
   
                                        Comment on above:   Order Comment: Campu  
s: M   
   
                                                            Result Comment: NOTE  
 NEW NORMAL RANGE DUE TO REAGENT CHANGE   
   
                                                            Performed By: #### L  
500.48341, L500.39999, L500.71754, L500.43026   
####[facility] [id number] [place], [place]   
[address]If# [phone]   
   
                                                    Chloride   
[Moles/Vol]     107 mmol/L      Normal                    [facility]  
   
                                        Comment on above:   Order Comment: Campu  
s: M   
   
                                                            Performed By: #### L  
500.53026, L500.36368, L500.28967, L500.18756   
####[facility] [id number] [place], [place]   
[address]Ig# [phone]   
   
                      CO2 [Moles/Vol] 28.0 mmol/L Normal     21-32      [facility]  
   
                                        Comment on above:   Order Comment: Campu  
s: M   
   
                                                            Performed By: #### L  
500.21342, L500.89496, L500.89401, L500.28363   
####[facility] [id number] [place], [place]   
[address]Rp# [phone]   
   
                                                    Creatinine   
[Mass/Vol]      0.66 mg/dL      Normal          0.510-0.950     [facility]  
   
                                        Comment on above:   Order Comment: Campu  
s: M   
   
                                                            Result Comment: Kayla  
ents receiving either N-Acetylcysteine (NAC)   
orMetamizole prior to venipuncture, may have falsely   
depressedresults.   
   
                                                            Performed By: #### L  
500.62201, L500.77176, L500.61914, L500.34102   
####[facility] [id number] [place], [place]   
[address]Nd# [phone]   
   
                                                    Globulin (S)   
[Mass/Vol]      3.3 g/dL        Normal          2.2-4.2         [facility]  
   
                                        Comment on above:   Order Comment: Campu  
s: M   
   
                                                            Performed By: #### L  
500.81420, L500.36405, L500.75471, L500.80799   
####[facility] [id number] [place], [place]   
[address]Ho# [phone]   
   
                                                    Glucose   
[Mass/Vol]      109 mg/dL       High                      [facility]   
[place]  
   
                                        Comment on above:   Order Comment: Campu  
s: M   
   
                                                            Result Comment: 70-1  
00- Normal Fasting; 100-125 Impaired Fasting;   
greaterthan 126 on more than one result- Diabetes. ADA   
guidelines.Results may be falsely elevated after the administration   
ofSulfapyridine.Results may be falsely depressed after the   
administration ofSulfasalazine.   
   
                                                            Performed By: #### L  
500.43214, L500.35229, L500.38510, L500.63684   
####[facility] [id number] [place], [place]   
[address]Ab# [phone]   
   
                                                    Potassium   
[Moles/Vol]     4.3 mmol/L      Normal          3.5-5.1         [facility]  
   
                                        Comment on above:   Order Comment: Campu  
s: M   
   
                                                            Performed By: #### L  
500.30177, L500.72657, L500.40817, L500.55619   
####[facility] [id number] [place], [place]   
[address]Dz# [phone]   
   
                                                    Protein   
[Mass/Vol]      6.1 g/dL        Normal          6.0-8.5         [facility]  
   
                                        Comment on above:   Order Comment: Campu  
s: M   
   
                                                            Performed By: #### L  
500.88766, L500.94463, L500.99304, L500.17028   
####[facility] [id number] [place], [place]   
[address]Pi# [phone]   
   
                                                    Sodium   
[Moles/Vol]     140 mmol/L      Normal          136-145         [facility]   
[place]  
   
                                        Comment on above:   Order Comment: Campu  
s: M   
   
                                                            Performed By: #### L  
500.45202, L500.92423, L500.44564, L500.59560   
####[facility] [id number] [place], [place]   
[address]Qo# [phone]   
   
                                                    Urea nitrogen   
[Mass/Vol]      25 mg/dL        Normal          7-26            [facility]  
   
                                        Comment on above:   Order Comment: Campu  
s: M   
   
                                                            Performed By: #### L  
500.42673, L500.72223, L500.93565, L500.10114   
####[facility] [id number] [place], [place]   
[address]Zz# [phone]   
   
                                                    Urea   
nitrogen/Creatini  
ne [Mass ratio] 38 mg/mg        High            15-24           [facility]   
[place]  
   
                                        Comment on above:   Order Comment: Campu  
s: M   
   
                                                            Performed By: #### L  
500.08530, L500.53264, L500.04644, L500.47659   
####[facility] [id number] [place] [address]   
[address]Se# [phone]   
   
                                                    GFR ESTon 2021   
   
                      IF  AMER Greater than 60 Normal                [facility]   
[place]  
   
                                        Comment on above:   Order Comment: Campu  
s: M   
   
                                                            Performed By: #### L  
500.26305, L500.80025, L500.04043, L500.89512   
####[facility] [id number] [place], [place]   
[address]Xi# [phone]   
   
                      IF non-AFR AMER Greater than 60 Normal                [facility]   
[place]  
   
                                        Comment on above:   Order Comment: Campu  
s: M   
   
                                                            Performed By: #### L  
500.01902, L500.07432, L500.84139, L500.98036   
####[facility] [id number] [place], [place]   
[address]Lw# [phone]   
   
                                                    MAGNESIUMon 2021   
   
                      MAGNESIUM  2.1 MG/CL  Normal     1.6-2.6    [facility]   
[place]  
   
                                        Comment on above:   Order Comment: Darinu  
s: M   
   
                                                            Performed By: #### L  
500.91404, L500.23391, L500.10053, L500.73737   
####[facility] [id number] [place], [place]   
[address]Qw# [phone]   
   
                                                    PHOSon 2021   
   
                                                    Phosphate   
[Mass/Vol]      4.90 mg/dL      Normal          2.5-4.9         [facility]   
[place]  
   
                                        Comment on above:   Order Comment: Campu  
s: M   
   
                                                            Result Comment: Elev  
ated m-protein (paraprotein) levels in the serum   
may beexhibited in patients with monoclonal gammopathies,   
causingfalsely elevated inorganic phosphorus results.   
   
                                                            Performed By: #### L  
500.15001, L500.36483, L500.44568, L500.83114   
####[facility] [id number] [place], [place]   
[address]Sw# [phone]   
   
                                                    Established Visit (Neurosurg  
domenica)on 2020   
   
                                                    Established Visit   
(Neurosurgery)                          Diagnoses/Problems  
Assessed  
Epidural abscess (324.9)   
(G06.2)  
Orders  
SocHx: Non-smoker  
Tobacco Use Screening;   
Status:Complete; Done:   
[id number]  
Patient Discussion/Summary  
Ms. Soto is seen today in   
postoperative follow-up of   
T7-10 and L3-4 posterior   
decompression and evacuation   
of epidural abscess by Dr. Kingsley Dodd on 2020. Patient is continuing   
an acute rehabilitation   
facility where staples   
removal and initial wound   
care are performed. She has   
2 separate incisions one in   
the thoracic and with a   
lumbar of which each have 2   
locations of incomplete   
incision closure   
approximately 2 cm in length   
and grossly superficial. Her   
facility had been endorsing   
minor wound drainage and   
follow-up MRI performed to   
evaluate for underlying   
infection. Lumbar region   
does have a degree of   
posterior fluid collection   
and postsurgical changes   
identified however no   
continued spinal cord   
compression or foraminal   
stenosis. Patient endorses   
sensation in her lower   
extremities and improving   
muscular strength as she   
works through physical   
therapy. She is continued on   
IV antibiotics through   
 she reports   
however this may continue   
secondary to concern and   
diabetic wound healing. Next   
follow-up visit with Dr. Dodd will be at   
approximately 3 months   
postoperative.  
  
Chief Complaint  
Patient is being seen for   
POST-OP and a follow-up   
Neurosurgical visit.  
  
History of Present Illness  
Ms. Soto is seen today in   
postoperative follow-up of   
T7-10 and L3-4 posterior   
decompression and evacuation   
of epidural abscess by Dr. Kingsley Dodd on 2020. Patient is continuing   
an acute rehabilitation   
facility where staples   
removal and initial wound   
care are performed. She has   
2 separate incisions one in   
the thoracic and with a   
lumbar of which each have 2   
locations of incomplete   
incision closure   
approximately 2 cm in length   
and grossly superficial. Her   
facility had been endorsing   
minor wound drainage and   
follow-up MRI performed to   
evaluate for underlying   
infection. Lumbar region   
does have a degree of   
posterior fluid collection   
and postsurgical changes   
identified however no   
continued spinal cord   
compression or foraminal   
stenosis. Patient endorses   
sensation in her lower   
extremities and improving   
muscular strength as she   
works through physical   
therapy. She is continued on   
IV antibiotics through   
 she reports   
however this may continue   
secondary to concern and   
diabetic wound healing. Next   
follow-up visit with Dr. Dodd will be at   
approximately 3 months   
postoperative.  
  
Social History  
Problems  
No alcohol use  
Non-smoker (V49.89) (Z78.9)  
Allergies  
Medication  
ACE Inhibitors  
Recorded By: Melanie Newsome;   
2020 11:03:02 AM  
acetaminophen  
Recorded By: Melanie Newsome;   
2020 11:03:02 AM  
hydrocodone  
Recorded By: Melanie Newsome;   
2020 11:03:02 AM  
morphine  
Recorded By: Melanie Newsome;   
2020 11:03:02 AM  
Current Meds  
  
Medication NameInstruction  
Amiodarone HCl TABS  
Aspirin 81 MG TABS  
Clopidogrel Bisulfate 75 MG   
Oral Tablet  
DULoxetine HCl - 30 MG Oral   
Capsule Delayed Release   
Particles  
Gabapentin TABS  
HumaLOG SOLN  
Jardiance 25 MG Oral Tablet  
Lantus SoloStar 100 UNIT/ML   
Subcutaneous Solution   
Pen-injector  
Metoprolol Tartrate 75 MG   
Oral Tablet  
NexIUM 40 MG Oral Capsule   
Delayed Release   
(Esomeprazole Magnesium)  
Nitroglycerin 0.4 MG   
Sublingual Tablet Sublingual  
oxyCODONE-Acetaminophen   
5-325 MG Oral Tablet  
Ranexa 1000 MG Oral Tablet   
Extended Release 12 Hour   
(Ranolazine ER)  
Vitals  
Vital Signs  
Recorded: [id number] 10:37AM  
Zqmvuwelkgm48.6 F  
Hnexwp535 lb 5.92 oz  
Tobacco Useb) No  
Fall Screeninga) No falls   
within the last year  
Signatures  
Electronically signed by :   
Jt Lynch PA-C; Dec 21   
2020 11:30AM EST (Author)  
Reviewed by : Kingsley Dodd MD; Dec 21 2020 12:35PM EST Normal                                     
[facility]  
   
                                                    NOVEL CORONAVIRUS NASOPHARYN  
GEAL - [facility] SPECIMEN ONLYon 2020   
   
                          SARS-COV-2   NOT DETECTED Normal       NOT   
DETECTED                                [facility]  
   
                                        Comment on above:   Order Comment: Submi  
tter Name: [facility]   
Agent   
Suspected: SARS-COV-2  
This test was performed using real time PCR and has been approved   
for the qualitative detection of SARS-CoV-2 nucleic acid. The test   
has been authorized by the FDA under an emergency use authorization   
for use by authorized laboratories.   
   
                                                            Result Comment: Nega  
tive results do not preclude SARS-CoV-2   
infection and should not be used as the sole basis for treatment or   
other patient management decisions. Optimum specimen types and   
timing for peak viral levels during infections caused by SARS-CoV-2   
has not been determined. The possibility of a false negative result   
should especially be considered if the patient's recent exposures or   
clinical presentation suggest that SARS-CoV-2 infection is probable,   
and diagnostic tests for other causes of illness (e.g., other   
respiratory illness) are negative. Collection of a new specimen and   
re-testing may be necessary if the patient is critically ill or   
clinically deteriorating.   
   
                                                            Performed By: #### L  
[id number] ####  
[facility] (DEFAULT)  
[address] [place] [address]   
   
                                                    NOVEL CORONAVIRUS NASOPHARYN  
GEAL - [facility] SPECIMEN ONLYon 2020   
   
                          SARS-COV-2   NOT DETECTED Normal       NOT   
DETECTED                                [facility]  
   
                                        Comment on above:   Order Comment: Submi  
tter Name: [facility]   
Agent   
Suspected: SARS-COV-2  
This test was performed using real time PCR and has been approved   
for the qualitative detection of SARS-CoV-2 nucleic acid. The test   
has been authorized by the FDA under an emergency use authorization   
for use by authorized laboratories.   
   
                                                            Result Comment: Nega  
tive results do not preclude SARS-CoV-2   
infection and should not be used as the sole basis for treatment or   
other patient management decisions. Optimum specimen types and   
timing for peak viral levels during infections caused by SARS-CoV-2   
has not been determined. The possibility of a false negative result   
should especially be considered if the patient's recent exposures or   
clinical presentation suggest that SARS-CoV-2 infection is probable,   
and diagnostic tests for other causes of illness (e.g., other   
respiratory illness) are negative. Collection of a new specimen and   
re-testing may be necessary if the patient is critically ill or   
clinically deteriorating.   
   
                                                            Performed By: #### L  
[id number] ####  
[facility] (DEFAULT)  
[address] [place] [address]   
   
                                                    Chart Updateon 2020   
   
                                        Chart Update        Chart Update  
I spoke with MS. JAYDEN SOTO's nurse at [facility]. I   
did give a verbal order to   
allow for Ms. JAYDEN SOTO to   
have her laminectomy wound   
evaluated and to have the   
staples removed when   
appropriate. I advised   
Mimi HOWARD that MS. JAYDEN SOTO will need a 3 months   
follow up with Dr. Dodd. I   
advised our office would be   
contacting to schedule. Ms. JAYDEN SOTO was in agreement   
with this plan and all her   
questions were answered.  
  
Signatures  
Electronically signed by :   
HARSHAD Nevarez; 2020 4:50PM EST (Author) Normal                                     
[facility]  
   
                                                    [facility]  
 [facility]   
   
                                                    Clinical Event   
Note-TRC call re:   
[facility]                                Clinical Event:  
Clinical Event Note:  
TopicTRC call re: [facility]   
(inpatient) -> [facility]  
Hospitalist Transfer  
Details  
60F with h/o DM2, CAD (s/p   
3vCAB), chronic OM L heel   
who, per h/o call via TRC  
@ 6615 with [facility]   
Dr lBanco:  sent here on   
10/23/20 from wound  
center b/o leukocytosis WBC   
30sK; (had surgery L heel in   
 in [place]  
partial calcanectomy vs   
I&D); started on Vanc/Zosyn,   
MRI +OM, bedside bone  
culture MSSA + others and   
BCs +MSSA; back pain -> MRI   
L-spine 10/27 @ 2020:  
multilevel infection   
including abscesses in psoas   
muscle, R ventral and dorsal  
epidural @ L3 and septic   
arthritis R L4-5 and L L5-S1   
facet joints; edema R L3,  
L4 and L5 pedicles which   
could be r/t OM; ID r/c   
transfer b/o Radiology  
concerned about cord   
compression (not sure what   
level); 5/5 str all extrem   
but  
can't raise legs b/o pain;   
spoke with Neurosurgery   
([facility]) at , r/c  
transfer to Medicine at    
with ID and NS consults;   
current VS: 141/63, 99.1,  
87, 18, 96% RA; remains on   
Vanc/Zosyn; getting 1250   
Vanc q12h, last at 1711; no  
contact precautions   
  
Terrance Rankin MD, [place] [facility], [facility]  
  
  
  
Electronic Signatures:  
Wilson Rankin) (Signed   
27-Oct-2020 23:58)  
Authored: Clinical Event   
Note  
  
  
Last Updated: 27-Oct-2020   
23:58 by Wilson Rankin) [facility]  
   
                                                    Provider Note - ED v2on 07-0  
3-2020   
   
                                                    Provider Note -   
ED v2                                   Provider Note - ED v2:  
Chart Review:  
ED NOTES  
ED NOTES:  
Patient came in with   
complaints of left foot   
wound. Patient is a   
diabetic.  
Patient says she's had for   
about a week. Patient says   
the wound opened up  
today. Patient says there is   
some redness around the   
wound. Patient says she  
did travel in a car for 9   
hours. Patient says she got   
in and out of the car.  
Patient denies any other   
symptoms at this time no   
headache fever blurred   
vision  
nausea vomiting chest pain   
cough.  
  
  
  
HISTORY OF PRESENTING   
ILLNESS  
JAYDEN is a 60 year old Female   
and was seen by me at   
2020 13:51. The  
historian is the patient.  
  
Triage Information: Most   
recent Vital Sign Value Date  
  
  
  
  
PAST MEDICAL HISTORY  
ATTESTATION: I have reviewed   
and confirmed   
nurse's/medic's notes for   
patient's  
medications, allergies,   
medical history, and   
surgical history  
  
PSYCHOSOCIAL SCREENING: NO:   
concerns for safety at home,   
feelings of  
depression, feels like   
hurting others and feels   
like hurting self  
  
ALLERGIES/INTOLERANCES:   
Allergy  
Allergen: statins  
Type: Drug Category  
Reaction: Other  
  
Allergen: morphine  
Type: Drug  
Reaction: Unknown  
  
HEALTH HISTORY: No   
documented data.  
  
OUTPATIENT MEDICATIONS: Home   
Medications Review Status   
for Reconciliation:  
Complete  
Med Status: Patient   
Currently Takes Medications  
  
Drug Name: metFORMIN 500 mg   
oral tablet  
Instructions: 1 tab(s)   
orally 2 times a day  
  
Drug Name: esomeprazole 40   
mg oral delayed release   
capsule  
Instructions: 1 cap(s)   
orally once a day  
  
Drug Name: HumuLIN 50/50   
subcutaneous suspension  
Instructions: null  
  
Drug Name: semaglutide 2   
mg/1.5 mL subcutaneous   
solution  
Instructions: null  
  
Drug Name: Jardiance 25 mg   
oral tablet  
Instructions: 1 tab(s)   
orally once a day (in the   
morning)  
  
Drug Name: DULoxetine 30 mg   
oral delayed release capsule  
Instructions: 1 cap(s)   
orally 2 times a day  
  
Drug Name: ezetimibe 10 mg   
oral tablet  
Instructions: 1 tab(s)   
orally once a day  
  
Drug Name: Pacerone 100 mg   
oral tablet  
Instructions: 2 tab(s)   
orally once a day  
  
Drug Name: clopidogrel 75 mg   
oral tablet  
Instructions: 1 tab(s)   
orally once a day  
  
Drug Name: metoprolol   
tartrate 25 mg oral tablet  
Instructions: 1 tab(s)   
orally 2 times a day  
  
Drug Name: isosorbide   
mononitrate 30 mg oral   
tablet, extended release  
Instructions: 1 tab(s)   
orally once a day (in the   
morning)  
  
Drug Name: furosemide 40 mg   
oral tablet  
Instructions: 1 tab(s)   
orally once a day  
  
Drug Name: Vitamin D3 50,000   
intl units (1250 mcg) oral   
capsule  
Instructions: null  
  
Drug Name: potassium   
chloride 20 mEq oral tablet,   
extended release  
Instructions: 1 tab(s)   
orally 2 times a day  
  
Drug Name: gabapentin 800 mg   
oral tablet  
Instructions: 1 tab(s)   
orally 3 times a day  
  
Drug Name: nitroglycerin 0.4   
mg/hr transdermal film,   
extended release  
Instructions: 1 patch   
transdermal once a day  
  
Drug Name: ranolazine 1000   
mg oral tablet, extended   
release  
Instructions: 1 tab(s)   
orally 2 times a day  
  
Drug Name: Low Dose ASA 81   
mg oral tablet  
Instructions: 1 tab(s)   
orally once a day  
  
Drug Name: B-12 1000 mcg   
oral tablet  
Instructions: 1 tab(s)   
orally once a day  
  
Drug Name: doxycycline   
hyclate 100 mg oral tablet  
Instructions: 1 tab(s)   
orally 2 times a day  
  
SIGNIFICANT EVENTS: No   
documented data.  
  
OB/GYN: Is Pregnant: no Is   
Breastfeeding: no  
  
  
  
REVIEW OF SYSTEMS  
INTEGUMENTARY: POSITIVE for:   
abrasions;  
All other systems reviewed   
and are negative  
  
  
  
PHYSICAL EXAM  
CONSTITUTIONAL: Well   
appearing, well nourished,   
awake, alert, oriented to  
person, place,   
time/situation and in no   
apparent distress.  
  
HENMT: Airway patent,  
  
EYES: pupils are   
accommodating  
  
MUSCULOSKELETAL: , range of   
motion is not limited, no   
muscle or joint  
tenderness.  
  
NEUROLOGICAL: Alert and   
oriented, no focal deficits,   
no motor or sensory  
deficits.  
  
SKIN: Skin normal color for   
race, warm, dry patient does   
have 3 and half  
centimeter open blister on   
the lateral aspect of the   
left foot. Minimal  
Erythema noted around the   
wound. PSYCHIATRIC: Alert   
and oriented to person,  
place, time/situation.   
normal mood and affect. No   
apparent risk to self or  
others.  
  
  
  
  
  
  
  
  
RESULTS/VITAL SIGNS  
  
VITAL SIGNS:  
  
T PRBP SpO2O2(LPM) %FiO2   
Method  
2020   
13:37:[id number]  
  
  
  
  
CLINICAL IMPRESSION  
Diagnosis/Annotation: ED Dx  
Name:Open wound of foot  
Code:S91.309A  
  
Dispostion: discharged  
  
Type: home  
  
  
ATTESTATION  
  
Comments/Additional   
Findings: At this time   
patient was prescribed   
doxycycline  
twice a day for 10 days.   
Patient's son is an EMT and   
patient son were  
instructed to monitor   
redness and swelling make   
sure it's going down and not  
spreading. Patient was   
instructed to go straight to   
the ER if the redness seems  
to be spreading. Patient has   
a specialist of follows her   
diabetes and patient  
will go home on Monday   
patient was instructed to   
call this physician and see   
if  
he would see her or set her   
up for wound care. Patient   
was educated about the  
importance of taking care of   
this wound and the   
complications that can arise   
if  
she does not and how to care   
and clean for the wound and   
do dressings twice a  
day until she gets home and   
can have a care plan from   
her physician. Supplies  
were given to patient.   
patient and son were   
okaywith this care plan.  
  
CRITICAL CARE TIME  
Is this a critically ill   
patient: no  
  
  
  
  
  
Electronic Signatures:  
Eunice Prado (APRN-CNP)   
(Signed 2020 14:18)  
Authored: Provider Note - ED   
v2  
  
  
Last Updated: 2020   
14:18 by Eunice Prado   
(APRN-CNP)          [facility]  
  
  
  
Vital Signs  
  
  
                      Date Time  Vital Sign Value      Performing Clinician Faci  
jc  
   
                                                    2022   
13:          Body temperature    97.59 [degF]        Alvin So MD  
Work Phone:   
[phone]                          [facility]  
   
                                                    2022   
13:                              Diastolic blood   
pressure                  64 mm[Hg]                 Alvin So MD  
Work Phone:   
[phone]                          [facility]  
   
                                                    2022   
13:          Heart rate          73 /min             Alvin So MD  
Work Phone:   
[phone]                          [facility]  
   
                                                    2022   
13:          Respiratory rate    18 /min             Alvin So MD  
Work Phone:   
[phone]                          [facility]  
   
                                                    2022   
13:                              SaO2% (BldA) [Mass   
fraction]                 100 %                     Alvin So MD  
Work Phone:   
[phone]                          [facility]  
   
                                                    2022   
13:                              Systolic blood   
pressure                  137 mm[Hg]                Alvin So MD  
Work Phone:   
[phone]                          [facility]  
   
                                                    2022   
14:          Body height         170.2 cm            Damon Eckert MD  
Work Phone:   
[phone]                          [facility]  
   
                                                    2022   
14:          Body weight         102.06 kg           Damon Eckert MD  
Work Phone:   
[phone]                          [facility]  
   
                                                    2022   
14:          Respiratory rate    20 /min             Damon Eckert MD  
Work Phone:   
[phone]                          [facility]  
   
                                                    04-   
09:          Body height         170.2 cm            Basil Mitchell MD  
Work Phone:   
[phone]                          [facility]  
   
                                                    04-   
09:          Body weight         102.06 kg           Basil Mitchell MD  
Work Phone:   
[phone]                          [facility]  
   
                                                    04-   
09:          Respiratory rate    16 /min             Basil Mitchell MD  
Work Phone:   
[phone]                          [facility]  
   
                                                    2022   
09:          Body height         170.2 cm            Damon Eckert MD  
Work Phone:   
[phone]                          [facility]  
   
                                                    2022   
09:          Body weight         102.06 kg           Damon Eckert MD  
Work Phone:   
[phone]                          [facility]  
   
                                                    2022   
09:          Respiratory rate    20 /min             Damon Eckert MD  
Work Phone:   
[phone]                          [facility]  
   
                                                    2021   
14:          Body height         170.2 cm            Basil Mitchell MD  
Work Phone:   
[phone]                          [facility]  
   
                                                    2021   
14:          Body weight         95.25 kg            Basil Mitchell MD  
Work Phone:   
[phone]                          [facility]  
   
                                                    2021   
14:          Respiratory rate    16 /min             Basil Mitchell MD  
Work Phone:   
[phone]                          [facility]  
  
  
  
Encounters  
  
  
                          Encounter Date Encounter Type Care Provider Facility  
   
                          Start: 2024 ambulatory   MICHAELA CABA [facility]  
   
                                                    Start: 2024  
End: 2024           ambulatory                CHERISE PAL                             Facility:[id number]  
   
                                                    Start: 2024  
End: 2024     ambulatory          FROY CHANG [facility]  
   
                          Start: 2024 [facility]  
   
                                                    Start: 10-  
End: 10-           ambulatory                MAMADOU STARK                                  Facility:[facility]  
   
                                Start: 2022 Telephone encounter Ag Victor Hugo  
Work Phone:   
[phone]                          [facility]   
Orthopedics  
   
                                        Comment on above:   Appointment   
   
                                Start: 2022 Telephone encounter Alvin hardin MD  
Work Phone:   
[phone]                          [facility]   
Department of   
Infectious Disease  
   
                                        Comment on above:   Results, Lab   
   
                                                            Physician orders   
   
                                                    Start: 2022  
End: 2022                         Emergency department   
patient visit             BECKY CORRALES          Facility:[facility]  
   
                          Start: 2022 ambulatory   ALVIN London  
ty:[facility]  
   
                                                    Start: 2022  
End: 2022                         Subsequent hospital visit   
by physician              [facility]           [facility]  
   
                                        Comment on above:   arm pain from picc l  
ine   
   
                                                    Start: 2022  
End: 2022                         Patient encounter   
procedure                               Alvin So MD  
Work Phone:   
[phone]                          [facility]   
Department of   
Infectious Disease  
   
                                        Comment on above:   Hospital discharge f  
ollow-up (Primary Dx);  
MSSA (methicillin susceptible Staphylococcus aureus) infection;  
Long term (current) use of antibiotics;  
Acute embolism and thrombosis of deep vein of right upper extremity   
(HCC)   
   
                                Start: 2022 Telephone encounter Alvin hardin MD  
Work Phone:   
[phone]                          [facility]   
Department of   
Infectious Disease  
   
                                        Comment on above:   Results, Lab; CoPat   
Management   
   
                                                    Start: 2022  
End: 2022     ambulatory          DAMON ECKERT      Facility[facility]  
al  
   
                                                    Start: 2022  
End: 2022                         Patient encounter   
procedure                               Damon Eckert MD  
Work Phone:   
[phone]                          [facility]   
Orthopedics  
   
                                        Comment on above:   S/P trigger finger r  
elease (Primary Dx)   
   
                                                    Start: 04-  
End: 04-     ambulatory          BASIL PAULA         Facility:[facility]  
al  
   
                                                    Start: 04-  
End: 04-                         Patient encounter   
procedure                               Basil Mitchell MD  
Work Phone:   
[phone]                          [facility]   
Orthopedics  
   
                                        Comment on above:   Acquired absence of   
knee joint following explantation of joint  
   
prosthesis with presence of antibiotic-impregnated cement spacer   
(Primary Dx)   
   
                                Start: 2022 Telephone encounter Alvin hardin MD  
Work Phone:   
[phone]                          [facility]   
Department of   
Infectious Disease  
   
                                        Comment on above:   Results, Lab; CoPat   
Management   
   
                                Start: 2022 Telephone encounter Alvin hardin MD  
Work Phone:   
[phone]                          [facility]   
Department of   
Infectious Disease  
   
                                        Comment on above:   plan of care   
   
                                                    Start: 2022  
End: 2022     ambulatory          DAMON ECKERT      [facility]  
al  
   
                                        Start: 2022   Encounter for other   
preprocedural examination [facility]            [facility]  
   
                                                    Start: 2022  
End: 2022     ambulatory          DAMON ECKERT      Facility:[facility]  
al  
   
                                                    Start: 2022  
End: 2022                         Patient encounter   
procedure                               Damon Eckert MD  
Work Phone:   
[phone]                          [facility]   
Orthopedics  
   
                                        Comment on above:   Trigger finger, righ  
t middle finger (Primary Dx)   
   
                                Start: 2022 Telephone encounter Basil lozano MD  
Work Phone:   
[phone]                          [facility]   
Orthopedics  
   
                                        Comment on above:   Appointment   
   
                                                    Start: 2022  
End: 2022                         Evaluation and management   
of inpatient              BASIL MITCHELL               Facility:[facility]  
   
                                                    Start: 2022  
End: 2022     ambulatory          BASIL MITCHELL         Facility[facility]  
al  
   
                                                    Start: 2022  
End: 2022     ambulatory          BASIL MITCHELL         Fac[facility]  
al  
   
                          Start: 2021 ambulatory   BASIL MITCHELL  [facility]  
   
                                                    Start: 2021  
End: 2021                         Subsequent hospital visit   
by physician                            [facility]  
Work Phone:   
[phone]                          [facility]  
   
                                        Comment on above:   Pain in prosthetic j  
oint, initial encounter ([facility]) [T84.84XA]   
   
                                                    Start: 2021  
End: 2021     ambulatory          BASIL MITCHELL         [facility]  
   
                                                    Start: 2021  
End: 2021                         Patient encounter   
procedure                               Basil Mitchell MD  
Work Phone:   
[phone]                          [facility]   
Orthopedics  
   
                                        Comment on above:   Status post left par  
tial knee replacement (Primary Dx);  
Pain in prosthetic joint, initial encounter ([facility])   
   
                                Start: 12- Telephone encounter Ag Orth  
Work Phone:   
[phone]                          [facility]   
Orthopedics  
   
                                        Comment on above:   Future Appointment   
   
                                                    Start: 2021  
End: 2021                         Patient encounter   
procedure                 MICHAEL ROBLES        [facility]  
   
                                        Start: 2021   Patient encounter   
procedure                               Annalee M Esterle DO  
Work Phone:   
[phone]                          [facility]  
   
                                Start: 2021 Progress Note   Annalee M Esterle  
 DO  
Work Phone:   
[phone]                          [facility]  
   
                                        Start: 2021   Patient encounter   
procedure                               Annalee M Esterle DO  
Work Phone:   
[phone]                          [facility]  
   
                                Start: 2021 Progress Note   Annalee M Esterle  
 DO  
Work Phone:   
[phone]                          [facility]  
   
                                        Start: 2021   Patient encounter   
procedure                               Michael Robles MD  
Work Phone:   
[phone]                          [facility]  
   
                                Start: 2021 Progress Note   Michael garcía MD  
Work Phone:   
[phone]                          [facility]  
   
                                        Start: 2021   Patient encounter   
procedure                               Annalee M Esterle DO  
Work Phone:   
[phone]                          [facility]  
   
                                Start: 2021 Progress Note   Annalee M Esterle  
 DO  
Work Phone:   
[phone]                          [facility]  
   
                                        Start: 2021   Patient encounter   
procedure                               Michael Robles MD  
Work Phone:   
[phone]                          [facility]  
   
                                Start: 2021 Progress Note   Michael garcía MD  
Work Phone:   
[phone]                          [facility]  
   
                                        Start: 2021   Patient encounter   
procedure                               Annalee M Esterle DO  
Work Phone:   
[phone]                          [facility]  
   
                                Start: 2021 Progress Note   Annalee M Esterle  
 DO  
Work Phone:   
[phone]                          [facility]  
   
                                        Start: 2021   Patient encounter   
procedure                               Annalee M Esterle DO  
Work Phone:   
[phone]                          [facility]  
   
                                Start: 2021 Progress Note   Annalee M Esterle  
 DO  
Work Phone:   
[phone]                          [facility]  
   
                                        Start: 2021   Patient encounter   
procedure                               Annalee M Esterle DO  
Work Phone:   
[phone]                          [facility]  
   
                                Start: 2021 Progress Note   Annalee M Esterle  
 DO  
Work Phone:   
[phone]                          [facility]  
   
                                        Start: 2021   Patient encounter   
procedure                               Annalee M Esterle DO  
Work Phone:   
[phone]                          [facility]  
   
                                Start: 2021 Progress Note   Annalee M Esterle  
 DO  
Work Phone:   
[phone]                          [facility]  
   
                                        Start: 2021   Patient encounter   
procedure                               Michael Robles MD  
Work Phone:   
[phone]                          [facility]  
   
                                Start: 2021 Progress Note   Michael garcía MD  
Work Phone:   
[phone]                          [facility]  
   
                                        Start: 2021   Patient encounter   
procedure                               Annalee M Esterle DO  
Work Phone:   
[phone]                          [facility]  
   
                                Start: 2021 Progress Note   Annalee M Esterle  
 DO  
Work Phone:   
[phone]                          [facility]  
   
                                        Start: 2021   Patient encounter   
procedure                               Michael Robles MD  
Work Phone:   
[phone]                          [facility]  
   
                                Start: 2021 Progress Note   Michael garcía MD  
Work Phone:   
[phone]                          [facility]  
   
                                        Start: 2021   Patient encounter   
procedure                               Annalee M Esterle DO  
Work Phone:   
[phone]                          [facility]  
   
                                Start: 2021 Progress Note   Annalee M Esterle  
 DO  
Work Phone:   
[phone]                          [facility]  
   
                                        Start: 2021   Patient encounter   
procedure                               Annalee M Esterle DO  
Work Phone:   
[phone]                          [facility]  
   
                                Start: 2021 Progress Note   Annalee M Esterle  
 DO  
Work Phone:   
[phone]                          [facility]  
   
                                        Start: 2021   Patient encounter   
procedure                               Michael Robles MD  
Work Phone:   
[phone]                          [facility]  
   
                                Start: 2021 Progress Note   Michael garcía MD  
Work Phone:   
[phone]                          [facility]  
   
                                        Start: 2021   Patient encounter   
procedure                               Michael Robles MD  
Work Phone:   
[phone]                          [facility]  
   
                                Start: 2021 Progress Note   Michael garcía MD  
Work Phone:   
[phone]                          [facility]  
   
                                        Start: 2021   Patient encounter   
procedure                               Michael Robles MD  
Work Phone:   
[phone]                          [facility]  
   
                                Start: 2021 Progress Note   Michael garcía MD  
Work Phone:   
[phone]                          [facility]  
   
                                        Start: 2021   Patient encounter   
procedure                               Michael Robles MD  
Work Phone:   
[phone]                          [facility]  
   
                                Start: 2021 Progress Note   Michael garcía MD  
Work Phone:   
[phone]                          [facility]  
   
                                        Start: 2021   Patient encounter   
procedure                               Annalee M Esterle DO  
Work Phone:   
[phone]                          [facility]  
   
                                Start: 2021 Progress Note   Annalee M Esterle  
 DO  
Work Phone:   
[phone]                          [facility]  
   
                                        Start: 02-   Patient encounter   
procedure                               Annalee M Esterle DO  
Work Phone:   
[phone]                          [facility]  
   
                                Start: 02- Progress Note   Annalee M Esterle  
 DO  
Work Phone:   
[phone]                          [facility]  
   
                                        Start: 2021   Patient encounter   
procedure                               Annalee M Esterle DO  
Work Phone:   
[phone]                          [facility]  
   
                                Start: 2021 Progress Note   Annalee M Esterle  
 DO  
Work Phone:   
[phone]                          [facility]  
   
                                        Start: 02-   Patient encounter   
procedure                               Michael Robles MD  
Work Phone:   
[phone]                          [facility]  
   
                                Start: 02- Progress Note   Michael garcía MD  
Work Phone:   
[phone]                          [facility]  
   
                                        Start: 2021   Patient encounter   
procedure                               Annalee M Esterle DO  
Work Phone:   
[phone]                          [facility]  
   
                                Start: 2021 Progress Note   Annalee M Esterle  
 DO  
Work Phone:   
[phone]                          [facility]  
   
                                        Start: 2021   Patient encounter   
procedure                               Michael Robles MD  
Work Phone:   
[phone]                          [facility]  
   
                                Start: 2021 Progress Note   Michael garcía MD  
Work Phone:   
[phone]                          [facility]  
   
                                        Start: 2021   Patient encounter   
procedure                               Michael Robles MD  
Work Phone:   
[phone]                          [facility]  
   
                                Start: 2021 Progress Note   Michael garcía MD  
Work Phone:   
[phone]                          [facility]  
   
                                        Start: 2021   Patient encounter   
procedure                               Annalee M Esterle DO  
Work Phone:   
[phone]                          [facility]  
   
                                Start: 2021 Progress Note   Annalee M Esterle  
 DO  
Work Phone:   
[phone]                          [facility]  
   
                                        Start: 2021   Patient encounter   
procedure                               Michael Robles MD  
Work Phone:   
[phone]                          [facility]  
   
                                Start: 2021 Progress Note   Michael garcía MD  
Work Phone:   
[phone]                          [facility]  
   
                                        Start: 2021   Patient encounter   
procedure                               Annalee M Esterle DO  
Work Phone:   
[phone]                          [facility]  
   
                                Start: 2021 Progress Note   Annalee M Esterle  
 DO  
Work Phone:   
[phone]                          [facility]  
   
                                        Start: 2021   Patient encounter   
procedure                               Annalee M Esterle DO  
Work Phone:   
[phone]                          [facility]  
   
                                Start: 2021 Progress Note   Annalee M Esterle  
 DO  
Work Phone:   
[phone]                          [facility]  
   
                                        Start: 2021   Patient encounter   
procedure                               Annalee M Esterle DO  
Work Phone:   
[phone]                          [facility]  
   
                                Start: 2021 Progress Note   Annalee M Esterle  
 DO  
Work Phone:   
[phone]                          [facility]  
   
                                        Start: 2021   Patient encounter   
procedure                               Michael Robles MD  
Work Phone:   
[phone]                          [facility]  
   
                                Start: 2021 Progress Note   Michael garcía MD  
Work Phone:   
[phone]                          [facility]  
   
                                        Start: 2021   Patient encounter   
procedure                               Ananlee M Esterle DO  
Work Phone:   
[phone]                          [facility]  
   
                                Start: 2021 Progress Note   Annalee M Esterle  
 DO  
Work Phone:   
[phone]                          [facility]  
   
                                        Start: 2021   Patient encounter   
procedure                               Annalee M Esterle DO  
Work Phone:   
[phone]                          [facility]  
   
                                Start: 2021 Progress Note   Annalee M Esterle  
 DO  
Work Phone:   
[phone]                          [facility]  
   
                                        Start: 2021   Patient encounter   
procedure                               Michael Robles MD  
Work Phone:   
[phone]                          [facility]  
   
                                Start: 2021 Progress Note   Michael garcía MD  
Work Phone:   
[phone]                          [facility]  
   
                                        Start: 2021   Patient encounter   
procedure                               Annalee M Esterle DO  
Work Phone:   
[phone]                          [facility]  
   
                                Start: 2021 Progress Note   Annalee M Esterle  
 DO  
Work Phone:   
[phone]                          [facility]  
   
                                        Start: 2021   Patient encounter   
procedure                               Michael Robles MD  
Work Phone:   
[phone]                          [facility]  
   
                                Start: 2021 Progress Note   Michael garcía MD  
Work Phone:   
[phone]                          [facility]  
   
                                        Start: 2021   Patient encounter   
procedure                               Michael Robles MD  
Work Phone:   
[phone]                          [facility]  
   
                                Start: 2021 Progress Note   Michael garcía MD  
Work Phone:   
[phone]                          [facility]  
   
                                        Start: 01-   Patient encounter   
procedure                               Mcihael Robles MD  
Work Phone:   
[phone]                          [facility]  
   
                                Start: 01- Progress Note   Michael garcía MD  
Work Phone:   
[phone]                          [facility]  
   
                                        Start: 2021   Patient encounter   
procedure                               Annalee M Esterle DO  
Work Phone:   
[phone]                          [facility]  
   
                                Start: 2021 Progress Note   Annalee M Esterle  
 DO  
Work Phone:   
[phone]                          [facility]  
   
                                        Start: 2021   Patient encounter   
procedure                               Annalee M Esterle DO  
Work Phone:   
[phone]                          [facility]  
   
                                Start: 2021 Progress Note   Annalee M Esterle  
 DO  
Work Phone:   
[phone]                          [facility]  
   
                                        Start: 2021   Patient encounter   
procedure                               Annalee M Esterle DO  
Work Phone:   
[phone]                          [facility]  
   
                                Start: 2021 Progress Note   Annalee M Esterle  
 DO  
Work Phone:   
[phone]                          [facility]  
   
                                        Start: 2021   Patient encounter   
procedure                               Michael Robles MD  
Work Phone:   
[phone]                          [facility]  
   
                                Start: 2021 Progress Note   Michael garcía MD  
Work Phone:   
[phone]                          [facility]  
   
                                        Start: 2021   Patient encounter   
procedure                               Annalee M Esterle DO  
Work Phone:   
[phone]                          [facility]  
   
                                Start: 2021 Progress Note   Annalee MAE Esterle  
 DO  
Work Phone:   
[phone]                          [facility]  
   
                                        Start: 2021   Patient encounter   
procedure                               Michael Robles MD  
Work Phone:   
[phone]                          [facility]  
   
                                Start: 2021 Progress Note   Michael garcía MD  
Work Phone:   
[phone]                          [facility]  
   
                                        Start: 2021   Patient encounter   
procedure                               Annalee M Esterle DO  
Work Phone:   
[phone]                          [facility]  
   
                                Start: 2021 Progress Note   Annalee MAE Esterle  
 DO  
Work Phone:   
[phone]                          [facility]  
   
                                        Start: 2021   Patient encounter   
procedure                               Annalee M Esterle DO  
Work Phone:   
[phone]                          [facility]  
   
                                Start: 2021 Progress Note   Annalee MAE Esterle  
 DO  
Work Phone:   
[phone]                          [facility]  
  
  
  
Procedures  
  
  
                          Date         Procedure    Procedure Detail Performing   
Clinician  
   
                                        Start: 2022   Dup-scan xtr veins   
unilateral/limited study                            Alvin So MD  
Work Phone:   
[phone]  
   
                          Start: 2022 Antibody screen              BECKY CORRALES  
   
                                        Comment on above:   Order Comment: Speci  
men Type: BLOOD SPECIMEN  
Ordering Facility: [facility]  
Address: [address] [place], [place] [id number]   
   
                                                            Performed By: #### T  
SCR ####  
[facility]  
CLIA [id number]  
[address] [place] [address] [place]   
   
                          Start: 2022 Follow-up visit Follow Up    BASIL PRINCE  
   
                                        Start: 2021   Bone &/joint imaging  
 3   
phase study                                         Basil Mitchell MD  
Work Phone:   
[phone]  
   
                          Start: 2021 Cardiac catheterization               
   
   
                          Start: 03- Cardiac catheterization               
   
   
                          Start: 2021 Antibody screen                
   
                                        Comment on above:   Order Comment: Camp[facility]  
s: MTranfuse Now? YPatient transfused or   
pregnant in the past 3 months: YESNon - Acute Hemorrhage   
Indication: Hgb<8 w ACS/EKG chg/CPIrradiated: NWashed: NTransfuse   
slowly @ 60mL/hr x15min, then increase to infuse:Over 3 Hours   
   
                          Start: 2021 Antibody screen                
   
                                        Comment on above:   Order Comment: Campu  
s: MPatient transfused or pregnant in the   
past   
3 months: YESComments: NO RECORD PT STATES RECIEVED BLOOD IN   
[place] Patient Going To Surgery? N   
   
                                        Start: 2021   Ecg routine ecg w/le  
ast 12   
lds i&r only                                          
   
                                                H/O: surgery    S/P trigger fing  
er   
release                                 Damon Eckert MD  
Work Phone:   
[phone]  
   
                                                            History of operative  
   
procedure on knee                       Status post left   
partial knee   
replacement                             Basil Mitchell MD  
Work Phone:   
[phone]  
  
  
  
Plan of Treatment  
  
  
                          Date         Care Activity Detail       Author  
   
                                        Start: 2022   Hemoglobin   
A1c/Hemoglobin.total in   
Blood                     HBA1C                     [facility]  
   
                          Start: 2022 Influenza vaccination              [facility]  
   
                                        Start: 2022   Hemoglobin   
A1c/Hemoglobin.total in   
Blood                     HBA1C                     [facility]  
   
                                                    Start: 2021  
End: 2022                         C reactive protein   
[Mass/volume] in Serum   
or Plasma                               C-REACTIVE PROTEIN   
(CRP) Lab Routine Pain   
in prosthetic joint,   
initial encounter ([facility])   
Expected: 2021,   
Expires: 2022                     [facility]  
Work Phone:   
[phone]  
   
                                        Comment on above:   Expected: 2021  
, Expires: 2022   
   
                                                    Start: 2021  
End: 2022                         Erythrocyte   
sedimentation rate                      [facility] Lab   
Routine Pain in   
prosthetic joint,   
initial encounter (HCC)   
Expected: 2021,   
Expires: 2022                     [facility]  
Work Phone:   
[phone]  
   
                                        Comment on above:   Expected: 2021  
, Expires: 2022   
   
                          Start: 2021 Influenza vaccination INFLUENZA (#1)  
 [facility]  
   
                                        Start: 2011   SHINGRIX VACCINE (1   
of   
2)                                      SHINGRIX VACCINE (1 of   
2)                                      [facility]  
   
                          Start: 2006 COLOGUARD (FIT-DNA) COLOGUARD (FIT-D  
NA) [facility]  
   
                          Start: 2006 Colonoscopy  COLONOSCOPY  [facility]  
   
                                        Start: 2006   COLORECTAL CANCER   
SCREENING                               COLORECTAL CANCER   
SCREENING                               [facility]  
   
                          Start: 2006 CT COLONOGRAPHY CT COLONOGRAPHY [facility]  
   
                          Start: 2006 DIABETES SCREEN DIABETES SCREEN [facility]  
   
                          Start: 2006 FECAL OCCULT BLOOD FECAL OCCULT BLOO  
D [facility]  
   
                          Start: 2006 LIPID SCREEN LIPID SCREEN [facility]  
   
                          Start: 2006 SIGMOIDOSCOPY SIGMOIDOSCOPY [facility]  
   
                          Start: 2001 Mammography  MAMMOGRAM    [facility]  
   
                          Start: 1991 HPV TESTING  HPV TESTING  [facility]  
   
                          Start: 1982 PAP TESTING  PAP TESTING  [facility]  
   
                                        Start: 1980   Urine microalbumin   
profile                   DTAP,TDAP,TD (1 - Tdap)   [facility]  
   
                                        Start: 1979   ANNUAL PCP TEAM   
JAREK   
DISEASE VISIT                           ANNUAL PCP TEAM CHRONIC   
DISEASE VISIT                           [facility]  
   
                          Start: 1979 BP CONTROLLED (<130/80) BP CONTROLLE  
D (<130/80) [facility]  
   
                                        Start: 1979   Hepatitis B surface   
antibody level            LDL CHOLESTEROL           [facility]  
   
                          Start: 1979 HEPATITIS C SCREENING HEPATITIS C SC  
REENING [facility]  
   
                          Start: 1979 HIV SCREENING HIV SCREENING [facility]  
   
                                        Start: 1977   ONE PNEUMOVAX PRIOR   
TO   
AGE 66                                  ONE PNEUMOVAX PRIOR TO   
AGE 66                                  [facility]  
   
                                        Start: 1973   Adult depression   
screening assessment      DEPRESSION SCREENING      [facility]  
   
                                        Start: 1971   3 comp foot exam   
completed                 DIABETIC FOOT EXAM        [facility]  
   
                                Start: 1971 Hepatitis B screening URINE   
ALBUMIN:CREATININE   
RATIO                                   [facility]  
   
                                        Start: 1971   Hepatitis C antibody  
,   
confirmatory test         DILATED RETINAL EXAM      [facility]  
   
                          Start: 1967 PNEUMOCOCCAL (1 - PCV) PNEUMOCOCCAL   
(1 - PCV) [facility]  
   
                          Start: 1966 COVID-19 VACCINE (#1) COVID-19 VACCI  
NE (#1) [facility]  
   
                          Start: 1966 COVID-19 VACCINE (1) COVID-19 VACCIN  
E (1) [facility]  
   
                          Start: 1961 COVID-19 VACCINE (#1) COVID-19 VACCI  
NE (#1) [facility]  
   
                                                      
End: 2023                         Bone &/joint imaging 3   
phase study                             NM BONE 3 PHASE   
Radiology Routine Pain   
in prosthetic joint,   
initial encounter (HCC)   
1 Occurrences starting   
2021 until   
2023                              [facility]  
Work Phone:   
[phone]  
   
                                        Comment on above:   1 Occurrences starti  
ng 2021 until 2023   
   
                                                            XR KNEE 3V   
FLEX/LAT/MERCH LEFT (AK)                XR KNEE 3V   
FLEX/LAT/MERCH LEFT   
(AK) Radiology Routine   
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   
joint, initial   
encounter (HCC)   
Ordered: 2021                     [facility]  
Work Phone:   
[phone]  
   
                                        Comment on above:   Ordered: 2021   
   
                                                                 [facility]  
c  
   
                                                                 [facility]  
c  
   
                                                                 [facility]  
c  
   
                                                                 [facility]  
c  
   
                                                                 [facility]  
c  
   
                                                                 [facility]  
c  
   
                                                                 [facility]  
c  
  
  
  
Payers  
  
  
                          Date         Payer Category Payer        Policy ID  
   
                                2021      Medicaid [place] [employer] [employer] [id number]   
2021-Present   
[phone] [address]   
[place] [address]   
Medicaid                                [id number]   
[id number]  
[id number]  
   
                                2021      Medicaid [place] [id number]   
2021-Present   
[phone] [address]   
[place] [address]   
Medicaid                                [id number]  
[id number]  
   
                          2021   Medicaid                  [id number]  
   
                          1960   Unknown                   [id number]   
[id number]  
   
                          1960   Unknown                   [id number]   
[id number]  
   
                          1960   Unknown                   [id number]   
[id number]  
   
                          1960   Unknown                   [id number]   
[id number]  
   
                          1960   Unknown                   [id number]   
[id number]  
   
                                       Medicare                  [id number]  
  
  
  
Social History  
  
  
                          Date         Type         Detail       Facility  
   
                                                            Tobacco smoking stat  
[facility]                                    Tobacco smoking   
consumption unknown                     [facility]  
   
                          Start: 1960 Sex Assigned At Birth Not on file  C  
[facility]  
   
                                                    Start: 2022  
End: 2022                         Exposure to SARS-CoV-2   
(event)                   Not sure                  [facility]  
   
                                        Start: 2022   Tobacco smoking stat  
[facility]                      Never smoked tobacco      [facility]  
   
                          Start: 2022 Tobacco use and exposure Smokeless t  
obacco non-user [facility]  
   
                                                    Start: 2022  
End: 2022           Alcohol intake            Lifetime non-drinker   
(finding)                               [facility]  
   
                                        Start: 2022   History SDOH Alcohol  
   
Frequency                 1                         [facility]  
  
  
  
Medical Equipment  
  
  
                          Procedure Code Equipment Code Equipment Original Text   
Equipment   
Identifier                              Dates  
   
                                                                Cement Simplex   
Gentamicin Bone High   
Viscosity 20ml Sterile   
40gm - [id number]         [id number]               Start:   
2022  
   
                                                                Cement Simplex   
Gentamicin Bone High   
Viscosity 20ml Sterile   
40gm - [id number]         [id number]               Start:   
2022  
   
                                                                Cement Simplex   
Gentamicin Bone High   
Viscosity 20ml Sterile   
40gm - [id number]         [id number]               Start:   
2022  
   
                                                                Insert 4-11 E-F   
12mm   
Ultracongruent Knee   
-607-12            [id number]               Start:   
2022  
   
                                                                Persona Imp Knee  
 Fem   
Stem Lt 0 Sz7   
-752-01            [id number]               Start:   
2022  
  
  
  
Clinical Notes 2021 to 2023  
    Telephone Encounter - Roma Reagan [facility] - 2022 4:16 PM   
EDTTelephone Encounter - Roma Reagan [facility] - 2022 4:15 PM 
EDTPatient Instructions  
  
                                Note Date & Type Note            Facility  
   
                                        2023 Note     .  
MICRO - Microbiology  
PROCEDURE: Catheter Tip Culture ACCESSION:   
[id number]  
[*1]  
SOURCE: Catheter Tip BODY SITE:  
COLLECTED DATE/TIME: 2023 15:00 EST   
RECEIVED DATE/TIME: 2023 16:11 EST  
START DATE/TIME: 2023 16:11 EST FREE TEXT   
SOURCE:  
***FINAL REPORTS***  
Final Report []  
Verified Date/Time/Personnel: 2023 07:54   
EST  
No growth at 48 hours.  
***PRELIMINARY REPORTS***  
Preliminary Report []  
Verified Date/Time/Personnel: 2/15/2023 09:15   
EST  
No growth to date  
Performing Locations  
*1: This test was performed at:  
[facility], [address] [place] [address], [address] , [facility] ([place])  
   
                                        10- Note     HNO ID: [id number]  
Author: Rosalinda Cazares PA-C  
Service: ?  
Author Type: Physician Assistant  
Type: Progress Notes  
Filed: 10/4/2022 12:43 PM  
Note Text:  
ORTHOPAEDIC SHOULDER AND ELBOW SERVICE  
HISTORY AND PHYSICAL EXAM  
REFERRING PROVIDER:  
Damon Eckert  
[address] [facility] [address]  
[facility] [address]  
CHIEF COMPLAINT: right shoulder pain  
PAIN EVALUATION  
10/4/2022  
0954  
Pain Level: 8  
Description: Aching;Sore  
Frequency: Continuous  
Intervention/Comfort measure: Medication  
SUBJECTIVE:  
Jayden Soto is a 62 year old female who   
presents to clinic with right  
shoulder pain. History of right shoulder rotator   
cuff repair maybe 20  
years ago. It did well following surgery until   
about 2 years ago. Two  
years ago she became septic due to diabetic   
ulcer on her left heel. She  
ended up having her left partial knee   
replacement washed out with an  
antibiotic spacer placed and a washout of her   
back. Due to the  
positioning during her back procedure she awoke   
with a  dead arm . While  
in the hospital she underwent a right shoulder   
corticosteroid injection  
and therapy without improvement. She was seen at   
an outside clinic  
following that with a subsequent corticosteroid   
injection without  
improvement. Shoulder is painful over the   
anterior aspect. Worse with  
range of motion. She has pain into her forearm   
and elbow. She does  
ambulate with a cane in her right hand. She is   
on chronic suppressive  
antibiotics and the plan is to keep the   
antibiotic spacer in place in her  
left knee. She is accompanied by her daughter   
today.  
PAST MEDICAL HISTORY:  
PAST MEDICAL HISTORY  
Diagnosis Date  
Chronic pain of left knee  
Coronary artery disease  
1 cardiac stent  
Diabetes (HCC)  
Hypercholesteremia  
Hypertension  
MRSA (methicillin resistant staph aureus)   
culture positive  
multiple joints/bones  
Pain of right hand  
PAST SURGICAL HISTORY:  
PAST SURGICAL HISTORY  
Procedure Laterality Date  
BACK SURGERY HX  
ELBOW SURGERY HX Bilateral  
for bone spurs  
IR VASCULAR ACCESS TEAM PICC INSERTION RADIO   
2022  
KNEE SURGERY HX  
PAST SURGICAL HISTORY OF Left 2022  
left knee  
PAST SURGICAL HISTORY OF Left  
heel surgery  
PAST SURGICAL HISTORY OF Right 2022  
right middle finger  
SHOULDER SURGERY HX Bilateral  
SOCIAL HISTORY:  
Social History  
Tobacco Use  
Smoking status: Never  
Smokeless tobacco: Never  
Vaping Use  
Vaping Use: Never used  
Substance Use Topics  
Alcohol use: Never  
Drug use: Never  
ALLERGIES:  
ALLERGIES  
Allergen Reactions  
Morphine GI Upset  
Statins-Hmg-Coa Red* Unknown  
MEDICATIONS:  
Current Outpatient Medications on File Prior to   
Visit  
Medication Sig  
cephALEXin (KEFLEX) 500 mg capsule Take 1   
capsule by mouth twice daily.  
Lactobacillus acidophilus (ACIDOPHILUS) cap Take   
1 capsule by mouth twice  
daily.  
apixaban (ELIQUIS DVT-PE TREAT 30D START) 5 mg   
(74 tabs) Take 2 tablets  
(10 mg) by mouth twice daily for 7 days. Then   
take 1 tablet (5 mg) by  
mouth twice daily for 23 days  
apixaban (ELIQUIS) 5 mg tab(s) Take 1 tablet by   
mouth twice daily.  
aspirin 81 mg cap Take 1 capsule by mouth once   
daily. Resume once BID  
dosing is completed  
aspirin, enteric coated (ASPIRIN, ENTERIC   
COATED) 81 mg EC tablet Take 1  
tablet by mouth twice daily for 21 days. Once   
BID dosing is completed in  
21 days, resume home daily dose  
insulin glargine (LANTUS U-100 INSULIN) 100   
unit/mL injection Inject 36  
Units subcutaneously daily at bedtime. (Patient   
taking differently: Inject  
40 Units subcutaneously daily at bedtime.)  
insulin aspart U-100 (NOVOLOG) 100 unit/mL 14   
units with breakfast  
8 units with lunch  
10 units with dinner  
Admin Instructions: ADMINISTER CORRECTIONAL   
INSULIN REGARDLESS OF MEAL OR  
NUTRITION INTAKE  
Custom Scale  
If Blood Glucose (mg/dL) is  
Less than 100 0 units  
101-150 0 units  
151-175 2 units  
176-200 3 units  
201-225 4 units  
226-250 5 units  
251-275 6 units  
276-300 7 units  
301-325 8 units  
326-350 9 units  
>350 10 units and Notify Provider  
Notify provider if 2 consecutive blood glucose   
values in the previous 24  
hours are greater than 250 mg/mL and there have   
been no changes to the  
insulin regimen in the previous 24 hours.  
doxepin capsule 10 mg Take 20 mg by mouth daily   
at bedtime.  
BISACODYL ORAL Take 10 mg by mouth once daily.  
melatonin 10 mg cap Take 1 capsule by mouth   
daily at bedtime.  
alirocumab (PRALUENT) 75 mg/mL pen Inject 150 mg   
subcutaneously every  
other week.  
DULoxetine (CYMBALTA) 60 mg capsule Take 60 mg   
by mouth twice daily.  
ranolazine SR (RANEXA) 1,000 mg tab ER 12 hr   
Take 1 tablet by mouth twice  
daily.  
dilTIAZem CD (CARDIZEM CD, CARTIA XT) 180 mg 24   
hr capsule Take 180 mg by  
mouth once daily.  
GABAPENTIN ORAL Take 200 mg by mouth three times   
daily.  
METOPROLOL TARTRATE ORAL Take 25 mg by mouth   
twice daily.  
isosorbide mononitrate ER (IMDUR) 30 mg 24 hr   
tablet Take 90 mg by mouth  
once daily.  
clopidogrel (PLAVIX) 75 mg tablet Take 75 mg by   
mouth once daily (more content not included)... [facility]  
   
                                                    2022   
Miscellaneous Notes                     Formatting of this note might be differe  
nt from   
the original.  
Please schedule with Kit.  
  
Roma Reagan  Ppg  
2022 4:16 PM  
  
Electronically signed by Roma Reagan   
Dickinson Ppg at 2022 4:16 PM EDT  
Formatting of this note might be different from   
the original.  
----- Message from Fauzia Moreira sent at 2022   
4:09 PM EDT -----  
Regarding: Orthopedics / Open Shoulder: Pain /   
Previous Surgery By A Non- Provider  
Contact: [phone]  
  
Patient has been identified by name and Date of   
birth (Y/N): y  
  
Patient: Jayden Soto  
YOB: 1960  
MRN: [id number]  
Previous Provider Seen: n/a  
Body Part(s) Identified: Right Shoulder  
Diagnosis/Reason For Visit: Right Shoulder  
Reason for the call/escalation: Right Shoulder   
Pain / Previous Surgery in [place] several yrs   
ago ? Please reach out  
If reason for call/escalation is discharge from   
ED/ER or Hospital, which facility was the   
patient seen at: n/a  
  
Was an appointment scheduled (Y/N): n  
  
Person calling if other than patient: n  
Return call to if other than patient: n  
  
Best contact number: [phone]  
  
Thank you,  
Fauzia Moreira  
2022 4:09 PM  
  
  
  
Electronically signed by Roma Reagan   
Dickinson Ppg at 2022 4:15 PM EDT  
documented in this encounter            [facility]  
   
                                                    2022   
Instructions                              
  
  
Alvin So MD - 2022 1:43 PM EDT  
  
  
Formatting of this note might be different from   
the original.  
- to send to Radiology - [facility] for   
stat US rt arm at 3 pm to rule out DVT  
- fu 2 months  
  
  
  
Electronically signed by Alvin So MD at   
2022 1:46 PM EDT  
documented in this encounter            [facility]  
   
                                                    2022 History   
of Present illness   
Narrative                                 
  
  
Formatting of this note is different from the   
original.  
INFECTIOUS DISEASES OUTPATIENT FOLLOW-UP NOTE  
  
SERVICE DATE: 2022  
  
Subjective  
  
INTERVAL HPI : The patient is seen in fu of left   
prosthetic knee joint infection secondary to   
MSSA s/p resection arthroplasty and spacer   
placement on 3/24/2022. Patient is currently on   
ceftriaxone for total 6 weeks with projected end   
date of May 4. Today, patient is accompanied by   
her daughter. She endorses new pain in right arm   
at the site of PICC line x1 week. There is no   
blockage during ceftriaxone infusion. Denies   
having fevers or chills. No nausea or vomiting.   
Left knee surgical wound has healed completely.   
No diarrhea. No sore throat. No new joint pain.   
Weekly safety monitoring labs were reviewed.  
  
Has had right middle trigger finger operated on   
about 3 weeks ago.  
  
ROS completed x14 and only pertinent data   
documented, the rest is negative except as   
documented above  
  
MEDICATIONS:  
  
Current Outpatient Medications  
Medication Sig  
Lactobacillus acidophilus (ACIDOPHILUS) cap Take   
1 capsule by mouth twice daily.  
aspirin 81 mg cap Take 1 capsule by mouth once   
daily. Resume once BID dosing is completed  
cyclobenzaprine (FLEXERIL) 10 mg tablet Take 1   
tablet by mouth three times daily.  
insulin glargine (LANTUS U-100 INSULIN) 100   
unit/mL injection Inject 36 Units subcutaneously   
daily at bedtime. (Patient taking differently:   
Inject 40 Units subcutaneously daily at bedtime.  
)  
insulin aspart U-100 (NOVOLOG) 100 unit/mL 14   
units with breakfast  
8 units with lunch  
10 units with dinner  
  
Admin Instructions: ADMINISTER CORRECTIONAL   
INSULIN REGARDLESS OF MEAL OR NUTRITION INTAKE  
Custom Scale  
If Blood Glucose (mg/dL) is  
Less than 100 0 units  
101-150 0 units  
151-175 2 units  
176-200 3 units  
201-225 4 units  
226-250 5 units  
251-275 6 units  
276-300 7 units  
301-325 8 units  
326-350 9 units  
>350 10 units and Notify Provider  
Notify provider if 2 consecutive blood glucose   
values in the previous 24 hours are greater than   
250 mg/mL and there have been no changes to the   
insulin regimen in the previous 24 hours.  
cefTRIAXone sodium (ROCEPHIN) 1 gram solr Inject   
2 g intramuscularly every 24 hours.  
doxepin capsule 10 mg Take 20 mg by mouth daily   
at bedtime.  
BISACODYL ORAL Take 10 mg by mouth once daily.  
melatonin 10 mg cap Take 1 capsule by mouth   
daily at bedtime.  
alirocumab (PRALUENT) 75 mg/mL pen Inject 150 mg   
subcutaneously every other week.  
  
DULoxetine (CYMBALTA) 60 mg capsule Take 60 mg   
by mouth twice daily.  
  
ranolazine SR (RANEXA) 1,000 mg tab ER 12 hr   
Take 1 tablet by mouth twice daily.  
  
dilTIAZem CD (CARDIZEM CD) 180 mg 24 hr capsule   
Take 180 mg by mouth once daily.  
GABAPENTIN ORAL Take 200 mg by mouth three times   
daily.  
  
METOPROLOL TARTRATE ORAL Take 25 mg by mouth   
twice daily.  
  
isosorbide mononitrate ER (IMDUR) 30 mg 24 hr   
tablet Take 90 mg by mouth once daily.  
  
clopidogrel (PLAVIX) 75 mg tablet Take 75 mg by   
mouth once daily.  
aspirin, enteric coated (ASPIRIN, ENTERIC   
COATED) 81 mg EC tablet Take 1 tablet by mouth   
twice daily for 21 days. Once BID dosing is   
completed in 21 days, resume home daily dose  
empagliflozin (JARDIANCE) 25 mg tablet Take 25   
mg by mouth daily with breakfast. (Patient not   
taking: Reported on 2022  
)  
  
No current facility-administered medications for   
this visit.  
  
  
Objective  
  
Social History  
  
Tobacco Use  
Smoking status: Never Smoker  
Smokeless tobacco: Never Used  
Vaping Use  
Vaping Use: Never used  
Substance Use Topics  
Alcohol use: Never  
Drug use: Never  
  
FAMILY HISTORY  
Problem Relation Age of Onset  
Heart Mother  
Stroke Father  
  
PHYSICAL EXAM  
22  
1325  
BP: 137/64  
BP Site: Left Arm  
BP Position: Sitting  
BP Cuff Size: Regular Adult  
Pulse: 73  
Resp: 18  
Temp: 36.4 C (97.6 F)  
TempSrc: Temporal  
SpO2: 100%  
  
PSYCHIATRIC: no apparent distress, oriented to   
time, place and person  
SKIN: no rashes, no ulcers, no pressure ulcers  
EYES: no scleral icterus, no conjunctivitis  
HEENT: normal inspection of teeth, lips, gums,   
and oropharynx  
NECK: normal appearance, normal movement  
RESPIRATORY: symmetrical chest expansion and   
respiratory effort, clear to auscultation  
CARDIOVASCULAR: S1, S2, no murmurs, no gallops,   
no rubs, no edema  
ABDOMINAL: soft, non-distended, non-tender  
MUSCULOSKELETAL: normal muscle strength, normal   
muscle tone  
EXTREMITIES:  
Right arm PICC line site with no induration or   
discharge.  
Surgical wound at the base of right middle   
finger has no open ulcer or drainage.  
Left knee surgical wound healed closed, no   
erythema or dehiscence.  
NEUROLOGICAL: nonfocal  
  
DIAGNOSTICS REVIEWED/OPAT LABS REVIEWED  
  
PERTINENT LABS  
  
CBC:  
WBC 9.93 3/29/2022  
Hemoglobin 8.3 3/29/2022  
Hematocrit 27.7 3/29/2022  
Platelet Count 381 3/29/2022  
  
CMP:  
Sodium 138 3/29/2022  
Potassium 4.3 3/29/2022  
BUN 17 3/29/2022  
Creatinine 1.12 3/29/2022  
Glucose 74 3/29/2022  
  
Creatinine clearance:  
Serum creatinine: 1.12 mg/dL (H) 22 0537  
Estimated creatinine clearance: 65.6 mL/min (A)   
INFLAMMATORY MARKERS  
  
Sed Rate/CRP:  
WSR 23 2022  
CRP 1.9 3/21/2022  
CRP 0.7 2022  
PERTINENT MICROBIOLOGY  
  
Reviewed, see micro PERTINENT RADIOLOGY REPORTS  
  
Reviewed  
  
  
Impression/Recommendations  
Jayden Soto is a 61 year old female with a   
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   
s/p resection arthroplasty and articulating   
spacer placement on 3/24/2022.  
  
1. 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  
2. Right arm pain with a PICC in place, to rule   
out acute DVT  
3. Receiving IV antibiotics at home  
4. Comorbidities: Type 2 diabetes mellitus,   
obesity  
  
With the new onset of right arm pain at the site   
of PICC line, it is concerning for DVT.  
Patient has so far received 4 weeks +5 days of   
IV ceftriaxone since the joint surgery.  
  
Plan:  
--Patient sent to the ultrasound radiology for   
stat venous ultrasound--> showed  Positive study   
for DVT in the right subclavian, axillary, and   
basilic veins.   
--Patient will go to the ED for further Rx of   
acute DVT  
--PICC line can be removed  
--To switch antibiotics from IV ceftriaxone to   
oral Keflex 500 mg p.o. 4 times daily x 4 months  
-- Probiotic supplement daily  
-- ID clinic follow-up 1 month  
  
Case d/w ER physician - Dr. Mack, over the   
phone.  
  
  
  
MEDICAL DECISION MAKING:  
I have spent 40 minutes with this patient, >50%   
of time spent in a face to face encounter. This   
included time spent on counseling including lab   
results, diagnostic testing, medications,   
further management planning, prognosis, risks   
and benefits of treatment options. All questions   
were answered to the patient and/or family   
members satisfaction.  
  
SIGNATURE: Alvin So MD PATIENT NAME:   
Jayden Soto  
DATE: 2022 MRN: [id number]  
TIME: 1:26 PM  
  
  
  
Electronically signed by Alvin So MD at   
2022 3:20 PM EDTdocumented in this   
encounter                               [facility]  
   
                                        2022 Note     HNO ID: [id number]  
Author: Damon Eckert MD  
Service: ?  
Author Type: Physician  
Type: Progress Notes  
Filed: 2022 2:29 PM  
Note Text:  
Patient presents with:  
Right Hand - Follow Up, Post Op, Pain  
SURGEON  
Damon Eckert MD  
PROCEDURE  
2022  
A1 pulley release right middle finger  
HISTORY OF PRESENT ILLNESS  
Jayden Soto presents for post operative follow   
up 2 weeks from surgery.  
The patient is doing well. She is very pleased   
with the results of the  
surgery and reports minimal pain in the hand. No   
wound concerns.  
Current Concerns: None  
Pain control: Well-controlled  
Currently taking pain medication:No  
Fever, chills or other signs of infection:   
Denies  
PHYSICAL EXAMINATION  
Right hand  
Inspection shows very minimal residual swelling  
A slight middle finger PIP flexion contracture   
remains, passively  
reducible and nonpainful  
Incision(s) clean, dry, intact. Nylon sutures in   
place  
No erythema, cellulitis or drainage  
Sutures removed without difficulty. Patient   
tolerated procedure well  
ROM: Full composite fist, middle fingertip to   
palm  
No residual mechanical locking or catching  
Sensation:Normal sensation  
AIN/PIN/ulnar motor intact  
Brisk cap refill  
IMAGING  
No new imaging obtained  
ASSESSMENT AND PLAN  
ASSESSMENT/PLAN:  
1. S/P trigger finger release - ICD9: V45.89,   
ICD10: Z98.890  
The patient is doing well and has healed the   
wound nicely. She is pleased  
with the results. We will follow-up together on   
an as-needed basis.  
Damon Eckert  
Pt education provided on palmar massage and   
desensitization.  
Patient was instructed to call the office with   
any questions and/or  
concerns                                [facility]  
   
                                                    2022 History   
of Present illness   
Narrative                                 
  
  
Formatting of this note might be different from   
the original.  
Patient presents with:  
Right Hand - Follow Up, Post Op, Pain  
  
SURGEON  
Damon Eckert MD  
  
PROCEDURE  
2022  
A1 pulley release right middle finger  
  
HISTORY OF PRESENT ILLNESS  
Jayden Soto presents for post operative follow   
up 2 weeks from surgery.  
The patient is doing well. She is very pleased   
with the results of the surgery and reports   
minimal pain in the hand. No wound concerns.  
  
Current Concerns: None  
Pain control: Well-controlled  
Currently taking pain medication:No  
Fever, chills or other signs of infection:   
Denies  
  
PHYSICAL EXAMINATION  
Right hand  
Inspection shows very minimal residual swelling  
A slight middle finger PIP flexion contracture   
remains, passively reducible and nonpainful  
Incision(s) clean, dry, intact. Nylon sutures in   
place  
No erythema, cellulitis or drainage  
Sutures removed without difficulty. Patient   
tolerated procedure well  
ROM: Full composite fist, middle fingertip to   
palm  
No residual mechanical locking or catching  
Sensation:Normal sensation  
AIN/PIN/ulnar motor intact  
Brisk cap refill  
  
IMAGING  
No new imaging obtained  
  
ASSESSMENT AND PLAN  
ASSESSMENT/PLAN:  
1. S/P trigger finger release - ICD9: V45.89,   
ICD10: Z98.890  
  
The patient is doing well and has healed the   
wound nicely. She is pleased with the results.   
We will follow-up together on an as-needed   
basis.  
  
Damon Eckert  
  
Pt education provided on palmar massage and   
desensitization.  
Patient was instructed to call the office with   
any questions and/or concerns  
  
  
  
  
Electronically signed by Damon Eckert MD at   
2022 2:29 PM EDTdocumented in this   
encounter                               [facility]  
   
                                        04- Note     HNO ID: [id number]  
Author: Basil Mitchell MD  
Service: ?  
Author Type: Physician  
Type: Progress Notes  
Filed: 4/15/2022 10:29 AM  
Note Text:  
Follow-up Patient Visit  
Jayden Soto is a 61 year old female who   
presents to follow up for  
Acquired absence of knee joint following   
explantation of joint prosthesis  
with presence of antibiotic-impregnated cement   
spacer (primary encounter  
diagnosis)  
Current or previous treatment regimens: NSAIDS  
Medications:  
Current Outpatient Medications  
Medication Sig  
- aspirin 81 mg cap Take 1 capsule by mouth once   
daily. Resume once BID  
dosing is completed  
- cyclobenzaprine (FLEXERIL) 10 mg tablet Take 1   
tablet by mouth three  
times daily.  
- aspirin, enteric coated (ASPIRIN, ENTERIC   
COATED) 81 mg EC tablet Take 1  
tablet by mouth twice daily for 21 days. Once   
BID dosing is completed in  
21 days, resume home daily dose  
- insulin glargine (LANTUS U-100 INSULIN) 100   
unit/mL injection Inject 36  
Units subcutaneously daily at bedtime.  
- insulin aspart U-100 (NOVOLOG) 100 unit/mL 14   
units with breakfast  
8 units with lunch  
10 units with dinner  
Admin Instructions: ADMINISTER CORRECTIONAL   
INSULIN REGARDLESS OF MEAL OR  
NUTRITION INTAKE  
Custom Scale  
If Blood Glucose (mg/dL) is  
Less than 100 ? 0 units  
101-150 ? 0 units  
151-175 ? 2 units  
176-200 ? 3 units  
201-225 ? 4 units  
226-250 ? 5 units  
251-275 ? 6 units  
276-300 ? 7 units  
301-325 ? 8 units  
326-350 ? 9 units  
>350 ? 10 units and Notify Provider  
Notify provider if 2 consecutive blood glucose   
values in the previous 24  
hours are greater than 250 mg/mL and there have   
been no changes to the  
insulin regimen in the previous 24 hours.  
- cefTRIAXone sodium (ROCEPHIN) 1 gram solr   
Inject 2 g intramuscularly  
every 24 hours.  
- doxepin capsule 10 mg Take 20 mg by mouth   
daily at bedtime.  
- BISACODYL ORAL Take 10 mg by mouth once daily.  
- melatonin 10 mg cap Take 1 capsule by mouth   
daily at bedtime.  
- alirocumab (PRALUENT) 75 mg/mL pen Inject 75   
mg subcutaneously every  
other week.  
- DULoxetine (CYMBALTA) 60 mg capsule Take 60 mg   
by mouth twice daily.  
- ranolazine SR (RANEXA) 1,000 mg tab ER 12 hr   
Take 1 tablet by mouth  
twice daily.  
- dilTIAZem CD (CARDIZEM CD) 180 mg 24 hr   
capsule Take 180 mg by mouth  
once daily.  
- empagliflozin (JARDIANCE) 25 mg tablet Take 25   
mg by mouth daily with  
breakfast.  
- GABAPENTIN ORAL Take 200 mg by mouth three   
times daily.  
- METOPROLOL TARTRATE ORAL Take 25 mg by mouth   
twice daily.  
- isosorbide mononitrate ER (IMDUR) 30 mg 24 hr   
tablet Take 90 mg by mouth  
once daily.  
- clopidogrel (PLAVIX) 75 mg tablet Take 75 mg   
by mouth once daily.  
No current facility-administered medications for   
this visit.  
Allergies:  
ALLERGIES  
Allergen Reactions  
- Morphine GI Upset  
- Statins-Hmg-Coa Red* Unknown  
Physical Examination:  
Resp 16   Ht 5' 7  (1.70m)   Wt 225 lb (102.1kg)   
  BMI 35.23 kg/(m2).  
Ortho Exam  
Ambulating with a walker  
Incision CDI  
ROM 5-100  
NVI  
Images: na  
Procedures  
Oxford Score: see report  
Assessment and Plan:  
1. Acquired absence of knee joint following   
explantation of joint  
prosthesis with presence of   
antibiotic-impregnated cement spacer - ICD9:  
V88.22, ICD10: Z89.529  
Continue antibiotics per ID  
Overall she is doing well, much better than   
prior to surgery  
As long as she is doing well clinically we can   
ride out the spacer  
FU in 6 weeks  
No follow-ups on file.  
Basil Mitchell MD                         [facility]  
   
                                        04- Note     HNO ID: [id number]  
Author: Radha Novak MA  
Service: ?  
Author Type: Medical Assistant  
Type: Progress Notes  
Filed: 4/15/2022 10:29 AM  
Note Text:  
REVIEW OF SYSTEMS:  
GENERAL: Well developed, well nourished. No   
acute distress  
PAIN: Pain left knee  
CARDIOVASCULAR: HTN  
MSK: Positive for joint swelling  
SKIN: Negative for lesions, rash, itching, metal   
sensitivity  
NEURO: Negative for seizure, trauma,   
numbness/tingling of extremities.  
ENDOCRINE: Positive for diabetic associated   
symptoms  
HEMATOLOGY: Negative for excessive bleeding,   
clots, bleeding disorders.              [facility]  
   
                                                    04- History   
of Present illness   
Narrative                                 
  
  
Formatting of this note is different from the   
original.  
Follow-up Patient Visit  
  
Jayden Soto is a 61 year old female who   
presents to follow up for Acquired absence of   
knee joint following explantation of joint   
prosthesis with presence of   
antibiotic-impregnated cement spacer (primary   
encounter diagnosis)  
  
Current or previous treatment regimens: NSAIDS  
  
Medications:  
Current Outpatient Medications  
Medication Sig  
aspirin 81 mg cap Take 1 capsule by mouth once   
daily. Resume once BID dosing is completed  
cyclobenzaprine (FLEXERIL) 10 mg tablet Take 1   
tablet by mouth three times daily.  
aspirin, enteric coated (ASPIRIN, ENTERIC   
COATED) 81 mg EC tablet Take 1 tablet by mouth   
twice daily for 21 days. Once BID dosing is   
completed in 21 days, resume home daily dose  
insulin glargine (LANTUS U-100 INSULIN) 100   
unit/mL injection Inject 36 Units subcutaneously   
daily at bedtime.  
insulin aspart U-100 (NOVOLOG) 100 unit/mL 14   
units with breakfast  
8 units with lunch  
10 units with dinner  
  
Admin Instructions: ADMINISTER CORRECTIONAL   
INSULIN REGARDLESS OF MEAL OR NUTRITION INTAKE  
Custom Scale  
If Blood Glucose (mg/dL) is  
Less than 100 0 units  
101-150 0 units  
151-175 2 units  
176-200 3 units  
201-225 4 units  
226-250 5 units  
251-275 6 units  
276-300 7 units  
301-325 8 units  
326-350 9 units  
>350 10 units and Notify Provider  
Notify provider if 2 consecutive blood glucose   
values in the previous 24 hours are greater than   
250 mg/mL and there have been no changes to the   
insulin regimen in the previous 24 hours.  
cefTRIAXone sodium (ROCEPHIN) 1 gram solr Inject   
2 g intramuscularly every 24 hours.  
doxepin capsule 10 mg Take 20 mg by mouth daily   
at bedtime.  
BISACODYL ORAL Take 10 mg by mouth once daily.  
melatonin 10 mg cap Take 1 capsule by mouth   
daily at bedtime.  
alirocumab (PRALUENT) 75 mg/mL pen Inject 75 mg   
subcutaneously every other week.  
DULoxetine (CYMBALTA) 60 mg capsule Take 60 mg   
by mouth twice daily.  
  
ranolazine SR (RANEXA) 1,000 mg tab ER 12 hr   
Take 1 tablet by mouth twice daily.  
  
dilTIAZem CD (CARDIZEM CD) 180 mg 24 hr capsule   
Take 180 mg by mouth once daily.  
empagliflozin (JARDIANCE) 25 mg tablet Take 25   
mg by mouth daily with breakfast.  
GABAPENTIN ORAL Take 200 mg by mouth three times   
daily.  
  
METOPROLOL TARTRATE ORAL Take 25 mg by mouth   
twice daily.  
  
isosorbide mononitrate ER (IMDUR) 30 mg 24 hr   
tablet Take 90 mg by mouth once daily.  
  
clopidogrel (PLAVIX) 75 mg tablet Take 75 mg by   
mouth once daily.  
  
No current facility-administered medications for   
this visit.  
  
Allergies:  
ALLERGIES  
Allergen Reactions  
Morphine GI Upset  
Statins-Hmg-Coa Red* Unknown  
  
Physical Examination:  
  
Resp 16   Ht 5' 7  (1.70m)   Wt 225 lb (102.1kg)   
  BMI 35.23 kg/(m^2).  
  
  
Ortho Exam  
  
Ambulating with a walker  
Incision CDI  
ROM 5-100  
NVI  
  
Images: na  
  
Procedures  
  
Oxford Score: see report  
  
Assessment and Plan:  
  
1. Acquired absence of knee joint following   
explantation of joint prosthesis with presence   
of antibiotic-impregnated cement spacer - ICD9:   
V88.22, ICD10: Z89.529  
  
Continue antibiotics per ID  
Overall she is doing well, much better than   
prior to surgery  
As long as she is doing well clinically we can   
ride out the spacer  
FU in 6 weeks  
  
No follow-ups on file.  
  
Basil Mitchell MD  
  
  
Electronically signed by Basil Mitchell MD at   
04/15/2022 10:29 AM EDT  
  
  
Formatting of this note might be different from   
the original.  
REVIEW OF SYSTEMS:  
  
GENERAL: Well developed, well nourished. No   
acute distress  
PAIN: Pain left knee  
CARDIOVASCULAR: HTN  
MSK: Positive for joint swelling  
SKIN: Negative for lesions, rash, itching, metal   
sensitivity  
NEURO: Negative for seizure, trauma,   
numbness/tingling of extremities.  
ENDOCRINE: Positive for diabetic associated   
symptoms  
HEMATOLOGY: Negative for excessive bleeding,   
clots, bleeding disorders.  
  
  
  
  
Electronically signed by Radha Novak MA   
at 04/15/2022 10:29 AM EDTdocumented in this   
encounter                               [facility]  
   
                                        2022 Note     HNO ID: [id number]  
Author: Damon Eckert MD  
Service: ?  
Author Type: Physician  
Type: Progress Notes  
Filed: 2022 9:52 AM  
Note Text:  
Patient presents with:  
Right Hand - New, Stiffness, Pain  
HISTORY OF PRESENT ILLNESS  
Jayden Soto is a 61 year old female right hand   
dominant who presents for  
evaluation of a right middle trigger finger. The   
patient reports this  
worsening over the last year or more. It   
frequently becomes locked and is  
rather painful. Of note she is recovering from a   
left septic knee  
arthroplasty which underwent explantation and   
placement of a cement  
spacer. She is being treated by Dr. Mitchell.  
Location: Right hand, middle finger  
Severity: 5 on a scale of 0-10  
Duration of symptoms: Over 1 year  
Date of injury n/a  
Symptoms have Worsened  
Previous treatment: Observation  
Context worse with  
Activity/Motion and Gripping  
Occupation: Retired   
Smoking status:  
Tobacco Use: Never  
REVIEW OF SYSTEMS  
Cardiovascular ROS:No history of chest pain,   
palpitation, orthopnea,  
cyanosis, pedal edema  
Neurologic ROS: Numbness and Tingling:No  
PAST MEDICAL HISTORY  
Past medical, surgical, family, and social   
histories have been reviewed  
and updated with the patient today and are   
located elsewhere in the  
medical record.  
Diabetes:Yes  
ALLERGIES  
ALLERGIES  
Allergen Reactions  
- Morphine GI Upset  
- Statins-Hmg-Coa Red* Unknown  
PHYSICAL EXAMINATION  
Resp 20   Ht 170.2 cm (5' 7 )   Wt 102.1 kg (225   
lb)   BMI 35.24 kg/m?  
Body mass index is 35.24 kg/m?.  
General Appearance Well appearing, alert, in no   
acute distress,  
well-hydrated, well nourished.  
Alert and oriented times: 3  
Normal affect times: 3  
Appears stated age and well nourished  
Gait and station:normal  
Right Upper Extremity Exam:  
Inspection shows a resting flexed posture at the   
middle finger  
No wounds or lacerations. No surgical incisions   
noted  
Skin: WNL  
Tenderness to palpation: Tender at middle finger   
A1 pulley  
ROM: Deficit at middle finger, 1 cm tip to palm  
Instability: none  
Sensation:Normal sensation  
Atrophy: Intact  
Brisk capillary refill  
Special tests: Pain, mechanical locking with   
terminal passive flexion of  
the middle finger  
REVIEW OF STUDIES  
No new imaging obtained  
ASSESSMENT AND PLAN  
ASSESSMENT/PLAN:  
1. Trigger finger, right middle finger - ICD9:   
727.03, ICD10: M65.331  
I discussed the diagnosis with the patient and   
reviewed some diagrams to  
explain the relevant anatomy. We reviewed   
treatment options including  
injections and surgery. She has a history of   
fragile diabetes and has  
experienced some significant blood sugar   
fluctuations following previous  
injections years ago. I think that the safest   
and most predictable  
measure in her case would be surgical release of   
the A1 pulley. This  
could be done under local anesthesia as a short   
outpatient procedure.  
Risks, benefits, alternatives were reviewed with   
her and she consider  
these carefully and elects to proceed. Consent   
was obtained. We will  
plan on outpatient surgery at her convenience.   
All of her questions were  
answered to her satisfaction.  
Damon Eckert  
Patient educated on expected postoperative   
course and recovery.  
Patient instructed to call the office with   
questions or concerns.                  [facility]  
   
                                                    2022 History   
of Present illness   
Narrative                                 
  
  
Formatting of this note is different from the   
original.  
Patient presents with:  
Right Hand - New, Stiffness, Pain  
  
HISTORY OF PRESENT ILLNESS  
Jayden Soto is a 61 year old female right hand   
dominant who presents for evaluation of a right   
middle trigger finger. The patient reports this   
worsening over the last year or more. It   
frequently becomes locked and is rather painful.   
Of note she is recovering from a left septic   
knee arthroplasty which underwent explantation   
and placement of a cement spacer. She is being   
treated by Dr. Mitchell.  
  
Location: Right hand, middle finger  
Severity: 5 on a scale of 0-10  
Duration of symptoms: Over 1 year  
Date of injury n/a  
Symptoms have Worsened  
Previous treatment: Observation  
  
Context worse with  
Activity/Motion and Gripping  
  
Occupation: Retired   
Smoking status:  
Tobacco Use: Never  
  
REVIEW OF SYSTEMS  
Cardiovascular ROS:No history of chest pain,   
palpitation, orthopnea, cyanosis, pedal edema  
Neurologic ROS: Numbness and Tingling:No  
  
PAST MEDICAL HISTORY  
Past medical, surgical, family, and social   
histories have been reviewed and updated with   
the patient today and are located elsewhere in   
the medical record.  
Diabetes:Yes  
  
ALLERGIES  
ALLERGIES  
Allergen Reactions  
Morphine GI Upset  
Statins-Hmg-Coa Red* Unknown  
  
PHYSICAL EXAMINATION  
Resp 20   Ht 170.2 cm (5' 7 )   Wt 102.1 kg (225   
lb)   BMI 35.24 kg/m  
Body mass index is 35.24 kg/m .  
General Appearance Well appearing, alert, in no   
acute distress, well-hydrated, well nourished.  
Alert and oriented times: 3  
Normal affect times: 3  
Appears stated age and well nourished  
Gait and station:normal  
  
Right Upper Extremity Exam:  
Inspection shows a resting flexed posture at the   
middle finger  
No wounds or lacerations. No surgical incisions   
noted  
Skin: WNL  
Tenderness to palpation: Tender at middle finger   
A1 pulley  
ROM: Deficit at middle finger, 1 cm tip to palm  
Instability: none  
Sensation:Normal sensation  
Atrophy: Intact  
Brisk capillary refill  
Special tests: Pain, mechanical locking with   
terminal passive flexion of the middle finger  
  
REVIEW OF STUDIES  
No new imaging obtained  
  
ASSESSMENT AND PLAN  
ASSESSMENT/PLAN:  
1. Trigger finger, right middle finger - ICD9:   
727.03, ICD10: M65.331  
  
I discussed the diagnosis with the patient and   
reviewed some diagrams to explain the relevant   
anatomy. We reviewed treatment options including   
injections and surgery. She has a history of   
fragile diabetes and has experienced some   
significant blood sugar fluctuations following   
previous injections years ago. I think that the   
safest and most predictable measure in her case   
would be surgical release of the A1 pulley. This   
could be done under local anesthesia as a short   
outpatient procedure. Risks, benefits,   
alternatives were reviewed with her and she   
consider these carefully and elects to proceed.   
Consent was obtained. We will plan on outpatient   
surgery at her convenience. All of her questions   
were answered to her satisfaction.  
  
Damon Eckert  
  
Patient educated on expected postoperative   
course and recovery.  
Patient instructed to call the office with   
questions or concerns.  
  
  
  
  
Electronically signed by Damon Eckert MD at   
2022 9:52 AM EDTdocumented in this   
encounter                               [facility]  
   
                                                    2022   
Miscellaneous Notes                       
  
  
Formatting of this note might be different from   
the original.  
Received Plan of Care to be reviewed and signed   
by Dr LOBITO So.  
  
Placed in folder to be reviewed/signature  
  
  
Electronically signed by TRENTON Gordon at   
2022 10:03 AM EDTdocumented in this   
encounter                               [facility]  
   
                                                    2022   
Miscellaneous Notes                       
  
  
Formatting of this note might be different from   
the original.  
Received 2022 weekly CoPAT lab results  
  
  
Electronically signed by TRENTON Gordon at   
2022 2:58 PM EDTdocumented in this   
encounter                               [facility]  
   
                                                    2022   
Miscellaneous Notes                       
  
  
Formatting of this note might be different from   
the original.  
Received 2022 weekly CoPAT lab results  
  
Placed in folder for review  
  
  
Electronically signed by TRENTON Gordon at   
2022 4:36 PM EDTdocumented in this   
encounter                               [facility]  
   
                                                    2022   
Miscellaneous Notes                       
  
  
Formatting of this note might be different from   
the original.  
Received lab results to be reviewed by Dr LOBITO So  
  
Placed in folder for review  
  
  
Electronically signed by TRENTON Gordon at   
2022 1:36 PM EDTdocumented in this   
encounter                               [facility]  
   
                                                    2022   
Miscellaneous Notes                       
  
  
Formatting of this note might be different from   
the original.  
Received Physician orders to be reviewed and   
signed by Dr LOBITO So.  
  
Placed in folder for review/signature  
  
*note PICC line was removed and Ceftriaxone   
discontinued 2022 due to DVT and   
transitioned to Keflex  
  
  
Electronically signed by TRENTON Gordon at   
2022 1:47 PM EDTdocumented in this   
encounter                               [facility]  
   
                                                    2022   
Miscellaneous Notes                       
  
  
Formatting of this note might be different from   
the original.  
Appointment scheduled with  4/15/22 @   
9:30 am.  
  
Maco Cardona Dickinson Ppg  
  
  
  
Electronically signed by Maco Cardona   
 Ppg at 2022 1:54 PM EDT  
  
  
Formatting of this note might be different from   
the original.  
----- Message from Amita Crooks sent at   
3/30/2022 1:31 PM EDT -----  
Regarding: Orthopedics / Paula Knee: Pain /   
Post Op Within 90 Day Period  
Subject Line Format: Orthopedics / [Provider   
Name or  Open & Body Part ] / [Issue]  
  
Patient has been identified by name and Date of   
birth (Y/N): Y  
  
Patient: Jayden Soto  
YOB: 1960  
MRN: [id number]  
Previous Provider Seen: DR. HANNAH MITCHELL  
Body Part(s) Identified: KNEE  
Diagnosis/Reason For Visit: POST OP  
Reason for the call/escalation: POST OP  
If reason for call/escalation is discharge from   
ED/ER or Hospital, which facility was the   
patient seen at: NA  
  
Was an appointment scheduled (Y/N): N  
  
Person calling if other than patient: DAUGHTER   
IN [facility]  
Return call to if other than patient:     
  
Best contact number: [phone]  
  
Thank you,  
Amita Crooks  
2022 1:31 PM  
  
  
  
  
Electronically signed by Maco Cardona   
 Ppg at 2022 1:53 PM   
EDTdocumented in this encounter         [facility]  
   
                                        2022 Note     HNO ID: [id number]  
Author: Iqra Pearce DO  
Service: Hospital Medicine  
Author Type: Physician  
Type: Progress Notes  
Filed: 3/29/2022 6:35 AM  
Note Text:  
DEPARTMENT OF HOSPITAL MEDICINE  
PROGRESS NOTE  
SERVICE DATE: 3/29/2022  
SERVICE TIME: 6:33 AM  
Hospital Medicine/Primary Attending: Iqra Pearce DO  
NIGHT AND WEEKEND COVERAGE:  
After 7pm please page [phone]  
CHIEF COMPLAINT: Follow up on MMP  
SUBJECTIVE: Pt seen and examined. States that   
she did have a small BM  
last night. No chest pain or SOB. Wondering if   
she is going home today  
or not  
OBJECTIVE:  
PHYSICAL EXAM: BP 99/49   Pulse 60   Temp (Src)   
97.9 (Oral)   Resp 18   Ht  
5' 7  (1.70m)   Wt 248 lb 10.9 oz (112.8kg)     
SpO2 100%   BMI 38.94  
kg/(m2).  
O2 Therapy: Room Air  
General - AANDOx3, NAD, Calm  
CV - RRR S1 S2, No M/R/G  
RESP - CTA B/L No wheezes, ronchi, rales  
ABD - soft, NT, ND +BS  
EXT - dressing in place  
NEURO - CN II-XII grossly intact, no focal   
deficits  
MEDICATIONS:  
Current Facility-Administered Medications  
Medication Dose Route Frequency  
- ondansetron (PF) 4 mg injection (ZOFRAN) 4 mg   
INTRAVENOUS q 6 H PRN  
- NaCl 0.9% iv flush bag 20 mL INTRAVENOUS PRN  
- sodium chloride 0.9 % (flush) 3-5 mL (BD   
POSIFLUSH) 3-5 mL INTRAVENOUS  
q 12 H  
- sodium chloride 0.9 % (flush) 2-10 mL (BD   
POSIFLUSH) 2-10 mL  
INTRAVENOUS q 12 H  
- DULoxetine 60 mg cap(s) (CYMBALTA) 60 mg ORAL   
BID  
- ranolazine ER 1,000 mg tab(s) (RANEXA) 1,000   
mg ORAL BID  
- dilTIAZem  mg cap(s) (CARDIZEM CD,   
CARTIA XT) 180 mg ORAL DAILY  
- gabapentin 200 mg cap(s) (NEURONTIN) 200 mg   
ORAL TID  
- isosorbide mononitrate ER 90 mg tab(s) (IMDUR)   
90 mg ORAL DAILY  
- clopidogrel 75 mg tab(s) (PLAVIX) 75 mg ORAL   
DAILY  
- metoprolol tartrate (short acting) 25 mg   
tab(s) (LOPRESSOR) 25 mg ORAL  
q 12 H  
- dextrose 40 % 15 g 15 g ORAL PRN  
Or  
- glucagon 1 mg injection 1 mg INTRAMUSCULAR PRN  
Or  
- dextrose 50% in water 25 mL syringe 12.5 g   
INTRAVENOUS PRN  
- empagliflozin 25 mg tab(s) (JARDIANCE) 25 mg   
ORAL DAILY  
- docusate sodium 100 mg cap(s) (COLACE) 100 mg   
ORAL BID  
- aspirin, enteric coated 81 mg tab(s) 81 mg   
ORAL BID  
- sodium chloride 0.9 % (flush) 3-5 mL (BD   
POSIFLUSH) 3-5 mL INTRAVENOUS  
q 12 H  
- miconazole 2 % 1 application topical powder   
(LOTRIMIN AF, DESENEX) 1  
application TOPICAL BID  
- acetaminophen 1,000 mg tab(s) (TYLENOL) 1,000   
mg ORAL q 6 H  
- oxyCODONE IR 5-10 mg tab(s) (ROXICODONE) 5-10   
mg ORAL q 4 H PRN  
- traMADol 50 mg tab(s) (ULTRAM) 50 mg ORAL q 6   
H  
- morphine 2 mg injection 2 mg INTRAVENOUS q 3 H   
PRN  
- melatonin 6 mg tab(s) 6 mg ORAL DAILY (8 PM)  
- insulin lispro pen (rapid acting) (HumaLOG   
KWIKPEN) SUBCUTANEOUS w  
MEALS  
- sodium chloride 0.9 % (flush) 10 mL (BD   
POSIFLUSH) 10 mL INTRAVENOUS q  
12 H  
- sodium chloride 0.9 % (flush) 20 mL (BD   
POSIFLUSH) 20 mL INTRAVENOUS  
PRN  
- senna-docusate 8.6-50 mg 1 tablet (SENNA-S) 1   
tablet ORAL BID  
- calcium carbonate 500 mg chewable tab(s)   
(TUMS) 500 mg ORAL TID PRN  
- insulin glargine 36 Units pen (long acting)   
(LANTUS SOLOSTAR, BASAGLAR  
KWIKPEN) 36 Units SUBCUTANEOUS AT BEDTIME  
- insulin lispro 8 Units pen (rapid acting)   
(HumaLOG KWIKPEN) 8 Units  
SUBCUTANEOUS DAILY wLUNCH  
- insulin lispro 10 Units pen (rapid acting)   
(HumaLOG KWIKPEN) 10 Units  
SUBCUTANEOUS DAILY wDINNER  
- insulin lispro 14 Units pen (rapid acting)   
(HumaLOG KWIKPEN) 14 Units  
SUBCUTANEOUS DAILY WITH BREAKFAST  
- cefTRIAXone iv piggyback 2 g in dextrose   
(iso-osmotic) 50 mL (ROCEPHIN)  
2 g INTRAVENOUS q 24 H  
- doxycycline hyclate 100 mg cap(s) (VIBRAMYCIN)   
100 mg ORAL q 12 h  
7am/7pm  
- diphenhydrAMINE 50 mg injection (BENADRYL) 50   
mg INTRAVENOUS q 6 H PRN  
- cyclobenzaprine 5 mg tab(s) (FLEXERIL) 5 mg   
ORAL TID PRN  
DATA:  
Diagnostic tests reviewed for today's visit:  
CBC:  
Recent Labs  
22  
0536  
WBC 9.93  
RBC 2.98*  
HB 8.3*  
HCT 27.7*  
  
MCV 93.0  
MCH 27.9  
MPV 9.5  
Coags: No results for input(s): PT, INR, APTT in   
the last 24 hours.  
BMP:  
Recent Labs  
22  
0537  
  
K 4.3  
CHLOR 103  
CO2 30  
BUN 17  
CREAT 1.12*  
GLUC 74  
CMP:  
Recent Labs  
22  
0537  
  
K 4.3  
CHLOR 103  
CO2 30  
BUN 17  
CREAT 1.12*  
GLUC 74  
CA 9.0  
ANION 5*  
Cardiac Enzymes: No results for input(s): CK,   
MB, CKMB, TROPT in the last  
24 hours.  
Liver Function, Amylase, Lipase: No results for   
input(s): TPROT, ALB, ALT,  
AST, ALKPHOS, TBILI, AMYLASE, LIPASE, LACTATE in   
the last 24 hours.  
MG/PHOS: No results for input(s): MG, P in the   
last 24 hours.  
Renal Panel:  
Recent Labs  
22  
0537  
CREAT 1.12*  
BUN 17  
GLUC 74  
CA 9.0  
CHLOR 103  
K 4.3  
CO2 30  
  
Heme: No results for input(s): RETICP, ABSRETIC,   
LD, KARI, FE, TIBC,  
TRANSFERSAT in the last 24 hours.  
No results found for: UALBCR  
Estimated Creatinine Clearance: 69.2 mL/min (A)   
(based on SCr of 1.12  
mg/dL (H)).  
Assessment/Plan  
1. POD #5?Irrigation and Debridement Left Knee,   
Removal Unicompartmental  
Knee Arthroplasty Le (more content not   
included)...                            [facility]  
   
                                        2022 Note     HNO ID: [id number]  
Author: Jayme Galo MD  
Service: Orthopaedic Surgery  
Author Type: Resident  
Type: Progress Notes  
Filed: 3/29/2022 6:31 AM  
Note Text:  
Assessment  
Jayden Soto is a 61 year old female who is POD   
#5 Irrigation and  
Debridement Left Knee, Removal Unicompartmental   
Knee Arthroplasty Left  
Knee, Insertion Articulating Antibiotic Spacer   
Left Knee  
Plan  
? Pain control  
? WBAT LLE  
? PT/OT  
? Medical management per IM - appreciate recs  
? Antibiotics per ID - PICC line placed Friday.   
Change to ceftriaxone-  
stop date=22  
? DVT ppx: Continue home plavix start Aspirin   
81mg BID x 21 days POD1  
? Discharge planning: home with [facility] when   
medically stable and abx set up -  
likely 3/28 or 3/29  
? Will need outpatient referral to hand surgery   
for R middle finger  
trigger finger at discharge  
Subjective  
No acute events overnight.  
Physical Examination  
Vitals  
BP (!) 99/49   Pulse 60   Temp 36.6 ?C (97.9 ?F)   
(Oral)   Resp 18   Ht  
170.2 cm (5' 7 )   Wt 112.8 kg (248 lb 10.9 oz)   
  SpO2 100%   BMI 38.95  
kg/m?  
General  
No acute distress.  
Left Lower Extremity  
Soft dressing d/c/i  
Appropriate tenderness to palpation about the   
incision site  
SILT Breaux/Sa/DP/SP/T.  
Motor intact EHL/DF/PF.  
DP/PT pulses palpable; BCR all digits.  
Labs  
Recent Labs  
22  
0536 22  
0209 22  
0401  
NA -- 138 139  
K -- 4.3 4.2  
CHLOR -- 102 101  
CO2 -- 29 29  
BUN -- 16 16  
CREAT -- 1.28* 1.11*  
GLUC -- 159* 146*  
ANION -- 7* 9  
CA -- 8.7 9.1  
WBC 9.93 9.68 11.01*  
HB 8.3* 7.8* 8.6*  
HCT 27.7* 25.9* 28.2*  
 372 396  
Imaging  
NNI  
Jayme Galo MD  
Orthopaedic Surgery  
Pager #[phone]  
2022                          [facility]  
   
                                        2022 Note     HNO ID: [id number]  
Author: Iqra Pearce DO  
Service: Hospital Medicine  
Author Type: Physician  
Type: Progress Notes  
Filed: 3/28/2022 6:44 AM  
Note Text:  
DEPARTMENT OF HOSPITAL MEDICINE  
PROGRESS NOTE  
SERVICE DATE: 3/28/2022  
SERVICE TIME: 6:41 AM  
Hospital Medicine/Primary Attending: Iqra Pearce DO  
NIGHT AND WEEKEND COVERAGE:  
After 7pm please page [phone]  
CHIEF COMPLAINT: Follow up on MMP  
SUBJECTIVE: Pt seen and examined. States that   
she didn't have an BM  
with the enema. Admits to frequent enema use at   
home. No chest pain or  
SOB. Did have low blood sugar yesterday  
OBJECTIVE:  
PHYSICAL EXAM: /45   Pulse 67   Temp (Src)   
97.9 (Oral)   Resp 18    
Ht 5' 7  (1.70m)   Wt 248 lb 10.9 oz (112.8kg)     
SpO2 96%   BMI 38.94  
kg/(m2).  
O2 Therapy: Room Air  
General - AANDOx3, NAD, Calm  
CV - RRR S1 S2, No M/R/G  
RESP - CTA B/L No wheezes, ronchi, rales  
ABD - soft, NT, ND +BS, Obese  
EXT - no gross joint deformity, no clubbing,   
cyanosis, edema  
NEURO - CN II-XII grossly intact, no focal   
deficits  
MEDICATIONS:  
Current Facility-Administered Medications  
Medication Dose Route Frequency  
- ondansetron (PF) 4 mg injection (ZOFRAN) 4 mg   
INTRAVENOUS q 6 H PRN  
- NaCl 0.9% iv flush bag 20 mL INTRAVENOUS PRN  
- sodium chloride 0.9 % (flush) 3-5 mL (BD   
POSIFLUSH) 3-5 mL INTRAVENOUS  
q 12 H  
- sodium chloride 0.9 % (flush) 2-10 mL (BD   
POSIFLUSH) 2-10 mL  
INTRAVENOUS q 12 H  
- DULoxetine 60 mg cap(s) (CYMBALTA) 60 mg ORAL   
BID  
- ranolazine ER 1,000 mg tab(s) (RANEXA) 1,000   
mg ORAL BID  
- dilTIAZem  mg cap(s) (CARDIZEM CD,   
CARTIA XT) 180 mg ORAL DAILY  
- gabapentin 200 mg cap(s) (NEURONTIN) 200 mg   
ORAL TID  
- isosorbide mononitrate ER 90 mg tab(s) (IMDUR)   
90 mg ORAL DAILY  
- clopidogrel 75 mg tab(s) (PLAVIX) 75 mg ORAL   
DAILY  
- metoprolol tartrate (short acting) 25 mg   
tab(s) (LOPRESSOR) 25 mg ORAL  
q 12 H  
- dextrose 40 % 15 g 15 g ORAL PRN  
Or  
- glucagon 1 mg injection 1 mg INTRAMUSCULAR PRN  
Or  
- dextrose 50% in water 25 mL syringe 12.5 g   
INTRAVENOUS PRN  
- empagliflozin 25 mg tab(s) (JARDIANCE) 25 mg   
ORAL DAILY  
- docusate sodium 100 mg cap(s) (COLACE) 100 mg   
ORAL BID  
- aspirin, enteric coated 81 mg tab(s) 81 mg   
ORAL BID  
- sodium chloride 0.9 % (flush) 3-5 mL (BD   
POSIFLUSH) 3-5 mL INTRAVENOUS  
q 12 H  
- vancomycin dosing and monitoring per pharmacy   
OTHER As Directed  
- miconazole 2 % 1 application topical powder   
(LOTRIMIN AF, DESENEX) 1  
application TOPICAL BID  
- acetaminophen 1,000 mg tab(s) (TYLENOL) 1,000   
mg ORAL q 6 H  
- oxyCODONE IR 5-10 mg tab(s) (ROXICODONE) 5-10   
mg ORAL q 4 H PRN  
- traMADol 50 mg tab(s) (ULTRAM) 50 mg ORAL q 6   
H  
- morphine 2 mg injection 2 mg INTRAVENOUS q 3 H   
PRN  
- melatonin 6 mg tab(s) 6 mg ORAL DAILY (8 PM)  
- insulin lispro pen (rapid acting) (HumaLOG   
KWIKPEN) SUBCUTANEOUS w  
MEALS  
- sodium chloride 0.9 % (flush) 10 mL (BD   
POSIFLUSH) 10 mL INTRAVENOUS q  
12 H  
- sodium chloride 0.9 % (flush) 20 mL (BD   
POSIFLUSH) 20 mL INTRAVENOUS  
PRN  
- senna-docusate 8.6-50 mg 1 tablet (SENNA-S) 1   
tablet ORAL BID  
- calcium carbonate 500 mg chewable tab(s)   
(TUMS) 500 mg ORAL TID PRN  
- insulin glargine 36 Units pen (long acting)   
(LANTUS SOLOSTAR, BASAGLAR  
KWIKPEN) 36 Units SUBCUTANEOUS AT BEDTIME  
- insulin lispro 8 Units pen (rapid acting)   
(HumaLOG KWIKPEN) 8 Units  
SUBCUTANEOUS DAILY wLUNCH  
- insulin lispro 10 Units pen (rapid acting)   
(HumaLOG KWIKPEN) 10 Units  
SUBCUTANEOUS DAILY wDINNER  
- insulin lispro 14 Units pen (rapid acting)   
(HumaLOG KWIKPEN) 14 Units  
SUBCUTANEOUS DAILY WITH BREAKFAST  
- NaCl 0.9% 1,000 mL iv bolus 1,000 mL   
INTRAVENOUS ONCE  
DATA:  
Diagnostic tests reviewed for today's visit:  
CBC:  
Recent Labs  
22  
WBC 9.68  
RBC 2.78*  
HB 7.8*  
HCT 25.9*  
  
MCV 93.2  
MCH 28.1  
MPV 9.7  
Coags: No results for input(s): PT, INR, APTT in   
the last 24 hours.  
BMP:  
Recent Labs  
22  
020  
  
K 4.3  
CHLOR 102  
CO2 29  
BUN 16  
CREAT 1.28*  
GLUC 159*  
CMP:  
Recent Labs  
22  
0209  
  
K 4.3  
CHLOR 102  
CO2 29  
BUN 16  
CREAT 1.28*  
GLUC 159*  
CA 8.7  
ANION 7*  
Cardiac Enzymes: No results for input(s): CK,   
MB, CKMB, TROPT in the last  
24 hours.  
Liver Function, Amylase, Lipase: No results for   
input(s): TPROT, ALB, ALT,  
AST, ALKPHOS, TBILI, AMYLASE, LIPASE, LACTATE in   
the last 24 hours.  
MG/PHOS: No results for input(s): MG, P in the   
last 24 hours.  
Renal Panel:  
Recent Labs  
22  
0209  
CREAT 1.28*  
BUN 16  
GLUC 159*  
CA 8.7  
CHLOR 102  
K 4.3  
CO2 29  
  
Heme: No results for input(s): RETICP, ABSRETIC,   
LD, KARI, FE, TIBC,  
TRANSFERSAT in the last 24 hours.  
No results found for: UALBCR  
Estimated Creatinine Clearance: 60.6 mL/min (A)   
(based on SCr of 1.28  
mg/dL (H)).  
Assessment/Plan  
1. POD #4?Irrigation and Debridement Left Knee,   
Removal Unicompartmental  
Knee Arthroplasty Left Knee, Insertion   
Articulating Antibiotic Spacer Left  
Knee Left septic knee - management per ortho and   
ID.? (more content not included)...     [facility]  
   
                                        2022 Note     HNO ID: [id number]  
Author: Jayme Galo MD  
Service: Orthopaedic Surgery  
Author Type: Resident  
Type: Progress Notes  
Filed: 3/28/2022 6:13 AM  
Note Text:  
Assessment  
Jayden Soto is a 61 year old female who is POD   
#4 Irrigation and  
Debridement Left Knee, Removal Unicompartmental   
Knee Arthroplasty Left  
Knee, Insertion Articulating Antibiotic Spacer   
Left Knee  
Plan  
? Pain control  
? WBAT LLE  
? PT/OT  
? Medical management per IM - appreciate recs  
? Antibiotics per ID - PICC line placed Friday,   
continuing vanc but  
stopped ceftriaxone and no need for rifampin -   
final recs to be given  
tomorrow  
? F/U intra-op culture results - wound culture   
growing rare GPC  
? DVT ppx: Continue home plavix POD1, start   
Aspirin 81mg BID x 21 days  
POD1  
? Discharge planning: home with [facility] when   
medically stable and abx set up -  
likely 3/28 or 3/29  
? Will need outpatient referral to hand surgery   
for R middle finger  
trigger finger at discharge  
Subjective  
No acute events overnight.  
Physical Examination  
Vitals  
BP (!) 111/45   Pulse 67   Temp 36.6 ?C (97.9   
?F) (Oral)   Resp 18    
Ht 170.2 cm (5' 7 )   Wt 112.8 kg (248 lb 10.9   
oz)   SpO2 96%   BMI  
38.95 kg/m?  
General  
No acute distress.  
Left Lower Extremity  
Soft dressing d/c/i  
Appropriate tenderness to palpation about the   
incision site  
SILT Breaux/Sa/DP/SP/T.  
Motor intact EHL/DF/PF.  
DP/PT pulses palpable; BCR all digits.  
Labs  
Recent Labs  
22  
0209 22  
0401  
 139  
K 4.3 4.2  
CHLOR 102 101  
CO2 29 29  
BUN 16 16  
CREAT 1.28* 1.11*  
GLUC 159* 146*  
ANION 7* 9  
CA 8.7 9.1  
WBC 9.68 11.01*  
HB 7.8* 8.6*  
HCT 25.9* 28.2*  
 396  
Imaging  
NNI  
Jayme Galo MD  
Orthopaedic Surgery  
Pager #[phone]  
2022                          [facility]  
   
                                        2022 Note     HNO ID: [id number]  
Author: Iqra Pearce DO  
Service: Hospital Medicine  
Author Type: Physician  
Type: Progress Notes  
Filed: 3/28/2022 6:44 AM  
Note Text:  
DEPARTMENT OF HOSPITAL MEDICINE  
PROGRESS NOTE  
SERVICE DATE: 3/27/2022  
SERVICE TIME: 6:41 AM  
Hospital Medicine/Primary Attending: Iqra Pearce DO  
NIGHT AND WEEKEND COVERAGE:  
After 7pm please page [phone]  
CHIEF COMPLAINT: Constipated  
SUBJECTIVE: Pt seen and examined. States that   
she is constipated. Has  
to use enemas at home frequently. Wants one   
here. No nausea or vomiting  
OBJECTIVE:  
PHYSICAL EXAM: /45   Pulse 71   Temp (Src)   
98.2 (Oral)   Resp 18    
Ht 5' 7  (1.70m)   Wt 250 lb 3.6 oz (113.5kg)     
SpO2 100%   BMI 39.18  
kg/(m2).  
O2 Therapy: Room Air  
General - AANDOx3, NAD, Calm  
CV - RRR S1 S2, No M/R/G  
RESP - CTA B/L No wheezes, ronchi, rales  
ABD - soft, NT, ND +BS, Obese  
EXT - Dressing in tact  
NEURO - CN II-XII grossly intact, no focal   
deficits  
MEDICATIONS:  
Current Facility-Administered Medications  
Medication Dose Route Frequency  
- ondansetron (PF) 4 mg injection (ZOFRAN) 4 mg   
INTRAVENOUS q 6 H PRN  
- NaCl 0.9% iv flush bag 20 mL INTRAVENOUS PRN  
- sodium chloride 0.9 % (flush) 3-5 mL (BD   
POSIFLUSH) 3-5 mL INTRAVENOUS  
q 12 H  
- sodium chloride 0.9 % (flush) 2-10 mL (BD   
POSIFLUSH) 2-10 mL  
INTRAVENOUS q 12 H  
- DULoxetine 60 mg cap(s) (CYMBALTA) 60 mg ORAL   
BID  
- ranolazine ER 1,000 mg tab(s) (RANEXA) 1,000   
mg ORAL BID  
- dilTIAZem  mg cap(s) (CARDIZEM CD,   
CARTIA XT) 180 mg ORAL DAILY  
- gabapentin 200 mg cap(s) (NEURONTIN) 200 mg   
ORAL TID  
- isosorbide mononitrate ER 90 mg tab(s) (IMDUR)   
90 mg ORAL DAILY  
- clopidogrel 75 mg tab(s) (PLAVIX) 75 mg ORAL   
DAILY  
- metoprolol tartrate (short acting) 25 mg   
tab(s) (LOPRESSOR) 25 mg ORAL  
q 12 H  
- dextrose 40 % 15 g 15 g ORAL PRN  
Or  
- glucagon 1 mg injection 1 mg INTRAMUSCULAR PRN  
Or  
- dextrose 50% in water 25 mL syringe 12.5 g   
INTRAVENOUS PRN  
- empagliflozin 25 mg tab(s) (JARDIANCE) 25 mg   
ORAL DAILY  
- docusate sodium 100 mg cap(s) (COLACE) 100 mg   
ORAL BID  
- aspirin, enteric coated 81 mg tab(s) 81 mg   
ORAL BID  
- sodium chloride 0.9 % (flush) 3-5 mL (BD   
POSIFLUSH) 3-5 mL INTRAVENOUS  
q 12 H  
- vancomycin dosing and monitoring per pharmacy   
OTHER As Directed  
- insulin glargine 40 Units pen (long acting)   
(LANTUS SOLOSTAR, BASAGLAR  
KWIKPEN) 40 Units SUBCUTANEOUS AT BEDTIME  
- miconazole 2 % 1 application topical powder   
(LOTRIMIN AF, DESENEX) 1  
application TOPICAL BID  
- acetaminophen 1,000 mg tab(s) (TYLENOL) 1,000   
mg ORAL q 6 H  
- oxyCODONE IR 5-10 mg tab(s) (ROXICODONE) 5-10   
mg ORAL q 4 H PRN  
- traMADol 50 mg tab(s) (ULTRAM) 50 mg ORAL q 6   
H  
- morphine 2 mg injection 2 mg INTRAVENOUS q 3 H   
PRN  
- melatonin 6 mg tab(s) 6 mg ORAL DAILY (8 PM)  
- insulin lispro 16 Units pen (rapid acting)   
(HumaLOG KWIKPEN) 16 Units  
SUBCUTANEOUS DAILY WITH BREAKFAST  
- insulin lispro 10 Units pen (rapid acting)   
(HumaLOG KWIKPEN) 10 Units  
SUBCUTANEOUS DAILY wLUNCH  
- insulin lispro 12 Units pen (rapid acting)   
(HumaLOG KWIKPEN) 12 Units  
SUBCUTANEOUS DAILY wDINNER  
- insulin lispro pen (rapid acting) (HumaLOG   
KWIKPEN) SUBCUTANEOUS w  
MEALS  
- sodium chloride 0.9 % (flush) 10 mL (BD   
POSIFLUSH) 10 mL INTRAVENOUS q  
12 H  
- sodium chloride 0.9 % (flush) 20 mL (BD   
POSIFLUSH) 20 mL INTRAVENOUS  
PRN  
- senna-docusate 8.6-50 mg 1 tablet (SENNA-S) 1   
tablet ORAL BID  
- vancomycin 1.5 g in D5W 250 mL (VANCOCIN) 1.5   
g INTRAVENOUS q 12 HR  
DATA:  
Diagnostic tests reviewed for today's visit:  
CBC:  
Recent Labs  
22  
WBC 11.01*  
RBC 3.06*  
HB 8.6*  
HCT 28.2*  
  
MCV 92.2  
MCH 28.1  
MPV 9.5  
Coags: No results for input(s): PT, INR, APTT in   
the last 24 hours.  
BMP:  
Recent Labs  
22  
040  
  
K 4.2  
CHLOR 101  
CO2 29  
BUN 16  
CREAT 1.11*  
GLUC 146*  
CMP:  
Recent Labs  
22  
040  
  
K 4.2  
CHLOR 101  
CO2 29  
BUN 16  
CREAT 1.11*  
GLUC 146*  
CA 9.1  
ANION 9  
Cardiac Enzymes: No results for input(s): CK,   
MB, CKMB, TROPT in the last  
24 hours.  
Liver Function, Amylase, Lipase: No results for   
input(s): TPROT, ALB, ALT,  
AST, ALKPHOS, TBILI, AMYLASE, LIPASE, LACTATE in   
the last 24 hours.  
MG/PHOS: No results for input(s): MG, P in the   
last 24 hours.  
Renal Panel:  
Recent Labs  
22  
0401  
CREAT 1.11*  
BUN 16  
GLUC 146*  
CA 9.1  
CHLOR 101  
K 4.2  
CO2 29  
  
Heme: No results for input(s): RETICP, ABSRETIC,   
LD, KARI, FE, TIBC,  
TRANSFERSAT in the last 24 hours.  
No results found for: UALBCR  
Estimated Creatinine Clearance: 70.1 mL/min (A)   
(based on SCr of 1.11  
mg/dL (H)).  
Assessment/Plan  
1. POD #3?Irrigation and Debridement Left Knee,   
Removal Unicompartmental  
Knee Arthroplasty Left Knee, Insertion   
Articulating Antibiotic Spacer Left  
KneeLeft septic knee - management per ortho and   
ID.?MSSA ?Cont with IV abx  
2. ROMEO - small bolus today  
3. Diabetes with insulin?with hyperglycemia?-   
accu checks ac and hs. ?Carb  
control (more content not included)...  [facility]  
   
                                        2022 Note     HNO ID: [id number]  
Author: Anna Rodriguez MD  
Service: Orthopaedic Surgery  
Author Type: Resident  
Type: Progress Notes  
Filed: 3/27/2022 6:59 AM  
Note Text:  
Orthopaedic Surgery Inpatient Progress Note  
Assessment  
Jayden Soto is a 61 year old female who is POD   
#3 Irrigation and  
Debridement Left Knee, Removal Unicompartmental   
Knee Arthroplasty Left  
Knee, Insertion Articulating Antibiotic Spacer   
Left Knee  
Plan  
Pain control  
WBAT LLE  
PT/OT  
Medical management per IM - appreciate recs  
Antibiotics per ID - PICC line placed Friday,   
continuing vanc but stopped  
ceftriaxone and no need for rifampin - final   
recs to be given tomorrow  
F/U intra-op culture results - wound culture   
growing rare GPC  
Dressing: Aquacel + Drain  
Monitor drain output: pull when less than 30cc   
per shift - pulled this  
morning, patient tolerated well  
DVT ppx: Continue home plavix POD1, start   
Aspirin 81mg BID x 21 days POD1  
Discharge planning: home with [facility] when medically   
stable and abx set up -  
likely 3/28 or 3/29  
Will need outpatient referral to hand surgery   
for R middle finger trigger  
finger at discharge  
Subjective  
No acute events overnight. Pain well controlled.   
No fevers, chills, chest  
pain, or shortness of breath. No new complaints.  
Physical Examination  
Vitals  
BP (!) 102/45   Pulse 71   Temp 36.8 ?C (98.2   
?F) (Oral)   Resp 18    
Ht 170.2 cm (5' 7 )   Wt 113.5 kg (250 lb 3.6   
oz)   SpO2 100%   BMI  
39.19 kg/m?  
General  
Alert and oriented. No acute distress.  
Cooperative with interview.  
Left Lower Extremity  
Alignment normal. No gross deformities.  
Dressing c/d/i. Drain in place, removed today   
without incident.  
No swelling, ecchymosis, or erythema.  
SILT Breaux/Sa/DP/SP/T.  
Motor intact EHL/DF/PF.  
DP/PT pulses palpable; BCR all digits.  
Labs  
Recent Labs  
22  
0401 22  
0135  
 140  
K 4.2 4.2  
CHLOR 101 103  
CO2 29 30  
BUN 16 17  
CREAT 1.11* 0.93  
GLUC 146* 107*  
ANION 9 7*  
CA 9.1 8.8  
WBC 11.01* 10.82  
HB 8.6* 8.4*  
HCT 28.2* 26.9*  
 329  
Imaging  
No new imaging  
Anna Rodriguez MD  
Orthopedic Surgery Resident  
2022  
6:43 AM                                 [facility]  
   
                                        2022 Note     HNO ID: [id number]  
Author: Floridalma Mckeon PA-C  
Service: Infectious Disease  
Author Type: Physician Assistant  
Type: Plan of Care  
Filed: 3/26/2022 3:40 PM  
Note Text:  
Infectious Diseases Plan of Care Note  
Cultures from the left knee are growing rare   
MSSA. Interestingly, it is  
susceptible to tetracycline. She was apparently   
on doxycycline for  
suppression of MRSA left knee arthroplasty   
prosthetic joint infection at  
the time of presentation. For now, we will stop   
the ceftriaxone. The  
vancomycin will cover both the newly isolated   
MSSA and the previous MRSA.  
Of note, she had undergone resection   
arthroplasty with articulating  
antibiotic spacer placement so we will not need   
to start rifampin. Will  
review her history and provide final   
recommendations for dismissal  
antibiotics in the next day.  
Discussed with Dr. Lovett.             [facility]  
   
                                        2022 Note     HNO ID: [id number]  
Author: Iqra Pearce DO  
Service: Hospital Medicine  
Author Type: Physician  
Type: Progress Notes  
Filed: 3/26/2022 6:58 AM  
Note Text:  
DEPARTMENT OF HOSPITAL MEDICINE  
PROGRESS NOTE  
SERVICE DATE: 3/26/2022  
SERVICE TIME: 6:55 AM  
Hospital Medicine/Primary Attending: Iqra Pearce DO  
NIGHT AND WEEKEND COVERAGE:  
After 7pm please page [phone]  
CHIEF COMPLAINT: Knee pain  
SUBJECTIVE: Pt seen and examined. States that   
her knee is bothering  
her. No chest pain or SOB. Gets constipated   
easily.  
OBJECTIVE:  
PHYSICAL EXAM: /40   Pulse 66   Temp (Src)   
98.4 (Oral)   Resp 18    
Ht 5' 7  (1.70m)   Wt 230 lb 13.2 oz (104.7kg)     
SpO2 94%   BMI 36.14  
kg/(m2).  
O2 Therapy: Room Air  
General - AANDOx3, NAD, Calm  
CV - RRR S1 S2, No M/R/G  
RESP - CTA B/L No wheezes, ronchi, rales  
ABD - soft, NT, ND +BS  
EXT - Left knee with dressing and hemovac drain  
NEURO - CN II-XII grossly intact, no focal   
deficits  
MEDICATIONS:  
Current Facility-Administered Medications  
Medication Dose Route Frequency  
- ondansetron (PF) 4 mg injection (ZOFRAN) 4 mg   
INTRAVENOUS q 6 H PRN  
- NaCl 0.9% iv flush bag 20 mL INTRAVENOUS PRN  
- sodium chloride 0.9 % (flush) 3-5 mL (BD   
POSIFLUSH) 3-5 mL INTRAVENOUS  
q 12 H  
- sodium chloride 0.9 % (flush) 2-10 mL (BD   
POSIFLUSH) 2-10 mL  
INTRAVENOUS q 12 H  
- DULoxetine 60 mg cap(s) (CYMBALTA) 60 mg ORAL   
BID  
- ranolazine ER 1,000 mg tab(s) (RANEXA) 1,000   
mg ORAL BID  
- dilTIAZem  mg cap(s) (CARDIZEM CD,   
CARTIA XT) 180 mg ORAL DAILY  
- gabapentin 200 mg cap(s) (NEURONTIN) 200 mg   
ORAL TID  
- isosorbide mononitrate ER 90 mg tab(s) (IMDUR)   
90 mg ORAL DAILY  
- clopidogrel 75 mg tab(s) (PLAVIX) 75 mg ORAL   
DAILY  
- metoprolol tartrate (short acting) 25 mg   
tab(s) (LOPRESSOR) 25 mg ORAL  
q 12 H  
- dextrose 40 % 15 g 15 g ORAL PRN  
Or  
- glucagon 1 mg injection 1 mg INTRAMUSCULAR PRN  
Or  
- dextrose 50% in water 25 mL syringe 12.5 g   
INTRAVENOUS PRN  
- empagliflozin 25 mg tab(s) (JARDIANCE) 25 mg   
ORAL DAILY  
- docusate sodium 100 mg cap(s) (COLACE) 100 mg   
ORAL BID  
- aspirin, enteric coated 81 mg tab(s) 81 mg   
ORAL BID  
- sodium chloride 0.9 % (flush) 3-5 mL (BD   
POSIFLUSH) 3-5 mL INTRAVENOUS  
q 12 H  
- vancomycin dosing and monitoring per pharmacy   
OTHER As Directed  
- cefTRIAXone iv piggyback 2 g in dextrose   
(iso-osmotic) 50 mL (ROCEPHIN)  
2 g INTRAVENOUS q 24 H  
- insulin glargine 40 Units pen (long acting)   
(LANTUS SOLOSTAR, BASAGLAR  
KWIKPEN) 40 Units SUBCUTANEOUS AT BEDTIME  
- miconazole 2 % 1 application topical powder   
(LOTRIMIN AF, DESENEX) 1  
application TOPICAL BID  
- acetaminophen 1,000 mg tab(s) (TYLENOL) 1,000   
mg ORAL q 6 H  
- oxyCODONE IR 5-10 mg tab(s) (ROXICODONE) 5-10   
mg ORAL q 4 H PRN  
- traMADol 50 mg tab(s) (ULTRAM) 50 mg ORAL q 6   
H  
- morphine 2 mg injection 2 mg INTRAVENOUS q 3 H   
PRN  
- melatonin 6 mg tab(s) 6 mg ORAL DAILY (8 PM)  
- insulin lispro 16 Units pen (rapid acting)   
(HumaLOG KWIKPEN) 16 Units  
SUBCUTANEOUS DAILY WITH BREAKFAST  
- insulin lispro 10 Units pen (rapid acting)   
(HumaLOG KWIKPEN) 10 Units  
SUBCUTANEOUS DAILY wLUNCH  
- insulin lispro 12 Units pen (rapid acting)   
(HumaLOG KWIKPEN) 12 Units  
SUBCUTANEOUS DAILY wDINNER  
- insulin lispro pen (rapid acting) (HumaLOG   
KWIKPEN) SUBCUTANEOUS w  
MEALS  
- sodium chloride 0.9 % (flush) 10 mL (BD   
POSIFLUSH) 10 mL INTRAVENOUS q  
12 H  
- sodium chloride 0.9 % (flush) 20 mL (BD   
POSIFLUSH) 20 mL INTRAVENOUS  
PRN  
DATA:  
Diagnostic tests reviewed for today's visit:  
CBC:  
Recent Labs  
22  
0135  
WBC 10.82  
RBC 2.98*  
HB 8.4*  
HCT 26.9*  
  
MCV 90.3  
MCH 28.2  
MPV 9.6  
Coags: No results for input(s): PT, INR, APTT in   
the last 24 hours.  
BMP:  
Recent Labs  
22  
0135  
  
K 4.2  
CHLOR 103  
CO2 30  
BUN 17  
CREAT 0.93  
GLUC 107*  
CMP:  
Recent Labs  
22  
0135  
  
K 4.2  
CHLOR 103  
CO2 30  
BUN 17  
CREAT 0.93  
GLUC 107*  
CA 8.8  
ANION 7*  
Cardiac Enzymes: No results for input(s): CK,   
MB, CKMB, TROPT in the last  
24 hours.  
Liver Function, Amylase, Lipase: No results for   
input(s): TPROT, ALB, ALT,  
AST, ALKPHOS, TBILI, AMYLASE, LIPASE, LACTATE in   
the last 24 hours.  
MG/PHOS: No results for input(s): MG, P in the   
last 24 hours.  
Renal Panel:  
Recent Labs  
22  
0135  
CREAT 0.93  
BUN 17  
GLUC 107*  
CA 8.8  
CHLOR 103  
K 4.2  
CO2 30  
  
Heme: No results for input(s): RETICP, ABSRETIC,   
LD, KARI, FE, TIBC,  
TRANSFERSAT in the last 24 hours.  
No results found for: UALBCR  
Estimated Creatinine Clearance: 80 mL/min (based   
on SCr of 0.93 mg/dL).  
Assessment/Plan  
1. POD #3?Irrigation and Debridement Left Knee,   
Removal Unicompartmental  
Knee Arthroplasty Left Knee, Insertion   
Articulating Antibiotic Spacer Left  
KneeLeft septic knee - management per ortho and   
ID.??Follow up on  
cultures. ?Cont with IV abx  
2. ROMEO - resolved  
3. Diabetes with insulin?with hyperglycemia?-   
accu checks ac and hs. ?Carb  
controlled diet post op. appreciate endocrine   
managin (more content not included)...  [facility]  
   
                                        2022 Note     HNO ID: [id number]  
Author: Anna Rodriguez MD  
Service: Orthopaedic Surgery  
Author Type: Resident  
Type: Progress Notes  
Filed: 3/26/2022 7:36 AM  
Note Text:  
------------------------------------------------  
--------------------------------  
Attestation signed by Basil Mitchell MD at   
3/26/2022 10:47 AM  
PICC placed 3/25.  
Pull drain on 3/27.  
Plan to DC to home 3/28 vs 3/29 when pain   
control optimized.  
I will be out of the office beginning 3/27. Dr Tanner Delgado will be covering  
in my absence.  
------------------------------------------------  
--------------------------------  
Orthopaedic Surgery Inpatient Progress Note  
Assessment  
Jayden Soto is a 61 year old female who is POD   
#3 Irrigation and  
Debridement Left Knee, Removal Unicompartmental   
Knee Arthroplasty Left  
Knee, Insertion Articulating Antibiotic Spacer   
Left Knee  
Plan  
Pain control  
WBAT LLE  
PT/OT  
Medical management per IM - appreciate recs  
Antibiotics per ID - PICC line placed yesterday,   
planning on continuing  
vanc and ceftr and adding rifampin on Monday  
F/U intra-op culture results - wound culture   
growing rare GPC  
Dressing: Aquacel + Drain  
Monitor drain output: pull when less than 30cc   
per shift - output 35  
overnight, maintain, may pull later today vs   
tomorrow  
DVT ppx: Continue home plavix POD1, start   
Aspirin 81mg BID x 21 days POD1  
Discharge planning: home with [facility] when medically   
stable and abx set up  
Will need outpatient referral to hand surgery   
for R middle finger trigger  
finger  
Subjective  
No acute events overnight. Pain well controlled.   
No fevers, chills, chest  
pain, or shortness of breath. No new complaints.  
Physical Examination  
Vitals  
BP (!) 105/40   Pulse 66   Temp 36.9 ?C (98.4   
?F) (Oral)   Resp 18    
Ht 170.2 cm (5' 7 )   Wt 104.7 kg (230 lb 13.2   
oz)   SpO2 94%   BMI  
36.15 kg/m?  
General  
Alert and oriented. No acute distress.  
Cooperative with interview.  
Left Lower Extremity  
Alignment normal. No gross deformities.  
Dressing c/d/i. Drain in place.  
No swelling, ecchymosis, or erythema.  
SILT Breaux/Sa/DP/SP/T.  
Motor intact EHL/DF/PF.  
DP/PT pulses palpable; BCR all digits.  
Labs  
Recent Labs  
22  
0135 22  
0314  
 141  
K 4.2 4.3  
CHLOR 103 101  
CO2 30 28  
BUN 17 17  
CREAT 0.93 1.11*  
GLUC 107* 132*  
ANION 7* 12  
CA 8.8 9.1  
WBC 10.82 15.83*  
HB 8.4* 9.2*  
HCT 26.9* 30.3*  
 437*  
Imaging  
No new imaging  
Anna Rodriguez MD  
Orthopedic Surgery Resident  
2022  
6:43 AM                                 [facility]  
   
                                        2022 Note     HNO ID: [id number]  
Author: Angeles Preston RN  
Service: PICC Team  
Author Type: Registered Nurse  
Type: Procedures  
Filed: 3/25/2022 1:19 PM  
Note Text:  
PICC NURSE INSERTION NOTE  
DATE OF PROCEDURE: 2022  
TIME OF PROCEDURE: 1240  
ORDERING PHYSICIAN: Dr. So  
INFORMED CONSENT: Obtained per hospital policy.  
INDICATION FOR LINE PLACEMENT: COPAT  
CONDITION OF LINE PLACEMENT: Sterile  
PRIMARY PROCEDURALIST: Rossana Medina RN  
ASSISTANT: Angeles Preston RN  
PRE-PROCEDURE REVIEW  
ALLERGIES  
Allergen Reactions  
- Morphine GI Upset  
- Statins-Hmg-Coa Red* Unknown  
Known History of Upper Venous Thrombosis: No  
Known History of Permanent Pacemaker or   
Automated Implanted Cardiac  
Device: No  
Previous Breast Surgery of Lymph Node   
Dissection: No  
Estimated Glomerular Filtration Rate  
Date Value Ref Range Status  
2022 57 (L) >=60 mL/min/1.73m? Final  
Comment:  
Estimated Glomerular Filtration Rate (eGFR) is   
calculated using the   
CKD-EPI creatinine equation. This equation   
utilizes serum creatinine, sex,  
and age as parameters. The creatinine assay has   
traceable calibration to  
isotope dilution-mass spectrometry. Refer to   
KDIGO guidelines for clinical  
interpretation. In patients with unstable renal   
function, e.g. those with  
acute kidney injury, the eGFR may not accurately   
reflect actual GFR.  
History of Renal Disease: No  
Ultrasound Assessment Complete: Yes  
PROCEDURE NARRATIVE  
SAFE PRACTICE  
Hand Hygiene per Hospital Policy: Yes  
Skin Preparation Unit Dose Applicator Used:   
Chloraprep (CHG + alcohol),  
allowed to dry.  
Procedure Surface Cleansed with Antimicrobial   
Wipes: Yes  
Barriers Used by Proceduralist and all Assisting   
Personnel: Yes  
UNIVERSAL PROTOCOL / SAFETY CHECKLIST  
Procedure to be Performed: peripherally inserted   
central catheter  
Sign In:  
A Moment of CARE was completed.  
Personnel directly involved with the procedure   
wore the appropriate PPE  
(Personal Protective Equipment).  
Special equipment: ultrasound and tip location   
system  
Patient/Surrogate Stated/Verified: PATIENT   
VERIFIED(optional for EMERGENT  
procedures): Patient name, Date of Birth,   
Relevant allergies and The  
intended procedure  
Time Out Communication:  
Intended patient and procedure match the source   
documents.  
Consent documented and matches the intended   
procedure.  
Relevant labs, photos, and/or imaging studies   
have been reviewed.  
Correct side/site marked and visible.  
Medications required for procedure verified.  
Fire risk assessed and interventions discussed.  
No implant(s) inserted.  
Sign Out:  
SIGN OUT (optional for EMERGENT procedures): No   
specimen collected.  
All instruments, equipment, possible retained   
foreign bodies accounted  
for.  
Post-procedure follow-up management communicated   
and Plan of Care Visit  
completed when applicable.  
Angeles Preston RN  
CATHETER PLACEMENT  
Brand: [employer] Lot: [id number]  
Number of Lumens: 1  
Type of PICC: Power Injectable PICC  
Lumen Size: 4 French  
PLACEMENT TECHNIQUE  
Lidocaine: Yes, Lidocaine 1% Volume 1 mL   
Subcutaneous  
Modified Seldinger Technique Used to Place Line   
via the Right Basilic  
Ultrasound Guidance: Yes  
Number of Attempts at Insertion: 1  
Ensured control of guidewire during all aspects   
of the procedure: Yes  
Accounted for entire guidewire upon removal: Yes  
Internal Length: 40 cm External Length: 2 cm   
Trim Length: 42 cm  
Mid-Arm Circumference: 35 centimeters  
Post Insertion Pain Level Related to Procedure:   
0  
Action Taken to Address Pain: None needed  
Verified Placement: Blood return and flushes   
with ease and Tip location  
system or device indicates the tip is located in   
the SVC/CAJ.  
Line was Flushed with 10 cc normal saline  
Line Secured with: Securement device  
Sterile Dressing Applied and Dated: Yes  
Sterile Caps on all Ports Prior to Leaving   
Procedure Area: Yes,  
Disinfection caps applied  
SPECIMENS: None  
COMPLICATIONS: None  
Patient Education Materials: Given to patient  
QUESTIONS or PROBLEMS: Call [id number]  
SIGNATURE: Angeles Preston RN PATIENT NAME: Jayden Soto  
DATE: 2022 MRN: [id number]  
TIME: 12:58 PM PAGER/CONTACT PHONE: [phone] [facility]  
   
                                        2022 Note     HNO ID: [id number]  
Author: Chantale Hong RN  
Service: Diabetes Education  
Author Type: Registered Nurse  
Type: Allied Health  
Filed: 3/25/2022 12:10 PM  
Note Text:  
DIABETES EDUCATION INPATIENT PROGRESS NOTE  
INITIAL OR FOLLOW-UP: First diabetes care AND   
education visit this admission  
VISIT TYPE: in-person  
SERVICE DATE: 3/25/2022  
SERVICE TIME: 1115  
RECOMMENDATIONS TO DISCHARGING PROVIDER FOR   
DISCHARGE ORDERS: Follow-up  
with PCP after discharge  
Patient with hx of diabetes and insulin and left   
knee infection. Discussed  
importance of blood sugar management with   
infection,new insulin  
doses.Discussed A1C and blood sugar goals.   
Patient states understanding.  
Diabetes literature provided with follow up   
phone number.  
RECOMMENDATIONS TO INPATIENT NURSE: N/A  
PATIENT HISTORY/ASSESSMENT:  
Previously diagnosed: Type 2 and 30 years  
Treatment prior to hospitalization: Oral   
Agent(s): jardiance and Insulin:  
Lantus,Humalog  
Has meter: Frequency of self-blood glucose   
monitoring: 3 times per day.  
Usual blood glucose results at home: 200.  
Admission Dx: knee infection  
BG on admission: 201 mg/dl  
Last HbA1c and date: 9.4%  
INTERVENTIONS/TOPICS COVERED:  
-Diabetes Basics: role of insulin in the body,   
role of glucose in the  
body and symptoms of diabetes  
-Monitoring: BG targets, A1c meaning and target   
<7%, using a home glucose  
monitor, logging and testing frequency  
-Medications: reviewed home DM meds, oral agents   
discussed: empagliflozin  
(Jardiance) and injectable insulin discussed:   
lispro (Humalog) and  
glargine (Lantus)  
-Problem Solving: hypoglycemia s/sx/tx and   
hyperglycemia s/sx/tx  
EDUCATION:  
Cognitive ability: Alert and Oriented.  
Motivation to learn: Interested.  
Barriers to learning: None  
Family support: Unable to assess - Family not   
present  
Education Type: Individual instruction  
Written instruction - handouts  
Verbal instruction  
Response to education: States/Identifies.  
Education provided to: Patient.  
Teachback method used: Yes  
Handouts provided: Diabetes survival packet  
Time Spent (Minutes): 15  
SIGNATURE: Chantale Hong RN PATIENT NAME: Jayden Soto  
DATE: 2022 MRN: [id number]  
TIME: 12:05 PM PAGER: [phone]              [facility]  
   
                                        2022 Note     HNO ID: [id number]  
Author: Aemrico Parisi MD  
Service: Infectious Disease  
Author Type: Resident  
Type: Progress Notes  
Filed: 3/25/2022 12:14 PM  
Note Text:  
------------------------------------------------  
--------------------------------  
Attestation signed by Alvin So MD at   
3/25/2022 12:20 PM  
I reviewed the resident's note. I agree with the   
resident's assessment and plan  
unless otherwise noted.  
Operative cx growing SA.  
Plan:  
- to cont vanc and ceftr  
- picc placement  
- to add rifampin on Monday  
Dr. Con Lovett is covering this weekend and   
is available if questions  
arise.  
Alvin So MD  
Infectious Disease  
[facility]  
Pager: [phone]   
------------------------------------------------  
--------------------------------  
PROGRESS NOTE INFECTIOUS DISEASE  
All of the below reviewed 3/25/2022  
Portions of this note were copied so as to   
provide important historical  
information essential in contributing to medical   
decision making today.  
The copied portions of this note were carefully   
reviewed and edited as  
needed today so as to accurately reflect my own   
independent evaluation on  
this patient. I have confirmed and edited as   
necessary, the PFSH and ROS  
obtained by others.  
BRIEF SUMMARY:  
61 year old female?with?diabetes mellitus, a   
history of left knee  
replacement, disseminated rt foot wound   
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  
infection and left knee seeding in Dec 2020,   
left prosthetic knee washout  
in 2021,?s/p iv abx followed by oral doxy   
and rifampin for 6 months,  
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   
to [facility]?3/21/2022?for  
further treatment of prosthetic joint infection.   
?Symptomatic with  
worsening left knee pain along with swelling,   
started in 2021  
after twisting the knee while walking. ?Also   
symptomatic with chills and  
hyperglycemia. ?Was seen in the Ortho clinic on   
3/17, a diagnostic  
arthrocentesis of left knee revealed 15 cc of   
cloudy and bloody synovial  
fluid. ?Fluid ,000, nucleated cells   
17,472, 97% neutrophils. ?CRP  
1.9. ?WBC count 12.0.  
ASSESSMENT:  
1. ?Left prosthetic knee pain and swelling,   
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  
2. ?Chills, no recorded fevers, no significant   
leukocytosis  
3. ?Uncontrolled Type 2 Diabetes Mellitus  
4. 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   
infection in 2020,  
history of lumbar spinal infection, history of   
left knee prosthetic joint  
infection?s/p joint washout in 2021  
Culture from left knee is growing GPC. Patient   
is currently on ceftriaxone  
and vancomycin.  
Estimated Creatinine Clearance: 67 mL/min (A)   
(based on SCr of 1.11 mg/dL  
(H)).  
RECOMMENDATIONS:  
-Culture from left knee is growing GPC. Continue   
patient on ceftriaxone  
and vancomycin.  
-Will consult PICC line team for line placement.   
Patient will need IV  
antibiotics for 6 weeks. Will await culture   
results for choice of  
antibiotics.  
-Patient's prosthetic joint was removed two days   
ago. Will follow-up with  
fluid aspiration that was obtained in the OR for   
further specification of  
what may be the appropriate antibiotic for her.  
-Hemoglobin A1c obtained was elevated at 9.4.   
Endocrinology has been  
consulted.  
Subjective  
SUBJECTIVE:  
Interval Events:  
Afebrile. No acute events overnight. Today   
patient denies any fever,  
chills, nausea or vomiting.  
Active Antimicrobials (From admission, onward)  
Start Stop  
22 1030 vancomycin 1.5 g in D5W 250 mL   
(VANCOCIN) 1.5 g,  
INTRAVENOUS, EVERY 12 HOURS  
22 1029  
22 1300 miconazole 2 % 1 application   
topical powder (LOTRIMIN AF,  
DESENEX) 1 application, TOPICAL, 2 TIMES DAILY  
--  
22 vancomycin dosing and monitoring   
per pharmacy OTHER, AS  
DIRECTED  
--  
22 cefTRIAXone iv piggyback 2 g in   
dextrose (iso-osmotic) 50  
mL (ROCEPHIN) 2 g, INTRAVENOUS, EVERY 24 HOURS  
--  
Immunosuppressant:  
None  
Medications:  
Current Facility-Administered Medications  
Medication Dose Route Frequency  
- ondansetron (PF) 4 mg injection (ZOFRAN) 4 mg   
INTRAVENOUS q 6 H PRN  
- NaCl 0.9% iv flush bag 20 mL INTRAVENOUS PRN  
- sodium chloride 0.9 % (flush) 3-5 mL (BD   
POSIFLUSH) 3-5 mL INTRAVENOUS  
q 12 H  
- sodium chloride 0.9 % (flush) 2-10 mL (BD   
POSIFLUSH) 2-10 mL  
INTRAVENOUS q 12 H  
- DULoxetine 60 mg cap(s) (CYMBALTA) 60 mg ORAL   
BID  
- ranolazine ER 1,000 mg tab(s) (RANEXA) 1,000   
mg ORAL BID  
- dilTIAZem  mg cap(s) (CARDIZEM CD,   
CARTIA XT) 180 mg ORAL DAILY  
- gabapentin 200 mg cap(s) (NEURONTIN) 200 mg   
ORAL TID  
- isosorbide mononitrate ER 90 mg tab(s) (IMDUR)   
90 mg ORAL DAILY  
- clopidogrel 75 mg tab(s) (PLAVIX) 75 mg ORAL   
DAILY  
- metoprolol tartrate (short acting) 25 mg   
tab(s) (LOPRESSOR) 25 mg ORAL  
q 12 H  
- dextrose 40 % 15 g 15 g ORAL PRN  
Or  
- glucagon 1 mg injection 1 mg INTRAMUSCULAR PRN  
Or  
- dextrose 50% in water 25 mL syringe 12.5 g   
INTRAVENOUS PRN  
- empagliflozin 25 mg tab(s) (JARDIANCE) 25 mg   
ORAL DAILY  
- docusate sodium 100 mg cap(s) (COLACE) 100   
(more content not included)...          [facility]  
   
                                        2022 Note     HNO ID: [id number]  
Author: Anna Rodriguez MD  
Service: Orthopaedic Surgery  
Author Type: Resident  
Type: Progress Notes  
Filed: 3/25/2022 6:58 AM  
Note Text:  
Orthopaedic Surgery Inpatient Progress Note  
Assessment  
Jayden Soto is a 61 year old female who is POD   
#2 Irrigation and  
Debridement Left Knee, Removal Unicompartmental   
Knee Arthroplasty Left  
Knee, Insertion Articulating Antibiotic Spacer   
Left Knee  
Plan  
Pain control  
WBAT LLE  
PT/OT  
Medical management per IM - fluid bolus given   
this am for slight bump in  
creatinine  
Antibiotics per ID  
Currently on IV Vancomycin  
F/U intra-op culture results - wound culture   
growing rare GPC  
Dressing: Aquacel + Drain  
Monitor drain output: pull when less than 30cc   
per shift - output 60  
overnight  
DVT ppx: Continue home plavix POD1, start   
Aspirin 81mg BID x 21 days POD1  
Discharge planning: home with [facility] when medically   
stable and abx set up  
Subjective  
No acute events overnight. Pain well controlled.   
No fevers, chills, chest  
pain, or shortness of breath. No new complaints.  
Physical Examination  
Vitals  
/54   Pulse 72   Temp 36.4 ?C (97.5 ?F)   
(Oral)   Resp 16   Ht  
170.2 cm (5' 7 )   Wt 104.7 kg (230 lb 13.2 oz)   
  SpO2 96%   BMI 36.15  
kg/m?  
General  
Alert and oriented. No acute distress.  
Cooperative with interview.  
Left Lower Extremity  
Alignment normal. No gross deformities.  
Dressing c/d/i. Drain in place.  
No swelling, ecchymosis, or erythema.  
SILT Breaux/Sa/DP/SP/T.  
Motor intact EHL/DF/PF.  
DP/PT pulses palpable; BCR all digits.  
Labs  
Recent Labs  
22  
0314 22  
0413  
 135*  
K 4.3 5.3*  
CHLOR 101 99  
CO2 28 26  
BUN 17 15  
CREAT 1.11* 0.73  
GLUC 132* 330*  
ANION 12 10  
CA 9.1 8.6  
WBC 15.83* 14.24*  
HB 9.2* 9.2*  
HCT 30.3* 29.8*  
* 381  
Imaging  
Post operative xrays demonstrating placement of   
spacer in good alignment  
Anna Rodriguez MD  
Orthopedic Surgery Resident  
2022  
6:43 AM                                 [facility]  
   
                                        2022 Note     HNO ID: [id number]  
Author: Iqra Pearce DO  
Service: Hospital Medicine  
Author Type: Physician  
Type: Progress Notes  
Filed: 3/25/2022 6:33 AM  
Note Text:  
DEPARTMENT OF HOSPITAL MEDICINE  
PROGRESS NOTE  
SERVICE DATE: 3/25/2022  
SERVICE TIME: 6:31 AM  
Hospital Medicine/Primary Attending: Iqra Pearce DO  
NIGHT AND WEEKEND COVERAGE:  
After 7pm please page [phone]  
CHIEF COMPLAINT: Follow up on MMP  
SUBJECTIVE: Pt seen and examined. States that   
she had a rough night due  
to pain. No chest pain or SOB  
OBJECTIVE:  
PHYSICAL EXAM: /54   Pulse 72   Temp (Src)   
97.5 (Oral)   Resp 16    
Ht 5' 7  (1.70m)   Wt 230 lb 13.2 oz (104.7kg)     
SpO2 96%   BMI 36.14  
kg/(m2).  
O2 Therapy: Room Air  
General - AANDOx3, NAD, Calm  
CV - RRR S1 S2, No M/R/G  
RESP - CTA B/L No wheezes, ronchi, rales  
ABD - soft, NT, ND +BS, Obese  
EXT -left knee dressing in place with drain  
NEURO - CN II-XII grossly intact, no focal   
deficits  
MEDICATIONS:  
Current Facility-Administered Medications  
Medication Dose Route Frequency  
- ondansetron (PF) 4 mg injection (ZOFRAN) 4 mg   
INTRAVENOUS q 6 H PRN  
- NaCl 0.9% iv flush bag 20 mL INTRAVENOUS PRN  
- sodium chloride 0.9 % (flush) 3-5 mL (BD   
POSIFLUSH) 3-5 mL INTRAVENOUS  
q 12 H  
- sodium chloride 0.9 % (flush) 2-10 mL (BD   
POSIFLUSH) 2-10 mL  
INTRAVENOUS q 12 H  
- DULoxetine 60 mg cap(s) (CYMBALTA) 60 mg ORAL   
BID  
- ranolazine ER 1,000 mg tab(s) (RANEXA) 1,000   
mg ORAL BID  
- dilTIAZem  mg cap(s) (CARDIZEM CD,   
CARTIA XT) 180 mg ORAL DAILY  
- gabapentin 200 mg cap(s) (NEURONTIN) 200 mg   
ORAL TID  
- isosorbide mononitrate ER 90 mg tab(s) (IMDUR)   
90 mg ORAL DAILY  
- clopidogrel 75 mg tab(s) (PLAVIX) 75 mg ORAL   
DAILY  
- metoprolol tartrate (short acting) 25 mg   
tab(s) (LOPRESSOR) 25 mg ORAL  
q 12 H  
- dextrose 40 % 15 g 15 g ORAL PRN  
Or  
- glucagon 1 mg injection 1 mg INTRAMUSCULAR PRN  
Or  
- dextrose 50% in water 25 mL syringe 12.5 g   
INTRAVENOUS PRN  
- empagliflozin 25 mg tab(s) (JARDIANCE) 25 mg   
ORAL DAILY  
- insulin lispro 13 Units pen (rapid acting)   
(HumaLOG KWIKPEN) 13 Units  
SUBCUTANEOUS w MEALS  
- docusate sodium 100 mg cap(s) (COLACE) 100 mg   
ORAL BID  
- aspirin, enteric coated 81 mg tab(s) 81 mg   
ORAL BID  
- sodium chloride 0.9 % (flush) 3-5 mL (BD   
POSIFLUSH) 3-5 mL INTRAVENOUS  
q 12 H  
- vancomycin dosing and monitoring per pharmacy   
OTHER As Directed  
- cefTRIAXone iv piggyback 2 g in dextrose   
(iso-osmotic) 50 mL (ROCEPHIN)  
2 g INTRAVENOUS q 24 H  
- insulin lispro pen (rapid acting) (HumaLOG   
KWIKPEN) SUBCUTANEOUS w  
MEALS AND HS  
- insulin glargine 40 Units pen (long acting)   
(LANTUS SOLOSTAR, BASAGLAR  
KWIKPEN) 40 Units SUBCUTANEOUS AT BEDTIME  
- vancomycin 1.5 g in D5W 250 mL (VANCOCIN) 1.5   
g INTRAVENOUS q 12 HR  
- miconazole 2 % 1 application topical powder   
(LOTRIMIN AF, DESENEX) 1  
application TOPICAL BID  
- acetaminophen 1,000 mg tab(s) (TYLENOL) 1,000   
mg ORAL q 6 H  
- oxyCODONE IR 5-10 mg tab(s) (ROXICODONE) 5-10   
mg ORAL q 4 H PRN  
- traMADol 50 mg tab(s) (ULTRAM) 50 mg ORAL q 6   
H  
- morphine 2 mg injection 2 mg INTRAVENOUS q 3 H   
PRN  
- melatonin 6 mg tab(s) 6 mg ORAL DAILY (8 PM)  
DATA:  
Diagnostic tests reviewed for today's visit:  
CBC:  
Recent Labs  
22  
WBC 15.83*  
RBC 3.44*  
HB 9.2*  
HCT 30.3*  
*  
MCV 88.1  
MCH 26.7  
MPV 9.5  
Coags: No results for input(s): PT, INR, APTT in   
the last 24 hours.  
BMP:  
Recent Labs  
22  
  
K 4.3  
CHLOR 101  
CO2 28  
BUN 17  
CREAT 1.11*  
GLUC 132*  
CMP:  
Recent Labs  
22  
  
K 4.3  
CHLOR 101  
CO2 28  
BUN 17  
CREAT 1.11*  
GLUC 132*  
CA 9.1  
ANION 12  
Cardiac Enzymes: No results for input(s): CK,   
MB, CKMB, TROPT in the last  
24 hours.  
Liver Function, Amylase, Lipase: No results for   
input(s): TPROT, ALB, ALT,  
AST, ALKPHOS, TBILI, AMYLASE, LIPASE, LACTATE in   
the last 24 hours.  
MG/PHOS: No results for input(s): MG, P in the   
last 24 hours.  
Renal Panel:  
Recent Labs  
22  
CREAT 1.11*  
BUN 17  
GLUC 132*  
CA 9.1  
CHLOR 101  
K 4.3  
CO2 28  
  
Heme: No results for input(s): RETICP, ABSRETIC,   
LD, KARI, FE, TIBC,  
TRANSFERSAT in the last 24 hours.  
No results found for: UALBCR  
Estimated Creatinine Clearance: 67 mL/min (A)   
(based on SCr of 1.11 mg/dL  
(H)).  
Assessment/Plan  
1. POD #2?Irrigation and Debridement Left Knee,   
Removal Unicompartmental  
Knee Arthroplasty Left Knee, Insertion   
Articulating Antibiotic Spacer Left  
KneeLeft septic knee - management per ortho and   
ID. ?Follow up on  
cultures. Cont with IV abx  
2. ROMEO - will give small fluid bolus today. Re   
check  
3. Diabetes with insulin?with hyperglycemia?-   
accu checks ac and hs. ?Carb  
controlled diet post op. Blood sugars were   
erratic yesterday. Endo  
consulted  
4. Hyperkalemia -resolved  
5. Obesity - weight loss  
6. HTN - monitor and adjust as needed  
7. CAD - Cont with home meds  
Medication and Non-Pharmacologic VTE   
Prophylaxis/Anticoagulants  
Anticoagulant AND Antiplatelet Medicati (more   
content not included)...                [facility]  
   
                                        2022 Note     HNO ID: [id number]  
Author: Divya Brasher, RN  
Service: Diabetes Education  
Author Type: Registered Nurse  
Type: Allied Health  
Filed: 3/24/2022 5:51 PM  
Note Text:  
DM Ed Note: Met with pt. DM ed initiated. Endo   
consult placed. Will await  
endo orders and proceed with DM ed accordingly. [facility]  
   
                                        2022 Note     HNO ID: [id number]  
Author: Iqra Pearce DO  
Service: Hospital Medicine  
Author Type: Physician  
Type: Plan of Care  
Filed: 3/24/2022 5:13 PM  
Note Text:  
Blood sugars are very labile. Will ask endo to   
see patient.  
  
Iqra Pearce DO  
3/24/2022  
5:13 PM                                 [facility]  
   
                                        2022 Note     HNO ID: [id number]  
Author: Americo Parisi MD  
Service: Infectious Disease  
Author Type: Resident  
Type: Progress Notes  
Filed: 3/24/2022 1:06 PM  
Note Text:  
------------------------------------------------  
--------------------------------  
Attestation signed by Alvin So MD at   
3/24/2022 3:52 PM  
I reviewed the resident's note. I agree with the   
resident's assessment and plan  
unless otherwise noted.  
Operative culture from 3/23 growing GPC.  
Plan: To continue vancomycin and ceftriaxone  
Alvin So MD  
Infectious Disease  
[facility]  
Pager: [phone]   
------------------------------------------------  
--------------------------------  
PROGRESS NOTE INFECTIOUS DISEASE  
All of the below reviewed 3/24/2022  
Portions of this note were copied so as to   
provide important historical  
information essential in contributing to medical   
decision making today.  
The copied portions of this note were carefully   
reviewed and edited as  
needed today so as to accurately reflect my own   
independent evaluation on  
this patient. I have confirmed and edited as   
necessary, the PFSH and ROS  
obtained by others.  
BRIEF SUMMARY:  
61 year old female?with?diabetes mellitus, a   
history of left knee  
replacement, disseminated rt foot wound   
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  
infection and left knee seeding in Dec 2020,   
left prosthetic knee washout  
in 2021,?s/p iv abx followed by oral doxy   
and rifampin for 6 months,  
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   
to [facility]?3/21/2022?for  
further treatment of prosthetic joint infection.   
?Symptomatic with  
worsening left knee pain along with swelling,   
started in 2021  
after twisting the knee while walking. ?Also   
symptomatic with chills and  
hyperglycemia. ?Was seen in the Ortho clinic on   
3/17, a diagnostic  
arthrocentesis of left knee revealed 15 cc of   
cloudy and bloody synovial  
fluid. ?Fluid ,000, nucleated cells   
17,472, 97% neutrophils. ?CRP  
1.9. ?WBC count 12.0.  
ASSESSMENT:  
1. ?Left prosthetic knee pain and swelling,   
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  
2. ?Chills, no recorded fevers, no significant   
leukocytosis  
3. ?Uncontrolled Type 2 Diabetes Mellitus  
4. 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   
infection in 2020,  
history of lumbar spinal infection, history of   
left knee prosthetic joint  
infection?s/p joint washout in 2021  
Estimated Creatinine Clearance: 101.9 mL/min   
(based on SCr of 0.73 mg/dL).  
RECOMMENDATIONS:  
-Patient was started on ceftriaxone and   
vancomycin after OR yesterday.  
-Patient's prosthetic joint was removed   
yesterday. Will follow-up with  
fluid aspiration that was obtained in the OR   
yesterday for further  
specification of what may be the appropriate   
antibiotic for her.  
-Hemoglobin A1c obtained was elevated at 9.4.   
Recommend endocrinology  
consult.  
Subjective  
SUBJECTIVE:  
Interval Events:  
Afebrile. No acute events overnight. Today   
patient denies any fever,  
chills, nausea or vomiting.  
Active Antimicrobials (From admission, onward)  
Start Stop  
22 0900 vancomycin 1.5 g in D5W 250 mL   
(VANCOCIN) 1.5 g,  
INTRAVENOUS, EVERY 12 HOURS  
--  
22 vancomycin dosing and monitoring   
per pharmacy OTHER, AS  
DIRECTED  
--  
22 cefTRIAXone iv piggyback 2 g in   
dextrose (iso-osmotic) 50  
mL (ROCEPHIN) 2 g, INTRAVENOUS, EVERY 24 HOURS  
--  
Immunosuppressant:  
None  
Medications:  
Current Facility-Administered Medications  
Medication Dose Route Frequency  
- ondansetron (PF) 4 mg injection (ZOFRAN) 4 mg   
INTRAVENOUS q 6 H PRN  
- NaCl 0.9% iv flush bag 20 mL INTRAVENOUS PRN  
- sodium chloride 0.9 % (flush) 3-5 mL (BD   
POSIFLUSH) 3-5 mL INTRAVENOUS  
q 12 H  
- sodium chloride 0.9 % (flush) 2-10 mL (BD   
POSIFLUSH) 2-10 mL  
INTRAVENOUS q 12 H  
- DULoxetine 60 mg cap(s) (CYMBALTA) 60 mg ORAL   
BID  
- ranolazine ER 1,000 mg tab(s) (RANEXA) 1,000   
mg ORAL BID  
- dilTIAZem  mg cap(s) (CARDIZEM CD,   
CARTIA XT) 180 mg ORAL DAILY  
- gabapentin 200 mg cap(s) (NEURONTIN) 200 mg   
ORAL TID  
- isosorbide mononitrate ER 90 mg tab(s) (IMDUR)   
90 mg ORAL DAILY  
- clopidogrel 75 mg tab(s) (PLAVIX) 75 mg ORAL   
DAILY  
- metoprolol tartrate (short acting) 25 mg   
tab(s) (LOPRESSOR) 25 mg ORAL  
q 12 H  
- traMADol 50 mg tab(s) (ULTRAM) 50 mg ORAL q 6   
H PRN  
- acetaminophen 1,000 mg tab(s) (TYLENOL) 1,000   
mg ORAL q 6 H PRN  
- oxyCODONE IR 5-10 mg tab(s) (ROXICODONE) 5-10   
mg ORAL q 6 H PRN  
- dextrose 40 % 15 g 15 g ORAL PRN  
Or  
- glucagon 1 mg injection 1 mg INTRAMUSCULAR PRN  
Or  
- dextrose 50% in water 25 mL syringe 12.5 g   
INTRAVENOUS PRN  
- empagliflozin 25 mg tab(s) (JARDIANCE) 25 mg   
ORAL DAILY  
- insulin lispro 13 Units pen (rapid acting)   
(HumaLOG KWIKPEN) 13 Units  
SUBCUTANEOUS w MEALS  
- docusate sodium 100 mg cap(s) (COLACE) 100 mg   
ORAL BID  
- aspirin, enteric coated 81 mg tab(s) 81 mg   
ORAL BID  
- sodium chloride 0.9 % (flush) 3-5 mL (BD   
POSIFLUSH) 3-5 mL INTRAVENOUS  
q 12 H  
- vancomycin dosing and monitoring per pharmacy   
OTHER As Directed  
- cef (more content not included)...    [facility]  
   
                                        2022 Note     HNO ID: [id number]  
Author: Iqra Pearce DO  
Service: Hospital Medicine  
Author Type: Physician  
Type: Progress Notes  
Filed: 3/24/2022 6:33 AM  
Note Text:  
DEPARTMENT OF HOSPITAL MEDICINE  
PROGRESS NOTE  
SERVICE DATE: 3/24/2022  
SERVICE TIME: 6:30 AM  
Hospital Medicine/Primary Attending: Iqra Pearce DO  
NIGHT AND WEEKEND COVERAGE:  
After 7pm please page [phone]  
CHIEF COMPLAINT: I'm fine  
SUBJECTIVE: Pt seen and examined. States that   
she feels ok today. No  
chest pain or SOB. No nausea or vomiting  
OBJECTIVE:  
PHYSICAL EXAM: /55   Pulse 68   Temp (Src)   
98.1 (Oral)   Resp 16    
Ht 5' 7  (1.70m)   Wt 230 lb 13.2 oz (104.7kg)     
SpO2 95%   BMI 36.14  
kg/(m2).  
O2 Therapy: Room Air, Liters: 2  
General - AANDOx3, NAD, Calm  
CV - RRR S1 S2, No M/R/G  
RESP - CTA B/L No wheezes, ronchi, rales  
ABD - soft, NT, ND +BS, Obese  
EXT - Did not take down dressing, does have   
drain  
NEURO - CN II-XII grossly intact, no focal   
deficits  
MEDICATIONS:  
Current Facility-Administered Medications  
Medication Dose Route Frequency  
- ondansetron (PF) 4 mg injection (ZOFRAN) 4 mg   
INTRAVENOUS q 6 H PRN  
- NaCl 0.9% iv flush bag 20 mL INTRAVENOUS PRN  
- sodium chloride 0.9 % (flush) 3-5 mL (BD   
POSIFLUSH) 3-5 mL INTRAVENOUS  
q 12 H  
- sodium chloride 0.9 % (flush) 2-10 mL (BD   
POSIFLUSH) 2-10 mL  
INTRAVENOUS q 12 H  
- DULoxetine 60 mg cap(s) (CYMBALTA) 60 mg ORAL   
BID  
- ranolazine ER 1,000 mg tab(s) (RANEXA) 1,000   
mg ORAL BID  
- dilTIAZem  mg cap(s) (CARDIZEM CD,   
CARTIA XT) 180 mg ORAL DAILY  
- gabapentin 200 mg cap(s) (NEURONTIN) 200 mg   
ORAL TID  
- isosorbide mononitrate ER 90 mg tab(s) (IMDUR)   
90 mg ORAL DAILY  
- clopidogrel 75 mg tab(s) (PLAVIX) 75 mg ORAL   
DAILY  
- metoprolol tartrate (short acting) 25 mg   
tab(s) (LOPRESSOR) 25 mg ORAL  
q 12 H  
- traMADol 50 mg tab(s) (ULTRAM) 50 mg ORAL q 6   
H PRN  
- acetaminophen 1,000 mg tab(s) (TYLENOL) 1,000   
mg ORAL q 6 H PRN  
- oxyCODONE IR 5-10 mg tab(s) (ROXICODONE) 5-10   
mg ORAL q 6 H PRN  
- dextrose 40 % 15 g 15 g ORAL PRN  
Or  
- glucagon 1 mg injection 1 mg INTRAMUSCULAR PRN  
Or  
- dextrose 50% in water 25 mL syringe 12.5 g   
INTRAVENOUS PRN  
- empagliflozin 25 mg tab(s) (JARDIANCE) 25 mg   
ORAL DAILY  
- insulin lispro 13 Units pen (rapid acting)   
(HumaLOG KWIKPEN) 13 Units  
SUBCUTANEOUS w MEALS  
- docusate sodium 100 mg cap(s) (COLACE) 100 mg   
ORAL BID  
- aspirin, enteric coated 81 mg tab(s) 81 mg   
ORAL BID  
- sodium chloride 0.9 % (flush) 3-5 mL (BD   
POSIFLUSH) 3-5 mL INTRAVENOUS  
q 12 H  
- vancomycin dosing and monitoring per pharmacy   
OTHER As Directed  
- cefTRIAXone iv piggyback 2 g in dextrose   
(iso-osmotic) 50 mL (ROCEPHIN)  
2 g INTRAVENOUS q 24 H  
- vancomycin 1.5 g in D5W 250 mL (VANCOCIN) 1.5   
g INTRAVENOUS q 24 HR  
- insulin lispro pen (rapid acting) (HumaLOG   
KWIKPEN) SUBCUTANEOUS w  
MEALS AND HS  
- insulin glargine 40 Units pen (long acting)   
(LANTUS SOLOSTAR, BASAGLAR  
KWIKPEN) 40 Units SUBCUTANEOUS AT BEDTIME  
- sodium polystyrene sulfonate (with sorbitol)   
15 g liquid (SPS) 15 g  
ORAL ONCE  
DATA:  
Diagnostic tests reviewed for today's visit:  
CBC:  
Recent Labs  
22  
WBC 14.24*  
RBC 3.45*  
HB 9.2*  
HCT 29.8*  
  
MCV 86.4  
MCH 26.7  
MPV 9.5  
Coags: No results for input(s): PT, INR, APTT in   
the last 24 hours.  
BMP:  
Recent Labs  
22  
*  
K 5.3*  
CHLOR 99  
CO2 26  
BUN 15  
CREAT 0.73  
GLUC 330*  
CMP:  
Recent Labs  
22  
*  
K 5.3*  
CHLOR 99  
CO2 26  
BUN 15  
CREAT 0.73  
GLUC 330*  
CA 8.6  
ANION 10  
Cardiac Enzymes: No results for input(s): CK,   
MB, CKMB, TROPT in the last  
24 hours.  
Liver Function, Amylase, Lipase: No results for   
input(s): TPROT, ALB, ALT,  
AST, ALKPHOS, TBILI, AMYLASE, LIPASE, LACTATE in   
the last 24 hours.  
MG/PHOS: No results for input(s): MG, P in the   
last 24 hours.  
Renal Panel:  
Recent Labs  
22  
CREAT 0.73  
BUN 15  
GLUC 330*  
CA 8.6  
CHLOR 99  
K 5.3*  
CO2 26  
*  
Heme: No results for input(s): RETICP, ABSRETIC,   
LD, KARI, FE, TIBC,  
TRANSFERSAT in the last 24 hours.  
No results found for: UALBCR  
Estimated Creatinine Clearance: 101.9 mL/min   
(based on SCr of 0.73 mg/dL).  
Assessment/Plan  
1. POD #1 Irrigation and Debridement Left Knee,   
Removal Unicompartmental  
Knee Arthroplasty Left Knee, Insertion   
Articulating Antibiotic Spacer Left  
KneeLeft septic knee - management per ortho and   
ID. Follow up on  
cultures. Cont with IV abx  
2. Diabetes with insulin with hyperglycemia -   
accu checks ac and hs. Carb  
controlled diet post op. Will increase lantus to   
home dose for tonight.  
SSI. Change diet to carb controlled. DC LR  
3. Hyperkalemia - will treat. Re check in AM  
4. Obesity - weight loss  
5. HTN - monitor and adjust as needed  
6. CAD - Cont with home meds  
?  
Medication and Non-Pharmacologic VTE   
Prophylaxis/Anticoagulants  
Anticoagulant AND Antiplatelet Medications (From   
admission, onward)  
Start Dose Route Frequency Last Action Ordered   
Stop  
22 0900 (more content not included)... [facility]  
   
                                        2022 Note     HNO ID: [id number]  
Author: Mahad Stinson MD  
Service: Orthopaedic Surgery  
Author Type: Resident  
Type: Progress Notes  
Filed: 3/24/2022 6:10 AM  
Note Text:  
------------------------------------------------  
--------------------------------  
Attestation signed by Basil Mitchell MD at   
3/24/2022 8:18 AM  
Agree. Continue ASA and Plavix. Monitor drain   
output, pull when less than 30cc  
per shift. Monitor cultures-IV antibiotics per   
ID. Will need PICC line. PT/OT  
eval-she is WBAT.  
------------------------------------------------  
--------------------------------  
Orthopaedic Surgery Inpatient Progress Note  
Assessment  
Jayden Soto is a 61 year old female who is POD   
#1 Irrigation and  
Debridement Left Knee, Removal Unicompartmental   
Knee Arthroplasty Left  
Knee, Insertion Articulating Antibiotic Spacer   
Left Knee  
Plan  
Pain control  
WBAT LLE  
PT/OT  
Medical management per IM  
Antibiotics per ID  
Currently on IV Vancomycin  
F/U intra-op culture results  
Dressing: Aquacel + Drain  
Monitor drain output: pull when less than 30cc   
per shift  
DVT ppx: Continue home plavix POD1, start   
Aspirin 81mg BID x 21 days POD1  
F/U XR L Knee  
Discharge planning: pending PT recs/ when final   
abx recs obtained  
Subjective  
No acute events overnight. Pain well controlled.   
No fevers, chills, chest  
pain, or shortness of breath. No new complaints.  
Physical Examination  
Vitals  
/55   Pulse 68   Temp 36.7 ?C (98.1 ?F)   
(Oral)   Resp 16   Ht  
170.2 cm (5' 7 )   Wt 104.7 kg (230 lb 13.2 oz)   
  SpO2 95%   BMI 36.15  
kg/m?  
General  
Alert and oriented. No acute distress.  
Cooperative with interview.  
Left Lower Extremity  
Alignment normal. No gross deformities.  
Dressing c/d/i. Drain in place.  
No swelling, ecchymosis, or erythema.  
SILT Breaux/Sa/DP/SP/T.  
Motor intact EHL/DF/PF.  
DP/PT pulses palpable; BCR all digits.  
Labs  
Recent Labs  
22  
0413 22  
0104 22  
0409 22  
2309  
* 135* < > --  
K 5.3* 4.2 < > --  
CHLOR 99 100 < > --  
CO2 26 27 < > --  
BUN 15 15 < > --  
CREAT 0.73 0.79 < > --  
GLUC 330* 285* < > --  
ANION 10 8* < > --  
CA 8.6 8.8 < > --  
WBC 14.24* 10.63 -- 12.01*  
HB 9.2* 9.7* -- 11.3*  
HCT 29.8* 30.6* -- 36.3  
 364 -- 478*  
INR -- -- -- 1.0  
< > = values in this interval not displayed.  
Imaging  
No new  
Mahad Stinson MD  
2022 5:59 AM                  [facility]  
   
                                        2022 Note     HNO ID: [id number]  
Author: AHSAN [email or web address]  
Service: Anesthesiology  
Author Type: Nurse Anesthetist  
Type: Anesthesia Procedure Notes  
Filed: 3/23/2022 3:30 PM  
Note Text:  
ANESTHESIOLOGY PROCEDURE NOTE  
Airway  
General Information  
Procedure Start Time/Medication Administration:   
3/23/2022 3:28 PM  
Patient location during procedure: OR  
Timeout Performed Pre-procedure: timeout   
performed  
Consent Obtained: Yes  
Patient identity confirmed: arm band and patient  
Staffing  
CRNA: JASON CoburnCRNA  
Performed by: GRICELDA  
Indications and Patient Condition  
Preoxygenated: yes  
Patient position: sniffing  
Indications for airway management: anesthesia  
anesthesia circuit  
Method: asleep  
Final Airway Details  
Final airway type: endotracheal airway  
Final Endotracheal Airway: ETT  
Cuffed: yes  
Successful intubation technique: direct   
laryngoscopy  
Devices used: Agee  
Endotracheal tube insertion site: oral  
Blade: Collette  
Blade size: #3  
ETT size (mm): 7.5  
Measured from: lips  
Measurement (cm): 21  
Placement verified by: chest auscultation and   
capnometry  
Cormack-Lehane Classification: grade I - full   
view of glottis  
Number of attempts at approach: 1  
SIGNATURE: JASON CoburnCRNA PATIENT NAME:   
Jayden Soto  
DATE: 2022 MRN: [id number]  
TIME: 3:30 PM CSN: [id number]            [facility]  
   
                                        2022 Note     HNO ID: [id number]  
Author: Iqra Pearce DO  
Service: Hospital Medicine  
Author Type: Physician  
Type: Plan of Care  
Filed: 3/23/2022 12:49 PM  
Note Text:  
Will keep lower dose of lantus tonight as   
surgery is delayed until this  
afternoon.  
Iqra Pearce DO  
3/23/2022  
12:49 PM                                [facility]  
   
                                        2022 Note     HNO ID: [id number]  
Author: Americo Parisi MD  
Service: Infectious Disease  
Author Type: Resident  
Type: Progress Notes  
Filed: 3/23/2022 12:19 PM  
Note Text:  
------------------------------------------------  
--------------------------------  
Attestation signed by Alivn So MD at   
3/23/2022 7:51 PM  
I reviewed the resident's note. I agree with the   
resident's assessment and plan  
unless otherwise noted.  
Alvin So MD  
Infectious Disease  
Respiratory Melissa  
Pager: [phone]   
------------------------------------------------  
--------------------------------  
PROGRESS NOTE INFECTIOUS DISEASE  
All of the below reviewed 3/23/2022  
Portions of this note were copied so as to   
provide important historical  
information essential in contributing to medical   
decision making today.  
The copied portions of this note were carefully   
reviewed and edited as  
needed today so as to accurately reflect my own   
independent evaluation on  
this patient. I have confirmed and edited as   
necessary, the PFSH and ROS  
obtained by others.  
BRIEF SUMMARY:  
61 year old female?with?diabetes mellitus, a   
history of left knee  
replacement, disseminated rt foot wound   
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  
infection and left knee seeding in Dec 2020,   
left prosthetic knee washout  
in 2021,?s/p iv abx followed by oral doxy   
and rifampin for 6 months,  
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   
to [facility]?3/21/2022?for  
further treatment of prosthetic joint infection.   
?Symptomatic with  
worsening left knee pain along with swelling,   
started in 2021  
after twisting the knee while walking. ?Also   
symptomatic with chills and  
hyperglycemia. ?Was seen in the Ortho clinic on   
3/17, a diagnostic  
arthrocentesis of left knee revealed 15 cc of   
cloudy and bloody synovial  
fluid. ?Fluid ,000, nucleated cells   
17,472, 97% neutrophils. ?CRP  
1.9. ?WBC count 12.0.  
ASSESSMENT:  
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  
2. Chills, no recorded fevers, no significant   
leukocytosis  
3. Uncontrolled Type 2 Diabetes Mellitus  
4. 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   
infection in 2020,  
history of lumbar spinal infection, history of   
left knee prosthetic joint  
infection?s/p joint washout in 2021  
Estimated Creatinine Clearance: 94.2 mL/min   
(based on SCr of 0.79 mg/dL).  
RECOMMENDATIONS:  
-Will hold off starting antibiotics until   
cultures obtained in the OR.  
-Will look at medical records from [facility].  
-Hemoglobin A1c obtained was elevated at 9.4.   
Recommend endocrinology  
consult.  
Subjective  
SUBJECTIVE:  
Interval Events:  
No acute events overnight. Patient is   
complaining of chills but denies any  
fevers, nausea or vomiting. Patient is scheduled   
to have left prosthetic  
of joint being removed today and will have   
aspiration of the knee joint  
today.  
Active Antimicrobials (From admission, onward)  
None  
Immunosuppressant:  
None  
Medications:  
Current Facility-Administered Medications  
Medication Dose Route Frequency  
- lactated ringers iv infusion 125 mL/hr   
INTRAVENOUS CONTINUOUS  
- ondansetron (PF) 4 mg injection (ZOFRAN) 4 mg   
INTRAVENOUS q 6 H PRN  
- NaCl 0.9% iv flush bag 20 mL INTRAVENOUS PRN  
- sodium chloride 0.9 % (flush) 3-5 mL (BD   
POSIFLUSH) 3-5 mL INTRAVENOUS  
q 12 H  
- sodium chloride 0.9 % (flush) 2-10 mL (BD   
POSIFLUSH) 2-10 mL  
INTRAVENOUS q 12 H  
- DULoxetine 60 mg cap(s) (CYMBALTA) 60 mg ORAL   
BID  
- ranolazine ER 1,000 mg tab(s) (RANEXA) 1,000   
mg ORAL BID  
- dilTIAZem  mg cap(s) (CARDIZEM CD,   
CARTIA XT) 180 mg ORAL DAILY  
- gabapentin 200 mg cap(s) (NEURONTIN) 200 mg   
ORAL TID  
- isosorbide mononitrate ER 90 mg tab(s) (IMDUR)   
90 mg ORAL DAILY  
- clopidogrel 75 mg tab(s) (PLAVIX) 75 mg ORAL   
DAILY  
- metoprolol tartrate (short acting) 25 mg   
tab(s) (LOPRESSOR) 25 mg ORAL  
q 12 H  
- traMADol 50 mg tab(s) (ULTRAM) 50 mg ORAL q 6   
H PRN  
- acetaminophen 1,000 mg tab(s) (TYLENOL) 1,000   
mg ORAL q 6 H PRN  
- oxyCODONE IR 5-10 mg tab(s) (ROXICODONE) 5-10   
mg ORAL q 6 H PRN  
- dextrose 40 % 15 g 15 g ORAL PRN  
Or  
- glucagon 1 mg injection 1 mg INTRAMUSCULAR PRN  
Or  
- dextrose 50% in water 25 mL syringe 12.5 g   
INTRAVENOUS PRN  
- [START ON 3/24/2022] empagliflozin 25 mg   
tab(s) (JARDIANCE) 25 mg ORAL  
DAILY  
- insulin lispro 13 Units pen (rapid acting)   
(HumaLOG KWIKPEN) 13 Units  
SUBCUTANEOUS w MEALS  
- insulin lispro pen (rapid acting) (HumaLOG   
KWIKPEN) SUBCUTANEOUS q 6 H  
- insulin glargine 40 Units pen (long acting)   
(LANTUS SOLOSTAR, BASAGLAR  
KWIKPEN) 40 Units SUBCUTANEOUS AT BEDTIME  
Objective  
OBJECTIVE:  
Physical Exam:  
Exam: Left knee is warm to touch. Left knee   
slightly swollen, incision  
wound well-healed, no erythema, painful on   
flexion.  
/66   Pulse 67   Temp (Src) 97.9 (Oral)     
Resp 18   Ht 5' 7  (1.70m)  
  Wt 230 lb 13.2 oz (104.7kg)   SpO2 92%   BMI   
36.14 kg/(m2).  
O2 Therapy: Room Air  
Lines, Drains, and Airways  
Line  
Peripheral 22 2317 Short Left Hand 20   
Gauge 1 d (more content not included)... [facility]  
   
                                        2022 Note     HNO ID: [id number]  
Author: Iqra Pearce DO  
Service: Hospital Medicine  
Author Type: Physician  
Type: Progress Notes  
Filed: 3/23/2022 6:36 AM  
Note Text:  
DEPARTMENT OF HOSPITAL MEDICINE  
PROGRESS NOTE  
SERVICE DATE: 3/23/2022  
SERVICE TIME: 6:30 AM  
Hospital Medicine/Primary Attending: Iqra Pearce DO  
NIGHT AND WEEKEND COVERAGE:  
After 7pm please page [phone]  
CHIEF COMPLAINT: Knee pain  
SUBJECTIVE: Pt seen and examined. States that   
her knee is bothering  
her. Pt denies chest pain, shortness of breath,   
nausea, vomiting, or  
diarrhea.  
OBJECTIVE:  
PHYSICAL EXAM: /52   Pulse 73   Temp (Src)   
97.9 (Oral)   Resp 16    
Ht 5' 7  (1.70m)   Wt 230 lb 13.2 oz (104.7kg)     
SpO2 98%   BMI 36.14  
kg/(m2).  
O2 Therapy: Room Air  
General - AANDOx3, NAD, Calm  
CV - RRR S1 S2, No M/R/G  
RESP - CTA B/L No wheezes, ronchi, rales  
ABD - soft, NT, ND +BS  
EXT - left knee is swollen  
NEURO - CN II-XII grossly intact, no focal   
deficits  
MEDICATIONS:  
Current Facility-Administered Medications  
Medication Dose Route Frequency  
- lactated ringers iv infusion 125 mL/hr   
INTRAVENOUS CONTINUOUS  
- ondansetron (PF) 4 mg injection (ZOFRAN) 4 mg   
INTRAVENOUS q 6 H PRN  
- NaCl 0.9% iv flush bag 20 mL INTRAVENOUS PRN  
- sodium chloride 0.9 % (flush) 3-5 mL (BD   
POSIFLUSH) 3-5 mL INTRAVENOUS  
q 12 H  
- sodium chloride 0.9 % (flush) 2-10 mL (BD   
POSIFLUSH) 2-10 mL  
INTRAVENOUS q 12 H  
- DULoxetine 60 mg cap(s) (CYMBALTA) 60 mg ORAL   
BID  
- ranolazine ER 1,000 mg tab(s) (RANEXA) 1,000   
mg ORAL BID  
- dilTIAZem  mg cap(s) (CARDIZEM CD,   
CARTIA XT) 180 mg ORAL DAILY  
- gabapentin 200 mg cap(s) (NEURONTIN) 200 mg   
ORAL TID  
- isosorbide mononitrate ER 90 mg tab(s) (IMDUR)   
90 mg ORAL DAILY  
- clopidogrel 75 mg tab(s) (PLAVIX) 75 mg ORAL   
DAILY  
- metoprolol tartrate (short acting) 25 mg   
tab(s) (LOPRESSOR) 25 mg ORAL  
q 12 H  
- traMADol 50 mg tab(s) (ULTRAM) 50 mg ORAL q 6   
H PRN  
- acetaminophen 1,000 mg tab(s) (TYLENOL) 1,000   
mg ORAL q 6 H PRN  
- oxyCODONE IR 5-10 mg tab(s) (ROXICODONE) 5-10   
mg ORAL q 6 H PRN  
- dextrose 40 % 15 g 15 g ORAL PRN  
Or  
- glucagon 1 mg injection 1 mg INTRAMUSCULAR PRN  
Or  
- dextrose 50% in water 25 mL syringe 12.5 g   
INTRAVENOUS PRN  
- insulin lispro pen (rapid acting) (HumaLOG   
KWIKPEN) SUBCUTANEOUS w  
MEALS  
- insulin glargine 25 Units pen (long acting)   
(LANTUS SOLOSTAR, BASAGLAR  
KWIKPEN) 25 Units SUBCUTANEOUS AT BEDTIME  
- [START ON 3/24/2022] empagliflozin 25 mg   
tab(s) (JARDIANCE) 25 mg ORAL  
DAILY  
DATA:  
Diagnostic tests reviewed for today's visit:  
CBC:  
Recent Labs  
22  
0104  
WBC 10.63  
RBC 3.48*  
HB 9.7*  
HCT 30.6*  
  
MCV 87.9  
MCH 27.9  
MPV 9.3  
Coags: No results for input(s): PT, INR, APTT in   
the last 24 hours.  
BMP:  
Recent Labs  
22  
0104  
*  
K 4.2  
CHLOR 100  
CO2 27  
BUN 15  
CREAT 0.79  
GLUC 285*  
CMP:  
Recent Labs  
22  
0104  
*  
K 4.2  
CHLOR 100  
CO2 27  
BUN 15  
CREAT 0.79  
GLUC 285*  
CA 8.8  
ANION 8*  
Cardiac Enzymes: No results for input(s): CK,   
MB, CKMB, TROPT in the last  
24 hours.  
Liver Function, Amylase, Lipase: No results for   
input(s): TPROT, ALB, ALT,  
AST, ALKPHOS, TBILI, AMYLASE, LIPASE, LACTATE in   
the last 24 hours.  
MG/PHOS: No results for input(s): MG, P in the   
last 24 hours.  
Renal Panel:  
Recent Labs  
22  
0104  
CREAT 0.79  
BUN 15  
GLUC 285*  
CA 8.8  
CHLOR 100  
K 4.2  
CO2 27  
*  
Heme: No results for input(s): RETICP, ABSRETIC,   
LD, KARI, FE, TIBC,  
TRANSFERSAT in the last 24 hours.  
No results found for: UALBCR  
Estimated Creatinine Clearance: 94.2 mL/min   
(based on SCr of 0.79 mg/dL).  
Assessment/Plan  
1. Left septic knee - management per ortho and   
ID. Plans for OR today.  
Cultures intraoperatively  
2. Diabetes with insulin with hyperglycemia -   
accu checks ac and hs. Carb  
controlled diet post op. Will increase lantus to   
home dose for tonight.  
SSI. Will add meal time insulin when she is po  
3. Obesity - weight loss  
4. HTN - monitor and adjust as needed  
5. CAD - Cont with home meds when ok with ortho   
(asa and plavix)  
Medication and Non-Pharmacologic VTE   
Prophylaxis/Anticoagulants  
Anticoagulant AND Antiplatelet Medications (From   
admission, onward)  
Start Dose Route Frequency Last Action Ordered   
Stop  
22 0900 clopidogrel 75 mg tab(s) (PLAVIX)  
75 mg ORAL DAILY Given,  0853 22   
--  
22 vte pharmacologic prophylaxis   
contraindicated (fl,oh)  
22 pneumatic compression stockings   
(fl,oh)  
Lines, Drains, and Airways  
Line  
Peripheral 22 4108 Short Left Hand 20   
Gauge 1 day  
VTE Prophylaxis: As per primary team  
Disposition: To be determined  
Functional Status Prior to Admit:  
Medical Necessity for Continued Hospitalization   
MMP  
Plan of care discussed with: Patient  
SIGNATURE: Iqra Pearce DO PATIENT NAME:   
Jayden Soto  
DATE: 2022 MRN: [id number]  
TIME: 6:30 AM PAGER/CONTACT #: Team color pager  
Disclaimer: Portions of this note may have (more   
content not included)...                [facility]  
   
                                        2022 Note     HNO ID: [id number]  
Author: Mahad Stinson MD  
Service: Orthopaedic Surgery  
Author Type: Resident  
Type: Progress Notes  
Filed: 3/23/2022 6:21 AM  
Note Text:  
------------------------------------------------  
--------------------------------  
Attestation signed by Basil Mitchell MD at   
3/23/2022 12:00 PM  
OR today  
------------------------------------------------  
--------------------------------  
Orthopaedic Surgery Inpatient Progress Note  
Assessment  
Jayden Soto is a 61 year old female here with   
left septic knee after  
total knee replacement and left periprosthetic   
joint infection with  
previous washout  
Plan  
- Admitted to Orthopaedic Surgery.  
- Pain control.  
- DVT PPx: SCDs.  
- Antibiotic PPx: Hold antibiotics  
- Weight-bearing Status: NWB LLE.  
- Medicine consult, appreciate recommendations.  
- Diet: regular. NPO pending surgery  
- OR today for removal prosthesis and   
implantation of antibiotic cement  
spacer.  
Subjective  
No acute events overnight. Pain well controlled.   
No fevers, chills, chest  
pain, or shortness of breath. No new complaints.  
Physical Examination  
Vitals  
/52   Pulse 73   Temp 36.6 ?C (97.9 ?F)   
(Oral)   Resp 16   Ht  
170.2 cm (5' 7 )   Wt 104.7 kg (230 lb 13.2 oz)   
  SpO2 98%   BMI 36.15  
kg/m?  
General  
Alert and oriented. No acute distress.  
Cooperative with interview.  
Left Lower Extremity  
Alignment normal. No gross deformities.  
There is mild swelling to the left knee.  
No open wounds or lacerations.  
Patient has mild pain with gross ranging of the   
left knee.  
SILT Breaux/Sa/DP/SP/T.  
Motor intact EHL/DF/PF.  
DP/PT pulses palpable; BCR all digits.  
Labs  
Recent Labs  
22  
0104 22  
0409 22  
2309  
* 138 --  
K 4.2 4.0 --  
CHLOR 100 101 --  
CO2 27 25 --  
BUN 15 14 --  
CREAT 0.79 0.67 --  
GLUC 285* 201* --  
ANION 8* 12 --  
CA 8.8 8.7 --  
WBC 10.63 -- 12.01*  
HB 9.7* -- 11.3*  
HCT 30.6* -- 36.3  
 -- 478*  
INR -- -- 1.0  
Imaging  
No new  
Mahad Stinson MD  
2022 6:12 AM                  [facility]  
   
                                        2022 Note     HNO ID: [id number]  
Author: Mahad Stinson MD  
Service: Orthopaedic Surgery  
Author Type: Resident  
Type: Progress Notes  
Filed: 3/22/2022 6:23 AM  
Note Text:  
Orthopaedic Surgery Inpatient Progress Note  
Assessment  
Jayden Soto is a 61 year old female here with   
left septic knee after  
total knee replacement and left periprosthetic   
joint infection with  
previous washout  
Plan  
- Admit to Orthopaedic Surgery.  
- Pain control.  
- DVT PPx: SCDs.  
- Antibiotic PPx: Hold antibiotics  
- Weight-bearing Status: NWB LLE.  
- Medicine consult, appreciate recommendations.  
- Pre-op labs: CBC, BMP, PT/INR, Type AND Screen  
- Diet: regular. NPO/IVF at midnight on 3/23.  
- OR 3/23 for removal prosthesis and   
implantation of antibiotic cement  
spacer.  
Subjective  
No acute events overnight. Pain well controlled.   
No fevers, chills, chest  
pain, or shortness of breath. No new complaints.  
Physical Examination  
Vitals  
/65   Pulse 72   Temp 36.8 ?C (98.2 ?F)   
(Oral)   Resp 18   Ht  
170.2 cm (5' 7 )   Wt 104.7 kg (230 lb 13.2 oz)   
  SpO2 97%   BMI 36.15  
kg/m?  
General  
Alert and oriented. No acute distress.  
Cooperative with interview.  
Left Lower Extremity  
Alignment normal. No gross deformities.  
There is mild swelling to the left knee.  
No open wounds or lacerations.  
Patient has mild pain with gross ranging of the   
left knee.  
SILT Breaux/Sa/DP/SP/T.  
Motor intact EHL/DF/PF.  
DP/PT pulses palpable; BCR all digits.  
Labs  
Recent Labs  
22  
0409 22  
2309  
 --  
K 4.0 --  
CHLOR 101 --  
CO2 25 --  
BUN 14 --  
CREAT 0.67 --  
GLUC 201* --  
ANION 12 --  
CA 8.7 --  
WBC -- 12.01*  
HB -- 11.3*  
HCT -- 36.3  
PLT -- 478*  
INR -- 1.0  
Imaging  
No new  
Mahad Stinson MD  
2022 6:15 AM                  [facility]  
   
                                        2022 Note     HNO ID: [id number]  
Author: Basil Mitchell MD  
Service: ?  
Author Type: Physician  
Type: Progress Notes  
Filed: 3/17/2022 10:12 AM  
Note Text:  
Follow-up Patient Visit  
Jayden Soto is a 61 year old female who   
presents to follow up for  
Infection of total knee replacement, subsequent   
encounter (primary  
encounter diagnosis)  
Status post left partial knee replacement  
Pain in prosthetic joint, initial encounter   
([facility])  
Worsening pain and swelling in the left knee   
joint. She missed a dose of  
her Doxy last week. Reports low grade fever not   
taken but feels warm,  
blood sugars have been high.  
Current or previous treatment regimens: NSAIDS  
Medications:  
Current Outpatient Medications  
Medication Sig  
- DULoxetine (CYMBALTA) 60 mg capsule Take 60 mg   
by mouth once daily.  
- ranolazine SR (RANEXA) 1,000 mg tab ER 12 hr   
Take by mouth.  
- dilTIAZem CD (CARDIZEM CD) 180 mg 24 hr   
capsule Take 180 mg by mouth  
once daily.  
- empagliflozin (JARDIANCE) 25 mg tablet Take 25   
mg by mouth daily with  
breakfast.  
- doxycycline monohydrate (MONODOX) 100 mg   
capsule Take 100 mg by mouth  
twice daily.  
- GABAPENTIN ORAL Take by mouth.  
- METOPROLOL TARTRATE ORAL Take by mouth.  
- isosorbide mononitrate ER (IMDUR) 30 mg 24 hr   
tablet Take 30 mg by mouth  
once daily.  
- insulin glargine,hum.rec.anlog (LANTUS   
SUBCUTANEOUS) Inject  
subcutaneously.  
- clopidogrel (PLAVIX) 75 mg tablet Take 75 mg   
by mouth once daily.  
No current facility-administered medications for   
this visit.  
Allergies:  
ALLERGIES  
Allergen Reactions  
- Morphine GI Upset  
- Statins-Hmg-Coa Red* Unknown  
Physical Examination:  
Resp 16   Ht 5' 7  (1.70m)   Wt 210 lb (95.3kg)   
  BMI 32.88 kg/(m2).  
Ortho Exam  
Seen today in wheelchair  
Moderate swelling and effusion  
Painful ROM  
Images: 3 views left knee taken today and   
reviewed by myself shows No  
obvious loosening of the prosthesis, interval   
worsening of joint space  
narrowing, possible Air in the suprapatellar   
space and effusion as well.  
Large Joint Arthro/Inj: L knee joint  
Informed Consent  
Consent Obtained: Verbal  
Universal Protocol  
A moment to CARE was completed.  
SIGN IN  
Personnel directly involved with the procedure   
wore the appropriate PPE.  
Patient/Surrogate Stated/Verified: Patient name,   
Date of birth, Relevant  
allergies and Intended procedure  
TIME OUT  
3/17/2022 10:11 AM  
The procedure site was prepped in the usual   
sterile fashion.  
Site: L knee joint  
Aspirate: 15 mL  
cloudy and bloody  
Outcome: Tolerated well, no immediate   
complications  
Post-injection instructions were reviewed with   
the patient and the patient  
voiced understanding of these instructions.  
SIGN OUT  
All specimen containers correctly labeled.  
Oxford Score: see report  
Assessment and Plan:  
1. Infection of total knee replacement,   
subsequent encounter - ICD9:  
V58.89, ICD10: T84.59XD, Z96.659 (primary   
diagnosis)  
2. Status post left partial knee replacement -   
ICD9: V43.65, ICD10:  
Z96.652  
3. Pain in prosthetic joint, initial encounter   
([facility]) - ICD9: 996.77,  
338.18, ICD10: T84.84XA  
Suspect possible recurrent infection in the   
joint. Her serology labs were  
normal 2 months ago. I aspirated the knee today   
and sent for analysis.  
Will call the patient and daughter with results.   
Briefly discussed  
possible explant and spacer in the future. Any   
signs symptoms of sepsis  
they should go to the ED.  
No follow-ups on file.  
Basil Mitchell MD                         [facility]  
   
                                        2022 Note     HNO ID: [id number]  
Author: Basil Mitchell MD  
Service: ?  
Author Type: Physician  
Type: Progress Notes  
Filed: 2022 3:07 PM  
Note Text:  
Follow-up Patient Visit  
Jayden Soto is a 61 year old female who   
presents to follow up for Pain  
in prosthetic joint, initial encounter ([facility])   
(primary encounter  
diagnosis)  
Status post left partial knee replacement  
Ongoing pain in the left knee with swelling that   
worsens throughout the  
day. She is on chronic doxycycline for previous   
infection in the knee.  
CRP and ESR normal.  
Current or previous treatment regimens: NSAIDS  
Medications:  
Current Outpatient Medications  
Medication Sig  
- DULoxetine (CYMBALTA) 60 mg capsule Take 60 mg   
by mouth once daily.  
- ranolazine SR (RANEXA) 1,000 mg tab ER 12 hr   
Take by mouth.  
- dilTIAZem CD (CARDIZEM CD) 180 mg 24 hr   
capsule Take 180 mg by mouth  
once daily.  
- empagliflozin (JARDIANCE) 25 mg tablet Take 25   
mg by mouth daily with  
breakfast.  
- doxycycline monohydrate (MONODOX) 100 mg   
capsule Take 100 mg by mouth  
twice daily.  
- GABAPENTIN ORAL Take by mouth.  
- METOPROLOL TARTRATE ORAL Take by mouth.  
- isosorbide mononitrate ER (IMDUR) 30 mg 24 hr   
tablet Take 30 mg by mouth  
once daily.  
- insulin glargine,hum.rec.anlog (LANTUS   
SUBCUTANEOUS) Inject  
subcutaneously.  
- clopidogrel (PLAVIX) 75 mg tablet Take 75 mg   
by mouth once daily.  
No current facility-administered medications for   
this visit.  
Allergies:  
ALLERGIES  
Allergen Reactions  
- Morphine GI Upset  
- Statins-Hmg-Coa Red* Unknown  
Physical Examination:  
Resp 16   Ht 5' 7  (1.70m)   Wt 210 lb (95.3kg)   
  BMI 32.88 kg/(m2).  
Ortho Exam  
Ambulates with a walker  
Brace in place left lower extremity  
Incision left knee well healed  
Swelling noted to the left knee joint  
ROM 5-110  
Images: no new images today  
Procedures  
Oxford Score: see report  
Assessment and Plan:  
1. Pain in prosthetic joint, initial encounter   
([facility]) - ICD9: 996.77,  
338.18, ICD10: T84.84XA (primary diagnosis)  
2. Status post left partial knee replacement -   
ICD9: V43.65, ICD10:  
Z96.652  
Discussed with the patient and her son today the   
possible plans moving  
forward if going to be surgical. Would include   
conversion to TKA versus  
placement of an articulating spacer. I will come   
up with a surgical plan  
and contact them. Ideally she would be off her   
Doxy so that we could  
obtain cultures intraoperatively.  
No follow-ups on file.  
Basil Mitchell MD                         [facility]  
   
                                        2022 Note     HNO ID: [id number]  
Author: Radha Novak MA  
Service: ?  
Author Type: Medical Assistant  
Type: Progress Notes  
Filed: 2022 3:07 PM  
Note Text:  
REVIEW OF SYSTEMS:  
GENERAL: Well developed, well nourished. No   
acute distress  
PAIN: Pain left knee  
CARDIOVASCULAR: HTN  
MSK: Positive for joint swelling  
SKIN: Negative for lesions, rash, itching, metal   
sensitivity  
NEURO: Negative for seizure, trauma,   
numbness/tingling of extremities.  
ENDOCRINE: Positive for diabetic associated   
symptoms  
HEMATOLOGY: Negative for excessive bleeding,   
clots, bleeding disorders.              [facility]  
   
                                        2021 Note     HNO ID: [id number]  
Author: RT Raúl(R)  
Service: Nuclear Medicine  
Author Type: Technologist  
Type: Progress Notes  
Filed: 2021 10:06 AM  
Note Text:  
RADIOLOGY SERVICE PROGRESS NOTE  
SERVICE DATE: 2021  
SERVICE TIME: 10:05 AM  
PATIENT IDENTITY VERIFICATION COMPLETED USING   
TWO (2) STANDARD  
IDENTIFIERS: Name and Date of Birth confirmed by   
patient verbally  
FALL SCREENING: Has the patient had 2 falls in   
the last year or 1 fall  
with injury or currently using an Ambulatory   
Assistive Device (Walker,  
Cane, Wheelchair, Crutches, etc.)? No  
PATIENT GENDER DATA: .female  
Pregnant: No  
ALLERGIES: Reviewed and unchanged  
MEDICATIONS REVIEWED: Yes  
PATIENT RELEVANT IMPLANT DATA REVIEWED: Not   
Applicable  
CREATININE: No results found for: CREAT,   
EGFROTH, EGFRAA  
P.O.C.T. RESULTS: N/A 2021  
DIAGNOSTIC CT PERFORMED: No  
IV SITE: Ambulatory: NM only - direct IV   
injection in the Right  
antecubital site  
POST EXAM PIV STATUS: Not applicable  
PROCEDURE TYPE: NM INJECT: nm bone 3 phase. 20.6   
mCi Tc99m MDP. No other  
medications given..  
ADMINISTRATION TIME: 932  
PATIENT DISCHARGED TO: Ambulatory patient, left   
NM department area.  
A Diagnostic radioactive procedure has taken   
place, with no further  
precautions necessary other than routine body   
substance precautions. More  
information regarding radiation safety can be   
found using this link:  
[email or web address]  
[email or web address]/files/Rad%20Protection  
%20-%20Diagnostic%20Nuclear%20Medicine%20Procedu  
res.pdf  
SIGNATURE: RT Raúl(R) PATIENT NAME: Jayden Soto  
DATE: 2021 MRN: [id number]  
TIME: 10:05 AM PAGER/CONTACT #:         [facility]  
   
                                                    2021 History   
of Present illness   
Narrative                                 
  
  
Formatting of this note is different from the   
original.  
RADIOLOGY SERVICE PROGRESS NOTE  
  
SERVICE DATE: 2021  
SERVICE TIME: 10:05 AM  
  
PATIENT IDENTITY VERIFICATION COMPLETED USING   
TWO (2) STANDARD IDENTIFIERS: Name and Date of   
Birth confirmed by patient verbally  
FALL SCREENING: Has the patient had 2 falls in   
the last year or 1 fall with injury or currently   
using an Ambulatory Assistive Device (Walker,   
Cane, Wheelchair, Crutches, etc.)? No  
PATIENT GENDER DATA: .female  
Pregnant: No  
ALLERGIES: Reviewed and unchanged  
MEDICATIONS REVIEWED: Yes  
PATIENT RELEVANT IMPLANT DATA REVIEWED: Not   
Applicable  
CREATININE: No results found for: CREAT,   
EGFROTH, EGFRAA  
P.O.C.T. RESULTS: N/A 2021  
  
DIAGNOSTIC CT PERFORMED: No  
  
IV SITE: Ambulatory: NM only - direct IV   
injection in the Right antecubital site  
POST EXAM PIV STATUS: Not applicable  
  
PROCEDURE TYPE: NM INJECT: nm bone 3 phase. 20.6   
mCi Tc99m MDP. No other medications given..  
ADMINISTRATION TIME: 932  
PATIENT DISCHARGED TO: Ambulatory patient, left   
NM department area.  
  
A Diagnostic radioactive procedure has taken   
place, with no further precautions necessary   
other than routine body substance precautions.   
More information regarding radiation safety can   
be found using this link:   
http://[email or web address]/environmental/[email or web address]  
tion/files/Rad%20Protection%20-%20Diagnostic%20N  
uclear%20Medicine%20Procedures.pdf  
  
SIGNATURE: RT Raúl(R) PATIENT NAME: Jayden Soto  
DATE: 2021 MRN: [id number]  
TIME: 10:05 AM PAGER/CONTACT #:  
  
  
  
  
Electronically signed by CHRISTELLE Olsen) at   
2021 10:06 AM ESTdocumented in this   
encounter                               [facility]  
   
                                        2021 Note     HNO ID: [id number]  
Author: Basil Mitchell MD  
Service: ?  
Author Type: Physician  
Type: Progress Notes  
Filed: 2021 4:06 PM  
Note Text:  
Patient ID: Jayden Soto is a 61 year old   
female.  
CC: Consultation requested by Annalee Eckert DO   
for evaluation of:  
Patient presents with:  
Left Knee - New  
HPI: Jayden Soto is a 61 year old female who   
presents with a long history  
of activity-related Left knee pain. The patient   
states she twisted the  
knee and has had pain medially. The pain is   
described as dull aching and  
sharp shooting pain and is present in themedial   
regions of the knee. The  
patient does require ambulatory aids. The   
patient does have difficulty  
ascending and descending stairs as well as   
getting out of a chair. The  
pain is to the point where it is adversely   
affecting activities of daily  
living.  
Previous treatment has consisted of left partial   
knee replacement in   
with a washout in March with PICC line. This   
started from a foot ulcer  
that ended up in her spine. This was washed out   
in [facility]  
The patient is referred for orthopedic   
evaluation and management.  
Pain Assessment  
The following portions of the patient's history   
were reviewed and updated  
as appropriate: allergies, current medications,   
past family history, past  
medical history, past social history, past   
surgical history and problem  
list.  
No past medical history on file.  
No past surgical history on file.  
ROS  
No family history on file.  
Social History  
Tobacco Use  
- Smoking status: Not on file  
- Smokeless tobacco: Not on file  
Substance Use Topics  
- Alcohol use: Not on file  
- Drug use: Not on file  
Current Outpatient Medications  
Medication Sig Dispense Refill  
- DULoxetine (CYMBALTA) 60 mg capsule Take 60 mg   
by mouth once daily.  
- ranolazine SR (RANEXA) 1,000 mg tab ER 12 hr   
Take by mouth.  
- dilTIAZem CD (CARDIZEM CD) 180 mg 24 hr   
capsule Take 180 mg by mouth  
once daily.  
- empagliflozin (JARDIANCE) 25 mg tablet Take 25   
mg by mouth daily with  
breakfast.  
- doxycycline monohydrate (MONODOX) 100 mg   
capsule Take 100 mg by mouth  
twice daily.  
- GABAPENTIN ORAL Take by mouth.  
- METOPROLOL TARTRATE ORAL Take by mouth.  
- isosorbide mononitrate ER (IMDUR) 30 mg 24 hr   
tablet Take 30 mg by mouth  
once daily.  
- insulin glargine,hum.rec.anlog (LANTUS   
SUBCUTANEOUS) Inject  
subcutaneously.  
- clopidogrel (PLAVIX) 75 mg tablet Take 75 mg   
by mouth once daily.  
No current facility-administered medications for   
this visit.  
ALLERGIES  
Allergen Reactions  
- Morphine GI Upset  
- Statins-Hmg-Coa Red* Unknown  
There is no problem list on file for this   
patient.  
Objective:  
On physical examination, the patient is a well   
appearing female in no  
respiratory distress. The patient is awake,   
alert and oriented to person,  
place and time. Body mass index is 32.89 kg/m?.  
Inspection of the bilateral lower extremities   
does not demonstrate color,  
temperature or trophic changes. There is   
satisfactory alignment and no  
asymmetry.  
Examination of theLeft knee finds mild   
intra-articular effusion. There is  
no varus deformity. The skin is noted to be   
intact. There is no  
lymphadenopathy. There is tenderness to   
palpation in the medial portion of  
the knee Range of motion is 5-100 degrees. There   
is satisfactory  
varus/valgus stability. There is no midflexion   
instability. Reynaldo test  
is negative. There is no calf tenderness. There   
is no evidence of distal  
neurovascular compromise.  
Examination of the Right knee finds no   
intra-articular effusion. There is  
no varus deformity. The skin is noted to be   
intact. There is no  
lymphadenopathy. There is no tenderness to   
palpation in the medial and  
lateral portion of the knee Range of motion is   
0-120 degrees. There is  
satisfactory varus/valgus stability. There is no   
midflexion instability.  
Reynaldo test is negative. There is no calf   
tenderness. There is no  
evidence of distal neurovascular compromise.  
Examination of the bilateral hips exhibits   
physiologic range of motion  
without difficulty, equal leg lengths, no pain   
with resisted hip flexion  
and no tenderness to palpation over greater   
trochanters.  
Further examination of the bilateral lower   
extremities finds satisfactory  
ankle dorsiflexion and plantar flexion strength.   
Sensation is intact to  
light touch distally. Distal pulses are   
palpable. The feet are warm and  
viable and there is brisk capillary refill.  
Ferdinand Knee Score: see report  
Radiographs:  
Radiographs are reviewed from office today to   
include right and left knee  
PA flexion, lateral and Merchant views. Per my   
interpretation, these show  
stable left partial knee replacement no obvious   
loosening  
Assessment:  
The primary encounter diagnosis was Status post   
left partial knee  
replacement. A diagnosis of Pain in prosthetic   
joint, initial encounter  
([facility]) was also pertinent to this visit.  
Plan:  
The clinical and radiographic findings as well   
as a risks, benefits and  
alternatives of treatme (more content not   
included)...                            [facility]  
   
                                        2021 Note     HNO ID: [id number]  
Author: Radha Novak MA  
Service: ?  
Author Type: Medical Assistant  
Type: Progress Notes  
Filed: 2021 4:06 PM  
Note Text:  
REVIEW OF SYSTEMS:  
GENERAL: Well developed, well nourished. No   
acute distress  
PAIN: Pain left knee  
CARDIOVASCULAR: HTN, CAD  
MSK: Positive for joint swelling  
SKIN: Negative for lesions, rash, itching, metal   
sensitivity  
NEURO: Negative for seizure, trauma,   
numbness/tingling of extremities.  
ENDOCRINE: Positive for diabetic associated   
symptoms  
HEMATOLOGY: Negative for excessive bleeding,   
clots, bleeding disorders.              [facility]  
   
                                                    2021 History   
of Present illness   
Narrative                                 
  
  
Formatting of this note is different from the   
original.  
Patient ID: Jayden Soto is a 61 year old   
female.  
  
CC: Consultation requested by Annalee Eckert DO   
for evaluation of: Patient presents with:  
Left Knee - New  
  
HPI: Jayden Soto is a 61 year old female who   
presents with a long history of activity-related   
Left knee pain. The patient states she twisted   
the knee and has had pain medially. The pain is   
described as dull aching and sharp shooting pain   
and is present in themedial regions of the knee.   
The patient does require ambulatory aids. The   
patient does have difficulty ascending and   
descending stairs as well as getting out of a   
chair. The pain is to the point where it is   
adversely affecting activities of daily living.  
  
Previous treatment has consisted of left partial   
knee replacement in  with a washout in March   
with PICC line. This started from a foot ulcer   
that ended up in her spine. This was washed out   
in   
  
The patient is referred for orthopedic   
evaluation and management.  
  
Pain Assessment  
  
The following portions of the patient's history   
were reviewed and updated as appropriate:   
allergies, current medications, past family   
history, past medical history, past social   
history, past surgical history and problem list.  
  
No past medical history on file.  
  
No past surgical history on file.  
  
ROS  
  
No family history on file.  
  
Social History  
  
Tobacco Use  
Smoking status: Not on file  
Smokeless tobacco: Not on file  
Substance Use Topics  
Alcohol use: Not on file  
Drug use: Not on file  
  
Current Outpatient Medications  
Medication Sig Dispense Refill  
DULoxetine (CYMBALTA) 60 mg capsule Take 60 mg   
by mouth once daily.  
ranolazine SR (RANEXA) 1,000 mg tab ER 12 hr   
Take by mouth.  
dilTIAZem CD (CARDIZEM CD) 180 mg 24 hr capsule   
Take 180 mg by mouth once daily.  
empagliflozin (JARDIANCE) 25 mg tablet Take 25   
mg by mouth daily with breakfast.  
doxycycline monohydrate (MONODOX) 100 mg capsule   
Take 100 mg by mouth twice daily.  
GABAPENTIN ORAL Take by mouth.  
METOPROLOL TARTRATE ORAL Take by mouth.  
isosorbide mononitrate ER (IMDUR) 30 mg 24 hr   
tablet Take 30 mg by mouth once daily.  
insulin glargine,hum.rec.anlog (LANTUS   
SUBCUTANEOUS) Inject subcutaneously.  
clopidogrel (PLAVIX) 75 mg tablet Take 75 mg by   
mouth once daily.  
  
No current facility-administered medications for   
this visit.  
  
ALLERGIES  
Allergen Reactions  
Morphine GI Upset  
Statins-Hmg-Coa Red* Unknown  
  
There is no problem list on file for this   
patient.  
  
Objective:  
  
On physical examination, the patient is a well   
appearing female in no respiratory distress. The   
patient is awake, alert and oriented to person,   
place and time. Body mass index is 32.89 kg/m .  
  
Inspection of the bilateral lower extremities   
does not demonstrate color, temperature or   
trophic changes. There is satisfactory alignment   
and no asymmetry.  
  
  
  
Examination of theLeft knee finds mild   
intra-articular effusion. There is no varus   
deformity. The skin is noted to be intact. There   
is no lymphadenopathy. There is tenderness to   
palpation in the medial portion of the knee   
Range of motion is 5-100 degrees. There is   
satisfactory varus/valgus stability. There is no   
midflexion instability. Reynaldo test is   
negative. There is no calf tenderness. There is   
no evidence of distal neurovascular compromise.  
  
Examination of the Right knee finds no   
intra-articular effusion. There is no varus   
deformity. The skin is noted to be intact. There   
is no lymphadenopathy. There is no tenderness to   
palpation in the medial and lateral portion of   
the knee Range of motion is 0-120 degrees. There   
is satisfactory varus/valgus stability. There is   
no midflexion instability. Reynaldo test is   
negative. There is no calf tenderness. There is   
no evidence of distal neurovascular compromise.  
  
Examination of the bilateral hips exhibits   
physiologic range of motion without difficulty,   
equal leg lengths, no pain with resisted hip   
flexion and no tenderness to palpation over   
greater trochanters.  
  
Further examination of the bilateral lower   
extremities finds satisfactory ankle   
dorsiflexion and plantar flexion strength.   
Sensation is intact to light touch distally.   
Distal pulses are palpable. The feet are warm   
and viable and there is brisk capillary refill.  
  
Ferdinand Knee Score: see report  
  
Radiographs:  
  
Radiographs are reviewed from office today to   
include right and left knee PA flexion, lateral   
and Merchant views. Per my interpretation, these   
show stable left partial knee replacement no   
obvious loosening  
  
Assessment:  
  
The primary encounter diagnosis was Status post   
left partial knee replacement. A diagnosis of   
Pain in prosthetic joint, initial encounter   
([facility]) was also pertinent to this visit.  
  
Plan:  
The clinical and radiographic findings as well   
as a risks, benefits and alternatives of   
treatment have been reviewed in detail with the   
patient. Checking CRP and ESR and bone scan for   
possible infection or loosening based on her   
history. She is on chronic antibiotics for her   
previous infection. Briefly discussed continuing   
with this treatment versus proceeding with   
antibiotic spacer placement  
  
The patient will return in after studies to   
assess their response to the above treatment   
plan, sooner if there are questions or problems.  
  
  
Electronically signed by Basil Mitchell MD at   
2021 4:06 PM EST  
  
  
Formatting of this note might be different from   
the original.  
REVIEW OF SYSTEMS:  
  
GENERAL: Well developed, well nourished. No   
acute distress  
PAIN: Pain left knee  
CARDIOVASCULAR: HTN, CAD  
MSK: Positive for joint swelling  
SKIN: Negative for lesions, rash, itching, metal   
sensitivity  
NEURO: Negative for seizure, trauma,   
numbness/tingling of extremities.  
ENDOCRINE: Positive for diabetic associated   
symptoms  
HEMATOLOGY: Negative for excessive bleeding,   
clots, bleeding disorders.  
  
  
  
  
Electronically signed by Radha Novak MA   
at 2021 4:06 PM ESTdocumented in this   
encounter                               [facility]  
   
                                                    12-   
Miscellaneous Notes                       
  
  
Formatting of this note might be different from   
the original.  
Spoke to the patient's daughter-in-law, Nneka,   
to obtain a little more information. She states   
Dr. Pepper would like her to see a joint   
replacement specialist. In  she had a   
partial knee replacement and this year she had   
had to have two I&D's to clean it out. Will   
Paula see for this?  
  
Roma Reagan Dickinson Ppg  
December 15, 2021 10:20 AM  
  
  
  
  
Electronically signed by Roma Reagan   
 Ppg at 12/15/2021 10:21 AM EST  
  
  
Formatting of this note might be different from   
the original.  
----- Message from Amita Crooks sent at   
12/15/2021 9:42 AM EST -----  
Regarding: Orthopedics / Open Knee: Pain /   
Previous Surgery By A Non-[facility] Provider  
Subject Line Format: Orthopedics / [Provider   
Name or  Open & Body Part ] / [Issue]  
  
Patient has been identified by name and Date of   
birth (Y/N): Y  
  
Patient: Jayden Soto  
YOB: 1960  
MRN: [id number]  
Previous Provider Seen: NA  
Body Part(s) Identified: L KNEE  
Diagnosis/Reason For Visit: PAIN - INFECTED   
IMPLANT IN KNEE  
Reason for the call/escalation: PREVIOUS   
SURGERIES ON KNEE  
If reason for call/escalation is discharge from   
ED/ER or Hospital, which facility was the   
patient seen at: NA  
  
Was an appointment scheduled (Y/N): DR. PEPPER   
FROM [facility] REFERRED PATIENT  
  
Person calling if other than patient: DAUGHTER   
IN [facility]  
Return call to if other than patient: DAUGHTER   
IN [facility] contact number: [phone]  
  
Thank you,  
Amita Crooks  
December 15, 2021 9:42 AM  
  
  
  
  
Electronically signed by Roma Reagan   
Dickinson Ppg at 12/15/2021 10:18 AM   
ESTdocumented in this encounter         [facility]  
   
                                                    2021 History   
of Present illness   
Narrative                                 
  
  
Formatting of this note might be different from   
the original.  
DATE OF SERVICE: 2021  
  
SUBJECTIVE: Patient is doing okay today. She is   
up ambulating in the hallway. She  
has no complaints.  
  
PHYSICAL EXAMINATION: Vital Signs: Stable.   
Heart: Regular. Lungs: Clear.  
Abdomen: Benign. Extremities: No edema.  
  
IMPRESSION:  
1. Prosthetic knee infection, status post   
incision and drainage and removal of  
hardware with polyethylene exchange on   
antibiotics through . Patient will be  
discharged home on Thursday with home health   
care.  
2. History of anemia requiring transfusions.  
3. Coronary artery disease, status post   
non-ST-elevation myocardial infarction,  
status post restenting and percutaneous   
transluminal coronary angioplasty.  
4. History of atrial fibrillation.  
5. Hypertension.  
6. Methicillin-sensitive Staphylococcus aureus   
infection.  
7. Debility.  
8. Depression.  
9. Hypothyroidism.  
10. Sacral decubitus, status post flap surgery.  
11. History of osteomyelitis.  
12. Constipation.  
  
PLAN: Continue with current plan of care, PT,   
OT, rehab, and wound care. Patient to  
be discharged home on Thursday with home health   
care.  
  
_______________________  
DO SOFI Cruz/[id number]  
DD: 2021 14:03  
DT: 2021 14:59  
SSI File#:   
[id number]  
Job #: [id number]  
  
Verified/Reviewed by  
  
[facility] PATIENT NAME:   
JAYDEN GUTIERREZ  
[facility] [address] [address] MED[facility] REC #: [id number]  
[place] [address] ACCOUNT #: [id number]  
ADMIT DATE:  
DISCHARGE DATE:  
ATTENDING PHY: Michael Robles MD  
  
  
Electronically signed by Annalee Eckert at   
2021 7:42 AM EDTdocumented in this   
encounter                               [facility]  
   
                                                    2021 History   
of Present illness   
Narrative                                 
  
  
Formatting of this note might be different from   
the original.  
DATE OF SERVICE: 2021  
  
SUBJECTIVE: Patient is doing well today. She   
states that her insurance so far is  
paying through Wednesday. She is hoping they   
will pay for another week because then  
she will be done with her antibiotics, and she   
will be able to go home.  
  
OBJECTIVE: Vital Signs: Blood pressure is   
142/73, pulse 99, respires 24,  
temperature 98.1, O2 saturation 98%. Heart:   
Regular. Lungs: Clear. Abdomen:  
Benign. Extremities: No edema.  
  
LABORATORY DATA: White count 8.6, hemoglobin   
11.5, hematocrit 38.1, platelets of  
303. Sodium is 141, potassium 4.1, chloride 107,   
CO2 of 27. BUN 19, creatinine  
0.48.  
  
IMPRESSION:  
1. Prosthetic knee infection status post   
incision and drainage and removal of  
hardware with polyethylene exchange on   
antibiotics through .  
2. History of anemia requiring transfusions.  
3. Coronary artery disease status post   
non-St-elevation myocardial infarction status  
post resenting and percutaneous transluminal   
coronary angioplasty.  
4. History of atrial fibrillation.  
5. Hypertension.  
6. Methicillin-sensitive Staphylococcus aureus.  
7. Depression.  
8. Debility.  
9. Hypothyroidism.  
10. Sacral decubitus status post flap surgery.  
11. History of osteomyelitis.  
12. Constipation.  
  
PLAN: We will continue with current plan of   
care, PT, OT, rehab, and wound care.  
  
_______________________  
DO SOFI Cruz/[id number]  
DD: 2021 10:44  
DT: 2021 11:27  
SSI File#:   
[id number]  
Job #: [id number]  
  
Verified/Reviewed by  
  
[facility] PATIENT NAME:   
JAYDEN GUTIERREZ [facility] MEDICAL REC #: [id number]  
[place] [address] ACCOUNT #: [id number]  
ADMIT DATE:  
DISCHARGE DATE:  
ATTENDING PHY: Michael Robles MD  
  
  
Electronically signed by Annalee Eckert at   
2021 1:04 PM EDTdocumented in this   
encounter                               [facility]  
   
                                                    2021 History   
of Present illness   
Narrative                                 
  
  
Formatting of this note might be different from   
the original.  
DATE OF SERVICE: 2021  
  
She is resting in her bed in very good spirits.   
She has been up walking with  
physical therapy. She says the knee incision has   
been healing very, very well. She  
says she still has a very small area of   
nonhealing in her back flap, but that is  
getting smaller and better as well.  
  
OBJECTIVE: Vital Signs: Vitals include a   
temperature of 97.8, pulse 85,  
respirations 16, blood pressure 114/76. Lungs:   
Her lungs are clear. Heart: Regular  
rate and rhythm. Abdomen: Soft and nontender.   
Bowel sounds are positive.  
Extremities: No edema.  
  
LABORATORIES: There were no new labs today.  
  
IMPRESSION:  
1. Prosthetic knee infection status post   
incision and drainage and removal of  
hardware with polyethylene exchange on   
antibiotics through .  
2. History of anemia requiring transfusion.  
3. Coronary artery disease status post non-ST   
elevation myocardial infarction status  
post restenting and percutaneous transluminal   
coronary angioplasty.  
4. History of atrial fibrillation.  
5. Hypertension.  
6. Methicillin-susceptible Staphylococcus   
aureus.  
7. Depression.  
8. Debility.  
9. Hypothyroidism.  
10. Sacral decubitus status post flap surgery.  
11. History of osteomyelitis.  
12. Constipation.  
  
PLAN: Continue current plan of care with   
physical and occupational therapy, wound  
care, rehabilitation, and discharge planning.  
  
_______________________  
Michael Robles MD  
  
/[id number]  
DD: 2021 11:31  
DT: 2021 12:18  
SSI File#:   
[id number]  
Job #: [id number]  
  
Verified/Reviewed by  
  
[facility] PATIENT NAME:   
JAYDEN GUTIERREZ [facility] MEDICAL REC #: [id number]  
[place] [address] ACCOUNT #: [id number]  
ADMIT DATE:  
DISCHARGE DATE:  
ATTENDING PHY: Michael Robles MD  
  
  
Electronically signed by Michael Robles at   
2021 11:43 AM EDTdocumented in this   
encounter                               [facility]  
   
                                                    2021 History   
of Present illness   
Narrative                                 
  
  
Formatting of this note might be different from   
the original.  
DATE OF SERVICE: 2021  
  
SUBJECTIVE: Patient is doing well today. She   
states that her wound on her back side  
is healing up nicely. It is almost completely   
healed, so she can finally sit on her  
bottom again. We are waiting to hear from her   
insurance to see if they are going to  
cover her to stay here. Dr. Luther would prefer   
her to stay here and finish off her  
antibiotics. Apparently, there are no homecare   
agencies in her area that will be  
able to accommodate her antibiotics, and she   
really does not want to go to a skilled  
facility.  
  
OBJECTIVE: Her vitals are stable.  
  
White count 9.0, hemoglobin 11, hematocrit 36.5,   
platelets 298. Sodium is 142,  
potassium 4.1, chloride 109, CO2 of 27, BUN 18,   
creatinine 0.46.  
  
Heart is regular. Lungs are clear. Abdomen is   
benign. Extremities: No edema.  
  
IMPRESSION:  
1. Prosthetic knee infection, status post   
incision and drainage and removal of  
hardware with polyethylene exchange. On IV   
antibiotics through .  
2. History of anemia requiring transfusions.  
3. Coronary artery disease, status post   
non-ST-elevation myocardial infarction.  
Status post restenting and PTCA.  
4. History of atrial fibrillation.  
5. Methicillin-sensitive Staphylococcus aureus   
infection.  
6. Hypertension.  
7. Depression.  
8. Debility.  
9. Hypothyroidism.  
10. Sacral decubitus status post flap surgery.  
11. History of osteomyelitis.  
12. Constipation.  
  
PLAN: Will continue with the current plan of   
care, PT, OT, rehab, and wound care.  
  
_______________________  
DO SOFI Cruz/[id number]  
DD: 2021 13:52  
DT: 2021 14:39  
SSI File#:   
[id number]  
Job #: [id number]  
  
Verified/Reviewed by  
  
[facility] PATIENT NAME:   
JAYDEN GUTIERREZ [facility] MEDICAL REC #: [id number]  
[place] [address] ACCOUNT #: [id number]  
ADMIT DATE:  
DISCHARGE DATE:  
ATTENDING PHY: Michael Robles MD  
  
  
Electronically signed by Annalee Eckert at   
2021 10:29 AM EDTdocumented in this   
encounter                               [facility]  
   
                                                    2021 History   
of Present illness   
Narrative                                 
  
  
Formatting of this note might be different from   
the original.  
DATE OF SERVICE: 2021  
  
SUBJECTIVE: She is resting in her bed, awake,   
alert, and looking forward to going  
home soon. She is on antibiotics and we are   
still waiting for Dr. Rogers to  
address whether she could potentially get her   
antibiotics at home or whether she  
needs to stay here.  
  
PHYSICAL EXAMINATION: Vitals include a   
temperature of 98.0, pulse 85, respirations  
18, blood pressure 156/77, oxygen at 95%. Her   
lungs are clear. Heart has a regular  
rate and rhythm. Abdomen soft, nontender,   
benign. Extremities: No edema.  
  
LABORATORIES: No new labs.  
  
IMPRESSION:  
1. Prosthetic knee infection, status post   
incision and drainage, and removal of  
hardware with polyethylene exchange on IV   
antibiotics through mid April.  
2. History of anemia requiring transfusions.  
3. Coronary artery disease, status post   
non-ST-elevation myocardial infarction,  
status post restenting and percutaneous   
transluminal coronary angioplasty.  
4. History of atrial fibrillation.  
5. Methicillin-sensitive Staphylococcus aureus   
infection.  
6. Hypertension.  
7. Depression.  
8. Debility.  
9. History of hypothyroidism.  
10. History of sacral decubitus, status flap   
surgery.  
11. History of osteomyelitis.  
12. Constipation.  
  
CONTINUE: Continue with current plan of care   
with physical and occupational therapy  
per rehab, antibiotics and wound care. Await   
further word from Infectious Disease.  
  
_______________________  
Michael Robles MD  
  
RG/[id number]  
DD: 2021 06:22  
DT: 2021 07:18  
SSI File#:   
[id number]  
Job #: [id number]  
  
Verified/Reviewed by  
  
[facility] PATIENT NAME:   
JAYDEN GUTIERREZ [facility] MED[facility] REC #: [id number]  
[place] [address] ACCOUNT #: [id number]  
ADMIT DATE:  
DISCHARGE DATE:  
ATTENDING PHY: Michael Robles MD  
  
  
Electronically signed by Michael Robles at   
2021 10:34 AM EDTdocumented in this   
encounter                               [facility]  
   
                                                    2021 History   
of Present illness   
Narrative                                 
  
  
Formatting of this note might be different from   
the original.  
DATE OF SERVICE: 2021  
  
SUBJECTIVE: Patient is doing well today. She is   
sitting up in the chair, eating her  
lunch. I spoke with Case Management. They are   
looking into hopefully getting her  
home on antibiotics, although Dr. Rogers told   
the patient previously that she had  
to stay her the whole time, so they are going to   
talk with him and see what his  
recommendations area. From my standpoint, I   
think she is fine to go home on  
antibiotics.  
  
OBJECTIVE: Blood pressure 148/77, pulse 90,   
respirations 24, temp 98.2, O2  
saturation 100%. Heart is regular. Lungs are   
clear. Abdomen is benign.  
Extremities: No edema.  
  
IMPRESSION:  
1. Prosthetic knee infection status post I and D   
and removal of hardware with  
polyethylene exchange. On IV antibiotics through   
mid April.  
2. History of anemia, requiring transfusions.  
3. Methicillin-sensitive Staphylococcus aureus   
infection.  
4. History of atrial fibrillation.  
5. Coronary artery disease status post   
non-ST-elevation myocardial infarction status  
post re-stenting and percutaneous transluminal   
coronary angioplasty.  
6. Hypertension.  
7. Debility.  
8. Depression.  
9. History of hypothyroidism.  
10. History of sacral decubitus status post flap   
surgery.  
11. History of osteomyelitis.  
12. Constipation.  
  
PLAN: Will continue with current plan of care,   
PT, OT, rehab, and wound care and  
will await word from Dr. Rogers.  
  
_______________________  
DO SOFI Cruz/[id number]  
DD: 2021 12:49  
DT: 2021 13:24  
SSI File#:   
[id number]  
Job #: [id number]  
  
Verified/Reviewed by  
  
[facility] PATIENT NAME:   
JAYDEN GUTIERREZ [facility] MED[facility] REC #: [id number]  
[place] [address] ACCOUNT #: [id number]  
ADMIT DATE:  
DISCHARGE DATE:  
ATTENDING PHY: Michael Robles MD  
  
  
Electronically signed by Annalee Eckert at   
2021 2:02 PM EDTdocumented in this   
encounter                               [facility]  
   
                                                    2021 History   
of Present illness   
Narrative                                 
  
  
Formatting of this note might be different from   
the original.  
DATE OF SERVICE: 2021  
  
SUBJECTIVE: Patient is doing well this morning,   
up and was walking to the bathroom.  
Apparently, cardiology said they could take off   
her nitroglycerin patch today. She  
said within a half-hour, she noticed she was   
having angina again, and she wants it  
put back on. So, I did add that back on today   
and will address with cardiology.  
  
OBJECTIVE: Vital Signs: Stable. Heart: Regular.   
Lungs: Clear. Abdomen:  
Benign. Extremities: No edema.  
  
LABORATORY RESULTS: White count of 8.5,   
hemoglobin 11.8, hematocrit 39.3, platelets  
of 356. Sodium is 144, potassium 4.1, chloride   
107, CO2 of 29. BUN 17, creatinine  
0.50.  
  
IMPRESSION:  
1. Prosthetic knee infection, status post   
incision and drainage and removal of  
hardware with polyethylene exchange on IV   
antibiotics through mid-April.  
2. History of anemia requiring transfusions.  
3. History of methicillin-sensitive   
Staphylococcus aureus infection.  
4. History of atrial fibrillation.  
5. Coronary artery disease status post   
non-ST-elevation myocardial infarction status  
post restenting and percutaneous transluminal   
coronary angioplasty.  
6. Hypertension.  
7. Debility.  
8. Depression.  
9. History of hypothyroidism.  
10. History of sacral decubitus status post flap   
surgery.  
11. History of osteomyelitis.  
12. Constipation.  
  
PLAN: We will continue with current plan of   
care, PT, OT, and rehab and wound care.  
I did add back on her nitroglycerin patch today   
for angina and will address with  
cardiology.  
  
_______________________  
DO SOFI Cruz/[id number]  
DD: 2021 10:38  
DT: 2021 11:26  
SSI File#:   
[id number]  
Job #: [id number]  
  
Verified/Reviewed by  
  
[facility] PATIENT NAME:   
JAYDEN GUTIERREZ [facility] MEDICAL REC #: [id number]  
[place] [address] ACCOUNT #: [id number]  
ADMIT DATE:  
DISCHARGE DATE:  
ATTENDING PHY: Michael Robles MD  
  
  
Electronically signed by Annalee Eckert at   
2021 12:33 PM EDTdocumented in this   
encounter                               [facility]  
   
                                                    2021 History   
of Present illness   
Narrative                                 
  
  
Formatting of this note might be different from   
the original.  
DATE OF SERVICE: 2021  
  
SUBJECTIVE: Patient is doing fine today. She has   
no complaints or issues.  
  
OBJECTIVE: Blood pressure 122/63, pulse 88,   
respirations 16, temp 97.7. Heart is  
regular. Lungs are clear. Abdomen is benign.   
Extremities: No edema.  
  
IMPRESSION:  
1. Prosthetic knee infection, status post   
incision and drainage and removal of  
hardware with polyethylene exchange on IV   
antibiotics through mid-April.  
2. History of anemia requiring transfusions.  
3. History of methicillin-sensitive   
Staphylococcus aureus infection.  
4. History of atrial fibrillation.  
5. Coronary artery disease, status post   
non-ST-elevation myocardial infarction,  
status post restenting and percutaneous   
transluminal coronary angioplasty.  
6. Hypertension.  
7. Debility.  
8. Depression.  
9. History of hypothyroidism.  
10. History of sacral decubitus, status post   
flap surgery.  
11. History of osteomyelitis.  
12. Constipation.  
  
PLAN: Will continue with current plan of care,   
PT, OT, rehab and wound care.  
  
_______________________  
DO SOFI Cruz/[id number]  
DD: 2021 10:45  
DT: 2021 11:30  
SSI File#:   
[id number]  
Job #: [id number]  
  
Verified/Reviewed by  
  
[facility] PATIENT NAME:   
JAYDEN GUTIERREZ [facility] MED[facility] REC #: [id number]  
[place] [address] ACCOUNT #: [id number]  
ADMIT DATE:  
DISCHARGE DATE:  
ATTENDING PHY: Michael Robles MD  
  
  
Electronically signed by Annalee Eckert at   
2021 10:25 AM EDTdocumented in this   
encounter                               [facility]  
   
                                                    2021 History   
of Present illness   
Narrative                                 
  
  
Formatting of this note might be different from   
the original.  
DATE OF SERVICE: 2021  
  
SUBJECTIVE: Patient is doing very well this   
morning. She had her staples removed  
yesterday from her knee. Looks really good.  
  
OBJECTIVE: Blood pressure is 114/70, pulse 88,   
respiratory rate is 18, temperature  
98. Heart is regular. Lungs: Diminished at the   
bases. Abdomen is benign.  
Extremities: No edema.  
  
IMPRESSION:  
1. Prostatic knee infection, status post   
incision and drainage and removal of  
hardware with polyethylene exchange. On   
intravenous antibiotics through mid-April.  
2. History of anemia, requiring transfusions.  
3. History of methicillin-sensitive   
Staphylococcus aureus infection.  
4. History of atrial fibrillation.  
5. Coronary artery disease, status post   
non-STEMI myocardial infarction, status post  
re-stenting and percutaneous transluminal   
coronary angioplasty.  
6. Hypertension.  
7. Debility.  
8. Depression.  
9. History of hypothyroidism.  
10. History of sacral decubitus, status post   
flap surgery.  
11. History of osteomyelitis.  
12. Constipation.  
  
PLAN: Will continue with current plan of care,   
PT, OT, rehabilitation, and wound  
care.  
  
_______________________  
DO SOFI Cruz/[id number]  
DD: 2021 11:06  
DT: 2021 11:50  
SSI File#:   
[id number]  
Job #: [id number]  
  
Verified/Reviewed by  
  
[facility] PATIENT NAME:   
JAYDEN GUTIERREZ [facility] MED[facility] REC #: [id number]  
[place] [address] ACCOUNT #: [id number]  
ADMIT DATE:  
DISCHARGE DATE:  
ATTENDING PHY: Michael Robles MD  
  
  
Electronically signed by Annalee Eckert at   
2021 10:55 AM EDTdocumented in this   
encounter                               [facility]  
   
                                                    2021 History   
of Present illness   
Narrative                                 
  
  
Formatting of this note might be different from   
the original.  
DATE OF SERVICE: 2021  
  
SUBJECTIVE: She is resting in her bed, she   
arouses when I arrive but then she falls  
back to sleep. She is in no distress.  
  
OBJECTIVE: Vitals: Temperature 97.9, pulse 89,   
respirations 18, blood pressure  
128/68, oxygen 99%. Lungs: Clear but diminished.   
Heart: Regular rate and rhythm.  
Abdomen: Soft, nontender, benign. Extremities:   
No edema. Her dressing is intact  
and there is little swelling.  
  
LABORATORY: No new labs today.  
  
IMPRESSION:  
1. Prostatic drain infection of the left knee   
status post incision and drainage and  
removal of hardware with polyethylene exchange   
on intravenous antibiotics through  
mid-April.  
2. History of anemia requiring transfusion.  
3. History of Methicillin-sensitive   
Staphylococcus aureus infection.  
4. History of atrial fibrillation.  
5. Coronary artery disease status post non-ST   
segment elevation myocardial  
infarction status post restenting and   
percutaneous transluminal coronary angioplasty.  
6. Hypertension.  
7. Debility.  
8. Depression.  
9. History of hypotension.  
10. History of sacral decubitus ulcer status   
post flap surgery.  
11. History of osteomyelitis.  
12. History of constipation.  
  
PLAN: Continue current plan of care with her   
therapies, rehab, and wound care.  
  
_______________________  
Michael Robles MD  
  
/[id number]  
DD: 2021 05:27  
DT: 2021 07:14  
SSI File#:   
[id number]  
Job #: [id number]  
  
Verified/Reviewed by  
  
[facility] PATIENT NAME:   
JAYDEN GUTIERREZ [facility] MED[facility] REC #: [id number]  
[place] [address] ACCOUNT #: [id number]  
ADMIT DATE:  
DISCHARGE DATE:  
ATTENDING PHY: Michael Robles MD  
  
  
Electronically signed by Michael Robles at   
2021 5:57 AM EDTdocumented in this   
encounter                               [facility]  
   
                                                    2021 History   
of Present illness   
Narrative                                 
  
  
Formatting of this note might be different from   
the original.  
DATE OF SERVICE: 2021  
  
SUBJECTIVE: Patient is doing very well today.   
She finally had a BM. She was very  
happy about that. No other complaints.  
  
OBJECTIVE: Her vital signs are stable. Heart is   
regular. Lungs are clear. Abdomen  
is benign. Extremities: No edema.  
  
LABORATORY STUDIES: White count of 10,   
hemoglobin 10.9, hematocrit 36.7, platelets  
of 427. Sodium is 141, potassium 4.3, chloride   
108, CO2 of 29, BUN 20, creatinine  
0.57.  
  
IMPRESSION:  
1. Prosthetic drain infection of the left knee,   
status post incision and drainage  
and removal of hardware with polyethylene   
exchange. On intravenous antibiotics  
through mid-April.  
2. History of anemia, requiring transfusions.  
3. History of methicillin-sensitive   
Staphylococcus aureus infection.  
4. History of atrial fibrillation.  
5. Coronary artery disease, status post non-ST   
segment elevation myocardial  
infarction, status post re-stenting and   
percutaneous transluminal coronary  
angioplasty.  
6. Hypertension.  
7. Debility.  
8. Depression.  
9. History of hypotension.  
10. History of sacral decubitus ulcer, status   
post flap surgery.  
11. History of osteomyelitis.  
12. History of constipation.  
  
PLAN: Will continue with current plan of care,   
PT, OT, rehabilitation, and wound  
care.  
  
_______________________  
DO SOFI Cruz/[id number]  
DD: 2021 13:40  
DT: 2021 15:21  
SSI File#:   
[id number]  
Job #: [id number]  
  
Verified/Reviewed by  
  
[facility] PATIENT NAME:   
JAYDEN GUTIERREZ [facility] MEDICAL REC #: [id number]  
[place] [address] ACCOUNT #: [id number]  
ADMIT DATE:  
DISCHARGE DATE:  
ATTENDING PHY: Michael Robles MD  
  
  
Electronically signed by Annalee Eckert at   
2021 10:51 AM EDTdocumented in this   
encounter                               [facility]  
   
                                                    2021 History   
of Present illness   
Narrative                                 
  
  
Formatting of this note might be different from   
the original.  
DATE OF SERVICE: 2021  
  
SUBJECTIVE: She is resting. She wakes up when I   
arrive. She denies any aches,  
pains or complaints. She said her knee is   
feeling better at this time. She  
apparently has been having some problems with   
constipation.  
  
OBJECTIVE: Vital Signs: Include a temperature of   
98.6, pulse 90, respiratory rate  
17, blood pressure 138/64, oxygen is 100%.   
Lungs: Clear. Heart: Regular rate and  
rhythm. Abdomen: Soft, nontender, benign.   
Extremities: No edema. Dressing is in  
place.  
  
LABORATORIES: There are no new labs today.  
  
IMPRESSION:  
1. 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   
status post incision and drainage and  
removal of hardware with polyethylene exchange   
on IV antibiotics through mid April.  
2. History of anemia requiring transfusions.  
3. History of methicillin-sensitive   
Staphylococcus aureus infection.  
4. History of atrial fibrillation.  
5. Coronary artery disease status post non-ST   
elevation myocardial infarction,  
status post re-stenting and percutaneous   
transluminal coronary angioplasty.  
6. Hypertension.  
7. Debility.  
8. Depression.  
9. History of hypotension.  
10. History of sacral decubitus ulcer status   
post flap surgery.  
11. History of osteomyelitis.  
12. History of constipation.  
  
PLAN: Continue with current plan of care with   
physical and occupational therapy,  
wound care, antibiotics and bowel management.  
  
_______________________  
MD ELIO Ventura/[id number]  
DD: 2021 05:29  
DT: 2021 07:42  
SSI File#:   
[id number]  
Job #: [id number]  
  
Verified/Reviewed by  
  
[facility] PATIENT NAME:   
JAYDEN GUTIERREZ [facility] REC #: [id number]  
[place] [address] ACCOUNT #: [id number]  
ADMIT DATE:  
DISCHARGE DATE:  
ATTENDING PHY: Michael Robles MD  
  
  
Electronically signed by Michael Robles at   
2021 4:25 AM EDTdocumented in this   
encounter                               [facility]  
   
                                                    2021 History   
of Present illness   
Narrative                                 
  
  
Formatting of this note might be different from   
the original.  
DATE OF SERVICE: 2021  
  
Patient is doing okay. She did not get her   
suppository. I did talk to the nurse  
about that. So, will get that for her. She is   
passing some mucus which I think some  
of that is from the [facility] after they removed it.   
Some of it could be her getting  
constipated now again and she is just passing   
mucus and not stool.  
  
PHYSICAL EXAMINATION: Vital Signs: Blood   
pressure is 118/64, pulse 87, respirations  
16, temp 98.4, O2 saturation 99%. Heart:   
Regular. Lungs: Clear. Abdomen:  
Benign. Extremities: No edema.  
  
IMPRESSION:  
1. 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   
status post insertion, drainage, and  
removal of hardware with polyethylene exchange   
on IV antibiotics through mid-April.  
2. History of methicillin-sensitive   
Staphylococcus aureus infection.  
3. History of anemia requiring transfusions.  
4. History of atrial fibrillation.  
5. Coronary artery disease status post   
non-ST-elevation myocardial infarction status  
post restenting and percutaneous transluminal   
coronary angioplasty.  
6. Hypertension.  
7. History of sacral decubitus ulcer status post   
flap surgery.  
8. Debility.  
9. Depression.  
10. History of hypotension.  
11. History of osteomyelitis.  
12. History of constipation.  
  
PLAN: Will continue with current plan of care,   
PT, OT, and wound care.  
  
_______________________  
DO SOFI Cruz/[id number]  
DD: 2021 12:40  
DT: 2021 13:18  
SSI File#:   
[id number]  
Job #: [id number]  
CC: Michael Robles MD  
  
Verified/Reviewed by  
  
[facility] PATIENT NAME:   
JAYDEN GUTIERREZ [address] MEDICAL REC #: [id number]  
[place] [address] ACCOUNT #: [id number]  
ADMIT DATE:  
DISCHARGE DATE:  
ATTENDING PHY: Michael Robles MD  
  
  
Electronically signed by Annalee Eckert at   
2021 1:15 PM EDTdocumented in this   
encounter                               [facility]  
   
                                                    2021 History   
of Present illness   
Narrative                                 
  
  
Formatting of this note might be different from   
the original.  
DATE OF SERVICE: 2021  
  
SUBJECTIVE: Patient is doing very well this   
morning. She has no complaints other  
than she would like her suppository back. We had   
backed off on some of her bowel  
regimen because she was having diarrhea when she   
was downstairs.  
  
OBJECTIVE: Vital Signs: Blood pressure is   
134/74, pulse 72, respires 23,  
temperature 98, O2 saturation 99%. Heart:   
Regular. Lungs: Diminished at the  
bases. Abdomen: Benign. Extremities: No edema.  
  
LABORATORY RESULTS: White count of 8.6,   
hemoglobin 10.0, hematocrit 34.0, platelets  
of 449. Sodium is 144, potassium 4.1, chloride   
109, CO2 of 30. BUN 18, creatinine  
0.60.  
  
IMPRESSION:  
1. 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   
status post insertion, drainage, and  
removal of hardware with polyethylene exchange   
on intravenous antibiotics through  
mid-April.  
2. History of methicillin-sensitive   
Staphylococcus aureus infection.  
3. History of anemia requiring transfusions.  
4. History of atrial fibrillation.  
5. Coronary artery disease status post   
non-ST-elevation myocardial infarction,  
status post restenting and percutaneous   
transluminal coronary angioplasty.  
6. Hypertension.  
7. History of sacral decubitus ulcer status post   
flap surgery.  
8. Debility.  
9. Depression.  
10. History of hypotension.  
11. History of osteomyelitis.  
12. History of constipation.  
  
PLAN: We will continue with current plan of   
care, PT, OT, rehab, and wound care. We  
will add back on her daily suppository p.r.n.  
  
_______________________  
Annalee EckertDO FARAH/[id number]  
DD: 2021 11:10  
DT: 2021 12:06  
SSI File#:   
[id number]  
Job #: [id number]  
  
Verified/Reviewed by  
  
[facility] PATIENT NAME:   
JAYDEN GUTIERREZ [address] MEDICAL REC #: [id number]  
[place] [address] ACCOUNT #: [id number]  
ADMIT DATE:  
DISCHARGE DATE:  
ATTENDING PHY: Michael Robles MD  
  
  
Electronically signed by Annalee Eckert at   
2021 12:29 PM EDTdocumented in this   
encounter                               [facility]  
   
                                                    2021 History   
of Present illness   
Narrative                                 
  
  
Formatting of this note might be different from   
the original.  
DATE OF SERVICE: 2021  
  
SUBJECTIVE: She is resting in her bed. States   
that the [place] seems to be helping  
her knee as well as the ice that she had on all   
night. She was not awakened by any  
pain and the swelling and redness seem to be   
less.  
  
OBJECTIVE: Vitals: Temperature of 97.6, pulse   
95, respirations 12, blood pressure  
128/69. Her lungs are clear. Heart has a regular   
rate and rhythm. Abdomen is soft  
and nontender. Bowel sounds are positive.   
Extremities: There is some very slight  
edema at the left knee. Dressing is clean, dry,   
and intact.  
  
LABORATORIES: There were no labs today.  
  
IMPRESSION:  
1. 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   
status post insertion, drainage and  
removal of hardware with polyethylene exchange   
on intravenous antibiotics through mid  
April.  
2. History of methicillin-sensitive   
Staphylococcus aureus infection.  
3. History of anemia requiring transfusions.  
4. History of atrial fibrillation.  
5. Coronary artery disease status post non-ST   
elevation myocardial infarction,  
status post re-stenting and percutaneous   
transluminal coronary angioplasty.  
6. Hypertension.  
7. History of sacral decubitus ulcer status post   
recent flap surgery.  
8. Debility.  
9. Depression.  
10. History of hypotension.  
11. History of osteomyelitis.  
12. History of constipation.  
  
PLAN: Continue current plan of care, physical   
and occupational therapy, wound care,  
antibiotics. Awaiting consult.  
  
_______________________  
Michael Robles MD  
  
RG/[id number]  
DD: 2021 12:02  
DT: 2021 15:25  
SSI File#:   
[id number]  
Job #: [id number]  
  
Verified/Reviewed by  
  
[facility] PATIENT NAME:   
JAYDEN GUTIERREZ [address] [facility] MED[facility] REC #: [id number]  
[place] [address] ACCOUNT #: [id number]  
ADMIT DATE:  
DISCHARGE DATE:  
ATTENDING PHY: Michael Robles MD  
  
  
Electronically signed by Michael Robles at   
2021 4:46 AM EDTdocumented in this   
encounter                               [facility]  
   
                                                    2021 History   
of Present illness   
Narrative                                 
  
  
Formatting of this note might be different from   
the original.  
DATE OF SERVICE: 2021  
  
SUBJECTIVE: She is resting in her bed in no   
distress. She says that her sleep was  
interrupted from time to time with some   
discomfort in her left knee, and there has  
been a little swelling there as well. She saw   
infectious disease, has not seen the  
wound care doctor yet, and cardiology apparently   
has not been following her up here.  
  
OBJECTIVE: Vital signs include a temperature of   
98.4, pulse 84, respiratory rate is  
18, blood pressure 134/76, oxygen is 100%. Her   
lungs are clear but diminished.  
Heart has got a regular rate and rhythm. Abdomen   
is soft, nontender. Bowel sounds  
are positive. Extremities: There is no edema on   
the right. The left knee is mildly  
swollen. It is not warm. The dressing is dry,   
clean, and intact.  
  
LABORATORIES: There were no new laboratories   
today.  
  
IMPRESSION:  
1. 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   
status post incision and drainage and  
removal of hardware with polyethylene exchange.   
On intravenous antibiotics through  
mid-April.  
2. History of methicillin sensitive   
Staphylococcus aureus infection.  
3. History of anemia, requiring transfusions.  
4. History of atrial fibrillation.  
5. Coronary artery disease, status post non-ST   
elevation myocardial infarction,  
status post re-stenting and percutaneous   
transluminal coronary angioplasty.  
6. Hypertension.  
7. Debility.  
8. History of sacral decubitus ulcers status   
post recent flap surgery.  
9. Depression.  
10. History of hypotension.  
11. History of constipation.  
12. History of osteomyelitis.  
  
PLAN: Continue current plan of care with   
antibiotics and wound care, and I will ask  
cardiology to continue to follow her after her   
procedure.  
  
_______________________  
MD ELIO Ventura/[id number]  
DD: 2021 10:35  
DT: 2021 11:08  
SSI File#:   
[id number]  
Job #: [id number]  
  
Verified/Reviewed by  
  
[facility] PATIENT NAME:   
JAYDEN GUTIERREZ  
[address] MEDICAL REC #: [id number]  
[place] [address] ACCOUNT #: [id number]  
ADMIT DATE:  
DISCHARGE DATE:  
ATTENDING PHY: Michael Robles MD  
  
  
Electronically signed by Michael Robles at   
2021 11:52 AM EDTdocumented in this   
encounter                               [facility]  
   
                                2021 Note                 [facility]  
   
                                                    2021 History   
of Present illness   
Narrative                                 
  
  
Formatting of this note might be different from   
the original.  
DATE OF SERVICE: 2021  
  
SUBJECTIVE: She is resting in her bed looking   
forward to hopefully going home after  
the weekend. Apparently yesterday she had some   
dehiscence of her wound and the  
surgeon apparently threw 4 more stitches in   
according to the patient without any  
local anesthesia and she felt every suture. She   
is otherwise in good spirits.  
Denies any itching from the rash on her back.  
  
OBJECTIVE: Vital Signs: Her vitals include a   
temperature of 99.1, pulse 81,  
respirations 16, blood pressure 126/68, oxygen   
is 97%. Lungs: Her lungs are clear.  
Heart: Has a regular rate and rhythm. Abdomen:   
Soft, nontender. Bowel sounds are  
positive. Extremities: No edema. Her dressings   
are intact.  
  
LABORATORIES: There were new labs today.  
  
IMPRESSION:  
1. Stage 4 sacral ulcer status post flap   
surgery.  
2. Intertriginous Candida.  
3. Left calcaneus fracture.  
4. Ulcer of the left foot with fat layer   
exposed.  
5. Acute osteomyelitis of the left calcaneus.  
6. History of paraspinal abscess.  
7. History of psoas muscle abscess.  
8. Epidural abscess, status post-surgery.  
9. Hypertension.  
10. Malnutrition.  
11. Debility.  
12. Constipation.  
13. Type 1 diabetes with neuropathy.  
  
PLAN: Continue the current plan of care with   
physical and occupational therapy and  
rehabilitation and flap management per surgery,   
discharge planning.  
  
_______________________  
Michael Robles MD  
  
/[id number]  
DD: 2021 10:34  
DT: 2021 11:10  
SSI File#:   
[id number]  
Job #: [id number]  
  
Verified/Reviewed by  
  
[facility] PATIENT NAME:   
JAYDEN GUTIREREZ [address] MED[facility] REC #: [id number]  
[place] [id number] ACCOUNT #: [id number]  
ADMIT DATE:  
DISCHARGE DATE:  
ATTENDING PHY: Annalee Eckert DO  
  
  
Electronically signed by Michael Robles at   
2021 11:24 AM ESTdocumented in this   
encounter                               [facility]  
   
                                                    2021 History   
of Present illness   
Narrative                                 
  
  
Formatting of this note might be different from   
the original.  
DATE OF SERVICE: 2021  
  
SUBJECTIVE: She is resting in her bed. She says   
her constipation was relieved by  
the Fleet's enema yesterday. She is in good   
spirits. Finally, the stitches have  
been removed from her back flap. She says things   
are comfortable there and not  
itching. She is not complaining of any pain and   
is looking forward to being released  
in a day or two.  
  
PHYSICAL EXAMINATION: Vital Signs: Include a   
temperature of 98.7, pulse 60,  
respirations 14, blood pressure 126/65. Lungs:   
Her lungs are clear. Cardiac: Her  
heart has got a regular rate and rhythm.   
Abdomen: Soft, nontender. Bowel sounds  
are positive. Extremities: No edema. Wrappings   
are intact.  
  
LABORATORIES: There were no new labs today.  
  
IMPRESSION:  
1. Stage 4 sacral ulcer status post flap   
surgery.  
2. Intertriginous candida.  
3. Left calcaneus fracture.  
4. Ulcer of the left foot with fat layer   
exposed.  
5. Acute osteomyelitis of left calcaneus.  
6. History of paraspinal abscess.  
7. History of psoas muscle abscess.  
8. Epidural abscess status post surgery.  
9. Hypertension.  
10. Debility.  
11. Malnutrition.  
12. Type 1 diabetes with neuropathy.  
13. Constipation.  
  
PLAN: Continue physical and occupational therapy   
rehabilitation, and wound care, and  
discharge planning.  
  
_______________________  
MD ELIO Ventura/[id number]  
DD: 2021 11:14  
DT: 2021 11:47  
SSI File#:   
[id number]  
Job #: [id number]  
  
Verified/Reviewed by  
  
[facility] PATIENT NAME:   
J[address] MEDICAL REC #: [id number]  
[place] [address] ACCOUNT #: [id number]  
ADMIT DATE:  
DISCHARGE DATE:  
ATTENDING PHY: Annalee Eckert DO  
  
  
Electronically signed by Michael Robles at   
2021 9:40 AM ESTdocumented in this   
encounter                               [facility]  
   
                                                    2021 History   
of Present illness   
Narrative                                 
  
  
Formatting of this note might be different from   
the original.  
DATE OF SERVICE: 2021  
  
Patient is doing okay this morning. She is back   
to being constipated again. Is  
requesting an enema so I did write for 1 daily   
p.r.n. and hopefully that will help.  
Plan is now for discharge home on Friday. They   
were supposed to discontinue her  
Bonilla yesterday, but I noticed it was still in.   
She denies any other complaints.  
  
PHYSICAL EXAMINATION: Vitals: Stable. Heart:   
Regular. Lungs: Clear. Abdomen:  
Benign. Extremities: No edema.  
  
IMPRESSION:  
1. Stage IV sacral ulcer status post flap   
surgery.  
2. Intertriginous candida.  
3. Left calcaneal fracture.  
4. Ulcer of the left foot with fat layer   
exposed.  
5. Acute osteomyelitis of left calcaneus.  
6. History of paraspinal abscess.  
7. Psoas muscle abscess.  
8. Epidural abscess status post surgery.  
9. Malnutrition.  
10. Hypertension.  
11. Debility.  
12. Type 1 diabetes with neuropathy.  
13. Constipation.  
  
PLAN: Will get a Fleet enema today. Will   
continue with PT, OT, rehab and wound  
care. Plan is again to discharge home on Friday.  
  
_______________________  
Annalee Eckert DO  
  
LE/[id number]  
DD: 2021 10:40  
DT: 2021 11:09  
SSI File#:   
[id number]  
Job #: [id number]  
  
Verified/Reviewed by  
  
[facility] PATIENT NAME:   
JAYDEN GUTIERREZ [address] MED[facility] REC #: [id number]  
[place] [address] ACCOUNT #: [id number]  
ADMIT DATE:  
DISCHARGE DATE:  
ATTENDING PHY: Annalee Eckert DO  
  
  
Electronically signed by Annalee Eckert at   
2021 1:30 PM ESTdocumented in this   
encounter                               [facility]  
   
                                                    02- History   
of Present illness   
Narrative                                 
  
  
Formatting of this note might be different from   
the original.  
DATE OF SERVICE: 02/15/2021  
  
SUBJECTIVE: Patient is doing well this morning.   
She would like to go home on the  
insulin pen versus the injectable, which is   
perfectly fine. We are going to  
discontinue her Bonilla today. Plan is for   
discharge on Wednesday with home health  
care.  
  
OBJECTIVE: Vitals are stable. White count 11.6,   
hemoglobin 9.7, hematocrit 31.8,  
platelets of 409. Sodium 141, potassium 4.3,   
chloride 108, CO2 of 29, BUN 27,  
creatinine 0.58. Heart is regular. Lungs are   
clear. Abdomen is benign.  
Extremities: No edema.  
  
IMPRESSION:  
1. Stage IV sacral ulcer, status post flap   
surgery.  
2. Candidal rash in the skin folds.  
3. Left calcaneus fracture.  
4. Ulcer of the left foot with fat layer   
exposed.  
5. Acute osteomyelitis of the left calcaneus.  
6. History of paraspinal abscess.  
7. Psoas muscle abscess.  
8. Epidural abscess, status post surgery.  
9. Malnutrition.  
10. Hypertension.  
11. Debility.  
12. Type 1 diabetes with neuropathy.  
  
PLAN: Will continue with current plan of care,   
PT, OT, rehab and wound care. Plan  
is to discharge home on Wednesday with home   
health care.  
  
_______________________  
Annalee Eckert DO  
  
LE/[id number]  
DD: 02/15/2021 10:50  
DT: 02/15/2021 11:44  
SSI File#:   
[id number]  
Job #: [id number]  
  
Verified/Reviewed by  
  
[facility] PATIENT NAME:   
JAYDEN GUTIERREZ [address] MEDICAL REC #: [id number]  
[place] [address] ACCOUNT #: [id number]  
ADMIT DATE:  
DISCHARGE DATE:  
ATTENDING PHY: Annalee Eckert DO  
  
  
Electronically signed by Annalee Eckert at   
2021 10:19 AM ESTdocumented in this   
encounter                               [facility]  
   
                                                    2021 History   
of Present illness   
Narrative                                 
  
  
Formatting of this note might be different from   
the original.  
DATE OF SERVICE: 2021  
  
SUBJECTIVE: Patient is resting this morning. No   
new issues with her overnight.  
  
OBJECTIVE: Blood pressure 112/62, pulse 70,   
respirations 18, temp 98, O2 saturation  
99%. Heart is regular. Lungs are clear. Abdomen   
is benign. Extremities: No  
edema.  
  
IMPRESSION:  
1. Stage IV sacral ulcer, status post flap   
surgery.  
2. Candidiasis rash in the skin folds.  
3. Left calcaneus fracture.  
4. Ulcer of the left foot with fat layer   
exposed.  
5. Acute osteomyelitis of the left calcaneus.  
6. History of methicillin-sensitive   
Staphylococcus aureus bacteremia.  
7. Paraspinal abscess.  
8. History of spinal cord infection.  
9. Psoas muscle abscess.  
10. Epidural abscess, status post surgery.  
11. Malnutrition.  
12. Hypertension.  
13. Debility.  
14. Constipation, improved.  
15. Type 1 diabetes with neuropathy.  
16. Hemorrhoids.  
  
PLAN: Will continue with current plan of care,   
PT, OT, rehab and wound care. Plan  
is to discharge home on Tuesday.  
  
_______________________  
DO SOFI Cruz/[id number]  
DD: 2021 10:09  
DT: 2021 11:30  
SSI File#:   
[id number]  
Job #: [id number]  
  
Verified/Reviewed by  
  
[facility] PATIENT NAME:   
JAYDEN GUTIERREZ [address] MEDICAL REC #: [id number]  
[place] [address] ACCOUNT #: [id number]  
ADMIT DATE:  
DISCHARGE DATE:  
ATTENDING PHY: Annalee Eckert DO  
  
  
Electronically signed by Annalee Eckert at   
02/15/2021 10:53 AM ESTdocumented in this   
encounter                               [facility]  
   
                                                    02- History   
of Present illness   
Narrative                                 
  
  
Formatting of this note might be different from   
the original.  
DATE OF SERVICE: 02/10/2021  
  
SUBJECTIVE: She is resting in her bed, hoping to   
go home soon. She says she has  
been in hospitals for 5 months. She says that   
her back is not itching, and she  
thinks her wound is healing well.  
  
OBJECTIVE: Vital Signs: Include a temperature of   
97.6, pulse 74, respires 18, blood  
pressure 110/60. Lungs: Clear but diminished.   
Heart: Regular rate and rhythm.  
Abdomen: Soft, nontender. Benign. Extremities:   
No edema.  
  
LABORATORY RESULTS: There were no new labs   
today.  
  
IMPRESSION:  
1. Stage IV sacral ulcer status post flap   
surgery.  
2. Surrounding rash consistent with what appears   
to be a fungal infection.  
3. Left calcaneus fracture.  
4. Ulcer of the left foot with fat layer   
exposed.  
5. Acute osteomyelitis of the left calcaneus.  
6. History of methicillin-sensitive   
Staphylococcus aureus bacteremia.  
7. Paraspinal abscess.  
8. History of spinal cord infection.  
9. Psoas muscle abscess.  
10. Epidural abscess status post surgery.  
11. Malnutrition.  
12. Hypertension.  
13. Debility.  
14. Constipation, resolved.  
15. Type 1 diabetes with neuropathy.  
16. Hemorrhoids.  
  
PLAN: Continue current plan of care with   
physical and occupational therapy, rehab,  
and wound care.  
  
_______________________  
MD ELIO Ventura/[id number]  
DD: 02/10/2021 10:46  
DT: 02/10/2021 11:15  
SSI File#:   
[id number]  
Job #: [id number]  
  
Verified/Reviewed by  
  
[facility] PATIENT NAME:   
JAYDEN GUTIERREZ [address] MEDICAL REC #: [id number]  
[place] [address] ACCOUNT #: [id number]  
ADMIT DATE:  
DISCHARGE DATE:  
ATTENDING PHY: Annalee Eckert DO  
  
  
Electronically signed by Michael Robles at   
2021 9:59 AM ESTdocumented in this   
encounter                               [facility]  
   
                                                    2021 History   
of Present illness   
Narrative                                 
  
  
Formatting of this note might be different from   
the original.  
DATE OF SERVICE: 2021  
  
SUBJECTIVE: Patient is doing well this morning.   
She said she got up to the side of  
the bed and even used the bedside commode   
yesterday, so she is making progress. I  
told her if by the end of the week she has   
continued to improve, we will possibly get  
her downstairs to rehab.  
  
OBJECTIVE: Vital Signs: Stable. Heart: Regular.   
Lungs: Diminished at the bases.  
Abdomen: Benign. Extremities: No edema.  
  
LABORATORY DATA: Her white count is 12.5,   
hemoglobin 9.2, hematocrit 30.3, platelets  
of 383.  
  
IMPRESSION:  
1. Stage IV sacral ulcer, status post flap   
surgery.  
2. Surrounding rash, consistent with what   
appears to be a fungal infection.  
3. Left calcaneus fracture.  
4. Ulcer of the left foot, with fat layer   
exposed.  
5. Acute osteomyelitis of the left calcaneus.  
6. History of MSSA bacteremia.  
7. Paraspinal abscess.  
8. History of spinal cord infection.  
9. Psoas muscle abscess.  
10. Epidural abscess, status post surgery.  
11. Malnutrition.  
12. Debility.  
13. Hypertension.  
14. Type 1 diabetes with neuropathy.  
15. Constipation.  
16. Hemorrhoids.  
  
PLAN: Will continue with current plan of care,   
PT, OT, rehab and wound care.  
  
_______________________  
DO SOFI Cruz/[id number]  
DD: 2021 12:55  
DT: 2021 13:57  
SSI File#:   
[id number]  
Job #: [id number]  
  
Verified/Reviewed by  
  
[facility] PATIENT NAME:   
JAYDEN GUTIERREZ [address] MED[facility] REC #: [id number]  
[place] [address] ACCOUNT #: [id number]  
ADMIT DATE:  
DISCHARGE DATE:  
ATTENDING PHY: Stalin Menjivar MD  
  
  
Electronically signed by Annalee Eckert at   
2021 1:27 PM ESTdocumented in this   
encounter                               [facility]  
   
                                                    2021 History   
of Present illness   
Narrative                                 
  
  
Formatting of this note might be different from   
the original.  
DATE OF SERVICE: 2021  
  
SUBJECTIVE: She is resting very soundly in her   
room right now, in no distress.  
  
OBJECTIVE: Vital signs include a temperature of   
98.3, pulse 76, respiratory rate is  
16, blood pressure 118/60, oxygen is 99%. Her   
lungs are clear but diminished. Heart  
has got a regular rate and rhythm. Abdomen is   
soft, nontender. Bowel sounds are  
positive. Extremities: No edema.  
  
LABORATORIES: There were no new laboratories   
today.  
  
IMPRESSION:  
1. Stage 4 sacral ulcer disease, status post   
flap surgery.  
2. Surrounding rash consistent with what appears   
to be a fungal infection.  
3. Left calcaneus fracture.  
4. Ulcer of the left foot with fat layer   
exposed.  
5. Acute osteomyelitis of the left calcaneus.  
6. History of methicillin-sensitive   
Staphylococcus aureus bacteremia.  
7. Paraspinal abscess.  
8. Psoas muscle abscess.  
9. History of spinal cord infection  
10. Epidural abscess, status post surgery.  
11. Malnutrition.  
12. Hypertension.  
13. Debility.  
14. Type 1 diabetes with neuropathy.  
15. Constipation, resolved.  
16. Hemorrhoids.  
  
PLAN: Continue current plan of care with   
therapies, rehabilitation, and wound care.  
  
_______________________  
MD ELIO Ventura/[id number]  
DD: 2021 10:49  
DT: 2021 11:35  
SSI File#:   
[id number]  
Job #: [id number]  
  
Verified/Reviewed by  
  
[facility] PATIENT NAME:   
JAYDEN GUTIERREZ [facility] [address] [address] MEDICAL REC #: [id number]  
[place] [address] ACCOUNT #: [id number]  
ADMIT DATE:  
DISCHARGE DATE:  
ATTENDING PHY: Stalin Menjivar MD  
  
  
Electronically signed by Michael Robles at   
2021 8:59 AM ESTdocumented in this   
encounter                               [facility]  
   
                                                    2021 History   
of Present illness   
Narrative                                 
  
  
Formatting of this note might be different from   
the original.  
DATE OF SERVICE: 2021  
  
SUBJECTIVE: She is resting in her bed in no   
distress. She has no complaints at this  
time.  
  
OBJECTIVE: Vitals: temperature 98.3, pulse 72,   
respiratory rate 18, blood pressure  
18, blood pressure 101/66, oxygen saturation   
100%. Lungs: Clear, but diminished at  
the bases. Heart: Regular rate and rhythm.   
Abdomen: Soft, nontender, benign.  
Extremities: No edema.  
  
LABORATORIES: There were no new labs today.  
  
IMPRESSION:  
1. Stage 4 sacral ulcer status post flap   
surgery.  
2. Surrounding rash consistent with what appears   
to be a fungal infection.  
3. Left calcaneus fracture.  
4. Ulcer of the left foot with fat layer   
exposed.  
5. Acute osteomyelitis of the left calcaneus.  
6. History of methicillin-sensitive   
Staphylococcus aureus bacteremia.  
7. Paraspinal abscess.  
8. History of spinal cord infection.  
9. Psoas muscle abscess.  
10. Epidural abscess status post surgery.  
11. Malnutrition.  
12. Debility.  
13. Hypertension.  
14. Type 1 diabetes with neuropathy.  
15. Constipation, resolved.  
16. Hemorrhoids.  
  
PLAN: Continue current plan of care. _____   
rehabilitation and wound care.  
  
_______________________  
MD LEIO Ventura/[id number]  
DD: 2021 09:46  
DT: 2021 11:02  
SSI File#:   
[id number]  
Job #: [id number]  
  
Verified/Reviewed by  
  
[facility] PATIENT NAME:   
JAYDEN GUTIERREZ [facility] [address] [address] MEDICAL REC #: [id number]  
[place] [address] ACCOUNT #: [id number]  
ADMIT DATE:  
DISCHARGE DATE:  
ATTENDING PHY: Annalee Eckert DO  
  
  
Electronically signed by Michael Robles at   
2021 9:31 AM ESTdocumented in this   
encounter                               [facility]  
   
                                                    2021 History   
of Present illness   
Narrative                                 
  
  
Formatting of this note might be different from   
the original.  
DATE OF SERVICE: 2021  
  
SUBJECTIVE: Patient is doing well this morning.   
No new issues overnight.  
  
OBJECTIVE: Blood pressure 111/65, pulse 67,   
respirations 18, O2 saturation 99%.  
Heart is regular. Lungs: Diminished in the   
bases. Abdomen is benign. Extremities:  
No edema.  
  
Her white count is 12, hemoglobin 9.0,   
hematocrit 29.9, platelets of 381. Sodium was  
142, potassium 4.4, chloride 108, CO2 of 28, BUN   
28, creatinine 0.58.  
  
IMPRESSION:  
1. Stage 4 sacral ulcer status post flap   
surgery.  
2. Surrounding rash consistent with what appears   
to be a fungal infection.  
3. Left calcaneus fracture.  
4. Ulcer to the left foot with fat layer   
exposed.  
5. Acute osteomyelitis of the left calcaneus.  
6. History of methicillin-susceptible   
Staphylococcus aureus bacteremia.  
7. Paraspinal abscess.  
8. History of spinal cord infection.  
9. Psoas muscle abscess.  
10. Epidural abscess status post surgery.  
11. Malnutrition.  
12. Debility.  
13. Hypertension.  
14. Type 1 diabetes with neuropathy.  
15. Hemorrhoids.  
16. Constipation.  
  
PLAN: We will continue with current plan of   
care, PT, OT, and wound care.  
  
_______________________  
DO SOFI Cruz/[id number]  
DD: 2021 14:52  
DT: 2021 18:17  
SSI File#:   
[id number]  
Job #: [id number]  
  
Verified/Reviewed by  
  
[facility] PATIENT NAME:   
JAYDEN GUTIERREZ  
[address] MEDICAL REC #: [id number]  
[place] [address] ACCOUNT #: [id number]  
ADMIT DATE:  
DISCHARGE DATE:  
ATTENDING PHY: Stalin Menjivar MD  
  
  
Electronically signed by Annalee Eckert at   
2021 9:27 AM ESTdocumented in this   
encounter                               [facility]  
   
                                                    2021 History   
of Present illness   
Narrative                                 
  
  
Formatting of this note might be different from   
the original.  
DATE OF SERVICE: 2021  
  
SUBJECTIVE: She is resting in her bed. She still   
has a bit of a rash around her  
flap but it is apparently better according to   
the nurses. She feels well.  
  
PHYSICAL EXAMINATION: Vitals: Temperature of   
97.5, respirations 26, pulse is 67,  
blood pressure is 106/55. Lungs: Diminished at   
the bases. Heart: Regular rate and  
rhythm. Abdomen: Soft, nontender. Extremities:   
No edema.  
  
LABORATORIES: There were no new labs today.  
  
IMPRESSION:  
1. Stage 4 sacral ulcer, status post flap   
surgery.  
2. Surrounding rash, consistent with what   
appears to be a fungal infection.  
3. Left calcaneus fracture.  
4. Ulcer to the left foot with fat layer   
exposed.  
5. Acute osteomyelitis of the left calcaneus.  
6. History of methicillin-sensitive   
Staphylococcus aureus bacteremia.  
7. Paraspinal abscess.  
8. History of spinal cord infection.  
9. Psoas muscle abscess.  
10. Epidural abscess, status post surgery.  
11. Malnutrition.  
12. Debility.  
13. Hypertension.  
14. Type 1 diabetes with neuropathy.  
15. Hemorrhoids.  
16. Constipation, resolved.  
  
PLAN: Continue current plan of care with   
physical and occupational therapy, rehab,  
and wound care.  
  
_______________________  
MD ELIO Ventura/[id number]  
DD: 2021 10:15  
DT: 2021 11:46  
SSI File#:   
[id number]  
Job #: [id number]  
  
Verified/Reviewed by  
  
[facility] PATIENT NAME:   
JAYDEN GUTIERREZ  
[address] MEDICAL REC #: [id number]  
[place] [address] ACCOUNT #: [id number]  
ADMIT DATE:  
DISCHARGE DATE:  
ATTENDING PHY: Annalee Eckert DO  
  
  
Electronically signed by Michael Robles at   
2021 8:34 AM ESTdocumented in this   
encounter                               [facility]  
   
                                                    2021 History   
of Present illness   
Narrative                                 
  
  
Formatting of this note might be different from   
the original.  
DATE OF SERVICE: 2021  
  
SUBJECTIVE: The patient is doing okay today.   
Still no bowel movement. She is  
asking if I can order her a suppository daily,   
so I did do that.  
  
OBJECTIVE: Vital Signs: Her vitals are stable.   
Heart: Regular. Lungs: Clear.  
Abdomen: Benign. Extremities: No edema.  
  
IMPRESSION:  
1. Stage IV sacral ulcer. Patient is status post   
flap surgery.  
2. Surrounding rash consistent with what appears   
to be a fungal infection.  
3. Left calcaneus fracture.  
4. Ulcer of the left foot with fat layer   
exposed.  
5. Acute osteomyelitis of the left calcaneus.  
6. History of methicillin-sensitive   
Staphylococcus aureus bacteremia.  
7. Paraspinal abscess.  
8. History of spinal cord infection.  
9. Psoas muscle abscess.  
10. Epidural abscess status post surgery.  
11. Constipation. We will add the Dulcolax   
suppository daily and also change her  
hemorrhoid cream to Anusol-HC.  
12. Malnutrition  
13. Debility.  
14. Hypertension.  
15. Type 1 diabetes with neuropathy.  
16. Hemorrhoids.  
  
PLAN: Will continue with current plan of care,   
PT, OT, rehab, and wound care, and  
did make those changes in her bowel regimen   
also.  
  
_______________________  
Annalee EckertDO  
  
SOFI/[id number]  
DD: 2021 12:49  
DT: 2021 15:47  
SSI File#:   
[id number]  
Job #: [id number]  
  
Verified/Reviewed by  
  
[facility] PATIENT NAME:   
JAYDEN GUTIERREZ [facility] [address] MEDICAL REC #: [id number]  
[place] [address] ACCOUNT #: [id number]  
ADMIT DATE:  
DISCHARGE DATE:  
ATTENDING PHY: Stalin Menjivar MD  
  
  
Electronically signed by Annalee Eckert at   
2021 2:16 PM ESTdocumented in this   
encounter                               [facility]  
   
                                                    2021 History   
of Present illness   
Narrative                                 
  
  
Formatting of this note might be different from   
the original.  
DATE OF SERVICE: 2021  
  
SUBJECTIVE: Patient is doing well today. She   
states her surgery went well, and the  
wound seems to be healing over well.  
  
OBJECTIVE: Her blood pressure is 128/68, pulse   
67, respirations 18, temp 97.2, O2  
saturation 97%. Heart: Regular. Lungs:   
Diminished at the bases. Abdomen:  
Benign. Extremities: No edema.  
  
White count of 10.6, hemoglobin 8.4, hematocrit   
28.1, platelets of 296. Sodium is  
140, potassium 4.1, chloride 108, CO2 of 29, BUN   
18, creatinine 0.54.  
  
IMPRESSION:  
1. Stage 4 sacral ulcer. Patient is status post   
flap surgery last Friday.  
2. Surrounding rash consistent with what   
appeared to be a fungal infection.  
3. Left calcaneus fracture.  
4. Ulcer to the left foot with fat layer   
exposed.  
5. Acute osteomyelitis of the left calcaneus.  
6. History of methicillin-sensitive   
Staphylococcus aureus bacteremia.  
7. Paraspinal abscess.  
8. History of spinal cord infection.  
9. Psoas muscle abscess.  
10. Epidural abscess status post surgery.  
11. Constipation, improving.  
12. Malnutrition.  
13. Debility.  
14. Hypertension.  
15. Type 1 diabetes with neuropathy.  
16. Hemorrhoids.  
  
PLAN: We will continue with current plan of   
care, PT, OT, rehab, and wound care.  
  
_______________________  
Annalee Eckert DO  
  
/[id number]  
DD: 2021 10:36  
DT: 2021 13:29  
SSI File#:   
[id number]  
Job #: [id number]  
  
Verified/Reviewed by  
  
[facility] PATIENT NAME:   
JAYDEN GUTIERREZ [address] MEDICAL REC #: [id number]  
[place] [address] ACCOUNT #: [id number]  
ADMIT DATE:  
DISCHARGE DATE:  
ATTENDING PHY: Annalee Eckert DO  
  
  
Electronically signed by Annalee Eckert at   
2021 11:59 AM ESTdocumented in this   
encounter                               [facility]  
   
                                                    2021 History   
of Present illness   
Narrative                                 
  
  
Formatting of this note might be different from   
the original.  
DATE OF SERVICE: 2021  
  
I came to see patient today, but unfortunately   
she remains off the floor getting her  
procedure.  
  
_______________________  
Michael Robles MD  
  
/[id number]  
DD: 2021 10:20  
DT: 2021 11:18  
SSI File#:   
[id number]  
Job #: [id number]  
  
Verified/Reviewed by  
  
[facility] PATIENT NAME:   
JAYDEN GUTIERREZ [address] MEDICAL REC #: [id number]  
[place] [id number] ACCOUNT #: [id number]  
ADMIT DATE:  
DISCHARGE DATE:  
ATTENDING PHY: Stalin Menjivar MD  
  
  
Electronically signed by Michael Robles at   
2021 8:43 AM ESTdocumented in this   
encounter                               [facility]  
   
                                                    2021 History   
of Present illness   
Narrative                                 
  
  
Formatting of this note might be different from   
the original.  
DATE OF SERVICE: 2021  
  
SUBJECTIVE: Patient is doing well today. She is   
scheduled for her surgery tomorrow.  
I did adjust her insulin for this evening so   
that she is only getting half her  
Lantus since she is going to be NPO.  
  
OBJECTIVE: Vital Signs: Blood pressure is   
112/62, pulse 70, respiratory rate 20,  
temp 97.6, O2 saturation 99%. Heart: Regular.   
Lungs: Clear. Abdomen: Benign.  
Extremities: No edema.  
  
LABORATORY: White count of 12.2, hemoglobin   
10.5, hematocrit 34.8, platelets of  
347,000. Sodium is 141, potassium 4.5, chloride   
101.5, CO2 of 31, BUN 28, creatinine  
0.62.  
  
IMPRESSION:  
1. Stage IV sacral ulcer. Patient to go to   
surgery tomorrow for a flap.  
2. Surrounding rash consistent with what   
appeared to be a fungal infection.  
3. Left calcaneus fracture.  
4. Ulcer to the left foot with fat layer   
exposed.  
5. Acute osteomyelitis of the left calcaneus.  
6. History of methicillin-sensitive   
Staphylococcus aureus bacteremia.  
7. Paraspinal abscess.  
8. History of spinal cord infection.  
9. Psoas muscle abscess.  
10. Epidural abscess status post surgery.  
11. Constipation, improving.  
12. Malnutrition.  
13. Debility.  
14. Hypertension.  
15. Type 1 diabetes with neuropathy.  
16. Hemorrhoids.  
  
PLAN: Will continue to hold her Plavix and   
aspirin. I did adjust her insulin for  
this evening and she is currently on the   
nystatin cream for the yeast infection.  
  
_______________________  
DO SOFI Cruz/[id number]  
DD: 2021 12:07  
DT: 2021 15:11  
SSI File#:   
[id number]  
Job #: [id number]  
  
Verified/Reviewed by  
  
[facility] PATIENT NAME:   
JAYDEN GUTIERREZ [facility] [address] MEDICAL REC #: [id number]  
[place] [address] ACCOUNT #: [id number]  
ADMIT DATE:  
DISCHARGE DATE:  
ATTENDING PHY: Annalee Eckert DO  
  
  
Electronically signed by Annalee Eckert at   
2021 9:56 AM ESTdocumented in this   
encounter                               [facility]  
   
                                                    2021 History   
of Present illness   
Narrative                                 
  
  
Formatting of this note might be different from   
the original.  
DATE OF SERVICE: 2021  
  
SUBJECTIVE: She is resting in her bed. The nurse   
mentions that she has an area  
surrounding her decubitus ulcer where the skin   
is mottled or red and quite itchy.  
The Calmoseptine cream does not appear to be   
helping at all. Otherwise, she is  
feeling fine and denies any other problems but   
admits to the itch.  
  
PHYSICAL EXAMINATION: Vitals: Include a   
temperature of 97.4, pulse 65, respirations  
11, blood pressure is 128/70, oxygen is 99%.   
Lungs: Clear. Heart: Regular rate  
and rhythm. Abdomen: Soft, nontender, benign.   
Extremities: No edema. Skin: On  
her buttocks, there is a widespread area of   
reddish rash surrounding her ulcer.  
  
IMPRESSION:  
1. Stage 4 ulcer on the sacrum.  
2. Surrounding rash consistent with what   
appeared to be a fungal infection.  
3. Left calcaneus fracture.  
4. Ulcer to the left foot with fat layer   
exposed.  
5. Acute osteomyelitis of the left calcaneus.  
6. History of methicillin-sensitive   
Staphylococcus aureus bacteremia.  
7. Paraspinal abscess.  
8. History of spinal cord infection.  
9. Psoas muscle abscess.  
10. Epidural abscess, status post surgery.  
11. Constipation, improved.  
12. Malnutrition.  
13. Debility.  
14. Hypertension.  
15. Type 1 diabetes with neuropathy.  
16. Hemorrhoids.  
  
PLAN: Continue to hold Plavix and aspirin in   
anticipation of her flap procedure this  
Friday. In the meantime, we will add nystatin   
cream to the area twice daily and  
continue physical, occupational, rehab, and   
wound care.  
  
_______________________  
Michael Robles MD  
  
RG/[id number]  
DD: 2021 10:42  
DT: 2021 11:46  
SSI File#:   
[id number]  
Job #: [id number]  
  
Verified/Reviewed by  
  
[facility] PATIENT NAME:   
JAYDEN GUTIERREZ [facility] [address] MEDICAL REC #: [id number]  
[place] [address] ACCOUNT #: [id number]  
ADMIT DATE:  
DISCHARGE DATE:  
ATTENDING PHY: Annalee Eckert DO  
  
  
Electronically signed by Michael Robles at   
2021 8:49 AM ESTdocumented in this   
encounter                               [facility]  
   
                                                    2021 History   
of Present illness   
Narrative                                 
  
  
Formatting of this note might be different from   
the original.  
DATE OF SERVICE: 2021  
  
SUBJECTIVE: Patient is doing well today. She has   
no complaints. I did hold her  
aspirin today as well for her procedure on   
Friday.  
  
OBJECTIVE: Blood pressure is 134/67, pulse 71,   
respirations 11, temperature 98.3, O2  
saturation 98%. Heart is regular. Lungs are   
clear. Abdomen is benign.  
Extremities, no edema.  
  
IMPRESSION:  
1. Stage 4 sacral ulcer.  
2. Left calcaneus fracture  
3. Ulcer to the left foot with fat layer   
exposed.  
4. Acute osteomyelitis of the left calcaneus.  
5. History of methicillin-sensitive   
staphylococcus aureus bacteremia.  
6. Paraspinal abscess.  
7. History of spinal cord infection.  
8. Psoas muscle abscess.  
9. Epidural abscess status post-surgery.  
10. Malnutrition.  
11. Constipation, improved.  
12. Debility.  
13. Hypertension.  
14. Type 1 diabetes with neuropathy.  
15. Hemorrhoids.  
  
PLAN: We will continue to hold her Plavix and   
aspirin. Patient going to have a flap  
procedure on Friday. We will continue with   
PT/OT/rehabilitation and wound care.  
  
_______________________  
DO SOFI Cruz/[id number]  
DD: 2021 12:41  
DT: 2021 13:47  
SSI File#:   
[id number]  
Job #: [id number]  
  
Verified/Reviewed by  
  
[facility] PATIENT NAME:   
JAYDEN GUTIERREZ [facility] [address] MEDICAL REC #: [id number]  
[place] [address] ACCOUNT #: [id number]  
ADMIT DATE:  
DISCHARGE DATE:  
ATTENDING PHY: Annalee Eckert DO  
  
  
Electronically signed by Annalee Eckert at   
2021 11:19 AM ESTdocumented in this   
encounter                               [facility]  
   
                                                    2021 History   
of Present illness   
Narrative                                 
  
  
Formatting of this note might be different from   
the original.  
DATE OF SERVICE: 2021  
  
SUBJECTIVE: Patient is doing okay today. She is   
sitting up eating her lunch. She  
states her bowels are still difficult to move at   
times but she just started on the  
MiraLAX daily so hopefully that will help.  
  
OBJECTIVE: Vital Signs: Blood pressure is   
132/56, pulse 81, respirations 18,  
temperature 98.1. Heart: Regular. Lungs: Clear.   
Abdomen: Benign. Extremities:  
No edema.  
  
LABORATORY DATA: Her white count today is 14.2,   
hemoglobin 10.9, hematocrit 34.4,  
platelets 360. Sodium 139, potassium 3.8,   
chloride 103, CO2 of 30, BUN 30,  
creatinine 0.49.  
  
IMPRESSION:  
1. Stage 4 sacral ulcer.  
2. Left calcaneus fracture.  
3. Ulcer of the left foot with fat layer   
exposed.  
4. Acute osteomyelitis of the left calcaneus.  
5. History of methicillin-sensitive   
Staphylococcus aureus bacteremia.  
6. Paraspinal abscess.  
7. History of spinal cord infections.  
8. Psoas muscle abscess.  
9. Epidural abscess status post surgery.  
10. Malnutrition.  
11. Debility.  
12. Constipation.  
13. Hypertension.  
14. Type 1 diabetes with neuropathy.  
  
PLAN: Will continue with PT, OT, wound care and   
rehab and will continue with the  
MiraLAX daily for her bowels.  
  
_______________________  
DO SOFI Cruz/[id number]  
DD: 2021 13:17  
DT: 2021 16:55  
SSI File#:   
[id number]  
Job #: [id number]  
  
Verified/Reviewed by  
  
[facility] PATIENT NAME:   
JAYDEN GUTIERREZ [facility] [address] MEDICAL REC #: [id number]  
[place] [address] ACCOUNT #: [id number]  
ADMIT DATE:  
DISCHARGE DATE:  
ATTENDING PHY: Annalee Eckert DO  
  
  
Electronically signed by Annalee Eckert at   
2021 10:48 AM ESTdocumented in this   
encounter                               [facility]  
   
                                                    2021 History   
of Present illness   
Narrative                                 
  
  
Formatting of this note might be different from   
the original.  
DATE OF SERVICE: 2021  
  
SUBJECTIVE: She is resting in her bed. She had   
some bowel movements yesterday, and  
now she has got a small hemorrhoid which is   
annoying her.  
  
OBJECTIVE: Vital Signs: Include a temperature of   
98.0, pulse 65, respirations 20,  
blood pressure 100/60, oxygen is 96%. Lungs:   
Clear but diminished. Heart: Regular  
rate and rhythm. Abdomen: Soft, nontender. Bowel   
sounds positive. Extremities:  
No edema.  
  
LABORATORIES: No new labs today.  
  
IMPRESSION:  
1. Stage 4 sacral ulcer.  
2. Left calcaneus.  
3. Ulcer of the left foot with fat layer   
exposed.  
4. Acute osteomyelitis on left calcaneus.  
5. History of methicillin-sensitive   
Staphylococcus aureus bacteremia.  
6. Paraspinal abscess.  
7. History of spinal cord infections.  
8. Psoas muscle abscess.  
9. Epidural abscess, status post surgery.  
10. Malnutrition.  
11. Debility.  
12. Constipation.  
13. Hypertension.  
14. Type 1 diabetes with neuropathy.  
  
PLAN: Continue physical and occupational   
therapy, rehab, wound care, bowel support  
and hemorrhoidal cream.  
  
_______________________  
Michael Robles MD  
  
RG/[id number]  
DD: 2021 11:14  
DT: 2021 12:10  
SSI File#:   
[id number]  
Job #: [id number]  
  
Verified/Reviewed by  
  
[facility] PATIENT NAME:   
JAYDEN GUTIERREZ [facility] [address] [address] MEDICAL REC #: [id number]  
[place] [address] ACCOUNT #: [id number]  
ADMIT DATE:  
DISCHARGE DATE:  
ATTENDING PHY: Annalee Eckert DO  
  
  
Electronically signed by Michael Robles at   
2021 10:07 AM ESTdocumented in this   
encounter                               [facility]  
   
                                                    2021 History   
of Present illness   
Narrative                                 
  
  
Formatting of this note might be different from   
the original.  
DATE OF SERVICE: 2021  
  
SUBJECTIVE: Patient still has not had a bowel   
movement. She had an x-ray, which  
showed fecal impaction in the rectal area. We   
are going to try another Fleets enema  
today. If that does not work, she may need a   
manual disimpaction.  
  
OBJECTIVE: Vital Signs: Blood pressure is   
118/62, pulse 78, respires 20,  
temperature 98. Heart: Regular. Lungs:   
Diminished throughout. Abdomen: Benign.  
Extremities: No edema.  
  
LABORATORY DATA: White count of 19.6, hemoglobin   
12.3, hematocrit 38.6, platelets of  
435. Sodium is 137, potassium 4.0, chloride 100,   
CO2 of 30. BUN 31, creatinine  
0.53.  
  
IMPRESSION:  
1. Stage IV sacral ulcer.  
2. Left calcaneus fracture.  
3. Ulcer of the left foot with fat layer   
exposed.  
4. Acute osteomyelitis of the left calcaneus.  
5. History of methicillin-sensitive   
Staphylococcus aureus bacteremia.  
6. Paraspinal abscess.  
7. History of spinal cord compression and   
infections.  
8. Psoas muscle abscess.  
9. Epidural abscess status post surgery.  
10. Debility.  
11. Malnutrition.  
12. Hypertension.  
13. Type 1 diabetes with neuropathy.  
14. Constipation with fecal impaction.  
  
PLAN: We will continue with PT, OT, rehab, and   
wound care. We will try a Fleets  
enema today, and if that does not work may need   
a manual fecal disimpaction.  
  
_______________________  
DO SOFI Cruz/[id number]  
DD: 2021 11:25  
DT: 2021 12:13  
SSI File#:   
[id number]  
Job #: [id number]  
  
Verified/Reviewed by  
  
[facility] PATIENT NAME:   
JAYDEN GUTIERREZ  
[address] [facility] #: [id number]  
[place] [address] ACCOUNT #: [id number]  
ADMIT DATE:  
DISCHARGE DATE:  
ATTENDING PHY: Annalee Eckert DO  
  
  
Electronically signed by Annalee Eckert at   
2021 1:28 PM ESTdocumented in this   
encounter                               [facility]  
   
                                                    2021 History   
of Present illness   
Narrative                                 
  
  
Formatting of this note might be different from   
the original.  
DATE OF SERVICE: 2021  
  
SUBJECTIVE: She had her room changed because her   
roommate was noisy last night. She  
is in pretty good spirits today. Had no   
complaints, but the nurse tells me that she  
told them that she was having problems with   
constipation and requested either an  
enema or a rectal suppository.  
  
OBJECTIVE: Vitals include temperature of 97.7,   
pulse 66, respiratory rate is 18,  
blood pressure 130/60. Her lungs are clear.   
Heart: Regular rate and rhythm.  
Abdomen is soft, nontender. Bowel sounds are   
positive. Extremities: No edema.  
Dressings are clean, dry, and intact.  
  
LABORATORIES: There were no new labs today.  
  
IMPRESSION:  
1. Sacral ulcer, stage 4.  
2. Left calcaneus fracture.  
3. Ulcer of the left foot with fat layer   
exposed.  
4. Acute osteomyelitis of the left calcaneus.  
5. History of methicillin-resistant   
Staphylococcus aureus bacteremia.  
6. Paraspinal abscess.  
7. History of spinal cord compression and   
infection.  
8. Psoas muscle abscess.  
9. Epidural abscess status post surgery.  
10. Debility.  
11. Malnutrition.  
12. Hypertension.  
13. Type 1 diabetes with neuropathy.  
14. Constipation.  
  
PLAN: Continue no antibiotics, physical and   
occupational therapy, wound care, and we  
will offer her a Dulcolax suppository to see if   
that helps with constipation.  
  
_______________________  
Michael Robles MD  
  
RG/[id number]  
DD: 2021 11:16  
DT: 2021 14:31  
SSI File#:   
[id number]  
Job #: [id number]  
  
Verified/Reviewed by  
  
[facility] PATIENT NAME:   
JAYDEN GUTIERREZ [address] [facility] #: [id number]  
[place] [address] ACCOUNT #: [id number]  
ADMIT DATE:  
DISCHARGE DATE:  
ATTENDING PHY: Annalee Eckert DO  
  
  
Electronically signed by Michael Robles at   
2021 9:56 AM ESTdocumented in this   
encounter                               [facility]  
   
                                                    2021 History   
of Present illness   
Narrative                                 
  
  
Formatting of this note might be different from   
the original.  
DATE OF SERVICE: 2021  
  
SUBJECTIVE: She is sitting on the edge of her   
bed. She is in no distress. She  
slept well. The food is good and comes on time.   
She has no aches, pains or  
complaints at this time.  
  
PHYSICAL EXAMINATION: Vital Signs: Temperature   
97.2, pulse 69, respirations 18,  
blood pressure 130/54, oxygen 94%. Her lungs are   
clear. Heart: She has a regular  
rate and rhythm. Abdomen is soft and nontender.   
Bowel sounds are positive.  
Extremities: No edema. Dressings are clean and   
intact.  
  
LABORATORIES: There were no new labs today.  
  
IMPRESSION:  
1. Sacral ulcer, stage 4.  
2. Left calcaneus fracture.  
3. Ulcer of the left foot with fat layer   
exposed.  
4. Acute osteomyelitis of the left calcaneus.  
5. History of methicillin-resistant   
Staphylococcus aureus bacteremia.  
6. Paraspinal abscess.  
7. History of spinal cord compression and   
infection.  
8. Psoas muscle abscess.  
9. Epidural abscess status post surgery.  
10. Debility.  
11. Malnutrition.  
12. Hypertension.  
13. Type 1 diabetes with neuropathy.  
  
PLAN: Continue antibiotics, physical and   
occupational therapy, wound care.  
  
_______________________  
MD ELIO Ventura/[id number]  
DD: 2021 11:34  
DT: 2021 12:04  
SSI File#:   
[id number]  
Job #: [id number]  
  
Verified/Reviewed by  
  
[facility] PATIENT NAME:   
JAYDEN GUTIERREZ [address] [facility] #: [id number]  
[place] [address] ACCOUNT #: [id number]  
ADMIT DATE:  
DISCHARGE DATE:  
ATTENDING PHY: Annalee Eckert DO  
  
  
Electronically signed by Michael Robles at   
2021 9:44 AM ESTdocumented in this   
encounter                               [facility]  
   
                                                    01- History   
of Present illness   
Narrative                                 
  
  
Formatting of this note might be different from   
the original.  
DATE OF SERVICE: 01/15/2021  
  
SUBJECTIVE: She is resting and sitting up on the   
side of her bed. She is in no  
distress. She has no complaints or issues. She   
has no problems with food quality or  
delivery time, but she says sometimes it is   
potentially a half hour late.  
  
OBJECTIVE: Vital Signs: Include a temperature of   
98.1, pulse 68, respiratory rate  
is 20, blood pressure 128/60, oxygen saturation   
is 98%. Lungs: Diminished at the  
bases. Heart: Regular rate and rhythm. Abdomen:   
Soft, nontender, benign.  
Extremities: No edema. Dressings are clean and   
intact.  
  
LABORATORIES: There are no new labs today.  
  
IMPRESSION:  
1. Sacral ulcer, stage 4.  
2. Left calcaneus fracture.  
3. Ulcer of the left foot with fat layer   
exposed.  
4. Acute osteomyelitis of the left calcaneus.  
5. History of methicillin-resistant   
Staphylococcus aureus bacteremia.  
6. Paraspinal abscess.  
7. History of spinal cord compression and   
infection.  
8. Psoas muscle abscess.  
9. Epidural abscess, status post surgery.  
10. Debility.  
11. Hypertension.  
12. Malnutrition.  
13. Type 1 diabetes with neuropathy.  
  
PLAN: Continue with antibiotics, physical and   
occupational therapy, wound care and I  
will renew her Ultram today.  
  
_______________________  
MD ELIO Vetnura/[id number]  
DD: 01/15/2021 12:53  
DT: 01/15/2021 13:29  
SSI File#:   
[id number]  
Job #: [id number]  
  
Verified/Reviewed by  
  
[facility] PATIENT NAME:   
JAYDEN GUTIERREZ  
[address] [facility] #: [id number]  
[place] [address] ACCOUNT #: [id number]  
ADMIT DATE:  
DISCHARGE DATE:  
ATTENDING PHY: Annalee Eckert DO  
  
  
Electronically signed by Michael Robles at   
01/15/2021 1:46 PM ESTdocumented in this   
encounter                               [facility]  
   
                                                    2021 History   
of Present illness   
Narrative                                 
  
  
Formatting of this note might be different from   
the original.  
DATE OF SERVICE: 2021  
  
SUBJECTIVE: The patient is currently FaceTiming   
with her family. She has noted no  
new issues overnight.  
  
OBJECTIVE: Vital Signs: Blood pressure is   
134/70, pulse 72, respiratory rate 14,  
temp 97.8, O2 saturation 99%. Laboratory Data:   
White count of 12.2, hemoglobin 9.8,  
hematocrit 30.8, platelets 336. Sodium is 143,   
potassium 4.3, chloride 108, CO2 of  
30, BUN 29, creatinine 0.51. Heart: Regular.   
Lungs: Diminished at the bases.  
Abdomen: Benign. Extremities: No edema.  
  
IMPRESSION:  
1. Sacral ulcer, stage IV.  
2. Left calcaneus fracture.  
3. Ulcer of the left foot with fat layer   
exposed.  
4. Acute osteomyelitis of the left calcaneus.  
5. History of methicillin-sensitive   
Staphylococcus aureus bacteremia.  
6. Paraspinal abscess.  
7. History of spinal cord compression and   
infection.  
8. Psoas muscle abscess.  
9. Epidural abscess, status post surgery.  
10. Malnutrition.  
11. Debility.  
12. Hypertension.  
13. Type 1 diabetes with neuropathy.  
  
PLAN: We will continue with empirical   
antibiotics: Vancomycin, cefepime and Flagyl.  
Also continue with PT, OT, rehab and wound care.  
  
_______________________  
DO SOFI Cruz/[id number]  
DD: 2021 15:30  
DT: 2021 18:23  
SSI File#:   
[id number]  
Job #: [id number]  
  
Verified/Reviewed by  
  
[facility] PATIENT NAME:   
JAYDEN GUTIERREZ  
[address] [facility] #: [id number]  
[place] [address] ACCOUNT #: [id number]  
ADMIT DATE:  
DISCHARGE DATE:  
ATTENDING PHY: Annalee Eckert DO  
  
  
Electronically signed by Annalee Eckert at   
2021 10:14 AM ESTdocumented in this   
encounter                               [facility]  
   
                                                    2021 History   
of Present illness   
Narrative                                 
  
  
Formatting of this note might be different from   
the original.  
DATE OF SERVICE: 2021  
  
SUBJECTIVE: Patient is doing okay today. She has   
no complaints.  
  
OBJECTIVE: Vital Signs: Blood pressure 108/60,   
pulse 61, respiratory is 14,  
temperature 97.8, O2 saturation 99%. Heart:   
Regular. Lungs: Clear. Abdomen:  
Benign. Extremities: No edema.  
  
IMPRESSION:  
1. Sacral ulcer, stage IV.  
2. Left calcaneus fracture.  
3. Ulcer of the left foot, with fat layer   
exposed.  
4. Acute osteomyelitis of the left calcaneus.  
5. History of methicillin-sensitive   
Staphylococcus aureus bacteremia.  
6. Paraspinal abscess.  
7. History of spinal cord compression and   
infection.  
8. Psoas muscle abscess.  
9. Epidural abscess, status post surgery.  
10. Malnutrition.  
11. Debility.  
12. Hypertension.  
13. Type 1 diabetes with neuropathy.  
  
PLAN: Will continue with empiric vancomycin,   
cefepime, and Flagyl. Will continue  
with PT, OT, rehab and wound care.  
  
_______________________  
DO SFOI Cruz/[id number]  
DD: 2021 12:54  
DT: 2021 13:46  
SSI File#:   
[id number]  
Job #: [id number]  
  
Verified/Reviewed by  
  
[facility] PATIENT NAME:   
JAYDEN GUTIERREZ  
[address] [facility] REC #: [id number]  
[place] [address] ACCOUNT #: [id number]  
ADMIT DATE:  
DISCHARGE DATE:  
ATTENDING PHY: Annalee Eckert DO  
  
  
Electronically signed by Annalee Eckert at   
2021 2:33 PM ESTdocumented in this   
encounter                               [facility]  
   
                                                    2021 History   
of Present illness   
Narrative                                 
  
  
Formatting of this note might be different from   
the original.  
DATE OF SERVICE: 2021  
  
Patient quite upset this morning. Apparently her   
83-year-old mother has been having  
multiple TIAs and there is nothing else they can   
for her. Her son has taken her home  
on hospice care. So, she did get to talk with   
her a little bit this morning, as much  
as her mother could talk. She is kind of   
speaking in word salad right now, but  
patient is very upset and obviously she cannot   
see her mother and she is an only  
child which makes it very difficult for her.  
  
PHYSICAL EXAMINATION: Vital Signs: Blood   
pressure 132/66, pulse 68, respirations  
15, temp 98.3, O2 saturation 100%. Heart:   
Regular. Lungs: Diminished at the  
bases. Abdomen: Benign. Extremities: No edema.  
  
White count of 11.9, hemoglobin 9.7, hematocrit   
32, platelets of 341. Sodium 140,  
potassium 4.3, chloride 104, CO2 of 29, BUN 23,   
creatinine 0.57.  
  
IMPRESSION:  
1. Sacral ulcer stage IV.  
2. Left calcaneus fracture.  
3. Ulcer of the left foot with fat layer   
exposed.  
4. Acute osteomyelitis of the left calcaneus.  
5. History of methicillin-sensitive   
Staphylococcus aureus bacteremia.  
6. Paraspinal abscess.  
7. History of spinal cord compression, cord   
infection.  
8. Psoas muscle abscess.  
9. Epidural abscess status post surgery.  
10. Malnutrition.  
11. Debility.  
12. Hypertension.  
13. Type 1 diabetes with neuropathy.  
  
She is on empiric vancomycin, cefepime and   
Flagyl for the sacral osteomyelitis and ID  
is following. Will continue with PT, OT, rehab   
and wound care.  
  
_______________________  
DO SOFI Cruz/[id number]  
DD: 2021 10:48  
DT: 2021 13:55  
SSI File#:   
[id number]  
Job #: [id number]  
  
Verified/Reviewed by  
  
[facility] PATIENT NAME:   
JAYDEN GUTIERREZ [address] [facility] #: [id number]  
[place] [address] ACCOUNT #: [id number]  
ADMIT DATE:  
DISCHARGE DATE:  
ATTENDING PHY: Annalee Eckert DO  
  
  
Electronically signed by Annalee Eckert at   
2021 12:17 PM ESTdocumented in this   
encounter                               [facility]  
   
                                                    2021 History   
of Present illness   
Narrative                                 
  
  
Formatting of this note might be different from   
the original.  
DATE OF SERVICE: 2021  
  
She is resting in the bed. Slept well. Has no   
pain. Is feeling very well and has  
no complaints today. Dressing was changed last   
evening.  
  
PHYSICAL EXAMINATION: Vitals: Include   
temperature of 98.0, pulse 60, respirations  
18, blood pressure _____ /57, oxygen is 98%.   
Lungs: Clear. Heart: Regular rate  
and rhythm. Abdomen: Soft, nontender, benign.   
Extremities: No edema. Dressing is  
clean, dry and intact.  
  
LABORATORIES: There were no new labs today.  
  
IMPRESSION:  
1. Sacral ulcer, stage IV.  
2. Left calcaneus fracture.  
3. Ulcer of the left foot with fat layer   
exposed.  
4. Acute osteomyelitis of the left calcaneus.  
5. History of methicillin-resistant   
Staphylococcus aureus bacteremia.  
6. Paraspinal abscess history.  
7. History of spinal cord infection.  
8. Psoas muscle abscess.  
9. Epidural abscess status post surgery.  
10. Malnutrition.  
11. Debility.  
12. Hypertension.  
13. Type 1 diabetes with neuropathy.  
  
PLAN: Continue current plan of care with   
physical and occupational therapy, rehab  
and wound care.  
  
_______________________  
MD ELIO Ventura/[id number]  
DD: 2021 10:46  
DT: 2021 12:00  
SSI File#:   
[id number]  
Job #: [id number]  
  
Verified/Reviewed by  
  
[facility] PATIENT NAME:   
JAYDEN GUTIERREZ [address] [facility] #: [id number]  
[place] [address] ACCOUNT #: [id number]  
ADMIT DATE:  
DISCHARGE DATE:  
ATTENDING PHY: Annalee Eckert DO  
  
  
Electronically signed by Michael Robles at   
2021 9:57 AM ESTdocumented in this   
encounter                               [facility]  
   
                                                    2021 History   
of Present illness   
Narrative                                 
  
  
Formatting of this note might be different from   
the original.  
DATE OF SERVICE: 2021  
  
SUBJECTIVE: Patient is doing okay today. She did   
finally have a bowel movement the  
other day and is feeling much better. She denies   
any complaints today.  
  
OBJECTIVE: Her vitals are stable. White count is   
14.6, hemoglobin 9.7, hematocrit  
31.3, platelets of 328. Sodium is 141, potassium   
4.4, chloride 104, CO2 of 30, BUN  
26, creatinine 0.60. Heart is regular. Lungs are   
clear. Abdomen is benign.  
Extremities: No edema.  
  
IMPRESSION:  
1. Sacral ulcer stage IV.  
2. Left calcaneus fracture.  
3. Ulcer to the left foot with fat layer   
exposed.  
4. Acute osteomyelitis of the left calcaneus.  
5. History of methicillin-sensitive   
Staphylococcus aureus bacteremia.  
6. Paraspinal abscess history.  
7. History of spinal cord infection.  
8. Psoas muscle abscess.  
9. Epidural abscess, status post surgery.  
10. Malnutrition.  
11. Debility.  
12. Type 1 diabetes with neuropathy.  
13. Hypertension.  
  
PLAN: Will continue with current plan of care,   
PT, OT, rehab and wound care.  
  
_______________________  
Annalee Eckert DO  
  
LE/[id number]  
DD: 2021 13:46  
DT: 2021 16:26  
SSI File#:   
[id number]  
Job #: [id number]  
  
Verified/Reviewed by  
  
[facility] PATIENT NAME:   
JAYDEN GUTIERREZ [address] [facility] #: [id number]  
[place] [address] ACCOUNT #: [id number]  
ADMIT DATE:  
DISCHARGE DATE:  
ATTENDING PHY: Annalee Eckert DO  
  
  
Electronically signed by Annalee Eckert at   
2021 10:12 AM ESTdocumented in this   
encounter                               [facility]  
   
                                                    2021 History   
of Present illness   
Narrative                                 
  
  
Formatting of this note might be different from   
the original.  
DATE OF SERVICE: 2021  
  
SUBJECTIVE: She is resting in her bed. She is in   
good spirits because she finally  
passed stool yesterday following her Fleets   
enema. She is very pleasant, has no  
aches, pains, or complaints today.  
  
OBJECTIVE: Vital Signs: Include a temperature of   
98.0, pulse 67, respires 18, blood  
pressure 130/60, oxygen 97%. Lungs: Diminished   
at the bases. Heart: Regular rate  
and rhythm. Abdomen: Soft, nontender. Benign.   
Extremities: No edema.  
  
LABORATORY RESULTS: There were no new labs   
today.  
  
IMPRESSION:  
1. Sacral ulcer stage IV.  
2. Left calcaneus fracture.  
3. Ulcer to the left foot with fat layer   
exposed.  
4. Acute osteomyelitis of the left calcaneus.  
5. History of methicillin-sensitive   
Staphylococcus aureus bacteremia.  
6. Paraspinal abscess history.  
7. History of spinal cord infection.  
8. Psoas muscle abscess.  
9. Epidural abscess status post surgery.  
10. Malnutrition.  
11. Debility.  
12. Type 1 diabetes with neuropathy.  
13. Hypertension.  
14. Constipation relieved.  
  
PLAN: Continue with current plan of care with   
physical and occupational therapy,  
rehab, and wound care.  
  
_______________________  
MD ELIO Ventura/[id number]  
DD: 2021 07:56  
DT: 2021 09:16  
SSI File#:   
[id number]  
Job #: [id number]  
  
Verified/Reviewed by  
  
[facility] PATIENT NAME:   
JAYDEN GUTIERREZ [address] [facility] #: [id number]  
[place] [address] ACCOUNT #: [id number]  
ADMIT DATE:  
DISCHARGE DATE:  
ATTENDING PHY: Annalee Eckert DO  
  
  
Electronically signed by Michael Robles at   
2021 9:23 AM ESTdocumented in this   
encounter                               [facility]  
   
                                                    2021 History   
of Present illness   
Narrative                                 
  
  
Formatting of this note might be different from   
the original.  
DATE OF SERVICE: 2021  
  
The patient is doing okay today. She has been   
trying to have a BM and, I guess, has  
a big ball of hard stool back there. So, nursing   
was asking about Fleet enema, so I  
did order that for her.  
  
Blood pressure is 121/64, pulse 63, respirations   
18, temperature 98.3, O2 saturation  
98%. Heart is regular. Lungs diminished at the   
bases. Abdomen is benign.  
Extremities, no edema.  
  
IMPRESSION:  
1. Sacral ulcer, stage 4.  
2. Left calcaneus fracture.  
3. Ulcer to the left foot with fat layer   
exposed.  
4. Acute osteomyelitis of the left calcaneus.  
5. History of Methicillin-sensitive   
Staphylococcus aureus bacteremia.  
6. Paraspinal abscess history.  
7. Spinal cord compression history.  
8. Psoas muscle abscess.  
9. Epidural abscess status post surgery.  
10. Malnutrition.  
11. Debility.  
12. Type 1 diabetes with neuropathy.  
13. Hypertension.  
14. Hyperlipidemia.  
  
PLAN: We will continue with current plan of   
care, PT, OT, rehabilitation, and wound  
care and also I did order a Fleet enema for her   
today.  
  
_______________________  
DO SOFI Cruz/[id number]  
DD: 2021 13:25  
DT: 2021 16:05  
SSI File#:   
[id number]  
Job #: [id number]  
CC: Annalee Eckert DO  
  
Verified/Reviewed by  
  
[facility] PATIENT NAME:   
JAYDEN GUTIERREZ [address] [facility] MED[facility] REC #: [id number]  
[place] [address] ACCOUNT #: [id number]  
ADMIT DATE:  
DISCHARGE DATE:  
ATTENDING PHY: Annalee Eckert DO  
  
  
Electronically signed by Annalee Eckert at   
2021 12:59 PM ESTdocumented in this   
encounter                               [facility]  
   
                                                    2021 History   
of Present illness   
Narrative                                 
  
  
Formatting of this note might be different from   
the original.  
DATE OF SERVICE: 2021  
  
SUBJECTIVE: Patient is awake today. She states   
that she is having some pain in her  
back and the bed is not doing her any good. I   
told her therapy should be here  
tomorrow to get her up and moving. She would   
like a bedside commode. Supposedly  
they were going to get her one yesterday and   
that did not happen.  
  
OBJECTIVE: Blood pressure is 105/58, pulse 60,   
respirations 18, temp 98. Heart is  
regular. Lungs: Diminished at the bases. Abdomen   
is benign. Extremities: No  
edema.  
  
IMPRESSION:  
1. Sacral pressure ulcer stage IV.  
2. Left calcaneus fracture.  
3. Ulcer to the left foot with fat layer   
exposed.  
4. Acute osteomyelitis of the left calcaneus.  
5. History of methicillin-sensitive   
Staphylococcus aureus bacteremia.  
6. Paraspinal abscess history.  
7. Spinal cord compression history.  
8. Psoas muscle abscess.  
9. Epidural abscess status post surgery.  
10. Malnutrition.  
11. Debility.  
12. Type 1 diabetes with neuropathy.  
13. Hypertension.  
14. Hyperlipidemia.  
  
PLAN: Will continue with current plan of care,   
PT, OT, rehab and wound care.  
  
_______________________  
DO SOFI Cruz/[id number]  
DD: 2021 10:21  
DT: 2021 14:15  
SSI File#:   
[id number]  
Job #: [id number]  
  
Verified/Reviewed by  
  
[facility] PATIENT NAME:   
JAYDEN GUTIERREZ [facility] [address] [facility] MED[facility] REC #: [id number]  
[place] [address] ACCOUNT #: [id number]  
ADMIT DATE:  
DISCHARGE DATE:  
ATTENDING PHY: Annalee Eckert DO  
  
  
Electronically signed by Annalee Eckert at   
2021 10:17 AM ESTdocumented in this   
encounter                               [facility]  
  
  
  
                                                    Evaluation note   
  
  
  
                                                    Diagnosis  
   
                                                      
  
  
Status post left partial knee replacement- Primary  
  
  
Knee joint replacement by other means  
   
                                                      
  
  
Pain in prosthetic joint, initial encounter (HCC)  
  
documented in this encounter  
[facility]   
  
                                                    Diagnosis  
   
                                                      
  
  
Pain in prosthetic joint, initial encounter (HCC)  
  
documented in this encounter  
[facility]   
  
                                                    Diagnosis  
   
                                                      
  
  
Trigger finger, right middle finger- Primary  
   
                                                      
  
  
Trigger middle finger of right hand  
  
  
Trigger finger (acquired)  
  
documented in this encounter  
[facility]   
  
                                                    Diagnosis  
   
                                                      
  
  
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   
presence of antibiotic-impregnated cement spacer- Primary  
  
  
Acquired absence of knee joint  
  
documented in this encounter  
[facility]   
  
                                                    Diagnosis  
   
                                                      
  
  
S/P trigger finger release- Primary  
  
  
Other postprocedural status  
  
documented in this encounter  
[facility]   
  
                                                    Diagnosis  
   
                                                      
  
  
Hospital discharge follow-up- Primary  
  
  
Other follow-up examination  
   
                                                      
  
  
MSSA (methicillin susceptible Staphylococcus aureus) infection  
  
  
Methicillin susceptible Staphylococcus aureus in conditions classified elsewhere
 and   
of unspecified site  
   
                                                      
  
  
Long term (current) use of antibiotics  
   
                                                      
  
  
Acute embolism and thrombosis of deep vein of right upper extremity (HCC)  
  
  
Acute venous embolism and thrombosis of deep veins of upper extremity  
  
documented in this encounter  
[facility]   
  
                                                    Diagnosis  
   
                                                      
  
  
MSSA (methicillin susceptible Staphylococcus aureus) infection  
  
  
Methicillin susceptible Staphylococcus aureus in conditions classified elsewhere
 and   
of unspecified site  
  
documented in this encounter  
[facility] Diagnostic Procedure Only 
  (Routine) - Authorized  
  
                          Specialty    Diagnoses / Procedures Referred By Contac  
t Referred To Contact  
   
                                                    MOLECULAR & FUNCTIONAL   
IMAGING                                   
  
  
Diagnoses  
  
  
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   
joint, initial   
encounter ([facility])  
  
  
  
Procedures  
  
  
NM BONE 3 PHASE  
  
  
R N 3 PHASE BONE SCAN                     
  
  
Basil Mitchell MD  
  
  
[address]   
[place] [address] [place] [id number]  
  
  
Phone: [phone]  
  
  
Fax: [phone]                         
  
  
Molecular &   
Functional Imaging  
  
  
[address] [place] [address]  
  
  
Phone: [phone]  
  
  
  
                          Referral ID  Status       Reason       Start   
Date                                    Expiration   
Date                                    Visits   
Requested                               Visits   
Authorized  
   
                                [id number]        Authorized        
  
  
Auto-Generat  
ed Referral                               
1                   2023           1                   1  
  
  
  
  
Electronically signed by Basil Mitchell MD at 2021 3:34 PM EST  
  
  
* Diagnostic Procedure Only (Routine) - Pending Review  
  
                          Specialty    Diagnoses / Procedures Referred By Contac  
t Referred To Contact  
   
                                        XR IMAGING            
  
  
Diagnoses  
  
  
Pain in prosthetic joint,   
initial encounter ([facility])  
  
  
  
Procedures  
  
  
XR KNEE 3V FLEX/LAT/MERCH   
LEFT (AK)  
  
  
X-RAY KNEE 3+ VW                          
  
  
Basil Mitchell MD  
  
  
[address] [place] [address]  
  
  
[place] [address]  
  
  
Phone: [phone]  
  
  
Fax: [phone]                         
  
  
Xr Imaging  
  
  
  
                          Referral ID  Status       Reason       Start   
Date                                    Expiration   
Date                                    Visits   
Requested                               Visits   
Authorized  
   
                                        [id number]            Pending   
Review                                    
  
  
Auto-Generat  
ed Referral                             12/2023           1                   1  
  
  
  
  
Electronically signed by Basil Mitchell MD at 2021 3:34 PM EST  
  
  
[facility] for referral (narrative)* Diagnostic Procedure Only 
  (Routine) - Closed  
  
                          Specialty    Diagnoses / Procedures Referred By Contac  
t Referred To Contact  
   
                                                    MOLECULAR & FUNCTIONAL   
IMAGING                                   
  
  
Diagnoses  
  
  
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   
joint, initial   
encounter (HCC)  
  
  
  
Procedures  
  
  
NM BONE 3 PHASE  
  
  
R N 3 PHASE BONE SCAN                     
  
  
Basil Mitchell MD  
  
  
[address]   
[place] [address]  
  
  
[place] [address]  
  
  
Phone: [phone]  
  
  
Fax: [phone]                         
  
  
Molecular &   
Functional Imaging  
  
  
[address] [place] [address]  
  
  
Phone: [phone]  
  
  
  
                    Referral ID Status    Reason    Start Date Expiration Date V  
isits   
Requested                               Visits   
Authorized  
   
                                [id number]        Closed            
  
  
Auto-Generate  
d Referral      2021       1               1  
  
  
  
  
Electronically signed by Basil Mitchell MD at 2021 9:07 AM EST  
  
  
[facility] for referral (narrative)* Diagnostic Procedure Only 
  (Routine) - Closed  
  
                          Specialty    Diagnoses / Procedures Referred By Contac  
t Referred To Contact  
   
                                        US IMAGING            
  
  
Diagnoses  
  
  
MSSA (methicillin susceptible   
Staphylococcus aureus)   
infection  
  
  
  
Procedures  
  
  
US DVT UPPER RT  
  
  
DUP-SCAN XTR VEINS   
UNILATERAL/LIMITED STUDY                  
  
  
Alvin So MD  
  
  
[address]  
  
  
[address]  
  
  
[place] [address]  
  
  
Phone: [phone]  
  
  
Fax: [phone]                         
  
  
[facility]  
  
  
  
                    Referral ID Status    Reason    Start Date Expiration Date V  
isits   
Requested                               Visits   
Authorized  
   
                                [id number]        Closed            
  
  
Auto-Generate  
d Referral      2022      1               1  
  
  
  
  
Electronically signed by Alvin So MD at 2022 1:41 PM EDT  
  
  
[facility] for referral (narrative)* Diagnostic Procedure Only 
  (Routine) - Closed  
  
                          Specialty    Diagnoses / Procedures Referred By Contac  
t Referred To Contact  
   
                                        US IMAGING            
  
  
Diagnoses  
  
  
MSSA (methicillin susceptible   
Staphylococcus aureus)   
infection  
  
  
  
Procedures  
  
  
US DVT UPPER RT  
  
  
DUP-SCAN XTR VEINS   
UNILATERAL/LIMITED STUDY                  
  
  
Alvin So MD  
  
  
[address]  
  
  
[address]  
  
  
[place] [address]  
  
  
Phone: [phone]  
  
  
Fax: [phone]                         
  
  
[facility]  
  
  
  
                    Referral ID Status    Reason    Start Date Expiration Date V  
isits   
Requested                               Visits   
Authorized  
   
                                [id number]        Closed            
  
  
Auto-Generate  
d Referral      2022      1               1  
  
  
  
  
Electronically signed by Alvin So MD at 2022 2:20 PM EDT  
  
  
[facility] for visit Narrative* Diagnostic Procedure Only (Routine) 
  - Closed  
  
                          Specialty    Diagnoses / Procedures Referred By Contac  
t Referred To Contact  
   
                                                    MOLECULAR & FUNCTIONAL   
IMAGING                                   
  
  
Diagnoses  
  
  
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   
joint, initial   
encounter (HCC)  
  
  
  
Procedures  
  
  
NM BONE 3 PHASE  
  
  
R N 3 PHASE BONE SCAN                     
  
  
Basil Mitchell MD  
  
  
[address]   
[place] [address]  
  
  
[place] [address]  
  
  
Phone: [phone]  
  
  
Fax: [phone]                         
  
  
Molecular &   
Functional Imaging  
  
  
[address] [place] [address]  
  
  
Phone: [phone]  
  
  
  
                    Referral ID Status    Reason    Start Date Expiration Date V  
isits   
Requested                               Visits   
Authorized  
   
                                [id number]        Closed            
  
  
Auto-Generate  
d Referral      2021       1               1  
  
  
  
[facility] for visit Narrative* Diagnostic Procedure Only (Routine) 
  - Closed  
  
                          Specialty    Diagnoses / Procedures Referred By Contac  
t Referred To Contact  
   
                                        US IMAGING            
  
  
Diagnoses  
  
  
MSSA (methicillin susceptible   
Staphylococcus aureus)   
infection  
  
  
  
Procedures  
  
  
US DVT UPPER RT  
  
  
DUP-SCAN XTR VEINS   
UNILATERAL/LIMITED STUDY                  
  
  
Alvin So MD  
  
  
[address]  
  
  
[address]  
  
  
[place] [address]  
  
  
Phone: [phone]  
  
  
Fax: [phone]                         
  
  
[facility]  
  
  
  
                    Referral ID Status    Reason    Start Date Expiration Date V  
isits   
Requested                               Visits   
Authorized  
   
                                [id number]        Closed            
  
  
Auto-Generate  
d Referral      2022      1               1  
  
  
  
[facility]  
  
Summary Purpose  
  
  
                                                      
  
  
  
Family History  
No Family History Records FoundNo Family History Records FoundNo Family History 
Records FoundNo Family History Records FoundNo Family History Records FoundNo 
Family History Records FoundNo Family History Records FoundNo Family History 
Records FoundNo Family History Records FoundNo Family History Records FoundNo 
Family History Records FoundNo Family History Records FoundNo Family History 
Records Found  
  
Advance Directives  
No Advanced Directives Records FoundDocuments on File  
  
                          Type         Date Recorded Patient Representative Expl  
anation  
   
                          Advance Directive(s) 3/22/2022 11:57 AM                
  
                                Documents on File  
  
                          Type         Date Recorded Patient Representative Expl  
anation  
   
                          Advance Directive(s) 2022 8:38 AM                
   
                          Advance Directive(s) 3/22/2022 11:57 AM                
  
                                Documents on File  
  
                          Type         Date Recorded Patient Representative Expl  
anation  
   
                          Advance Directive(s) 2022 6:25 PM                
   
                          Advance Directive(s) 2022 8:38 AM                
   
                          Advance Directive(s) 3/22/2022 11:57 AM                
  
                                Documents on File  
  
                          Type         Date Recorded Patient Representative Expl  
anation  
   
                          Advance Directive(s) 2022 6:25 PM                
   
                          Advance Directive(s) 2022 8:38 AM                
   
                          Advance Directive(s) 3/22/2022 11:57 AM                
  
  
  
Health Concerns  
  
  
                          Infection    Onset Date   Last Indicated Resolved Time  
   
                          COVID-19 Rule-Out 2022  
 11:45 PM EDT  
  
  
  
Additional Source Comments  
  
  
  
                                                    INFORMATION SOURCE (unrecogn  
ized section and content)  
   
                                          
  
  
  
                                        DATE CREATED        AUTHOR  
   
                                10/28/2020                      Merg[facility] with [facility]  
  
  
  
                                DATE CREATED    AUTHOR          AUTHOR'S ORGANIZ  
ATION  
   
                                2020                      [facility]  
  
  
  
                                DATE CREATED    AUTHOR          AUTHOR'S ORGANIZ  
ATION  
   
                                2020                       [employer]  
  
  
  
                                DATE CREATED    AUTHOR          AUTHOR'S ORGANIZ  
ATION  
   
                                2021                      [facility]  
  
  
  
                                DATE CREATED    AUTHOR          AUTHOR'S ORGANIZ  
ATION  
   
                                2021                      [facility]  
dical Center  
  
  
  
                                DATE CREATED    AUTHOR          AUTHOR'S ORGANIZ  
ATION  
   
                                2022                      [facility]  
  
  
  
                                DATE CREATED    AUTHOR          AUTHOR'S ORGANIZ  
ATION  
   
                                2022                      [facility]  
spital  
  
  
  
                                DATE CREATED    AUTHOR          AUTHOR'S ORGANIZ  
ATION  
   
                                05/10/2022                      [facility]  
  
  
  
                                DATE CREATED    AUTHOR          AUTHOR'S ORGANIZ  
ATION  
   
                                10/05/2022                      [facility]  
  
  
  
                                DATE CREATED    AUTHOR          AUTHOR'S ORGANIZ  
ATION  
   
                                2023                      [facility]  
oundation ([place])  
  
  
  
                                DATE CREATED    AUTHOR          AUTHOR'S ORGANIZ  
ATION  
   
                                2023                      [facility]  
  
  
  
                                DATE CREATED    AUTHOR          AUTHOR'S ORGANIZ  
ATION  
   
                                2024                      [facility]  
nt  
  
  
  
                                DATE CREATED    AUTHOR          AUTHOR'S ORGANIZ  
ATION  
   
                                2024                      [facility]  
  
  
  
  
  
                                                    Source Comments (unrecognize  
d section and content)  
   
                                                    In the event this informatio  
n is protected by the Federal Confidentiality of   
Alcohol   
and Drug Abuse Patient Records regulations: This information has been disclosed 
to   
you from records protected by Federal confidentiality rules (42 CFR Part 2). The
   
Federal rules prohibit you from making any further disclosure of this 
information   
unless further disclosure is expressly permitted by the written consent of the 
person   
to whom it pertains or as otherwise permitted by 42 CFR Part 2. A general   
authorization for the release of medical or other information is NOT sufficient 
for   
this purpose. The Federal rules restrict any use of the information to 
criminally   
investigate or prosecute any alcohol or drug abuse patient.[facility] CampusIn 
the   
event this information is protected by the Federal Confidentiality of Alcohol 
and   
Drug Abuse Patient Records regulations: This information has been disclosed to 
you   
from records protected by Federal confidentiality rules (42 CFR Part 2). The 
Federal   
rules prohibit you from making any further disclosure of this information unless
   
further disclosure is expressly permitted by the written consent of the person 
to   
whom it pertains or as otherwise permitted by 42 CFR Part 2. A general 
authorization   
for the release of medical or other information is NOT sufficient for this 
purpose.   
The Federal rules restrict any use of the information to criminally investigate 
or   
prosecute any alcohol or drug abuse patient.[facility] CampusIn the event this   
information is protected by the Federal Confidentiality of Alcohol and Drug 
Abuse   
Patient Records regulations: This information has been disclosed to you from 
records   
protected by Federal confidentiality rules (42 CFR Part 2). The Federal rules   
prohibit you from making any further disclosure of this information unless 
further   
disclosure is expressly permitted by the written consent of the person to whom 
it   
pertains or as otherwise permitted by 42 CFR Part 2. A general authorization for
 the   
release of medical or other information is NOT sufficient for this purpose. The   
Federal rules restrict any use of the information to criminally investigate or   
prosecute any alcohol or drug abuse patient.[facility] CampusIn the event this   
information is protected by the Federal Confidentiality of Alcohol and Drug 
Abuse   
Patient Records regulations: This information has been disclosed to you from 
records   
protected by Federal confidentiality rules (42 CFR Part 2). The Federal rules   
prohibit you from making any further disclosure of this information unless 
further   
disclosure is expressly permitted by the written consent of the person to whom 
it   
pertains or as otherwise permitted by 42 CFR Part 2. A general authorization for
 the   
release of medical or other information is NOT sufficient for this purpose. The   
Federal rules restrict any use of the information to criminally investigate or   
prosecute any alcohol or drug abuse patient.[facility] CampusIn the event this   
information is protected by the Federal Confidentiality of Alcohol and Drug 
Abuse   
Patient Records regulations: This information has been disclosed to you from 
records   
protected by Federal confidentiality rules (42 CFR Part 2). The Federal rules   
prohibit you from making any further disclosure of this information unless 
further   
disclosure is expressly permitted by the written consent of the person to whom 
it   
pertains or as otherwise permitted by 42 CFR Part 2. A general authorization for
 the   
release of medical or other information is NOT sufficient for this purpose. The   
Federal rules restrict any use of the information to criminally investigate or   
prosecute any alcohol or drug abuse patient.[facility] CampusIn the event this   
information is protected by the Federal Confidentiality of Alcohol and Drug 
Abuse   
Patient Records regulations: This information has been disclosed to you from 
records   
protected by Federal confidentiality rules (42 CFR Part 2). The Federal rules   
prohibit you from making any further disclosure of this information unless 
further   
disclosure is expressly permitted by the written consent of the person to whom 
it   
pertains or as otherwise permitted by 42 CFR Part 2. A general authorization for
 the   
release of medical or other information is NOT sufficient for this purpose. The   
Federal rules restrict any use of the information to criminally investigate or   
prosecute any alcohol or drug abuse patient.[facility] CampusIn the event this   
information is protected by the Federal Confidentiality of Alcohol and Drug 
Abuse   
Patient Records regulations: This information has been disclosed to you from 
records   
protected by Federal confidentiality rules (42 CFR Part 2). The Federal rules   
prohibit you from making any further disclosure of this information unless 
further   
disclosure is expressly permitted by the written consent of the person to whom 
it   
pertains or as otherwise permitted by 42 CFR Part 2. A general authorization for
 the   
release of medical or other information is NOT sufficient for this purpose. The   
Federal rules restrict any use of the information to criminally investigate or   
prosecute any alcohol or drug abuse patient.[facility] CampusIn the event this   
information is protected by the Federal Confidentiality of Alcohol and Drug 
Abuse   
Patient Records regulations: This information has been disclosed to you from 
records   
protected by Federal confidentiality rules (42 CFR Part 2). The Federal rules   
prohibit you from making any further disclosure of this information unless 
further   
disclosure is expressly permitted by the written consent of the person to whom 
it   
pertains or as otherwise permitted by 42 CFR Part 2. A general authorization for
 the   
release of medical or other information is NOT sufficient for this purpose. The   
Federal rules restrict any use of the information to criminally investigate or   
prosecute any alcohol or drug abuse patient.[facility] CampusIn the event this   
information is protected by the Federal Confidentiality of Alcohol and Drug 
Abuse   
Patient Records regulations: This information has been disclosed to you from 
records   
protected by Federal confidentiality rules (42 CFR Part 2). The Federal rules   
prohibit you from making any further disclosure of this information unless 
further   
disclosure is expressly permitted by the written consent of the person to whom 
it   
pertains or as otherwise permitted by 42 CFR Part 2. A general authorization for
 the   
release of medical or other information is NOT sufficient for this purpose. The   
Federal rules restrict any use of the information to criminally investigate or   
prosecute any alcohol or drug abuse patient.[facility] CampusIn the event this   
information is protected by the Federal Confidentiality of Alcohol and Drug 
Abuse   
Patient Records regulations: This information has been disclosed to you from 
records   
protected by Federal confidentiality rules (42 CFR Part 2). The Federal rules   
prohibit you from making any further disclosure of this information unless 
further   
disclosure is expressly permitted by the written consent of the person to whom 
it   
pertains or as otherwise permitted by 42 CFR Part 2. A general authorization for
 the   
release of medical or other information is NOT sufficient for this purpose. The   
Federal rules restrict any use of the information to criminally investigate or   
prosecute any alcohol or drug abuse patient.[facility] CampusIn the event this   
information is protected by the Federal Confidentiality of Alcohol and Drug 
Abuse   
Patient Records regulations: This information has been disclosed to you from 
records   
protected by Federal confidentiality rules (42 CFR Part 2). The Federal rules   
prohibit you from making any further disclosure of this information unless 
further   
disclosure is expressly permitted by the written consent of the person to whom 
it   
pertains or as otherwise permitted by 42 CFR Part 2. A general authorization for
 the   
release of medical or other information is NOT sufficient for this purpose. The   
Federal rules restrict any use of the information to criminally investigate or   
prosecute any alcohol or drug abuse patient.[facility] CampusIn the event this   
information is protected by the Federal Confidentiality of Alcohol and Drug 
Abuse   
Patient Records regulations: This information has been disclosed to you from 
records   
protected by Federal confidentiality rules (42 CFR Part 2). The Federal rules   
prohibit you from making any further disclosure of this information unless 
further   
disclosure is expressly permitted by the written consent of the person to whom 
it   
pertains or as otherwise permitted by 42 CFR Part 2. A general authorization for
 the   
release of medical or other information is NOT sufficient for this purpose. The   
Federal rules restrict any use of the information to criminally investigate or   
prosecute any alcohol or drug abuse patient.[facility]In the event this   
information is protected by the Federal Confidentiality of Alcohol and Drug 
Abuse   
Patient Records regulations: This information has been disclosed to you from 
records   
protected by Federal confidentiality rules (42 CFR Part 2). The Federal rules   
prohibit you from making any further disclosure of this information unless 
further   
disclosure is expressly permitted by the written consent of the person to whom 
it   
pertains or as otherwise permitted by 42 CFR Part 2. A general authorization for
 the   
release of medical or other information is NOT sufficient for this purpose. The   
Federal rules restrict any use of the information to criminally investigate or   
prosecute any alcohol or drug abuse patient.[facility] CampusIn the event this   
information is protected by the Federal Confidentiality of Alcohol and Drug 
Abuse   
Patient Records regulations: This information has been disclosed to you from 
records   
protected by Federal confidentiality rules (42 CFR Part 2). The Federal rules   
prohibit you from making any further disclosure of this information unless 
further   
disclosure is expressly permitted by the written consent of the person to whom 
it   
pertains or as otherwise permitted by 42 CFR Part 2. A general authorization for
 the   
release of medical or other information is NOT sufficient for this purpose. The   
Federal rules restrict any use of the information to criminally investigate or   
prosecute any alcohol or drug abuse patient.[facility] CampusIn the event this   
information is protected by the Federal Confidentiality of Alcohol and Drug 
Abuse   
Patient Records regulations: This information has been disclosed to you from 
records   
protected by Federal confidentiality rules (42 CFR Part 2). The Federal rules   
prohibit you from making any further disclosure of this information unless 
further   
disclosure is expressly permitted by the written consent of the person to whom 
it   
pertains or as otherwise permitted by 42 CFR Part 2. A general authorization for
 the   
release of medical or other information is NOT sufficient for this purpose. The   
Federal rules restrict any use of the information to criminally investigate or   
prosecute any alcohol or drug abuse patient.[facility] CampusIn the event this   
information is protected by the Federal Confidentiality of Alcohol and Drug 
Abuse   
Patient Records regulations: This information has been disclosed to you from 
records   
protected by Federal confidentiality rules (42 CFR Part 2). The Federal rules   
prohibit you from making any further disclosure of this information unless 
further   
disclosure is expressly permitted by the written consent of the person to whom 
it   
pertains or as otherwise permitted by 42 CFR Part 2. A general authorization for
 the   
release of medical or other information is NOT sufficient for this purpose. The   
Federal rules restrict any use of the information to criminally investigate or   
prosecute any alcohol or drug abuse patient.[facility] CampusIn the event this   
information is protected by the Federal Confidentiality of Alcohol and Drug 
Abuse   
Patient Records regulations: This information has been disclosed to you from 
records   
protected by Federal confidentiality rules (42 CFR Part 2). The Federal rules   
prohibit you from making any further disclosure of this information unless 
further   
disclosure is expressly permitted by the written consent of the person to whom 
it   
pertains or as otherwise permitted by 42 CFR Part 2. A general authorization for
 the   
release of medical or other information is NOT sufficient for this purpose. The   
Federal rules restrict any use of the information to criminally investigate or   
prosecute any alcohol or drug abuse patient.[facility] CampusIn the event this   
information is protected by the Federal Confidentiality of Alcohol and Drug 
Abuse   
Patient Records regulations: This information has been disclosed to you from 
records   
protected by Federal confidentiality rules (42 CFR Part 2). The Federal rules   
prohibit you from making any further disclosure of this information unless 
further   
disclosure is expressly permitted by the written consent of the person to whom 
it   
pertains or as otherwise permitted by 42 CFR Part 2. A general authorization for
 the   
release of medical or other information is NOT sufficient for this purpose. The   
Federal rules restrict any use of the information to criminally investigate or   
prosecute any alcohol or drug abuse patient.[facility] CampusIn the event this   
information is protected by the Federal Confidentiality of Alcohol and Drug 
Abuse   
Patient Records regulations: This information has been disclosed to you from 
records   
protected by Federal confidentiality rules (42 CFR Part 2). The Federal rules   
prohibit you from making any further disclosure of this information unless 
further   
disclosure is expressly permitted by the written consent of the person to whom 
it   
pertains or as otherwise permitted by 42 CFR Part 2. A general authorization for
 the   
release of medical or other information is NOT sufficient for this purpose. The   
Federal rules restrict any use of the information to criminally investigate or   
prosecute any alcohol or drug abuse patient.[facility] CampusIn the event this   
information is protected by the Federal Confidentiality of Alcohol and Drug 
Abuse   
Patient Records regulations: This information has been disclosed to you from 
records   
protected by Federal confidentiality rules (42 CFR Part 2). The Federal rules   
prohibit you from making any further disclosure of this information unless 
further   
disclosure is expressly permitted by the written consent of the person to whom 
it   
pertains or as otherwise permitted by 42 CFR Part 2. A general authorization for
 the   
release of medical or other information is NOT sufficient for this purpose. The   
Federal rules restrict any use of the information to criminally investigate or   
prosecute any alcohol or drug abuse patient.[facility] CampusIn the event this   
information is protected by the Federal Confidentiality of Alcohol and Drug 
Abuse   
Patient Records regulations: This information has been disclosed to you from 
records   
protected by Federal confidentiality rules (42 CFR Part 2). The Federal rules   
prohibit you from making any further disclosure of this information unless 
further   
disclosure is expressly permitted by the written consent of the person to whom 
it   
pertains or as otherwise permitted by 42 CFR Part 2. A general authorization for
 the   
release of medical or other information is NOT sufficient for this purpose. The   
Federal rules restrict any use of the information to criminally investigate or   
prosecute any alcohol or drug abuse patient.[facility] CampusIn the event this   
information is protected by the Federal Confidentiality of Alcohol and Drug 
Abuse   
Patient Records regulations: This information has been disclosed to you from 
records   
protected by Federal confidentiality rules (42 CFR Part 2). The Federal rules   
prohibit you from making any further disclosure of this information unless 
further   
disclosure is expressly permitted by the written consent of the person to whom 
it   
pertains or as otherwise permitted by 42 CFR Part 2. A general authorization for
 the   
release of medical or other information is NOT sufficient for this purpose. The   
Federal rules restrict any use of the information to criminally investigate or   
prosecute any alcohol or drug abuse patient.[facility] CampusIn the event this   
information is protected by the Federal Confidentiality of Alcohol and Drug 
Abuse   
Patient Records regulations: This information has been disclosed to you from 
records   
protected by Federal confidentiality rules (42 CFR Part 2). The Federal rules   
prohibit you from making any further disclosure of this information unless 
further   
disclosure is expressly permitted by the written consent of the person to whom 
it   
pertains or as otherwise permitted by 42 CFR Part 2. A general authorization for
 the   
release of medical or other information is NOT sufficient for this purpose. The   
Federal rules restrict any use of the information to criminally investigate or   
prosecute any alcohol or drug abuse patient.[facility] CampusIn the event this   
information is protected by the Federal Confidentiality of Alcohol and Drug 
Abuse   
Patient Records regulations: This information has been disclosed to you from 
records   
protected by Federal confidentiality rules (42 CFR Part 2). The Federal rules   
prohibit you from making any further disclosure of this information unless 
further   
disclosure is expressly permitted by the written consent of the person to whom 
it   
pertains or as otherwise permitted by 42 CFR Part 2. A general authorization for
 the   
release of medical or other information is NOT sufficient for this purpose. The   
Federal rules restrict any use of the information to criminally investigate or   
prosecute any alcohol or drug abuse patient.[facility] CampusIn the event this   
information is protected by the Federal Confidentiality of Alcohol and Drug 
Abuse   
Patient Records regulations: This information has been disclosed to you from 
records   
protected by Federal confidentiality rules (42 CFR Part 2). The Federal rules   
prohibit you from making any further disclosure of this information unless 
further   
disclosure is expressly permitted by the written consent of the person to whom 
it   
pertains or as otherwise permitted by 42 CFR Part 2. A general authorization for
 the   
release of medical or other information is NOT sufficient for this purpose. The   
Federal rules restrict any use of the information to criminally investigate or   
prosecute any alcohol or drug abuse patient.[facility] CampusIn the event this   
information is protected by the Federal Confidentiality of Alcohol and Drug 
Abuse   
Patient Records regulations: This information has been disclosed to you from 
records   
protected by Federal confidentiality rules (42 CFR Part 2). The Federal rules   
prohibit you from making any further disclosure of this information unless 
further   
disclosure is expressly permitted by the written consent of the person to whom 
it   
pertains or as otherwise permitted by 42 CFR Part 2. A general authorization for
 the   
release of medical or other information is NOT sufficient for this purpose. The   
Federal rules restrict any use of the information to criminally investigate or   
prosecute any alcohol or drug abuse patient.[facility] CampusIn the event this   
information is protected by the Federal Confidentiality of Alcohol and Drug 
Abuse   
Patient Records regulations: This information has been disclosed to you from 
records   
protected by Federal confidentiality rules (42 CFR Part 2). The Federal rules   
prohibit you from making any further disclosure of this information unless 
further   
disclosure is expressly permitted by the written consent of the person to whom 
it   
pertains or as otherwise permitted by 42 CFR Part 2. A general authorization for
 the   
release of medical or other information is NOT sufficient for this purpose. The   
Federal rules restrict any use of the information to criminally investigate or   
prosecute any alcohol or drug abuse patient.[facility] CampusIn the event this   
information is protected by the Federal Confidentiality of Alcohol and Drug 
Abuse   
Patient Records regulations: This information has been disclosed to you from 
records   
protected by Federal confidentiality rules (42 CFR Part 2). The Federal rules   
prohibit you from making any further disclosure of this information unless 
further   
disclosure is expressly permitted by the written consent of the person to whom 
it   
pertains or as otherwise permitted by 42 CFR Part 2. A general authorization for
 the   
release of medical or other information is NOT sufficient for this purpose. The   
Federal rules restrict any use of the information to criminally investigate or   
prosecute any alcohol or drug abuse patient.[facility] CampusIn the event this   
information is protected by the Federal Confidentiality of Alcohol and Drug 
Abuse   
Patient Records regulations: This information has been disclosed to you from 
records   
protected by Federal confidentiality rules (42 CFR Part 2). The Federal rules   
prohibit you from making any further disclosure of this information unless 
further   
disclosure is expressly permitted by the written consent of the person to whom 
it   
pertains or as otherwise permitted by 42 CFR Part 2. A general authorization for
 the   
release of medical or other information is NOT sufficient for this purpose. The   
Federal rules restrict any use of the information to criminally investigate or   
prosecute any alcohol or drug abuse patient.[facility] CampusIn the event this   
information is protected by the Federal Confidentiality of Alcohol and Drug 
Abuse   
Patient Records regulations: This information has been disclosed to you from 
records   
protected by Federal confidentiality rules (42 CFR Part 2). The Federal rules   
prohibit you from making any further disclosure of this information unless 
further   
disclosure is expressly permitted by the written consent of the person to whom 
it   
pertains or as otherwise permitted by 42 CFR Part 2. A general authorization for
 the   
release of medical or other information is NOT sufficient for this purpose. The   
Federal rules restrict any use of the information to criminally investigate or   
prosecute any alcohol or drug abuse patient.[facility] CampusIn the event this   
information is protected by the Federal Confidentiality of Alcohol and Drug 
Abuse   
Patient Records regulations: This information has been disclosed to you from 
records   
protected by Federal confidentiality rules (42 CFR Part 2). The Federal rules   
prohibit you from making any further disclosure of this information unless 
further   
disclosure is expressly permitted by the written consent of the person to whom 
it   
pertains or as otherwise permitted by 42 CFR Part 2. A general authorization for
 the   
release of medical or other information is NOT sufficient for this purpose. The   
Federal rules restrict any use of the information to criminally investigate or   
prosecute any alcohol or drug abuse patient.[facility] CampusIn the event this   
information is protected by the Federal Confidentiality of Alcohol and Drug 
Abuse   
Patient Records regulations: This information has been disclosed to you from 
records   
protected by Federal confidentiality rules (42 CFR Part 2). The Federal rules   
prohibit you from making any further disclosure of this information unless 
further   
disclosure is expressly permitted by the written consent of the person to whom 
it   
pertains or as otherwise permitted by 42 CFR Part 2. A general authorization for
 the   
release of medical or other information is NOT sufficient for this purpose. The   
Federal rules restrict any use of the information to criminally investigate or   
prosecute any alcohol or drug abuse patient.[facility] CampusIn the event this   
information is protected by the Federal Confidentiality of Alcohol and Drug 
Abuse   
Patient Records regulations: This information has been disclosed to you from 
records   
protected by Federal confidentiality rules (42 CFR Part 2). The Federal rules   
prohibit you from making any further disclosure of this information unless 
further   
disclosure is expressly permitted by the written consent of the person to whom 
it   
pertains or as otherwise permitted by 42 CFR Part 2. A general authorization for
 the   
release of medical or other information is NOT sufficient for this purpose. The   
Federal rules restrict any use of the information to criminally investigate or   
prosecute any alcohol or drug abuse patient.[facility] CampusIn the event this   
information is protected by the Federal Confidentiality of Alcohol and Drug 
Abuse   
Patient Records regulations: This information has been disclosed to you from 
records   
protected by Federal confidentiality rules (42 CFR Part 2). The Federal rules   
prohibit you from making any further disclosure of this information unless 
further   
disclosure is expressly permitted by the written consent of the person to whom 
it   
pertains or as otherwise permitted by 42 CFR Part 2. A general authorization for
 the   
release of medical or other information is NOT sufficient for this purpose. The   
Federal rules restrict any use of the information to criminally investigate or   
prosecute any alcohol or drug abuse patient.[facility] CampusIn the event this   
information is protected by the Federal Confidentiality of Alcohol and Drug 
Abuse   
Patient Records regulations: This information has been disclosed to you from 
records   
protected by Federal confidentiality rules (42 CFR Part 2). The Federal rules   
prohibit you from making any further disclosure of this information unless 
further   
disclosure is expressly permitted by the written consent of the person to whom 
it   
pertains or as otherwise permitted by 42 CFR Part 2. A general authorization for
 the   
release of medical or other information is NOT sufficient for this purpose. The   
Federal rules restrict any use of the information to criminally investigate or   
prosecute any alcohol or drug abuse patient.[facility] CampusIn the event this   
information is protected by the Federal Confidentiality of Alcohol and Drug 
Abuse   
Patient Records regulations: This information has been disclosed to you from 
records   
protected by Federal confidentiality rules (42 CFR Part 2). The Federal rules   
prohibit you from making any further disclosure of this information unless 
further   
disclosure is expressly permitted by the written consent of the person to whom 
it   
pertains or as otherwise permitted by 42 CFR Part 2. A general authorization for
 the   
release of medical or other information is NOT sufficient for this purpose. The   
Federal rules restrict any use of the information to criminally investigate or   
prosecute any alcohol or drug abuse patient.[facility] CampusIn the event this   
information is protected by the Federal Confidentiality of Alcohol and Drug 
Abuse   
Patient Records regulations: This information has been disclosed to you from 
records   
protected by Federal confidentiality rules (42 CFR Part 2). The Federal rules   
prohibit you from making any further disclosure of this information unless 
further   
disclosure is expressly permitted by the written consent of the person to whom 
it   
pertains or as otherwise permitted by 42 CFR Part 2. A general authorization for
 the   
release of medical or other information is NOT sufficient for this purpose. The   
Federal rules restrict any use of the information to criminally investigate or   
prosecute any alcohol or drug abuse patient.[facility] CampusIn the event this   
information is protected by the Federal Confidentiality of Alcohol and Drug 
Abuse   
Patient Records regulations: This information has been disclosed to you from 
records   
protected by Federal confidentiality rules (42 CFR Part 2). The Federal rules   
prohibit you from making any further disclosure of this information unless 
further   
disclosure is expressly permitted by the written consent of the person to whom 
it   
pertains or as otherwise permitted by 42 CFR Part 2. A general authorization for
 the   
release of medical or other information is NOT sufficient for this purpose. The   
Federal rules restrict any use of the information to criminally investigate or   
prosecute any alcohol or drug abuse patient.[facility] CampusIn the event this   
information is protected by the Federal Confidentiality of Alcohol and Drug 
Abuse   
Patient Records regulations: This information has been disclosed to you from 
records   
protected by Federal confidentiality rules (42 CFR Part 2). The Federal rules   
prohibit you from making any further disclosure of this information unless 
further   
disclosure is expressly permitted by the written consent of the person to whom 
it   
pertains or as otherwise permitted by 42 CFR Part 2. A general authorization for
 the   
release of medical or other information is NOT sufficient for this purpose. The   
Federal rules restrict any use of the information to criminally investigate or   
prosecute any alcohol or drug abuse patient.[facility] CampusIn the event this   
information is protected by the Federal Confidentiality of Alcohol and Drug 
Abuse   
Patient Records regulations: This information has been disclosed to you from 
records   
protected by Federal confidentiality rules (42 CFR Part 2). The Federal rules   
prohibit you from making any further disclosure of this information unless 
further   
disclosure is expressly permitted by the written consent of the person to whom 
it   
pertains or as otherwise permitted by 42 CFR Part 2. A general authorization for
 the   
release of medical or other information is NOT sufficient for this purpose. The   
Federal rules restrict any use of the information to criminally investigate or   
prosecute any alcohol or drug abuse patient.[facility] CampusIn the event this   
information is protected by the Federal Confidentiality of Alcohol and Drug 
Abuse   
Patient Records regulations: This information has been disclosed to you from 
records   
protected by Federal confidentiality rules (42 CFR Part 2). The Federal rules   
prohibit you from making any further disclosure of this information unless 
further   
disclosure is expressly permitted by the written consent of the person to whom 
it   
pertains or as otherwise permitted by 42 CFR Part 2. A general authorization for
 the   
release of medical or other information is NOT sufficient for this purpose. The   
Federal rules restrict any use of the information to criminally investigate or   
prosecute any alcohol or drug abuse patient.[facility] CampusIn the event this   
information is protected by the Federal Confidentiality of Alcohol and Drug 
Abuse   
Patient Records regulations: This information has been disclosed to you from 
records   
protected by Federal confidentiality rules (42 CFR Part 2). The Federal rules   
prohibit you from making any further disclosure of this information unless 
further   
disclosure is expressly permitted by the written consent of the person to whom 
it   
pertains or as otherwise permitted by 42 CFR Part 2. A general authorization for
 the   
release of medical or other information is NOT sufficient for this purpose. The   
Federal rules restrict any use of the information to criminally investigate or   
prosecute any alcohol or drug abuse patient.[facility] CampusIn the event this   
information is protected by the Federal Confidentiality of Alcohol and Drug 
Abuse   
Patient Records regulations: This information has been disclosed to you from 
records   
protected by Federal confidentiality rules (42 CFR Part 2). The Federal rules   
prohibit you from making any further disclosure of this information unless 
further   
disclosure is expressly permitted by the written consent of the person to whom 
it   
pertains or as otherwise permitted by 42 CFR Part 2. A general authorization for
 the   
release of medical or other information is NOT sufficient for this purpose. The   
Federal rules restrict any use of the information to criminally investigate or   
prosecute any alcohol or drug abuse patient.[facility] CampusIn the event this   
information is protected by the Federal Confidentiality of Alcohol and Drug 
Abuse   
Patient Records regulations: This information has been disclosed to you from 
records   
protected by Federal confidentiality rules (42 CFR Part 2). The Federal rules   
prohibit you from making any further disclosure of this information unless 
further   
disclosure is expressly permitted by the written consent of the person to whom 
it   
pertains or as otherwise permitted by 42 CFR Part 2. A general authorization for
 the   
release of medical or other information is NOT sufficient for this purpose. The   
Federal rules restrict any use of the information to criminally investigate or   
prosecute any alcohol or drug abuse patient.[facility] CampusIn the event this   
information is protected by the Federal Confidentiality of Alcohol and Drug 
Abuse   
Patient Records regulations: This information has been disclosed to you from 
records   
protected by Federal confidentiality rules (42 CFR Part 2). The Federal rules   
prohibit you from making any further disclosure of this information unless 
further   
disclosure is expressly permitted by the written consent of the person to whom 
it   
pertains or as otherwise permitted by 42 CFR Part 2. A general authorization for
 the   
release of medical or other information is NOT sufficient for this purpose. The   
Federal rules restrict any use of the information to criminally investigate or   
prosecute any alcohol or drug abuse patient.[facility] CampusIn the event this   
information is protected by the Federal Confidentiality of Alcohol and Drug 
Abuse   
Patient Records regulations: This information has been disclosed to you from 
records   
protected by Federal confidentiality rules (42 CFR Part 2). The Federal rules   
prohibit you from making any further disclosure of this information unless 
further   
disclosure is expressly permitted by the written consent of the person to whom 
it   
pertains or as otherwise permitted by 42 CFR Part 2. A general authorization for
 the   
release of medical or other information is NOT sufficient for this purpose. The   
Federal rules restrict any use of the information to criminally investigate or   
prosecute any alcohol or drug abuse patient.[facility] CampusIn the event this   
information is protected by the Federal Confidentiality of Alcohol and Drug 
Abuse   
Patient Records regulations: This information has been disclosed to you from 
records   
protected by Federal confidentiality rules (42 CFR Part 2). The Federal rules   
prohibit you from making any further disclosure of this information unless 
further   
disclosure is expressly permitted by the written consent of the person to whom 
it   
pertains or as otherwise permitted by 42 CFR Part 2. A general authorization for
 the   
release of medical or other information is NOT sufficient for this purpose. The   
Federal rules restrict any use of the information to criminally investigate or   
prosecute any alcohol or drug abuse patient.[facility] CampusIn the event this   
information is protected by the Federal Confidentiality of Alcohol and Drug 
Abuse   
Patient Records regulations: This information has been disclosed to you from 
records   
protected by Federal confidentiality rules (42 CFR Part 2). The Federal rules   
prohibit you from making any further disclosure of this information unless 
further   
disclosure is expressly permitted by the written consent of the person to whom 
it   
pertains or as otherwise permitted by 42 CFR Part 2. A general authorization for
 the   
release of medical or other information is NOT sufficient for this purpose. The   
Federal rules restrict any use of the information to criminally investigate or   
prosecute any alcohol or drug abuse patient.[facility] CampusIn the event this   
information is protected by the Federal Confidentiality of Alcohol and Drug 
Abuse   
Patient Records regulations: This information has been disclosed to you from 
records   
protected by Federal confidentiality rules (42 CFR Part 2). The Federal rules   
prohibit you from making any further disclosure of this information unless 
further   
disclosure is expressly permitted by the written consent of the person to whom 
it   
pertains or as otherwise permitted by 42 CFR Part 2. A general authorization for
 the   
release of medical or other information is NOT sufficient for this purpose. The   
Federal rules restrict any use of the information to criminally investigate or   
prosecute any alcohol or drug abuse patient.[facility] CampusIn the event this   
information is protected by the Federal Confidentiality of Alcohol and Drug 
Abuse   
Patient Records regulations: This information has been disclosed to you from 
records   
protected by Federal confidentiality rules (42 CFR Part 2). The Federal rules   
prohibit you from making any further disclosure of this information unless 
further   
disclosure is expressly permitted by the written consent of the person to whom 
it   
pertains or as otherwise permitted by 42 CFR Part 2. A general authorization for
 the   
release of medical or other information is NOT sufficient for this purpose. The   
Federal rules restrict any use of the information to criminally investigate or   
prosecute any alcohol or drug abuse patient.[facility] CampusIn the event this   
information is protected by the Federal Confidentiality of Alcohol and Drug 
Abuse   
Patient Records regulations: This information has been disclosed to you from 
records   
protected by Federal confidentiality rules (42 CFR Part 2). The Federal rules   
prohibit you from making any further disclosure of this information unless 
further   
disclosure is expressly permitted by the written consent of the person to whom 
it   
pertains or as otherwise permitted by 42 CFR Part 2. A general authorization for
 the   
release of medical or other information is NOT sufficient for this purpose. The   
Federal rules restrict any use of the information to criminally investigate or   
prosecute any alcohol or drug abuse patient.[facility] CampusIn the event this   
information is protected by the Federal Confidentiality of Alcohol and Drug 
Abuse   
Patient Records regulations: This information has been disclosed to you from 
records   
protected by Federal confidentiality rules (42 CFR Part 2). The Federal rules   
prohibit you from making any further disclosure of this information unless 
further   
disclosure is expressly permitted by the written consent of the person to whom 
it   
pertains or as otherwise permitted by 42 CFR Part 2. A general authorization for
 the   
release of medical or other information is NOT sufficient for this purpose. The   
Federal rules restrict any use of the information to criminally investigate or   
prosecute any alcohol or drug abuse patient.[facility] CampusIn the event this   
information is protected by the Federal Confidentiality of Alcohol and Drug 
Abuse   
Patient Records regulations: This information has been disclosed to you from 
records   
protected by Federal confidentiality rules (42 CFR Part 2). The Federal rules   
prohibit you from making any further disclosure of this information unless 
further   
disclosure is expressly permitted by the written consent of the person to whom 
it   
pertains or as otherwise permitted by 42 CFR Part 2. A general authorization for
 the   
release of medical or other information is NOT sufficient for this purpose. The   
Federal rules restrict any use of the information to criminally investigate or   
prosecute any alcohol or drug abuse patient.[facility] CampusIn the event this   
information is protected by the Federal Confidentiality of Alcohol and Drug 
Abuse   
Patient Records regulations: This information has been disclosed to you from 
records   
protected by Federal confidentiality rules (42 CFR Part 2). The Federal rules   
prohibit you from making any further disclosure of this information unless 
further   
disclosure is expressly permitted by the written consent of the person to whom 
it   
pertains or as otherwise permitted by 42 CFR Part 2. A general authorization for
 the   
release of medical or other information is NOT sufficient for this purpose. The   
Federal rules restrict any use of the information to criminally investigate or   
prosecute any alcohol or drug abuse patient.[facility] CampusIn the event this   
information is protected by the Federal Confidentiality of Alcohol and Drug 
Abuse   
Patient Records regulations: This information has been disclosed to you from 
records   
protected by Federal confidentiality rules (42 CFR Part 2). The Federal rules   
prohibit you from making any further disclosure of this information unless 
further   
disclosure is expressly permitted by the written consent of the person to whom 
it   
pertains or as otherwise permitted by 42 CFR Part 2. A general authorization for
 the   
release of medical or other information is NOT sufficient for this purpose. The   
Federal rules restrict any use of the information to criminally investigate or   
prosecute any alcohol or drug abuse patient.[facility] CampusIn the event this   
information is protected by the Federal Confidentiality of Alcohol and Drug 
Abuse   
Patient Records regulations: This information has been disclosed to you from 
records   
protected by Federal confidentiality rules (42 CFR Part 2). The Federal rules   
prohibit you from making any further disclosure of this information unless 
further   
disclosure is expressly permitted by the written consent of the person to whom 
it   
pertains or as otherwise permitted by 42 CFR Part 2. A general authorization for
 the   
release of medical or other information is NOT sufficient for this purpose. The   
Federal rules restrict any use of the information to criminally investigate or   
prosecute any alcohol or drug abuse patient.[facility] CampusIn the event this   
information is protected by the Federal Confidentiality of Alcohol and Drug 
Abuse   
Patient Records regulations: This information has been disclosed to you from 
records   
protected by Federal confidentiality rules (42 CFR Part 2). The Federal rules   
prohibit you from making any further disclosure of this information unless 
further   
disclosure is expressly permitted by the written consent of the person to whom 
it   
pertains or as otherwise permitted by 42 CFR Part 2. A general authorization for
 the   
release of medical or other information is NOT sufficient for this purpose. The   
Federal rules restrict any use of the information to criminally investigate or   
prosecute any alcohol or drug abuse patient.[facility] CampusIn the event this   
information is protected by the Federal Confidentiality of Alcohol and Drug 
Abuse   
Patient Records regulations: This information has been disclosed to you from 
records   
protected by Federal confidentiality rules (42 CFR Part 2). The Federal rules   
prohibit you from making any further disclosure of this information unless 
further   
disclosure is expressly permitted by the written consent of the person to whom 
it   
pertains or as otherwise permitted by 42 CFR Part 2. A general authorization for
 the   
release of medical or other information is NOT sufficient for this purpose. The   
Federal rules restrict any use of the information to criminally investigate or   
prosecute any alcohol or drug abuse patient.[facility] CampusIn the event this   
information is protected by the Federal Confidentiality of Alcohol and Drug 
Abuse   
Patient Records regulations: This information has been disclosed to you from 
records   
protected by Federal confidentiality rules (42 CFR Part 2). The Federal rules   
prohibit you from making any further disclosure of this information unless 
further   
disclosure is expressly permitted by the written consent of the person to whom 
it   
pertains or as otherwise permitted by 42 CFR Part 2. A general authorization for
 the   
release of medical or other information is NOT sufficient for this purpose. The   
Federal rules restrict any use of the information to criminally investigate or   
prosecute any alcohol or drug abuse patient.[facility] CampusIn the event this   
information is protected by the Federal Confidentiality of Alcohol and Drug 
Abuse   
Patient Records regulations: This information has been disclosed to you from 
records   
protected by Federal confidentiality rules (42 CFR Part 2). The Federal rules   
prohibit you from making any further disclosure of this information unless 
further   
disclosure is expressly permitted by the written consent of the person to whom 
it   
pertains or as otherwise permitted by 42 CFR Part 2. A general authorization for
 the   
release of medical or other information is NOT sufficient for this purpose. The   
Federal rules restrict any use of the information to criminally investigate or   
prosecute any alcohol or drug abuse patient.[facility] CampusIn the event this   
information is protected by the Federal Confidentiality of Alcohol and Drug 
Abuse   
Patient Records regulations: This information has been disclosed to you from 
records   
protected by Federal confidentiality rules (42 CFR Part 2). The Federal rules   
prohibit you from making any further disclosure of this information unless 
further   
disclosure is expressly permitted by the written consent of the person to whom 
it   
pertains or as otherwise permitted by 42 CFR Part 2. A general authorization for
 the   
release of medical or other information is NOT sufficient for this purpose. The   
Federal rules restrict any use of the information to criminally investigate or   
prosecute any alcohol or drug abuse patient.[facility] CampusIn the event this   
information is protected by the Federal Confidentiality of Alcohol and Drug 
Abuse   
Patient Records regulations: This information has been disclosed to you from 
records   
protected by Federal confidentiality rules (42 CFR Part 2). The Federal rules   
prohibit you from making any further disclosure of this information unless 
further   
disclosure is expressly permitted by the written consent of the person to whom 
it   
pertains or as otherwise permitted by 42 CFR Part 2. A general authorization for
 the   
release of medical or other information is NOT sufficient for this purpose. The   
Federal rules restrict any use of the information to criminally investigate or   
prosecute any alcohol or drug abuse patient.[facility]In the event this   
information is protected by the Federal Confidentiality of Alcohol and Drug 
Abuse   
Patient Records regulations: This information has been disclosed to you from 
records   
protected by Federal confidentiality rules (42 CFR Part 2). The Federal rules   
prohibit you from making any further disclosure of this information unless 
further   
disclosure is expressly permitted by the written consent of the person to whom 
it   
pertains or as otherwise permitted by 42 CFR Part 2. A general authorization for
 the   
release of medical or other information is NOT sufficient for this purpose. The   
Federal rules restrict any use of the information to criminally investigate or   
prosecute any alcohol or drug abuse patient.[facility]  
  
  
  
  
                                                    Reason for Visit (unrecogniz  
ed section and content)  
   
                                          
  
  
  
                                        Reason              Comments  
   
                                        Future Appointment    
  
  
  
                                        Reason              Comments  
   
                                        New                   
  
  
  
                                        Reason              Comments  
   
                                        Appointment           
  
  
  
                                        Reason              Comments  
   
                                        New                   
   
                                        Stiffness             
   
                                        Pain                  
  
  
  
                                        Reason              Comments  
   
                                        plan of care          
  
  
  
                                        Reason              Comments  
   
                                        Results, Lab          
   
                                        CoPat Management      
  
  
  
                                        Reason              Comments  
   
                                        Post Op               
  
  
  
                                        Reason              Comments  
   
                                        Follow Up             
   
                                        Post Op               
   
                                        Pain                  
  
  
  
                                        Reason              Comments  
   
                                        Infection Follow Up   
  
  
  
                                        Reason              Comments  
   
                                        Results, Lab          
  
  
  
                                        Reason              Comments  
   
                                        Physician orders      
  
  
  
  
  
                                                    Care Teams (unrecognized sec  
tion and content)  
   
                                          
  
  
  
                      Team Member Relationship Specialty  Start Date End Date  
   
                                                      
  
  
Annalee Eckert, DO  
  
  
[address] [facility]  
  
  
[address]  
  
  
[place] [address]  
  
  
[phone] (Work)  
  
  
[phone] (Fax) PCP - General   Internal Medicine 21          
  
  
  
                      Team Member Relationship Specialty  Start Date End Date  
   
                                                      
  
  
Annalee Eckert DO  
  
  
[address] [facility]  
  
  
[address]  
  
  
[place] [address]  
  
  
[phone] (Work)  
  
  
[phone] (Fax) PCP - General   Internal Medicine 21          
  
  
  
                      Team Member Relationship Specialty  Start Date End Date  
   
                                                      
  
  
Annalee Eckert DO  
  
  
[address]  
  
  
[address]  
  
  
[place] [address]  
  
  
[phone] (Work)  
  
  
[phone] (Fax) PCP - General   Internal Medicine 21          
  
  
  
                      Team Member Relationship Specialty  Start Date End Date  
   
                                                      
  
  
Becky Corrales MD  
  
  
[address]  
  
  
[address]  
  
  
[place] [address]  
  
  
[phone] (Work)  
  
  
[phone] (Fax) PCP - General   Internal Medicine 3/1/22            
  
  
  
                      Team Member Relationship Specialty  Start Date End Date  
   
                                                      
  
  
Becky Corrales MD  
  
  
[address]  
  
  
[address]  
  
  
[place] [address]  
  
  
[phone] (Work)  
  
  
[phone] (Fax) PCP - General   Internal Medicine 3/1/22            
  
  
  
                      Team Member Relationship Specialty  Start Date End Date  
   
                                                      
  
  
Becky Corrales MD  
  
  
[address]  
  
  
[address]  
  
  
[place] [address]  
  
  
[phone] (Work)  
  
  
[phone] (Fax) PCP - General   Internal Medicine 3/1/22            
  
  
  
                      Team Member Relationship Specialty  Start Date End Date  
   
                                                      
  
  
Becky Corrales MD  
  
  
[address]  
  
  
[address]  
  
  
[place] [address]  
  
  
[phone] (Work)  
  
  
 (Fax) PCP - General   Internal Medicine 3/1/22            
  
  
  
                      Team Member Relationship Specialty  Start Date End Date  
   
                                                      
  
  
Becky Corrales MD  
  
  
[address]  
  
  
[address]  
  
  
[place] [address]  
  
  
[phone] (Work)  
  
  
 (Fax) PCP - General   Internal Medicine 3/1/22            
  
  
  
                      Team Member Relationship Specialty  Start Date End Date  
   
                                                      
  
  
Becky Corrales MD  
  
  
[address]  
  
  
[address]  
  
  
[place] [address]  
  
  
[phone] (Work)  
  
  
 (Fax) PCP - General   Internal Medicine 3/1/22            
  
  
  
                      Team Member Relationship Specialty  Start Date End Date  
   
                                                      
  
  
Becky Corrales MD  
  
  
[address]  
  
  
[address]  
  
  
[place] [address]  
  
  
[phone] (Work)  
  
  
 (Fax) PCP - General   Internal Medicine 3/1/22            
  
  
  
                      Team Member Relationship Specialty  Start Date End Date  
   
                                                      
  
  
Annalee Eckert, DO  
  
  
[address]  
  
  
[address]  
  
  
[place], [place] [address] [phone] (Work)  
  
  
[phone] (Fax) PCP - General   Internal Medicine 21  
   
                                                      
  
  
Becky Corrales MD  
  
  
[address]  
  
  
[address]  
  
  
[place] [address]  
  
  
[phone] (Work)  
  
  
[phone] (Fax) PCP - General   Internal Medicine 3/1/22            
  
  
  
                      Team Member Relationship Specialty  Start Date End Date  
   
                                                      
  
  
Becky Corrales MD  
  
  
[address]  
  
  
[address]  
  
  
[place] [address]  
  
  
[phone] (Work)  
  
  
[phone] (Fax) PCP - General   Internal Medicine 3/1/22            
  
  
FOR RECORDS PERTAINING TO PATIENTS WHO ARE OR HAVE BEEN ENROLLED IN A CHEMICAL 
DEPENDENCY/SUBSTANCEABUSE PROGRAM, SOME INFORMATION MAY BE OMITTED. This 
clinical summary was aggregated from multiple sources. Caution should be 
exercised in using it in the provision of clinical care. This summary normalizes
 information from multiple sources, and as a consequence, information in this 
document may materially change the coding, format and clinical context of 
patient data. In addition, data may be omitted in some cases. CLINICAL DECISIONS
 SHOULD BE BASED ON THE PRIMARY CLINICAL RECORDS. [email or web address] [facility]. provides
 no warranty or guarantee of the accuracy or completeness of information in this
 document.

## 2024-09-02 NOTE — EKG12_ITS
Test Reason : CP 
Blood Pressure : ***/*** mmHG 
Vent. Rate : 101 BPM     Atrial Rate : 101 BPM 
   P-R Int : 154 ms          QRS Dur : 122 ms 
    QT Int : 386 ms       P-R-T Axes : 044 019 135 degrees 
   QTc Int : 500 ms 
  
Sinus tachycardia 
Minimal voltage criteria for LVH, may be normal variant ( [employer] product ) 
Septal infarct , age undetermined 
Marked ST abnormality, possible inferolateral subendocardial injury 
Abnormal ECG 
  
Confirmed by KAREN CHANG, BRENDA ([id number]),  DERRICK GUILLERMO ([id number]) on 9/3/2024 1:15:44 PM 
  
Referred By: Lux Guerrero           Confirmed By:BRENDA JONES MD

## 2024-09-02 NOTE — XMS RPT_ITS
Comprehensive CCD (C-CDA v2.1)  
  
                          Created on: 2024  
  
  
JAYDEN MILES  
External Reference #: CDR,PersonID:[id number]  
: 1960  
Sex: Female  
  
Demographics  
  
  
                                        Address             [id number]  
[place]  [address]  
   
                                        Home Phone          [phone]  
   
                                        Home Phone          [phone]  
   
                                        Home Phone          [phone]  
   
                                        Home Phone          [phone]  
   
                                        Preferred Language  en  
   
                                        Marital Status        
   
                                        M[employer]on Affiliation Unknown  
   
                                        Race                White  
   
                                        Ethnic Group        Not  or Lati  
no  
  
  
Author  
  
  
                                        Organization        [facility]  
  
  
Care Team Providers  
  
  
                                Care Team Member Name Role            Phone  
   
                                MICHAEL ROBLES MD Attending       Unavailable  
   
                                MICHAEL ROBLES MD Primary Care    Unavailable  
   
                                MICHAEL ROBLES MD Admitting       Unavailable  
   
                                Unavailable     Primary Care Provider UnavailAnnalee Noel DO Primary Care Provider [phone]   
   
                                Lenny Reilly MD Primary Care Provider [phone]  
[phone]  
   
                                Annalee Garcia DO Primary Care Provider [phone]   
   
                                Lenny Reilly MD Primary Care Provider [phone]  
   
                                LENNY REILLY Primary Care    Unavailable  
   
                                HERNANDEZ RESENDEZ Attending       UnavailLIZZIE Molina    Referring       Unavailable  
   
                                ESTERSOFI, ANNALEE M Primary Care    Unavailable  
   
                                LIZZIE CHRISTINE    Referring       Unavailable  
   
                                ESTERSOFI, ANNALEE M Primary Care    Unavailable  
   
                                LIZZIE CHRISTINE    Attending       Unavailable  
   
                                RADHA, ANNALEE M Primary Care    Unavailable  
   
                                LIZZIE CHRISTINE    Attending       Unavailable  
   
                                ANNALEE GARCIA M Primary Care    Unavailable  
   
                                DAMON GARCIA Admitting       Unavailable  
   
                                DAMON GARCIA Attending       Unavailable  
   
                                LENNY REILLY Primary Care    Unavailable  
   
                                LIZZIE CHRISTINE    Admitting       Unavailable  
   
                                LIZZIE CHRISTINE    Attending       Unavailable  
   
                                LIZZIE CHRISTINE    Referring       Unavailable  
   
                                SAVANNA, LENNY Primary Care    Unavailable  
   
                                DUMCON SANDOVAL III Consulting      UnavailLIZZIE Molina    Attending       Unavailable  
   
                                RADHA, NANALEE M Primary Care    Unavailable  
   
                                DAMON GARCIA Attending       Unavailable  
   
                                LENNY REILLY Primary Care    Unavailable  
   
                                LIZZIE CHRISTINE    Attending       Unavailable  
   
                                LIZZIE CHRISTINE    Referring       Unavailable  
   
                                KARENI, LENNY Primary Care    Unavailable  
   
                                DAMON GARCIA Attending       Unavailable  
   
                                SAVANNA, LENNY Primary Care    Unavailable  
   
                                ALVIN SO Referring       Unavailable  
   
                                SAVANNA, LENNY Primary Care    Unavailable  
   
                                MAMADOU STARK Attending       Unavailab  
DAMON Cabrera Referring       Unavailable  
   
                                KALISELENNY CRALISLE Primary Care    Unavailable  
   
                                STEWART WEBBER KARINE PAL Admitting       Unav  
ailable  
   
                                KARINE WEST Attending       Unav  
ailable  
   
                                LENNY REILLY Primary Care    Unavailable  
   
                                FROY ALVARADO MD Admitting       Unavailable  
   
                                LENNY REILLY MD Consulting      Unavailable  
   
                                FROY ALVARADO MD Attending       Unavailable  
   
                                FROY ALVARADO MD Primary Care    Unavailable  
   
                                PROVIDER, UNKNOWN Consulting      Unavailable  
   
                                PROVIDER, UNKNOWN Consulting      Unavailable  
   
                                LENNY REILLY MD Admitting       Unavailable  
   
                                LENNY REILLY MD Attending       Unavailable  
   
                                LENNY REILLY MD Consulting      Unavailable  
   
                                LENNY REILLY MD Primary Care    Unavailable  
   
                                PROVIDER, UNKNOWN Consulting      Unavailable  
   
                                PROVIDER, UNKNOWN Consulting      Unavailable  
   
                                CABA, MICHAELA T Admitting       Unavailable  
   
                                LENNY ERILLY MD Consulting      Unavailable  
   
                                CABA, MICHAELA T Attending       Unavailable  
   
                                CABA, MICHAELA T Primary Care    Unavailable  
   
                                PROVIDER, UNKNOWN Consulting      Unavailable  
   
                                PROVIDER, UNKNOWN Consulting      Unavailable  
   
                                LENNY REILLY MD Admitting       Unavailable  
   
                                LENNY REILLY MD Attending       Unavailable  
   
                                LENNY REILLY MD Consulting      Unavailable  
   
                                LENNY REILLY MD Primary Care    Unavailable  
   
                                PROVIDER, UNKNOWN Consulting      Unavailable  
   
                                PROVIDER, UNKNOWN Consulting      Unavailable  
   
                                LENNY REILLY MD Admitting       Unavailable  
   
                                LENNY REILLY MD Attending       Unavailable  
   
                                LENNY REILLY MD Consulting      Unavailable  
   
                                LENNY REILLY MD Primary Care    Unavailable  
   
                                PROVIDER, UNKNOWN Consulting      Unavailable  
   
                                PROVIDER, UNKNOWN Consulting      Unavailable  
  
  
  
Allergies  
  
  
                                                    Allergy   
Classification                          Reported   
Allergen(s)               Allergy Type              Date of   
Onset                     Reaction(s)               Facility  
   
                                                      
(2 sources)                             Acetaminophen /   
HYDROcodone     Drug Allergy                                    [facility]   
Repository  
   
                                                      
(2 sources)                             Angiotensin   
Converting Enzyme   
(Ace) Inhibitors                        Drug allergy   
(disorder)                                                  [facility]   
Repository  
   
                                                      
(4 sources)                             Morphine;   
Translations:   
[MORPHINE]                Drug Allergy                
1                                                   [facility]   
Repository  
   
                                                      
(20 sources)                            HMG-CoA reductase   
inhibitor;   
Translations:   
[STATINS-HMG-COA   
REDUCTASE   
INHIBITORS]                             Drug   
Intolerance                               
1                         Unknown                   [facility]  
   
                                                      
(20 sources)        Morphine            Drug Allergy          
1                         GI Upset                  [facility]  
   
                                                      
(1 source)                              HMG-CoA reductase   
inhibitor                               Drug   
Intolerance                               
1                         Unknown                   [facility]  
   
                                                      
(1 source)                              STATINS (HMG-COA   
REDUCTASE   
INHIBITORS)                             Drug allergy   
(disorder)                                                  [facility]   
Repository  
  
  
  
Medications  
Current Medications  
  
  
  
                      Medication Drug Class(es) Dates      Sig (Normalized) Sig   
(Original)  
   
                                                    acetaminophen 325 mg   
/ oxyCODONE   
hydrochloride 5 mg   
oral tablet  
(1 source)                Opioid Agonist            Start:   
2022  
End:   
2022                              take 1 tablet by   
mouth every six   
hours as needed   
for pain                                oxyCODONE-acetaminop  
hen (PERCOCET) 5-325   
mg tablet   
Indications: Trigger   
middle finger of   
right hand Take 1   
tablet by mouth   
every 6 hours as   
needed for pain for   
up to 3 days. 5   
tablet 0 2022 Active  
   
                                        Comment on above:   Take 1 tablet by selina  
th every 6 hours as needed for pain for up  
  
to 3 days.   
   
                                                    cyclobenzaprine   
hydrochloride 10 mg   
oral tablet  
(9 sources)               Muscle Relaxant           Start:   
2022  
End:   
2022                              take 1 tablet by   
mouth three times   
daily                                   cyclobenzaprine   
(FLEXERIL) 10 mg   
tablet Take 1 tablet   
by mouth three times   
daily. 90 tablet 0   
2022 Active  
   
                                        Comment on above:   Take 1 tablet by selina  
th three times daily.   
  
  
  
Completed/Discontinued Medications  
  
  
  
                      Medication Drug Class(es) Dates      Sig (Normalized) Sig   
(Original)  
   
                                                    1 ml alirocumab 75   
mg/ml auto-injector  
(12 sources)        PCSK9 Inhibitor                         inject 150 mg by   
subcutaneous   
injection every other   
week                                    alirocumab (PRALUENT)   
75 mg/mL pen Inject 150   
mg subcutaneously every   
other week. 0 Active  
  
  
  
                                                            inject 75 mg by subc  
utaneous injection   
every other week                        alirocumab (PRALUENT) 75 mg/mL pen Injec  
t   
75 mg subcutaneously every other week. 0   
Active  
  
  
  
                                        Comment on above:   Inject 75 mg subcuta  
neously every other week.   
   
                                                            Inject 150 mg subcut  
aneously every other week.  
   
   
                                                    apixaban 5 mg oral   
tablet  
(6 sources)               Factor Xa Inhibitor       Start:   
2022                              take 1 tablet by   
mouth twice   
daily                                   apixaban   
(ELIQUIS) 5 mg   
tab(s) Take 1   
tablet by mouth   
twice daily. 60   
tablet 0   
2022 Active  
  
  
  
                                        Start: 2022   take 1 tablet by selina  
th twice   
daily                                   apixaban (ELIQUIS) 5 mg tab(s) Take   
1 tablet by mouth twice daily. 60   
tablet 0 2022 Active  
   
                                        Start: 2022   take 1 tablet by selina  
th twice   
daily                                   apixaban (ELIQUIS) 5 mg tab(s) Take   
1 tablet by mouth twice daily. 60   
tablet 0 2022 Active  
   
                                        Start: 2022   take 2 tablets by mo  
uth twice   
daily, then take 1 tablet by   
mouth twice daily                       apixaban (ELIQUIS DVT-PE TREAT 30D   
START) 5 mg (74 tabs) Take 2   
tablets (10 mg) by mouth twice   
daily for 7 days. Then take 1   
tablet (5 mg) by mouth twice daily   
for 23 days 74 tablet 0 2022   
Active  
  
  
  
                                        Comment on above:   Take 1 tablet by selina  
 twice daily.   
   
                                                            Take 2 tablets (10 m  
g) by mouth twice daily for 7 days. Then   
take 1 tablet (5 mg) by mouth twice daily for 23 days   
   
                                                    aspirin 81 mg oral   
tablet  
(20 sources)                            Platelet Aggregation   
Inhibitor, Nonsteroidal   
Anti-inflammatory Drug                  Start:   
2022                                          aspirin 81 mg cap Take   
1 capsule by mouth   
once daily. Resume   
once BID dosing is   
completed 0 2022   
Active  
  
  
  
                                                    Start: 2022  
End: 2022                         take 1 tablet by mouth twice   
daily, then take 1 tablet by   
mouth twice daily                       aspirin, enteric coated (ASPIRIN,   
ENTERIC COATED) 81 mg EC tablet   
Take 1 tablet by mouth twice daily   
for 21 days. Once BID dosing is   
completed in 21 days, resume home   
daily dose 42 tablet 0 2022   
Active  
  
  
  
                                        Comment on above:   Take 1 tablet by [facility] twice daily for 21 days. Once BID   
dosing is   
completed in 21 days, resume home daily dose   
   
                                                            Take 1 capsule by mo  
uth once daily. Resume once BID dosing is   
completed   
   
                                                    bisacodyl 10 mg   
delayed release   
oral tablet  
(12 sources)        Stimulant Laxative                      take 10 mg by mouth   
once daily                              BISACODYL ORAL Take   
10 mg by mouth once   
daily. 0 Active  
   
                                        Comment on above:   Take 10 mg by mouth   
once daily.   
   
                                                    cefTRIAXone 1000   
mg injection  
(9 sources)                             Cephalosporin   
Antibacterial                           Start:   
2022  
End:   
2022                                    inject 2 g by   
intramuscular   
injection every   
twenty-four hours                       cefTRIAXone sodium   
(ROCEPHIN) 1 gram   
solr Inject 2 g   
intramuscularly every   
24 hours. 0   
2022   
Discontinued (Patient   
chooses alternative   
therapy)  
   
                                        Comment on above:   Inject 2 g intramusc  
ularly every 24 hours.   
   
                                                    cephalexin 500 mg   
oral capsule  
(3 sources)                             Cephalosporin   
Antibacterial                           Start:   
2022                                    take 1 capsule by   
mouth twice daily                       cephALEXin (KEFLEX)   
500 mg capsule Take 1   
capsule by mouth   
twice daily. 60   
capsule 5 2022   
Active

## 2024-09-03 VITALS
RESPIRATION RATE: 16 BRPM | OXYGEN SATURATION: 94 % | HEART RATE: 96 BPM | SYSTOLIC BLOOD PRESSURE: 113 MMHG | DIASTOLIC BLOOD PRESSURE: 54 MMHG

## 2024-09-03 VITALS
HEART RATE: 96 BPM | TEMPERATURE: 98.3 F | DIASTOLIC BLOOD PRESSURE: 62 MMHG | OXYGEN SATURATION: 97 % | RESPIRATION RATE: 16 BRPM | SYSTOLIC BLOOD PRESSURE: 99 MMHG

## 2024-09-03 VITALS
HEART RATE: 96 BPM | RESPIRATION RATE: 15 BRPM | DIASTOLIC BLOOD PRESSURE: 64 MMHG | OXYGEN SATURATION: 94 % | SYSTOLIC BLOOD PRESSURE: 110 MMHG

## 2024-09-03 VITALS
DIASTOLIC BLOOD PRESSURE: 68 MMHG | SYSTOLIC BLOOD PRESSURE: 102 MMHG | OXYGEN SATURATION: 92 % | HEART RATE: 97 BPM | RESPIRATION RATE: 14 BRPM

## 2024-09-03 VITALS
RESPIRATION RATE: 17 BRPM | DIASTOLIC BLOOD PRESSURE: 55 MMHG | OXYGEN SATURATION: 95 % | SYSTOLIC BLOOD PRESSURE: 118 MMHG | HEART RATE: 72 BPM

## 2024-09-03 RX ADMIN — HEPARIN SODIUM 8 UNITS: 10000 INJECTION, SOLUTION INTRAVENOUS at 00:45

## 2024-09-03 NOTE — CON.PCM.HO_ITS
Assessment & Plan    
Assessment/Plan    
(1) CAD (coronary artery disease):     
PLAN:     
Plan    
1 coronary artery disease status post heart catheterization done acutely after   
ST elevation MI was called patient also has history of CABG and multiple stents.  
 Patient was found to have left main disease and determined to be more amenable   
to treatment at a larger facility with a backup cardiothoracic surgery..    
Patient was stable at the time of my evaluation and transfer was pending to   
[facility] in [facility]    
    
HPI    
Consult Data    
Date of Consult: 09/03/24    
HPI Narrative    
Reason for Consultation: medical management    
HPI Narrative:     
JAYDEN MILES, is a 64 F who presents    
    
    
[facility]    
Medical History (Reviewed 08/15/24 @ 13:44 by Diana Elizondo, NP-C)    
    
Cellulitis of left foot    
Non-pressure chronic ulcer of other part of left foot with necrosis of bone    
History of MI (myocardial infarction)    
Diabetic foot infection    
Diabetes mellitus    
HLD (hyperlipidemia)    
HTN (hypertension)    
Myocardial infarct    
    
    
                                Home Medications    
    
    
    
?Medication ?Instructions ?Recorded ?Last Taken ?Type    
     
clopidogrel 75 mg tablet 75 mg PO DAILY BLOOD THINNER 10/23/20 09/02/24 History    
     
nitroglycerin 0.4 mg sublingual 0.4 mg sublingual Q5M PRN Chest 11/18/20 Unknown  
History    
    
tablet Pain       
     
aspirin 81 mg chewable tablet 81 mg PO DAILY HEART HEALTH 12/22/22 09/02/24   
History    
     
bisacodyl 5 mg tablet 10 mg PO DAILY PRN Constipation 12/22/22 Unknown History    
     
diltiazem HCl 180 mg 180 mg PO LUNCH BLOOD PRESSURE 12/22/22 09/02/24 History    
    
capsule,extended release 24 hr        
     
duloxetine 60 mg capsule,delayed 60 mg PO BID DEPRESSION 12/22/22 09/02/24   
History    
    
release        
     
isosorbide mononitrate 60 mg 90 mg PO QHS ANGINA 12/22/22 12/21/22 History    
    
tablet,extended release 24 hr        
     
buspirone 5 mg tablet 5 mg PO BID anxiety 04/11/23 09/02/24 History    
     
gabapentin 100 mg capsule 600 mg PO TID NERVE PAIN 04/11/23 09/02/24 History    
     
acetaminophen 325 mg tablet 650 mg (2 x 325 mg) PO Q4H PRN PRN 04/21/23 Unknown   
Rx    
    
 Fever, pain 1-10/10 #0 tabs       
     
tamsulosin 0.4 mg capsule (Flomax) 0.4 mg PO DAILY urinary retention 10/17/23   
09/02/24 History    
     
insulin glargine-yfgn 100 unit/mL 75 unit (0.75 mL) subcut QAM 02/21/24 Unknown   
Rx    
    
(3 mL) subcutaneous pen diabetes #22.5 mL       
     
pen needle, diabetic 32 gauge x #100 ea 02/21/24 Unknown Rx    
    
5/32  (BD Ultra-Fine Beth Pen        
    
Needle)        
     
doxepin 10 mg capsule 20 mg PO QHS depression 08/15/24 09/02/24 History    
     
flash glucose sensor (FreeStyle #6 ea 08/15/24 Unknown Rx    
    
Tu 2 Sensor kit)        
     
pen needle, diabetic 29 gauge x #100 ea 08/15/24 Unknown Rx    
    
1/2  (Ultra-Thin II Insulin Pen        
    
Needles)        
     
semaglutide 2 mg/dose (8 mg/3 mL) 2 mg (0.75 mL) subcut QWEEK DM #3 08/15/24   
08/26/24 Rx    
    
subcutaneous pen injector (Ozempic) mL  2 mg     
     
aluminum-mag hydroxide-simethicone 30 ml PO DAILY PRN constipation 09/02/24   
Unknown History    
    
200 mg-200 mg-20 mg/5 mL oral susp        
    
(Advanced Antacid-Antigas)        
     
carvedilol 12.5 mg tablet 12.5 mg PO BID blood pressure 09/02/24 09/02/24   
History    
     
dapagliflozin propanediol 10 mg 10 mg PO DAILY 09/02/24 Unknown History    
    
tablet (Farxiga)        
     
insulin glargine 100 unit/mL (3 60 unit subcut QHS BLOOD SUGAR 09/02/24 Unknown   
History    
    
mL) subcutaneous pen (Basaglar        
    
KwikPen U-100 Insulin)        
     
insulin lispro 100 unit/mL 20 unit subcut TIDCM BLOOD SUGAR 09/02/24 Unknown   
History    
    
subcutaneous pen        
     
metformin 500 mg tablet 500 mg PO DAILY DM 09/02/24 09/02/24 History    
     
zolpidem 10 mg tablet 10 mg PO QHS PRN PRN insomnia 09/02/24 Unknown History    
    
    
    
                                            
    
    
    
Allergy/AdvReac Type Severity Reaction Status Date / Time    
     
morphine Allergy Severe Other Verified 09/02/24 19:26    
     
ACE Inhibitors Allergy   gets sick  Verified 09/02/24 19:26    
     
hydrocodone (From Lortab) Allergy  Nausea/Vom/ Verified 09/02/24 19:26    
    
   Diarrhea      
     
niacin (From Niaspan Allergy  Nausea Verified 09/02/24 19:26    
    
Extended-Release)         
     
Statins-Hmg-Coa Reductase Allergy   locks up Verified 09/02/24 19:26    
    
Inhibitor   muscles in      
    
   legs       
    
    
    
Family History (Reviewed 08/15/24 @ 13:44 by SAM Macdonald)    
Mother   CVA (cerebral vascular accident)    
   Dementia    
    
    
Surgical History (Reviewed 08/15/24 @ 13:44 by SAM Macdonald)    
    
Status post above-knee amputation of left lower extremity    
History of left knee replacement    
Hx of CABG    
    
    
Social History (Reviewed 08/15/24 @ 13:44 by SAM Macdonald)    
Smoking Status:  Never smoker     
alcohol intake:  never     
substance use type:  does not use     
what type of physical activity do you participate in:  none     
    
    
    
ROS    
Constitutional    
Constitutional: Denies chills or fever(s)    
Eyes    
Eyes: Denies blurry vision    
ENT    
HEENT: Denies abnormal hearing    
Cardiovascular    
Cardiovascular: Reports chest pain and dyspnea on exertion    
Respiratory/Chest    
Respiratory/Chest: Denies cough    
Gastrointestinal    
Gastrointestinal: Denies abdominal pain    
Genitourinary    
Genitourinary: Denies burning urination    
Musculoskeletal    
Musculoskeletal: Denies arthralgias    
Neurologic    
Neurologic: Denies abnormal gait    
Psychiatric    
Psychiatric: Denies anxiety    
Endocrine    
Endocrinology: Denies change in body appearance    
    
Physical Exam    
Const    
oriented x3    
HEENT    
normocephalic and head/scalp atraumatic    
Neck    
no lymphadenopathy    
Resp    
normal respiratory effort    
Cardio    
regular rate, regular rhythm, S1 normal heart sound and S2 normal heart sound    
GI    
normal to inspection, nondistended, normoactive bowel sounds    
Neuro    
oriented x3, moves all extremities and no focal motor deficits    
Psych    
affect normal    
    
Lab / Micro Data    
    
                                                        09/02/24 19:24              
    
                                                        09/02/24 19:24              
    
Labs:     
                         Laboratory Results - last 24 hr    
    
09/02/24 19:24: WBC 10.9, RBC 3.88 L, Hgb 10.8 L, Hct 35.1 L, MCV 90.5, MCH   
27.8, MCHC 30.8 L, RDW Std Deviation 48.6 H, RDW Coeff of Lexus 14.6, Plt Count   
274, MPV 10.0, Immature Gran % (Auto) 1.000 H, Neut % (Auto) 61.8, Lymph %   
(Auto) 25.3, Mono % (Auto) 8.7, Eos % (Auto) 2.6, Baso % (Auto) 0.6, Absolute   
Neuts (auto) 6.7, Absolute Lymphs (auto) 2.76, Nucleated RBC % 0, PT 13.7, INR   
1.0, APTT 22.8 L, Sodium 136, Potassium 4.2, Chloride 104, Carbon Dioxide 27.0,   
Anion Gap 5, BUN 19 H, Creatinine 1.11 H, Estim Creat Clear Calc 68.69, Est GFR   
(MDRD) Af Amer 64, Est GFR (MDRD) Non-Af 53 L, BUN/Creatinine Ratio 17.1, G  
lucose 372 H, Calcium 9.2, Troponin I High Sens 467 H*    
    
    
Imaging    
                              Radiology Impression    
    
Chest X-Ray  09/02/24 19:35    
IMPRESSION:    
     
No acute radiographic abnormalities.    
     
Electronically Signed:    
Christ Ruiz MD    
2024/09/02 at 19:51 EDT    
Reading Location ID and State: [id number] / [place]    
Tel [phone], Service support  [phone], Fax [phone]

## 2024-09-03 NOTE — PCM.CONS.GEN
Assessment & Plan
Assessment/Plan
(1) CAD (coronary artery disease): 
PLAN: 
Plan
1 coronary artery disease status post heart catheterization done acutely after ST elevation MI was called patient also has history of CABG and multiple stents.  Patient was found to have left main disease and determined to be more amenable to 
treatment at a larger facility with a backup cardiothoracic surgery..  Patient was stable at the time of my evaluation and transfer was pending to [facility] in [facility]

HPI
Consult Data
Date of Consult: 09/03/24
HPI Narrative
Reason for Consultation: medical management
HPI Narrative: 
JAYDEN MILES, is a 64 F who presents


[facility]
Medical History (Reviewed 08/15/24 @ 13:44 by Diana Elizondo, NP-C)

Cellulitis of left foot
Non-pressure chronic ulcer of other part of left foot with necrosis of bone
History of MI (myocardial infarction)
Diabetic foot infection
Diabetes mellitus
HLD (hyperlipidemia)
HTN (hypertension)
Myocardial infarct


Home Medications

?Medication ?Instructions ?Recorded ?Last Taken ?Type
clopidogrel 75 mg tablet 75 mg PO DAILY BLOOD THINNER 10/23/20 09/02/24 History
nitroglycerin 0.4 mg sublingual 0.4 mg sublingual Q5M PRN Chest 11/18/20 Unknown History
tablet Pain   
aspirin 81 mg chewable tablet 81 mg PO DAILY HEART HEALTH 12/22/22 09/02/24 History
bisacodyl 5 mg tablet 10 mg PO DAILY PRN Constipation 12/22/22 Unknown History
diltiazem HCl 180 mg 180 mg PO LUNCH BLOOD PRESSURE 12/22/22 09/02/24 History
capsule,extended release 24 hr    
duloxetine 60 mg capsule,delayed 60 mg PO BID DEPRESSION 12/22/22 09/02/24 History
release    
isosorbide mononitrate 60 mg 90 mg PO QHS ANGINA 12/22/22 12/21/22 History
tablet,extended release 24 hr    
buspirone 5 mg tablet 5 mg PO BID anxiety 04/11/23 09/02/24 History
gabapentin 100 mg capsule 600 mg PO TID NERVE PAIN 04/11/23 09/02/24 History
acetaminophen 325 mg tablet 650 mg (2 x 325 mg) PO Q4H PRN PRN 04/21/23 Unknown Rx
 Fever, pain 1-10/10 #0 tabs   
tamsulosin 0.4 mg capsule (Flomax) 0.4 mg PO DAILY urinary retention 10/17/23 09/02/24 History
insulin glargine-yfgn 100 unit/mL 75 unit (0.75 mL) subcut QAM 02/21/24 Unknown Rx
(3 mL) subcutaneous pen diabetes #22.5 mL   
pen needle, diabetic 32 gauge x #100 ea 02/21/24 Unknown Rx
5/32  (BD Ultra-Fine Beth Pen    
Needle)    
doxepin 10 mg capsule 20 mg PO QHS depression 08/15/24 09/02/24 History
flash glucose sensor (FreeStyle #6 ea 08/15/24 Unknown Rx
Tu 2 Sensor kit)    
pen needle, diabetic 29 gauge x #100 ea 08/15/24 Unknown Rx
1/2  (Ultra-Thin II Insulin Pen    
Needles)    
semaglutide 2 mg/dose (8 mg/3 mL) 2 mg (0.75 mL) subcut QWEEK DM #3 08/15/24 08/26/24 Rx
subcutaneous pen injector (Ozempic) mL  2 mg 
aluminum-mag hydroxide-simethicone 30 ml PO DAILY PRN constipation 09/02/24 Unknown History
200 mg-200 mg-20 mg/5 mL oral susp    
(Advanced Antacid-Antigas)    
carvedilol 12.5 mg tablet 12.5 mg PO BID blood pressure 09/02/24 09/02/24 History
dapagliflozin propanediol 10 mg 10 mg PO DAILY 09/02/24 Unknown History
tablet (Farxiga)    
insulin glargine 100 unit/mL (3 60 unit subcut QHS BLOOD SUGAR 09/02/24 Unknown History
mL) subcutaneous pen (Basaglar    
KwikPen U-100 Insulin)    
insulin lispro 100 unit/mL 20 unit subcut TIDCM BLOOD SUGAR 09/02/24 Unknown History
subcutaneous pen    
metformin 500 mg tablet 500 mg PO DAILY DM 09/02/24 09/02/24 History
zolpidem 10 mg tablet 10 mg PO QHS PRN PRN insomnia 09/02/24 Unknown History




Allergy/AdvReac Type Severity Reaction Status Date / Time
morphine Allergy Severe Other Verified 09/02/24 19:26
ACE Inhibitors Allergy   gets sick  Verified 09/02/24 19:26
hydrocodone (From Lortab) Allergy  Nausea/Vom/ Verified 09/02/24 19:26
   Diarrhea  
niacin (From Niaspan Allergy  Nausea Verified 09/02/24 19:26
Extended-Release)     
Statins-Hmg-Coa Reductase Allergy   locks up Verified 09/02/24 19:26
Inhibitor   muscles in  
   legs   


Family History (Reviewed 08/15/24 @ 13:44 by SAM Macdonald)
Mother
 CVA (cerebral vascular accident)
 Dementia


Surgical History (Reviewed 08/15/24 @ 13:44 by SAM Macdonald)

Status post above-knee amputation of left lower extremity
History of left knee replacement
Hx of CABG


Social History (Reviewed 08/15/24 @ 13:44 by SAM Macdonald)
Smoking Status:  Never smoker 
alcohol intake:  never 
substance use type:  does not use 
what type of physical activity do you participate in:  none 



ROS
Constitutional
Constitutional: Denies chills or fever(s)
Eyes
Eyes: Denies blurry vision
ENT
HEENT: Denies abnormal hearing
Cardiovascular
Cardiovascular: Reports chest pain and dyspnea on exertion
Respiratory/Chest
Respiratory/Chest: Denies cough
Gastrointestinal
Gastrointestinal: Denies abdominal pain
Genitourinary
Genitourinary: Denies burning urination
Musculoskeletal
Musculoskeletal: Denies arthralgias
Neurologic
Neurologic: Denies abnormal gait
Psychiatric
Psychiatric: Denies anxiety
Endocrine
Endocrinology: Denies change in body appearance

Physical Exam
Const
oriented x3
HEENT
normocephalic and head/scalp atraumatic
Neck
no lymphadenopathy
Resp
normal respiratory effort
Cardio
regular rate, regular rhythm, S1 normal heart sound and S2 normal heart sound
GI
normal to inspection, nondistended, normoactive bowel sounds
Neuro
oriented x3, moves all extremities and no focal motor deficits
Psych
affect normal

Lab / Micro Data

09/02/24 19:24          

09/02/24 19:24          

Labs: 
Laboratory Results - last 24 hr

09/02/24 19:24: WBC 10.9, RBC 3.88 L, Hgb 10.8 L, Hct 35.1 L, MCV 90.5, MCH 27.8, MCHC 30.8 L, RDW Std Deviation 48.6 H, RDW Coeff of Lex[place] 14.6, Plt Count 274, MPV 10.0, Immature Gran % (Auto) 1.000 H, Neut % (Auto) 61.8, Lymph % (Auto) 25.3, Mono % 
(Auto) 8.7, Eos % (Auto) 2.6, Baso % (Auto) 0.6, Absolute Neuts (auto) 6.7, Absolute Lymphs (auto) 2.76, Nucleated RBC % 0, PT 13.7, INR 1.0, APTT 22.8 L, Sodium 136, Potassium 4.2, Chloride 104, Carbon Dioxide 27.0, Anion Gap 5, BUN 19 H, 
Creatinine 1.11 H, Estim Creat Clear Calc 68.69, Est GFR (MDRD) Af Amer 64, Est GFR (MDRD) Non-Af 53 L, BUN/Creatinine Ratio 17.1, Glucose 372 H, Calcium 9.2, Troponin I High Sens 467 H*


Imaging
Radiology Impression

Chest X-Ray  09/02/24 19:35
IMPRESSION:
 
No acute radiographic abnormalities.
 
Electronically Signed:
Christ Ruiz MD
2024/09/02 at 19:51 EDT
Reading Location ID and State: [id number] / [place]
Tel [phone], Service support  [phone], Fax [phone]

## 2024-09-03 NOTE — NURSING
Report given to Florentino HOWARD at [facility]. Physicians arrived around 0230, gave report to the medic. Pt loaded onto cot and being transferred to transport monitors/IV pumps at this time.

## 2024-10-04 NOTE — CON.PCM.CA_ITS
HPI    
Consult Data    
Date of Consult: 09/02/24    
HPI Narrative    
HPI Narrative:     
Procedure Report    
Date of Procedure: 09/02/24    
Left heart catheterization;    
1.  Moderate sedation    
2.  Selective cholangiography    
3.  Selective right coronary angiography    
4.  Selective SVG to RCA    
5.  Selective SVG VG graft to OM1    
6.  Nonselective LIMA to LAD    
Access;    
Right radial artery approach    
Consent;    
Risk and benefit of the procedure explained informed consent obtained as patient  
presented as an emergency with chest pain and abnormal EKG.    
Preprocedure diagnosis patient is a 64-year-old with extensive cardiac history    
Has history of coronary artery bypass surgery/CABG 2013    
Subsequently has stent to the left main LAD and circumflex artery.    
She presented to the ED complaining of symptoms of chest pain, evidently the   
symptoms have been ongoing for the last 3 to 4 weeks and progressively getting   
worse    
The time she came to the ER she has a change in the EKG with diffuse ST   
depression and ST elevation noted in aVR.    
She was given nitroglycerin and Brilinta in the ER her symptoms of chest pain   
resolved with nitroglycerin sublingual    
Other medical problem patient had history of diabetes mellitus    
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   
foot and she had left these replacement which got infected and underwent   
amputation.    
She does not smoke.    
Diagnostic catheters;    
1.  6 [place] sheath placed in the right radial artery    
2.  5 [place] JL 3 oh    
3.  5 [place] JR4    
4.  5 [place] LCB    
Procedure in detail;    
Patient brought to the emergency to the Cath Lab.  [facility]    
Access obtained from the right radial artery and a cocktail of verapamil,   
heparin as well as nitroglycerin was given through the right radial artery   
sheath    
Unguinal proceed with the diagnostic catheter which is 5 [place]  advancing  
[place] cannulated the left main multiple views (to obtain    
Following this catheter exchanged for 5 [place] JR4 and cannulated the RCA in the  
native RCA    
The same catheter was used to cannulate the SVG graft RCA.    
LCB catheter was used and selectively engage the SVG graft to OM1 which is   
occluded proximally    
The 5 [place] JR4 was used to nonselective LIMA to LAD through the left   
subclavian angiography.    
JR4 catheter was used to cross the aortic valve in place in the mid ventricle    
LVEDP was measured and a pullback pressure was recorded.    
Hemodynamic;    
1.  LVEDP measured 15 mmHg    
2.  No systolic gradient across aortic valve.    
No systolic gradient across aortic valve'    
    
Coronary angiography;    
1.  Left main stent has diffuse 90% stenosis    
It bifurcates into LAD and left circumflex    
2.  Left anterior descending artery stent is occluded mid    
3.  Left circumflex stent occluded proximally    
4.  Native RCA occluded proximally    
Graft angiography;    
1.  LIMA to LAD is patent    
2.  SVG graft to RCA is patent    
3.  SVG graft to the OM1 is occluded    
Conclusion recommendation    
64-year-old female with severe coronary atherosclerosis and prior coronary   
artery bypass surgery    
Multiple coronary artery stent to the left main LAD and left circumflex    
Presented with severe retrosternal chest pain typical of angina and had change   
in the EKG with diffuse ST depression noted    
She has been off her nitroglycerin and her symptoms has been ongoing for 3 to 4   
weeks    
Based on the change in the EKG and her significant history she was taken as an   
emergency to the Cath Lab and the cardiac catheterization demonstrated showed   
severe in-stent stenosis of the left main stent around 90% distally.    
Patient continues to have symptoms of chest pain.    
Patient will be admitted to the intensive care unit on heparin nitroglycerin    
TR band applied to right radial artery arteriotomy site    
I talked to the referring facility at the [facility]    
And the patient is awaiting for bed for transfer as she may require a PCI of the  
in-stent restenosis of the left main.    
Which is a complex history and will need and require high risk PCI    
Her LV systolic function need to be evaluated prior to that and she may require   
hemodynamic support for PCI of the left main which is the protected left main.    
I discussed cardiac catheterization finding with the patient as well as the   
family and her daughter-in-law.  She had symptoms of chest pain and was started   
on nitroglycerin and morphine and heparin.  Will continue on the antiplatelet   
with aspirin and Brilinta as well as the rest of her medication.    
She will be admitted to the intensive care unit under care of the hospitalist.    
No complication in the Cath Lab    
Lux Guerrero MD,[facility]    
Medical History (Reviewed 08/15/24 @ 13:44 by Diana Elizondo NP-BEAN)    
    
Cellulitis of left foot    
Non-pressure chronic ulcer of other part of left foot with necrosis of bone    
History of MI (myocardial infarction)    
Diabetic foot infection    
Diabetes mellitus    
HLD (hyperlipidemia)    
HTN (hypertension)    
Myocardial infarct    
    
    
                                Home Medications    
    
    
    
?Medication ?Instructions ?Recorded ?Last Taken ?Type    
     
clopidogrel 75 mg tablet 75 mg PO DAILY BLOOD THINNER 10/23/20 09/02/24 History    
     
nitroglycerin 0.4 mg sublingual 0.4 mg sublingual Q5M PRN Chest 11/18/20 Unknown  
History    
    
tablet Pain       
     
aspirin 81 mg chewable tablet 81 mg PO DAILY HEART HEALTH 12/22/22 09/02/24   
History    
     
bisacodyl 5 mg tablet 10 mg PO DAILY PRN Constipation 12/22/22 Unknown History    
     
diltiazem HCl 180 mg 180 mg PO LUNCH BLOOD PRESSURE 12/22/22 09/02/24 History    
    
capsule,extended release 24 hr        
     
duloxetine 60 mg capsule,delayed 60 mg PO BID DEPRESSION 12/22/22 09/02/24   
History    
    
release        
     
isosorbide mononitrate 60 mg 90 mg PO QHS ANGINA 12/22/22 12/21/22 History    
    
tablet,extended release 24 hr        
     
buspirone 5 mg tablet 5 mg PO BID anxiety 04/11/23 09/02/24 History    
     
gabapentin 100 mg capsule 600 mg PO TID NERVE PAIN 04/11/23 09/02/24 History    
     
acetaminophen 325 mg tablet 650 mg (2 x 325 mg) PO Q4H PRN PRN 04/21/23 Unknown   
Rx    
    
 Fever, pain 1-10/10 #0 tabs       
     
tamsulosin 0.4 mg capsule (Flomax) 0.4 mg PO DAILY urinary retention 10/17/23   
09/02/24 History    
     
insulin glargine-yfgn 100 unit/mL 75 unit (0.75 mL) subcut QAM 02/21/24 Unknown   
Rx    
    
(3 mL) subcutaneous pen diabetes #22.5 mL       
     
pen needle, diabetic 32 gauge x #100 ea 02/21/24 Unknown Rx    
    
5/32  (BD Ultra-Fine Beth Pen        
    
Needle)        
     
doxepin 10 mg capsule 20 mg PO QHS depression 08/15/24 09/02/24 History    
     
flash glucose sensor (FreeStyle #6 ea 08/15/24 Unknown Rx    
    
Tu 2 Sensor kit)        
     
pen needle, diabetic 29 gauge x #100 ea 08/15/24 Unknown Rx    
    
1/2  (Ultra-Thin II Insulin Pen        
    
Needles)        
     
semaglutide 2 mg/dose (8 mg/3 mL) 2 mg (0.75 mL) subcut QWEEK DM #3 08/15/24   
08/26/24 Rx    
    
subcutaneous pen injector (Ozempic) mL  2 mg     
     
aluminum-mag hydroxide-simethicone 30 ml PO DAILY PRN constipation 09/02/24   
Unknown History    
    
200 mg-200 mg-20 mg/5 mL oral susp        
    
(Advanced Antacid-Antigas)        
     
carvedilol 12.5 mg tablet 12.5 mg PO BID blood pressure 09/02/24 09/02/24   
History    
     
dapagliflozin propanediol 10 mg 10 mg PO DAILY 09/02/24 Unknown History    
    
tablet (Farxiga)        
     
insulin glargine 100 unit/mL (3 60 unit subcut QHS BLOOD SUGAR 09/02/24 Unknown   
History    
    
mL) subcutaneous pen (Basaglar        
    
KwikPen U-100 Insulin)        
     
insulin lispro 100 unit/mL 20 unit subcut TIDCM BLOOD SUGAR 09/02/24 Unknown   
History    
    
subcutaneous pen        
     
metformin 500 mg tablet 500 mg PO DAILY DM 09/02/24 09/02/24 History    
     
zolpidem 10 mg tablet 10 mg PO QHS PRN PRN insomnia 09/02/24 Unknown History    
    
    
    
                                            
    
    
    
Allergy/AdvReac Type Severity Reaction Status Date / Time    
     
morphine Allergy Severe Other Verified 09/02/24 19:26    
     
ACE Inhibitors Allergy   gets sick  Verified 09/02/24 19:26    
     
hydrocodone (From Lortab) Allergy  Nausea/Vom/ Verified 09/02/24 19:26    
    
   Diarrhea      
     
niacin (From Niaspan Allergy  Nausea Verified 09/02/24 19:26    
    
Extended-Release)         
     
Statins-HMG-CoA Reductase Allergy   locks up Verified 09/02/24 19:26    
    
Inhibitor (Statins-Hmg-Coa   muscles in      
    
Reductase Inhibitor)   legs       
    
    
    
Family History (Reviewed 08/15/24 @ 13:44 by Diana Elizondo NP-C)    
Mother   CVA (cerebral vascular accident)    
   Dementia    
    
    
Surgical History (Reviewed 08/15/24 @ 13:44 by Diana Elizondo NP-BEAN)    
    
Status post above-knee amputation of left lower extremity    
History of left knee replacement    
Hx of CABG    
    
    
Social History (Reviewed 08/15/24 @ 13:44 by Diana Elizondo NP-C)    
Smoking Status:  Never smoker     
alcohol intake:  never     
substance use type:  does not use     
what type of physical activity do you participate in:  none     
    
    
    
Risk Stratification    
Risk Stratification Applicable: No    
    
Objective Data    
Vital Signs:     
Vital Signs    
    
    
    
Temp Pulse Resp BP Pulse Ox O2 Del Method O2 Flow Rate    
     
 98.3 F   72   17   118/55 L  95   Room Air   2     
     
 09/03/24 00:00  09/03/24 02:00  09/03/24 02:00  09/03/24 02:00  09/03/24 02:00   
09/03/24 02:00  09/02/24 23:45    
    
    
    
    
Oxygen Flow Rate (L/min)         2                                                
       
Oxygen Delivery Method           Room Air                                         
       
Weight:                          251 lb 15.814 oz                                 
       
Body Mass Index (BMI)            39.4                                             
       
    
    
Lab / Micro Data    
    
                                                        09/02/24 19:24              
    
                                                        09/02/24 19:24              
    
Cardiology Labs/Tests    
Rhythm:    
    
EKG:    
    
ECHO:    
    
Stress Test:    
    
Cardiac Cath:    
    
PCI:    
    
CT Surgery:    
    
Holter monitor:    
    
EPS:    
    
PPM:    
    
CXR:    
    
Chest CT Scan:

## 2024-10-04 NOTE — PCM.CONS.C
HPI
Consult Data
Date of Consult: 09/02/24
HPI Narrative
HPI Narrative: 
Procedure Report
Date of Procedure: 09/02/24
Left heart catheterization;
1.  Moderate sedation
2.  Selective cholangiography
3.  Selective right coronary angiography
4.  Selective SVG to RCA
5.  Selective SVG VG graft to OM1
6.  Nonselective LIMA to LAD
Access;
Right radial artery approach
Consent;
Risk and benefit of the procedure explained informed consent obtained as patient presented as an emergency with chest pain and abnormal EKG.
Preprocedure diagnosis patient is a 64-year-old with extensive cardiac history
Has history of coronary artery bypass surgery/CABG 2013
Subsequently has stent to the left main LAD and circumflex artery.
She presented to the ED complaining of symptoms of chest pain, evidently the symptoms have been ongoing for the last 3 to 4 weeks and progressively getting worse
The time she came to the ER she has a change in the EKG with diffuse ST depression and ST elevation noted in aVR.
She was given nitroglycerin and Brilinta in the ER her symptoms of chest pain resolved with nitroglycerin sublingual
Other medical problem patient had history of diabetes mellitus
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
She does not smoke.
Diagnostic catheters;
1.  6 [place] sheath placed in the right radial artery
2.  5 [place]an JL 3 oh
3.  5 [place] JR4
4.  5 [place] LCB
Procedure in detail;
Patient brought to the emergency to the Cath Lab.  [facility]
Access obtained from the right radial artery and a cocktail of verapamil, heparin as well as nitroglycerin was given through the right radial artery sheath
Unguinal proceed with the diagnostic catheter which is 5 [place]an  advancing Sciota cannulated the left main multiple views (to obtain
Following this catheter exchanged for 5 [place]an JR4 and cannulated the RCA in the native RCA
The same catheter was used to cannulate the SVG graft RCA.
LCB catheter was used and selectively engage the SVG graft to OM1 which is occluded proximally
The 5 [place]an JR4 was used to nonselective LIMA to LAD through the left subclavian angiography.
JR4 catheter was used to cross the aortic valve in place in the mid ventricle
LVEDP was measured and a pullback pressure was recorded.
Hemodynamic;
1.  LVEDP measured 15 mmHg
2.  No systolic gradient across aortic valve.
No systolic gradient across aortic valve'

Coronary angiography;
1.  Left main stent has diffuse 90% stenosis
It bifurcates into LAD and left circumflex
2.  Left anterior descending artery stent is occluded mid
3.  Left circumflex stent occluded proximally
4.  Native RCA occluded proximally
Graft angiography;
1.  LIMA to LAD is patent
2.  SVG graft to RCA is patent
3.  SVG graft to the OM1 is occluded
Conclusion recommendation
64-year-old female with severe coronary atherosclerosis and prior coronary artery bypass surgery
Multiple coronary artery stent to the left main LAD and left circumflex
Presented with severe retrosternal chest pain typical of angina and had change in the EKG with diffuse ST depression noted
She has been off her nitroglycerin and her symptoms has been ongoing for 3 to 4 weeks
Based on the change in the EKG and her significant history she was taken as an emergency to the Cath Lab and the cardiac catheterization demonstrated showed severe in-stent stenosis of the left main stent around 90% distally.
Patient continues to have symptoms of chest pain.
Patient will be admitted to the intensive care unit on heparin nitroglycerin
TR band applied to right radial artery arteriotomy site
I talked to the referring facility at the [facility]
And the patient is awaiting for bed for transfer as she may require a PCI of the in-stent restenosis of the left main.
Which is a complex history and will need and require high risk PCI
Her LV systolic function need to be evaluated prior to that and she may require hemodynamic support for PCI of the left main which is the protected left main.
I discussed cardiac catheterization finding with the patient as well as the family and her daughter-in-law.  She had symptoms of chest pain and was started on nitroglycerin and morphine and heparin.  Will continue on the antiplatelet with aspirin 
and Brilinta as well as the rest of her medication.
She will be admitted to the intensive care unit under care of the hospitalist.
No complication in the Cath Lab
Lux Guerrero MD,[facility] with [facility]
Medical History (Reviewed 08/15/24 @ 13:44 by Diana Elizondo NP-BEAN)

Cellulitis of left foot
Non-pressure chronic ulcer of other part of left foot with necrosis of bone
History of MI (myocardial infarction)
Diabetic foot infection
Diabetes mellitus
HLD (hyperlipidemia)
HTN (hypertension)
Myocardial infarct


Home Medications

?Medication ?Instructions ?Recorded ?Last Taken ?Type
clopidogrel 75 mg tablet 75 mg PO DAILY BLOOD THINNER 10/23/20 09/02/24 History
nitroglycerin 0.4 mg sublingual 0.4 mg sublingual Q5M PRN Chest 11/18/20 Unknown History
tablet Pain   
aspirin 81 mg chewable tablet 81 mg PO DAILY HEART HEALTH 12/22/22 09/02/24 History
bisacodyl 5 mg tablet 10 mg PO DAILY PRN Constipation 12/22/22 Unknown History
diltiazem HCl 180 mg 180 mg PO LUNCH BLOOD PRESSURE 12/22/22 09/02/24 History
capsule,extended release 24 hr    
duloxetine 60 mg capsule,delayed 60 mg PO BID DEPRESSION 12/22/22 09/02/24 History
release    
isosorbide mononitrate 60 mg 90 mg PO QHS ANGINA 12/22/22 12/21/22 History
tablet,extended release 24 hr    
buspirone 5 mg tablet 5 mg PO BID anxiety 04/11/23 09/02/24 History
gabapentin 100 mg capsule 600 mg PO TID NERVE PAIN 04/11/23 09/02/24 History
acetaminophen 325 mg tablet 650 mg (2 x 325 mg) PO Q4H PRN PRN 04/21/23 Unknown Rx
 Fever, pain 1-10/10 #0 tabs   
tamsulosin 0.4 mg capsule (Flomax) 0.4 mg PO DAILY urinary retention 10/17/23 09/02/24 History
insulin glargine-yfgn 100 unit/mL 75 unit (0.75 mL) subcut QAM 02/21/24 Unknown Rx
(3 mL) subcutaneous pen diabetes #22.5 mL   
pen needle, diabetic 32 gauge x #100 ea 02/21/24 Unknown Rx
5/32  (BD Ultra-Fine Beth Pen    
Needle)    
doxepin 10 mg capsule 20 mg PO QHS depression 08/15/24 09/02/24 History
flash glucose sensor (FreeStyle #6 ea 08/15/24 Unknown Rx
Tu 2 Sensor kit)    
pen needle, diabetic 29 gauge x #100 ea 08/15/24 Unknown Rx
1/2  (Ultra-Thin II Insulin Pen    
Needles)    
semaglutide 2 mg/dose (8 mg/3 mL) 2 mg (0.75 mL) subcut QWEEK DM #3 08/15/24 08/26/24 Rx
subcutaneous pen injector (Ozempic) mL  2 mg 
aluminum-mag hydroxide-simethicone 30 ml PO DAILY PRN constipation 09/02/24 Unknown History
200 mg-200 mg-20 mg/5 mL oral susp    
(Advanced Antacid-Antigas)    
carvedilol 12.5 mg tablet 12.5 mg PO BID blood pressure 09/02/24 09/02/24 History
dapagliflozin propanediol 10 mg 10 mg PO DAILY 09/02/24 Unknown History
tablet (Farxiga)    
insulin glargine 100 unit/mL (3 60 unit subcut QHS BLOOD SUGAR 09/02/24 Unknown History
mL) subcutaneous pen (Basaglar    
KwikPen U-100 Insulin)    
insulin lispro 100 unit/mL 20 unit subcut TIDCM BLOOD SUGAR 09/02/24 Unknown History
subcutaneous pen    
metformin 500 mg tablet 500 mg PO DAILY DM 09/02/24 09/02/24 History
zolpidem 10 mg tablet 10 mg PO QHS PRN PRN insomnia 09/02/24 Unknown History




Allergy/AdvReac Type Severity Reaction Status Date / Time
morphine Allergy Severe Other Verified 09/02/24 19:26
ACE Inhibitors Allergy   gets sick  Verified 09/02/24 19:26
hydrocodone (From Lortab) Allergy  Nausea/Vom/ Verified 09/02/24 19:26
   Diarrhea  
niacin (From Niaspan Allergy  Nausea Verified 09/02/24 19:26
Extended-Release)     
Statins-HMG-CoA Reductase Allergy   locks up Verified 09/02/24 19:26
Inhibitor (Statins-Hmg-Coa   muscles in  
Reductase Inhibitor)   legs   


Family History (Reviewed 08/15/24 @ 13:44 by Diana Elizondo NP-C)
Mother
 CVA (cerebral vascular accident)
 Dementia


Surgical History (Reviewed 08/15/24 @ 13:44 by Diana Elizondo NP-BEAN)

Status post above-knee amputation of left lower extremity
History of left knee replacement
Hx of CABG


Social History (Reviewed 08/15/24 @ 13:44 by Diana Elizondo NP-C)
Smoking Status:  Never smoker 
alcohol intake:  never 
substance use type:  does not use 
what type of physical activity do you participate in:  none 



Risk Stratification
Risk Stratification Applicable: No

Objective Data
Vital Signs: 
Vital Signs

Temp Pulse Resp BP Pulse Ox O2 Del Method O2 Flow Rate
 98.3 F   72   17   118/55 L  95   Room Air   2 
 09/03/24 00:00  09/03/24 02:00  09/03/24 02:00  09/03/24 02:00  09/03/24 02:00  09/03/24 02:00  09/02/24 23:45



Oxygen Flow Rate (L/min)       2                                                
Oxygen Delivery Method         Room Air                                         
Weight:                        251 lb 15.814 oz                                 
Body Mass Index (BMI)          39.4                                             


Lab / Micro Data

09/02/24 19:24          

09/02/24 19:24          

Cardiology Labs/Tests
Rhythm:

EKG:

ECHO:

Stress Test:

Cardiac Cath:

PCI:

CT Surgery:

Holter monitor:

EPS:

PPM:

CXR:

Chest CT Scan: